# Patient Record
Sex: MALE | Race: WHITE | Employment: OTHER | URBAN - METROPOLITAN AREA
[De-identification: names, ages, dates, MRNs, and addresses within clinical notes are randomized per-mention and may not be internally consistent; named-entity substitution may affect disease eponyms.]

---

## 2017-01-13 DIAGNOSIS — E78.5 HYPERLIPIDEMIA LDL GOAL <100: ICD-10-CM

## 2017-01-13 DIAGNOSIS — E03.9 HYPOTHYROIDISM, UNSPECIFIED TYPE: ICD-10-CM

## 2017-01-13 DIAGNOSIS — Z11.59 NEED FOR HEPATITIS C SCREENING TEST: ICD-10-CM

## 2017-01-13 LAB
CHOLEST SERPL-MCNC: 227 MG/DL
HCV AB SERPL QL IA: NORMAL
HDLC SERPL-MCNC: 75 MG/DL
LDLC SERPL CALC-MCNC: 130 MG/DL
NONHDLC SERPL-MCNC: 152 MG/DL
TRIGL SERPL-MCNC: 110 MG/DL
TSH SERPL DL<=0.005 MIU/L-ACNC: 3.2 MU/L (ref 0.4–4)

## 2017-01-13 PROCEDURE — 36415 COLL VENOUS BLD VENIPUNCTURE: CPT | Performed by: INTERNAL MEDICINE

## 2017-01-13 PROCEDURE — 84443 ASSAY THYROID STIM HORMONE: CPT | Performed by: INTERNAL MEDICINE

## 2017-01-13 PROCEDURE — 80061 LIPID PANEL: CPT | Performed by: INTERNAL MEDICINE

## 2017-01-13 PROCEDURE — 86803 HEPATITIS C AB TEST: CPT | Performed by: INTERNAL MEDICINE

## 2017-01-20 ENCOUNTER — OFFICE VISIT (OUTPATIENT)
Dept: INTERNAL MEDICINE | Facility: CLINIC | Age: 67
End: 2017-01-20
Payer: COMMERCIAL

## 2017-01-20 VITALS
TEMPERATURE: 97.9 F | BODY MASS INDEX: 25.33 KG/M2 | DIASTOLIC BLOOD PRESSURE: 74 MMHG | WEIGHT: 187 LBS | HEART RATE: 75 BPM | HEIGHT: 72 IN | SYSTOLIC BLOOD PRESSURE: 138 MMHG | OXYGEN SATURATION: 96 %

## 2017-01-20 DIAGNOSIS — E03.9 HYPOTHYROIDISM, UNSPECIFIED TYPE: ICD-10-CM

## 2017-01-20 DIAGNOSIS — E78.5 HYPERLIPIDEMIA LDL GOAL <100: ICD-10-CM

## 2017-01-20 DIAGNOSIS — N18.30 CKD (CHRONIC KIDNEY DISEASE) STAGE 3, GFR 30-59 ML/MIN (H): ICD-10-CM

## 2017-01-20 DIAGNOSIS — I10 ESSENTIAL HYPERTENSION: ICD-10-CM

## 2017-01-20 PROCEDURE — 99214 OFFICE O/P EST MOD 30 MIN: CPT | Performed by: INTERNAL MEDICINE

## 2017-01-20 RX ORDER — PRAVASTATIN SODIUM 40 MG
40 TABLET ORAL DAILY
Qty: 90 TABLET | Refills: 3 | Status: SHIPPED | OUTPATIENT
Start: 2017-01-20 | End: 2017-10-13 | Stop reason: ALTCHOICE

## 2017-01-20 RX ORDER — LOSARTAN POTASSIUM 100 MG/1
100 TABLET ORAL DAILY
Qty: 90 TABLET | Refills: 3 | Status: SHIPPED | OUTPATIENT
Start: 2017-01-20 | End: 2018-03-09

## 2017-01-20 RX ORDER — LEVOTHYROXINE SODIUM 100 UG/1
100 TABLET ORAL DAILY
Qty: 90 TABLET | Refills: 3 | Status: SHIPPED | OUTPATIENT
Start: 2017-01-20 | End: 2018-04-07

## 2017-01-20 RX ORDER — HYDROCHLOROTHIAZIDE 25 MG/1
25 TABLET ORAL DAILY
Qty: 90 TABLET | Refills: 3 | Status: SHIPPED | OUTPATIENT
Start: 2017-01-20 | End: 2018-04-07

## 2017-01-20 NOTE — NURSING NOTE
Chief Complaint   Patient presents with     Results       Initial /82 mmHg  Pulse 75  Temp(Src) 97.9  F (36.6  C) (Oral)  Ht 6' (1.829 m)  Wt 187 lb (84.823 kg)  BMI 25.36 kg/m2  SpO2 96% Estimated body mass index is 25.36 kg/(m^2) as calculated from the following:    Height as of this encounter: 6' (1.829 m).    Weight as of this encounter: 187 lb (84.823 kg).  BP completed using cuff size: regular

## 2017-01-20 NOTE — PROGRESS NOTES
SUBJECTIVE:                                                    Agapito Fairbanks is a 66 year old male who presents to clinic today for the following health issues:      Lab results  Pt's past medical history, family history, habits, medications and allergies were reviewed with the patient today.  See snap shot for  HCM status. Most recent lab results reviewed with pt. Problem list and histories reviewed & adjusted, as indicated.  Additional history as below:    Denies chest pain, shortness of breath, abdominal pain, headache, vision changes or side effects with medications.  Patient ran out of losartan four days ago. Blood pressure elevated today off of the medication. Energy good. Did not ever start Pravastatin as previously discussed    GLC      106   12/2/2016  A1C      5.7   6/15/2015  CHOL      227   1/13/2017  LDL      130   1/13/2017  HDL       75   1/13/2017  TRIG      110   1/13/2017  CR     1.30   12/2/2016  ALT       29   12/2/2016  AST       15   12/2/2016  MICROL        5   11/24/2015  TSH     3.20   1/13/2017       No identified additional risks  [place] 10-year CHD Risk Score: 12% (13 Total Points)   Values used to calculate score:     Age: 66 years -- Points: 11     Total Cholesterol: 227 mg/dL -- Points: 1     HDL Cholesterol: 75 mg/dL -- Points: -1     Systolic BP (treated): 138 mmHg -- Points: 2     The patient is not a smoker. -- Points: 0     The patient has not been diagnosed with diabetes. -- Points: 0     The patient does not have a family history of CHD. -- Points:0     Additional ROS:   Constitutional, HEENT, Cardiovascular, Pulmonary, GI and , Neuro, MSK and Psych review of systems/symptoms are otherwise negative or unchanged from previous, except as noted above.      OBJECTIVE:  /82 mmHg  Pulse 75  Temp(Src) 97.9  F (36.6  C) (Oral)  Ht 6' (1.829 m)  Wt 187 lb (84.823 kg)  BMI 25.36 kg/m2  SpO2 96%   Estimated body mass index is 25.36 kg/(m^2) as calculated from the  following:    Height as of this encounter: 6' (1.829 m).    Weight as of this encounter: 187 lb (84.823 kg).  Eye: PERRL, EOMI  HENT: ear canals and TM's normal and nose and mouth without ulcers or lesions   Neck: no adenopathy. Thyroid normal to palpation. No bruits  Pulm: Lungs clear to auscultation   CV: Regular rates and rhythm  GI: Soft, nontender, Normal active bowel sounds, No hepatosplenomegaly or masses palpable  Ext: Peripheral pulses intact. No edema.  Neuro: Normal strength and tone, sensory exam grossly normal    Assessment/Plan:   1. Hyperlipidemia LDL goal <100   start statin therapy with CAD risk elevation. See plan discussion below  - pravastatin (PRAVACHOL) 40 MG tablet; Take 1 tablet (40 mg) by mouth daily  Dispense: 90 tablet; Refill: 3  - Lipid panel reflex to direct LDL; Future  - Hepatic panel; Future  - CK total; Future    2. Essential hypertension   blood pressure elevated today but off medication currently. Restart Losartan. Future BP recheck as below  - losartan (COZAAR) 100 MG tablet; Take 1 tablet (100 mg) by mouth daily  Dispense: 90 tablet; Refill: 3  - hydrochlorothiazide (HYDRODIURIL) 25 MG tablet; Take 1 tablet (25 mg) by mouth daily  Dispense: 90 tablet; Refill: 3  - Basic metabolic panel; Future    3. Hypothyroidism, unspecified type   Stable. Continue current medication  - levothyroxine (SYNTHROID/LEVOTHROID) 100 MCG tablet; Take 1 tablet (100 mcg) by mouth daily  Dispense: 90 tablet; Refill: 3    4. CKD (chronic kidney disease) stage 3, GFR 30-59 ml/min   stable. Future labs ordered  - Basic metabolic panel; Future      Plan discussion:  Pravastatin 40mg tab, 1 tab daily in PM  Restart Losartan for blood pressure control.  Continue other medications  Fasting labs in 4-6 weeks for cholesterol, liver. Call earlier  If side effects with med  Nonfasting kidney lab mid July. See me a few days later to review  results and blood pressure control back on Losartan       Los Love,  MD  Internal Medicine Department  [facility]

## 2017-01-20 NOTE — MR AVS SNAPSHOT
After Visit Summary   1/20/2017    Agapito Fairbanks    MRN: [id number]           Patient Information     Date Of Birth          1950        Visit Information        Provider Department      1/20/2017 10:30 AM Los Love MD [facility]        Today's Diagnoses     Advanced directives, counseling/discussion    -  1     Hyperlipidemia LDL goal <100         Essential hypertension         Hypothyroidism, unspecified type         CKD (chronic kidney disease) stage 3, GFR 30-59 ml/min           Care Instructions    Pravastatin 40mg tab, 1 tab daily in PM  Fasting labs in 4-6 weeks for cholesterol, liver. Call earlier  If side effects with med  Nonfasting kidney lab mid July. See me a few days later to review  Results and blood pressure              Follow-ups after your visit        Future tests that were ordered for you today     Open Future Orders        Priority Expected Expires Ordered    Lipid panel reflex to direct LDL Routine 2/19/2017 1/20/2018 1/20/2017    Hepatic panel Routine 2/19/2017 1/20/2018 1/20/2017    CK total Routine 2/19/2017 1/20/2018 1/20/2017    Basic metabolic panel Routine 7/19/2017 11/16/2017 1/20/2017            Who to contact     If you have questions or need follow up information about today's clinic visit or your schedule please contact [facility] directly at [phone].  Normal or non-critical lab and imaging results will be communicated to you by MyChart, letter or phone within 4 business days after the clinic has received the results. If you do not hear from us within 7 days, please contact the clinic through MyChart or phone. If you have a critical or abnormal lab result, we will notify you by phone as soon as possible.  Submit refill requests through [employer] or call your pharmacy and they will forward the refill request to us. Please allow 3 business days for your refill to be completed.          Additional  "Information About Your Visit        MyChart Information     esolidar lets you send messages to your doctor, view your test results, renew your prescriptions, schedule appointments and more. To sign up, go to [email or web address] . Click on \"Log in\" on the left side of the screen, which will take you to the Welcome page. Then click on \"Sign up Now\" on the right side of the page.     You will be asked to enter the access code listed below, as well as some personal information. Please follow the directions to create your username and password.     Your access code is: [email or web address]  Expires: 2017 11:00 AM     Your access code will  in 90 days. If you need help or a new code, please call your [facility] clinic or [phone].        Care EveryWhere ID     This is your Care EveryWhere ID. This could be used by other organizations to access your [facility] medical records  [id number]        Your Vitals Were     Pulse Temperature Height BMI (Body Mass Index) Pulse Oximetry       75 97.9  F (36.6  C) (Oral) 6' (1.829 m) 25.36 kg/m2 96%        Blood Pressure from Last 3 Encounters:   17 138/74   16 142/84   16 126/82    Weight from Last 3 Encounters:   17 187 lb (84.823 kg)   16 187 lb (84.823 kg)   16 186 lb 4.8 oz (84.505 kg)                 Where to get your medicines      These medications were sent to [facility] [address] [place] [address] - [address] [facility]  [address] [facility] [address]     Phone:  [phone]    - hydrochlorothiazide 25 MG tablet  - levothyroxine 100 MCG tablet  - losartan 100 MG tablet  - pravastatin 40 MG tablet       Primary Care Provider Office Phone # Fax #    Los Love [phone]       [facility] [address]  [facility] [address]        Thank you!     Thank you for choosing [facility]  for your care. Our goal is always to provide you with excellent care. Hearing " back from our patients is one way we can continue to improve our services. Please take a few minutes to complete the written survey that you may receive in the mail after your visit with us. Thank you!             Your Updated Medication List - Protect others around you: Learn how to safely use, store and throw away your medicines at www.disposemymeds.org.          This list is accurate as of: 1/20/17 11:20 AM.  Always use your most recent med list.                   Brand Name Dispense Instructions for use    hydrochlorothiazide 25 MG tablet    HYDRODIURIL    90 tablet    Take 1 tablet (25 mg) by mouth daily       levothyroxine 100 MCG tablet    SYNTHROID/LEVOTHROID    90 tablet    Take 1 tablet (100 mcg) by mouth daily       losartan 100 MG tablet    COZAAR    90 tablet    Take 1 tablet (100 mg) by mouth daily       MULTI VITAMIN MENS PO      1 TABLET DAILY       pravastatin 40 MG tablet    PRAVACHOL    90 tablet    Take 1 tablet (40 mg) by mouth daily       triamcinolone 0.1 % cream    KENALOG    80 g    Apply sparingly to affected area three times daily as needed

## 2017-04-28 DIAGNOSIS — L30.9 DERMATITIS: ICD-10-CM

## 2017-04-28 RX ORDER — TRIAMCINOLONE ACETONIDE 1 MG/G
CREAM TOPICAL
Qty: 80 G | Refills: 3 | Status: SHIPPED | OUTPATIENT
Start: 2017-04-28 | End: 2017-12-11

## 2017-05-08 ENCOUNTER — OFFICE VISIT (OUTPATIENT)
Dept: URGENT CARE | Facility: URGENT CARE | Age: 67
End: 2017-05-08
Payer: COMMERCIAL

## 2017-05-08 VITALS
WEIGHT: 185.8 LBS | OXYGEN SATURATION: 96 % | DIASTOLIC BLOOD PRESSURE: 90 MMHG | SYSTOLIC BLOOD PRESSURE: 140 MMHG | TEMPERATURE: 98.1 F | BODY MASS INDEX: 25.2 KG/M2 | HEART RATE: 87 BPM

## 2017-05-08 DIAGNOSIS — L30.9 DERMATITIS: ICD-10-CM

## 2017-05-08 DIAGNOSIS — R42 LIGHTHEADEDNESS: Primary | ICD-10-CM

## 2017-05-08 DIAGNOSIS — N18.30 CKD (CHRONIC KIDNEY DISEASE) STAGE 3, GFR 30-59 ML/MIN (H): ICD-10-CM

## 2017-05-08 DIAGNOSIS — E87.1 HYPONATREMIA: ICD-10-CM

## 2017-05-08 DIAGNOSIS — R42 DIZZINESS: ICD-10-CM

## 2017-05-08 DIAGNOSIS — L29.9 ITCHING: ICD-10-CM

## 2017-05-08 LAB
ALBUMIN SERPL-MCNC: 4 G/DL (ref 3.4–5)
ALP SERPL-CCNC: 78 U/L (ref 40–150)
ALT SERPL W P-5'-P-CCNC: 26 U/L (ref 0–70)
ANION GAP SERPL CALCULATED.3IONS-SCNC: 8 MMOL/L (ref 3–14)
AST SERPL W P-5'-P-CCNC: 17 U/L (ref 0–45)
BASOPHILS # BLD AUTO: 0 10E9/L (ref 0–0.2)
BASOPHILS NFR BLD AUTO: 0.4 %
BILIRUB SERPL-MCNC: 1.2 MG/DL (ref 0.2–1.3)
BUN SERPL-MCNC: 16 MG/DL (ref 7–30)
CALCIUM SERPL-MCNC: 8.9 MG/DL (ref 8.5–10.1)
CHLORIDE SERPL-SCNC: 93 MMOL/L (ref 94–109)
CO2 SERPL-SCNC: 29 MMOL/L (ref 20–32)
CREAT SERPL-MCNC: 1.31 MG/DL (ref 0.66–1.25)
DIFFERENTIAL METHOD BLD: NORMAL
EOSINOPHIL # BLD AUTO: 0.1 10E9/L (ref 0–0.7)
EOSINOPHIL NFR BLD AUTO: 1.6 %
ERYTHROCYTE [DISTWIDTH] IN BLOOD BY AUTOMATED COUNT: 12.9 % (ref 10–15)
GFR SERPL CREATININE-BSD FRML MDRD: 54 ML/MIN/1.7M2
GLUCOSE SERPL-MCNC: 104 MG/DL (ref 70–99)
HCT VFR BLD AUTO: 41.5 % (ref 40–53)
HGB BLD-MCNC: 14.2 G/DL (ref 13.3–17.7)
LYMPHOCYTES # BLD AUTO: 1.2 10E9/L (ref 0.8–5.3)
LYMPHOCYTES NFR BLD AUTO: 17.8 %
MCH RBC QN AUTO: 32.2 PG (ref 26.5–33)
MCHC RBC AUTO-ENTMCNC: 34.2 G/DL (ref 31.5–36.5)
MCV RBC AUTO: 94 FL (ref 78–100)
MONOCYTES # BLD AUTO: 0.7 10E9/L (ref 0–1.3)
MONOCYTES NFR BLD AUTO: 10.5 %
NEUTROPHILS # BLD AUTO: 4.9 10E9/L (ref 1.6–8.3)
NEUTROPHILS NFR BLD AUTO: 69.7 %
PLATELET # BLD AUTO: 263 10E9/L (ref 150–450)
POTASSIUM SERPL-SCNC: 3.7 MMOL/L (ref 3.4–5.3)
PROT SERPL-MCNC: 7.5 G/DL (ref 6.8–8.8)
RBC # BLD AUTO: 4.41 10E12/L (ref 4.4–5.9)
SODIUM SERPL-SCNC: 130 MMOL/L (ref 133–144)
WBC # BLD AUTO: 7 10E9/L (ref 4–11)

## 2017-05-08 PROCEDURE — 85025 COMPLETE CBC W/AUTO DIFF WBC: CPT | Performed by: PHYSICIAN ASSISTANT

## 2017-05-08 PROCEDURE — 99214 OFFICE O/P EST MOD 30 MIN: CPT | Performed by: PHYSICIAN ASSISTANT

## 2017-05-08 PROCEDURE — 80053 COMPREHEN METABOLIC PANEL: CPT | Performed by: PHYSICIAN ASSISTANT

## 2017-05-08 PROCEDURE — 36415 COLL VENOUS BLD VENIPUNCTURE: CPT | Performed by: PHYSICIAN ASSISTANT

## 2017-05-08 RX ORDER — TRIAMCINOLONE ACETONIDE 1 MG/G
CREAM TOPICAL
Qty: 30 G | Refills: 0
Start: 2017-05-08 | End: 2017-07-14

## 2017-05-08 NOTE — PATIENT INSTRUCTIONS
Discharge Instructions for Hyponatremia  You were diagnosed with hyponatremia, which means your blood level of sodium (salt) is too low. Salt is needed for the body and brain to work. Very low blood levels of sodium can be fatal. Symptoms can include headache, confusion, fatigue, muscle cramps, hallucinations, seizures, and coma. You have been treated to raise your blood levels of sodium. The following instructions will help you care for yourself at home as you have been instructed.  Home care    Limit your intake of fluids. Drink only the amounts directed by your healthcare provider.    Ask your healthcare provider what you should use to replace fluids if you are throwing up.    Keep all follow-up appointments. Your provider needs to watch your condition closely.  To help prevent hyponatremia:    Take all medicines exactly as directed. Certain medicines can lower blood sodium levels.    If you have done something that makes you sweat a lot, drink fluids that contain salt and other electrolytes.     Tell all healthcare providers what medicines you take. Mention all prescription and over-the-counter drugs and herbs.    Have your sodium levels checked often. This is vital if you take a diuretic (medicine that helps your body get rid of water).  Follow-up  Schedule a follow-up visit as directed.  When to call your healthcare provider  Call your provider right away if you have any of the following:    Severe tiredness    Fainting    Dizziness    Loss of appetite    Nausea or vomiting    Confusion or forgetfullness    Muscle spasms, cramping, or twitching    Seizures    Gait disturbances     5677-1869 [employer]. [address] [place] [address], [place] [address]. All rights reserved. This information is not intended as a substitute for professional medical care. Always follow your healthcare professional's instructions.        Hyponatremia  Hyponatremia means low sodium levels in the blood. This condition most  often occurs after prolonged vomiting or diarrhea, which causes your body to lose too much water and sodium. It can also result from drinking excess amounts of water or the use of diuretics (water pills).  Mild hyponatremia causes no symptoms. It is only discovered with a blood test. As sodium levels in the blood decreases, symptoms begin to appear. This includes weakness, confusion, muscle cramping and seizures.  Home care    Reduce your daily water intake until the problem is corrected.    If you have been taking diuretics, you may be asked to stop taking them for a short time.    If you are having symptoms of weakness or confusion, do not drive or operate dangerous machinery until symptoms resolve.  Follow-up care  Follow up with your healthcare provider for a repeat blood test within the next week or as advised by our staff.  When to seek medical advice  Call your healthcare provider if any of the following occur:    Increasing weakness    Dizziness    Irregular heartbeat, extra beats or very fast heart rate    Increasing confusion    Fainting or loss of consciousness    2447-9170 [employer]. [address] [place] [address], [place] [address]. All rights reserved. This information is not intended as a substitute for professional medical care. Always follow your healthcare professional's instructions.

## 2017-05-08 NOTE — NURSING NOTE
Chief Complaint   Patient presents with     Derm Problem     Pt c/o itching on his lower back and bottom.     Urgent Care     Pt reports feeling lightheaded intermittently X 1 week. Pt requesting blood work done.     Abrasion     Pt was accidently slashed with a weed chris on both legs on 5/6/17.       Initial /90  Pulse 87  Temp 98.1  F (36.7  C)  Wt 185 lb 12.8 oz (84.3 kg)  SpO2 96%  BMI 25.2 kg/m2 Estimated body mass index is 25.2 kg/(m^2) as calculated from the following:    Height as of 1/20/17: 6' (1.829 m).    Weight as of this encounter: 185 lb 12.8 oz (84.3 kg).  Medication Reconciliation: complete

## 2017-05-08 NOTE — MR AVS SNAPSHOT
After Visit Summary   5/8/2017    Agapito Fairbanks    MRN: [id number]           Patient Information     Date Of Birth          1950        Visit Information        Provider Department      5/8/2017 3:05 PM Kendrick Rojas PA-C [facility]        Today's Diagnoses     Lightheadedness    -  1    Itching        Dizziness        Dermatitis        Hyponatremia        CKD (chronic kidney disease) stage 3, GFR 30-59 ml/min          Care Instructions      Discharge Instructions for Hyponatremia  You were diagnosed with hyponatremia, which means your blood level of sodium (salt) is too low. Salt is needed for the body and brain to work. Very low blood levels of sodium can be fatal. Symptoms can include headache, confusion, fatigue, muscle cramps, hallucinations, seizures, and coma. You have been treated to raise your blood levels of sodium. The following instructions will help you care for yourself at home as you have been instructed.  Home care    Limit your intake of fluids. Drink only the amounts directed by your healthcare provider.    Ask your healthcare provider what you should use to replace fluids if you are throwing up.    Keep all follow-up appointments. Your provider needs to watch your condition closely.  To help prevent hyponatremia:    Take all medicines exactly as directed. Certain medicines can lower blood sodium levels.    If you have done something that makes you sweat a lot, drink fluids that contain salt and other electrolytes.     Tell all healthcare providers what medicines you take. Mention all prescription and over-the-counter drugs and herbs.    Have your sodium levels checked often. This is vital if you take a diuretic (medicine that helps your body get rid of water).  Follow-up  Schedule a follow-up visit as directed.  When to call your healthcare provider  Call your provider right away if you have any of the following:    Severe  tiredness    Fainting    Dizziness    Loss of appetite    Nausea or vomiting    Confusion or forgetfullness    Muscle spasms, cramping, or twitching    Seizures    Gait disturbances     7491-3199 [employer]. [address] [place] [address]. All rights reserved. This information is not intended as a substitute for professional medical care. Always follow your healthcare professional's instructions.        Hyponatremia  Hyponatremia means low sodium levels in the blood. This condition most often occurs after prolonged vomiting or diarrhea, which causes your body to lose too much water and sodium. It can also result from drinking excess amounts of water or the use of diuretics (water pills).  Mild hyponatremia causes no symptoms. It is only discovered with a blood test. As sodium levels in the blood decreases, symptoms begin to appear. This includes weakness, confusion, muscle cramping and seizures.  Home care    Reduce your daily water intake until the problem is corrected.    If you have been taking diuretics, you may be asked to stop taking them for a short time.    If you are having symptoms of weakness or confusion, do not drive or operate dangerous machinery until symptoms resolve.  Follow-up care  Follow up with your healthcare provider for a repeat blood test within the next week or as advised by our staff.  When to seek medical advice  Call your healthcare provider if any of the following occur:    Increasing weakness    Dizziness    Irregular heartbeat, extra beats or very fast heart rate    Increasing confusion    Fainting or loss of consciousness    4843-2423 [employer]. [address] [place] [address]. All rights reserved. This information is not intended as a substitute for professional medical care. Always follow your healthcare professional's instructions.              Follow-ups after your visit        Who to contact     If you have questions or need  "follow up information about today's clinic visit or your schedule please contact [facility] directly at [phone].  Normal or non-critical lab and imaging results will be communicated to you by MyChart, letter or phone within 4 business days after the clinic has received the results. If you do not hear from us within 7 days, please contact the clinic through [employer] or phone. If you have a critical or abnormal lab result, we will notify you by phone as soon as possible.  Submit refill requests through [employer] or call your pharmacy and they will forward the refill request to us. Please allow 3 business days for your refill to be completed.          Additional Information About Your Visit        [employer] Information     [employer]ove lets you send messages to your doctor, view your test results, renew your prescriptions, schedule appointments and more. To sign up, go to [email or web address] . Click on \"Log in\" on the left side of the screen, which will take you to the Welcome page. Then click on \"Sign up Now\" on the right side of the page.     You will be asked to enter the access code listed below, as well as some personal information. Please follow the directions to create your username and password.     Your access code is: [email or web address]  Expires: 2017  4:16 PM     Your access code will  in 90 days. If you need help or a new code, please call your [place] clinic or [phone].        Care EveryWhere ID     This is your Care EveryWhere ID. This could be used by other organizations to access your [place] medical records  [id number]        Your Vitals Were     Pulse Temperature Pulse Oximetry BMI (Body Mass Index)          87 98.1  F (36.7  C) 96% 25.2 kg/m2         Blood Pressure from Last 3 Encounters:   17 140/90   17 138/74   16 142/84    Weight from Last 3 Encounters:   17 185 lb 12.8 oz (84.3 kg)   17 187 lb (84.8 kg)   16 187 lb " (84.8 kg)              We Performed the Following     CBC with platelets differential     Comprehensive metabolic panel          Today's Medication Changes          These changes are accurate as of: 5/8/17  4:16 PM.  If you have any questions, ask your nurse or doctor.               These medicines have changed or have updated prescriptions.        Dose/Directions    pravastatin 40 MG tablet   Commonly known as:  PRAVACHOL   This may have changed:    - when to take this  - reasons to take this   Used for:  Hyperlipidemia LDL goal <100        Dose:  40 mg   Take 1 tablet (40 mg) by mouth daily   Quantity:  90 tablet   Refills:  3       * triamcinolone 0.1 % cream   Commonly known as:  KENALOG   This may have changed:  Another medication with the same name was added. Make sure you understand how and when to take each.   Used for:  Dermatitis   Changed by:  Los Love MD        APPLY SPARINGLY TO AFFECTED AREA THREE TIMES DAILY AS NEEDED   Quantity:  80 g   Refills:  3       * triamcinolone 0.1 % cream   Commonly known as:  KENALOG   This may have changed:  You were already taking a medication with the same name, and this prescription was added. Make sure you understand how and when to take each.   Used for:  Dermatitis   Changed by:  Kendrick Rojas PA-C        Apply sparingly to affected area three times daily for 14 days.   Quantity:  30 g   Refills:  0       * Notice:  This list has 2 medication(s) that are the same as other medications prescribed for you. Read the directions carefully, and ask your doctor or other care provider to review them with you.         Where to get your medicines      Some of these will need a paper prescription and others can be bought over the counter.  Ask your nurse if you have questions.     You don't need a prescription for these medications     triamcinolone 0.1 % cream                Primary Care Provider Office Phone # Fax #    Los Love [phone]        [facility] [address] [facility] [address]        Thank you!     Thank you for choosing [facility]  for your care. Our goal is always to provide you with excellent care. Hearing back from our patients is one way we can continue to improve our services. Please take a few minutes to complete the written survey that you may receive in the mail after your visit with us. Thank you!             Your Updated Medication List - Protect others around you: Learn how to safely use, store and throw away your medicines at www.disposemymeds.org.          This list is accurate as of: 5/8/17  4:16 PM.  Always use your most recent med list.                   Brand Name Dispense Instructions for use    hydrochlorothiazide 25 MG tablet    HYDRODIURIL    90 tablet    Take 1 tablet (25 mg) by mouth daily       levothyroxine 100 MCG tablet    SYNTHROID/LEVOTHROID    90 tablet    Take 1 tablet (100 mcg) by mouth daily       losartan 100 MG tablet    COZAAR    90 tablet    Take 1 tablet (100 mg) by mouth daily       MULTI VITAMIN MENS PO      1 TABLET DAILY       pravastatin 40 MG tablet    PRAVACHOL    90 tablet    Take 1 tablet (40 mg) by mouth daily       * triamcinolone 0.1 % cream    KENALOG    80 g    APPLY SPARINGLY TO AFFECTED AREA THREE TIMES DAILY AS NEEDED       * triamcinolone 0.1 % cream    KENALOG    30 g    Apply sparingly to affected area three times daily for 14 days.       * Notice:  This list has 2 medication(s) that are the same as other medications prescribed for you. Read the directions carefully, and ask your doctor or other care provider to review them with you.

## 2017-05-08 NOTE — PROGRESS NOTES
SUBJECTIVE:   Agapito Fairbanks is a 67 year old male presenting with a chief complaint of lightheadedness and itching of skin.  Onset of symptoms was 1 day(s) ago.  Course of illness is same.    Severity moderate  Current and Associated symptoms: lightheaded  Treatment measures tried include none.  Predisposing factors include hx of Anemia.    Past Medical History:   Diagnosis Date     Acute gastritis with hemorrhage 11/02     Basal cell carcinoma, leg      left pretibial area     CKD (chronic kidney disease) stage 3, GFR 30-59 ml/min     creat baseline approx 1.4     Hearing loss      Helicobacter pylori (H. 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     Mild major depression (H)      Other and unspecified malignant neoplasm of skin of other and unspecified parts of face      Basal Cell CA nasal area 4/04, chest 3/05, right chest 10/09     Other testicular hypofunction      Squamous cell carcinoma (H)  Jan 2012     RLE s/p excision     Tobacco use disorder     quit 1998     Unspecified cataract     left     Unspecified essential hypertension      Unspecified retinal detachment     Bilateral        Allergies   Allergen Reactions     Simvastatin      myalgias     Lisinopril Rash     rash         Social History   Substance Use Topics     Smoking status: Former Smoker     Packs/day: 1.50     Years: 30.00     Quit date: 1/1/1998     Smokeless tobacco: Never Used     Alcohol use No       ROS:  CONSTITUTIONAL:NEGATIVE for fever, chills, change in weight  INTEGUMENTARY/SKIN: POSITIVE for itchy rash on arms bilaterally  ENT/MOUTH: NEGATIVE for ear, mouth and throat problems  RESP:NEGATIVE for significant cough or SOB  CV: NEGATIVE for chest pain, palpitations or  peripheral edema  GI: NEGATIVE for nausea, abdominal pain, heartburn, or change in bowel habits  MUSCULOSKELETAL: NEGATIVE for significant arthralgias or myalgia  NEURO: Positive for lightheadedness    OBJECTIVE  :/90  Pulse 87  Temp 98.1  F (36.7  C)  Wt 185 lb 12.8 oz (84.3 kg)  SpO2 96%  BMI 25.2 kg/m2  GENERAL APPEARANCE: healthy, alert and no distress  EYES: EOMI,  PERRL, conjunctiva clear  HENT: ear canals and TM's normal.  Nose and mouth without ulcers, erythema or lesions  NECK: supple, nontender, no lymphadenopathy  RESP: lungs clear to auscultation - no rales, rhonchi or wheezes  CV: regular rates and rhythm, normal S1 S2, no murmur noted  NEURO: Normal strength and tone, sensory exam grossly normal,  normal speech and mentation  SKIN: Positive for dermatitis of skin, both arms    Results for orders placed or performed in visit on 05/08/17   CBC with platelets differential   Result Value Ref Range    WBC 7.0 4.0 - 11.0 10e9/L    RBC Count 4.41 4.4 - 5.9 10e12/L    Hemoglobin 14.2 13.3 - 17.7 g/dL    Hematocrit 41.5 40.0 - 53.0 %    MCV 94 78 - 100 fl    MCH 32.2 26.5 - 33.0 pg    MCHC 34.2 31.5 - 36.5 g/dL    RDW 12.9 10.0 - 15.0 %    Platelet Count 263 150 - 450 10e9/L    Diff Method Automated Method     % Neutrophils 69.7 %    % Lymphocytes 17.8 %    % Monocytes 10.5 %    % Eosinophils 1.6 %    % Basophils 0.4 %    Absolute Neutrophil 4.9 1.6 - 8.3 10e9/L    Absolute Lymphocytes 1.2 0.8 - 5.3 10e9/L    Absolute Monocytes 0.7 0.0 - 1.3 10e9/L    Absolute Eosinophils 0.1 0.0 - 0.7 10e9/L    Absolute Basophils 0.0 0.0 - 0.2 10e9/L   Comprehensive metabolic panel   Result Value Ref Range    Sodium 130 (L) 133 - 144 mmol/L    Potassium 3.7 3.4 - 5.3 mmol/L    Chloride 93 (L) 94 - 109 mmol/L    Carbon Dioxide 29 20 - 32 mmol/L    Anion Gap 8 3 - 14 mmol/L    Glucose 104 (H) 70 - 99 mg/dL    Urea Nitrogen 16 7 - 30 mg/dL    Creatinine 1.31 (H) 0.66 - 1.25 mg/dL    GFR Estimate 54 (L) >60 mL/min/1.7m2     GFR Estimate If Black 66 >60 mL/min/1.7m2    Calcium 8.9 8.5 - 10.1 mg/dL    Bilirubin Total 1.2 0.2 - 1.3 mg/dL    Albumin 4.0 3.4 - 5.0 g/dL    Protein Total 7.5 6.8 - 8.8 g/dL    Alkaline Phosphatase 78 40 - 150 U/L    ALT 26 0 - 70 U/L    AST 17 0 - 45 U/L       ASSESSMENT/PLAN:      ICD-10-CM    1. Lightheadedness R42 CBC with platelets differential     Comprehensive metabolic panel   2. Itching L29.9    3. Dizziness R42    4. Dermatitis L30.9 triamcinolone (KENALOG) 0.1 % cream   5. Hyponatremia E87.1    6. CKD (chronic kidney disease) stage 3, GFR 30-59 ml/min N18.3        Hyponatremia info given to patient  Schedule follow up appt with PCP in 1 week, sooner if symptoms worsen

## 2017-05-16 ENCOUNTER — OFFICE VISIT (OUTPATIENT)
Dept: INTERNAL MEDICINE | Facility: CLINIC | Age: 67
End: 2017-05-16
Payer: COMMERCIAL

## 2017-05-16 VITALS
TEMPERATURE: 97.5 F | HEIGHT: 72 IN | SYSTOLIC BLOOD PRESSURE: 130 MMHG | DIASTOLIC BLOOD PRESSURE: 84 MMHG | WEIGHT: 186.3 LBS | OXYGEN SATURATION: 98 % | HEART RATE: 75 BPM | BODY MASS INDEX: 25.23 KG/M2

## 2017-05-16 DIAGNOSIS — R42 DIZZINESS: ICD-10-CM

## 2017-05-16 DIAGNOSIS — E87.1 HYPONATREMIA: Primary | ICD-10-CM

## 2017-05-16 DIAGNOSIS — I10 ESSENTIAL HYPERTENSION: ICD-10-CM

## 2017-05-16 LAB
ANION GAP SERPL CALCULATED.3IONS-SCNC: 8 MMOL/L (ref 3–14)
BUN SERPL-MCNC: 15 MG/DL (ref 7–30)
CALCIUM SERPL-MCNC: 8.9 MG/DL (ref 8.5–10.1)
CHLORIDE SERPL-SCNC: 95 MMOL/L (ref 94–109)
CO2 SERPL-SCNC: 30 MMOL/L (ref 20–32)
CREAT SERPL-MCNC: 1.34 MG/DL (ref 0.66–1.25)
GFR SERPL CREATININE-BSD FRML MDRD: 53 ML/MIN/1.7M2
GLUCOSE SERPL-MCNC: 111 MG/DL (ref 70–99)
POTASSIUM SERPL-SCNC: 3.9 MMOL/L (ref 3.4–5.3)
SODIUM SERPL-SCNC: 133 MMOL/L (ref 133–144)

## 2017-05-16 PROCEDURE — 36415 COLL VENOUS BLD VENIPUNCTURE: CPT | Performed by: INTERNAL MEDICINE

## 2017-05-16 PROCEDURE — 99213 OFFICE O/P EST LOW 20 MIN: CPT | Performed by: INTERNAL MEDICINE

## 2017-05-16 PROCEDURE — 80048 BASIC METABOLIC PNL TOTAL CA: CPT | Performed by: INTERNAL MEDICINE

## 2017-05-16 NOTE — MR AVS SNAPSHOT
"              After Visit Summary   2017    Agapito Fairbanks    MRN: [id number]           Patient Information     Date Of Birth          1950        Visit Information        Provider Department      2017 10:40 AM Aureliano Canela MD [facility]        Today's Diagnoses     Hyponatremia    -  1    Essential hypertension        Dizziness           Follow-ups after your visit        Who to contact     If you have questions or need follow up information about today's clinic visit or your schedule please contact [facility] directly at [phone].  Normal or non-critical lab and imaging results will be communicated to you by Afraxish[employer], letter or phone within 4 business days after the clinic has received the results. If you do not hear from us within 7 days, please contact the clinic through Afraxishart or phone. If you have a critical or abnormal lab result, we will notify you by phone as soon as possible.  Submit refill requests through [employer] or call your pharmacy and they will forward the refill request to us. Please allow 3 business days for your refill to be completed.          Additional Information About Your Visit        MyChart Information     [employer] lets you send messages to your doctor, view your test results, renew your prescriptions, schedule appointments and more. To sign up, go to [email or web address] . Click on \"Log in\" on the left side of the screen, which will take you to the Welcome page. Then click on \"Sign up Now\" on the right side of the page.     You will be asked to enter the access code listed below, as well as some personal information. Please follow the directions to create your username and password.     Your access code is: [email or web address]  Expires: 2017  4:16 PM     Your access code will  in 90 days. If you need help or a new code, please call your [facility] or [phone].        Care EveryWhere ID     This is " your Care EveryWhere ID. This could be used by other organizations to access your [facility] medical records  [id number]        Your Vitals Were     Pulse Temperature Height Pulse Oximetry BMI (Body Mass Index)       75 97.5  F (36.4  C) (Oral) 6' (1.829 m) 98% 25.27 kg/m2        Blood Pressure from Last 3 Encounters:   05/16/17 130/84   05/08/17 140/90   01/20/17 138/74    Weight from Last 3 Encounters:   05/16/17 186 lb 4.8 oz (84.5 kg)   05/08/17 185 lb 12.8 oz (84.3 kg)   01/20/17 187 lb (84.8 kg)              We Performed the Following     Basic metabolic panel          Today's Medication Changes          These changes are accurate as of: 5/16/17 11:19 AM.  If you have any questions, ask your nurse or doctor.               These medicines have changed or have updated prescriptions.        Dose/Directions    pravastatin 40 MG tablet   Commonly known as:  PRAVACHOL   This may have changed:    - when to take this  - reasons to take this   Used for:  Hyperlipidemia LDL goal <100        Dose:  40 mg   Take 1 tablet (40 mg) by mouth daily   Quantity:  90 tablet   Refills:  3                Primary Care Provider Office Phone # Fax #    Los Love [phone]       [facility] [address] [facility] [address]        Thank you!     Thank you for choosing [facility]  for your care. Our goal is always to provide you with excellent care. Hearing back from our patients is one way we can continue to improve our services. Please take a few minutes to complete the written survey that you may receive in the mail after your visit with us. Thank you!             Your Updated Medication List - Protect others around you: Learn how to safely use, store and throw away your medicines at www.disposemymeds.org.          This list is accurate as of: 5/16/17 11:19 AM.  Always use your most recent med list.                   Brand Name Dispense Instructions for use     hydrochlorothiazide 25 MG tablet    HYDRODIURIL    90 tablet    Take 1 tablet (25 mg) by mouth daily       levothyroxine 100 MCG tablet    SYNTHROID/LEVOTHROID    90 tablet    Take 1 tablet (100 mcg) by mouth daily       losartan 100 MG tablet    COZAAR    90 tablet    Take 1 tablet (100 mg) by mouth daily       MULTI VITAMIN MENS PO      1 TABLET DAILY       pravastatin 40 MG tablet    PRAVACHOL    90 tablet    Take 1 tablet (40 mg) by mouth daily       * triamcinolone 0.1 % cream    KENALOG    80 g    APPLY SPARINGLY TO AFFECTED AREA THREE TIMES DAILY AS NEEDED       * triamcinolone 0.1 % cream    KENALOG    30 g    Apply sparingly to affected area three times daily for 14 days.       * Notice:  This list has 2 medication(s) that are the same as other medications prescribed for you. Read the directions carefully, and ask your doctor or other care provider to review them with you.

## 2017-05-16 NOTE — NURSING NOTE
Chief Complaint   Patient presents with     Hospital F/U       Initial /88  Pulse 75  Temp 97.5  F (36.4  C) (Oral)  Ht 6' (1.829 m)  Wt 186 lb 4.8 oz (84.5 kg)  SpO2 98%  BMI 25.27 kg/m2 Estimated body mass index is 25.27 kg/(m^2) as calculated from the following:    Height as of this encounter: 6' (1.829 m).    Weight as of this encounter: 186 lb 4.8 oz (84.5 kg).  Medication Reconciliation: complete

## 2017-05-16 NOTE — LETTER
[facility]  [address]  [address]  [phone]        Agapito Fairbanks  [address] [facility] [id number]      5/17/2017        Dear Mr. Villaloni ARECHIGA Fairbanks:    I am writing to inform you of the results of the laboratory tests you had done recently.    The results of your recent labs are as noted.   Electrolytes: NORMAL (Sodium level is back to normal)      Thank you for allowing me to participate in your care. If you have any further questions or problems, please contact me via our nurse line at [phone].    Sincerely,          Aureliano Canela MD  Department of Internal Medicine  [facility]

## 2017-05-16 NOTE — PROGRESS NOTES
"  SUBJECTIVE:                                                    Agapito Fairbanks is a 67 year old male who presents to clinic today for the following health issues:      ED/UC Followup:    Facility:  [facility]  Date of visit: 5-8-17  Reason for visit: Lightheadedness, dizzy  Current Status: stable     Pt states he feels pretty much \"fine\" today. Came back in because they told him to and due to the low Na.  Hasn't changed anything since his visit but notes no further feeling of dizziness.    Problem list and histories reviewed & adjusted, as indicated.  Additional history: as documented    Labs reviewed in EPIC    Reviewed and updated as needed this visit by clinical staff  Allergies       Reviewed and updated as needed this visit by Provider       ROS:  Constitutional, HEENT, cardiovascular, pulmonary, gi and gu systems are negative, except as otherwise noted.    OBJECTIVE:                                                    /84  Pulse 75  Temp 97.5  F (36.4  C) (Oral)  Ht 6' (1.829 m)  Wt 186 lb 4.8 oz (84.5 kg)  SpO2 98%  BMI 25.27 kg/m2  Body mass index is 25.27 kg/(m^2).  GENERAL APPEARANCE: alert and no distress  HENT: nose and mouth without ulcers or lesions  NECK: no adenopathy, no asymmetry, masses, or scars and thyroid normal to palpation  RESP: lungs clear to auscultation - no rales, rhonchi or wheezes  CV: regular rates and rhythm, normal S1 S2, no S3 or S4 and no murmur, click or rub    Diagnostic test results:  Results for orders placed or performed in visit on 05/16/17 (from the past 24 hour(s))   Basic metabolic panel   Result Value Ref Range    Sodium 133 133 - 144 mmol/L    Potassium 3.9 3.4 - 5.3 mmol/L    Chloride 95 94 - 109 mmol/L    Carbon Dioxide 30 20 - 32 mmol/L    Anion Gap 8 3 - 14 mmol/L    Glucose 111 (H) 70 - 99 mg/dL    Urea Nitrogen 15 7 - 30 mg/dL    Creatinine 1.34 (H) 0.66 - 1.25 mg/dL    GFR Estimate 53 (L) >60 mL/min/1.7m2    GFR Estimate If Black 64 >60 " mL/min/1.7m2    Calcium 8.9 8.5 - 10.1 mg/dL        ASSESSMENT/PLAN:                                                    1. Hyponatremia  Resolved- continue meds  - Basic metabolic panel    2. Essential hypertension  Borderline- on recheck here it did go down some into the lower 130s    3. Dizziness  resolved      Follow up with Provider - w/PCP prn or prev scheduled     Aureliano Canela MD  [facility]

## 2017-07-11 DIAGNOSIS — E78.5 HYPERLIPIDEMIA LDL GOAL <100: ICD-10-CM

## 2017-07-11 DIAGNOSIS — N18.30 CKD (CHRONIC KIDNEY DISEASE) STAGE 3, GFR 30-59 ML/MIN (H): ICD-10-CM

## 2017-07-11 DIAGNOSIS — I10 ESSENTIAL HYPERTENSION: ICD-10-CM

## 2017-07-11 LAB
ALBUMIN SERPL-MCNC: 4.1 G/DL (ref 3.4–5)
ALP SERPL-CCNC: 69 U/L (ref 40–150)
ALT SERPL W P-5'-P-CCNC: 27 U/L (ref 0–70)
ANION GAP SERPL CALCULATED.3IONS-SCNC: 8 MMOL/L (ref 3–14)
AST SERPL W P-5'-P-CCNC: 18 U/L (ref 0–45)
BILIRUB DIRECT SERPL-MCNC: 0.2 MG/DL (ref 0–0.2)
BILIRUB SERPL-MCNC: 1.3 MG/DL (ref 0.2–1.3)
BUN SERPL-MCNC: 17 MG/DL (ref 7–30)
CALCIUM SERPL-MCNC: 8.9 MG/DL (ref 8.5–10.1)
CHLORIDE SERPL-SCNC: 94 MMOL/L (ref 94–109)
CHOLEST SERPL-MCNC: 217 MG/DL
CK SERPL-CCNC: 231 U/L (ref 30–300)
CO2 SERPL-SCNC: 31 MMOL/L (ref 20–32)
CREAT SERPL-MCNC: 1.39 MG/DL (ref 0.66–1.25)
GFR SERPL CREATININE-BSD FRML MDRD: 51 ML/MIN/1.7M2
GLUCOSE SERPL-MCNC: 110 MG/DL (ref 70–99)
HDLC SERPL-MCNC: 64 MG/DL
LDLC SERPL CALC-MCNC: 120 MG/DL
NONHDLC SERPL-MCNC: 153 MG/DL
POTASSIUM SERPL-SCNC: 3.6 MMOL/L (ref 3.4–5.3)
PROT SERPL-MCNC: 7.2 G/DL (ref 6.8–8.8)
SODIUM SERPL-SCNC: 133 MMOL/L (ref 133–144)
TRIGL SERPL-MCNC: 163 MG/DL

## 2017-07-11 PROCEDURE — 36415 COLL VENOUS BLD VENIPUNCTURE: CPT | Performed by: INTERNAL MEDICINE

## 2017-07-11 PROCEDURE — 82550 ASSAY OF CK (CPK): CPT | Performed by: INTERNAL MEDICINE

## 2017-07-11 PROCEDURE — 80076 HEPATIC FUNCTION PANEL: CPT | Performed by: INTERNAL MEDICINE

## 2017-07-11 PROCEDURE — 80048 BASIC METABOLIC PNL TOTAL CA: CPT | Performed by: INTERNAL MEDICINE

## 2017-07-11 PROCEDURE — 80061 LIPID PANEL: CPT | Performed by: INTERNAL MEDICINE

## 2017-07-14 ENCOUNTER — OFFICE VISIT (OUTPATIENT)
Dept: INTERNAL MEDICINE | Facility: CLINIC | Age: 67
End: 2017-07-14
Payer: COMMERCIAL

## 2017-07-14 VITALS
BODY MASS INDEX: 24.95 KG/M2 | HEART RATE: 69 BPM | TEMPERATURE: 97.6 F | SYSTOLIC BLOOD PRESSURE: 124 MMHG | WEIGHT: 184 LBS | OXYGEN SATURATION: 98 % | DIASTOLIC BLOOD PRESSURE: 76 MMHG

## 2017-07-14 DIAGNOSIS — N18.30 CKD (CHRONIC KIDNEY DISEASE) STAGE 3, GFR 30-59 ML/MIN (H): ICD-10-CM

## 2017-07-14 DIAGNOSIS — E78.5 HYPERLIPIDEMIA LDL GOAL <100: Primary | ICD-10-CM

## 2017-07-14 DIAGNOSIS — E03.9 HYPOTHYROIDISM, UNSPECIFIED TYPE: ICD-10-CM

## 2017-07-14 PROCEDURE — 99213 OFFICE O/P EST LOW 20 MIN: CPT | Performed by: INTERNAL MEDICINE

## 2017-07-14 NOTE — NURSING NOTE
Chief Complaint   Patient presents with     Results       Initial /82  Pulse 69  Temp 97.6  F (36.4  C) (Oral)  Wt 184 lb (83.5 kg)  SpO2 98%  BMI 24.95 kg/m2 Estimated body mass index is 24.95 kg/(m^2) as calculated from the following:    Height as of 5/16/17: 6' (1.829 m).    Weight as of this encounter: 184 lb (83.5 kg).  Medication Reconciliation: complete

## 2017-07-14 NOTE — PROGRESS NOTES
SUBJECTIVE:                                                    Agapito Fairbanks is a 67 year old male who presents to clinic today for the following health issues:      Follow up on Labs reaults    Amount of exercise or physical activity: 4-5 days/week for an average of 30-45 minutes    Problems taking medications regularly: No    Medication side effects: none    Diet: regular (no restrictions)    Pt's past medical history, family history, habits, medications and allergies were reviewed with the patient today.  See snap shot for  HCM status. Most recent lab results reviewed with pt. Problem list and histories reviewed & adjusted, as indicated.  Additional history as below:    Denies chest pain, shortness of breath, abdominal pain, headache, vision changes or side effects with medications.  Exercising  3x/week with walking dog and some weights. Has been off of Pravastatin recently.  Not sure why stopped taking it. Doesn't remember having side effects with med. Mood good. PHQ=0     Additional ROS:   Constitutional, HEENT, Cardiovascular, Pulmonary, GI and , Neuro, MSK and Psych review of systems/symptoms are otherwise negative or unchanged from previous, except as noted above.      OBJECTIVE:  /76  Pulse 69  Temp 97.6  F (36.4  C) (Oral)  Wt 184 lb (83.5 kg)  SpO2 98%  BMI 24.95 kg/m2   Estimated body mass index is 24.95 kg/(m^2) as calculated from the following:    Height as of 5/16/17: 6' (1.829 m).    Weight as of this encounter: 184 lb (83.5 kg).  Eye: PERRL, EOMI  HENT: ear canals and TM's normal and nose and mouth without ulcers or lesions   Neck: no adenopathy. Thyroid normal to palpation. No bruits  Pulm: Lungs clear to auscultation   CV: Regular rates and rhythm  GI: Soft, nontender, Normal active bowel sounds, No hepatosplenomegaly or masses palpable  Ext: Peripheral pulses intact. No edema.  Neuro: Normal strength and tone, sensory exam grossly normal  Nodes: normal ant/post cervical,  supraclavicular, and axillary nodes    Assessment/Plan: (See plan discussion below for further details)  1. Hyperlipidemia LDL goal <100  Restart Pravastatin. Repeat fasting lab 1 month  - Lipid panel reflex to direct LDL; Future    2. CKD (chronic kidney disease) stage 3, GFR 30-59 ml/min  Stable. Continue medications. Future labs 6 months except urine protein lab 1 month. Discussed importance of statin with CKD being heart disease risk equivalent  - Albumin Random Urine Quantitative; Future  - Vitamin D Deficiency; Future  - Hemoglobin; Future  - Basic metabolic panel; Future    3. Hypothyroidism, unspecified type  Stable. Continue medications. Future labs 6 months  - TSH with free T4 reflex; Future         Los Love MD  Internal Medicine Department  [facility]

## 2017-07-14 NOTE — MR AVS SNAPSHOT
After Visit Summary   7/14/2017    Agapito Fairbanks    MRN: [id number]           Patient Information     Date Of Birth          1950        Visit Information        Provider Department      7/14/2017 9:30 AM Los Love MD [facility]        Today's Diagnoses     Hyperlipidemia LDL goal <100    -  1    CKD (chronic kidney disease) stage 3, GFR 30-59 ml/min        Hypothyroidism, unspecified type           Follow-ups after your visit        Future tests that were ordered for you today     Open Future Orders        Priority Expected Expires Ordered    Albumin Random Urine Quantitative Routine 8/14/2017 7/14/2018 7/14/2017    Vitamin D Deficiency Routine 1/10/2018 5/10/2018 7/14/2017    Hemoglobin Routine 1/10/2018 5/10/2018 7/14/2017    Basic metabolic panel Routine 1/10/2018 5/10/2018 7/14/2017    TSH with free T4 reflex Routine 1/10/2018 5/10/2018 7/14/2017    Lipid panel reflex to direct LDL Routine 8/13/2017 7/14/2018 7/14/2017            Who to contact     If you have questions or need follow up information about today's clinic visit or your schedule please contact [facility] directly at [phone].  Normal or non-critical lab and imaging results will be communicated to you by [employer], letter or phone within 4 business days after the clinic has received the results. If you do not hear from us within 7 days, please contact the clinic through [employer] or phone. If you have a critical or abnormal lab result, we will notify you by phone as soon as possible.  Submit refill requests through [employer] or call your pharmacy and they will forward the refill request to us. Please allow 3 business days for your refill to be completed.          Additional Information About Your Visit        [employer] Information     [employer] lets you send messages to your doctor, view your test results, renew your prescriptions, schedule appointments and more. To sign up, go  "to [email or web address] . Click on \"Log in\" on the left side of the screen, which will take you to the Welcome page. Then click on \"Sign up Now\" on the right side of the page.     You will be asked to enter the access code listed below, as well as some personal information. Please follow the directions to create your username and password.     Your access code is: [email or web address]  Expires: 2017  4:16 PM     Your access code will  in 90 days. If you need help or a new code, please call your [place] clinic or [phone].        Care EveryWhere ID     This is your Care EveryWhere ID. This could be used by other organizations to access your [place] medical records  [id number]        Your Vitals Were     Pulse Temperature Pulse Oximetry BMI (Body Mass Index)          69 97.6  F (36.4  C) (Oral) 98% 24.95 kg/m2         Blood Pressure from Last 3 Encounters:   17 124/76   17 130/84   17 140/90    Weight from Last 3 Encounters:   17 184 lb (83.5 kg)   17 186 lb 4.8 oz (84.5 kg)   17 185 lb 12.8 oz (84.3 kg)                 Today's Medication Changes          These changes are accurate as of: 17 10:03 AM.  If you have any questions, ask your nurse or doctor.               These medicines have changed or have updated prescriptions.        Dose/Directions    pravastatin 40 MG tablet   Commonly known as:  PRAVACHOL   This may have changed:    - when to take this  - reasons to take this   Used for:  Hyperlipidemia LDL goal <100        Dose:  40 mg   Take 1 tablet (40 mg) by mouth daily   Quantity:  90 tablet   Refills:  3                Primary Care Provider Office Phone # Fax #    Los Love [phone]       [facility] [address] [facility] [address]        Equal Access to Services     JES DANGELO AH: ariella Ku, mann soto la'aan ah. So [facility] " [phone].    ATENCIÓN: Si ashish diego, tiene a agrawal disposición servicios gratuitos de asistencia lingüística. Louis rodrigues [phone].    We comply with applicable federal civil rights laws and [place] laws. We do not discriminate on the basis of race, color, national origin, age, disability sex, sexual orientation or gender identity.            Thank you!     Thank you for choosing [facility]  for your care. Our goal is always to provide you with excellent care. Hearing back from our patients is one way we can continue to improve our services. Please take a few minutes to complete the written survey that you may receive in the mail after your visit with us. Thank you!             Your Updated Medication List - Protect others around you: Learn how to safely use, store and throw away your medicines at www.disposemymeds.org.          This list is accurate as of: 7/14/17 10:03 AM.  Always use your most recent med list.                   Brand Name Dispense Instructions for use Diagnosis    hydrochlorothiazide 25 MG tablet    HYDRODIURIL    90 tablet    Take 1 tablet (25 mg) by mouth daily    Essential hypertension       levothyroxine 100 MCG tablet    SYNTHROID/LEVOTHROID    90 tablet    Take 1 tablet (100 mcg) by mouth daily    Hypothyroidism, unspecified type       losartan 100 MG tablet    COZAAR    90 tablet    Take 1 tablet (100 mg) by mouth daily    Essential hypertension       MULTI VITAMIN MENS PO      1 TABLET DAILY        pravastatin 40 MG tablet    PRAVACHOL    90 tablet    Take 1 tablet (40 mg) by mouth daily    Hyperlipidemia LDL goal <100       triamcinolone 0.1 % cream    KENALOG    80 g    APPLY SPARINGLY TO AFFECTED AREA THREE TIMES DAILY AS NEEDED    Dermatitis

## 2017-07-15 ASSESSMENT — PATIENT HEALTH QUESTIONNAIRE - PHQ9: SUM OF ALL RESPONSES TO PHQ QUESTIONS 1-9: 0

## 2017-08-22 DIAGNOSIS — N18.30 CKD (CHRONIC KIDNEY DISEASE) STAGE 3, GFR 30-59 ML/MIN (H): ICD-10-CM

## 2017-08-22 DIAGNOSIS — E78.5 HYPERLIPIDEMIA LDL GOAL <100: ICD-10-CM

## 2017-08-22 LAB
CHOLEST SERPL-MCNC: 211 MG/DL
CREAT UR-MCNC: 106 MG/DL
HDLC SERPL-MCNC: 73 MG/DL
LDLC SERPL CALC-MCNC: 113 MG/DL
MICROALBUMIN UR-MCNC: 6 MG/L
MICROALBUMIN/CREAT UR: 5.2 MG/G CR (ref 0–17)
NONHDLC SERPL-MCNC: 138 MG/DL
TRIGL SERPL-MCNC: 125 MG/DL

## 2017-08-22 PROCEDURE — 82043 UR ALBUMIN QUANTITATIVE: CPT | Performed by: INTERNAL MEDICINE

## 2017-08-22 PROCEDURE — 36415 COLL VENOUS BLD VENIPUNCTURE: CPT | Performed by: INTERNAL MEDICINE

## 2017-08-22 PROCEDURE — 80061 LIPID PANEL: CPT | Performed by: INTERNAL MEDICINE

## 2017-08-25 ENCOUNTER — OFFICE VISIT (OUTPATIENT)
Dept: INTERNAL MEDICINE | Facility: CLINIC | Age: 67
End: 2017-08-25
Payer: COMMERCIAL

## 2017-08-25 VITALS
OXYGEN SATURATION: 97 % | BODY MASS INDEX: 25.09 KG/M2 | DIASTOLIC BLOOD PRESSURE: 78 MMHG | SYSTOLIC BLOOD PRESSURE: 122 MMHG | WEIGHT: 185 LBS | TEMPERATURE: 98.7 F | HEART RATE: 73 BPM

## 2017-08-25 DIAGNOSIS — E78.5 HYPERLIPIDEMIA LDL GOAL <100: Primary | ICD-10-CM

## 2017-08-25 DIAGNOSIS — R41.3 MEMORY LOSS: ICD-10-CM

## 2017-08-25 PROCEDURE — 99214 OFFICE O/P EST MOD 30 MIN: CPT | Performed by: INTERNAL MEDICINE

## 2017-08-25 ASSESSMENT — PATIENT HEALTH QUESTIONNAIRE - PHQ9
SUM OF ALL RESPONSES TO PHQ QUESTIONS 1-9: 3
SUM OF ALL RESPONSES TO PHQ QUESTIONS 1-9: 3
10. IF YOU CHECKED OFF ANY PROBLEMS, HOW DIFFICULT HAVE THESE PROBLEMS MADE IT FOR YOU TO DO YOUR WORK, TAKE CARE OF THINGS AT HOME, OR GET ALONG WITH OTHER PEOPLE: NOT DIFFICULT AT ALL

## 2017-08-25 NOTE — PROGRESS NOTES
SUBJECTIVE:   Agapito Fairbanks is a 67 year old male who presents to clinic today for the following health issues:      Discuss lab results    Problems taking medications regularly: No    Medication side effects: none    Diet: lower cholesterol      Pt's past medical history, family history, habits, medications and allergies were reviewed with the patient today.  See snap shot for  HCM status. Most recent lab results reviewed with pt. Problem list and histories reviewed & adjusted, as indicated.  Additional history as below:    Was instructed to start taking Pravastatin daily but still has not been taking very often. Takes other meds in Am and generally forgets to take PM meds so lipids unchanged basically now compared to 1 month ago and still elevated with hx CKD. Denies chest pain, shortness of breath, abdominal pain, headache, vision changes or side effects with medications.   Notices occ forgetting small things like taking PM med. No issues with driving, finances, etc. Rare tingle/soreness in feet. Wears boat shoes often with poor arch support. Had better shoes yesterday and no sx      Additional ROS:   Constitutional, HEENT, Cardiovascular, Pulmonary, GI and , Neuro, MSK and Psych review of systems/symptoms are otherwise negative or unchanged from previous, except as noted above.      OBJECTIVE:  /78  Pulse 73  Temp 98.7  F (37.1  C) (Oral)  Wt 185 lb (83.9 kg)  SpO2 97%  BMI 25.09 kg/m2   Estimated body mass index is 25.09 kg/(m^2) as calculated from the following:    Height as of 5/16/17: 6' (1.829 m).    Weight as of this encounter: 185 lb (83.9 kg).  Eye: PERRL, EOMI  HENT: ear canals and TM's normal and nose and mouth without ulcers or lesions   Neck: no adenopathy. Thyroid normal to palpation. No bruits  Pulm: Lungs clear to auscultation   CV: Regular rates and rhythm  GI: Soft, nontender, Normal active bowel sounds, No hepatosplenomegaly or masses palpable  Ext: Peripheral pulses intact.  No edema. Higher arches of feet bilaterally  Neuro: Normal strength and tone, sensory exam grossly normal. 6CIT 10/28    Assessment/Plan: (See plan discussion below for further details)  1. Hyperlipidemia LDL goal <100  See plan below. Lipids elevated currently with poor med compliance  - Lipid panel reflex to direct LDL; Future    2. Memory loss  6CIT done (see nursing note) with elevated score but inconsistent since pt scoring 2/3 recall when asked by me to remember 3 objects but scored 0/4 when remembering address part of the 6 CIT. WIll have B12 in 1 month and then see pt back in clinic to repeat 6CIT and if still abnormal, will then get MRI brain and consider medication initiation of Aricept  - Vitamin B12; Future    Plan discussion:  Take Pravastatin  with morning meds to improve compliance  Fasting labs in 1 month for cholesterol and B12    If cholesterol labs still elevated with consistent use Pravastatin, will change to Atorvastatin  Flu shot in the Fall  Good arch support with shoes  Baseline memory test (done) and repeat 1 month     >25 minutes was spent with the patient today with more than 50% of the appointment providing counseling/education re: statin benefits in setting of CKD and memory loss causes/treatment options  and coordination of care      Los Love MD    Internal Medicine Department  [facility]

## 2017-08-25 NOTE — MR AVS SNAPSHOT
"              After Visit Summary   8/25/2017    Agapito Fairbanks    MRN: [id number]           Patient Information     Date Of Birth          1950        Visit Information        Provider Department      8/25/2017 8:00 AM Los Love MD [facility]        Care Instructions    Take Pravastatin  with morning meds to improve compliance  Fasting labs in 1 month for cholesterol and B12  If cholesterol labs still elevated with consistent use Pravastatin, will change to Atorvastatin  Flu shot in the Fall  See me in 6 months, earlier as needed  Good arch support with shoes  Baseline memory test and repeat 6 months          Follow-ups after your visit        Who to contact     If you have questions or need follow up information about today's clinic visit or your schedule please contact [facility] directly at [phone].  Normal or non-critical lab and imaging results will be communicated to you by MyChart, letter or phone within 4 business days after the clinic has received the results. If you do not hear from us within 7 days, please contact the clinic through SpotOnhart or phone. If you have a critical or abnormal lab result, we will notify you by phone as soon as possible.  Submit refill requests through [email or web address] or call your pharmacy and they will forward the refill request to us. Please allow 3 business days for your refill to be completed.          Additional Information About Your Visit        MyChart Information     [email or web address] lets you send messages to your doctor, view your test results, renew your prescriptions, schedule appointments and more. To sign up, go to [email or web address] . Click on \"Log in\" on the left side of the screen, which will take you to the Welcome page. Then click on \"Sign up Now\" on the right side of the page.     You will be asked to enter the access code listed below, as well as some personal information. Please follow the directions to " create your username and password.     Your access code is: [email or web address]  Expires: 2017  8:39 AM     Your access code will  in 90 days. If you need help or a new code, please call your [place] clinic or [phone].        Care EveryWhere ID     This is your Care EveryWhere ID. This could be used by other organizations to access your [place] medical records  [id number]        Your Vitals Were     Pulse Temperature Pulse Oximetry BMI (Body Mass Index)          73 98.7  F (37.1  C) (Oral) 97% 25.09 kg/m2         Blood Pressure from Last 3 Encounters:   17 122/78   17 124/76   17 130/84    Weight from Last 3 Encounters:   17 185 lb (83.9 kg)   17 184 lb (83.5 kg)   17 186 lb 4.8 oz (84.5 kg)              Today, you had the following     No orders found for display         Today's Medication Changes          These changes are accurate as of: 17  8:39 AM.  If you have any questions, ask your nurse or doctor.               These medicines have changed or have updated prescriptions.        Dose/Directions    pravastatin 40 MG tablet   Commonly known as:  PRAVACHOL   This may have changed:    - when to take this  - reasons to take this   Used for:  Hyperlipidemia LDL goal <100        Dose:  40 mg   Take 1 tablet (40 mg) by mouth daily   Quantity:  90 tablet   Refills:  3                Primary Care Provider Office Phone # Fax #    Los Love [phone]       [address] [place] [address]        Equal Access to Services     [facility] Services[facility] AH: Hadleonora espinosa Sokhushi, waaxda luqadaha, qaybta kaalmamann vaughn. So [facility] [phone].    ATENCIÓN: Si habla español, tiene a agrawal disposición servicios gratuitos de asistencia lingüística. Llame al [phone].    We comply with applicable federal civil rights laws and [place] laws. We do not discriminate on the basis of race, color, national origin, age,  disability sex, sexual orientation or gender identity.            Thank you!     Thank you for choosing [facility]  for your care. Our goal is always to provide you with excellent care. Hearing back from our patients is one way we can continue to improve our services. Please take a few minutes to complete the written survey that you may receive in the mail after your visit with us. Thank you!             Your Updated Medication List - Protect others around you: Learn how to safely use, store and throw away your medicines at www.disposemymeds.org.          This list is accurate as of: 8/25/17  8:39 AM.  Always use your most recent med list.                   Brand Name Dispense Instructions for use Diagnosis    hydrochlorothiazide 25 MG tablet    HYDRODIURIL    90 tablet    Take 1 tablet (25 mg) by mouth daily    Essential hypertension       levothyroxine 100 MCG tablet    SYNTHROID/LEVOTHROID    90 tablet    Take 1 tablet (100 mcg) by mouth daily    Hypothyroidism, unspecified type       losartan 100 MG tablet    COZAAR    90 tablet    Take 1 tablet (100 mg) by mouth daily    Essential hypertension       MULTI VITAMIN MENS PO      1 TABLET DAILY        pravastatin 40 MG tablet    PRAVACHOL    90 tablet    Take 1 tablet (40 mg) by mouth daily    Hyperlipidemia LDL goal <100       triamcinolone 0.1 % cream    KENALOG    80 g    APPLY SPARINGLY TO AFFECTED AREA THREE TIMES DAILY AS NEEDED    Dermatitis

## 2017-08-25 NOTE — NURSING NOTE
Chief Complaint   Patient presents with     Results       Initial /78  Pulse 73  Temp 98.7  F (37.1  C) (Oral)  Wt 185 lb (83.9 kg)  SpO2 97%  BMI 25.09 kg/m2 Estimated body mass index is 25.09 kg/(m^2) as calculated from the following:    Height as of 5/16/17: 6' (1.829 m).    Weight as of this encounter: 185 lb (83.9 kg).  Medication Reconciliation: complete       Six Item Cognitive Impairment Test   (6CIT):      What year is it?                               Correct - 0 points    What month is it?                               Correct - 0 points      Give the patient an address to remember with five components:   [place] ( first and last name - 2 components)   [address]  (number and name of street - 2 components)   [place] ( city - 1 component)      About what time is it (within the hour)? Correct - 0 points    Count backwards from 20 to 1:   Correct - 0 points    Say the months of the year in reverse: Correct - 0 points    Repeat the address phrase:   All wrong - 10 points    Total 6CIT Score:      10/28    Interpretation: The 6CIT uses an inverse score and questions are weighted to produce a total out of 28. Scores of 0-7 are considered normal and 8 or more significant.    Advantages The test has high sensitivity without compromising specificity even in mild dementia. It is easy to translate linguistically and culturally.  Disadvantages The main disadvantage is in the scoring and weighting of the test, which is initially confusing, however computer models have simplified this greatly.    Probability Statistics: At the 7/8 cut off: Overall figures sensitivity 90% specificity 100%, in mild dementia sensitivity = 78% , specificity = 100%    Copyright 2000 [facility], [facility], [place]. Courtesy of Dr. Tesfaye Goetz

## 2017-08-25 NOTE — PATIENT INSTRUCTIONS
Take Pravastatin  with morning meds to improve compliance  Fasting labs in 1 month for cholesterol and B12    If cholesterol labs still elevated with consistent use Pravastatin, will change to Atorvastatin  Flu shot in the Fall  Good arch support with shoes  Baseline memory test (done) and repeat 1 month

## 2017-08-26 ASSESSMENT — PATIENT HEALTH QUESTIONNAIRE - PHQ9: SUM OF ALL RESPONSES TO PHQ QUESTIONS 1-9: 3

## 2017-10-02 DIAGNOSIS — R41.3 MEMORY LOSS: ICD-10-CM

## 2017-10-02 DIAGNOSIS — E78.5 HYPERLIPIDEMIA LDL GOAL <100: ICD-10-CM

## 2017-10-02 LAB
CHOLEST SERPL-MCNC: 220 MG/DL
HDLC SERPL-MCNC: 74 MG/DL
LDLC SERPL CALC-MCNC: 131 MG/DL
NONHDLC SERPL-MCNC: 146 MG/DL
TRIGL SERPL-MCNC: 74 MG/DL
VIT B12 SERPL-MCNC: 504 PG/ML (ref 193–986)

## 2017-10-02 PROCEDURE — 82607 VITAMIN B-12: CPT | Performed by: INTERNAL MEDICINE

## 2017-10-02 PROCEDURE — 80061 LIPID PANEL: CPT | Performed by: INTERNAL MEDICINE

## 2017-10-02 PROCEDURE — 36415 COLL VENOUS BLD VENIPUNCTURE: CPT | Performed by: INTERNAL MEDICINE

## 2017-10-04 ENCOUNTER — TELEPHONE (OUTPATIENT)
Dept: INTERNAL MEDICINE | Facility: CLINIC | Age: 67
End: 2017-10-04

## 2017-10-04 DIAGNOSIS — E78.5 HYPERLIPIDEMIA LDL GOAL <100: Primary | ICD-10-CM

## 2017-10-04 NOTE — TELEPHONE ENCOUNTER
Reason for Call:  Request for results:    Name of test or procedure: Lab Test(s)    Date of test of procedure: 10.02.17    Location of the test or procedure: [facility] to leave the result message on voice mail or with a family member? email - [email or web address]    Phone number Patient can be reached at:  email - [email or web address]    Additional comments: the patient would like to know his lab results asap, please email to him at above email address.  If not acceptable, call him.    Call taken on 10/4/2017 at 11:31 AM by Edith Flynn

## 2017-10-06 NOTE — TELEPHONE ENCOUNTER
Pt was seen last in August. At that trime, had nt been taking pravastatin very often. Was instructed to be compliant and take Pravastatin every day   And repeat fasting labs 1 monrth later but now lipids are still elevated and even slightly worse so would appear that pt still not taking pravastatin every day (unless has poor respone to med but would expect some drop in cholesterol if taking daily no mater what compared to when had sporadic use - that is why  Need to know if has been compliant. Check with pt and then route  Info back to me. If compliant, will change to different statin therapy. B12 level normal

## 2017-10-10 NOTE — TELEPHONE ENCOUNTER
Spoke with patient and he does not think he has been taking it everyday due to it is an evening pill and he forgets.    Should he try taking it again daily and recheck labs in one month?  Please advise.

## 2017-10-13 RX ORDER — ROSUVASTATIN CALCIUM 10 MG/1
10 TABLET, COATED ORAL DAILY
Qty: 30 TABLET | Refills: 11 | Status: SHIPPED | OUTPATIENT
Start: 2017-10-13 | End: 2017-11-29

## 2017-10-13 NOTE — TELEPHONE ENCOUNTER
Have already tried to have pt better better with remembering to take Pravastatin every night with last clinic appt and that plan did not work. Therefore need to use a stronger statin that pt can take in the AM to help with compliance.  Pt to stop Pravastatin  Pt to start Rosuvastatin (generic Crestor) 10mg tab, 1 tab daily in AM. Rx faxed to [facility] pharmacy  Pt to have repeat fasting labs as ordered in 1 month

## 2017-11-29 DIAGNOSIS — E78.5 HYPERLIPIDEMIA LDL GOAL <100: ICD-10-CM

## 2017-11-29 LAB
ALBUMIN SERPL-MCNC: 4 G/DL (ref 3.4–5)
ALP SERPL-CCNC: 75 U/L (ref 40–150)
ALT SERPL W P-5'-P-CCNC: 31 U/L (ref 0–70)
AST SERPL W P-5'-P-CCNC: 22 U/L (ref 0–45)
BILIRUB DIRECT SERPL-MCNC: 0.2 MG/DL (ref 0–0.2)
BILIRUB SERPL-MCNC: 0.8 MG/DL (ref 0.2–1.3)
CHOLEST SERPL-MCNC: 162 MG/DL
CK SERPL-CCNC: 175 U/L (ref 30–300)
HDLC SERPL-MCNC: 79 MG/DL
LDLC SERPL CALC-MCNC: 63 MG/DL
NONHDLC SERPL-MCNC: 83 MG/DL
PROT SERPL-MCNC: 7.6 G/DL (ref 6.8–8.8)
TRIGL SERPL-MCNC: 102 MG/DL

## 2017-11-29 PROCEDURE — 80061 LIPID PANEL: CPT | Performed by: INTERNAL MEDICINE

## 2017-11-29 PROCEDURE — 82550 ASSAY OF CK (CPK): CPT | Performed by: INTERNAL MEDICINE

## 2017-11-29 PROCEDURE — 80076 HEPATIC FUNCTION PANEL: CPT | Performed by: INTERNAL MEDICINE

## 2017-11-29 PROCEDURE — 36415 COLL VENOUS BLD VENIPUNCTURE: CPT | Performed by: INTERNAL MEDICINE

## 2017-11-29 RX ORDER — ROSUVASTATIN CALCIUM 10 MG/1
10 TABLET, COATED ORAL DAILY
Qty: 90 TABLET | Refills: 3 | Status: SHIPPED | OUTPATIENT
Start: 2017-11-29 | End: 2018-04-23

## 2017-11-29 NOTE — LETTER
"[facility]  [address] [address]  [phone]      11/29/2017       Agapito Fairbanks  [address]  [facility] [address]        Dear Agapito,  Here are your most recent lab results. Unless commented on below, mild variation of results  outside the normal range are not clinically signicant.    Resulted Orders   Lipid panel reflex to direct LDL   Result Value Ref Range    Cholesterol 162 <200 mg/dL    Triglycerides 102 <150 mg/dL      Comment:      Fasting specimen    HDL Cholesterol 79 >39 mg/dL    LDL Cholesterol Calculated 63 <100 mg/dL      Comment:      Desirable:       <100 mg/dl    Non HDL Cholesterol 83 <130 mg/dL   Hepatic panel   Result Value Ref Range    Bilirubin Direct 0.2 0.0 - 0.2 mg/dL    Bilirubin Total 0.8 0.2 - 1.3 mg/dL    Albumin 4.0 3.4 - 5.0 g/dL    Protein Total 7.6 6.8 - 8.8 g/dL    Alkaline Phosphatase 75 40 - 150 U/L    ALT 31 0 - 70 U/L    AST 22 0 - 45 U/L   CK total   Result Value Ref Range    CK Total 175 30 - 300 U/L       Total Cholesterol, HDL (good) Cholesterol, LDL (bad) Cholesterol, Non-HDL (bad) cholesterol, Triglycerides, CK (muscle enzyme) and Liver lab results were normal.  Continue current medication.  Please contact your pharmacy if you need further refills of your medication. I have authorized a 90 day supply of Rosuvastatin the next time you fill the prescription.  Call our appointment desk at [phone] to schedule a \"lab only\" to have your Comprehensive Metabolic Panel and Lipid profile (Cholesterol Panel) checked fasting in 1 year.  Please also remember to have labs done non-fasting  in mid January 2018 to follow-up on your thyroid function, etc as previously ordered.    If you have further questions/concerns regarding the results, I would ask that you bring them to your next follow-up appointment with me and I would be happy to review them with you further.      Sincerely,      Los Love, " MD  Internal Medicine

## 2017-12-08 ENCOUNTER — TELEPHONE (OUTPATIENT)
Dept: INTERNAL MEDICINE | Facility: CLINIC | Age: 67
End: 2017-12-08

## 2017-12-08 DIAGNOSIS — R41.3 MEMORY LOSS: Primary | ICD-10-CM

## 2017-12-08 NOTE — TELEPHONE ENCOUNTER
Reason for call:  Other   Patient called regarding (reason for call): patients wife would like a referral for neurology  Additional comments: Patients wife would like to a referral to neurology. He cannot follow directions, and is having more memory issues. He is forgetting to take his medication. Whatever is going on has increased drastically in the last 2 months.       Phone number to reach patient:  Home number on file [phone] (home)    Best Time:  anytime    Can we leave a detailed message on this number?  YES

## 2017-12-11 NOTE — TELEPHONE ENCOUNTER
Patient last seen in August.  At that time, was to follow-up with me a month later for repeat memory testing with plan to have an MRI of the brain if not improved.  Per note below, patient's memory issues seem to be persisting.  With cancer history, important to have an MRI of the brain to rule out any evidence of spread from previous cancers or other reversible cause of memory loss.   Pt to have an MRI brain at [facility]. They will call pt to schedule appt  Pt to either f/u with me in clinic fore repeat  Memory testing and review of MRI results or See Dr Streeter  ([facility]) at the [facility] [phone]. Will have to call for an appointment    Inform pt and his wife

## 2017-12-11 NOTE — TELEPHONE ENCOUNTER
"Pt's wife is calling requesting a referral for pt to see a Neurologist, regarding memory issues, he can't sort things out, pt is a very smart person and now \"can't change a light bulb.\" changes started around Aug/Sept. And he had a baseline memory test at appt on 8/25/17.  Informed wife that pt is due for f/u appt with Dr. Love.  Please advise.  "

## 2017-12-14 ENCOUNTER — HOSPITAL ENCOUNTER (OUTPATIENT)
Dept: MRI IMAGING | Facility: CLINIC | Age: 67
Discharge: HOME | End: 2017-12-14
Attending: INTERNAL MEDICINE | Admitting: INTERNAL MEDICINE
Payer: MEDICARE

## 2017-12-14 DIAGNOSIS — R41.3 MEMORY LOSS: ICD-10-CM

## 2017-12-14 PROCEDURE — 70551 MRI BRAIN STEM W/O DYE: CPT

## 2017-12-21 ENCOUNTER — OFFICE VISIT (OUTPATIENT)
Dept: INTERNAL MEDICINE | Facility: CLINIC | Age: 67
End: 2017-12-21
Payer: COMMERCIAL

## 2017-12-21 VITALS
HEART RATE: 82 BPM | WEIGHT: 182 LBS | DIASTOLIC BLOOD PRESSURE: 78 MMHG | SYSTOLIC BLOOD PRESSURE: 120 MMHG | OXYGEN SATURATION: 98 % | BODY MASS INDEX: 24.68 KG/M2 | TEMPERATURE: 98.6 F

## 2017-12-21 DIAGNOSIS — E78.5 HYPERLIPIDEMIA LDL GOAL <100: ICD-10-CM

## 2017-12-21 DIAGNOSIS — R41.3 MEMORY LOSS: ICD-10-CM

## 2017-12-21 DIAGNOSIS — Z23 NEED FOR PROPHYLACTIC VACCINATION AND INOCULATION AGAINST INFLUENZA: Primary | ICD-10-CM

## 2017-12-21 PROCEDURE — 90662 IIV NO PRSV INCREASED AG IM: CPT | Performed by: INTERNAL MEDICINE

## 2017-12-21 PROCEDURE — 99214 OFFICE O/P EST MOD 30 MIN: CPT | Mod: 25 | Performed by: INTERNAL MEDICINE

## 2017-12-21 PROCEDURE — G0008 ADMIN INFLUENZA VIRUS VAC: HCPCS | Performed by: INTERNAL MEDICINE

## 2017-12-21 NOTE — MR AVS SNAPSHOT
"              After Visit Summary   2017    Agapito Fairbanks    MRN: [id number]           Patient Information     Date Of Birth          1950        Visit Information        Provider Department      2017 3:00 PM Los Love MD [facility]        Today's Diagnoses     Need for prophylactic vaccination and inoculation against influenza    -  1    Memory loss        Hyperlipidemia LDL goal <100           Follow-ups after your visit        Who to contact     If you have questions or need follow up information about today's clinic visit or your schedule please contact [facility] directly at [phone].  Normal or non-critical lab and imaging results will be communicated to you by INgrooveshart, letter or phone within 4 business days after the clinic has received the results. If you do not hear from us within 7 days, please contact the clinic through INgrooveshart or phone. If you have a critical or abnormal lab result, we will notify you by phone as soon as possible.  Submit refill requests through Applix or call your pharmacy and they will forward the refill request to us. Please allow 3 business days for your refill to be completed.          Additional Information About Your Visit        MyChart Information     Applix lets you send messages to your doctor, view your test results, renew your prescriptions, schedule appointments and more. To sign up, go to [email or web address] . Click on \"Log in\" on the left side of the screen, which will take you to the Welcome page. Then click on \"Sign up Now\" on the right side of the page.     You will be asked to enter the access code listed below, as well as some personal information. Please follow the directions to create your username and password.     Your access code is: [email or web address]  Expires: 3/21/2018  5:39 PM     Your access code will  in 90 days. If you need help or a new code, please call your [place] " clinic or [phone].        Care EveryWhere ID     This is your Care EveryWhere ID. This could be used by other organizations to access your [place] medical records  [id number]        Your Vitals Were     Pulse Temperature Pulse Oximetry BMI (Body Mass Index)          82 98.6  F (37  C) (Oral) 98% 24.68 kg/m2         Blood Pressure from Last 3 Encounters:   12/21/17 120/78   08/25/17 122/78   07/14/17 124/76    Weight from Last 3 Encounters:   12/21/17 182 lb (82.6 kg)   08/25/17 185 lb (83.9 kg)   07/14/17 184 lb (83.5 kg)              We Performed the Following     ADMIN INFLUENZA (For MEDICARE Patients ONLY) []     FLU VACCINE, INCREASED ANTIGEN, PRESV FREE, AGE 65+ [11643]     Vaccine Administration, Initial [63842]        Primary Care Provider Office Phone # Fax #    Los Love [phone]       [address] [place] [address]        Equal Access to Services     : Hadii aad ku hadasho Soomaali, waaxda luqadaha, qaybta kaalmada adeegyada, waxay shelia haydwayne liu . So [facility] [phone].    ATENCIÓN: Si habla español, tiene a agrawal disposición servicios gratuitos de asistencia lingüística. Llame al [phone].    We comply with applicable federal civil rights laws and [place] laws. We do not discriminate on the basis of race, color, national origin, age, disability, sex, sexual orientation, or gender identity.            Thank you!     Thank you for choosing [facility]  for your care. Our goal is always to provide you with excellent care. Hearing back from our patients is one way we can continue to improve our services. Please take a few minutes to complete the written survey that you may receive in the mail after your visit with us. Thank you!             Your Updated Medication List - Protect others around you: Learn how to safely use, store and throw away your medicines at www.disposemymeds.org.          This list is  accurate as of: 12/21/17  5:39 PM.  Always use your most recent med list.                   Brand Name Dispense Instructions for use Diagnosis    hydrochlorothiazide 25 MG tablet    HYDRODIURIL    90 tablet    Take 1 tablet (25 mg) by mouth daily    Essential hypertension       levothyroxine 100 MCG tablet    SYNTHROID/LEVOTHROID    90 tablet    Take 1 tablet (100 mcg) by mouth daily    Hypothyroidism, unspecified type       losartan 100 MG tablet    COZAAR    90 tablet    Take 1 tablet (100 mg) by mouth daily    Essential hypertension       MULTI VITAMIN MENS PO      1 TABLET DAILY        rosuvastatin 10 MG tablet    CRESTOR    90 tablet    Take 1 tablet (10 mg) by mouth daily    Hyperlipidemia LDL goal <100

## 2017-12-21 NOTE — NURSING NOTE
Chief Complaint   Patient presents with     Results     Follow up MRI     Memory Testing       Initial /78  Pulse 82  Temp 98.6  F (37  C) (Oral)  Wt 182 lb (82.6 kg)  SpO2 98%  BMI 24.68 kg/m2 Estimated body mass index is 24.68 kg/(m^2) as calculated from the following:    Height as of 5/16/17: 6' (1.829 m).    Weight as of this encounter: 182 lb (82.6 kg).  Medication Reconciliation: complete

## 2017-12-21 NOTE — PROGRESS NOTES
SUBJECTIVE:   Agapito Fairbanks is a 67 year old male who presents to clinic today for the following health issues:    Chief Complaint   Patient presents with     Results     Follow up MRI     Memory Testing     Six Item Cognitive Impairment Test   (6CIT):      What year is it?                               Correct - 0 points    What month is it?                               Correct - 0 points      Give the patient an address to remember with five components:   Joshua King ( first and last name - 2 components)   [address]  (number and name of street - 2 components)   [place] ( city - 1 component)      About what time is it (within the hour)? Correct - 0 points    Count backwards from 20 to 1:   Correct - 0 points    Say the months of the year in reverse: Correct - 0 points    Repeat the address phrase:   1 error - 2 points    Total 6CIT Score:      02/28    Interpretation: The 6CIT uses an inverse score and questions are weighted to produce a total out of 28. Scores of 0-7 are considered normal and 8 or more significant.    Advantages The test has high sensitivity without compromising specificity even in mild dementia. It is easy to translate linguistically and culturally.  Disadvantages The main disadvantage is in the scoring and weighting of the test, which is initially confusing, however computer models have simplified this greatly.    Probability Statistics: At the 7/8 cut off: Overall figures sensitivity 90% specificity 100%, in mild dementia sensitivity = 78% , specificity = 100%    Copyright 2000 [facility], [facility], . Courtesy of Dr. Tesfaye Goetz    Pt's past medical history, family history, habits, medications and allergies were reviewed with the patient today.  See snap shot for  HCM status. Most recent lab results reviewed with pt. Problem list and histories reviewed & adjusted, as indicated.  Additional history as below:       Component      Latest Ref Rng & Units  1/13/2017 5/8/2017 10/2/2017   WBC      4.0 - 11.0 10e9/L  7.0    RBC Count      4.4 - 5.9 10e12/L  4.41    Hemoglobin      13.3 - 17.7 g/dL  14.2    Hematocrit      40.0 - 53.0 %  41.5    MCV      78 - 100 fl  94    MCH      26.5 - 33.0 pg  32.2    MCHC      31.5 - 36.5 g/dL  34.2    RDW      10.0 - 15.0 %  12.9    Platelet Count      150 - 450 10e9/L  263    Diff Method        Automated Method    % Neutrophils      %  69.7    % Lymphocytes      %  17.8    % Monocytes      %  10.5    % Eosinophils      %  1.6    % Basophils      %  0.4    Absolute Neutrophil      1.6 - 8.3 10e9/L  4.9    Absolute Lymphocytes      0.8 - 5.3 10e9/L  1.2    Absolute Monocytes      0.0 - 1.3 10e9/L  0.7    Absolute Eosinophils      0.0 - 0.7 10e9/L  0.1    Absolute Basophils      0.0 - 0.2 10e9/L  0.0    Sodium      133 - 144 mmol/L  130 (L)    Potassium      3.4 - 5.3 mmol/L  3.7    Chloride      94 - 109 mmol/L  93 (L)    Carbon Dioxide      20 - 32 mmol/L  29    Anion Gap      3 - 14 mmol/L  8    Glucose      70 - 99 mg/dL  104 (H)    Urea Nitrogen      7 - 30 mg/dL  16    Creatinine      0.66 - 1.25 mg/dL  1.31 (H)    GFR Estimate      >60 mL/min/1.7m2  54 (L)    GFR Estimate If Black      >60 mL/min/1.7m2  66    Calcium      8.5 - 10.1 mg/dL  8.9    Bilirubin Total      0.2 - 1.3 mg/dL  1.2    Albumin      3.4 - 5.0 g/dL  4.0    Protein Total      6.8 - 8.8 g/dL  7.5    Alkaline Phosphatase      40 - 150 U/L  78    ALT      0 - 70 U/L  26    AST      0 - 45 U/L  17    Bilirubin Direct      0.0 - 0.2 mg/dL      Cholesterol      <200 mg/dL   220 (H)   Triglycerides      <150 mg/dL   74   HDL Cholesterol      >39 mg/dL   74   LDL Cholesterol Calculated      <100 mg/dL   131 (H)   Non HDL Cholesterol      <130 mg/dL   146 (H)   TSH      0.40 - 4.00 mU/L 3.20     Vitamin B12      193 - 986 pg/mL   504   CK Total      30 - 300 U/L        Component      Latest Ref Rng & Units 11/29/2017   WBC      4.0 - 11.0 10e9/L    RBC Count      4.4  - 5.9 10e12/L    Hemoglobin      13.3 - 17.7 g/dL    Hematocrit      40.0 - 53.0 %    MCV      78 - 100 fl    MCH      26.5 - 33.0 pg    MCHC      31.5 - 36.5 g/dL    RDW      10.0 - 15.0 %    Platelet Count      150 - 450 10e9/L    Diff Method          % Neutrophils      %    % Lymphocytes      %    % Monocytes      %    % Eosinophils      %    % Basophils      %    Absolute Neutrophil      1.6 - 8.3 10e9/L    Absolute Lymphocytes      0.8 - 5.3 10e9/L    Absolute Monocytes      0.0 - 1.3 10e9/L    Absolute Eosinophils      0.0 - 0.7 10e9/L    Absolute Basophils      0.0 - 0.2 10e9/L    Sodium      133 - 144 mmol/L    Potassium      3.4 - 5.3 mmol/L    Chloride      94 - 109 mmol/L    Carbon Dioxide      20 - 32 mmol/L    Anion Gap      3 - 14 mmol/L    Glucose      70 - 99 mg/dL    Urea Nitrogen      7 - 30 mg/dL    Creatinine      0.66 - 1.25 mg/dL    GFR Estimate      >60 mL/min/1.7m2    GFR Estimate If Black      >60 mL/min/1.7m2    Calcium      8.5 - 10.1 mg/dL    Bilirubin Total      0.2 - 1.3 mg/dL 0.8   Albumin      3.4 - 5.0 g/dL 4.0   Protein Total      6.8 - 8.8 g/dL 7.6   Alkaline Phosphatase      40 - 150 U/L 75   ALT      0 - 70 U/L 31   AST      0 - 45 U/L 22   Bilirubin Direct      0.0 - 0.2 mg/dL 0.2   Cholesterol      <200 mg/dL 162   Triglycerides      <150 mg/dL 102   HDL Cholesterol      >39 mg/dL 79   LDL Cholesterol Calculated      <100 mg/dL 63   Non HDL Cholesterol      <130 mg/dL 83   TSH      0.40 - 4.00 mU/L    Vitamin B12      193 - 986 pg/mL    CK Total      30 - 300 U/L 175       PHQ-9 ([employer]) 8/25/2017   1.  Little interest or pleasure in doing things Several days   2.  Feeling down, depressed, or hopeless Not at all   3.  Trouble falling or staying asleep, or sleeping too much Not at all   4.  Feeling tired or having little energy Several days   5.  Poor appetite or overeating Several days   6.  Feeling bad about yourself Not at all   7.  Trouble concentrating Not at all   8.   "Moving slowly or restless Not at all   9.  Suicidal or self-harm thoughts Not at all   PHQ-9 via [facility] TOTAL SCORE-----> 3 (Minimal depression)   Difficulty at work, home, or with people Not difficult at all         MR BRAIN WITHOUT CONTRAST December 14, 2017 5:42 PM     HISTORY: Memory loss.     COMPARISON: None.     TECHNIQUE: Routine noncontrast MR brain.     FINDINGS: Diffusion scan shows no evidence for recent infarct. There  are a few tiny foci of T2 hyperintensity in the periventricular region  of each cerebral hemisphere consistent with minimal small vessel  ischemic change. There is only minimal atrophy for age. No  intracranial hemorrhage or mass. There is a cyst or polyp in the floor  of the left maxillary antrum.       IMPRESSION: Minimal atrophy and chronic white matter disease for age.     CHRISTI KWONG MD          Patient taking rosuvastatin now.  Excellent response for lipids with medication.  Denies side effects with the medicine. Denies chest pain, shortness of breath, abdominal pain, headache, vision changes or side effects with medications.  Exercising a few times a week.  Patient here with his wife today.  Patient states that he occasionally misplaces things or will forget occasional instructions.  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  Patient drinking 2 glasses of scotch or beer per day at night.  Wife states that his sleep is \"quiet\".  She has not seen him snoring or having obvious apnea episodes.  She describes the patient is having some good days and some \"crazy\" days.  On those \"crazy\" days, patient will have more difficulty following directions.  The is occasionally misplacing or losing things with a previous history of being very detailed regarding things like that.  Patient denies getting lost with driving.  Denies headache issues.  PHQ score 4 months ago was 3 (see above).  Patient denies depression symptoms today.  States he is \"middle-of-the-road\".  " Not specifically happy that specifically sad.  Reviewed recent MRI of the brain with patient and his wife today.  Denies any sinus symptoms related to the cyst versus polyp in the left maxillary sinus patient's wife states that she had spoken to  A friend who is a neurologist in [place] who was able to then get a referral for the patient to see Dr. Roque Paige at the [facility].  The appointment is scheduled for April but possibly being moved up into January according to patient's wife    Additional ROS:   Constitutional, HEENT, Cardiovascular, Pulmonary, GI and , Neuro, MSK and Psych review of systems/symptoms are otherwise negative or unchanged from previous, except as noted above.      OBJECTIVE:  /78  Pulse 82  Temp 98.6  F (37  C) (Oral)  Wt 182 lb (82.6 kg)  SpO2 98%  BMI 24.68 kg/m2   Estimated body mass index is 24.68 kg/(m^2) as calculated from the following:    Height as of 5/16/17: 6' (1.829 m).    Weight as of this encounter: 182 lb (82.6 kg).  Eye: PERRL, EOMI  HENT: ear canals and TM's normal and nose and mouth without ulcers or lesions.  Baseline minimal voice hoarseness  Neck: no adenopathy. Thyroid normal to palpation. No bruits  Pulm: Lungs clear to auscultation   CV: Regular rates and rhythm  GI: Soft, nontender, Normal active bowel sounds, No hepatosplenomegaly or masses palpable  Ext: Peripheral pulses intact. No edema.  Neuro: Normal strength and tone, sensory exam grossly normal. Normal coordination. 6CIT = 2/28 today. Was 10/28 four months ago  Gen:  Slight guarding affect.  Normal dress and current thought content    Assessment/Plan: (See plan discussion below for further details)  1. Memory loss  No reversible cause seen on MRI or labs.  With patient's use of daily alcohol, when or how much effect this is happening either on brain function itself or potentially affecting sleep quality and creating some possible central apnea as wife was not observed  obvious obstructive apnea episodes.  Alcohol use may be also indicative of a psychological component despite above PHQ scores that could also affect memory issues.  Wife commented that the patient personality is a little different now than previous and not as joking as he used to be in the past encourage patient to discontinue all alcohol use for the next 1 month.  And then see neurology in January if able to have the consult moved up or otherwise follow-up with me.  Possible benefit of neuropsych testing but will defer to neurology.  If no change off of alcohol, wife does not see any apnea episodes occurring when she observes patient's sleep pattern as asked to do by me, will consider initiation of Aricept trial    2. Hyperlipidemia LDL goal <100  Lipids now at goal.  Continue with simvastatin    3. Need for prophylactic vaccination and inoculation against influenza  - FLU VACCINE, INCREASED ANTIGEN, PRESV FREE, AGE 65+ [93488]  - Vaccine Administration, Initial [42195]  - ADMIN INFLUENZA (For MEDICARE Patients ONLY) []    >25 minutes was spent with the patient today with more than 50% of the appointment providing counseling/education re: memory loss  and coordination of care         Los Love MD  Internal Medicine Department  [facility]              Injectable Influenza Immunization Documentation    1.  Is the person to be vaccinated sick today?   No    2. Does the person to be vaccinated have an allergy to a component   of the vaccine?   No  Egg Allergy Algorithm Link    3. Has the person to be vaccinated ever had a serious reaction   to influenza vaccine in the past?   No    4. Has the person to be vaccinated ever had Guillain-Barré syndrome?   No    Form completed by Patsy Winslow [facility]

## 2018-01-03 ENCOUNTER — TELEPHONE (OUTPATIENT)
Dept: INTERNAL MEDICINE | Facility: CLINIC | Age: 68
End: 2018-01-03

## 2018-01-03 NOTE — TELEPHONE ENCOUNTER
Reason for Call:  Other Pt request  Detailed comments: Dr Love saw the pt on 12/21/17.  At that time, Dr Love told the pt that he would send MRI results and other records to Dr Valentin Paige at [facility].  The records haven't been received.  Please contact pt when the records have been sent.    Phone Number Patient can be reached at: Home number on file [phone] (home)    Best Time: anytime    Can we leave a detailed message on this number? YES    Call taken on 1/3/2018 at 4:01 PM by HOLDEN ANTONIO

## 2018-01-24 DIAGNOSIS — E03.9 HYPOTHYROIDISM, UNSPECIFIED TYPE: ICD-10-CM

## 2018-01-24 DIAGNOSIS — N18.30 CKD (CHRONIC KIDNEY DISEASE) STAGE 3, GFR 30-59 ML/MIN (H): ICD-10-CM

## 2018-01-24 LAB
ANION GAP SERPL CALCULATED.3IONS-SCNC: 7 MMOL/L (ref 3–14)
BUN SERPL-MCNC: 12 MG/DL (ref 7–30)
CALCIUM SERPL-MCNC: 8.5 MG/DL (ref 8.5–10.1)
CHLORIDE SERPL-SCNC: 99 MMOL/L (ref 94–109)
CO2 SERPL-SCNC: 29 MMOL/L (ref 20–32)
CREAT SERPL-MCNC: 1.34 MG/DL (ref 0.66–1.25)
DEPRECATED CALCIDIOL+CALCIFEROL SERPL-MC: 38 UG/L (ref 20–75)
GFR SERPL CREATININE-BSD FRML MDRD: 53 ML/MIN/1.7M2
GLUCOSE SERPL-MCNC: 94 MG/DL (ref 70–99)
HGB BLD-MCNC: 14 G/DL (ref 13.3–17.7)
POTASSIUM SERPL-SCNC: 3.5 MMOL/L (ref 3.4–5.3)
SODIUM SERPL-SCNC: 135 MMOL/L (ref 133–144)
T4 FREE SERPL-MCNC: 0.94 NG/DL (ref 0.76–1.46)
TSH SERPL DL<=0.005 MIU/L-ACNC: 4.24 MU/L (ref 0.4–4)

## 2018-01-24 PROCEDURE — 85018 HEMOGLOBIN: CPT | Performed by: INTERNAL MEDICINE

## 2018-01-24 PROCEDURE — 80048 BASIC METABOLIC PNL TOTAL CA: CPT | Performed by: INTERNAL MEDICINE

## 2018-01-24 PROCEDURE — 84439 ASSAY OF FREE THYROXINE: CPT | Performed by: INTERNAL MEDICINE

## 2018-01-24 PROCEDURE — 84443 ASSAY THYROID STIM HORMONE: CPT | Performed by: INTERNAL MEDICINE

## 2018-01-24 PROCEDURE — 36415 COLL VENOUS BLD VENIPUNCTURE: CPT | Performed by: INTERNAL MEDICINE

## 2018-01-24 PROCEDURE — 82306 VITAMIN D 25 HYDROXY: CPT | Performed by: INTERNAL MEDICINE

## 2018-02-05 ENCOUNTER — TRANSFERRED RECORDS (OUTPATIENT)
Dept: HEALTH INFORMATION MANAGEMENT | Facility: CLINIC | Age: 68
End: 2018-02-05

## 2018-03-09 DIAGNOSIS — I10 ESSENTIAL HYPERTENSION: ICD-10-CM

## 2018-03-09 NOTE — TELEPHONE ENCOUNTER
"Requested Prescriptions   Pending Prescriptions Disp Refills     losartan (COZAAR) 100 MG tablet [Pharmacy Med Name: LOSARTAN 100MG   TAB]  Last Written Prescription Date:  1/20/2017  Last Fill Quantity: 90,  # refills: 3   Last office visit: 12/21/2017 with prescribing provider:  12/21/2017   Future Office Visit:     90 tablet 3     Sig: TAKE ONE TABLET BY MOUTH ONCE DAILY    Angiotensin-II Receptors Failed    3/9/2018  9:20 AM       Failed - Normal serum creatinine on file in past 12 months    Recent Labs   Lab Test  01/24/18   1052   CR  1.34*            Passed - Blood pressure under 140/90 in past 12 months    BP Readings from Last 3 Encounters:   12/21/17 120/78   08/25/17 122/78   07/14/17 124/76                Passed - Recent (12 mo) or future (30 days) visit within the authorizing provider's specialty    Patient had office visit in the last year or has a visit in the next 30 days with authorizing provider.  See \"Patient Info\" tab in inbasket, or \"Choose Columns\" in Meds & Orders section of the refill encounter.            Passed - Patient is age 18 or older       Passed - Normal serum potassium on file in past 12 months    Recent Labs   Lab Test  01/24/18   1052   POTASSIUM  3.5                      "

## 2018-03-12 RX ORDER — LOSARTAN POTASSIUM 100 MG/1
TABLET ORAL
Qty: 90 TABLET | Refills: 2 | Status: SHIPPED | OUTPATIENT
Start: 2018-03-12 | End: 2018-04-23

## 2018-04-07 DIAGNOSIS — E03.9 HYPOTHYROIDISM, UNSPECIFIED TYPE: ICD-10-CM

## 2018-04-07 DIAGNOSIS — E78.5 HYPERLIPIDEMIA LDL GOAL <100: ICD-10-CM

## 2018-04-07 DIAGNOSIS — I10 ESSENTIAL HYPERTENSION: ICD-10-CM

## 2018-04-07 NOTE — TELEPHONE ENCOUNTER
"  Requested Prescriptions   Pending Prescriptions Disp Refills     hydrochlorothiazide (HYDRODIURIL) 25 MG tablet [Pharmacy Med Name: HYDROCHLOROT 25MG   TAB]  Last Written Prescription Date:  1/20/17  Last Fill Quantity: 90 tablet,  # refills: 3   Last Office Visit with [facility] or [facility] prescribing provider:  12/21/17   Future Office Visit:      90 tablet 3     Sig: TAKE ONE TABLET BY MOUTH ONCE DAILY    Diuretics (Including Combos) Protocol Failed    4/7/2018  1:37 PM       Failed - Normal serum creatinine on file in past 12 months    Recent Labs   Lab Test  01/24/18   1052   CR  1.34*           Passed - Blood pressure under 140/90 in past 12 months    BP Readings from Last 3 Encounters:   12/21/17 120/78   08/25/17 122/78   07/14/17 124/76          Passed - Recent (12 mo) or future (30 days) visit within the authorizing provider's specialty    Patient had office visit in the last 12 months or has a visit in the next 30 days with authorizing provider or within the authorizing provider's specialty.  See \"Patient Info\" tab in inbasket, or \"Choose Columns\" in Meds & Orders section of the refill encounter.           Passed - Patient is age 18 or older       Passed - Normal serum potassium on file in past 12 months    Recent Labs   Lab Test  01/24/18   1052   POTASSIUM  3.5           Passed - Normal serum sodium on file in past 12 months    Recent Labs   Lab Test  01/24/18   1052   NA  135              levothyroxine (SYNTHROID/LEVOTHROID) 100 MCG tablet [Pharmacy Med Name: LEVOTHYROXIN 100MCG TAB]  Last Written Prescription Date:  1/20/17  Last Fill Quantity: 90 tablet,  # refills: 3   Last Office Visit with [facility] or [facility] prescribing provider:  12/21/17   Future Office Visit:      90 tablet 3     Sig: TAKE ONE TABLET BY MOUTH ONCE DAILY    Thyroid Protocol Failed    4/7/2018  1:37 PM       Failed - Normal TSH on file in past 12 months    Recent Labs   Lab Test  01/24/18   1052   TSH  4.24*           Passed - " "Patient is 12 years or older       Passed - Recent (12 mo) or future (30 days) visit within the authorizing provider's specialty    Patient had office visit in the last 12 months or has a visit in the next 30 days with authorizing provider or within the authorizing provider's specialty.  See \"Patient Info\" tab in inbasket, or \"Choose Columns\" in Meds & Orders section of the refill encounter.            pravastatin (PRAVACHOL) 40 MG tablet [Pharmacy Med Name: PRAVASTATIN 40MG    TAB]  Last Written Prescription Date:  1/20/17  Last Fill Quantity: 90 tablet,  # refills: 3   Last Office Visit with [facility] or [facility] prescribing provider:  12/21/17   Future Office Visit:      90 tablet 3     Sig: TAKE ONE TABLET BY MOUTH ONCE DAILY    Statins Protocol Passed    4/7/2018  1:37 PM       Passed - LDL on file in past 12 months    Recent Labs   Lab Test  11/29/17   0943   LDL  63          Passed - No abnormal creatine kinase in past 12 months    Recent Labs   Lab Test  11/29/17   0943   CKT  175           Passed - Recent (12 mo) or future (30 days) visit within the authorizing provider's specialty    Patient had office visit in the last 12 months or has a visit in the next 30 days with authorizing provider or within the authorizing provider's specialty.  See \"Patient Info\" tab in inbasket, or \"Choose Columns\" in Meds & Orders section of the refill encounter.           Passed - Patient is age 18 or older          "

## 2018-04-09 NOTE — TELEPHONE ENCOUNTER
Routing refill request to provider for review/approval because:  Labs out of range:  TSH and Cr

## 2018-04-10 RX ORDER — PRAVASTATIN SODIUM 40 MG
TABLET ORAL
Qty: 30 TABLET | Refills: 0 | Status: SHIPPED | OUTPATIENT
Start: 2018-04-10 | End: 2018-04-11 | Stop reason: ALTCHOICE

## 2018-04-10 RX ORDER — LEVOTHYROXINE SODIUM 100 UG/1
TABLET ORAL
Qty: 30 TABLET | Refills: 0 | Status: SHIPPED | OUTPATIENT
Start: 2018-04-10 | End: 2018-04-23 | Stop reason: DRUGHIGH

## 2018-04-10 RX ORDER — HYDROCHLOROTHIAZIDE 25 MG/1
TABLET ORAL
Qty: 30 TABLET | Refills: 0 | Status: SHIPPED | OUTPATIENT
Start: 2018-04-10 | End: 2018-04-23

## 2018-04-11 ENCOUNTER — TELEPHONE (OUTPATIENT)
Dept: INTERNAL MEDICINE | Facility: CLINIC | Age: 68
End: 2018-04-11

## 2018-04-11 DIAGNOSIS — N18.30 CKD (CHRONIC KIDNEY DISEASE) STAGE 3, GFR 30-59 ML/MIN (H): Primary | ICD-10-CM

## 2018-04-11 DIAGNOSIS — E78.5 HYPERLIPIDEMIA LDL GOAL <100: ICD-10-CM

## 2018-04-11 DIAGNOSIS — E03.9 HYPOTHYROIDISM, UNSPECIFIED TYPE: ICD-10-CM

## 2018-04-11 DIAGNOSIS — Z12.5 SCREENING FOR PROSTATE CANCER: ICD-10-CM

## 2018-04-11 NOTE — TELEPHONE ENCOUNTER
Reason for call:  Other   Patient called regarding (reason for call): patient wants lab orders before annual appt on 4/23/2018 so he can have lab results when seen  Additional comments:     Phone number to reach patient:  Cell number on file:    Telephone Information:   Mobile [phone]       Best Time:  Anytime     Can we leave a detailed message on this number?  YES

## 2018-04-12 NOTE — TELEPHONE ENCOUNTER
Labs ordered. Pt to have done fasting.  Nursing had entered 3 meds   4/7/18 that pt was requesting to have refilled and MD refilled the 3 meds for 30 days until seen in clinic. However, review of chart shoes that one of the requested and refilled meds was Pravastatin that had been discontinued in October when pt changed instead to  Rosuvastatin (generic Crestor) which pt should be taking instead of Pravastatin and should still have medication from previous refills. If pt has not picked up the med refills from a few days ago, cancel the refill request for Pravstatin from the pharmacy. If pt has already picked up the Pravastatin med, pt NOT to take it and pt to continue with Rosuvastatin 10mg daily as previously prescribed. Pt to schedule future lab appt in the next week or so as requested and will then review reuslts when seen back in clinic as scheduled

## 2018-04-17 DIAGNOSIS — N18.30 CKD (CHRONIC KIDNEY DISEASE) STAGE 3, GFR 30-59 ML/MIN (H): ICD-10-CM

## 2018-04-17 DIAGNOSIS — E03.9 HYPOTHYROIDISM, UNSPECIFIED TYPE: ICD-10-CM

## 2018-04-17 DIAGNOSIS — E78.5 HYPERLIPIDEMIA LDL GOAL <100: ICD-10-CM

## 2018-04-17 DIAGNOSIS — Z12.5 SCREENING FOR PROSTATE CANCER: ICD-10-CM

## 2018-04-17 LAB — PSA SERPL-ACNC: 4.59 UG/L (ref 0–4)

## 2018-04-17 PROCEDURE — G0103 PSA SCREENING: HCPCS | Performed by: INTERNAL MEDICINE

## 2018-04-17 PROCEDURE — 84443 ASSAY THYROID STIM HORMONE: CPT | Performed by: INTERNAL MEDICINE

## 2018-04-17 PROCEDURE — 80053 COMPREHEN METABOLIC PANEL: CPT | Performed by: INTERNAL MEDICINE

## 2018-04-17 PROCEDURE — 36415 COLL VENOUS BLD VENIPUNCTURE: CPT | Performed by: INTERNAL MEDICINE

## 2018-04-17 PROCEDURE — 84439 ASSAY OF FREE THYROXINE: CPT | Performed by: INTERNAL MEDICINE

## 2018-04-17 PROCEDURE — 80061 LIPID PANEL: CPT | Performed by: INTERNAL MEDICINE

## 2018-04-18 LAB
ALBUMIN SERPL-MCNC: 4.1 G/DL (ref 3.4–5)
ALP SERPL-CCNC: 67 U/L (ref 40–150)
ALT SERPL W P-5'-P-CCNC: 23 U/L (ref 0–70)
ANION GAP SERPL CALCULATED.3IONS-SCNC: 9 MMOL/L (ref 3–14)
AST SERPL W P-5'-P-CCNC: 17 U/L (ref 0–45)
BILIRUB SERPL-MCNC: 1 MG/DL (ref 0.2–1.3)
BUN SERPL-MCNC: 13 MG/DL (ref 7–30)
CALCIUM SERPL-MCNC: 9 MG/DL (ref 8.5–10.1)
CHLORIDE SERPL-SCNC: 97 MMOL/L (ref 94–109)
CHOLEST SERPL-MCNC: 212 MG/DL
CO2 SERPL-SCNC: 29 MMOL/L (ref 20–32)
CREAT SERPL-MCNC: 1.3 MG/DL (ref 0.66–1.25)
GFR SERPL CREATININE-BSD FRML MDRD: 55 ML/MIN/1.7M2
GLUCOSE SERPL-MCNC: 91 MG/DL (ref 70–99)
HDLC SERPL-MCNC: 74 MG/DL
LDLC SERPL CALC-MCNC: 126 MG/DL
NONHDLC SERPL-MCNC: 138 MG/DL
POTASSIUM SERPL-SCNC: 4.1 MMOL/L (ref 3.4–5.3)
PROT SERPL-MCNC: 7.4 G/DL (ref 6.8–8.8)
SODIUM SERPL-SCNC: 135 MMOL/L (ref 133–144)
T4 FREE SERPL-MCNC: 0.87 NG/DL (ref 0.76–1.46)
TRIGL SERPL-MCNC: 61 MG/DL
TSH SERPL DL<=0.005 MIU/L-ACNC: 5.48 MU/L (ref 0.4–4)

## 2018-04-23 ENCOUNTER — OFFICE VISIT (OUTPATIENT)
Dept: INTERNAL MEDICINE | Facility: CLINIC | Age: 68
End: 2018-04-23
Payer: COMMERCIAL

## 2018-04-23 VITALS
SYSTOLIC BLOOD PRESSURE: 124 MMHG | DIASTOLIC BLOOD PRESSURE: 80 MMHG | WEIGHT: 188 LBS | HEIGHT: 72 IN | TEMPERATURE: 98.4 F | RESPIRATION RATE: 16 BRPM | HEART RATE: 76 BPM | BODY MASS INDEX: 25.47 KG/M2

## 2018-04-23 DIAGNOSIS — E78.5 HYPERLIPIDEMIA LDL GOAL <100: ICD-10-CM

## 2018-04-23 DIAGNOSIS — Z00.00 MEDICARE ANNUAL WELLNESS VISIT, INITIAL: Primary | ICD-10-CM

## 2018-04-23 DIAGNOSIS — R97.20 ELEVATED PROSTATE SPECIFIC ANTIGEN (PSA): ICD-10-CM

## 2018-04-23 DIAGNOSIS — I10 ESSENTIAL HYPERTENSION: ICD-10-CM

## 2018-04-23 DIAGNOSIS — N18.30 CKD (CHRONIC KIDNEY DISEASE) STAGE 3, GFR 30-59 ML/MIN (H): ICD-10-CM

## 2018-04-23 DIAGNOSIS — E03.9 HYPOTHYROIDISM, UNSPECIFIED TYPE: ICD-10-CM

## 2018-04-23 PROCEDURE — 99214 OFFICE O/P EST MOD 30 MIN: CPT | Mod: 25 | Performed by: INTERNAL MEDICINE

## 2018-04-23 PROCEDURE — G0438 PPPS, INITIAL VISIT: HCPCS | Performed by: INTERNAL MEDICINE

## 2018-04-23 RX ORDER — HYDROCHLOROTHIAZIDE 25 MG/1
25 TABLET ORAL DAILY
Qty: 90 TABLET | Refills: 3 | Status: SHIPPED | OUTPATIENT
Start: 2018-04-23 | End: 2019-01-15 | Stop reason: DRUGHIGH

## 2018-04-23 RX ORDER — ROSUVASTATIN CALCIUM 10 MG/1
10 TABLET, COATED ORAL DAILY
Qty: 90 TABLET | Refills: 3 | Status: SHIPPED | OUTPATIENT
Start: 2018-04-23 | End: 2019-01-16

## 2018-04-23 RX ORDER — LEVOTHYROXINE SODIUM 100 UG/1
100 TABLET ORAL DAILY
Qty: 30 TABLET | Refills: 0 | Status: CANCELLED | OUTPATIENT
Start: 2018-04-23

## 2018-04-23 RX ORDER — LEVOTHYROXINE SODIUM 112 UG/1
112 TABLET ORAL DAILY
Qty: 90 TABLET | Refills: 3 | Status: SHIPPED | OUTPATIENT
Start: 2018-04-23 | End: 2019-01-16

## 2018-04-23 RX ORDER — CIPROFLOXACIN 250 MG/1
250 TABLET, FILM COATED ORAL 2 TIMES DAILY
Qty: 28 TABLET | Refills: 0 | Status: SHIPPED | OUTPATIENT
Start: 2018-04-23 | End: 2018-05-09

## 2018-04-23 RX ORDER — LOSARTAN POTASSIUM 100 MG/1
100 TABLET ORAL DAILY
Qty: 90 TABLET | Refills: 3 | Status: SHIPPED | OUTPATIENT
Start: 2018-04-23 | End: 2019-01-16

## 2018-04-23 ASSESSMENT — PAIN SCALES - GENERAL: PAINLEVEL: NO PAIN (0)

## 2018-04-23 ASSESSMENT — ACTIVITIES OF DAILY LIVING (ADL)
I_NEED_ASSISTANCE_FOR_THE_FOLLOWING_DAILY_ACTIVITIES:: NO ASSISTANCE IS NEEDED
CURRENT_FUNCTION: NO ASSISTANCE NEEDED

## 2018-04-23 NOTE — MR AVS SNAPSHOT
After Visit Summary   4/23/2018    Agapito Fairbanks    MRN: [id number]           Patient Information     Date Of Birth          1950        Visit Information        Provider Department      4/23/2018 3:00 PM Los Love MD [facility]        Today's Diagnoses     Elevated prostate specific antigen (PSA)    -  1    Essential hypertension        Hypothyroidism, unspecified type        Hyperlipidemia LDL goal <100          Care Instructions    Cipro 250mg,  1 tab twice a day for 14 days for elevated PSA  Stop Levothyroxine 100mcg tab  Start Levothyroxine 112mcg tab, 1 tab daily in AM for thyroid  Continue other medications   Review med list with current medication bottles at home to be sure what you are taking is on the list (besides the thyroid med change)  Fasting labs in early June for cholesterol and thyroid and PSA  If PSA remains elevated, will refer on to Urology for further assessment  Check with insurance re: coverage of Shingrx vaccine for shingles prevention and whether they want it given in clinic or pharmacy  Neuropsych testing with [facility] re: memory          Follow-ups after your visit        Who to contact     If you have questions or need follow up information about today's clinic visit or your schedule please contact [facility] directly at [phone].  Normal or non-critical lab and imaging results will be communicated to you by MyChart, letter or phone within 4 business days after the clinic has received the results. If you do not hear from us within 7 days, please contact the clinic through MyChart or phone. If you have a critical or abnormal lab result, we will notify you by phone as soon as possible.  Submit refill requests through [employer] or call your pharmacy and they will forward the refill request to us. Please allow 3 business days for your refill to be completed.          Additional Information About Your  "Visit        LiquidText Information     LiquidText lets you send messages to your doctor, view your test results, renew your prescriptions, schedule appointments and more. To sign up, go to [email or web address] . Click on \"Log in\" on the left side of the screen, which will take you to the Welcome page. Then click on \"Sign up Now\" on the right side of the page.     You will be asked to enter the access code listed below, as well as some personal information. Please follow the directions to create your username and password.     Your access code is: [email or web address]  Expires: 2018  3:48 PM     Your access code will  in 90 days. If you need help or a new code, please call your [facility] clinic or [phone].        Care EveryWhere ID     This is your Care EveryWhere ID. This could be used by other organizations to access your [facility] medical records  [id number]        Your Vitals Were     Pulse Temperature Respirations Height BMI (Body Mass Index)       76 98.4  F (36.9  C) (Oral) 16 6' (1.829 m) 25.5 kg/m2        Blood Pressure from Last 3 Encounters:   18 124/80   17 120/78   17 122/78    Weight from Last 3 Encounters:   18 188 lb (85.3 kg)   17 182 lb (82.6 kg)   17 185 lb (83.9 kg)              Today, you had the following     No orders found for display         Today's Medication Changes          These changes are accurate as of 18  3:48 PM.  If you have any questions, ask your nurse or doctor.               Start taking these medicines.        Dose/Directions    ciprofloxacin 250 MG tablet   Commonly known as:  CIPRO   Used for:  Elevated prostate specific antigen (PSA)   Started by:  Los Love MD        Dose:  250 mg   Take 1 tablet (250 mg) by mouth 2 times daily for 14 days   Quantity:  28 tablet   Refills:  0         These medicines have changed or have updated prescriptions.        Dose/Directions    hydrochlorothiazide 25 MG tablet   Commonly known as:  " HYDRODIURIL   This may have changed:  See the new instructions.   Used for:  Essential hypertension   Changed by:  Los Love MD        Dose:  25 mg   Take 1 tablet (25 mg) by mouth daily   Quantity:  90 tablet   Refills:  3       levothyroxine 112 MCG tablet   Commonly known as:  SYNTHROID/LEVOTHROID   This may have changed:  See the new instructions.   Used for:  Hypothyroidism, unspecified type   Changed by:  Los Love MD        Dose:  112 mcg   Take 1 tablet (112 mcg) by mouth daily   Quantity:  90 tablet   Refills:  3       losartan 100 MG tablet   Commonly known as:  COZAAR   This may have changed:  See the new instructions.   Used for:  Essential hypertension   Changed by:  Los Love MD        Dose:  100 mg   Take 1 tablet (100 mg) by mouth daily   Quantity:  90 tablet   Refills:  3            Where to get your medicines      These medications were sent to [facility] [address] [place] [address]     Phone:  [phone]     ciprofloxacin 250 MG tablet    hydrochlorothiazide 25 MG tablet    levothyroxine 112 MCG tablet    losartan 100 MG tablet    rosuvastatin 10 MG tablet                Primary Care Provider Office Phone # Fax #    Los Love [phone]       [address] [facility] [address]        Equal Access to Services     JES DANGELO AH: Hadii fabrizio rodriguez hadasho Soomaali, waaxda luqadaha, qaybta kaalmada adeegyada, waxay brendain haydwayne doshi. So [facility] [phone].    ATENCIÓN: Si habla español, tiene a agrawal disposición servicios gratuitos de asistencia lingüística. Llame al [phone].    We comply with applicable federal civil rights laws and [place] laws. We do not discriminate on the basis of race, color, national origin, age, disability, sex, sexual orientation, or gender identity.            Thank you!     Thank you for choosing [facility]  for your care.  Our goal is always to provide you with excellent care. Hearing back from our patients is one way we can continue to improve our services. Please take a few minutes to complete the written survey that you may receive in the mail after your visit with us. Thank you!             Your Updated Medication List - Protect others around you: Learn how to safely use, store and throw away your medicines at www.disposemymeds.org.          This list is accurate as of 4/23/18  3:48 PM.  Always use your most recent med list.                   Brand Name Dispense Instructions for use Diagnosis    ciprofloxacin 250 MG tablet    CIPRO    28 tablet    Take 1 tablet (250 mg) by mouth 2 times daily for 14 days    Elevated prostate specific antigen (PSA)       hydrochlorothiazide 25 MG tablet    HYDRODIURIL    90 tablet    Take 1 tablet (25 mg) by mouth daily    Essential hypertension       levothyroxine 112 MCG tablet    SYNTHROID/LEVOTHROID    90 tablet    Take 1 tablet (112 mcg) by mouth daily    Hypothyroidism, unspecified type       losartan 100 MG tablet    COZAAR    90 tablet    Take 1 tablet (100 mg) by mouth daily    Essential hypertension       MULTI VITAMIN MENS PO      1 TABLET DAILY        rosuvastatin 10 MG tablet    CRESTOR    90 tablet    Take 1 tablet (10 mg) by mouth daily    Hyperlipidemia LDL goal <100

## 2018-04-23 NOTE — PROGRESS NOTES
SUBJECTIVE:   Agapito Fairbanks is a 68 year old male who presents for Preventive Visit.      Are you in the first 12 months of your Medicare Part B coverage?  No    Healthy Habits:  Answers for HPI/ROS submitted by the patient on 4/23/2018   Annual Exam:  Getting at least 3 servings of Calcium per day:: Yes  Bi-annual eye exam:: Yes  Dental care twice a year:: Yes  Sleep apnea or symptoms of sleep apnea:: None  Diet:: Regular (no restrictions)  Taking medications regularly:: Yes  Medication side effects:: Lightheadedness  Additional concerns today:: No  Activities of Daily Living: no assistance needed  Home safety: throw rugs in the hallway, lack of grab bars in the bathroom  Hearing Impairment:: difficult to understand a speaker at a public meeting or Mosque service  PHQ-2 Score: 0        Ability to successfully perform activities of daily living: Yes, no assistance needed    Home safety:  none identified     Hearing impairment: No    Fall risk:  Fallen 2 or more times in the past year?: No  Any fall with injury in the past year?: No        COGNITIVE SCREEN  1) Repeat 3 items (Banana, Sunrise, Chair)    2) Clock draw: NORMAL  3) 3 item recall: Recalls 2 objects   Results: NORMAL clock, 1-2 items recalled: COGNITIVE IMPAIRMENT LESS LIKELY    Mini-CogTM Copyright S Sudhakar. Licensed by the author for use in [facility]; reprinted with permission ([email or web address]@.[facility]). All rights reserved.        Lab Results   Component Value Date    GLC 91 04/17/2018     Lab Results   Component Value Date    A1C 5.7 06/15/2015     Lab Results   Component Value Date    CHOL 212 04/17/2018     Lab Results   Component Value Date     04/17/2018     Lab Results   Component Value Date    HDL 74 04/17/2018     Lab Results   Component Value Date    TRIG 61 04/17/2018     Lab Results   Component Value Date    CR 1.30 04/17/2018     Lab Results   Component Value Date    ALT 23 04/17/2018     Lab Results   Component Value  Date    AST 17 04/17/2018     Lab Results   Component Value Date    MICROL 6 08/22/2017     Lab Results   Component Value Date    TSH 5.48 04/17/2018             Reviewed and updated as needed this visit by clinical staff         Reviewed and updated as needed this visit by Provider        Social History   Substance Use Topics     Smoking status: Former Smoker     Packs/day: 1.50     Years: 30.00     Quit date: 1/1/1998     Smokeless tobacco: Never Used     Alcohol use No       If you drink alcohol do you typically have >3 drinks per day or >7 drinks per week? No                        Today's PHQ-2 Score:   PHQ-2 ( 1999 [employer]) 8/25/2017 7/14/2017   Q1: Little interest or pleasure in doing things 0 0   Q2: Feeling down, depressed or hopeless 0 0   PHQ-2 Score 0 0       Do you feel safe in your environment - Yes    Do you have a Health Care Directive?: Yes: Patient states has Advance Directive and will bring in a copy to clinic.    Current providers sharing in care for this patient include:   Patient Care Team:  Los Love MD as PCP - General    The following health maintenance items are reviewed in Epic and correct as of today:  Health Maintenance   Topic Date Due     AORTIC ANEURYSM SCREENING (SYSTEM ASSIGNED)  03/31/2013     DEPRESSION ACTION PLAN Q1 YR  01/28/2015     ADVANCE DIRECTIVE PLANNING Q5 YRS  05/13/2016     PHQ-9 Q6 MONTHS  02/25/2018     FALL RISK ASSESSMENT  07/14/2018     MICROALBUMIN Q1 YEAR  08/22/2018     HEMOGLOBIN Q1 YR  01/24/2019     COLONOSCOPY Q5 YR  02/19/2019     BMP Q1 YR  04/17/2019     LIPID MONITORING Q1 YEAR  04/17/2019     TETANUS IMMUNIZATION (SYSTEM ASSIGNED)  12/06/2026     PNEUMOCOCCAL  Completed     INFLUENZA VACCINE  Completed     HEPATITIS C SCREENING  Completed     Labs reviewed in EPIC    Component      Latest Ref Rng & Units 11/21/2011 1/24/2014 7/11/2017 10/2/2017   PSA      0 - 4 ug/L 2.77 3.61       Component      Latest Ref Rng & Units 11/29/2017 1/24/2018 4/17/2018    PSA      0 - 4 ug/L   4.59 (H)       ROS:  CONSTITUTIONAL: NEGATIVE for fever, chills. Weight up 6 pounds in 4 mos  INTEGUMENTARY/SKIN: NEGATIVE for worrisome rashes, moles or lesions.   EYES: NEGATIVE for vision changes or irritation. Has glasses.   Eye exam in the past 1 year  ENT/MOUTH: NEGATIVE for ear, mouth and throat problems  RESP: NEGATIVE for significant cough or SOB  CV: NEGATIVE for chest pain, palpitations or peripheral edema  GI: NEGATIVE for nausea, abdominal pain, heartburn. Mild constipation  : NEGATIVE for frequency, dysuria, or hematuria. Hx CKD. Recent elevated PSA  MUSCULOSKELETAL: NEGATIVE for significant arthralgias or myalgia  NEURO: NEGATIVE for weakness, dizziness or paresthesias. Pt feels memory stable with mld loss.  6 CIT = 2/28 in December. Saw Neuro in February and will be having neuropsych testing  ENDOCRINE:  POSITIVE for  Low thyroid and  Hyperlipidemia. Lipids off despite same med (Crestor) showing normal lipids result last Fall and thyroid function too low now. Pt state compliant with meds  HEME: NEGATIVE for bleeding problems  PSYCHIATRIC:  POSITIVE for occ grumpiness. PHQ=0.     OBJECTIVE:   /80  Pulse 76  Temp 98.4  F (36.9  C) (Oral)  Resp 16  Ht 6' (1.829 m)  Wt 188 lb (85.3 kg)  BMI 25.5 kg/m2 Estimated body mass index is 25.5 kg/(m^2) as calculated from the following:    Height as of this encounter: 6' (1.829 m).    Weight as of this encounter: 188 lb (85.3 kg).  EXAM:   General appearance - healthy, alert, no distress  Skin - No rashes or lesions. Tattoo left UE  Head - normocephalic, atraumatic  Eyes - KAITLIN, EOMI, fundi exam with nondilated pupils negative.  Ears - External ears normal. Canals clear. TM's normal.  Nose/Sinuses - Nares normal. Septum midline. Mucosa normal. No drainage or sinus tenderness.  Oropharynx - No erythema, no adenopathy, no exudates.  Neck - Supple without adenopathy or thyromegaly. No bruits.  Lungs - Clear to auscultation  without wheezes/rhonchi.  Heart - Regular rate and rhythm without murmurs, clicks, or gallops.  Nodes - No supraclavicular, axillary, or inguinal adenopathy palpable.  Abdomen - Abdomen soft, non-tender. BS normal. No masses or hepatosplenomegaly palpable. No bruits.  Extremities -No cyanosis, clubbing or edema.    Musculoskeletal - Spine ROM normal. Muscular strength intact.   Peripheral pulses - radial=4/4, femoral=4/4, posterior tibial=4/4, dorsalis pedis=4/4,  Neuro - Gait normal. Reflexes normal and symmetric. Sensation grossly WNL.  Repeat memory testing deferred as patient has upcoming neuropsych testing with neurology  Genital - Normal-appearing male external genitalia. No scrotal masses or inguinal hernia palpable.   Rectal - Guaic negative stool. Normal tone. Prostate 2 plus in size to palpation.Mildly tender to palpation.  No rectal masses or prostate nodularity palpable      ASSESSMENT / PLAN:   1. Medicare annual wellness visit, initial   See plan discussion below re: [place]    2. Essential hypertension  Controlled.  Continue current medication  - hydrochlorothiazide (HYDRODIURIL) 25 MG tablet; Take 1 tablet (25 mg) by mouth daily  Dispense: 90 tablet; Refill: 3  - losartan (COZAAR) 100 MG tablet; Take 1 tablet (100 mg) by mouth daily  Dispense: 90 tablet; Refill: 3    3. Hypothyroidism, unspecified type  Uncontrolled.   Patient states he has been compliant with medication.  We will therefore increase levothyroxine to 112 mcg daily.  Repeat TSH 6 weeks  - levothyroxine (SYNTHROID/LEVOTHROID) 112 MCG tablet; Take 1 tablet (112 mcg) by mouth daily  Dispense: 90 tablet; Refill: 3  - OFFICE/OUTPT VISIT,EST,LEVL III  - TSH with free T4 reflex; Future    4. Hyperlipidemia LDL goal <100  Lipids elevated similar to when patient was not taking medications though patient adamant that he has been taking the rosuvastatin.  Numbers have been normal in the past 1 month after starting that medication and patient's  diet has not changed according to his report.  See plan below regarding confirming patient taking medication and encouraged him to take it in the morning to ensure additional compliance.  Repeat fasting lab 6 weeks  - rosuvastatin (CRESTOR) 10 MG tablet; Take 1 tablet (10 mg) by mouth daily  Dispense: 90 tablet; Refill: 3  - OFFICE/OUTPT VISIT,EST,LEVL III  - Lipid panel reflex to direct LDL Fasting; Future    5. Elevated prostate specific antigen (PSA)  Prostate minimally tender to palpation.  Large in size.  Will treat with Cipro for possible prostatitis caused the PSA elevation and recheck PSA with a six-week lab.  If not improving, will refer on to urology for possible MRI versus other  - ciprofloxacin (CIPRO) 250 MG tablet; Take 1 tablet (250 mg) by mouth 2 times daily for 14 days  Dispense: 28 tablet; Refill: 0  - OFFICE/OUTPT VISIT,EST,LEVL III  - PSA, total and free; Future    6. CKD (chronic kidney disease) stage 3, GFR 30-59 ml/min  Overall stable. Repeat labs 6 mos  - Basic metabolic panel; Future  - Albumin Random Urine Quantitative with Creat Ratio; Future  - Hemoglobin; Future  - Phosphorus; Future      End of Life Planning:  Patient currently has an advanced directive: Yes.  Practitioner is supportive of decision.    COUNSELING:  Reviewed preventive health counseling, as reflected in patient instructions        Estimated body mass index is 24.68 kg/(m^2) as calculated from the following:    Height as of 5/16/17: 6' (1.829 m).    Weight as of 12/21/17: 182 lb (82.6 kg).       reports that he quit smoking about 20 years ago. He has a 45.00 pack-year smoking history. He has never used smokeless tobacco.      Appropriate preventive services were discussed with this patient, including applicable screening as appropriate for cardiovascular disease, diabetes, osteopenia/osteoporosis, and glaucoma.  As appropriate for age/gender, discussed screening for colorectal cancer, prostate cancer, breast cancer, and  cervical cancer. Checklist reviewing preventive services available has been given to the patient.    Reviewed patients plan of care and provided an AVS. The Basic Care Plan (routine screening as documented in Health Maintenance) for Agapito meets the Care Plan requirement. This Care Plan has been established and reviewed with the Patient.    Counseling Resources:  ATP IV Guidelines  Pooled Cohorts Equation Calculator  Breast Cancer Risk Calculator  FRAX Risk Assessment  ICSI Preventive Guidelines  Dietary Guidelines for Americans, 2010  [employer] MyPlate  ASA Prophylaxis  Lung CA Screening      PLAN:  Cipro 250mg,  1 tab twice a day for 14 days for elevated PSA  Stop Levothyroxine 100mcg tab  Start Levothyroxine 112mcg tab, 1 tab daily in AM for thyroid  Continue other medications   Review med list with current medication bottles at home to be sure what you are taking is on the list (besides the thyroid med change)  Fasting labs in early June for cholesterol and thyroid and PSA  If PSA remains elevated, will refer on to Urology for further assessment  Check with insurance re: coverage of Shingrx vaccine for shingles prevention and whether they want it given in clinic or pharmacy  Neuropsych testing with [facility] re: memory    Pt was informed regarding extra E&M billing for medical issues not related to Medicare Wellness visit    Los Love MD  [facility]

## 2018-04-23 NOTE — PATIENT INSTRUCTIONS
Cipro 250mg,  1 tab twice a day for 14 days for elevated PSA  Stop Levothyroxine 100mcg tab  Start Levothyroxine 112mcg tab, 1 tab daily in AM for thyroid  Continue other medications   Review med list with current medication bottles at home to be sure what you are taking is on the list (besides the thyroid med change)  Fasting labs in early June for cholesterol and thyroid and PSA  If PSA remains elevated, will refer on to Urology for further assessment  Check with insurance re: coverage of Shingrx vaccine for shingles prevention and whether they want it given in clinic or pharmacy  Neuropsych testing with [facility] re: memory

## 2018-04-24 ASSESSMENT — PATIENT HEALTH QUESTIONNAIRE - PHQ9: SUM OF ALL RESPONSES TO PHQ QUESTIONS 1-9: 0

## 2018-05-04 DIAGNOSIS — L30.9 DERMATITIS: ICD-10-CM

## 2018-05-06 NOTE — TELEPHONE ENCOUNTER
"Requested Prescriptions   Pending Prescriptions Disp Refills     triamcinolone (KENALOG) 0.1 % cream [Pharmacy Med Name: TRIAMCINOLON 0.1%   CRE]  DISCONTINUED 7/14/17.  Last Written Prescription Date:  5/8/17  Last Fill Quantity: 30 G,  # refills: 0   Last office visit: 4/23/2018 with prescribing provider:  ANDREW   Future Office Visit:      3     Sig: APPLY  CREAM EXTERNALLY TO AFFECTED AREA THREE TIMES DAILY AS NEEDED SPARINGLY    Topical Steroid Protocol Passed    5/4/2018 11:39 AM       Passed - Patient is age 6 or older       Passed - Authorizing prescriber's most recent note related to this medication read.       Passed - High potency steroid not ordered       Passed - Recent (12 mo) or future (30 days) visit within the authorizing provider's specialty    Patient had office visit in the last 12 months or has a visit in the next 30 days with authorizing provider or within the authorizing provider's specialty.  See \"Patient Info\" tab in inbasket, or \"Choose Columns\" in Meds & Orders section of the refill encounter.              "

## 2018-05-09 RX ORDER — TRIAMCINOLONE ACETONIDE 1 MG/G
CREAM TOPICAL
Qty: 80 G | Refills: 0 | Status: SHIPPED | OUTPATIENT
Start: 2018-05-09 | End: 2019-01-16

## 2018-06-05 ENCOUNTER — OFFICE VISIT (OUTPATIENT)
Dept: URGENT CARE | Facility: URGENT CARE | Age: 68
End: 2018-06-05
Payer: COMMERCIAL

## 2018-06-05 VITALS
BODY MASS INDEX: 24.28 KG/M2 | RESPIRATION RATE: 16 BRPM | SYSTOLIC BLOOD PRESSURE: 130 MMHG | OXYGEN SATURATION: 99 % | WEIGHT: 179 LBS | HEART RATE: 70 BPM | DIASTOLIC BLOOD PRESSURE: 110 MMHG

## 2018-06-05 DIAGNOSIS — R42 LIGHTHEADEDNESS: ICD-10-CM

## 2018-06-05 DIAGNOSIS — R41.89 COGNITIVE IMPAIRMENT: ICD-10-CM

## 2018-06-05 DIAGNOSIS — R41.3 MEMORY LOSS: Primary | ICD-10-CM

## 2018-06-05 LAB
ALBUMIN SERPL-MCNC: 4.4 G/DL (ref 3.4–5)
ALBUMIN UR-MCNC: NEGATIVE MG/DL
ALP SERPL-CCNC: 79 U/L (ref 40–150)
ALT SERPL W P-5'-P-CCNC: 33 U/L (ref 0–70)
ANION GAP SERPL CALCULATED.3IONS-SCNC: 5 MMOL/L (ref 3–14)
APPEARANCE UR: CLEAR
AST SERPL W P-5'-P-CCNC: 27 U/L (ref 0–45)
BASOPHILS # BLD AUTO: 0 10E9/L (ref 0–0.2)
BASOPHILS NFR BLD AUTO: 0.3 %
BILIRUB SERPL-MCNC: 1.5 MG/DL (ref 0.2–1.3)
BILIRUB UR QL STRIP: NEGATIVE
BUN SERPL-MCNC: 10 MG/DL (ref 7–30)
CALCIUM SERPL-MCNC: 9.4 MG/DL (ref 8.5–10.1)
CHLORIDE SERPL-SCNC: 100 MMOL/L (ref 94–109)
CO2 SERPL-SCNC: 27 MMOL/L (ref 20–32)
COLOR UR AUTO: YELLOW
CREAT SERPL-MCNC: 1.28 MG/DL (ref 0.66–1.25)
DIFFERENTIAL METHOD BLD: NORMAL
EOSINOPHIL # BLD AUTO: 0.2 10E9/L (ref 0–0.7)
EOSINOPHIL NFR BLD AUTO: 1.9 %
ERYTHROCYTE [DISTWIDTH] IN BLOOD BY AUTOMATED COUNT: 13.3 % (ref 10–15)
GFR SERPL CREATININE-BSD FRML MDRD: 56 ML/MIN/1.7M2
GLUCOSE SERPL-MCNC: 110 MG/DL (ref 70–99)
GLUCOSE UR STRIP-MCNC: NEGATIVE MG/DL
HCT VFR BLD AUTO: 45.3 % (ref 40–53)
HGB BLD-MCNC: 15.2 G/DL (ref 13.3–17.7)
HGB UR QL STRIP: NEGATIVE
KETONES UR STRIP-MCNC: NEGATIVE MG/DL
LEUKOCYTE ESTERASE UR QL STRIP: NEGATIVE
LYMPHOCYTES # BLD AUTO: 1.1 10E9/L (ref 0.8–5.3)
LYMPHOCYTES NFR BLD AUTO: 13.6 %
MCH RBC QN AUTO: 32 PG (ref 26.5–33)
MCHC RBC AUTO-ENTMCNC: 33.6 G/DL (ref 31.5–36.5)
MCV RBC AUTO: 95 FL (ref 78–100)
MONOCYTES # BLD AUTO: 0.5 10E9/L (ref 0–1.3)
MONOCYTES NFR BLD AUTO: 6.8 %
NEUTROPHILS # BLD AUTO: 6 10E9/L (ref 1.6–8.3)
NEUTROPHILS NFR BLD AUTO: 77.4 %
NITRATE UR QL: NEGATIVE
PH UR STRIP: 6.5 PH (ref 5–7)
PLATELET # BLD AUTO: 253 10E9/L (ref 150–450)
POTASSIUM SERPL-SCNC: 4.9 MMOL/L (ref 3.4–5.3)
PROT SERPL-MCNC: 8.1 G/DL (ref 6.8–8.8)
RBC # BLD AUTO: 4.75 10E12/L (ref 4.4–5.9)
RBC #/AREA URNS AUTO: NORMAL /HPF
SODIUM SERPL-SCNC: 132 MMOL/L (ref 133–144)
SOURCE: NORMAL
SP GR UR STRIP: 1.01 (ref 1–1.03)
TSH SERPL DL<=0.005 MIU/L-ACNC: 2.1 MU/L (ref 0.4–4)
UROBILINOGEN UR STRIP-ACNC: 0.2 EU/DL (ref 0.2–1)
WBC # BLD AUTO: 7.8 10E9/L (ref 4–11)
WBC #/AREA URNS AUTO: NORMAL /HPF

## 2018-06-05 PROCEDURE — 81001 URINALYSIS AUTO W/SCOPE: CPT | Performed by: PHYSICIAN ASSISTANT

## 2018-06-05 PROCEDURE — 99214 OFFICE O/P EST MOD 30 MIN: CPT | Performed by: PHYSICIAN ASSISTANT

## 2018-06-05 PROCEDURE — 85025 COMPLETE CBC W/AUTO DIFF WBC: CPT | Performed by: PHYSICIAN ASSISTANT

## 2018-06-05 PROCEDURE — 36415 COLL VENOUS BLD VENIPUNCTURE: CPT | Performed by: PHYSICIAN ASSISTANT

## 2018-06-05 PROCEDURE — 80053 COMPREHEN METABOLIC PANEL: CPT | Performed by: PHYSICIAN ASSISTANT

## 2018-06-05 PROCEDURE — 84443 ASSAY THYROID STIM HORMONE: CPT | Performed by: PHYSICIAN ASSISTANT

## 2018-06-05 NOTE — MR AVS SNAPSHOT
"              After Visit Summary   2018    Agapito Fairbanks    MRN: [id number]           Patient Information     Date Of Birth          1950        Visit Information        Provider Department      2018 12:10 PM Kendrick Rojas PA-C [facility]        Today's Diagnoses     Memory loss    -  1    Cognitive impairment        Lightheadedness           Follow-ups after your visit        Who to contact     If you have questions or need follow up information about today's clinic visit or your schedule please contact [facility] directly at [phone].  Normal or non-critical lab and imaging results will be communicated to you by MyChart, letter or phone within 4 business days after the clinic has received the results. If you do not hear from us within 7 days, please contact the clinic through Miraculinshart or phone. If you have a critical or abnormal lab result, we will notify you by phone as soon as possible.  Submit refill requests through [employer] or call your pharmacy and they will forward the refill request to us. Please allow 3 business days for your refill to be completed.          Additional Information About Your Visit        MyChart Information     [employer] lets you send messages to your doctor, view your test results, renew your prescriptions, schedule appointments and more. To sign up, go to [email or web address] . Click on \"Log in\" on the left side of the screen, which will take you to the Welcome page. Then click on \"Sign up Now\" on the right side of the page.     You will be asked to enter the access code listed below, as well as some personal information. Please follow the directions to create your username and password.     Your access code is: [email or web address]  Expires: 2018  3:48 PM     Your access code will  in 90 days. If you need help or a new code, please call your [place] clinic or [phone].        Care EveryWhere ID  "    This is your Care EveryWhere ID. This could be used by other organizations to access your [place] medical records  [id number]        Your Vitals Were     Pulse Respirations Pulse Oximetry BMI (Body Mass Index)          70 16 99% 24.28 kg/m2         Blood Pressure from Last 3 Encounters:   06/05/18 (!) 130/110   04/23/18 124/80   12/21/17 120/78    Weight from Last 3 Encounters:   06/05/18 179 lb (81.2 kg)   04/23/18 188 lb (85.3 kg)   12/21/17 182 lb (82.6 kg)              We Performed the Following     CBC with platelets differential     Comprehensive metabolic panel     TSH with free T4 reflex     UA with Microscopic reflex to Culture        Primary Care Provider Office Phone # Fax #    [place] [facility] [phone]       [address] [facility] [address]        Equal Access to Services     JES DANGELO : Hadii fabrizio rodriguez hadasho Soomaali, waaxda luqadaha, qaybta kaalmada adeegyada, mann bass haydwayne liu . So [facility] [phone].    ATENCIÓN: Si habla español, tiene a agrawal disposición servicios gratuitos de asistencia lingüística. Llame al [phone].    We comply with applicable federal civil rights laws and [place] laws. We do not discriminate on the basis of race, color, national origin, age, disability, sex, sexual orientation, or gender identity.            Thank you!     Thank you for choosing [place] [facility]  for your care. Our goal is always to provide you with excellent care. Hearing back from our patients is one way we can continue to improve our services. Please take a few minutes to complete the written survey that you may receive in the mail after your visit with us. Thank you!             Your Updated Medication List - Protect others around you: Learn how to safely use, store and throw away your medicines at www.disposemymeds.org.          This list is accurate as of 6/5/18  3:16 PM.  Always use your most recent med list.                    Brand Name Dispense Instructions for use Diagnosis    hydrochlorothiazide 25 MG tablet    HYDRODIURIL    90 tablet    Take 1 tablet (25 mg) by mouth daily    Essential hypertension       levothyroxine 112 MCG tablet    SYNTHROID/LEVOTHROID    90 tablet    Take 1 tablet (112 mcg) by mouth daily    Hypothyroidism, unspecified type       losartan 100 MG tablet    COZAAR    90 tablet    Take 1 tablet (100 mg) by mouth daily    Essential hypertension       MULTI VITAMIN MENS PO      1 TABLET DAILY        rosuvastatin 10 MG tablet    CRESTOR    90 tablet    Take 1 tablet (10 mg) by mouth daily    Hyperlipidemia LDL goal <100       triamcinolone 0.1 % cream    KENALOG    80 g    APPLY  CREAM EXTERNALLY TO AFFECTED AREA THREE TIMES DAILY AS NEEDED SPARINGLY    Dermatitis

## 2018-06-05 NOTE — PROGRESS NOTES
SUBJECTIVE:   Agapito Fairbanks is a 68 year old male presenting with a chief complaint of having memory loss and impairment that seems to have worsened.  Onset of symptoms was 6 month(s) ago.  Course of illness is waxing and waning, but overall worsening.    Severity mild to moderate  Current and Associated symptoms: feeling forgetful at times, intermittent dizzines  Treatment measures tried include none.  Predisposing factors include hx alcohol consumption and CKD.    Past Medical History:   Diagnosis Date     Acute gastritis with hemorrhage 11/02     Basal cell carcinoma, leg      left pretibial area     CKD (chronic kidney disease) stage 3, GFR 30-59 ml/min     creat baseline approx 1.4     Hearing loss      Helicobacter pylori (H. 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     Mild major depression (H)      Other and unspecified malignant neoplasm of skin of other and unspecified parts of face      Basal Cell CA nasal area 4/04, chest 3/05, right chest 10/09     Other testicular hypofunction      Squamous cell carcinoma  Jan 2012     RLE s/p excision     Tobacco use disorder     quit 1998     Unspecified cataract     left     Unspecified essential hypertension      Unspecified retinal detachment     Bilateral        Allergies   Allergen Reactions     Simvastatin      myalgias     Lisinopril Rash     rash         Social History   Substance Use Topics     Smoking status: Former Smoker     Packs/day: 1.50     Years: 30.00     Quit date: 1/1/1998     Smokeless tobacco: Never Used     Alcohol use No       ROS:  CONSTITUTIONAL:NEGATIVE for fever, chills, change in weight  INTEGUMENTARY/SKIN: NEGATIVE for worrisome rashes, moles or lesions  EYES: NEGATIVE for vision  changes or irritation  ENT/MOUTH: NEGATIVE for ear, mouth and throat problems  RESP:NEGATIVE for significant cough or SOB  CV: NEGATIVE for chest pain, palpitations or peripheral edema  GI: NEGATIVE for nausea, abdominal pain, heartburn, or change in bowel habits  MUSCULOSKELETAL: NEGATIVE for significant arthralgias or myalgia  NEURO: POSITIVE for intermittent dizziness    OBJECTIVE  :BP (!) 130/110  Pulse 70  Resp 16  Wt 179 lb (81.2 kg)  SpO2 99%  BMI 24.28 kg/m2  GENERAL APPEARANCE: healthy, alert and no distress  EYES: EOMI,  PERRL, conjunctiva clear  HENT: ear canals and TM's normal.  Nose and mouth without ulcers, erythema or lesions  NECK: supple, nontender, no lymphadenopathy  RESP: lungs clear to auscultation - no rales, rhonchi or wheezes  CV: regular rates and rhythm, normal S1 S2, no murmur noted  ABDOMEN:  soft, nontender, no HSM or masses and bowel sounds normal  NEURO: Normal strength and tone, sensory exam grossly normal,  normal speech and mentation  SKIN: no suspicious lesions or rashes    Results for orders placed or performed in visit on 06/05/18   UA with Microscopic reflex to Culture   Result Value Ref Range    Color Urine Yellow     Appearance Urine Clear     Glucose Urine Negative NEG^Negative mg/dL    Bilirubin Urine Negative NEG^Negative    Ketones Urine Negative NEG^Negative mg/dL    Specific Gravity Urine 1.015 1.003 - 1.035    pH Urine 6.5 5.0 - 7.0 pH    Protein Albumin Urine Negative NEG^Negative mg/dL    Urobilinogen Urine 0.2 0.2 - 1.0 EU/dL    Nitrite Urine Negative NEG^Negative    Blood Urine Negative NEG^Negative    Leukocyte Esterase Urine Negative NEG^Negative    Source Midstream Urine     WBC Urine 0 - 5 OTO5^0 - 5 /HPF    RBC Urine O - 2 OTO2^O - 2 /HPF   CBC with platelets differential   Result Value Ref Range    WBC 7.8 4.0 - 11.0 10e9/L    RBC Count 4.75 4.4 - 5.9 10e12/L    Hemoglobin 15.2 13.3 - 17.7 g/dL    Hematocrit 45.3 40.0 - 53.0 %    MCV 95 78 - 100 fl    MCH  32.0 26.5 - 33.0 pg    MCHC 33.6 31.5 - 36.5 g/dL    RDW 13.3 10.0 - 15.0 %    Platelet Count 253 150 - 450 10e9/L    Diff Method Automated Method     % Neutrophils 77.4 %    % Lymphocytes 13.6 %    % Monocytes 6.8 %    % Eosinophils 1.9 %    % Basophils 0.3 %    Absolute Neutrophil 6.0 1.6 - 8.3 10e9/L    Absolute Lymphocytes 1.1 0.8 - 5.3 10e9/L    Absolute Monocytes 0.5 0.0 - 1.3 10e9/L    Absolute Eosinophils 0.2 0.0 - 0.7 10e9/L    Absolute Basophils 0.0 0.0 - 0.2 10e9/L   Comprehensive metabolic panel   Result Value Ref Range    Sodium 132 (L) 133 - 144 mmol/L    Potassium 4.9 3.4 - 5.3 mmol/L    Chloride 100 94 - 109 mmol/L    Carbon Dioxide 27 20 - 32 mmol/L    Anion Gap 5 3 - 14 mmol/L    Glucose 110 (H) 70 - 99 mg/dL    Urea Nitrogen 10 7 - 30 mg/dL    Creatinine 1.28 (H) 0.66 - 1.25 mg/dL    GFR Estimate 56 (L) >60 mL/min/1.7m2    GFR Estimate If Black 67 >60 mL/min/1.7m2    Calcium 9.4 8.5 - 10.1 mg/dL    Bilirubin Total 1.5 (H) 0.2 - 1.3 mg/dL    Albumin 4.4 3.4 - 5.0 g/dL    Protein Total 8.1 6.8 - 8.8 g/dL    Alkaline Phosphatase 79 40 - 150 U/L    ALT 33 0 - 70 U/L    AST 27 0 - 45 U/L   TSH with free T4 reflex   Result Value Ref Range    TSH 2.10 0.40 - 4.00 mU/L       ASSESSMENT/PLAN:      ICD-10-CM    1. Memory loss R41.3 UA with Microscopic reflex to Culture     CBC with platelets differential     Comprehensive metabolic panel     TSH with free T4 reflex   2. Cognitive impairment R41.89 UA with Microscopic reflex to Culture     CBC with platelets differential     Comprehensive metabolic panel     TSH with free T4 reflex   3. Lightheadedness R42      Orders Placed This Encounter     UA with Microscopic reflex to Culture     CBC with platelets differential     Comprehensive metabolic panel     TSH with free T4 reflex       Discussed with Dr. Veum.  At this time will have patient follow up with Neurology next week  If symptoms worsen acutely or weakness, numbness, headaches, confusion occur then go  to the ED  See orders in Epic

## 2018-09-21 ENCOUNTER — OFFICE VISIT (OUTPATIENT)
Dept: URGENT CARE | Facility: URGENT CARE | Age: 68
End: 2018-09-21
Payer: COMMERCIAL

## 2018-09-21 VITALS
DIASTOLIC BLOOD PRESSURE: 82 MMHG | RESPIRATION RATE: 18 BRPM | SYSTOLIC BLOOD PRESSURE: 118 MMHG | OXYGEN SATURATION: 98 % | TEMPERATURE: 98.2 F | HEART RATE: 62 BPM

## 2018-09-21 DIAGNOSIS — H93.8X1 PLUGGED FEELING IN EAR, RIGHT: ICD-10-CM

## 2018-09-21 DIAGNOSIS — R42 DIZZINESS: Primary | ICD-10-CM

## 2018-09-21 DIAGNOSIS — H69.91 DYSFUNCTION OF RIGHT EUSTACHIAN TUBE: ICD-10-CM

## 2018-09-21 PROCEDURE — 99213 OFFICE O/P EST LOW 20 MIN: CPT | Performed by: FAMILY MEDICINE

## 2018-09-21 NOTE — PROGRESS NOTES
Chief Complaint   Patient presents with     Urgent Care     Pt c/o stiff neck and off balance     SUBJECTIVE:   Agapito Fairbanks is a 68 year old male presenting with a chief complaint of plugged ears , post nasal drip , coughing , dizziness .  Onset of symptoms was 2 day(s) ago.  Course of illness is waxing and waning.    Severity mild  Current and Associated symptoms: post nasal drip   Treatment measures tried include None tried.  Predisposing factors include None.    Past Medical History:   Diagnosis Date     Acute gastritis with hemorrhage 11/02     Basal cell carcinoma, leg      left pretibial area     CKD (chronic kidney disease) stage 3, GFR 30-59 ml/min     creat baseline approx 1.4     Hearing loss      Helicobacter pylori (H. 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     Mild major depression (H)      Other and unspecified malignant neoplasm of skin of other and unspecified parts of face      Basal Cell CA nasal area 4/04, chest 3/05, right chest 10/09     Other testicular hypofunction      Squamous cell carcinoma  Jan 2012     RLE s/p excision     Tobacco use disorder     quit 1998     Unspecified cataract     left     Unspecified essential hypertension      Unspecified retinal detachment     Bilateral     Current Outpatient Prescriptions   Medication Sig Dispense Refill     levothyroxine (SYNTHROID/LEVOTHROID) 112 MCG tablet Take 1 tablet (112 mcg) by mouth daily 90 tablet 3     losartan (COZAAR) 100 MG tablet Take 1 tablet (100 mg) by mouth daily 90 tablet 3     MULTI VITAMIN MENS OR 1 TABLET DAILY       rosuvastatin (CRESTOR) 10 MG tablet Take 1 tablet (10 mg) by mouth daily 90 tablet 3     triamcinolone (KENALOG) 0.1 % cream APPLY  CREAM EXTERNALLY  TO AFFECTED AREA THREE TIMES DAILY AS NEEDED SPARINGLY 80 g 0     hydrochlorothiazide (HYDRODIURIL) 25 MG tablet Take 1 tablet (25 mg) by mouth daily (Patient not taking: Reported on 9/21/2018) 90 tablet 3     Social History   Substance Use Topics     Smoking status: Former Smoker     Packs/day: 1.50     Years: 30.00     Quit date: 1/1/1998     Smokeless tobacco: Never Used     Alcohol use No       ROS:  10 point ROS of systems including Constitutional, Eyes, Respiratory, Cardiovascular, Gastroenterology, Genitourinary, Integumentary, Muscularskeletal, Psychiatric were all negative except for pertinent positives noted in my HPI           OBJECTIVE:  /82  Pulse 62  Temp 98.2  F (36.8  C) (Oral)  Resp 18  SpO2 98%  GENERAL APPEARANCE: healthy, alert and no distress  EYES: EOMI,  PERRL, conjunctiva clear  HENT: ear canals and TM's normal left rt TM congested .  Nose and mouth without ulcers, erythema or lesions  NECK: supple, nontender, no lymphadenopathy  RESP: lungs clear to auscultation - no rales, rhonchi or wheezes  CV: regular rates and rhythm, normal S1 S2, no murmur noted  ABDOMEN:  soft, nontender, no HSM or masses and bowel sounds normal, finger nose test intact , Rombergs negative   NEURO: Normal strength and tone, sensory exam grossly normal,  normal speech and mentation  SKIN: no suspicious lesions or rashes  PSYCH: mentation appears normal    ASSESSMENT:  Agapito was seen today for urgent care.    Diagnoses and all orders for this visit:    Dizziness    Plugged feeling in ear, right    Dysfunction of right eustachian tube          PLAN:  Tylenol, OTC decongestant/antihistamine, Vaporizer   Consider dramamine for dizziness   Follow up if  symptoms fail to improve or worsens   Pt understood and agreed with plan     Deb Krishna MD     See orders in Epic

## 2018-09-21 NOTE — MR AVS SNAPSHOT
"              After Visit Summary   2018    Agapito Fairbanks    MRN: [id number]           Patient Information     Date Of Birth          1950        Visit Information        Provider Department      2018 11:30 AM Deb Krishna MD [facility]        Today's Diagnoses     Dizziness    -  1    Plugged feeling in ear, right        Dysfunction of right eustachian tube           Follow-ups after your visit        Who to contact     If you have questions or need follow up information about today's clinic visit or your schedule please contact [facility] directly at [phone].  Normal or non-critical lab and imaging results will be communicated to you by Paydianthart, letter or phone within 4 business days after the clinic has received the results. If you do not hear from us within 7 days, please contact the clinic through Paydianthart or phone. If you have a critical or abnormal lab result, we will notify you by phone as soon as possible.  Submit refill requests through [employer] or call your pharmacy and they will forward the refill request to us. Please allow 3 business days for your refill to be completed.          Additional Information About Your Visit        MyChart Information     [employer]u lets you send messages to your doctor, view your test results, renew your prescriptions, schedule appointments and more. To sign up, go to [email or web address] . Click on \"Log in\" on the left side of the screen, which will take you to the Welcome page. Then click on \"Sign up Now\" on the right side of the page.     You will be asked to enter the access code listed below, as well as some personal information. Please follow the directions to create your username and password.     Your access code is: [email or web address]  Expires: 2018  2:36 PM     Your access code will  in 90 days. If you need help or a new code, please call your [place] clinic or " [phone].        Care EveryWhere ID     This is your Care EveryWhere ID. This could be used by other organizations to access your [place] medical records  [id number]        Your Vitals Were     Pulse Temperature Respirations Pulse Oximetry          62 98.2  F (36.8  C) (Oral) 18 98%         Blood Pressure from Last 3 Encounters:   09/21/18 118/82   06/05/18 (!) 130/110   04/23/18 124/80    Weight from Last 3 Encounters:   06/05/18 179 lb (81.2 kg)   04/23/18 188 lb (85.3 kg)   12/21/17 182 lb (82.6 kg)              Today, you had the following     No orders found for display       Primary Care Provider Office Phone # Fax #    [place] [facility] [phone]       [address] [facility] [address]        Equal Access to Services     [facility]: Hadii aad ku hadasho Soomaali, waaxda luqadaha, qaybta kaalmada adeegyada, waxay brendain hayaan toya liu . So [facility] [phone].    ATENCIÓN: Si habla español, tiene a agrawal disposición servicios gratuitos de asistencia lingüística. Llame al [phone].    We comply with applicable federal civil rights laws and [place] laws. We do not discriminate on the basis of race, color, national origin, age, disability, sex, sexual orientation, or gender identity.            Thank you!     Thank you for choosing [place] [facility]  for your care. Our goal is always to provide you with excellent care. Hearing back from our patients is one way we can continue to improve our services. Please take a few minutes to complete the written survey that you may receive in the mail after your visit with us. Thank you!             Your Updated Medication List - Protect others around you: Learn how to safely use, store and throw away your medicines at www.disposemymeds.org.          This list is accurate as of 9/21/18  2:36 PM.  Always use your most recent med list.                   Brand Name Dispense Instructions for use Diagnosis     hydrochlorothiazide 25 MG tablet    HYDRODIURIL    90 tablet    Take 1 tablet (25 mg) by mouth daily    Essential hypertension       levothyroxine 112 MCG tablet    SYNTHROID/LEVOTHROID    90 tablet    Take 1 tablet (112 mcg) by mouth daily    Hypothyroidism, unspecified type       losartan 100 MG tablet    COZAAR    90 tablet    Take 1 tablet (100 mg) by mouth daily    Essential hypertension       MULTI VITAMIN MENS PO      1 TABLET DAILY        rosuvastatin 10 MG tablet    CRESTOR    90 tablet    Take 1 tablet (10 mg) by mouth daily    Hyperlipidemia LDL goal <100       triamcinolone 0.1 % cream    KENALOG    80 g    APPLY  CREAM EXTERNALLY TO AFFECTED AREA THREE TIMES DAILY AS NEEDED SPARINGLY    Dermatitis

## 2018-11-28 ENCOUNTER — OFFICE VISIT (OUTPATIENT)
Dept: INTERNAL MEDICINE | Facility: CLINIC | Age: 68
End: 2018-11-28
Payer: COMMERCIAL

## 2018-11-28 VITALS
OXYGEN SATURATION: 94 % | WEIGHT: 185.4 LBS | DIASTOLIC BLOOD PRESSURE: 80 MMHG | BODY MASS INDEX: 25.14 KG/M2 | SYSTOLIC BLOOD PRESSURE: 124 MMHG | HEART RATE: 83 BPM | TEMPERATURE: 99.1 F

## 2018-11-28 DIAGNOSIS — J06.9 UPPER RESPIRATORY TRACT INFECTION, UNSPECIFIED TYPE: Primary | ICD-10-CM

## 2018-11-28 PROCEDURE — 99213 OFFICE O/P EST LOW 20 MIN: CPT | Performed by: INTERNAL MEDICINE

## 2018-11-28 RX ORDER — AZITHROMYCIN 250 MG/1
TABLET, FILM COATED ORAL
Qty: 6 TABLET | Refills: 0 | Status: SHIPPED | OUTPATIENT
Start: 2018-11-28 | End: 2019-01-15

## 2018-11-28 NOTE — PROGRESS NOTES
SUBJECTIVE:                                                      HPI: Agapito Fairbanks is a pleasant 68 year old male who presents with a productive cough and sinus congestion:    - ongoing for ~3 days    - no sinus pressure or pain  - no runny nose or postnasal drip  - no sore throat  - no fevers or chills  - no shortness of breath or wheezing   - no sick contacts or recent travel    Has not tried any OTC medications for symptoms.    The medication, allergy, and problem lists have been reviewed and updated as appropriate.       OBJECTIVE:                                                      /80  Pulse 83  Temp 99.1  F (37.3  C) (Oral)  Wt 185 lb 6.4 oz (84.1 kg)  SpO2 94%  BMI 25.14 kg/m2  Constitutional: well-appearing  Head, Ears, Nose, and Throat: normocephalic, atraumatic; normal external auditory canal and pinna; tympanic membranes visualized and normal; nasal septum straight; nasal mucosa pink; no oropharyngeal lesions or ulcers; dentition normal; no tonsillar exudates  Neck: supple, symmetric, no thyromegaly or lymphadenopathy  Respiratory: normal respiratory effort; clear to auscultation bilaterally - no rhonchi, crackles, or wheezes       ASSESSMENT/PLAN:                                                      (J06.9) Upper respiratory tract infection, unspecified type  (primary encounter diagnosis)  Comment: likely viral URI.  Plan:    - supportive care measures recommended:    - rest and stay well-hydrated    - guaifenesin-dextromethorphan as needed for cough.    - Sudafed as needed for sinus congestion.   - if symptoms significantly worsen or do not improve over the next 1.5-2 weeks...    - patient may fill Rx for azithromycin (provided today) or call MD.    The instructions on the AVS were discussed and explained to the patient. Patient expressed understanding of instructions.    (Chart documentation was completed, in part, with Nursenav voice-recognition software. Even though reviewed, some  grammatical, spelling, and word errors may remain.)    Lizet Ferrer MD   [address] [address]  T: [phone], F: [phone]

## 2018-11-28 NOTE — MR AVS SNAPSHOT
"              After Visit Summary   11/28/2018    Agapito Fairbanks    MRN: [id number]           Patient Information     Date Of Birth          1950        Visit Information        Provider Department      11/28/2018 10:30 AM Lizet Ferrer MD [facility]        Care Instructions    Suspect viral URI.    Guaifenesin with dextromethorphan as needed for cough (Robitussin DM, Mucinex DM, Delsym DM).    Sudafed as needed for sinus congestion or runny nose.    Neti pot once or twice daily if possible.     ---    I don't think you need a chest xray or antibiotic right now, BUT if symptoms significantly worsen or do not improve after 1.5-2 weeks of symptoms, you should fill the antibiotic and/or contact me or Dr. Love.           Follow-ups after your visit        Who to contact     If you have questions or need follow up information about today's clinic visit or your schedule please contact [facility] directly at [phone].  Normal or non-critical lab and imaging results will be communicated to you by Infiniuhart, letter or phone within 4 business days after the clinic has received the results. If you do not hear from us within 7 days, please contact the clinic through Infiniuhart or phone. If you have a critical or abnormal lab result, we will notify you by phone as soon as possible.  Submit refill requests through Ecoark or call your pharmacy and they will forward the refill request to us. Please allow 3 business days for your refill to be completed.          Additional Information About Your Visit        Ecoark Information     Ecoark lets you send messages to your doctor, view your test results, renew your prescriptions, schedule appointments and more. To sign up, go to [email or web address] . Click on \"Log in\" on the left side of the screen, which will take you to the Welcome page. Then click on \"Sign up Now\" on the right side of the page.     You will be asked " to enter the access code listed below, as well as some personal information. Please follow the directions to create your username and password.     Your access code is: [email or web address]  Expires: 2018  1:36 PM     Your access code will  in 90 days. If you need help or a new code, please call your [place] clinic or [phone].        Care EveryWhere ID     This is your Care EveryWhere ID. This could be used by other organizations to access your [place] medical records  [id number]        Your Vitals Were     Pulse Temperature Pulse Oximetry BMI (Body Mass Index)          83 99.1  F (37.3  C) (Oral) 94% 25.14 kg/m2         Blood Pressure from Last 3 Encounters:   18 124/80   18 118/82   18 (!) 130/110    Weight from Last 3 Encounters:   18 185 lb 6.4 oz (84.1 kg)   18 179 lb (81.2 kg)   18 188 lb (85.3 kg)              Today, you had the following     No orders found for display       Primary Care Provider Office Phone # Fax #    Los Love [phone]       [address] [facility] [address]        Equal Access to Services     JES DANGELO AH: Hadii fabrizio ku hadasho Soomaali, waaxda luqadaha, qaybta kaalmada adeegyada, waxay idiin hayaldon toya liu . So [facility] [phone].    ATENCIÓN: Si habla español, tiene a agrawal disposición servicios gratuitos de asistencia lingüística. Llame al [phone].    We comply with applicable federal civil rights laws and [place] laws. We do not discriminate on the basis of race, color, national origin, age, disability, sex, sexual orientation, or gender identity.            Thank you!     Thank you for choosing [facility]  for your care. Our goal is always to provide you with excellent care. Hearing back from our patients is one way we can continue to improve our services. Please take a few minutes to complete the written survey that you may receive in the mail after your visit with us.  Thank you!             Your Updated Medication List - Protect others around you: Learn how to safely use, store and throw away your medicines at www.disposemymeds.org.          This list is accurate as of 11/28/18 10:41 AM.  Always use your most recent med list.                   Brand Name Dispense Instructions for use Diagnosis    hydrochlorothiazide 25 MG tablet    HYDRODIURIL    90 tablet    Take 1 tablet (25 mg) by mouth daily    Essential hypertension       levothyroxine 112 MCG tablet    SYNTHROID/LEVOTHROID    90 tablet    Take 1 tablet (112 mcg) by mouth daily    Hypothyroidism, unspecified type       losartan 100 MG tablet    COZAAR    90 tablet    Take 1 tablet (100 mg) by mouth daily    Essential hypertension       MULTI VITAMIN MENS PO      1 TABLET DAILY        rosuvastatin 10 MG tablet    CRESTOR    90 tablet    Take 1 tablet (10 mg) by mouth daily    Hyperlipidemia LDL goal <100       triamcinolone 0.1 % external cream    KENALOG    80 g    APPLY  CREAM EXTERNALLY TO AFFECTED AREA THREE TIMES DAILY AS NEEDED SPARINGLY    Dermatitis

## 2018-12-10 ENCOUNTER — OFFICE VISIT (OUTPATIENT)
Dept: URGENT CARE | Facility: URGENT CARE | Age: 68
End: 2018-12-10
Payer: COMMERCIAL

## 2018-12-10 VITALS
HEART RATE: 68 BPM | BODY MASS INDEX: 25.1 KG/M2 | OXYGEN SATURATION: 100 % | SYSTOLIC BLOOD PRESSURE: 128 MMHG | TEMPERATURE: 97.9 F | DIASTOLIC BLOOD PRESSURE: 82 MMHG | WEIGHT: 185.1 LBS

## 2018-12-10 DIAGNOSIS — R42 LIGHTHEADEDNESS: Primary | ICD-10-CM

## 2018-12-10 DIAGNOSIS — N18.30 CKD (CHRONIC KIDNEY DISEASE) STAGE 3, GFR 30-59 ML/MIN (H): ICD-10-CM

## 2018-12-10 DIAGNOSIS — Z85.118 HISTORY OF LUNG CANCER: ICD-10-CM

## 2018-12-10 DIAGNOSIS — E03.9 HYPOTHYROIDISM, UNSPECIFIED TYPE: ICD-10-CM

## 2018-12-10 PROCEDURE — 99214 OFFICE O/P EST MOD 30 MIN: CPT | Performed by: FAMILY MEDICINE

## 2018-12-18 NOTE — PROGRESS NOTES
SUBJECTIVE: Agapito Fairbanks is a 68 year old male presenting with a chief complaint of dizziness.  Onset of symptoms was day(s) ago.  Course of illness is same.    Severity moderate  Current and Associated symptoms: none  Treatment measures tried include None tried.  Predisposing factors include see problem list.    Past Medical History:   Diagnosis Date     Acute gastritis with hemorrhage      Basal cell carcinoma, leg      left pretibial area     CKD (chronic kidney disease) stage 3, GFR 30-59 ml/min (H)     creat baseline approx 1.4     Hearing loss      Helicobacter pylori (H. pylori)      Humerus fracture     left      Hyperlipidemia LDL goal <100 10/31/2010     Hypothyroidism      Impotence of organic origin      Laryngeal carcinoma (H)     Squamous cell carcinoma right vocal cord     Lung cancer (H)     1cm spiculated SKYLA Adenosquamous cell carcinoma     Mild major depression (H)      Other and unspecified malignant neoplasm of skin of other and unspecified parts of face      Basal Cell CA nasal area , chest 3/05, right chest 10/09     Other testicular hypofunction      Squamous cell carcinoma  2012     RLE s/p excision     Tobacco use disorder     quit      Unspecified cataract     left     Unspecified essential hypertension      Unspecified retinal detachment     Bilateral     Allergies   Allergen Reactions     Simvastatin      myalgias     Lisinopril Rash     rash     Social History     Tobacco Use     Smoking status: Former Smoker     Packs/day: 1.50     Years: 30.00     Pack years: 45.00     Last attempt to quit: 1998     Years since quittin.9     Smokeless tobacco: Never Used   Substance Use Topics     Alcohol use: No       ROS:  SKIN: no rash  GI: no vomiting    OBJECTIVE:  /82   Pulse 68   Temp 97.9  F (36.6  C) (Oral)   Wt 84 kg (185 lb 1.6 oz)   SpO2 100%   BMI 25.10 kg/m  GENERAL APPEARANCE: healthy, alert and no distress  EYES: EOMI,  PERRL,  conjunctiva clear  HENT: ear canals and TM's normal.  Nose and mouth without ulcers, erythema or lesions  NECK: supple, nontender, no lymphadenopathy  RESP: lungs clear to auscultation - no rales, rhonchi or wheezes  CV: regular rates and rhythm, normal S1 S2, no murmur noted  ABDOMEN:  soft, nontender, no HSM or masses and bowel sounds normal  NEURO: Normal strength and tone, sensory exam grossly normal,  normal speech and mentation  SKIN: no suspicious lesions or rashes      ICD-10-CM    1. Lightheadedness R42 CANCELED: Hemoglobin     CANCELED: Troponin I     CANCELED: Basic metabolic panel     CANCELED: TSH with free T4 reflex     CANCELED: EKG 12-lead complete w/read - Clinics     CANCELED: Hemoglobin     CANCELED: Troponin I     CANCELED: Basic metabolic panel  (Ca, Cl, CO2, Creat, Gluc, K, Na, BUN)     CANCELED: TSH with free T4 reflex   2. History of lung cancer Z85.118 CANCELED: Hemoglobin     CANCELED: Troponin I     CANCELED: Basic metabolic panel     CANCELED: TSH with free T4 reflex     CANCELED: EKG 12-lead complete w/read - Clinics     CANCELED: Hemoglobin     CANCELED: Troponin I     CANCELED: Basic metabolic panel  (Ca, Cl, CO2, Creat, Gluc, K, Na, BUN)     CANCELED: TSH with free T4 reflex   3. CKD (chronic kidney disease) stage 3, GFR 30-59 ml/min (H) N18.3 CANCELED: Hemoglobin     CANCELED: Troponin I     CANCELED: Basic metabolic panel     CANCELED: TSH with free T4 reflex     CANCELED: EKG 12-lead complete w/read - Clinics     CANCELED: Phosphorus     CANCELED: Phosphorus     CANCELED: Hemoglobin     CANCELED: Troponin I     CANCELED: Basic metabolic panel  (Ca, Cl, CO2, Creat, Gluc, K, Na, BUN)     CANCELED: TSH with free T4 reflex   4. Hypothyroidism, unspecified type E03.9 CANCELED: Hemoglobin     CANCELED: Troponin I     CANCELED: Basic metabolic panel     CANCELED: TSH with free T4 reflex     CANCELED: EKG 12-lead complete w/read - Clinics     CANCELED: Hemoglobin     CANCELED: Troponin I      CANCELED: Basic metabolic panel  (Ca, Cl, CO2, Creat, Gluc, K, Na, BUN)     CANCELED: TSH with free T4 reflex     Pt left without awaiting testing

## 2019-01-03 ENCOUNTER — TELEPHONE (OUTPATIENT)
Dept: INTERNAL MEDICINE | Facility: CLINIC | Age: 69
End: 2019-01-03

## 2019-01-03 DIAGNOSIS — R41.3 MEMORY LOSS: Primary | ICD-10-CM

## 2019-01-03 NOTE — TELEPHONE ENCOUNTER
Patients wife Millicent is calling to request a referral for Valentin to the [facility]. Valentin was diagnosed last year at [facility] with Mild Cognitive Impairment and they are seeking a second opinion and possible transfer of care.

## 2019-01-04 NOTE — TELEPHONE ENCOUNTER
Signed CTC in Media section from 2014 does not list anyone besides pt for communication. Please call pt to first see if OK to be sharing medical info/scheduling/etc with pt's wife and document that in demographics area in comments section. Also then confirm that pt wishing to have referral to other neurologist. Once have this info, then route back to me to address further and sign referral order

## 2019-01-04 NOTE — TELEPHONE ENCOUNTER
I talked with Roque and he gave his verbal permission to talk to his wife Millicent about scheduling, medical information and this referral request. Patient is aware and does want the referral to [facility].

## 2019-01-07 ENCOUNTER — TELEPHONE (OUTPATIENT)
Dept: NEUROLOGY | Facility: CLINIC | Age: 69
End: 2019-01-07

## 2019-01-07 NOTE — TELEPHONE ENCOUNTER
[facility] Call Center    Phone Message    May a detailed message be left on voicemail: yes    Reason for Call: Other: Pt's wife scheduled an appointment for him with Dr. Chavira on 01/23 for a second opinion for memory loss and mild cognitive impairment, and just wanted to pass along some information to Dr. Chavira prior to the visit.     The pt was originally seen at [facility] with a neurologist, psychologist, and occupational therapist. They had done some scans and testing, but ultimately told him to just wait a year and come back for more testing. The pt and his wife were displeased with this, and they are looking to take a more proactive approach to the pt's care. She wanted to express that they are open to participating in any studies or clinical trials that might be available, and really just want to take an active role in trying to improve the pt's health.    Action Taken: Message routed to:  [facility] Neurology

## 2019-01-08 NOTE — TELEPHONE ENCOUNTER
[facility] Call Center    Phone Message    May a detailed message be left on voicemail: yes    Reason for Call: Other: Pt's wife returning call from Cori, please give pt's wife a call back tomorrow anytime after 1pm.     Action Taken: Message routed to:  [facility] Neurology

## 2019-01-09 ENCOUNTER — CARE COORDINATION (OUTPATIENT)
Dept: NEUROLOGY | Facility: CLINIC | Age: 69
End: 2019-01-09

## 2019-01-09 NOTE — TELEPHONE ENCOUNTER
Pt wife states appt already made.    Also pt wife states that pt is not doing good. Wife is going to come to [facility] next week with pt - wife would like scan redone if possible. Wife states she is bringing list of things going on with pt. FYI

## 2019-01-09 NOTE — TELEPHONE ENCOUNTER
[facility] Call Center    Phone Message    May a detailed message be left on voicemail: yes    Reason for Call: Other: Crispin called in for Haritha, she said she was returning a call. Please give her a call back.     Action Taken: Message routed to:  [facility] Neurology

## 2019-01-09 NOTE — PROGRESS NOTES
Pt's wife called to relay some information to Dr. Chavira. Pt is seeing him for a second opinion as they aren't happy with his previous neurologist. They would like to take a proactive approach to his care and are requesting information on clinical trials.

## 2019-01-09 NOTE — TELEPHONE ENCOUNTER
Referral to neurology ordered.  Patient/wife  to call [facility]: Neurology Clinic - [place] [phone] to schedule an appointment

## 2019-01-10 DIAGNOSIS — E03.9 HYPOTHYROIDISM, UNSPECIFIED TYPE: ICD-10-CM

## 2019-01-10 DIAGNOSIS — E78.5 HYPERLIPIDEMIA LDL GOAL <100: ICD-10-CM

## 2019-01-10 DIAGNOSIS — R97.20 ELEVATED PROSTATE SPECIFIC ANTIGEN (PSA): ICD-10-CM

## 2019-01-10 DIAGNOSIS — N18.30 CKD (CHRONIC KIDNEY DISEASE) STAGE 3, GFR 30-59 ML/MIN (H): ICD-10-CM

## 2019-01-10 LAB
ANION GAP SERPL CALCULATED.3IONS-SCNC: 3 MMOL/L (ref 3–14)
BUN SERPL-MCNC: 16 MG/DL (ref 7–30)
CALCIUM SERPL-MCNC: 9.5 MG/DL (ref 8.5–10.1)
CHLORIDE SERPL-SCNC: 101 MMOL/L (ref 94–109)
CHOLEST SERPL-MCNC: 180 MG/DL
CO2 SERPL-SCNC: 31 MMOL/L (ref 20–32)
CREAT SERPL-MCNC: 1.4 MG/DL (ref 0.66–1.25)
CREAT UR-MCNC: 75 MG/DL
GFR SERPL CREATININE-BSD FRML MDRD: 50 ML/MIN/{1.73_M2}
GLUCOSE SERPL-MCNC: 104 MG/DL (ref 70–99)
HDLC SERPL-MCNC: 71 MG/DL
HGB BLD-MCNC: 14.6 G/DL (ref 13.3–17.7)
LDLC SERPL CALC-MCNC: 94 MG/DL
MICROALBUMIN UR-MCNC: <5 MG/L
MICROALBUMIN/CREAT UR: NORMAL MG/G CR (ref 0–17)
NONHDLC SERPL-MCNC: 109 MG/DL
POTASSIUM SERPL-SCNC: 4.1 MMOL/L (ref 3.4–5.3)
SODIUM SERPL-SCNC: 135 MMOL/L (ref 133–144)
TRIGL SERPL-MCNC: 77 MG/DL
TSH SERPL DL<=0.005 MIU/L-ACNC: 1.52 MU/L (ref 0.4–4)

## 2019-01-10 PROCEDURE — 82043 UR ALBUMIN QUANTITATIVE: CPT | Performed by: INTERNAL MEDICINE

## 2019-01-10 PROCEDURE — 84443 ASSAY THYROID STIM HORMONE: CPT | Performed by: INTERNAL MEDICINE

## 2019-01-10 PROCEDURE — 80061 LIPID PANEL: CPT | Performed by: INTERNAL MEDICINE

## 2019-01-10 PROCEDURE — 99000 SPECIMEN HANDLING OFFICE-LAB: CPT | Performed by: INTERNAL MEDICINE

## 2019-01-10 PROCEDURE — 80048 BASIC METABOLIC PNL TOTAL CA: CPT | Performed by: INTERNAL MEDICINE

## 2019-01-10 PROCEDURE — 85018 HEMOGLOBIN: CPT | Performed by: INTERNAL MEDICINE

## 2019-01-10 PROCEDURE — 36415 COLL VENOUS BLD VENIPUNCTURE: CPT | Performed by: INTERNAL MEDICINE

## 2019-01-10 PROCEDURE — 84154 ASSAY OF PSA FREE: CPT | Mod: 90 | Performed by: INTERNAL MEDICINE

## 2019-01-10 PROCEDURE — 84153 ASSAY OF PSA TOTAL: CPT | Mod: 90 | Performed by: INTERNAL MEDICINE

## 2019-01-11 LAB
PSA FREE MFR SERPL: 34 %
PSA FREE SERPL-MCNC: 1.5 NG/ML
PSA SERPL-MCNC: 4.4 NG/ML (ref 0–4)

## 2019-01-15 ENCOUNTER — OFFICE VISIT (OUTPATIENT)
Dept: INTERNAL MEDICINE | Facility: CLINIC | Age: 69
End: 2019-01-15
Payer: MEDICARE

## 2019-01-15 VITALS
HEIGHT: 72 IN | WEIGHT: 186 LBS | BODY MASS INDEX: 25.19 KG/M2 | DIASTOLIC BLOOD PRESSURE: 70 MMHG | HEART RATE: 76 BPM | OXYGEN SATURATION: 96 % | RESPIRATION RATE: 16 BRPM | TEMPERATURE: 98.1 F | SYSTOLIC BLOOD PRESSURE: 118 MMHG

## 2019-01-15 DIAGNOSIS — R09.81 NASAL CONGESTION: ICD-10-CM

## 2019-01-15 DIAGNOSIS — E78.5 HYPERLIPIDEMIA LDL GOAL <100: ICD-10-CM

## 2019-01-15 DIAGNOSIS — R41.89 COGNITIVE IMPAIRMENT: ICD-10-CM

## 2019-01-15 DIAGNOSIS — I10 ESSENTIAL HYPERTENSION: ICD-10-CM

## 2019-01-15 DIAGNOSIS — N18.30 CKD (CHRONIC KIDNEY DISEASE) STAGE 3, GFR 30-59 ML/MIN (H): Primary | ICD-10-CM

## 2019-01-15 PROCEDURE — 99214 OFFICE O/P EST MOD 30 MIN: CPT | Performed by: INTERNAL MEDICINE

## 2019-01-15 RX ORDER — HYDROCHLOROTHIAZIDE 12.5 MG/1
12.5 TABLET ORAL DAILY
Qty: 90 TABLET | Refills: 3 | Status: SHIPPED | OUTPATIENT
Start: 2019-01-15 | End: 2020-01-01

## 2019-01-15 ASSESSMENT — MIFFLIN-ST. JEOR: SCORE: 1641.69

## 2019-01-15 NOTE — PATIENT INSTRUCTIONS
Reduce Hydrochlorothiazide to 12.5mg tab, 1 tab daily tro see if lower dose lessens  lightheadedness  Continue exercise  Nasacort 1-2 spray each nostril daily at bedtime for nasal congestion  See neurology next week as scheduled   Repeat nonfasting labs in 6 months

## 2019-01-15 NOTE — PROGRESS NOTES
SUBJECTIVE:   Agapito Fairbanks is a 68 year old male who presents to clinic today for the following health issues:    Chief Complaint   Patient presents with     Sinus Problem     Patient was seen in Urgent Care on December 10, 2018 for Sinus Infection. Patient states the symptoms are reoccurring     Memory Loss     Pt's past medical history, family history, habits, medications and allergies were reviewed with the patient today.  See snap shot for  HCM status. Most recent lab results reviewed with pt. Problem list and histories reviewed & adjusted, as indicated.  Additional history as below:    Has intermittent lightheadedness.  No vertigo.  Occurs generally with position changes.  Denies chest pain, shortness of breath, vision changes, headaches.  Denies numbness in his extremities.  History of mild cognitive impairment disease.  Patient previously followed by many up with clinic of neurology.  He is accompanied by his wife today and they state that the patient has undergone some previous neuropsych testing but those results are not available to review. Last 6CIT score  in chart from Dec 2017 was 2 of 28.  Neurology notes from February 2018 reviewed.  Plan at that time was for patient to follow-up in 1 year but wife and patient state that they wish to be more proactive and participate in clinical trials or other treatments besides current prescribed donepezil through neurology.  They have therefore scheduled an appointment with neurology from the [facility] and patient has an appointment scheduled for next week   Notes some intermittent nasal congestion and occasional clear rhinorrhea.  No ear pain, sore throat, sinus nasal discoloration, fever, chill, frequent cough.  No current headache  Rare use of alcohol now.  Patient exercising 3-4 times a week with walking the dog and some resistance training    Additional ROS:   Constitutional, HEENT, Cardiovascular, Pulmonary, GI and , Neuro, MSK and Psych  review of systems/symptoms are otherwise negative or unchanged from previous, except as noted above.      OBJECTIVE:  /70   Pulse 76   Temp 98.1  F (36.7  C) (Oral)   Resp 16   Ht 1.829 m (6')   Wt 84.4 kg (186 lb)   SpO2 96%   BMI 25.23 kg/m     Estimated body mass index is 25.23 kg/m  as calculated from the following:    Height as of this encounter: 1.829 m (6').    Weight as of this encounter: 84.4 kg (186 lb).     HENT: ear canals and TM's normal and nose and mouth without ulcers or lesions nasal mucosa mildly edematous with some clear nasal discharge.  Sinuses nontender to palpation.  Chronic mild voice hoarseness from previous vocal cord cancer  Neck: no adenopathy. Thyroid normal to palpation. No bruits  Pulm: Lungs clear to auscultation   CV: Regular rates and rhythm  GI: Soft, nontender, Normal active bowel sounds   Ext: Peripheral pulses intact. No edema.  Neuro: Normal strength and gross LTS.  Answering general questions appropriately.  Formal memory testing not repeated today since patient will be seeing neurology in 1 week and will have extensive testing done at that time    Assessment/Plan: (See plan discussion below for further details)  1. Essential hypertension  No obvious orthostatic change today with some maneuver testing.   however, symptoms consistent with probable mild orthostasis.  No palpitations  Per pt when patient has lightheadedness.  Will try reducing hydrochlorothiazide dosage to see if symptoms improved.  Patient will maintain good fluid intake  - hydrochlorothiazide (HYDRODIURIL) 12.5 MG tablet; Take 1 tablet (12.5 mg) by mouth daily  Dispense: 90 tablet; Refill: 3    2. Nasal congestion  Patient will get over-the-counter Nasacort and use daily at bedtime to see if helps    3. Cognitive impairment  On donepezil through [place] [facility].  Will await second opinion evaluation by [facility] next week    4. CKD (chronic kidney disease) stage 3, GFR 30-59  ml/min (H)  Stable.  Repeat lab in 6 months  - Basic metabolic panel; Future    Plan discussion:   Reduce Hydrochlorothiazide to 12.5mg tab, 1 tab daily tro see if lower dose lessens  lightheadedness  Continue exercise  Nasacort 1-2 spray each nostril daily at bedtime for nasal congestion  See neurology next week as scheduled   Repeat nonfasting labs in 6 months         Los Love MD  Internal Medicine Department  [facility]

## 2019-01-16 DIAGNOSIS — E03.9 HYPOTHYROIDISM, UNSPECIFIED TYPE: ICD-10-CM

## 2019-01-16 DIAGNOSIS — L30.9 DERMATITIS: ICD-10-CM

## 2019-01-16 DIAGNOSIS — I10 ESSENTIAL HYPERTENSION: ICD-10-CM

## 2019-01-16 DIAGNOSIS — E78.5 HYPERLIPIDEMIA LDL GOAL <100: ICD-10-CM

## 2019-01-16 RX ORDER — LEVOTHYROXINE SODIUM 112 UG/1
112 TABLET ORAL DAILY
Qty: 90 TABLET | Refills: 3 | Status: SHIPPED | OUTPATIENT
Start: 2019-01-16 | End: 2020-01-01 | Stop reason: DRUGHIGH

## 2019-01-16 RX ORDER — ROSUVASTATIN CALCIUM 10 MG/1
10 TABLET, COATED ORAL DAILY
Qty: 90 TABLET | Refills: 3 | Status: SHIPPED | OUTPATIENT
Start: 2019-01-16 | End: 2020-01-01

## 2019-01-16 RX ORDER — TRIAMCINOLONE ACETONIDE 1 MG/G
CREAM TOPICAL
Qty: 80 G | Refills: 1 | Status: SHIPPED | OUTPATIENT
Start: 2019-01-16 | End: 2020-01-01

## 2019-01-16 RX ORDER — LOSARTAN POTASSIUM 100 MG/1
100 TABLET ORAL DAILY
Qty: 90 TABLET | Refills: 3 | Status: SHIPPED | OUTPATIENT
Start: 2019-01-16 | End: 2020-01-01

## 2019-01-16 NOTE — TELEPHONE ENCOUNTER
"Requested Prescriptions   Pending Prescriptions Disp Refills     rosuvastatin (CRESTOR) 10 MG tablet 90 tablet 3     Sig: Take 1 tablet (10 mg) by mouth daily    Statins Protocol Passed - 1/16/2019 10:51 AM       Passed - LDL on file in past 12 months    Recent Labs   Lab Test 01/10/19  1036   LDL 94            Passed - No abnormal creatine kinase in past 12 months    Recent Labs   Lab Test 11/29/17  0943                  Passed - Recent (12 mo) or future (30 days) visit within the authorizing provider's specialty    Patient had office visit in the last 12 months or has a visit in the next 30 days with authorizing provider or within the authorizing provider's specialty.  See \"Patient Info\" tab in inbasket, or \"Choose Columns\" in Meds & Orders section of the refill encounter.             Passed - Medication is active on med list       Passed - Patient is age 18 or older     Last Written Prescription Date:  04/23/2018  Last Fill Quantity: 90,  # refills: 3   Last office visit: No previous visit found with prescribing provider:  Dr Love   Future Office Visit:           triamcinolone (KENALOG) 0.1 % external cream 80 g 0     Sig: APPLY  CREAM EXTERNALLY TO AFFECTED AREA THREE TIMES DAILY AS NEEDED SPARINGLY    Topical Steroids and Nonsteroidals Protocol Passed - 1/16/2019 10:51 AM       Passed - Patient is age 6 or older       Passed - Authorizing prescriber's most recent note related to this medication read.    If refill request is for ophthalmic use, please forward request to provider for approval.         Passed - High potency steroid not ordered       Passed - Recent (12 mo) or future (30 days) visit within the authorizing provider's specialty    Patient had office visit in the last 12 months or has a visit in the next 30 days with authorizing provider or within the authorizing provider's specialty.  See \"Patient Info\" tab in inbasket, or \"Choose Columns\" in Meds & Orders section of the refill encounter.  " "           Passed - Medication is active on med list     Last Written Prescription Date:  05/09/2018  Last Fill Quantity: 80,  # refills: 0   Last office visit: No previous visit found with prescribing provider:  Dr Love   Future Office Visit:             losartan (COZAAR) 100 MG tablet 90 tablet 3     Sig: Take 1 tablet (100 mg) by mouth daily    Angiotensin-II Receptors Failed - 1/16/2019 10:51 AM       Failed - Normal serum creatinine on file in past 12 months    Recent Labs   Lab Test 01/10/19  1036  08/04/11  0837   CR 1.40*   < >  --    CREAT  --   --  1.5*    < > = values in this interval not displayed.            Passed - Blood pressure under 140/90 in past 12 months    BP Readings from Last 3 Encounters:   01/15/19 118/70   12/10/18 128/82   11/28/18 124/80                Passed - Recent (12 mo) or future (30 days) visit within the authorizing provider's specialty    Patient had office visit in the last 12 months or has a visit in the next 30 days with authorizing provider or within the authorizing provider's specialty.  See \"Patient Info\" tab in inbasket, or \"Choose Columns\" in Meds & Orders section of the refill encounter.             Passed - Medication is active on med list       Passed - Patient is age 18 or older       Passed - Normal serum potassium on file in past 12 months    Recent Labs   Lab Test 01/10/19  1036   POTASSIUM 4.1                 Last Written Prescription Date:  04/23/2018  Last Fill Quantity: 90,  # refills: 3   Last office visit: No previous visit found with prescribing provider:  Dr Love   Future Office Visit:             levothyroxine (SYNTHROID/LEVOTHROID) 112 MCG tablet 90 tablet 3     Sig: Take 1 tablet (112 mcg) by mouth daily    Thyroid Protocol Passed - 1/16/2019 10:51 AM       Passed - Patient is 12 years or older       Passed - Recent (12 mo) or future (30 days) visit within the authorizing provider's specialty    Patient had office visit in the last 12 months or has a " "visit in the next 30 days with authorizing provider or within the authorizing provider's specialty.  See \"Patient Info\" tab in inbasket, or \"Choose Columns\" in Meds & Orders section of the refill encounter.             Passed - Medication is active on med list       Passed - Normal TSH on file in past 12 months    Recent Labs   Lab Test 01/10/19  1036   TSH 1.52              Last Written Prescription Date:  04/23/2018  Last Fill Quantity: 90,  # refills: 3   Last office visit: No previous visit found with prescribing provider:  Dr Love   Future Office Visit:      "

## 2019-01-16 NOTE — TELEPHONE ENCOUNTER
Levothyroxine, Rosuvastatin, and Kenalog cream    Prescription approved per [facility] Refill Protocol.    Janet BAKER RN, BSN, PHN

## 2019-01-16 NOTE — TELEPHONE ENCOUNTER
Losartan    Last Written Prescription Date:  4/23/2018  Last Fill Quantity: 90,  # refills: 3   Last office visit: 1/15/2019 with prescribing provider:  Los Love     Future Office Visit:      Routing refill request to provider for review/approval because:  Labs out of range:  Cr elevated    Janet BAKER RN, BSN, PHN

## 2019-01-21 ENCOUNTER — PATIENT OUTREACH (OUTPATIENT)
Dept: CARE COORDINATION | Facility: CLINIC | Age: 69
End: 2019-01-21

## 2019-01-23 ENCOUNTER — OFFICE VISIT (OUTPATIENT)
Dept: NEUROLOGY | Facility: CLINIC | Age: 69
End: 2019-01-23
Payer: MEDICARE

## 2019-01-23 VITALS
SYSTOLIC BLOOD PRESSURE: 138 MMHG | HEART RATE: 65 BPM | HEIGHT: 72 IN | BODY MASS INDEX: 25.19 KG/M2 | DIASTOLIC BLOOD PRESSURE: 88 MMHG | WEIGHT: 186 LBS | OXYGEN SATURATION: 96 %

## 2019-01-23 DIAGNOSIS — R41.3 MEMORY LOSS: Primary | ICD-10-CM

## 2019-01-23 DIAGNOSIS — R41.3 MEMORY LOSS: ICD-10-CM

## 2019-01-23 LAB
HIV 1+2 AB+HIV1 P24 AG SERPL QL IA: NONREACTIVE
VIT B12 SERPL-MCNC: 737 PG/ML (ref 193–986)

## 2019-01-23 ASSESSMENT — ENCOUNTER SYMPTOMS
DISTURBANCES IN COORDINATION: 0
PARALYSIS: 0
MYALGIAS: 0
NECK MASS: 0
STIFFNESS: 0
NECK PAIN: 1
JOINT SWELLING: 0
DIZZINESS: 1
WEAKNESS: 0
TASTE DISTURBANCE: 0
TREMORS: 0
SINUS CONGESTION: 0
SINUS PAIN: 0
ARTHRALGIAS: 0
NUMBNESS: 0
BACK PAIN: 0
HOARSE VOICE: 0
SPEECH CHANGE: 0
MEMORY LOSS: 1
TROUBLE SWALLOWING: 0
LOSS OF CONSCIOUSNESS: 0
SMELL DISTURBANCE: 0
SORE THROAT: 0
SEIZURES: 0
TINGLING: 0
HEADACHES: 0
MUSCLE CRAMPS: 0
MUSCLE WEAKNESS: 0

## 2019-01-23 ASSESSMENT — PAIN SCALES - GENERAL: PAINLEVEL: NO PAIN (0)

## 2019-01-23 ASSESSMENT — MIFFLIN-ST. JEOR: SCORE: 1641.69

## 2019-01-23 NOTE — NURSING NOTE
Chief Complaint   Patient presents with     New Patient     [facility] LOSS        Sonia Gallardo, EMT

## 2019-01-23 NOTE — LETTER
2019       RE: Agapito Fairbanks  [address]  [facility] [address]     Dear Colleague,    Thank you for referring your patient, Agapito Fairbanks, to the [facility] NEUROLOGY at [facility]. Please see a copy of my visit note below.    Service Date: 2019      RE: Agapito Fairbanks   MRN: [id number]   : 1950      Dear Dr. Love:      Thank you for referring Agapito Fairbanks for neurologic consultation on 2019.  The patient is a 68-year-old man who comes with a chief complaint of memory loss.      The patient was accompanied by his wife, Millicent.  The patient did start to have trouble with memory the last few years, but it became evident last fall.  She noted he could not wire a light switch.  He also had trouble installing a .  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  He was an excellent .  The patient also has asked questions about what he had done recently which he should have known.  In addition, she has noted more irritability.  The patient in the past would not have had any trouble with remodeling and has demonstrated that now.  He said he possibly has had some mild depression, but he is not suicidal.  He has not had any symptoms to suggest acute issues including stroke.  He denied visual disturbance, including loss of vision or diplopia, weakness, paresthesias, imbalance, nor has he had headache.  He has had no trouble controlling his urine and stool.  He has not had any REM type of sleep disturbance.  He has been in good health.  He eats fruits, vegetables and meat.  He is taking a routine multivitamin from [employer].  The patient did have a transfusion in .  He evidently had a bleeding ulcer.  He can recall he probably had 3-4 units of blood given.  The patient also had a left lobectomy in .  He also had treated squamous cell cancer of the throat.  There has been no recurrence.  He  quit smoking 15-16 years ago.  He smoked for about 25-30 years.  He has not chewed tobacco.  He possibly would drink up to a pint of scotch or vodka a night, but this would be in 2-3 drinks.  It was hard to estimate the exact amount of alcohol he had used in the past.  In June he stopped.  The patient did have records that I was able to review through your office with them.  He did see a prior neurologist, Dr. Valentin Paige.  He had undergone previous neuropsychiatric testing, but it has not been available to us yet.  He was told to come back, but he and his wife would like him to possibly participate in any clinical trials or treatments besides prescribed donepezil.  He has suffered from essential hypertension.  He has continued hydrochlorothiazide 12.5 mg daily.  He does suffer also from nasal congestion and you recommended Nasacort.  He does have chronic kidney disease, stage III, and he is to have followup metabolic profile.  The patient was told to continue to exercise.      The patient did have other records available to me.  He was seen in the Nephrology Clinic on 11/13/2009.  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  He had a PET CT scan that showed a left lung apical mass that would be biopsied in the near future.  He also had Mohs surgery to treat the skin cancer on his torso.  The patient recently had hydrochlorothiazide that was stopped.  This was not controlling blood pressure and was started on amlodipine.        The patient did have other records that I reviewed with him.  He had on 12/14/2017 an MRI scan of the brain that showed minimal atrophy and chronic white matter disease for his age.  I agreed with that assessment.  There were a few tiny foci of T2 hyperintensity in the periventricular region and there was minimal atrophy for age.      I was able to review his laboratory tests with him.  On 01/10/2019, he had a normal metabolic profile   except for his creatinine of 1.4.  On 2018, it was 1.28.  On 2018, he had normal liver function tests.  The patient had on 01/10/2019 total cholesterol of 180 and his HDL cholesterol was 71 and his LDL cholesterol was 94.  His PSA free was 1.5.  The patient had a TSH of 1.52 and his glucose was 104.  He hemoglobin was 14.6.  On 2018, his hemoglobin was 15.2.  His MCV then was 95 and his white count 7800.      I was able to review with the patient other records now.  He was seen by Dr. Valentin Paige on 2018.  At that time, his wife did indicate he had memory problems since last summer.  She gave an example when he had purchased a patio umbrella but it was the wrong one and he did not recall them going to get the correct one.  She also noted he had become a bit depressed over the last year.  The patient did have a history according to his wife to suggest ADD as several family members had it.  He also had vocal cord cancer treated in .  He has had no brain irradiation.  He was treated with cisplatin.  The patient did tell during the review of these records that he has walked his dog daily.  With group home out of boredom he has gone to the [employer].  He has had no change though in his behavior according to his wife.  He also had a previous history of bilateral detached retinas but had good results from surgery and also had cataracts extracted.  He currently has been taking beside hydrochlorothiazide, levothyroxine, losartan and rosuvastatin.  He has no listed drug allergies.  He and his wife reviewed his family history.  His father suffered from memory loss in his 80s and  at 88.  It was more apparent the last 2 years of his life.  His brother who is 67 seems to be slower to speak and probably has some issues with memory.  The patient has been on Aricept 5 mg since he saw Dr. Paige.  He is not certain how beneficial it has been and his wife is not certain he has been taking it always.   He has not had any complications from it, including dyspepsia or diarrhea.  He has not had any symptoms to suggest bradycardia or orthostasis.  Dr. Paige noted that his MRI scan of the brain was unrevealing and a B12 level and TSH were normal.  He was sent for neuropsychometric testing, but I do not have those records available to me.      Neurologic examination revealed a pleasant man.  He was alert and oriented to time, place and person.  He could recall 1/3 objects after 5 minutes and with prompting 3/3 objects.  He was able to tell me about the [facility] presidency.  He could tell me the states that touch [place], and he was able to draw a clock correctly.  Otherwise, he does have some mild decrease in hearing and he has hearing aids but according to his wife does not wear them.  Gait, station, cerebellar testing, muscle stretch reflexes which were hypoactive in the arms, trace at the knees and absent at the ankles, plantar stimulation, strength, careful cranial nerve examination except for aphakia with no frontal lobe release signs and good extraocular movements and full visual fields, superficial and cortical sensory testing are unremarkable.  He had no trouble drawing a clock correctly to indicate 4:30.  I could not auscultate cervical bruits.  He had a regular cardiac rhythm without gallops or murmurs.  His blood pressure is 138/80 with pulse 65.      IMPRESSION:  Mild cognitive impairment.      The patient probably by history does have mild cognitive impairment.  I recommended he have repeat psychometrics to be compared to the previous ones done through the [facility].  I also asked that he have further tests now including serology and followup B12 level.  I did go over day-to-day activities that could be helpful in memory.  I went over possible studies that could be done at [facility] with Dr. Still or ones that are possibly being still done through [facility].  He and his wife did have  specific questions about new treatment that has been on the Internet.  I did explain to them that it is hard to know what to make of some of the new reports and I told them I will be going to a review of neurologic discoveries at [facility] in the next few weeks.  They have an excellent department regarding degenerative brain disease, and I am hoping I will find out if there are any new trials.  I am not aware of any that are being done currently at [place] other than epidemiologic ones.  I did suggest he continue donepezil.        I spent 1 hour with him.  Over 50% of the time involved counseling and coordination of care.  A complete review of medical systems was done and a positive review is listed in the report above.        D: 2019   T: 2019   MT: eugene      Name:     CHRISTI MANN   MRN:      [id number]        Account:      [id number]   :      1950           Service Date: 2019      Document: [id number]       Again, thank you for allowing me to participate in the care of your patient.      Sincerely,    Sebastien Chavira MD    CC:  Los Love MD   [facility]   [address] [place] [address]  [phone]

## 2019-01-24 LAB — T PALLIDUM AB SER QL: NONREACTIVE

## 2019-01-26 LAB — METHYLMALONATE SERPL-SCNC: 0.13 UMOL/L (ref 0–0.4)

## 2019-01-30 ENCOUNTER — TELEPHONE (OUTPATIENT)
Dept: NEUROLOGY | Facility: CLINIC | Age: 69
End: 2019-01-30

## 2019-01-30 NOTE — TELEPHONE ENCOUNTER
----- Message from Sebastien Chavira MD sent at 1/30/2019 10:46 AM CST -----  Tell patient and wife all labs normal or negative[good news]

## 2019-02-08 NOTE — PROGRESS NOTES
Service Date: 2019      Los Love MD   [facility]   [address]  [address]      RE: Agapito Fairbanks   MRN: [id number]   : 1950      Dear Dr. Love:      Thank you for referring Agapito Fairbanks for neurologic consultation on 2019.  The patient is a 68-year-old man who comes with a chief complaint of memory loss.      The patient was accompanied by his wife, Millicent.  The patient did start to have trouble with memory the last few years, but it became evident last fall.  She noted he could not wire a light switch.  He also had trouble installing a .  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  He was an excellent .  The patient also has asked questions about what he had done recently which he should have known.  In addition, she has noted more irritability.  The patient in the past would not have had any trouble with remodeling and has demonstrated that now.  He said he possibly has had some mild depression, but he is not suicidal.  He has not had any symptoms to suggest acute issues including stroke.  He denied visual disturbance, including loss of vision or diplopia, weakness, paresthesias, imbalance, nor has he had headache.  He has had no trouble controlling his urine and stool.  He has not had any REM type of sleep disturbance.  He has been in good health.  He eats fruits, vegetables and meat.  He is taking a routine multivitamin from [employer].  The patient did have a transfusion in .  He evidently had a bleeding ulcer.  He can recall he probably had 3-4 units of blood given.  The patient also had a left lobectomy in .  He also had treated squamous cell cancer of the throat.  There has been no recurrence.  He quit smoking 15-16 years ago.  He smoked for about 25-30 years.  He has not chewed tobacco.  He possibly would drink up to a pint of scotch or vodka a night, but this would be in 2-3 drinks.  It was  hard to estimate the exact amount of alcohol he had used in the past.  In June he stopped.  The patient did have records that I was able to review through your office with them.  He did see a prior neurologist, Dr. Valentin Paige.  He had undergone previous neuropsychiatric testing, but it has not been available to us yet.  He was told to come back, but he and his wife would like him to possibly participate in any clinical trials or treatments besides prescribed donepezil.  He has suffered from essential hypertension.  He has continued hydrochlorothiazide 12.5 mg daily.  He does suffer also from nasal congestion and you recommended Nasacort.  He does have chronic kidney disease, stage III, and he is to have followup metabolic profile.  The patient was told to continue to exercise.      The patient did have other records available to me.  He was seen in the Nephrology Clinic on 11/13/2009.  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  He had a PET CT scan that showed a left lung apical mass that would be biopsied in the near future.  He also had Mohs surgery to treat the skin cancer on his torso.  The patient recently had hydrochlorothiazide that was stopped.  This was not controlling blood pressure and was started on amlodipine.        The patient did have other records that I reviewed with him.  He had on 12/14/2017 an MRI scan of the brain that showed minimal atrophy and chronic white matter disease for his age.  I agreed with that assessment.  There were a few tiny foci of T2 hyperintensity in the periventricular region and there was minimal atrophy for age.      I was able to review his laboratory tests with him.  On 01/10/2019, he had a normal metabolic profile  except for his creatinine of 1.4.  On 06/05/2018, it was 1.28.  On 06/05/2018, he had normal liver function tests.  The patient had on 01/10/2019 total cholesterol of 180 and his HDL cholesterol was 71  and his LDL cholesterol was 94.  His PSA free was 1.5.  The patient had a TSH of 1.52 and his glucose was 104.  He hemoglobin was 14.6.  On 2018, his hemoglobin was 15.2.  His MCV then was 95 and his white count 7800.      I was able to review with the patient other records now.  He was seen by Dr. Valentin Paige on 2018.  At that time, his wife did indicate he had memory problems since last summer.  She gave an example when he had purchased a patio umbrella but it was the wrong one and he did not recall them going to get the correct one.  She also noted he had become a bit depressed over the last year.  The patient did have a history according to his wife to suggest ADD as several family members had it.  He also had vocal cord cancer treated in .  He has had no brain irradiation.  He was treated with cisplatin.  The patient did tell during the review of these records that he has walked his dog daily.  With snf out of boredom he has gone to the [employer].  He has had no change though in his behavior according to his wife.  He also had a previous history of bilateral detached retinas but had good results from surgery and also had cataracts extracted.  He currently has been taking beside hydrochlorothiazide, levothyroxine, losartan and rosuvastatin.  He has no listed drug allergies.  He and his wife reviewed his family history.  His father suffered from memory loss in his 80s and  at 88.  It was more apparent the last 2 years of his life.  His brother who is 67 seems to be slower to speak and probably has some issues with memory.  The patient has been on Aricept 5 mg since he saw Dr. Paige.  He is not certain how beneficial it has been and his wife is not certain he has been taking it always.  He has not had any complications from it, including dyspepsia or diarrhea.  He has not had any symptoms to suggest bradycardia or orthostasis.  Dr. Paige noted that his MRI scan of the brain was  unrevealing and a B12 level and TSH were normal.  He was sent for neuropsychometric testing, but I do not have those records available to me.      Neurologic examination revealed a pleasant man.  He was alert and oriented to time, place and person.  He could recall 1/3 objects after 5 minutes and with prompting 3/3 objects.  He was able to tell me about the [employer] presidency.  He could tell me the states that touch [place], and he was able to draw a clock correctly.  Otherwise, he does have some mild decrease in hearing and he has hearing aids but according to his wife does not wear them.  Gait, station, cerebellar testing, muscle stretch reflexes which were hypoactive in the arms, trace at the knees and absent at the ankles, plantar stimulation, strength, careful cranial nerve examination except for aphakia with no frontal lobe release signs and good extraocular movements and full visual fields, superficial and cortical sensory testing are unremarkable.  He had no trouble drawing a clock correctly to indicate 4:30.  I could not auscultate cervical bruits.  He had a regular cardiac rhythm without gallops or murmurs.  His blood pressure is 138/80 with pulse 65.      IMPRESSION:  Mild cognitive impairment.      The patient probably by history does have mild cognitive impairment.  I recommended he have repeat psychometrics to be compared to the previous ones done through the [facility].  I also asked that he have further tests now including serology and followup B12 level.  I did go over day-to-day activities that could be helpful in memory.  I went over possible studies that could be done at [facility] with Dr. Still or ones that are possibly being still done through [facility].  He and his wife did have specific questions about new treatment that has been on the Internet.  I did explain to them that it is hard to know what to make of some of the new reports and I told them I will be going to a  review of neurologic discoveries at [facility] in the next few weeks.  They have an excellent department regarding degenerative brain disease, and I am hoping I will find out if there are any new trials.  I am not aware of any that are being done currently at [place] other than epidemiologic ones.  I did suggest he continue donepezil.        I spent 1 hour with him.  Over 50% of the time involved counseling and coordination of care.  A complete review of medical systems was done and a positive review is listed in the report above.      Sincerely,         AZUL FLORES MD             D: 2019   T: 2019   MT: eugene      Name:     CHRISTI MNAN   MRN:      [id number]        Account:      [id number]   :      1950           Service Date: 2019      Document: [id number]

## 2019-02-18 ENCOUNTER — OFFICE VISIT (OUTPATIENT)
Dept: NEUROPSYCHOLOGY | Facility: CLINIC | Age: 69
End: 2019-02-18
Payer: MEDICARE

## 2019-02-18 DIAGNOSIS — G31.84 MCI (MILD COGNITIVE IMPAIRMENT) WITH MEMORY LOSS: Primary | ICD-10-CM

## 2019-02-18 NOTE — NURSING NOTE
The patient was seen for neuropsychological evaluation at the request of Dr. Sebastien Chavira, for the purposes of diagnostic clarification and treatment planning. 140 minutes of test administration and scoring were provided by this writer, Jamie Tello. Please see Dr. Yakov Larios's report for a full interpretation of the findings.

## 2019-02-26 NOTE — PROGRESS NOTES
NAME: Agapito Fairbanks  MRN: [id number]  : 1950  CAPUTO: 2019    Neuropsychology Laboratory  [facility]  [address] [facility] [address]  [place]  [address] [phone]    NEUROPSYCHOLOGICAL EVALUATION    RELEVANT HISTORY AND REASON FOR REFERRAL    This is a report of neuropsychological consultation regarding Agapito Fairbanks, a 68-year-old, right-handed man with 12 years of formal education. There have been concerns about cognitive problems that seemed to be growing over the last year and a half. He saw providers at [facility] and was diagnosed with mild cognitive impairment (MCI) on the basis of a 2018 neuropsychological evaluation with Dr. Rosita Castano. Dr. Castano s test data primarily suggested amnestic disturbance. Aside from possible degenerative syndromes, concerns were raised about his history of heavy alcohol consumption and he was encouraged to quit or cut back drastically. He was prescribed donepezil by Dr. Valentin Paige. He recently saw Dr. Sebastien Chavira for additional neurologic evaluation. Dr. Chavira ordered this reevaluation.    Mr. Fairbanks tells me he has a sense of needing to think about things more than usual, but he does not see himself as having any debilitating cognitive problems. His wife, Millicent, accompanies him to the evaluation. She describes more problems than he does. He is less prone to confusion now, because he did end up cutting back his alcohol intake last fall. He had a habit of at least 2-3 drinks every day for his adult life. He has now cut back to about 2-3 drinks each week, per his estimate. He denies any use of tobacco or illicit drugs. His wife describes continued cognitive abnormalities. He asks atypical questions out of the blue about things that he should know, such as whether she wears contact lenses or where she went to high school. The early signs of cognitive problems were difficulties completing home  repairs, needing to bring in outside assistance for projects that would have previously been easy for him.    There is no history of stroke, seizure, TBI, migraine, motoric dyscontrol/parkinsonism, or episodes of delirium. Brain MRI without contrast from December 2017 showed only minimal parenchymal atrophy and signs of chronic white matter changes. His wife tells me he fell off of a ladder several months ago and has had complaints of a stiff neck since then.    Medical history is notable for prior cancer treatments. Vocal cord cancer was treated with chemotherapy and radiation in 2006. He also had a lung lobectomy for carcinoma. Records from pathology examinations suggested that the lung carcinoma was not a metastatic process from the vocal cord cancer, but that they were probably two separate entities. He also had skin cancer treated with Mohs surgery. His sense of taste has been chronically affected since his cancer treatments. He has needed to use hearing aids ever since treatment with cisplatin. There have been no visual abnormalities, and he denies any changes to his sense of smell.    He reports that his mood is always lower in the samuels. He is unsure of the time frame, but he says at some point in his life he was given an antidepressant but then his doctor later declined to continue the prescription because of his alcohol habits. The medical records indicate he was treated with citalopram in 2011 in 2012. He has never had engagement with psychotherapy. Today, his wife tells me she wants him to receive greater clinical attention to his mental health.    He exhibits a shorter temper, and he seems to have lost much of his sense of humor. He does not demonstrate inappropriate interpersonal behaviors or coarsening of social interactions.    He denies any hallucinatory experiences. There are no REM sleep behaviors.    He feels that his sleep is okay.    Mr. Fairbanks denies any changes in his ability to manage  instrumental ADLs. He denies any concerns about driving safety.    Psychosocial history includes one prior marriage, with a daughter from that marriage. He and Maira have been  since . She brought two children to the marriage.    Medical records indicate some concerns about lifelong ADHD-like behavioral traits, and an extended family history of ADHD. Mr. Fairbanks tells me that he was always a smart kid but never applied himself in school. He previously told Dr. Castano that testing in his teens indicated an IQ of 127, yet he mostly earned C grades. He characterizes himself as spending more time in the hallway than in the classroom. He liked science and history classes and was less fond of language arts classes. He graduated from high school on time. 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 He thinks he retired sometime between  at . He had been working as a part-time  for residents of a [employer] home. He tells me that he stopped about two months ago, but he cannot state why. When he saw Dr. Castano, he also indicated having reduced his hours but could not clearly describe why.     Aside from some members of extended family receiving diagnoses of ADHD, reported family history includes about four or five years of cognitive decline for his father, prior to his death at age 87 or 88. His mother  from colon cancer in her late 50s or early 60s.    Additional medical concerns include hypothyroidism, hypertension, hyperlipemia, stage-3 chronic kidney disease, elevated PSA, and impotence of organic origin. Current medications include donepezil, levothyroxine, losartan, hydrochlorothiazide, and rosuvastatin.     BEHAVIORAL OBSERVATIONS    Mr. Fairbanks was polite and cooperative with the evaluation. He was alert and not somnolent. Mood was neutral, with mood-congruent affective  display. He had demeanor that suggested limited concern about his recent history, and my sense in the interview was that this was an expression of anosognosia/indifference rather than denial/minimization. Speech production was generally normal. Content of speech was linear and goal-directed, but tended to be vague and imprecise. The vagueness and imprecision seemed to reflect reduced fund of recent information. He demonstrated rapid, impulsive, and unplanned approaches to most tests, ending to give answers while instructions or other materials were still being delivered to him. Despite inattention and impulsivity, he appeared to put forth appropriate effort, and he persisted well with all requested procedures. The test results are seen as valid estimations of his cognitive status.    MEASURES ADMINISTERED    In addition to conducting a clinical interview, I directly administered the following measures:     [facility]    The following measures were administered by a trained psychometrist, under my direct supervision:    Orientation Questionnaire: Time, Place, Basic Personal Information, Recent [place] Presidents; Wide Range Achievement Test 4: Word Reading; Wechsler Adult Intelligence Scale-IV: Similarities, Information, Block Design, Matrix Reasoning, Digit Span, Coding; Controlled Oral Word Association Test; Animal Naming Test; Gerlaw Naming Test; Clemente Visual Acuity Screen; Grooved Pegboard; Trail Making Test; Porteus Maze Test; Letter Cancellation Test; Jero-Osterrieth Complex Figure Test; Jero Auditory Verbal Learning Test; Wechsler Memory Scale-IV: Logical Memory; Cardoso Depression Inventory-II; Cardoso Anxiety Inventory.    RESULTS AND INTERPRETATION    Orientation: Orientation was normal for time, place, basic personal information, and basic cultural information.     Intellectual Abilities: Performance on a reading and pronunciation test that is validated for estimating premorbid intellect  was in the high average range. On measures of current intellectual functioning, verbal skills were commensurately high average, while nonverbal/spatial skills were middle average. High average verbal abilities included demonstrating general fund of information and abstract analogical reasoning. Middle average nonverbal skills included problem-solving through hands-on object assembly and through visual pattern identification.     Language & Related Skills: Aphasia screening was entirely negative for the presence of a central language disorder, with no indications of letter/number agnosia, anomia, alexia, agraphia, spelling apraxia, acalculia, repetition defects, comprehension defects, ideomotor apraxia, or right-left disorientation/body agnosia. As noted above, basic reading and pronunciation skills were high average. Confrontation naming was high average to superior. Associative verbal fluency performances were middle average.     Visual Perceptual & Constructional Skills: Practical near-point visual acuity (i.e., both eyes together and wearing glasses) was 20/20 on Clemente screening. Copies of simple shapes and figures were rushed and loosely organized but readily recognizable as the target images. Likewise, his copy of a complex geometric figure was recognizable but below normal expectations for accuracy and precision in details and relative proportions. Later recall trials showed improved accuracy in some of the more distorted portions of his copy, indicating that his copy errors were due to poor planing and lack of self-correction attempts rather than fundamental perceptual or constructional defects.     Motor Skills: Speeded fine-motor dexterity for placing shaped pegs into holes was average and bilaterally equivalent.     Mental Speed & Executive Functioning: As noted above, speeded verbal fluency performances were average. Graphomotor clerical speed was average. Visual scanning and sequencing (trail making)  under simple conditions was borderline for speed but had no errors. Trail making under greater executive demands for controlling divided attention was low average for speed but had 3 errors. Sustained, vigilant control over divided attention was below normal expectations on a dual-target detection task. Planing, foresight, and using immediate feedback to overcome errors were normal on an unspeeded maze-solving task. General inattentive impulsivity in his test approaches indicated executive weaknesses.     Attention & Working Memory: Immediate auditory attention and working memory were middle average for repeating and rearranging digit strings.     Learning & Anterograde Memory: A few minutes after the initial copy, free recall of the complex figure was impaired (very few details but as noted above, there were certain improvements in the basic layout of his recall drawing compared to his copy drawing). After 30 minutes, free recall of the figure was borderline impaired, and recognition of figure elements was impaired. Immediate verbal memory for short stories was on the lower side of average. Delayed story recall was borderline impaired. Delayed recognition of story details was borderline impaired. Learning a word list over repeated readings was impaired. Free recall of the list was borderline after a brief delay with active interference. After 30 minutes, list recall was very impaired, while list recognition was borderline impaired (normal hit rate but excessive false-positive errors).     Emotional Functioning: Mr. Fairbanks did not endorse clinically significant levels of depression (BDI-II = 4), and he did not endorse any anxiety-related symptoms (VALERIE = 0).     IMPRESSIONS    The neuropsychological results are abnormal. Mr. Fairbanks demonstrates substantial dysfunction in learning and memory, ranging from borderline to very impaired. There are also significant but somewhat more variable weaknesses in executive  functioning. Visuoconstructional abilities are below expectations and may partly reflect dysexecutive behavioral interference. Basic visual perceptual functions appear to be intact. Basic language functions are intact. Verbal intellectual abilities are above average, and nonverbal abilities are middle average. Basic mental and psychomotor speed are average. He denies the presence of any clinically meaningful mood or anxiety symptoms, while his wife has concerns about his emotional functioning. He seems to have reduced insight into recent functioning, with a bit of anosognosia and indifference that could reflect changes in brain health.     The results are similar to those obtained by Dr. Castano in June 2018, but with a bit of decline in some aspects of executive and spatial abilities. The overall picture is one of relative stability and no signs of precipitous progression. Mild cognitive impairment (MCI) is still an appropriate diagnostic label.    The data indicate dysfunction among medial temporal and/or basal forebrain memory systems bilaterally, along with weaknesses in frontal-subcortical circuits related to executive control. His history suggests above-average cognitive capacity at baseline, with lifelong ADHD-like behaviors contributing to relative underachievement. Exacerbation of such neurodevelopmental factors is likely from decades of heavier-than-average alcohol consumption. It is also possible that we are seeing the early stages of an intrinsic neurodegenerative syndrome, such as Alzheimer s disease. Medical records indicate that cancer was treated with radiation targeted to the neck and vocal cords, not the brain, so downstream effects of radiation therapy are unlikely. He has had three separate cancers in his lifetime, and heavier alcohol consumption is a risk for cancer. PSA is elevated. His brain MRI in 2017 did not utilize contrast agent. Overall, the etiology for his MCI is not clear, but impacts  from early neurodegenerative processes and alcohol would be at the top of my differential.      RECOMMENDATIONS    Continued neurologic care and monitoring are needed. I defer to Dr. Chavira regarding any additional workups or changes to the treatment plan.     I am glad that he is not experiencing side effects from donepezil. It is possible that relative stability in his psychometric profile is due to the medication.     It is also possible that relative stability is related to his healthier levels of alcohol intake. Given clear evidence that cognitive functions and brain health are reduced from premorbid levels, it stands to reason that all controllable risk factors for further decline should be assiduously managed. I recommend that his alcohol intake be brought to minimum possible levels.     He continues to possess many significant cognitive strengths. Executive weaknesses would be expected to reduce his ability to fully apply his strengths. Some extra checking and monitoring for safety (e.g., driving, tool use) and for medication adherence are reasonable. Further evaluation with Occupational Therapy might be considered, if his wife notices consequential errors or growing risk factors.     If this is Alzheimer s disease or other degenerative condition, a slower rate of progression might be expected for an individual with a high baseline and significant cognitive reserve. An up-to-date neuropsychological baseline has been obtained. Longitudinal monitoring is advised. A return for reevaluation in 12-18 months would be reasonable, but I would be happy to see him whenever clinically necessary.     Yakov Larios, PhD,   Clinical Neuropsychologist      Time spent: One hour neurobehavioral status exam including interview, clinical assessment by licensed and board-eligible neuropsychologist (CPT 18047). One hour neuropsychological testing evaluation by licensed and board-eligible neuropsychologist, including  integration of patient data, interpretation of standardized test results and clinical data, clinical decision-making, treatment planning, report, and interactive feedback to the patient, first hour (CPT 57667). Two hours of neuropsychological testing evaluation by licensed and board-eligible neuropsychologist, including integration of patient data, interpretation of standardized test results and clinical data, clinical decision-making, treatment planning, report, and interactive feedback to the patient, subsequent hours (CPT 68498). Thirty minutes of psychological and neuropsychological test administration and scoring by technician, first 30 minutes (CPT 31506). One hour and 50 minutes psychological or neuropsychological test administration and scoring by technician, subsequent 30-minute intervals (CPT 66272). Diagnoses: G31.84

## 2019-02-26 NOTE — PROGRESS NOTES
Name: Agapito Fairbanks MRN: [id number]  : 1950 CAPUTO: 2019  Staff: SALENA Tech: NEO Age: 68  Sex: Male Hand: Right Educ: 12  Vision: 20/20 ?with correction / ?without correction    ORIENTATION     Time  -1     Place       Personal info          Presidents     WRAT4   SS %ile Grade Equiv.     Word Reading  117 87 >12.9    WAIS-IV    VCI: (120) YENNIFER: (98)     Raw SSa     Similarities  29 13     Information  22 14     Block Design  24 8     Matrix Reasoning 13 11     Digit Span  26 11     Coding  57 11    [facility] AST   Err: None   Figures: fast, loosely organized    COWAT (CFL)   Raw: 34   SS: 10 %ile: 41-59    ANIMAL NAMING TEST   Raw: 16   SS: 8 T: 45    BOSTON NAMING TEST   Raw: 60   SS: 17 %ile: 99     GROOVED PEGBOARD    Raw  Drops SS T   RH  86  0 6 49   LH 85  0 7 52    TRAILS  Raw  Err SS %ile   A 60  0 6 6-10   B 121  3 8 19-28    PORTEUS MAZE TEST   Test Age: 14    LETTER CANCELLATION   Time: 182  Err: 2    RADHA-O    Raw    T %ile     Time to Copy  181      >16     Copy    24     11-18     Short Delay Recall 3.5 27 1     Long Delay Recall 7 34 5     Recognition Total 15 <20 <1    AVLT (>55, MOANS norms)     Trial 1 2 3 4 5 B 6 30              7 7 8 8 9 4 4 1      Raw SS %ile     Trial 1    7 14 76-86     Learning Over Trials  4 5 <2     Short Delay Recall  4 6 2-5     30  Recall   1 5 <2     30  Recognition Hits/FPs  12/7 5 3-5    WMS-IV  Raw SS / %ile     LM I  25 8     LM II  4 4     LM Recog. 14 3rd-9th    BDI-II  Raw:  4  Interpretation: Minimal    VALERIE  Raw:  0  Interpretation: Minimal

## 2019-02-28 ENCOUNTER — OFFICE VISIT (OUTPATIENT)
Dept: NEUROLOGY | Facility: CLINIC | Age: 69
End: 2019-02-28
Payer: MEDICARE

## 2019-02-28 VITALS — SYSTOLIC BLOOD PRESSURE: 134 MMHG | HEART RATE: 82 BPM | DIASTOLIC BLOOD PRESSURE: 76 MMHG | OXYGEN SATURATION: 98 %

## 2019-02-28 DIAGNOSIS — R41.3 MEMORY LOSS: Primary | ICD-10-CM

## 2019-02-28 DIAGNOSIS — F41.9 ANXIETY: ICD-10-CM

## 2019-02-28 RX ORDER — SERTRALINE HYDROCHLORIDE 25 MG/1
TABLET, FILM COATED ORAL
Qty: 90 TABLET | Refills: 3 | Status: SHIPPED | OUTPATIENT
Start: 2019-02-28 | End: 2019-10-23

## 2019-02-28 ASSESSMENT — PAIN SCALES - GENERAL: PAINLEVEL: NO PAIN (0)

## 2019-02-28 NOTE — NURSING NOTE
Chief Complaint   Patient presents with     RECHECK     [facility] RETURN - 5 WEEK FOLLOW UP POST LAB       Farhat Mallory, EMT

## 2019-02-28 NOTE — LETTER
2019       RE: Agapito Fairbanks  [address] [facility] [address]     Dear Colleague,    Thank you for referring your patient, Agapito Fairbanks, to the [facility] NEUROLOGY at [facility]. Please see a copy of my visit note below.    Service Date: 2019      Los Love MD   [facility]    [address] [place] [address]      RE: Agapito Fairbanks   MRN: [id number]   : 1950      Dear Dr. Love:      This is in regard to follow up on Agapito Fairbanks.  The patient returned today with a chief complaint of memory loss.      The patient underwent further testing at my suggestion.  He had negative serology.  His B12 level was well within normal limits and he had a normal MMA.  I went over all of his most recent labs with the patient and his wife.  I also reviewed with them at length his neuropsychometrics.  These were done by Dr. Larios on 2019.  I made sure the patient and his wife had copies of these records.  I did ask that they share them with you.  Dr. Larios went on to state the patient demonstrated substantial dysfunction in learning and memory, ranging from borderline to very impaired.  He had more variable weaknesses in executive functioning.  He went on to state visual constructional abilities are below expectations and may partly reflect dysexecutive behavioral interference.  The patient did have verbal intellectual abilities that were above average and nonverbal abilities are middle average.  He felt that he seemed to have reduced insight in the recent functioning, with a bit of anosognosia and indifference that could reflect changes in brain health.  These results were compared to the previous study by Dr. Castano Ph. D. in 2018 and there was a bit of decline in some aspects of executive and spatial abilities.  The overall picture is one of relative stability and there were no signs of precipitous progression.   Dr. Larios felt mild cognitive impairment was still an appropriate diagnostic label.  He went on to state that he had dysfunction among medial temporal and/or basal forebrain membrane systems bilaterally, along with weaknesses in frontal-subcortical circuits related to executive control.  His history suggests above average cognitive capacity at baseline, with lifelong ADHD-like behaviors contributing to relative underachievement.  Exacerbation of such neurodevelopment factors is likely from decades of heavier than average alcohol consumption.  The patient and his wife did go on to tell me that he basically is not using alcohol now.  Dr. Larios did go on to state it is also possible that were seen the early stages of an intrinsic neurodegenerative syndrome, such as Alzheimer disease.  The patient and his wife did review with me his lifestyle issues.  Dr. Larios did suggest that he may benefit from occupational therapy assessment for safety.  He is willing to go ahead with this.  The patient is going to continue donepezil.  He has had no trouble taking the medication and it is possible it has been helpful to him.  I did go over briefly the use of rivastigmine, but at this point will continue on present medication as his condition does seem to be somewhat stable.      The patient and his wife again had multiple questions about possible recent advances in treating memory loss.  I went over a recent conference I went to the [employer] and lectures there as well as a personal conversation with Dr. Heredia,  one of the co-directors of the Aging and Memory clinic.  It does appear that there is now good evidence that exercise may stabilize memory loss and possibly improve it.  I did tell them about Dr. Heredia's assessment of intranasal insulin and also her limited review of cerebral ultrasound treatments.  The patient and his wife definitely would like him involved in some type of study and I suggested he  either try to get into the [facility] study, although his previous alcohol use may be problematic.  I also suggested again that he may want to see Dr. Still, our neurodegenerative expert here.  The patient has not had an MRI scan for a while as Dr. Larios pointed out.  It was not done with contrast.  He is going to go ahead now and have MRI scan with and without contrast done of the brain.  I have suggested he have neurologic follow up then in the next few weeks with me.      Thank you for allowing me to see this patient.       Sincerely yours,       Sebastien Chavira MD      I spent 35 minutes with the patient and his wife.  Over 50% of the time this involved counseling and coordination of care.     D: 2019   T: 2019   MT: LEIGHTON      Name:     CHRISTI MANN   MRN:      [id number]        Account:      [id number]   :      1950           Service Date: 2019      Document: [id number]

## 2019-03-01 NOTE — PROGRESS NOTES
Service Date: 2019      Los Love MD   [facility]    [address] [place] [address]      RE: Agapito Fairbanks   MRN: [id number]   : 1950      Dear Dr. Love:      This is in regard to follow up on Agapito Fairbanks.  The patient returned today with a chief complaint of memory loss.      The patient underwent further testing at my suggestion.  He had negative serology.  His B12 level was well within normal limits and he had a normal MMA.  I went over all of his most recent labs with the patient and his wife.  I also reviewed with them at length his neuropsychometrics.  These were done by Dr. Larios on 2019.  I made sure the patient and his wife had copies of these records.  I did ask that they share them with you.  Dr. Larios went on to state the patient demonstrated substantial dysfunction in learning and memory, ranging from borderline to very impaired.  He had more variable weaknesses in executive functioning.  He went on to state visual constructional abilities are below expectations and may partly reflect dysexecutive behavioral interference.  The patient did have verbal intellectual abilities that were above average and nonverbal abilities are middle average.  He felt that he seemed to have reduced insight in the recent functioning, with a bit of anosognosia and indifference that could reflect changes in brain health.  These results were compared to the previous study by Dr. Castano Ph. D. in 2018 and there was a bit of decline in some aspects of executive and spatial abilities.  The overall picture is one of relative stability and there were no signs of precipitous progression.  Dr. Larios felt mild cognitive impairment was still an appropriate diagnostic label.  He went on to state that he had dysfunction among medial temporal and/or basal forebrain membrane systems bilaterally, along with weaknesses in frontal-subcortical circuits related to executive control.   His history suggests above average cognitive capacity at baseline, with lifelong ADHD-like behaviors contributing to relative underachievement.  Exacerbation of such neurodevelopment factors is likely from decades of heavier than average alcohol consumption.  The patient and his wife did go on to tell me that he basically is not using alcohol now.  Dr. Larios did go on to state it is also possible that were seen the early stages of an intrinsic neurodegenerative syndrome, such as Alzheimer disease.  The patient and his wife did review with me his lifestyle issues.  Dr. Larios did suggest that he may benefit from occupational therapy assessment for safety.  He is willing to go ahead with this.  The patient is going to continue donepezil.  He has had no trouble taking the medication and it is possible it has been helpful to him.  I did go over briefly the use of rivastigmine, but at this point will continue on present medication as his condition does seem to be somewhat stable.      The patient and his wife again had multiple questions about possible recent advances in treating memory loss.  I went over a recent conference I went to the [employer] and lectures there as well as a personal conversation with Dr. Heredia,  one of the co-directors of the [facility] clinic.  It does appear that there is now good evidence that exercise may stabilize memory loss and possibly improve it.  I did tell them about Dr. Heredia's assessment of intranasal insulin and also her limited review of cerebral ultrasound treatments.  The patient and his wife definitely would like him involved in some type of study and I suggested he either try to get into the [facility] study, although his previous alcohol use may be problematic.  I also suggested again that he may want to see Dr. Still, our neurodegenerative expert here.  The patient has not had an MRI scan for a while as Dr. Larios pointed out.  It was not done with  contrast.  He is going to go ahead now and have MRI scan with and without contrast done of the brain.  I have suggested he have neurologic follow up then in the next few weeks with me.      Thank you for allowing me to see this patient.       Sincerely yours,       Azul Chavira MD      I spent 35 minutes with the patient and his wife.  Over 50% of the time this involved counseling and coordination of care.         AZUL CHAVIRA MD             D: 2019   T: 2019   MT: LEIGHTON      Name:     CHRISTI MANN   MRN:      [id number]        Account:      [id number]   :      1950           Service Date: 2019      Document: [id number]

## 2019-03-08 ENCOUNTER — HOSPITAL ENCOUNTER (OUTPATIENT)
Dept: OCCUPATIONAL THERAPY | Facility: CLINIC | Age: 69
Setting detail: THERAPIES SERIES
End: 2019-03-08
Attending: PSYCHIATRY & NEUROLOGY
Payer: MEDICARE

## 2019-03-08 PROCEDURE — 97165 OT EVAL LOW COMPLEX 30 MIN: CPT | Mod: GO | Performed by: OCCUPATIONAL THERAPIST

## 2019-03-08 PROCEDURE — 96125 COGNITIVE TEST BY HC PRO: CPT | Mod: GO | Performed by: OCCUPATIONAL THERAPIST

## 2019-03-08 PROCEDURE — 97535 SELF CARE MNGMENT TRAINING: CPT | Mod: GO | Performed by: OCCUPATIONAL THERAPIST

## 2019-03-08 NOTE — PROGRESS NOTES
"Cognitive Performance Test    SUMMARY OF TEST:    The Cognitive Performance Test (CPT) is a standardized performance-based assessment to measure working memory/executive function processing capacities that underlie functional performance. Subtasks include common basic and instrumental activities of daily living (ADL/IADL) which are rated based on the manner in which patients respond to task demands of varying complexity. The total CPT score describes a level of functioning that indicates how information is processed, implications for functional activities, potential safety risks and a recommended level of supervision or assist based on cognitive function. The highest total score on this test is in the range of 5.6 to 5.8.    DATE OF TESTING: 3/8/2019    RESULTS OF TESTING:                                                                                           CPT Subtest Results    MEDBOX: 6/6 SHOP/GLOVES: 5/6 PHONE: 6/6   WASH:  5/5 TRAVEL: 6/6 TOAST: 5/5   DRESS: NT/5   TOTAL CPT SCORE:  33/34     Average CPT Score  5.5/5.6    INTERPRETATION OF TEST RESULTS:    Based on the Cognitive Performance Test, this patient scored at CPT Level 5.5.  See CPT Levels reference below.    Summary of functional cognitive status:   Patient did very well on all the tasks today stating \"aren't we are kind of wasting our time here.\"  He was able to set up all medications correctly, demonstrated good attention to details, listening and problem solving skills.     Factors affecting performance:  No additional problems noted    Recommendations:  Supervision in living setting:  Alone/independent in managing personal affairs.    Patient will benefit from further education on factors that affect memory, ways to stay cognitively fit and strategies to improve memory.         TIME ADMINISTERING TEST: 30    TIME FOR INTERPRETATION AND PREPARATION OF REPORT: 8    TOTAL TIME: 38      CPT Levels Reference:    Patient's Average CPT Score:  5.5   " "                                                                                                                                               Individual scores range along a continuum as outlined below.  In addition to cognitive status, other factors may affect safety in a home environment.  Please refer to specific recommendations for this patient.    _X__5.6-5.8  Normal functioning (absence of cognitive-functional disability).  Independent in managing personal affairs, monitors and directs own behavior.  Uses complex information to carry out daily activities with safety and accuracy.    Proficient with instrumental activities of daily living (IADL) and learning new activity.  Problems are anticipated, errors are avoided, and consequences of actions are considered.      ___5.0   Mild cognitive-functional disability; deficits in working memory and executive thought processes. Difficulty using complex information. Problems may be observed with recent memory, judgment, reasoning and planning ahead. May be impulsive or have difficulty anticipating consequences.  Safety:  May require assistance to plan ahead; or to manage complex medication schedules, appointments or finances.  Hazardous activities may need to be monitored or limited.  ADL:  Mild functional decline.  Able to complete basic self-care and routine household tasks.  May have difficulty with complex daily tasks such as reading, writing, meal preparation, shopping or driving.   Learns through hands on teaching. Self-centered behavior or difficulty considering the needs of others may be seen related to trouble seeing the  whole picture\". Can appear disorganized or uninhibited.    ___4.5  Mild to moderate cognitive-functional disability. Significant deficits in working memory and executive thought processes. Judgment, reasoning and planning show obvious impairment.  Distractible with inability to shift attention/actions given competing stimuli.  Difficulty " with problem solving and managing details. Complex daily tasks performed with inconsistency, difficulty, or error.     Safety:  Medications should be monitored, stove use may require supervision, and driving ability may be affected.  Impaired safety awareness with inability to anticipate potential problems.  May not recognize or respond to emergent situations. Requires frequent check-in support.   ADL:  Mild difficulty with simple everyday self-care tasks. Benefits from structured, routine activity.  Will likely need reminders to complete tasks outside of the routine. Requires assistance with planning and IADL tasks like shopping and finances. Learns concrete tasks through repetition, but performance may not generalize. Tends to be impulsive with poor insight. Self centered behavior or inability to consider the needs of others is common.    ___4.0  Moderate cognitive-functional disability; abstract to concrete thought processes. Working memory and executive function impairments are obvious. Difficulty with planning and problem solving.  Behavior is goal-directed, but unable to follow multi-step directions, is easily distracted, and may not recognize mistakes.  Inability to anticipate hazards or understand precautions.  Safety:  Recommend 24-hour supervision for safety. Supervision needed for medication management and for hazardous activities. May not be able to follow a restricted diet. Can get lost in unfamiliar surroundings. Generally, persons functioning at level 4 should not be driving.   ADL:  Some decline in quality or frequency of ADL.  Bourbon enhanced by use of a routine, simple concrete directions, and caregiver set-up of needed items. Complex tasks such as money or home management typically requires assistance.  Relies heavily on vision to guide behavior; will ignore objects/hazards not in plain sight and can be distracted by irrelevant objects. Often has poor insight.  Able to carry out social  conversation and may verbally  cover  for deficits leading caregivers to believe they are capable of functioning independently.       ___3.5  Moderate cognitive-functional disability; increased cues needed for task completion. Aware of concrete task steps but needs prompting or cues to initiate and complete simple tasks. Attention span is limited, simple directions may need to be repeated, and re-focus to a topic or task may be required.  Safety:  24-hour supervision required for safety and for assistance with daily tasks. Assistance required with medications, and access to medication should be limited. Meals, nutrition and dietary restrictions need to be monitored.  All hazardous activities should be restricted or supervised. Should not drive. Prone to wandering and can become lost.  ADL:  Moderate functional decline. Familiar tasks usually requires set-up of supplies and directions to complete steps. May need objects handed to them for task initiation. Function best with a set schedule in familiar surroundings with familiar people. All complex tasks must be done by others. Vocabulary is diminished and speech often unfocused.

## 2019-03-08 NOTE — PROGRESS NOTES
[facility]          OUTPATIENT OCCUPATIONAL THERAPY  EVALUATION  PLAN OF TREATMENT FOR OUTPATIENT REHABILITATION  (COMPLETE FOR INITIAL CLAIMS ONLY)  Patient's Last Name, First Name, M.I.  YOB: 1950  Agapito Fairbanks                        Provider's Name  [facility] Medical Record No.  [id number]                               Onset Date:     02/28/19   Start of Care Date:     03/05/19   Type:     ___PT   _X_OT   ___SLP Medical Diagnosis:     Memory Loss                          OT Diagnosis:     decreased cognition for ADL/IADLs Visits from SOC:  1   _________________________________________________________________________________  Plan of Treatment/Functional Goals:             Goals  Goal Identifier: 1-CPT  Goal Description: Patient to verbalize understanding of Cognitive Performance Test results and identify 3 strategies to increase patient's safety and independence in the home setting.  Target Date: 03/08/19  Date Met: 03/08/19    Goal Identifier: 2-Memory Compensation  Goal Description: Patient will verbalize understanding of memory compensatory strategies and ways to stay cognitively fit to improve memory and independence for remembering appointments, conversations, etc.   Target Date: 03/08/19  Date Met: 03/08/19        Therapy Frequency: Eval and one treatment        Melodie Spencer OT          I CERTIFY THE NEED FOR THESE SERVICES FURNISHED UNDER        THIS PLAN OF TREATMENT AND WHILE UNDER MY CARE     (Physician co-signature of this document indicates review and certification of the therapy plan).                   Certification date from: 03/08/19, Certification date to: 03/08/19               Referring Physician: Dr. Sebastien Chavira     Initial Assessment        See Epic Evaluation      Start Of Care Date: 03/05/19

## 2019-03-08 NOTE — PROGRESS NOTES
03/08/19 1400   Quick Adds   Quick Adds Certification   Type of Visit Initial Outpatient Occupational Therapy Evaluation   General Information   Start Of Care Date 03/05/19   Referring Physician Dr. Sebastien Chavira   Orders Evaluate and treat as indicated   Orders Date 02/28/19   Medical Diagnosis Memory Loss   Onset of Illness/Injury or Date of Surgery 02/28/19   Precautions/Limitations (has hearing aids (wears occasinally))   Special Instructions Cognitive Performance Test   Surgical/Medical History Reviewed Yes   Additional Occupational Profile Info/Pertinent History of Current Problem Patient referred to outpatient OT for Cognitive Performance testing.  Please see Dr. Colón note for details.  Patient had neuropsych testing completed.    Role/Living Environment   Current Community Support Family/friend caregiver;Housekeeping service   Patient role/Employment history Retired  (real estate sales)   Community/Avocational Activities Drives for [employer] 1-2 days a week, exercise 3 days a week, enjoys golfing/traveling, reads the newspaper everyday   Current Living Environment House   Prior Level - Transfers Independent   Prior Level - Ambulation Independent   Prior Level - ADLS Independent   Prior Responsibilities - IADL Meal Preparation;Housekeeping;Laundry;Shopping;Yardwork;Medication management;Finances;Driving;Work   Patient/family Goals Statement To improve memory   Pain   Patient currently in pain No   Fall Risk Screen   Have you fallen 2 or more times in the past year? No   Have you fallen and had an injury in the past year? No   Cognitive Status Examination   Orientation Orientation to person, place and time   Level of Consciousness Alert   Follows Commands and Answers Questions 100% of the time;Able to follow multistep instructions   Personal Safety and Judgment Intact   Memory Comments Declined MOCA screen-felt he has done in the past.  Reports that he is sometimes forgetful but reports no  concerns with his memory.   Cognitive Comment See Cognitive Performance Testing. Patient will benefit from further education on memory compensation strategies.    Visual Perception   Visual Perception Wears glasses   Visual Perception Comments No new changes.    Adult OT Eval Goals   OT Eval Goals (Adult) 2   OT Goal 1   Goal Identifier 1-CPT   Goal Description Patient to verbalize understanding of Cognitive Performance Test results and identify 3 strategies to increase patient's safety and independence in the home setting.   Target Date 03/08/19   Date Met 03/08/19   OT Goal 2   Goal Identifier 2-Memory Compensation   Goal Description Patient will verbalize understanding of memory compensatory strategies and ways to stay cognitively fit to improve memory and independence for remembering appointments, conversations, etc.    Target Date 03/08/19   Date Met 03/08/19   Clinical Impression   Criteria for Skilled Therapeutic Interventions Met Yes, treatment indicated   OT Diagnosis decreased cognition for ADL/IADLs   Influenced by the following impairments memory changes   Assessment of Occupational Performance 1-3 Performance Deficits   Identified Performance Deficits affects ADL/IADL, household/community mobility, work, driving, recreational activities   Clinical Decision Making (Complexity) Low complexity   Therapy Frequency Eval and one treatment   Risks and Benefits of Treatment have been explained. Yes   Patient, Family & other staff in agreement with plan of care Yes   Clinical Impression Comments Patient was seen for OT eval, completion of the cognitive performance test, and 1 treatment session for education on results and factors that affect memory/strategies to improve memory.  No further skilled occupational therapy needed at this time.  Patient discharged from OT.   Education Assessment   Barriers To Learning No Barriers   Preferred Learning Style Listening;Reading;Demonstration;Pictures/video   Therapy  Certification   Certification date from 03/08/19   Certification date to 03/08/19   Total Evaluation Time   OT Eval, Low Complexity Minutes (19689) 10

## 2019-03-14 ENCOUNTER — ANCILLARY PROCEDURE (OUTPATIENT)
Dept: MRI IMAGING | Facility: CLINIC | Age: 69
End: 2019-03-14
Attending: PSYCHIATRY & NEUROLOGY
Payer: MEDICARE

## 2019-03-14 DIAGNOSIS — R41.3 MEMORY LOSS: ICD-10-CM

## 2019-04-04 ENCOUNTER — OFFICE VISIT (OUTPATIENT)
Dept: NEUROLOGY | Facility: CLINIC | Age: 69
End: 2019-04-04
Payer: MEDICARE

## 2019-04-04 VITALS
RESPIRATION RATE: 16 BRPM | OXYGEN SATURATION: 96 % | DIASTOLIC BLOOD PRESSURE: 73 MMHG | SYSTOLIC BLOOD PRESSURE: 109 MMHG | BODY MASS INDEX: 25.06 KG/M2 | HEART RATE: 65 BPM | HEIGHT: 72 IN | TEMPERATURE: 98.1 F | WEIGHT: 185 LBS

## 2019-04-04 DIAGNOSIS — R41.3 MEMORY LOSS: ICD-10-CM

## 2019-04-04 ASSESSMENT — MIFFLIN-ST. JEOR: SCORE: 1632.15

## 2019-04-04 ASSESSMENT — PAIN SCALES - GENERAL: PAINLEVEL: NO PAIN (0)

## 2019-04-04 NOTE — LETTER
2019       RE: Agapito Fairbanks  [address]  [facility] [address]     Dear Colleague,    Thank you for referring your patient, Agapito Fairbanks, to the [facility] NEUROLOGY at [facility]. Please see a copy of my visit note below.    Service Date: 2019      RE: Agapito Fairbanks   MRN: [id number]   : 1950      Dear Dr. Love:      This is in regard to followup on Agapito Fairbanks.  The patient returned today with chief complaint of memory loss.      The patient has markedly improved according to his wife, Millicent, since he was started on sertraline.  He possibly had one night that he had vivid dreams but otherwise has not had significant dreams.  He transitioned himself to a 50 mg dose.  She has noted that he has been much more focused and overall much better than he has been for a number of years.  He and his wife are looking forward to starting an exercise program.  They are aware that aerobic exercise is the one activity that may help degenerative brain disease.  He does snore at times, but he does feel refreshed.  He has never fallen asleep in a chair in the afternoon nor at a stop sign.  He has stayed away from alcohol.  She did note that he snored heavily when he was drinking.  The patient denied any side effects from the sertraline including tremor, nausea, headache or impotence.      I reviewed with them his most recent MRI.  This was compared to the previous MRI from 2 years ago.  This was done without contrast.  I went over the reasons for this with them.  It did not appear there was any significant change.  I reassured him about this.  He was felt to have mild atrophy on both scans.      The patient did undergo occupational therapy assessment for safety.  He scored 5.5 and this was registered as normal functioning.  He would then be independent in all ADLs.  The patient and his wife were quite pleased with this.      The patient still is  going to be seen in followup now by Dr. Still.  His wife did have some questions about intranasal insulin therapy.  She also did contact the [facility] concerning ultrasound treatment for memory loss.  I told her again I did not have any experience in this and had reviewed it earlier with Dr. Heredia who is a member of the [facility] at the [facility] in [place].  I had nothing further to add about it and did suggest they review that with Dr. Still.      The patient's blood pressure today was 109/73 and his pulse was 65.  He had a regular cardiac rhythm without gallops or murmurs.        The patient seems to be doing quite well.  I said that his response to sertraline would be in keeping with a diagnosis of possible ADHD.  The patient probably did have issues related to alcohol use.  I have suggested he have neurologic followup in the [facility] now on a p.r.n. basis.  He is going to follow up with Dr. Still.      I spent 20 minutes with the patient today.  Over 50% of the time involved counseling and coordination of care.      Thank you for allowing me to see this patient.         D: 2019   T: 2019   MT: eugene      Name:     CHRISTI MANN   MRN:      [id number]        Account:      [id number]   :      1950           Service Date: 2019      Document: [id number]       Again, thank you for allowing me to participate in the care of your patient.      Sincerely,    Sebastien Chavira MD    CC:  Los Love MD   [address]  [address]

## 2019-04-04 NOTE — NURSING NOTE
Chief Complaint   Patient presents with     Memory Loss     [facility] RETURN 6 WK F/U MEMORY LOSS, MILD COGNITIVE IMPAIRMENT       Los Adame, EMT

## 2019-04-05 NOTE — PROGRESS NOTES
Service Date: 2019      Los Love MD   [facility]   [address] [place] [address]  [id number]      RE: Agapito Fairbanks   MRN: [id number]   : 1950      Dear Dr. Love:      This is in regard to followup on Agapito Fairbanks.  The patient returned today with chief complaint of memory loss.      The patient has markedly improved according to his wife, Millicent, since he was started on sertraline.  He possibly had one night that he had vivid dreams but otherwise has not had significant dreams.  He transitioned himself to a 50 mg dose.  She has noted that he has been much more focused and overall much better than he has been for a number of years.  He and his wife are looking forward to starting an exercise program.  They are aware that aerobic exercise is the one activity that may help degenerative brain disease.  He does snore at times, but he does feel refreshed.  He has never fallen asleep in a chair in the afternoon nor at a stop sign.  He has stayed away from alcohol.  She did note that he snored heavily when he was drinking.  The patient denied any side effects from the sertraline including tremor, nausea, headache or impotence.      I reviewed with them his most recent MRI.  This was compared to the previous MRI from 2 years ago.  This was done without contrast.  I went over the reasons for this with them.  It did not appear there was any significant change.  I reassured him about this.  He was felt to have mild atrophy on both scans.      The patient did undergo occupational therapy assessment for safety.  He scored 5.5 and this was registered as normal functioning.  He would then be independent in all ADLs.  The patient and his wife were quite pleased with this.      The patient still is going to be seen in followup now by Dr. Still.  His wife did have some questions about intranasal insulin therapy.  She also did contact the [facility] concerning ultrasound  treatment for memory loss.  I told her again I did not have any experience in this and had reviewed it earlier with Dr. Heredia who is a member of the [facility] at the [facility] in [place].  I had nothing further to add about it and did suggest they review that with Dr. Still.      The patient's blood pressure today was 109/73 and his pulse was 65.  He had a regular cardiac rhythm without gallops or murmurs.        The patient seems to be doing quite well.  I said that his response to sertraline would be in keeping with a diagnosis of possible ADHD.  The patient probably did have issues related to alcohol use.  I have suggested he have neurologic followup in the [facility] now on a p.r.n. basis.  He is going to follow up with Dr. Still.      I spent 20 minutes with the patient today.  Over 50% of the time involved counseling and coordination of care.      Thank you for allowing me to see this patient.      Sincerely,      MD AZUL Ko MD             D: 2019   T: 2019   MT: [email or web address]      Name:     CHRISTI MANN   MRN:      [id number]        Account:      [id number]   :      1950           Service Date: 2019      Document: [id number]

## 2019-04-10 PROBLEM — R41.3 MEMORY LOSS: Status: ACTIVE | Noted: 2019-04-10

## 2019-04-26 ENCOUNTER — OFFICE VISIT (OUTPATIENT)
Dept: NEUROLOGY | Facility: CLINIC | Age: 69
End: 2019-04-26
Attending: PSYCHIATRY & NEUROLOGY
Payer: MEDICARE

## 2019-04-26 VITALS
WEIGHT: 183.6 LBS | SYSTOLIC BLOOD PRESSURE: 149 MMHG | HEART RATE: 84 BPM | DIASTOLIC BLOOD PRESSURE: 95 MMHG | BODY MASS INDEX: 24.9 KG/M2

## 2019-04-26 DIAGNOSIS — G31.84 MILD COGNITIVE IMPAIRMENT: Primary | ICD-10-CM

## 2019-04-26 ASSESSMENT — PAIN SCALES - GENERAL: PAINLEVEL: NO PAIN (0)

## 2019-04-26 NOTE — LETTER
4/26/2019     RE: Agapito Fairbanks  [address]  [facility] [address]     Dear Colleague,    Thank you for referring your patient, Agapito Fairbanks, to the [facility] at [facility]. Please see a copy of my visit note below.    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 He is accompanied to today's visit by his wife Millicent, whom he has been with for 28 years, who assists in providing the history.      At baseline, Mr. Fairbanks is described as having an excellent memory for his entire life. He has a high IQ but had difficulty in school because of disruptive behavior which he attributes to ADHD. 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 Currently he works around 10-25 hours a week in this capacity.     Millicent reports they first began noticing a decline in memory approximately 2 years ago when he disassembled a three-way light switch and could not figure out how to put it back together. This was highly irregular for Mr. Fairbanks who had always been very good at these kinds of tasks. Also, during this time, Mr. Fairbanks started having difficulty remembering upcoming social commitments. For example, Millicent would tell him that they were going to have dinner with friends the next night, and within 30 minutes, he would have no recollection of that plan. Millicent also reports an episode where Mr. Fairbanks returned from lunch and could not remember the name of restaurant that he just ate at. Mr. Fairbanks has problems following and keeping track of conversations. He  misplaces his clothes, which he never had a problem with before. Recently, he attempted to cook corned beef and cabbage but forgot to include cabbage. Ms. Fairbanks thinks his memory has always been this way and when asked about the problems that his wife pointed out, he says that she is just noticing it now. Millicent reports that the only personality change she sees is that Mr. Fairbanks is less humorous than before.     Mr. Fairbanks s cognitive issues have remained stable at this level of dysfunction over these past two years. Millicent confirms this. Things have not been getting worse. Actually, Sandra has seen some improvements in memory which could have to do with Mr. Fairbanks cutting back on alcohol consumption over the past 6-8 months going from 2-3 drinks a day to 1-2 drinks a week. He was started on Donepezil about a year ago by his PCP Dr. Paige. He takes it at night and has been tolerating the lowest dose well but does report some vivid dreams.      Review of outside records:  I personally reviewed outside records which in summary reveal:   -8/2017 Scored 10/28 on 6CIT test during visit with PCP [place] [facility] who noted inconsistent results   6CIT done (see nursing note) with elevated score but inconsistent since pt scoring 2/3 recall when asked by me to remember 3 objects but scored 0/4 when remembering address part of the 6 CIT   -12/2017 Score 2/28 on 6CIT during visit with PCP [facility]. Instructed patient to decrease alcohol consumption, if does not see improvement in memory, considering initiation of Aricept trial  12/2017 MRI w/o contrast reveals minimal atrophy due to age, minimal small vessel ischemic change in periventricular region  -2/2018 Neurology exam revealed MMSE score of 25/30; ordered neuropsych eval  -4/2018 Minicog performed by PCP [facility] Love was normal   -2/2019 Neuropsych evaluation with Dr. Larios reveals substantial dysfunction in learning and memory, and milder dysfunction in other cognitive  domains (see below for details)  - 2/2019 Neurology testing for causes of memory loss reveal negative serology, B12 level was well within normal limits and normal MMA.  - 3/2019 OT evaluation - Mr. Fairbanks scored average of 5.5 out of 5.6. Did very well in all tests. Recommendations were: Supervision in living setting:  Alone/independent in managing personal affairs.  Patient will benefit from further education on factors that affect memory, ways to stay cognitively fit and strategies to improve memory.  - 3/2019 MRI w/o contrast shows no significant changes when compared to MRI performed 2 years prior. Mild atrophy on both scans.  - 4/2019 Neurology evaluation reveals Mr. Fairbanks has responded positively to sertraline, becoming more focused and overall much better. This was consistent with a possible diagnosis of ADHD.    Review of cognitive, visuospatial, motor, sensory and behavioral systems:      Memory: Mr. Fairbanks has no problem with remembering remote events. He does have problems with recent memory. He required his wife to remind him that her birthday was this month before he correctly said that the month is April.     Executive functions: Mr. Fairbanks has does not have difficulty performing multi-step tasks.      Language: Mr. Fairbanks does not have word-finding difficulties. No difficulty which speech or with reading. He is communicating well.     Visuospatial skills: No issues with recognizing people's faces. No problems with driving. He has no trouble seeing things properly or difficulty recognizing objects. No problem with getting lost. No problem reaching for things, not bumping into walls. Seeing things properly.     Motor: Mr. Fairbanks has no difficulty with walking. No balance issues. He stays active by doing yardwork and going to the gym which he goes to 2-3 times a week and lifts weights. He has no tremor, involuntary movements, or muscle twitching. He has no difficulty swallowing. No reported  falls.     Sensory: Mr. Fairbanks has no sensory problems such as numbness, tingling or paresthesias and pain. He denies loss of vision, blurry vision or double vision. He does have absent patellar reflex on the right side.     Behavior: Mr. Fairbanks is less humorous than before. There are no symptoms of agitation, aggression, or violent outbursts. There are no delusions, hallucinations. No gazing spells.     Sleep: Mr. Fairbanks sleeps well at night getting about 9 hours of good quality sleep a night. He sleeps lightly but there is no witnessed apnea. He does not take naps or feel sleepy during the day time. He has no abnormal or violent behavior during sleep and does not act out his dreams.      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 There are no concerns with his driving. He does his finances, pays his own bills, and taxes without problem. He enjoys cooking and eats balanced meals with no dietary restrictions. He works out 2-3 times a week using weight machines.    Medical review of systems: The comprehensive review of systems is otherwise reviewed and negative.     Patient Active Problem List   Diagnosis     Testicular hypofunction     Impotence of organic origin     Essential hypertension     ESOPHAGEAL REFLUX     CKD (chronic kidney disease) stage 3, GFR 30-59 ml/min (H)     HYPERLIPIDEMIA LDL GOAL <100     Hypothyroidism     Advanced directives, counseling/discussion     History of lung cancer     History of laryngeal cancer     Talipes cavus     Memory loss         Past Medical History:   Diagnosis Date     Acute gastritis with hemorrhage 11/02     Basal cell carcinoma, leg      left pretibial area     CKD (chronic kidney disease) stage 3, GFR 30-59 ml/min (H)     creat baseline approx 1.4     Hearing loss      Helicobacter pylori (H. 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    Squamous cell carcinoma right vocal cord     Lung cancer (H) 0/09    1cm spiculated SKYLA Adenosquamous cell carcinoma     Mild major depression (H)      Other and unspecified malignant neoplasm of skin of other and unspecified parts of face      Basal Cell CA nasal area 4/04, chest 3/05, right chest 10/09     Other testicular hypofunction      Squamous cell carcinoma  Jan 2012     RLE s/p excision     Tobacco use disorder     quit 1998     Unspecified cataract     left     Unspecified essential hypertension      Unspecified retinal detachment     Bilateral   Cisplatin induced kidney disease   Evaluated yearly with endoscope for vocal cord cancer in remission  He had normal childhood development and reports no major head injuries.     Past Surgical History:   Procedure Laterality Date     C NONSPECIFIC PROCEDURE  5/01, 9/01    B retinal surg.     C NONSPECIFIC PROCEDURE  12/01    L cataract     C NONSPECIFIC PROCEDURE  1983    right ankle fx repair     C NONSPECIFIC PROCEDURE  3/05    Right hemilaryngectomy (laser) for Squamous Cell CA T1N0     C NONSPECIFIC PROCEDURE  3/05, 10/09    Moh's procedure for Basal Cell CA chest     C NONSPECIFIC PROCEDURE  4/04    Moh's procedure for Basal Cell CA ear lobe     C NONSPECIFIC PROCEDURE  3/05, 12/05    right vocal cord squamous cell CA excision     C NONSPECIFIC PROCEDURE  12/07    Phacoemulsification of the lens with posterior chamber lens implant, right eye.      C NONSPECIFIC PROCEDURE  10/09     Moh's procedufe for BCC left pretibial area     C NONSPECIFIC PROCEDURE  12/09    Left thoracoscopic wedge resection, Open completion left upper lobe segmentectomy        Current Outpatient Medications   Medication Sig Dispense Refill     Donepezil HCl (ARICEPT PO)        hydrochlorothiazide (HYDRODIURIL) 12.5 MG tablet Take 1 tablet (12.5 mg) by mouth daily 90 tablet 3     levothyroxine  (SYNTHROID/LEVOTHROID) 112 MCG tablet Take 1 tablet (112 mcg) by mouth daily 90 tablet 3     losartan (COZAAR) 100 MG tablet Take 1 tablet (100 mg) by mouth daily 90 tablet 3     MULTI VITAMIN MENS OR 1 TABLET DAILY       rosuvastatin (CRESTOR) 10 MG tablet Take 1 tablet (10 mg) by mouth daily 90 tablet 3     sertraline (ZOLOFT) 25 MG tablet Take 25 mg daily for 2 weeks, then 50mg 90 tablet 3     triamcinolone (KENALOG) 0.1 % external cream APPLY  CREAM EXTERNALLY TO AFFECTED AREA THREE TIMES DAILY AS NEEDED SPARINGLY 80 g 1      Donepezil dosage is around 5 mg, started about 1 year ago  Sertraline started 6-8 weeks ago, mood elevated    Allergies   Allergen Reactions     Simvastatin      myalgias     Lisinopril Rash     rash       Family History   Problem Relation Age of Onset     Colon Cancer Mother          age 65, in      Family History Negative Father         mild memory problems,  age 87, in      Family History Negative Brother      Family History Negative Brother      Cancer Brother         hx prostate ca   3 brothers - older is 72. Younger brothers are 65, 68. No memory problems that they are aware of, but they are not in contact often  One daughter, in good health.    Social History     Socioeconomic History     Marital status:      Spouse name: Not on file     Number of children: Not on file     Years of education: Not on file     Highest education level: Not on file   Occupational History     Not on file   Social Needs     Financial resource strain: Not on file     Food insecurity:     Worry: Not on file     Inability: Not on file     Transportation needs:     Medical: Not on file     Non-medical: Not on file   Tobacco Use     Smoking status: Former Smoker     Packs/day: 1.50     Years: 30.00     Pack years: 45.00     Last attempt to quit: 1998     Years since quittin.3     Smokeless tobacco: Never Used   Substance and Sexual Activity     Alcohol use: No     Drug use: No      Sexual activity: Yes     Partners: Female     Comment: vidya with TL   Lifestyle     Physical activity:     Days per week: Not on file     Minutes per session: Not on file     Stress: Not on file   Relationships     Social connections:     Talks on phone: Not on file     Gets together: Not on file     Attends Alevism service: Not on file     Active member of club or organization: Not on file     Attends meetings of clubs or organizations: Not on file     Relationship status: Not on file     Intimate partner violence:     Fear of current or ex partner: Not on file     Emotionally abused: Not on file     Physically abused: Not on file     Forced sexual activity: Not on file   Other Topics Concern     Parent/sibling w/ CABG, MI or angioplasty before 65F 55M? Not Asked   Social History Narrative    High school education.    Worked as  and worked way up to manager    Transitioned to career in real estate x 40 years    [id number] became a transporter for [employer] services such as [employer]    Working part-time as of 2019    Met wife in 1992    Seldom drinks more than 1 drink every now and then    1 daughter in [facility]       General Physical examination:  BP (!) 149/95   Pulse 84   Wt 183 lb 9.6 oz (83.3 kg)   BMI 24.90 kg/m     BP checks at home 120s-130s/70s    General: Mr. Fairbanks is well appearing and is appropriately groomed and dressed. His voice is slightly hoarse s/p vocal cord tumor resection  Chest: Clear to auscultation bilaterally.  Heart: Regular rhythm with no murmurs, rubs, or gallops.  Ext: warm, no edema  Skin: clean, dry, intact    Neurological examination:  Mental status: Mr. Fairbanks  is awake, alert, and appropriate, with fluent speech, no word finding difficulties and no difficulty in answering questions. He has mildly diminished insight into his memory loss. His memory for remote events is intact. His memory for recent events is generally intact, alhtough he  relies on his wife to fill in some details. Attention and concentration are intact. His fund of knowledge is fair. No apraxia is noted.  He initially does not name the month or year, then corrects himself  He is oriented to the 26th, Friday, and [place]  He is unable to say which floor we are on.  Fundoscopic examination: Fundus is flat with sharp disc margins on the left, not examined on the right.    Cranial nerves: Pupils are equal, round and reactive to light bilaterally. Visual fields are full to confrontation with no visual extinction. Extraocular movements are intact. Smooth pursuit is intact. Saccades are normal in initiation, velocity and amplitude both vertically and horizontally. Facial sensation is intact to light touch. Facial strength is full bilaterally. Hearing is intact to finger rub bilaterally. The tongue protrudes in the midline with intact strength. There are no tongue fasciculations noted. The soft palate elevates symmetrically. Sternocledomastoid and trapezius muscle strength is full bilaterally.  Motor examination: Bulk is normal throughout. Axial and upper and lower extremity tone is normal. No pronator drift is noted. Strength is 5/5 and symmetric throughout. No fasciculations are noted. There is no tremor or other involuntary movement.  Sensory examination: Sensation is intact to vibratory sense and proprioception. There is no cortical sensory loss by graphesthesia, or neglect.  Reflexes: Reflexes are symmetric and 2+ throughout. Plantar response is flexor bilaterally.  Coordination: Finger-nose-finger and heel-knee-shin testing is normal with no dysmetria bilaterally.  Rapid finger tapping, opening and closing of fist and pronation-supination are not slowed. Foot tapping is not slowed.  Gait: He has an upright and steady stance with normal stride length and arm swing. Toe, heel, and tandem walking is intact. No Romberg's sign is present. There is no  "retropulsion.    Neuropsychological evaluation in Feb 2019 by Dr. Yakov Larios:  \"Substantial dysfunction in learning and memory, ranging from borderline to very impaired. There are also significant but somewhat more variable weaknesses in executive functioning. Visuoconstructional abilities are below expectations and may partly reflect dysexecutive behavioral interference. Reduced insight into recent functioning.\"    Labs:  Orders Only on 01/23/2019   Component Date Value Ref Range Status     Treponema Antibodies 01/23/2019 Nonreactive  NR^Nonreactive Final     HIV Antigen Antibody Combo 01/23/2019 Nonreactive  NR^Nonreactive     Final     Methylmalonic Acid 01/23/2019 0.13  0.00 - 0.40 umol/L Final     Vitamin B12 01/23/2019 737  193 - 986 pg/mL Final   Orders Only on 01/10/2019   Component Date Value Ref Range Status     PSA Free 01/10/2019 1.5  ng/mL Final     Prostate Specific Antigen 01/10/2019 4.4* 0.0 - 4.0 ng/mL Final     PSAPCT 01/10/2019 34  % Final     Cholesterol 01/10/2019 180  <200 mg/dL Final     Triglycerides 01/10/2019 77  <150 mg/dL Final     HDL Cholesterol 01/10/2019 71  >39 mg/dL Final     LDL Cholesterol Calculated 01/10/2019 94  <100 mg/dL Final     Non HDL Cholesterol 01/10/2019 109  <130 mg/dL Final     TSH 01/10/2019 1.52  0.40 - 4.00 mU/L Final     Sodium 01/10/2019 135  133 - 144 mmol/L Final     Potassium 01/10/2019 4.1  3.4 - 5.3 mmol/L Final     Chloride 01/10/2019 101  94 - 109 mmol/L Final     Carbon Dioxide 01/10/2019 31  20 - 32 mmol/L Final     Anion Gap 01/10/2019 3  3 - 14 mmol/L Final     Glucose 01/10/2019 104* 70 - 99 mg/dL Final     Urea Nitrogen 01/10/2019 16  7 - 30 mg/dL Final     Creatinine 01/10/2019 1.40* 0.66 - 1.25 mg/dL Final     GFR Estimate 01/10/2019 50* >60 mL/min/[1.73_m2] Final     GFR Estimate If Black 01/10/2019 58* >60 mL/min/[1.73_m2] Final     Calcium 01/10/2019 9.5  8.5 - 10.1 mg/dL Final     Hemoglobin 01/10/2019 14.6  13.3 - 17.7 g/dL Final     " Creatinine Urine 01/10/2019 75  mg/dL Final     Albumin Urine mg/L 01/10/2019 <5  mg/L Final     Albumin Urine mg/g Cr 01/10/2019 Unable to calculate due to low value  0 - 17 mg/g Cr Final   Office Visit on 06/05/2018   Component Date Value Ref Range Status     Color Urine 06/05/2018 Yellow   Final     Appearance Urine 06/05/2018 Clear   Final     Glucose Urine 06/05/2018 Negative  NEG^Negative mg/dL Final     Bilirubin Urine 06/05/2018 Negative  NEG^Negative Final     Ketones Urine 06/05/2018 Negative  NEG^Negative mg/dL Final     Specific Gravity Urine 06/05/2018 1.015  1.003 - 1.035 Final     pH Urine 06/05/2018 6.5  5.0 - 7.0 pH Final     Protein Albumin Urine 06/05/2018 Negative  NEG^Negative mg/dL Final     Urobilinogen Urine 06/05/2018 0.2  0.2 - 1.0 EU/dL Final     Nitrite Urine 06/05/2018 Negative  NEG^Negative Final     Blood Urine 06/05/2018 Negative  NEG^Negative Final     Leukocyte Esterase Urine 06/05/2018 Negative  NEG^Negative Final     Source 06/05/2018 Midstream Urine   Final     WBC Urine 06/05/2018 0 - 5  OTO5^0 - 5 /HPF Final     RBC Urine 06/05/2018 O - 2  OTO2^O - 2 /HPF Final     WBC 06/05/2018 7.8  4.0 - 11.0 10e9/L Final     RBC Count 06/05/2018 4.75  4.4 - 5.9 10e12/L Final     Hemoglobin 06/05/2018 15.2  13.3 - 17.7 g/dL Final     Hematocrit 06/05/2018 45.3  40.0 - 53.0 % Final     MCV 06/05/2018 95  78 - 100 fl Final     MCH 06/05/2018 32.0  26.5 - 33.0 pg Final     MCHC 06/05/2018 33.6  31.5 - 36.5 g/dL Final     RDW 06/05/2018 13.3  10.0 - 15.0 % Final     Platelet Count 06/05/2018 253  150 - 450 10e9/L Final     Diff Method 06/05/2018 Automated Method   Final     % Neutrophils 06/05/2018 77.4  % Final     % Lymphocytes 06/05/2018 13.6  % Final     % Monocytes 06/05/2018 6.8  % Final     % Eosinophils 06/05/2018 1.9  % Final     % Basophils 06/05/2018 0.3  % Final     Absolute Neutrophil 06/05/2018 6.0  1.6 - 8.3 10e9/L Final     Absolute Lymphocytes 06/05/2018 1.1  0.8 - 5.3 10e9/L  Final     Absolute Monocytes 06/05/2018 0.5  0.0 - 1.3 10e9/L Final     Absolute Eosinophils 06/05/2018 0.2  0.0 - 0.7 10e9/L Final     Absolute Basophils 06/05/2018 0.0  0.0 - 0.2 10e9/L Final     Sodium 06/05/2018 132* 133 - 144 mmol/L Final     Potassium 06/05/2018 4.9  3.4 - 5.3 mmol/L Final     Chloride 06/05/2018 100  94 - 109 mmol/L Final     Carbon Dioxide 06/05/2018 27  20 - 32 mmol/L Final     Anion Gap 06/05/2018 5  3 - 14 mmol/L Final     Glucose 06/05/2018 110* 70 - 99 mg/dL Final     Urea Nitrogen 06/05/2018 10  7 - 30 mg/dL Final     Creatinine 06/05/2018 1.28* 0.66 - 1.25 mg/dL Final     GFR Estimate 06/05/2018 56* >60 mL/min/1.7m2 Final     GFR Estimate If Black 06/05/2018 67  >60 mL/min/1.7m2 Final     Calcium 06/05/2018 9.4  8.5 - 10.1 mg/dL Final     Bilirubin Total 06/05/2018 1.5* 0.2 - 1.3 mg/dL Final     Albumin 06/05/2018 4.4  3.4 - 5.0 g/dL Final     Protein Total 06/05/2018 8.1  6.8 - 8.8 g/dL Final     Alkaline Phosphatase 06/05/2018 79  40 - 150 U/L Final     ALT 06/05/2018 33  0 - 70 U/L Final     AST 06/05/2018 27  0 - 45 U/L Final     TSH 06/05/2018 2.10  0.40 - 4.00 mU/L Final   Orders Only on 04/17/2018   Component Date Value Ref Range Status     Cholesterol 04/17/2018 212* <200 mg/dL Final     Triglycerides 04/17/2018 61  <150 mg/dL Final     HDL Cholesterol 04/17/2018 74  >39 mg/dL Final     LDL Cholesterol Calculated 04/17/2018 126* <100 mg/dL Final     Non HDL Cholesterol 04/17/2018 138* <130 mg/dL Final     Sodium 04/17/2018 135  133 - 144 mmol/L Final     Potassium 04/17/2018 4.1  3.4 - 5.3 mmol/L Final     Chloride 04/17/2018 97  94 - 109 mmol/L Final     Carbon Dioxide 04/17/2018 29  20 - 32 mmol/L Final     Anion Gap 04/17/2018 9  3 - 14 mmol/L Final     Glucose 04/17/2018 91  70 - 99 mg/dL Final     Urea Nitrogen 04/17/2018 13  7 - 30 mg/dL Final     Creatinine 04/17/2018 1.30* 0.66 - 1.25 mg/dL Final     GFR Estimate 04/17/2018 55* >60 mL/min/1.7m2 Final     GFR Estimate  If Black 04/17/2018 66  >60 mL/min/1.7m2 Final     Calcium 04/17/2018 9.0  8.5 - 10.1 mg/dL Final     Bilirubin Total 04/17/2018 1.0  0.2 - 1.3 mg/dL Final     Albumin 04/17/2018 4.1  3.4 - 5.0 g/dL Final     Protein Total 04/17/2018 7.4  6.8 - 8.8 g/dL Final     Alkaline Phosphatase 04/17/2018 67  40 - 150 U/L Final     ALT 04/17/2018 23  0 - 70 U/L Final     AST 04/17/2018 17  0 - 45 U/L Final     TSH 04/17/2018 5.48* 0.40 - 4.00 mU/L Final     PSA 04/17/2018 4.59* 0 - 4 ug/L Final     T4 Free 04/17/2018 0.87  0.76 - 1.46 ng/dL Final   Orders Only on 01/24/2018   Component Date Value Ref Range Status     TSH 01/24/2018 4.24* 0.40 - 4.00 mU/L Final     Vitamin D Deficiency screening 01/24/2018 38  20 - 75 ug/L Final     Hemoglobin 01/24/2018 14.0  13.3 - 17.7 g/dL Final     Sodium 01/24/2018 135  133 - 144 mmol/L Final     Potassium 01/24/2018 3.5  3.4 - 5.3 mmol/L Final     Chloride 01/24/2018 99  94 - 109 mmol/L Final     Carbon Dioxide 01/24/2018 29  20 - 32 mmol/L Final     Anion Gap 01/24/2018 7  3 - 14 mmol/L Final     Glucose 01/24/2018 94  70 - 99 mg/dL Final     Urea Nitrogen 01/24/2018 12  7 - 30 mg/dL Final     Creatinine 01/24/2018 1.34* 0.66 - 1.25 mg/dL Final     GFR Estimate 01/24/2018 53* >60 mL/min/1.7m2 Final     GFR Estimate If Black 01/24/2018 64  >60 mL/min/1.7m2 Final     Calcium 01/24/2018 8.5  8.5 - 10.1 mg/dL Final     T4 Free 01/24/2018 0.94  0.76 - 1.46 ng/dL Final   Orders Only on 11/29/2017   Component Date Value Ref Range Status     Cholesterol 11/29/2017 162  <200 mg/dL Final     Triglycerides 11/29/2017 102  <150 mg/dL Final     HDL Cholesterol 11/29/2017 79  >39 mg/dL Final     LDL Cholesterol Calculated 11/29/2017 63  <100 mg/dL Final     Non HDL Cholesterol 11/29/2017 83  <130 mg/dL Final     Bilirubin Direct 11/29/2017 0.2  0.0 - 0.2 mg/dL Final     Bilirubin Total 11/29/2017 0.8  0.2 - 1.3 mg/dL Final     Albumin 11/29/2017 4.0  3.4 - 5.0 g/dL Final     Protein Total 11/29/2017  7.6  6.8 - 8.8 g/dL Final     Alkaline Phosphatase 11/29/2017 75  40 - 150 U/L Final     ALT 11/29/2017 31  0 - 70 U/L Final     AST 11/29/2017 22  0 - 45 U/L Final     CK Total 11/29/2017 175  30 - 300 U/L Final   Orders Only on 10/02/2017   Component Date Value Ref Range Status     Cholesterol 10/02/2017 220* <200 mg/dL Final     Triglycerides 10/02/2017 74  <150 mg/dL Final     HDL Cholesterol 10/02/2017 74  >39 mg/dL Final     LDL Cholesterol Calculated 10/02/2017 131* <100 mg/dL Final     Non HDL Cholesterol 10/02/2017 146* <130 mg/dL Final     Vitamin B12 10/02/2017 504  193 - 986 pg/mL Final   Orders Only on 08/22/2017   Component Date Value Ref Range Status     Cholesterol 08/22/2017 211* <200 mg/dL Final     Triglycerides 08/22/2017 125  <150 mg/dL Final     HDL Cholesterol 08/22/2017 73  >39 mg/dL Final     LDL Cholesterol Calculated 08/22/2017 113* <100 mg/dL Final     Non HDL Cholesterol 08/22/2017 138* <130 mg/dL Final     Creatinine Urine 08/22/2017 106  mg/dL Final     Albumin Urine mg/L 08/22/2017 6  mg/L Final     Albumin Urine mg/g Cr 08/22/2017 5.20  0 - 17 mg/g Cr Final   Orders Only on 07/11/2017   Component Date Value Ref Range Status     Cholesterol 07/11/2017 217* <200 mg/dL Final     Triglycerides 07/11/2017 163* <150 mg/dL Final     HDL Cholesterol 07/11/2017 64  >39 mg/dL Final     LDL Cholesterol Calculated 07/11/2017 120* <100 mg/dL Final     Non HDL Cholesterol 07/11/2017 153* <130 mg/dL Final     Bilirubin Direct 07/11/2017 0.2  0.0 - 0.2 mg/dL Final     Bilirubin Total 07/11/2017 1.3  0.2 - 1.3 mg/dL Final     Albumin 07/11/2017 4.1  3.4 - 5.0 g/dL Final     Protein Total 07/11/2017 7.2  6.8 - 8.8 g/dL Final     Alkaline Phosphatase 07/11/2017 69  40 - 150 U/L Final     ALT 07/11/2017 27  0 - 70 U/L Final     AST 07/11/2017 18  0 - 45 U/L Final     CK Total 07/11/2017 231  30 - 300 U/L Final     Sodium 07/11/2017 133  133 - 144 mmol/L Final     Potassium 07/11/2017 3.6  3.4 - 5.3  mmol/L Final     Chloride 07/11/2017 94  94 - 109 mmol/L Final     Carbon Dioxide 07/11/2017 31  20 - 32 mmol/L Final     Anion Gap 07/11/2017 8  3 - 14 mmol/L Final     Glucose 07/11/2017 110* 70 - 99 mg/dL Final     Urea Nitrogen 07/11/2017 17  7 - 30 mg/dL Final     Creatinine 07/11/2017 1.39* 0.66 - 1.25 mg/dL Final     GFR Estimate 07/11/2017 51* >60 mL/min/1.7m2 Final     GFR Estimate If Black 07/11/2017 61  >60 mL/min/1.7m2 Final     Calcium 07/11/2017 8.9  8.5 - 10.1 mg/dL Final   Office Visit on 05/16/2017   Component Date Value Ref Range Status     Sodium 05/16/2017 133  133 - 144 mmol/L Final     Potassium 05/16/2017 3.9  3.4 - 5.3 mmol/L Final     Chloride 05/16/2017 95  94 - 109 mmol/L Final     Carbon Dioxide 05/16/2017 30  20 - 32 mmol/L Final     Anion Gap 05/16/2017 8  3 - 14 mmol/L Final     Glucose 05/16/2017 111* 70 - 99 mg/dL Final     Urea Nitrogen 05/16/2017 15  7 - 30 mg/dL Final     Creatinine 05/16/2017 1.34* 0.66 - 1.25 mg/dL Final     GFR Estimate 05/16/2017 53* >60 mL/min/1.7m2 Final     GFR Estimate If Black 05/16/2017 64  >60 mL/min/1.7m2 Final     Calcium 05/16/2017 8.9  8.5 - 10.1 mg/dL Final   There may be more visits with results that are not included.     Imaging:  Brain MRI 3/14/19 Thin rim of periventricular white matter disease, more so on the right, mild hippocampal atrophy, all findings similar to that of brain MRI 12/14/17    Impression:  Mr. Fairbanks is a 69 year old right-handed male who presents with two years of progressive memory loss. His exam is notable for mild short-term memory loss and is otherwise normal. Neuropsychological testing shows impairments in learning and memory and milder impairments in executive function and visuospatial abilities. Brain MRI shows mild periventricular white matter disease and hippocampal atrophy. He meets criteria for mild cognitive impairment with intermediate likelihood of Alzheimer's pathophysiology.    Recommendations:    Switch to  morning dosing of donepezil 5 mg, we may go up to 10 mg a day in the near future    Basic labs for coags and platelets before lumbar puncture    Lumbar puncture to be scheduled at [facility] to assess for biomarkers of Alzheimer's disease, and inflammatory and auto-immune (para-neoplastic or cancer related) conditions.     Mr. Fairbanks expressed interest in being contacted in the future about clinical studies for which he may be eligible     Limit alcohol intake to 1 drink or less a day, and not immediately before bedtime.     Providing consent form to be contacted by  the [employer]    Encourage cardiovascular exercise and Mediterranean diet.    Repeat neuropsychological testing as needed.     Follow-up: Return in about 4 months.    I spent a total of 60 minutes face-to-face with the patient, and over half of that time was spent counseling regarding the symptoms, diagnosis, medication risks, lifestyle modification, therapeutic options, and prognosis.    Again, thank you for allowing me to participate in the care of your patient.      Sincerely,    Josep Still MD

## 2019-04-26 NOTE — PROGRESS NOTES
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 He is accompanied to today's visit by his wife Millicent, whom he has been with for 28 years, who assists in providing the history.      At baseline, Mr. Fairbanks is described as having an excellent memory for his entire life. He has a high IQ but had difficulty in school because of disruptive behavior which he attributes to ADHD. 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 Currently he works around 10-25 hours a week in this capacity.     Millicent reports they first began noticing a decline in memory approximately 2 years ago when he disassembled a three-way light switch and could not figure out how to put it back together. This was highly irregular for Mr. Fairbanks who had always been very good at these kinds of tasks. Also, during this time, Mr. Fairbanks started having difficulty remembering upcoming social commitments. For example, Millicent would tell him that they were going to have dinner with friends the next night, and within 30 minutes, he would have no recollection of that plan. Millicent also reports an episode where Mr. Fairbanks returned from lunch and could not remember the name of restaurant that he just ate at. Mr. Fairbanks has problems following and keeping track of conversations. He misplaces his clothes, which he never had a problem with before. Recently, he attempted to cook corned beef and cabbage but forgot to include cabbage. Ms. Fairbanks thinks his memory has always been this way and when asked about the problems that his wife pointed out, he says that she is just  noticing it now. Millicent reports that the only personality change she sees is that Mr. Fairbanks is less humorous than before.     Mr. Fairbanks s cognitive issues have remained stable at this level of dysfunction over these past two years. Millicent confirms this. Things have not been getting worse. Actually, Sandra has seen some improvements in memory which could have to do with Mr. Fairbanks cutting back on alcohol consumption over the past 6-8 months going from 2-3 drinks a day to 1-2 drinks a week. He was started on Donepezil about a year ago by his PCP Dr. Paige. He takes it at night and has been tolerating the lowest dose well but does report some vivid dreams.      Review of outside records:  I personally reviewed outside records which in summary reveal:   -8/2017 Scored 10/28 on 6CIT test during visit with PCP [facility] who noted inconsistent results   6CIT done (see nursing note) with elevated score but inconsistent since pt scoring 2/3 recall when asked by me to remember 3 objects but scored 0/4 when remembering address part of the 6 CIT   -12/2017 Score 2/28 on 6CIT during visit with PCP [facility]. Instructed patient to decrease alcohol consumption, if does not see improvement in memory, considering initiation of Aricept trial  12/2017 MRI w/o contrast reveals minimal atrophy due to age, minimal small vessel ischemic change in periventricular region  -2/2018 Neurology exam revealed MMSE score of 25/30; ordered neuropsych eval  -4/2018 Minicog performed by PCP [facility] was normal   -2/2019 Neuropsych evaluation with Dr. Larios reveals substantial dysfunction in learning and memory, and milder dysfunction in other cognitive domains (see below for details)  - 2/2019 Neurology testing for causes of memory loss reveal negative serology, B12 level was well within normal limits and normal MMA.  - 3/2019 OT evaluation - Mr. Fairbanks scored average of 5.5 out of 5.6. Did very well in all tests. Recommendations were:  Supervision in living setting:  Alone/independent in managing personal affairs.  Patient will benefit from further education on factors that affect memory, ways to stay cognitively fit and strategies to improve memory.  - 3/2019 MRI w/o contrast shows no significant changes when compared to MRI performed 2 years prior. Mild atrophy on both scans.  - 4/2019 Neurology evaluation reveals Mr. Fairbanks has responded positively to sertraline, becoming more focused and overall much better. This was consistent with a possible diagnosis of ADHD.    Review of cognitive, visuospatial, motor, sensory and behavioral systems:      Memory: Mr. Fairbanks has no problem with remembering remote events. He does have problems with recent memory. He required his wife to remind him that her birthday was this month before he correctly said that the month is April.     Executive functions: Mr. Fairbanks has does not have difficulty performing multi-step tasks.      Language: Mr. Fairbanks does not have word-finding difficulties. No difficulty which speech or with reading. He is communicating well.     Visuospatial skills: No issues with recognizing people's faces. No problems with driving. He has no trouble seeing things properly or difficulty recognizing objects. No problem with getting lost. No problem reaching for things, not bumping into walls. Seeing things properly.     Motor: Mr. Fairbanks has no difficulty with walking. No balance issues. He stays active by doing yardwork and going to the gym which he goes to 2-3 times a week and lifts weights. He has no tremor, involuntary movements, or muscle twitching. He has no difficulty swallowing. No reported falls.     Sensory: Mr. Fairbanks has no sensory problems such as numbness, tingling or paresthesias and pain. He denies loss of vision, blurry vision or double vision. He does have absent patellar reflex on the right side.     Behavior: Mr. Fairbanks is less humorous than before. There are no  symptoms of agitation, aggression, or violent outbursts. There are no delusions, hallucinations. No gazing spells.     Sleep: Mr. Fairbanks sleeps well at night getting about 9 hours of good quality sleep a night. He sleeps lightly but there is no witnessed apnea. He does not take naps or feel sleepy during the day time. He has no abnormal or violent behavior during sleep and does not act out his dreams.      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 There are no concerns with his driving. He does his finances, pays his own bills, and taxes without problem. He enjoys cooking and eats balanced meals with no dietary restrictions. He works out 2-3 times a week using weight machines.    Medical review of systems: The comprehensive review of systems is otherwise reviewed and negative.     Patient Active Problem List   Diagnosis     Testicular hypofunction     Impotence of organic origin     Essential hypertension     ESOPHAGEAL REFLUX     CKD (chronic kidney disease) stage 3, GFR 30-59 ml/min (H)     HYPERLIPIDEMIA LDL GOAL <100     Hypothyroidism     Advanced directives, counseling/discussion     History of lung cancer     History of laryngeal cancer     Talipes cavus     Memory loss         Past Medical History:   Diagnosis Date     Acute gastritis with hemorrhage 11/02     Basal cell carcinoma, leg      left pretibial area     CKD (chronic kidney disease) stage 3, GFR 30-59 ml/min (H)     creat baseline approx 1.4     Hearing loss      Helicobacter pylori (H. 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     Mild major depression (H)      Other and unspecified malignant neoplasm of skin of other and  unspecified parts of face      Basal Cell CA nasal area 4/04, chest 3/05, right chest 10/09     Other testicular hypofunction      Squamous cell carcinoma  Jan 2012     RLE s/p excision     Tobacco use disorder     quit 1998     Unspecified cataract     left     Unspecified essential hypertension      Unspecified retinal detachment     Bilateral   Cisplatin induced kidney disease   Evaluated yearly with endoscope for vocal cord cancer in remission  He had normal childhood development and reports no major head injuries.     Past Surgical History:   Procedure Laterality Date     C NONSPECIFIC PROCEDURE  5/01, 9/01    B retinal surg.     C NONSPECIFIC PROCEDURE  12/01    L cataract     C NONSPECIFIC PROCEDURE  1983    right ankle fx repair     C NONSPECIFIC PROCEDURE  3/05    Right hemilaryngectomy (laser) for Squamous Cell CA T1N0     C NONSPECIFIC PROCEDURE  3/05, 10/09    Moh's procedure for Basal Cell CA chest     C NONSPECIFIC PROCEDURE  4/04    Moh's procedure for Basal Cell CA ear lobe     C NONSPECIFIC PROCEDURE  3/05, 12/05    right vocal cord squamous cell CA excision     C NONSPECIFIC PROCEDURE  12/07    Phacoemulsification of the lens with posterior chamber lens implant, right eye.      C NONSPECIFIC PROCEDURE  10/09     Moh's procedufe for BCC left pretibial area     C NONSPECIFIC PROCEDURE  12/09    Left thoracoscopic wedge resection, Open completion left upper lobe segmentectomy        Current Outpatient Medications   Medication Sig Dispense Refill     Donepezil HCl (ARICEPT PO)        hydrochlorothiazide (HYDRODIURIL) 12.5 MG tablet Take 1 tablet (12.5 mg) by mouth daily 90 tablet 3     levothyroxine (SYNTHROID/LEVOTHROID) 112 MCG tablet Take 1 tablet (112 mcg) by mouth daily 90 tablet 3     losartan (COZAAR) 100 MG tablet Take 1 tablet (100 mg) by mouth daily 90 tablet 3     MULTI VITAMIN MENS OR 1 TABLET DAILY       rosuvastatin (CRESTOR) 10 MG tablet Take 1 tablet (10 mg) by mouth daily 90 tablet 3      sertraline (ZOLOFT) 25 MG tablet Take 25 mg daily for 2 weeks, then 50mg 90 tablet 3     triamcinolone (KENALOG) 0.1 % external cream APPLY  CREAM EXTERNALLY TO AFFECTED AREA THREE TIMES DAILY AS NEEDED SPARINGLY 80 g 1      Donepezil dosage is around 5 mg, started about 1 year ago  Sertraline started 6-8 weeks ago, mood elevated    Allergies   Allergen Reactions     Simvastatin      myalgias     Lisinopril Rash     rash       Family History   Problem Relation Age of Onset     Colon Cancer Mother          age 65, in      Family History Negative Father         mild memory problems,  age 87, in      Family History Negative Brother      Family History Negative Brother      Cancer Brother         hx prostate ca   3 brothers - older is 72. Younger brothers are 65, 68. No memory problems that they are aware of, but they are not in contact often  One daughter, in good health.    Social History     Socioeconomic History     Marital status:      Spouse name: Not on file     Number of children: Not on file     Years of education: Not on file     Highest education level: Not on file   Occupational History     Not on file   Social Needs     Financial resource strain: Not on file     Food insecurity:     Worry: Not on file     Inability: Not on file     Transportation needs:     Medical: Not on file     Non-medical: Not on file   Tobacco Use     Smoking status: Former Smoker     Packs/day: 1.50     Years: 30.00     Pack years: 45.00     Last attempt to quit: 1998     Years since quittin.3     Smokeless tobacco: Never Used   Substance and Sexual Activity     Alcohol use: No     Drug use: No     Sexual activity: Yes     Partners: Female     Comment: vidya with TL   Lifestyle     Physical activity:     Days per week: Not on file     Minutes per session: Not on file     Stress: Not on file   Relationships     Social connections:     Talks on phone: Not on file     Gets together: Not on file      Attends [employer] service: Not on file     Active member of club or organization: Not on file     Attends meetings of clubs or organizations: Not on file     Relationship status: Not on file     Intimate partner violence:     Fear of current or ex partner: Not on file     Emotionally abused: Not on file     Physically abused: Not on file     Forced sexual activity: Not on file   Other Topics Concern     Parent/sibling w/ CABG, MI or angioplasty before 65F 55M? Not Asked   Social History Narrative    High school education.    Worked as  and worked way up to manager    Transitioned to career in real estate x 40 years    [id number] became a transporter for [employer] services such as [employer]    Working part-time as of 2019    Met wife in 1992    Seldom drinks more than 1 drink every now and then    1 daughter in [facility]       General Physical examination:  BP (!) 149/95   Pulse 84   Wt 183 lb 9.6 oz (83.3 kg)   BMI 24.90 kg/m     BP checks at home 120s-130s/70s    General: Mr. Fairbanks is well appearing and is appropriately groomed and dressed. His voice is slightly hoarse s/p vocal cord tumor resection  Chest: Clear to auscultation bilaterally.  Heart: Regular rhythm with no murmurs, rubs, or gallops.  Ext: warm, no edema  Skin: clean, dry, intact    Neurological examination:  Mental status: Mr. Fairbanks  is awake, alert, and appropriate, with fluent speech, no word finding difficulties and no difficulty in answering questions. He has mildly diminished insight into his memory loss. His memory for remote events is intact. His memory for recent events is generally intact, alhtough he relies on his wife to fill in some details. Attention and concentration are intact. His fund of knowledge is fair. No apraxia is noted.  He initially does not name the month or year, then corrects himself  He is oriented to the 26th, Friday, and [place]  He is unable to say which floor we are  "on.  Fundoscopic examination: Fundus is flat with sharp disc margins on the left, not examined on the right.    Cranial nerves: Pupils are equal, round and reactive to light bilaterally. Visual fields are full to confrontation with no visual extinction. Extraocular movements are intact. Smooth pursuit is intact. Saccades are normal in initiation, velocity and amplitude both vertically and horizontally. Facial sensation is intact to light touch. Facial strength is full bilaterally. Hearing is intact to finger rub bilaterally. The tongue protrudes in the midline with intact strength. There are no tongue fasciculations noted. The soft palate elevates symmetrically. Sternocledomastoid and trapezius muscle strength is full bilaterally.  Motor examination: Bulk is normal throughout. Axial and upper and lower extremity tone is normal. No pronator drift is noted. Strength is 5/5 and symmetric throughout. No fasciculations are noted. There is no tremor or other involuntary movement.  Sensory examination: Sensation is intact to vibratory sense and proprioception. There is no cortical sensory loss by graphesthesia, or neglect.  Reflexes: Reflexes are symmetric and 2+ throughout. Plantar response is flexor bilaterally.  Coordination: Finger-nose-finger and heel-knee-shin testing is normal with no dysmetria bilaterally.  Rapid finger tapping, opening and closing of fist and pronation-supination are not slowed. Foot tapping is not slowed.  Gait: He has an upright and steady stance with normal stride length and arm swing. Toe, heel, and tandem walking is intact. No Romberg's sign is present. There is no retropulsion.    Neuropsychological evaluation in Feb 2019 by Dr. Yakov Larios:  \"Substantial dysfunction in learning and memory, ranging from borderline to very impaired. There are also significant but somewhat more variable weaknesses in executive functioning. Visuoconstructional abilities are below expectations and may partly " "reflect dysexecutive behavioral interference. Reduced insight into recent functioning.\"    Labs:  Orders Only on 01/23/2019   Component Date Value Ref Range Status     Treponema Antibodies 01/23/2019 Nonreactive  NR^Nonreactive Final     HIV Antigen Antibody Combo 01/23/2019 Nonreactive  NR^Nonreactive     Final     Methylmalonic Acid 01/23/2019 0.13  0.00 - 0.40 umol/L Final     Vitamin B12 01/23/2019 737  193 - 986 pg/mL Final   Orders Only on 01/10/2019   Component Date Value Ref Range Status     PSA Free 01/10/2019 1.5  ng/mL Final     Prostate Specific Antigen 01/10/2019 4.4* 0.0 - 4.0 ng/mL Final     PSAPCT 01/10/2019 34  % Final     Cholesterol 01/10/2019 180  <200 mg/dL Final     Triglycerides 01/10/2019 77  <150 mg/dL Final     HDL Cholesterol 01/10/2019 71  >39 mg/dL Final     LDL Cholesterol Calculated 01/10/2019 94  <100 mg/dL Final     Non HDL Cholesterol 01/10/2019 109  <130 mg/dL Final     TSH 01/10/2019 1.52  0.40 - 4.00 mU/L Final     Sodium 01/10/2019 135  133 - 144 mmol/L Final     Potassium 01/10/2019 4.1  3.4 - 5.3 mmol/L Final     Chloride 01/10/2019 101  94 - 109 mmol/L Final     Carbon Dioxide 01/10/2019 31  20 - 32 mmol/L Final     Anion Gap 01/10/2019 3  3 - 14 mmol/L Final     Glucose 01/10/2019 104* 70 - 99 mg/dL Final     Urea Nitrogen 01/10/2019 16  7 - 30 mg/dL Final     Creatinine 01/10/2019 1.40* 0.66 - 1.25 mg/dL Final     GFR Estimate 01/10/2019 50* >60 mL/min/[1.73_m2] Final     GFR Estimate If Black 01/10/2019 58* >60 mL/min/[1.73_m2] Final     Calcium 01/10/2019 9.5  8.5 - 10.1 mg/dL Final     Hemoglobin 01/10/2019 14.6  13.3 - 17.7 g/dL Final     Creatinine Urine 01/10/2019 75  mg/dL Final     Albumin Urine mg/L 01/10/2019 <5  mg/L Final     Albumin Urine mg/g Cr 01/10/2019 Unable to calculate due to low value  0 - 17 mg/g Cr Final   Office Visit on 06/05/2018   Component Date Value Ref Range Status     Color Urine 06/05/2018 Yellow   Final     Appearance Urine 06/05/2018 Clear "   Final     Glucose Urine 06/05/2018 Negative  NEG^Negative mg/dL Final     Bilirubin Urine 06/05/2018 Negative  NEG^Negative Final     Ketones Urine 06/05/2018 Negative  NEG^Negative mg/dL Final     Specific Gravity Urine 06/05/2018 1.015  1.003 - 1.035 Final     pH Urine 06/05/2018 6.5  5.0 - 7.0 pH Final     Protein Albumin Urine 06/05/2018 Negative  NEG^Negative mg/dL Final     Urobilinogen Urine 06/05/2018 0.2  0.2 - 1.0 EU/dL Final     Nitrite Urine 06/05/2018 Negative  NEG^Negative Final     Blood Urine 06/05/2018 Negative  NEG^Negative Final     Leukocyte Esterase Urine 06/05/2018 Negative  NEG^Negative Final     Source 06/05/2018 Midstream Urine   Final     WBC Urine 06/05/2018 0 - 5  OTO5^0 - 5 /HPF Final     RBC Urine 06/05/2018 O - 2  OTO2^O - 2 /HPF Final     WBC 06/05/2018 7.8  4.0 - 11.0 10e9/L Final     RBC Count 06/05/2018 4.75  4.4 - 5.9 10e12/L Final     Hemoglobin 06/05/2018 15.2  13.3 - 17.7 g/dL Final     Hematocrit 06/05/2018 45.3  40.0 - 53.0 % Final     MCV 06/05/2018 95  78 - 100 fl Final     MCH 06/05/2018 32.0  26.5 - 33.0 pg Final     MCHC 06/05/2018 33.6  31.5 - 36.5 g/dL Final     RDW 06/05/2018 13.3  10.0 - 15.0 % Final     Platelet Count 06/05/2018 253  150 - 450 10e9/L Final     Diff Method 06/05/2018 Automated Method   Final     % Neutrophils 06/05/2018 77.4  % Final     % Lymphocytes 06/05/2018 13.6  % Final     % Monocytes 06/05/2018 6.8  % Final     % Eosinophils 06/05/2018 1.9  % Final     % Basophils 06/05/2018 0.3  % Final     Absolute Neutrophil 06/05/2018 6.0  1.6 - 8.3 10e9/L Final     Absolute Lymphocytes 06/05/2018 1.1  0.8 - 5.3 10e9/L Final     Absolute Monocytes 06/05/2018 0.5  0.0 - 1.3 10e9/L Final     Absolute Eosinophils 06/05/2018 0.2  0.0 - 0.7 10e9/L Final     Absolute Basophils 06/05/2018 0.0  0.0 - 0.2 10e9/L Final     Sodium 06/05/2018 132* 133 - 144 mmol/L Final     Potassium 06/05/2018 4.9  3.4 - 5.3 mmol/L Final     Chloride 06/05/2018 100  94 - 109  mmol/L Final     Carbon Dioxide 06/05/2018 27  20 - 32 mmol/L Final     Anion Gap 06/05/2018 5  3 - 14 mmol/L Final     Glucose 06/05/2018 110* 70 - 99 mg/dL Final     Urea Nitrogen 06/05/2018 10  7 - 30 mg/dL Final     Creatinine 06/05/2018 1.28* 0.66 - 1.25 mg/dL Final     GFR Estimate 06/05/2018 56* >60 mL/min/1.7m2 Final     GFR Estimate If Black 06/05/2018 67  >60 mL/min/1.7m2 Final     Calcium 06/05/2018 9.4  8.5 - 10.1 mg/dL Final     Bilirubin Total 06/05/2018 1.5* 0.2 - 1.3 mg/dL Final     Albumin 06/05/2018 4.4  3.4 - 5.0 g/dL Final     Protein Total 06/05/2018 8.1  6.8 - 8.8 g/dL Final     Alkaline Phosphatase 06/05/2018 79  40 - 150 U/L Final     ALT 06/05/2018 33  0 - 70 U/L Final     AST 06/05/2018 27  0 - 45 U/L Final     TSH 06/05/2018 2.10  0.40 - 4.00 mU/L Final   Orders Only on 04/17/2018   Component Date Value Ref Range Status     Cholesterol 04/17/2018 212* <200 mg/dL Final     Triglycerides 04/17/2018 61  <150 mg/dL Final     HDL Cholesterol 04/17/2018 74  >39 mg/dL Final     LDL Cholesterol Calculated 04/17/2018 126* <100 mg/dL Final     Non HDL Cholesterol 04/17/2018 138* <130 mg/dL Final     Sodium 04/17/2018 135  133 - 144 mmol/L Final     Potassium 04/17/2018 4.1  3.4 - 5.3 mmol/L Final     Chloride 04/17/2018 97  94 - 109 mmol/L Final     Carbon Dioxide 04/17/2018 29  20 - 32 mmol/L Final     Anion Gap 04/17/2018 9  3 - 14 mmol/L Final     Glucose 04/17/2018 91  70 - 99 mg/dL Final     Urea Nitrogen 04/17/2018 13  7 - 30 mg/dL Final     Creatinine 04/17/2018 1.30* 0.66 - 1.25 mg/dL Final     GFR Estimate 04/17/2018 55* >60 mL/min/1.7m2 Final     GFR Estimate If Black 04/17/2018 66  >60 mL/min/1.7m2 Final     Calcium 04/17/2018 9.0  8.5 - 10.1 mg/dL Final     Bilirubin Total 04/17/2018 1.0  0.2 - 1.3 mg/dL Final     Albumin 04/17/2018 4.1  3.4 - 5.0 g/dL Final     Protein Total 04/17/2018 7.4  6.8 - 8.8 g/dL Final     Alkaline Phosphatase 04/17/2018 67  40 - 150 U/L Final     ALT  04/17/2018 23  0 - 70 U/L Final     AST 04/17/2018 17  0 - 45 U/L Final     TSH 04/17/2018 5.48* 0.40 - 4.00 mU/L Final     PSA 04/17/2018 4.59* 0 - 4 ug/L Final     T4 Free 04/17/2018 0.87  0.76 - 1.46 ng/dL Final   Orders Only on 01/24/2018   Component Date Value Ref Range Status     TSH 01/24/2018 4.24* 0.40 - 4.00 mU/L Final     Vitamin D Deficiency screening 01/24/2018 38  20 - 75 ug/L Final     Hemoglobin 01/24/2018 14.0  13.3 - 17.7 g/dL Final     Sodium 01/24/2018 135  133 - 144 mmol/L Final     Potassium 01/24/2018 3.5  3.4 - 5.3 mmol/L Final     Chloride 01/24/2018 99  94 - 109 mmol/L Final     Carbon Dioxide 01/24/2018 29  20 - 32 mmol/L Final     Anion Gap 01/24/2018 7  3 - 14 mmol/L Final     Glucose 01/24/2018 94  70 - 99 mg/dL Final     Urea Nitrogen 01/24/2018 12  7 - 30 mg/dL Final     Creatinine 01/24/2018 1.34* 0.66 - 1.25 mg/dL Final     GFR Estimate 01/24/2018 53* >60 mL/min/1.7m2 Final     GFR Estimate If Black 01/24/2018 64  >60 mL/min/1.7m2 Final     Calcium 01/24/2018 8.5  8.5 - 10.1 mg/dL Final     T4 Free 01/24/2018 0.94  0.76 - 1.46 ng/dL Final   Orders Only on 11/29/2017   Component Date Value Ref Range Status     Cholesterol 11/29/2017 162  <200 mg/dL Final     Triglycerides 11/29/2017 102  <150 mg/dL Final     HDL Cholesterol 11/29/2017 79  >39 mg/dL Final     LDL Cholesterol Calculated 11/29/2017 63  <100 mg/dL Final     Non HDL Cholesterol 11/29/2017 83  <130 mg/dL Final     Bilirubin Direct 11/29/2017 0.2  0.0 - 0.2 mg/dL Final     Bilirubin Total 11/29/2017 0.8  0.2 - 1.3 mg/dL Final     Albumin 11/29/2017 4.0  3.4 - 5.0 g/dL Final     Protein Total 11/29/2017 7.6  6.8 - 8.8 g/dL Final     Alkaline Phosphatase 11/29/2017 75  40 - 150 U/L Final     ALT 11/29/2017 31  0 - 70 U/L Final     AST 11/29/2017 22  0 - 45 U/L Final     CK Total 11/29/2017 175  30 - 300 U/L Final   Orders Only on 10/02/2017   Component Date Value Ref Range Status     Cholesterol 10/02/2017 220* <200 mg/dL  Final     Triglycerides 10/02/2017 74  <150 mg/dL Final     HDL Cholesterol 10/02/2017 74  >39 mg/dL Final     LDL Cholesterol Calculated 10/02/2017 131* <100 mg/dL Final     Non HDL Cholesterol 10/02/2017 146* <130 mg/dL Final     Vitamin B12 10/02/2017 504  193 - 986 pg/mL Final   Orders Only on 08/22/2017   Component Date Value Ref Range Status     Cholesterol 08/22/2017 211* <200 mg/dL Final     Triglycerides 08/22/2017 125  <150 mg/dL Final     HDL Cholesterol 08/22/2017 73  >39 mg/dL Final     LDL Cholesterol Calculated 08/22/2017 113* <100 mg/dL Final     Non HDL Cholesterol 08/22/2017 138* <130 mg/dL Final     Creatinine Urine 08/22/2017 106  mg/dL Final     Albumin Urine mg/L 08/22/2017 6  mg/L Final     Albumin Urine mg/g Cr 08/22/2017 5.20  0 - 17 mg/g Cr Final   Orders Only on 07/11/2017   Component Date Value Ref Range Status     Cholesterol 07/11/2017 217* <200 mg/dL Final     Triglycerides 07/11/2017 163* <150 mg/dL Final     HDL Cholesterol 07/11/2017 64  >39 mg/dL Final     LDL Cholesterol Calculated 07/11/2017 120* <100 mg/dL Final     Non HDL Cholesterol 07/11/2017 153* <130 mg/dL Final     Bilirubin Direct 07/11/2017 0.2  0.0 - 0.2 mg/dL Final     Bilirubin Total 07/11/2017 1.3  0.2 - 1.3 mg/dL Final     Albumin 07/11/2017 4.1  3.4 - 5.0 g/dL Final     Protein Total 07/11/2017 7.2  6.8 - 8.8 g/dL Final     Alkaline Phosphatase 07/11/2017 69  40 - 150 U/L Final     ALT 07/11/2017 27  0 - 70 U/L Final     AST 07/11/2017 18  0 - 45 U/L Final     CK Total 07/11/2017 231  30 - 300 U/L Final     Sodium 07/11/2017 133  133 - 144 mmol/L Final     Potassium 07/11/2017 3.6  3.4 - 5.3 mmol/L Final     Chloride 07/11/2017 94  94 - 109 mmol/L Final     Carbon Dioxide 07/11/2017 31  20 - 32 mmol/L Final     Anion Gap 07/11/2017 8  3 - 14 mmol/L Final     Glucose 07/11/2017 110* 70 - 99 mg/dL Final     Urea Nitrogen 07/11/2017 17  7 - 30 mg/dL Final     Creatinine 07/11/2017 1.39* 0.66 - 1.25 mg/dL Final      GFR Estimate 07/11/2017 51* >60 mL/min/1.7m2 Final     GFR Estimate If Black 07/11/2017 61  >60 mL/min/1.7m2 Final     Calcium 07/11/2017 8.9  8.5 - 10.1 mg/dL Final   Office Visit on 05/16/2017   Component Date Value Ref Range Status     Sodium 05/16/2017 133  133 - 144 mmol/L Final     Potassium 05/16/2017 3.9  3.4 - 5.3 mmol/L Final     Chloride 05/16/2017 95  94 - 109 mmol/L Final     Carbon Dioxide 05/16/2017 30  20 - 32 mmol/L Final     Anion Gap 05/16/2017 8  3 - 14 mmol/L Final     Glucose 05/16/2017 111* 70 - 99 mg/dL Final     Urea Nitrogen 05/16/2017 15  7 - 30 mg/dL Final     Creatinine 05/16/2017 1.34* 0.66 - 1.25 mg/dL Final     GFR Estimate 05/16/2017 53* >60 mL/min/1.7m2 Final     GFR Estimate If Black 05/16/2017 64  >60 mL/min/1.7m2 Final     Calcium 05/16/2017 8.9  8.5 - 10.1 mg/dL Final   There may be more visits with results that are not included.     Imaging:  Brain MRI 3/14/19 Thin rim of periventricular white matter disease, more so on the right, mild hippocampal atrophy, all findings similar to that of brain MRI 12/14/17    Impression:  Mr. Fairbanks is a 69 year old right-handed male who presents with two years of progressive memory loss. His exam is notable for mild short-term memory loss and is otherwise normal. Neuropsychological testing shows impairments in learning and memory and milder impairments in executive function and visuospatial abilities. Brain MRI shows mild periventricular white matter disease and hippocampal atrophy. He meets criteria for mild cognitive impairment with intermediate likelihood of Alzheimer's pathophysiology.    Recommendations:    Switch to morning dosing of donepezil 5 mg, we may go up to 10 mg a day in the near future    Basic labs for coags and platelets before lumbar puncture    [facility] to assess for biomarkers of Alzheimer's disease, and inflammatory and auto-immune (para-neoplastic or cancer related)  conditions.     Mr. Fairbanks expressed interest in being contacted in the future about clinical studies for which he may be eligible     Limit alcohol intake to 1 drink or less a day, and not immediately before bedtime.     Providing consent form to be contacted by  the Alzheimer's Association    Encourage cardiovascular exercise and Mediterranean diet.    Repeat neuropsychological testing as needed.     Follow-up: Return in about 4 months.    I spent a total of 60 minutes face-to-face with the patient, and over half of that time was spent counseling regarding the symptoms, diagnosis, medication risks, lifestyle modification, therapeutic options, and prognosis.

## 2019-04-26 NOTE — PATIENT INSTRUCTIONS
"You saw saw Dr. Josep Still at the [facility] for an evaluation of memory loss. Further workup is needed:     Recommendations:    Switch to morning dosing of donepezil 5 mg, we may go up to 10 mg a day in the near future    Basic labs for clotting factors at any [place] lab, before lumbar puncture    Lumbar puncture to be scheduled at [facility] to assess for biomarkers of Alzheimer's disease, and inflammatory and auto-immune (para-neoplastic or cancer related) conditions.     In the future, if you qualify, wll have our coordinator, Rocio Tierney, contact you about a clinical trial at the [facility]. The clinical trial is titled \"Levetiracetam for Alzheimer's disease-associated Network Hyperexcitability\". More information is available on ClinicalTrials.gov. Type the identifier [id number] in the Other Terms tab.    Limit alcohol intake to 1 drink or less a day, and not before bed, as discussed.     Providing consent form to be contacted by  the Alzheimer's Association    Encourage cardiovascular exercise and Mediterranean diet.    Return to clinic in 4 months.     Research opportunities:  1. [facility] Caregiver Registry (forms available in our waiting room)  2. Alzheimer's Association TrialMatch:  http://www.alz.org/research/clinical_trials/find_clinical_trials_trialmatch.asp  3. ClinicalTrials.gov       "

## 2019-05-03 ENCOUNTER — HEALTH MAINTENANCE LETTER (OUTPATIENT)
Age: 69
End: 2019-05-03

## 2019-05-10 DIAGNOSIS — G31.84 MILD COGNITIVE IMPAIRMENT: ICD-10-CM

## 2019-05-10 LAB
APTT PPP: 38 SEC (ref 22–37)
BASOPHILS # BLD AUTO: 0 10E9/L (ref 0–0.2)
BASOPHILS NFR BLD AUTO: 0.6 %
DIFFERENTIAL METHOD BLD: NORMAL
EOSINOPHIL # BLD AUTO: 0.1 10E9/L (ref 0–0.7)
EOSINOPHIL NFR BLD AUTO: 2.1 %
ERYTHROCYTE [DISTWIDTH] IN BLOOD BY AUTOMATED COUNT: 13 % (ref 10–15)
HCT VFR BLD AUTO: 42.8 % (ref 40–53)
HGB BLD-MCNC: 14.5 G/DL (ref 13.3–17.7)
INR PPP: 0.98 (ref 0.86–1.14)
LYMPHOCYTES # BLD AUTO: 1 10E9/L (ref 0.8–5.3)
LYMPHOCYTES NFR BLD AUTO: 16.3 %
MCH RBC QN AUTO: 31.1 PG (ref 26.5–33)
MCHC RBC AUTO-ENTMCNC: 33.9 G/DL (ref 31.5–36.5)
MCV RBC AUTO: 92 FL (ref 78–100)
MONOCYTES # BLD AUTO: 0.5 10E9/L (ref 0–1.3)
MONOCYTES NFR BLD AUTO: 7.5 %
NEUTROPHILS # BLD AUTO: 4.6 10E9/L (ref 1.6–8.3)
NEUTROPHILS NFR BLD AUTO: 73.5 %
PLATELET # BLD AUTO: 251 10E9/L (ref 150–450)
RBC # BLD AUTO: 4.66 10E12/L (ref 4.4–5.9)
WBC # BLD AUTO: 6.2 10E9/L (ref 4–11)

## 2019-05-10 PROCEDURE — 85730 THROMBOPLASTIN TIME PARTIAL: CPT | Performed by: INTERNAL MEDICINE

## 2019-05-10 PROCEDURE — 85025 COMPLETE CBC W/AUTO DIFF WBC: CPT | Performed by: INTERNAL MEDICINE

## 2019-05-10 PROCEDURE — 85610 PROTHROMBIN TIME: CPT | Performed by: INTERNAL MEDICINE

## 2019-05-10 PROCEDURE — 36415 COLL VENOUS BLD VENIPUNCTURE: CPT | Performed by: INTERNAL MEDICINE

## 2019-05-31 ENCOUNTER — OFFICE VISIT (OUTPATIENT)
Dept: NEUROLOGY | Facility: CLINIC | Age: 69
End: 2019-05-31
Payer: MEDICARE

## 2019-05-31 VITALS
OXYGEN SATURATION: 95 % | WEIGHT: 182 LBS | SYSTOLIC BLOOD PRESSURE: 125 MMHG | DIASTOLIC BLOOD PRESSURE: 79 MMHG | HEIGHT: 72 IN | BODY MASS INDEX: 24.65 KG/M2 | HEART RATE: 80 BPM

## 2019-05-31 DIAGNOSIS — G31.84 MILD COGNITIVE IMPAIRMENT: ICD-10-CM

## 2019-05-31 DIAGNOSIS — R41.3 MEMORY LOSS: Primary | ICD-10-CM

## 2019-05-31 DIAGNOSIS — E78.5 HYPERLIPIDEMIA LDL GOAL <100: ICD-10-CM

## 2019-05-31 LAB
APPEARANCE CSF: CLEAR
COLOR CSF: COLORLESS
GLUCOSE CSF-MCNC: 63 MG/DL (ref 40–70)
PROT CSF-MCNC: 36 MG/DL (ref 15–60)
RBC # CSF MANUAL: 3 /UL (ref 0–2)
TUBE # CSF: 3 #
WBC # CSF MANUAL: 1 /UL (ref 0–5)

## 2019-05-31 ASSESSMENT — MIFFLIN-ST. JEOR: SCORE: 1618.55

## 2019-05-31 ASSESSMENT — PAIN SCALES - GENERAL: PAINLEVEL: NO PAIN (0)

## 2019-05-31 NOTE — LETTER
2019       RE: Agapito Fairbanks  [address]  [facility] [address]     Dear Colleague,    Thank you for referring your patient, Agapito Fairbanks, to the [facility] at [facility]. Please see a copy of my visit note below.    [facility]  [address] [address] [phone]  Dept: [phone]  ______________________________________________________________________________    Patient: Agapito Fairbanks   : 1950   MRN: [id number]   May 31, 2019    INVASIVE PROCEDURE SAFETY CHECKLIST    Date: 2019   Procedure:Botox injections  Patient Name: Agapito Fairbanks  MRN: [id number]  YOB: 1950    Action: Complete sections as appropriate. Any discrepancy results in a HARD COPY until resolved.     PRE PROCEDURE:  Patient ID verified with 2 identifiers (name and  or MRN): Yes  Procedure and site verified with patient/designee (when able): Yes  Accurate consent documentation in medical record: Yes  H&P (or appropriate assessment) documented in medical record: Yes  H&P must be up to 20 days prior to procedure and updates within 24 hours of procedure as applicable: Yes  Relevant diagnostic and radiology test results appropriately labeled and displayed as applicable: Yes  Procedure site(s) marked with provider initials: NA    TIMEOUT:  Time-Out performed immediately prior to starting procedure, including verbal and active participation of all team members addressing the following:Yes  * Correct patient identify  * Confirmed that the correct side and site are marked  * An accurate procedure consent form  * Agreement on the procedure to be done  * Correct patient position  * Relevant images and results are properly labeled and appropriately displayed  * The need to administer antibiotics or fluids for irrigation purposes during the procedure as applicable   * Safety precautions based on patient  history or medication use    DURING PROCEDURE: Verification of correct person, site, and procedures any time the responsibility for care of the patient is transferred to another member of the care team.       PURPOSE OF VISIT: Diagnostic lumbar puncture.   REQUESTING PHYSICIAN:Josep Robbins  INDICATION: Clinical evaluation of memory loss, cognitive changes  CONTRAINDICATIONS: This individual has no sensitivity to topical iodine or allergy to local anesthetic. She is not on any anticoagulation medications. She has no history of bleeding diathesis or coagulopathy.   Results for AGAPITO MANN (MRN [id number]) as of 5/31/2019 09:28   Ref. Range 5/10/2019 09:40   Platelet Count Latest Ref Range: 150 - 450 10e9/L 251     Results for AGAPITO MANN (MRN [id number]) as of 5/31/2019 09:28   Ref. Range 5/10/2019 09:40   INR Latest Ref Range: 0.86 - 1.14  0.98   PTT Latest Ref Range: 22 - 37 sec 38 (H)     Brain MRI on 3/14/2019 reviewed and no apparent contraindications.   CONSENT: This was obtained directly from the patient as documented on the [facility] Affirmation of Consent Surgery or Invasive Procedure.   PATIENT SAFETY: Invasive procedure safety check was successfully completed.   : Candace IBRAHIM CNP  STAFF IN THE ROOM: [place] EMT  DESCRIPTION: Agapito Mann was positioned in the left lateral decubitus. The lumbar skin was prepared with an iodine-containing solution at the mid lumbar level. Two cubic centimeters of 1% Lidocaine (lot [id number], EXP 01 aUG 2021, NDC [id number]) was infiltrated locally at the L4-L5 interspace level. The lumbar subarachnoid space was  entered on the first pass by utilizing a 3-1/2 inch, 22 gauge, pencil-point Charles needle. There was an initial show of clear CSF. The opening pressure was 13 cm CSF. The CSF meniscus exhibited good respiratory and cardiac dynamics.   Ten milliliters of spinal fluid was removed and sent to the lab for tests.    per Dr Still orders: protein, lyme IgG and IgM CSF, glucose, CSF Autoimmune Encephalopathy panel to [place], CSF ADmark Phos-Tau/Total-Tau/A Beta 42 Analysis to [place], cell count with diff,   COMPLICATIONS: None were identified.   HEALTH LITERACY: The concept and management of post lumbar puncture headache were discussed with the patient.  DISPOSITION: Study results will be conveyed to the patient by Dr. Still    This procedure was performed under a hospital privileging agreement with Dr. Kan, Neurologist     Candace IBRAHIM, [facility]urology Clinic

## 2019-05-31 NOTE — PROGRESS NOTES
[facility] NEUROLOG[facility]  [address] [place] [address]  [phone]  Dept: [phone]  ______________________________________________________________________________    Patient: Agapito Fairbanks   : 1950   MRN: [id number]   May 31, 2019    INVASIVE PROCEDURE SAFETY CHECKLIST    Date: 2019   Procedure:Botox injections  Patient Name: Agapito Fairbanks  MRN: [id number]  YOB: 1950    Action: Complete sections as appropriate. Any discrepancy results in a HARD COPY until resolved.     PRE PROCEDURE:  Patient ID verified with 2 identifiers (name and  or MRN): Yes  Procedure and site verified with patient/designee (when able): Yes  Accurate consent documentation in medical record: Yes  H&P (or appropriate assessment) documented in medical record: Yes  H&P must be up to 20 days prior to procedure and updates within 24 hours of procedure as applicable: Yes  Relevant diagnostic and radiology test results appropriately labeled and displayed as applicable: Yes  Procedure site(s) marked with provider initials: NA    TIMEOUT:  Time-Out performed immediately prior to starting procedure, including verbal and active participation of all team members addressing the following:Yes  * Correct patient identify  * Confirmed that the correct side and site are marked  * An accurate procedure consent form  * Agreement on the procedure to be done  * Correct patient position  * Relevant images and results are properly labeled and appropriately displayed  * The need to administer antibiotics or fluids for irrigation purposes during the procedure as applicable   * Safety precautions based on patient history or medication use    DURING PROCEDURE: Verification of correct person, site, and procedures any time the responsibility for care of the patient is transferred to another member of the care team.       PURPOSE OF VISIT: Diagnostic lumbar puncture.   REQUESTING PHYSICIAN:Dr Still,  Josep  INDICATION: Clinical evaluation of memory loss, cognitive changes  CONTRAINDICATIONS: This individual has no sensitivity to topical iodine or allergy to local anesthetic. She is not on any anticoagulation medications. She has no history of bleeding diathesis or coagulopathy.   Results for AGAPITO MANN (MRN [id number]) as of 5/31/2019 09:28   Ref. Range 5/10/2019 09:40   Platelet Count Latest Ref Range: 150 - 450 10e9/L 251     Results for AGAPITO MANN (MRN [id number]) as of 5/31/2019 09:28   Ref. Range 5/10/2019 09:40   INR Latest Ref Range: 0.86 - 1.14  0.98   PTT Latest Ref Range: 22 - 37 sec 38 (H)     Brain MRI on 3/14/2019 reviewed and no apparent contraindications.   CONSENT: This was obtained directly from the patient as documented on the [facility] Affirmation of Consent Surgery or Invasive Procedure.   PATIENT SAFETY: Invasive procedure safety check was successfully completed.   : Candace IBRAHIM CNP  STAFF IN THE ROOM: [place] EMT  DESCRIPTION: Agapito Mann was positioned in the left lateral decubitus. The lumbar skin was prepared with an iodine-containing solution at the mid lumbar level. Two cubic centimeters of 1% Lidocaine (lot [id number], EXP 01 aUG 2021, NDC [id number]) was infiltrated locally at the L4-L5 interspace level. The lumbar subarachnoid space was  entered on the first pass by utilizing a 3-1/2 inch, 22 gauge, pencil-point Charles needle. There was an initial show of clear CSF. The opening pressure was 13 cm CSF. The CSF meniscus exhibited good respiratory and cardiac dynamics.   Ten milliliters of spinal fluid was removed and sent to the lab for tests.   per Dr Still orders: protein, lyme IgG and IgM CSF, glucose, CSF Autoimmune Encephalopathy panel to [place], CSF ADmark Phos-Tau/Total-Tau/A Beta 42 Analysis to [place], cell count with diff,   COMPLICATIONS: None were identified.   HEALTH LITERACY: The concept and management of post  lumbar puncture headache were discussed with the patient.  DISPOSITION: Study results will be conveyed to the patient by Dr. Still    This procedure was performed under a hospital privileging agreement with Dr. Kan, Neurologist     Candace IBRAHIM, [facility]

## 2019-05-31 NOTE — NURSING NOTE
Chief Complaint   Patient presents with     Procedure     [facility] LUMBAR PUNCTURE       Sonia Gallardo, EMT

## 2019-06-02 LAB
B BURGDOR IGG CSF QL IB: NEGATIVE
B BURGDOR IGM CSF QL IB: NEGATIVE

## 2019-06-05 LAB
MISCELLANEOUS TEST: NORMAL
MISCELLANEOUS TEST: NORMAL

## 2019-06-12 LAB
LAB SCANNED RESULT: ABNORMAL
RESULT: NORMAL
SEND OUTS MISC TEST CODE: NORMAL
SEND OUTS MISC TEST SPECIMEN: NORMAL
TEST NAME: NORMAL

## 2019-06-13 LAB — LAB SCANNED RESULT: NORMAL

## 2019-08-04 ENCOUNTER — OFFICE VISIT (OUTPATIENT)
Dept: URGENT CARE | Facility: URGENT CARE | Age: 69
End: 2019-08-04
Payer: MEDICARE

## 2019-08-04 VITALS
SYSTOLIC BLOOD PRESSURE: 137 MMHG | TEMPERATURE: 97.2 F | BODY MASS INDEX: 25.23 KG/M2 | HEART RATE: 66 BPM | DIASTOLIC BLOOD PRESSURE: 90 MMHG | WEIGHT: 186 LBS

## 2019-08-04 DIAGNOSIS — H93.8X1 EAR PRESSURE, RIGHT: ICD-10-CM

## 2019-08-04 DIAGNOSIS — H69.91 DYSFUNCTION OF RIGHT EUSTACHIAN TUBE: Primary | ICD-10-CM

## 2019-08-04 PROCEDURE — 99214 OFFICE O/P EST MOD 30 MIN: CPT | Performed by: PHYSICIAN ASSISTANT

## 2019-08-04 RX ORDER — FLUTICASONE PROPIONATE 50 MCG
1-2 SPRAY, SUSPENSION (ML) NASAL DAILY
Qty: 16 G | Refills: 0 | Status: SHIPPED | OUTPATIENT
Start: 2019-08-04 | End: 2020-01-01

## 2019-08-06 NOTE — PROGRESS NOTES
SUBJECTIVE:   Agapito Fairbanks is a 69 year old male presenting with a chief complaint of right ear pressure and pain.  Onset of symptoms was 3 week(s) ago.  Course of illness is same.    Severity moderate  Current and Associated symptoms: right ear pressure and tenderness  Treatment measures tried include OTC medications.  Predisposing factors include recent illness, hx of hearing loss.    Past Medical History:   Diagnosis Date     Acute gastritis with hemorrhage      Basal cell carcinoma, leg      left pretibial area     CKD (chronic kidney disease) stage 3, GFR 30-59 ml/min (H)     creat baseline approx 1.4     Hearing loss      Helicobacter pylori (H. pylori)      Humerus fracture     left      Hyperlipidemia LDL goal <100 10/31/2010     Hypothyroidism      Impotence of organic origin      Laryngeal carcinoma (H)     Squamous cell carcinoma right vocal cord     Lung cancer (H)     1cm spiculated SKYLA Adenosquamous cell carcinoma     Mild major depression (H)      Other and unspecified malignant neoplasm of skin of other and unspecified parts of face      Basal Cell CA nasal area , chest 3/05, right chest 10/09     Other testicular hypofunction      Squamous cell carcinoma  2012     RLE s/p excision     Tobacco use disorder     quit      Unspecified cataract     left     Unspecified essential hypertension      Unspecified retinal detachment     Bilateral        Family History   Problem Relation Age of Onset     Colon Cancer Mother          age 65, in      Family History Negative Father         mild memory problems,  age 87, in      Family History Negative Brother      Family History Negative Brother      Cancer Brother         hx prostate ca       Allergies   Allergen Reactions     Simvastatin      myalgias     Lisinopril Rash     rash         Social History     Tobacco Use     Smoking status: Former Smoker     Packs/day: 1.50     Years: 30.00     Pack years:  45.00     Last attempt to quit: 1998     Years since quittin.6     Smokeless tobacco: Never Used   Substance Use Topics     Alcohol use: No       ROS:  CONSTITUTIONAL:NEGATIVE for fever, chills, change in weight  INTEGUMENTARY/SKIN: NEGATIVE for worrisome rashes, moles or lesions  EYES: NEGATIVE for vision changes or irritation  ENT/MOUTH: Positive for right ear pain, pressure  RESP:NEGATIVE for significant cough or SOB  CV: NEGATIVE for chest pain, palpitations or peripheral edema  GI: NEGATIVE for nausea, abdominal pain, heartburn, or change in bowel habits  MUSCULOSKELETAL: NEGATIVE for significant arthralgias or myalgia  NEURO: Positive for mild hearing issues of right ear    OBJECTIVE  :BP (!) 137/90   Pulse 66   Temp 97.2  F (36.2  C) (Oral)   Wt 84.4 kg (186 lb)   BMI 25.23 kg/m    GENERAL APPEARANCE: healthy, alert and no distress  EYES: EOMI,  PERRL, conjunctiva clear  HENT: ear canals and TM's normal.  Nose and mouth without ulcers, erythema or lesions  NECK: supple, nontender, no lymphadenopathy  RESP: lungs clear to auscultation - no rales, rhonchi or wheezes  CV: regular rates and rhythm, normal S1 S2, no murmur noted  ABDOMEN:  soft, nontender, no HSM or masses and bowel sounds normal  NEURO: Normal strength and tone, sensory exam grossly normal,  normal speech and mentation  SKIN: no suspicious lesions or rashes    ASSESSMENT/PLAN      ICD-10-CM    1. Dysfunction of right eustachian tube H69.81 fluticasone (FLONASE) 50 MCG/ACT nasal spray   2. Ear pressure, right H93.8X1 fluticasone (FLONASE) 50 MCG/ACT nasal spray       Orders Placed This Encounter     fluticasone (FLONASE) 50 MCG/ACT nasal spray       Follow up with ENT if symptoms seem to persist or worsen  ENT information given to patient  See orders in Epic

## 2019-08-10 ENCOUNTER — HOSPITAL ENCOUNTER (EMERGENCY)
Facility: CLINIC | Age: 69
Discharge: HOME | End: 2019-08-10
Attending: EMERGENCY MEDICINE | Admitting: EMERGENCY MEDICINE
Payer: MEDICARE

## 2019-08-10 VITALS
RESPIRATION RATE: 20 BRPM | TEMPERATURE: 99.2 F | BODY MASS INDEX: 24.52 KG/M2 | OXYGEN SATURATION: 98 % | WEIGHT: 181 LBS | DIASTOLIC BLOOD PRESSURE: 89 MMHG | HEIGHT: 72 IN | HEART RATE: 75 BPM | SYSTOLIC BLOOD PRESSURE: 150 MMHG

## 2019-08-10 DIAGNOSIS — T18.108A IMPACTED FOREIGN BODY IN ESOPHAGUS, INITIAL ENCOUNTER: ICD-10-CM

## 2019-08-10 LAB — INTERPRETATION ECG - MUSE: NORMAL

## 2019-08-10 PROCEDURE — 25500045 ZZH RX 255: Performed by: EMERGENCY MEDICINE

## 2019-08-10 PROCEDURE — 99283 EMERGENCY DEPT VISIT LOW MDM: CPT

## 2019-08-10 RX ADMIN — ANTACID/ANTIFLATULENT 4 G: 380; 550; 10; 10 GRANULE, EFFERVESCENT ORAL at 14:18

## 2019-08-10 ASSESSMENT — MIFFLIN-ST. JEOR: SCORE: 1614.01

## 2019-08-10 ASSESSMENT — ENCOUNTER SYMPTOMS
VOMITING: 1
FEVER: 0
DIARRHEA: 0

## 2019-08-10 NOTE — ED AVS SNAPSHOT
Emergency Department  [address] [facility] [address]  Phone:  [phone]  Fax:  [phone]                                    Agapito Fairbanks   MRN: [id number]    Department:   Emergency Department   Date of Visit:  8/10/2019           After Visit Summary Signature Page    I have received my discharge instructions, and my questions have been answered. I have discussed any challenges I see with this plan with the nurse or doctor.    ..........................................................................................................................................  Patient/Patient Representative Signature      ..........................................................................................................................................  Patient Representative Print Name and Relationship to Patient    ..................................................               ................................................  Date                                   Time    ..........................................................................................................................................  Reviewed by Signature/Title    ...................................................              ..............................................  Date                                               Time          22EPIC Rev 08/18

## 2019-08-10 NOTE — ED PROVIDER NOTES
"  History     Chief Complaint:  Gastroesophageal Reflux     HPI   Agapito Fairbanks is a 69 year old male with a history of lung cancer and laryngeal cancer s/p radiation who presents with gastroesophageal reflux. Patient reports that since last night, every time he eats or drinks, he will cough up \"foam.\" This began after eating steak. He doesn't feel like anything is stuck, but is gagging. He has a history of needing dilation. No fevers or diarrhea.     Allergies:  Simvastatin   Lisinopril      Medications:    Aricept   Hydrodiuril   Levothyroxine   Cozaar   Crestor   Zoloft     Past Medical History:    Acute gastritis with hemorrhage   Basal cell carcinoma, leg   CKD (chronic kidney disease) stage 3  Hearing loss   Helicobacter pylori (H. pylori)   Humerus fracture   Hyperlipidemia    Hypothyroidism   Laryngeal carcinoma    Lung cancer    Mild major depression    Other testicular hypofunction   Squamous cell carcinoma    Cataract   Hypertension   Retinal detachment     Past Surgical History:    Retinal surgery, bilateral  Cataract, left   Right ankle fx repair   Right hemilaryngectomy (laser)   Moh's procedure x3  Right vocal cord squamous cancer excision   Phacoemulsification of the lens with posterior chamber lens implant, right   Left thoracoscopic wedge resection, open completion left upper lobe segmentectomy     Family History:    Mother: colon cancer  Father: memory issues   Brother: prostate cancer     Social History:  The patient was accompanied to the ED by wife.  Smoking Status: Former 1.50 ppd smoker for 30 years, quit 21.6 years ago  Smokeless Tobacco: Never Used  Alcohol Use: Negative   Drug Use: Negative    Marital Status:      Review of Systems   Constitutional: Negative for fever.   Gastrointestinal: Positive for vomiting (gagging). Negative for diarrhea.   All other systems reviewed and are negative.        Physical Exam     Patient Vitals for the past 24 hrs:   BP Temp Temp src Pulse " Heart Rate Resp SpO2 Height Weight   08/10/19 1556 (!) 150/89 -- -- 75 75 20 98 % -- --   08/10/19 1334 (!) 161/92 99.2  F (37.3  C) Oral -- 77 20 95 % 1.829 m (6') 82.1 kg (181 lb)     Physical Exam  Vitals: reviewed by me  General: Pt seen on [facility], [facility], cooperative, and alert to conversation  Eyes: Tracking well, clear conjunctiva BL  ENT: MMM, midline trachea.   Lungs:   No tachypnea, no accessory muscle use. No respiratory distress.   CV: Rate as above, regular rhythm.    Abd: Soft, non tender, no guarding, no rebound. Non distended  MSK: no peripheral edema or joint effusion.  No evidence of trauma  Skin: No rash, normal turgor and temperature  Neuro: Clear speech and no facial droop.  Psych: Not RIS, no e/o AH/VH      Emergency Department Course     Interventions:  1418 EZ gas 4 g Oral     Emergency Department Course:    1353 Nursing notes and vitals reviewed.    1404 I performed an exam of the patient as documented above.     1546 Rechecked and updated.      1600 Findings and plan explained to the Patient. Patient discharged home with instructions regarding supportive care, medications, and reasons to return. The importance of close follow-up was reviewed.     Impression & Plan      Medical Decision Making:  Agapito Fairbanks is a very pleasant 69 year old male who presents to the emergency department with what appears to be an esophageal food impaction. He has a very clear history that would predispose him to this, and that he has had radiation around his esophagus with his throat cancer, and that he also had to have dilation at one point before. Here in the ER, he states he feels 100% better after taking in some EZ gas. He is then able to drink a full bottle of water and eat some crackers, and observe for an hour with no nausea or vomiting or other symptoms. He is asking to go home, I do think he is stable for outpatient GI evaluation for a possible stricture. Will discharge as above. Family  okay with plan.     Diagnosis:    ICD-10-CM    1. Impacted foreign body in esophagus, initial encounter T18.108A     resolved     Disposition:   The patient is discharged to home.    Scribe Disclosure:  I, Rfuina Eastman, am serving as a scribe at 2:03 PM on 8/10/2019 to document services personally performed by Sinan Hollingsworth MD based on my observations and the provider's statements to me.         Destin Hollingsworth MD  08/10/19 2046

## 2019-08-30 ENCOUNTER — OFFICE VISIT (OUTPATIENT)
Dept: NEUROLOGY | Facility: CLINIC | Age: 69
End: 2019-08-30
Payer: COMMERCIAL

## 2019-08-30 VITALS
BODY MASS INDEX: 25.25 KG/M2 | HEART RATE: 70 BPM | SYSTOLIC BLOOD PRESSURE: 156 MMHG | WEIGHT: 186.2 LBS | DIASTOLIC BLOOD PRESSURE: 94 MMHG

## 2019-08-30 DIAGNOSIS — G31.84 MILD COGNITIVE IMPAIRMENT: Primary | ICD-10-CM

## 2019-08-30 ASSESSMENT — PAIN SCALES - GENERAL: PAINLEVEL: NO PAIN (0)

## 2019-08-30 NOTE — PROGRESS NOTES
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 He is accompanied to today's visit by his wife Millicent, who assists in providing the history.      Mr. Fairbanks has a history of lung cancer and laryngeal cancer s/p radiation therapy. [facility] with two years of progressive memory loss. 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     Neither he nor Millicent have noticed any changes in his cognitive abilities. Overall, he is doing much better than he was two years ago, when he was drinking more alcohol including vodka daily. Jeremie does worry that he does not always follow conversations well.     Millicent is not sure if he is taking donepezil or Zoloft. She does not oversee his medications. He had a couple of bouts of diarrhea while driving and worries that this was a side effect of donepezil.     Millicent has noticed that he has a short temper and is more irritable, and does not smile. Because of this, she wonders if he is taking the Zoloft.     He has not developed any motor symptoms, but can be fidgety with his hands.    He is exercising frequently, including weights.     Medical review of systems: He was seen in the ED recently for difficulty swallowing, which self resolved, and is planning to have an endoscopy evaluation. The comprehensive review of systems is otherwise reviewed and negative.     Patient Active Problem List   Diagnosis     Testicular hypofunction     Impotence of organic origin     Essential hypertension     ESOPHAGEAL REFLUX     CKD (chronic kidney disease) stage 3, GFR 30-59 ml/min (H)     HYPERLIPIDEMIA LDL GOAL <100     Hypothyroidism     Advanced directives, counseling/discussion     History of lung cancer     History of laryngeal cancer     Talipes cavus     Memory loss          Past Medical History:   Diagnosis Date     Acute gastritis with hemorrhage 11/02     Basal cell carcinoma, leg      left pretibial area     CKD (chronic kidney disease) stage 3, GFR 30-59 ml/min (H)     creat baseline approx 1.4     Hearing loss      Helicobacter pylori (H. 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     Mild major depression (H)      Other and unspecified malignant neoplasm of skin of other and unspecified parts of face      Basal Cell CA nasal area 4/04, chest 3/05, right chest 10/09     Other testicular hypofunction      Squamous cell carcinoma  Jan 2012     RLE s/p excision     Tobacco use disorder     quit 1998     Unspecified cataract     left     Unspecified essential hypertension      Unspecified retinal detachment     Bilateral   Cisplatin induced kidney disease   Evaluated yearly with endoscope for vocal cord cancer in remission  He had normal childhood development and reports no major head injuries.     Past Surgical History:   Procedure Laterality Date     C NONSPECIFIC PROCEDURE  5/01, 9/01    B retinal surg.     C NONSPECIFIC PROCEDURE  12/01    L cataract     C NONSPECIFIC PROCEDURE  1983    right ankle fx repair     C NONSPECIFIC PROCEDURE  3/05    Right hemilaryngectomy (laser) for Squamous Cell CA T1N0     C NONSPECIFIC PROCEDURE  3/05, 10/09    Moh's procedure for Basal Cell CA chest     C NONSPECIFIC PROCEDURE  4/04    Moh's procedure for Basal Cell CA ear lobe     C NONSPECIFIC PROCEDURE  3/05, 12/05    right vocal cord squamous cell CA excision     C NONSPECIFIC PROCEDURE  12/07    Phacoemulsification of the lens with posterior chamber lens implant, right eye.      C NONSPECIFIC PROCEDURE  10/09     Moh's procedufe for  BCC left pretibial area     C NONSPECIFIC PROCEDURE      Left thoracoscopic wedge resection, Open completion left upper lobe segmentectomy      THORACIC SURGERY      lung,throat       Current Outpatient Medications   Medication Sig Dispense Refill     Donepezil HCl (ARICEPT PO) Patient take prn       fluticasone (FLONASE) 50 MCG/ACT nasal spray Spray 1-2 sprays into both nostrils daily (Patient taking differently: Spray 1-2 sprays into both nostrils daily prn) 16 g 0     hydrochlorothiazide (HYDRODIURIL) 12.5 MG tablet Take 1 tablet (12.5 mg) by mouth daily 90 tablet 3     levothyroxine (SYNTHROID/LEVOTHROID) 112 MCG tablet Take 1 tablet (112 mcg) by mouth daily 90 tablet 3     losartan (COZAAR) 100 MG tablet Take 1 tablet (100 mg) by mouth daily 90 tablet 3     MULTI VITAMIN MENS OR 1 TABLET DAILY       rosuvastatin (CRESTOR) 10 MG tablet Take 1 tablet (10 mg) by mouth daily 90 tablet 3     sertraline (ZOLOFT) 25 MG tablet Take 25 mg daily for 2 weeks, then 50mg 90 tablet 3     triamcinolone (KENALOG) 0.1 % external cream APPLY  CREAM EXTERNALLY TO AFFECTED AREA THREE TIMES DAILY AS NEEDED SPARINGLY 80 g 1      Donepezil dosage is around 5 mg, started about 1 year ago  Sertraline started 6-8 weeks ago, mood elevated    Allergies   Allergen Reactions     Simvastatin      myalgias     Lisinopril Rash     rash       Family History   Problem Relation Age of Onset     Colon Cancer Mother          age 65, in      Family History Negative Father         mild memory problems,  age 87, in      Family History Negative Brother      Family History Negative Brother      Cancer Brother         hx prostate ca   3 brothers - older is 72. Younger brothers are 65, 68. No memory problems that they are aware of, but they are not in contact often  One daughter, in good health.    Social History     Socioeconomic History     Marital status:      Spouse name: Not on file     Number of children: Not on file      Years of education: Not on file     Highest education level: Not on file   Occupational History     Not on file   Social Needs     Financial resource strain: Not on file     Food insecurity:     Worry: Not on file     Inability: Not on file     Transportation needs:     Medical: Not on file     Non-medical: Not on file   Tobacco Use     Smoking status: Former Smoker     Packs/day: 1.50     Years: 30.00     Pack years: 45.00     Last attempt to quit: 1998     Years since quittin.6     Smokeless tobacco: Never Used   Substance and Sexual Activity     Alcohol use: No     Drug use: No     Sexual activity: Yes     Partners: Female     Comment: vidya with TL   Lifestyle     Physical activity:     Days per week: Not on file     Minutes per session: Not on file     Stress: Not on file   Relationships     Social connections:     Talks on phone: Not on file     Gets together: Not on file     Attends Gnosticist service: Not on file     Active member of club or organization: Not on file     Attends meetings of clubs or organizations: Not on file     Relationship status: Not on file     Intimate partner violence:     Fear of current or ex partner: Not on file     Emotionally abused: Not on file     Physically abused: Not on file     Forced sexual activity: Not on file   Other Topics Concern     Parent/sibling w/ CABG, MI or angioplasty before 65F 55M? Not Asked   Social History Narrative    High school education.    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     General Physical examination:  BP (!) 156/94 (BP Location: Right arm, Patient Position: Sitting, Cuff Size: Adult Regular)   Pulse 70    Wt 186 lb 3.2 oz (84.5 kg)   BMI 25.25 kg/m     BP checks at home 120s-130s/70s    General: Mr. Fairbanks is well appearing and is appropriately groomed and dressed. His voice is slightly hoarse s/p vocal cord tumor resection  Chest: Clear to auscultation bilaterally.  Heart: Regular rhythm with no murmurs, rubs, or gallops.  Ext: warm, no edema  Skin: clean, dry, intact    Neurological examination:  Mental status: Mr. Fairbanks  is awake, alert, and appropriate, with fluent speech, no word finding difficulties and no difficulty in answering questions. He has mildly diminished insight into his memory loss. His memory for remote events is intact. His memory for recent events is generally intact, including a band they saw at the [facility] and his recent visit to the ED. Attention and concentration are intact. His fund of knowledge is fair. No apraxia is noted.  Cranial nerves: Pupils are equal, round and reactive to light bilaterally. Visual fields are full to confrontation with no visual extinction. Extraocular movements are intact. Smooth pursuit is intact. Saccades are normal in initiation, velocity and amplitude both vertically and horizontally. Facial sensation is intact to light touch. Facial strength is full bilaterally. Hearing is intact to finger rub bilaterally.   Motor examination: Bulk is normal throughout. Axial and upper and lower extremity tone is normal. No pronator drift is noted. Strength is 5/5 and symmetric throughout. No fasciculations are noted. There is no tremor or other involuntary movement.  Sensory examination: Sensation is intact to vibratory sense and proprioception. There is no cortical sensory loss by graphesthesia, or neglect.  Reflexes: Reflexes are symmetric and 1+ throughout. Plantar response is flexor bilaterally.  Coordination: Finger-nose-finger and heel-knee-shin testing is normal with no dysmetria bilaterally.  Rapid finger tapping, opening and closing of fist and  "pronation-supination are not slowed. Foot tapping is not slowed.  Gait: He has an upright and steady stance with normal stride length and arm swing.  There is no retropulsion.    Neuropsychological evaluation in Feb 2019 by Dr. Yakov Larios:  \"Substantial dysfunction in learning and memory, ranging from borderline to very impaired. There are also significant but somewhat more variable weaknesses in executive functioning. Visuoconstructional abilities are below expectations and may partly reflect dysexecutive behavioral interference. Reduced insight into recent functioning.\"    Labs:  Orders Only on 01/23/2019   Component Date Value Ref Range Status     Treponema Antibodies 01/23/2019 Nonreactive  NR^Nonreactive Final     HIV Antigen Antibody Combo 01/23/2019 Nonreactive  NR^Nonreactive     Final     Methylmalonic Acid 01/23/2019 0.13  0.00 - 0.40 umol/L Final     Vitamin B12 01/23/2019 737  193 - 986 pg/mL Final   Orders Only on 01/10/2019   Component Date Value Ref Range Status     PSA Free 01/10/2019 1.5  ng/mL Final     Prostate Specific Antigen 01/10/2019 4.4* 0.0 - 4.0 ng/mL Final     PSAPCT 01/10/2019 34  % Final     Cholesterol 01/10/2019 180  <200 mg/dL Final     Triglycerides 01/10/2019 77  <150 mg/dL Final     HDL Cholesterol 01/10/2019 71  >39 mg/dL Final     LDL Cholesterol Calculated 01/10/2019 94  <100 mg/dL Final     Non HDL Cholesterol 01/10/2019 109  <130 mg/dL Final     TSH 01/10/2019 1.52  0.40 - 4.00 mU/L Final     Sodium 01/10/2019 135  133 - 144 mmol/L Final     Potassium 01/10/2019 4.1  3.4 - 5.3 mmol/L Final     Chloride 01/10/2019 101  94 - 109 mmol/L Final     Carbon Dioxide 01/10/2019 31  20 - 32 mmol/L Final     Anion Gap 01/10/2019 3  3 - 14 mmol/L Final     Glucose 01/10/2019 104* 70 - 99 mg/dL Final     Urea Nitrogen 01/10/2019 16  7 - 30 mg/dL Final     Creatinine 01/10/2019 1.40* 0.66 - 1.25 mg/dL Final     GFR Estimate 01/10/2019 50* >60 mL/min/[1.73_m2] Final     GFR Estimate If " Black 01/10/2019 58* >60 mL/min/[1.73_m2] Final     Calcium 01/10/2019 9.5  8.5 - 10.1 mg/dL Final     Hemoglobin 01/10/2019 14.6  13.3 - 17.7 g/dL Final     Creatinine Urine 01/10/2019 75  mg/dL Final     Albumin Urine mg/L 01/10/2019 <5  mg/L Final     Albumin Urine mg/g Cr 01/10/2019 Unable to calculate due to low value  0 - 17 mg/g Cr Final   Office Visit on 06/05/2018   Component Date Value Ref Range Status     Color Urine 06/05/2018 Yellow   Final     Appearance Urine 06/05/2018 Clear   Final     Glucose Urine 06/05/2018 Negative  NEG^Negative mg/dL Final     Bilirubin Urine 06/05/2018 Negative  NEG^Negative Final     Ketones Urine 06/05/2018 Negative  NEG^Negative mg/dL Final     Specific Gravity Urine 06/05/2018 1.015  1.003 - 1.035 Final     pH Urine 06/05/2018 6.5  5.0 - 7.0 pH Final     Protein Albumin Urine 06/05/2018 Negative  NEG^Negative mg/dL Final     Urobilinogen Urine 06/05/2018 0.2  0.2 - 1.0 EU/dL Final     Nitrite Urine 06/05/2018 Negative  NEG^Negative Final     Blood Urine 06/05/2018 Negative  NEG^Negative Final     Leukocyte Esterase Urine 06/05/2018 Negative  NEG^Negative Final     Source 06/05/2018 Midstream Urine   Final     WBC Urine 06/05/2018 0 - 5  OTO5^0 - 5 /HPF Final     RBC Urine 06/05/2018 O - 2  OTO2^O - 2 /HPF Final     WBC 06/05/2018 7.8  4.0 - 11.0 10e9/L Final     RBC Count 06/05/2018 4.75  4.4 - 5.9 10e12/L Final     Hemoglobin 06/05/2018 15.2  13.3 - 17.7 g/dL Final     Hematocrit 06/05/2018 45.3  40.0 - 53.0 % Final     MCV 06/05/2018 95  78 - 100 fl Final     MCH 06/05/2018 32.0  26.5 - 33.0 pg Final     MCHC 06/05/2018 33.6  31.5 - 36.5 g/dL Final     RDW 06/05/2018 13.3  10.0 - 15.0 % Final     Platelet Count 06/05/2018 253  150 - 450 10e9/L Final     Diff Method 06/05/2018 Automated Method   Final     % Neutrophils 06/05/2018 77.4  % Final     % Lymphocytes 06/05/2018 13.6  % Final     % Monocytes 06/05/2018 6.8  % Final     % Eosinophils 06/05/2018 1.9  % Final     %  Basophils 06/05/2018 0.3  % Final     Absolute Neutrophil 06/05/2018 6.0  1.6 - 8.3 10e9/L Final     Absolute Lymphocytes 06/05/2018 1.1  0.8 - 5.3 10e9/L Final     Absolute Monocytes 06/05/2018 0.5  0.0 - 1.3 10e9/L Final     Absolute Eosinophils 06/05/2018 0.2  0.0 - 0.7 10e9/L Final     Absolute Basophils 06/05/2018 0.0  0.0 - 0.2 10e9/L Final     Sodium 06/05/2018 132* 133 - 144 mmol/L Final     Potassium 06/05/2018 4.9  3.4 - 5.3 mmol/L Final     Chloride 06/05/2018 100  94 - 109 mmol/L Final     Carbon Dioxide 06/05/2018 27  20 - 32 mmol/L Final     Anion Gap 06/05/2018 5  3 - 14 mmol/L Final     Glucose 06/05/2018 110* 70 - 99 mg/dL Final     Urea Nitrogen 06/05/2018 10  7 - 30 mg/dL Final     Creatinine 06/05/2018 1.28* 0.66 - 1.25 mg/dL Final     GFR Estimate 06/05/2018 56* >60 mL/min/1.7m2 Final     GFR Estimate If Black 06/05/2018 67  >60 mL/min/1.7m2 Final     Calcium 06/05/2018 9.4  8.5 - 10.1 mg/dL Final     Bilirubin Total 06/05/2018 1.5* 0.2 - 1.3 mg/dL Final     Albumin 06/05/2018 4.4  3.4 - 5.0 g/dL Final     Protein Total 06/05/2018 8.1  6.8 - 8.8 g/dL Final     Alkaline Phosphatase 06/05/2018 79  40 - 150 U/L Final     ALT 06/05/2018 33  0 - 70 U/L Final     AST 06/05/2018 27  0 - 45 U/L Final     TSH 06/05/2018 2.10  0.40 - 4.00 mU/L Final   Orders Only on 04/17/2018   Component Date Value Ref Range Status     Cholesterol 04/17/2018 212* <200 mg/dL Final     Triglycerides 04/17/2018 61  <150 mg/dL Final     HDL Cholesterol 04/17/2018 74  >39 mg/dL Final     LDL Cholesterol Calculated 04/17/2018 126* <100 mg/dL Final     Non HDL Cholesterol 04/17/2018 138* <130 mg/dL Final     Sodium 04/17/2018 135  133 - 144 mmol/L Final     Potassium 04/17/2018 4.1  3.4 - 5.3 mmol/L Final     Chloride 04/17/2018 97  94 - 109 mmol/L Final     Carbon Dioxide 04/17/2018 29  20 - 32 mmol/L Final     Anion Gap 04/17/2018 9  3 - 14 mmol/L Final     Glucose 04/17/2018 91  70 - 99 mg/dL Final     Urea Nitrogen  04/17/2018 13  7 - 30 mg/dL Final     Creatinine 04/17/2018 1.30* 0.66 - 1.25 mg/dL Final     GFR Estimate 04/17/2018 55* >60 mL/min/1.7m2 Final     GFR Estimate If Black 04/17/2018 66  >60 mL/min/1.7m2 Final     Calcium 04/17/2018 9.0  8.5 - 10.1 mg/dL Final     Bilirubin Total 04/17/2018 1.0  0.2 - 1.3 mg/dL Final     Albumin 04/17/2018 4.1  3.4 - 5.0 g/dL Final     Protein Total 04/17/2018 7.4  6.8 - 8.8 g/dL Final     Alkaline Phosphatase 04/17/2018 67  40 - 150 U/L Final     ALT 04/17/2018 23  0 - 70 U/L Final     AST 04/17/2018 17  0 - 45 U/L Final     TSH 04/17/2018 5.48* 0.40 - 4.00 mU/L Final     PSA 04/17/2018 4.59* 0 - 4 ug/L Final     T4 Free 04/17/2018 0.87  0.76 - 1.46 ng/dL Final   Orders Only on 01/24/2018   Component Date Value Ref Range Status     TSH 01/24/2018 4.24* 0.40 - 4.00 mU/L Final     Vitamin D Deficiency screening 01/24/2018 38  20 - 75 ug/L Final     Hemoglobin 01/24/2018 14.0  13.3 - 17.7 g/dL Final     Sodium 01/24/2018 135  133 - 144 mmol/L Final     Potassium 01/24/2018 3.5  3.4 - 5.3 mmol/L Final     Chloride 01/24/2018 99  94 - 109 mmol/L Final     Carbon Dioxide 01/24/2018 29  20 - 32 mmol/L Final     Anion Gap 01/24/2018 7  3 - 14 mmol/L Final     Glucose 01/24/2018 94  70 - 99 mg/dL Final     Urea Nitrogen 01/24/2018 12  7 - 30 mg/dL Final     Creatinine 01/24/2018 1.34* 0.66 - 1.25 mg/dL Final     GFR Estimate 01/24/2018 53* >60 mL/min/1.7m2 Final     GFR Estimate If Black 01/24/2018 64  >60 mL/min/1.7m2 Final     Calcium 01/24/2018 8.5  8.5 - 10.1 mg/dL Final     T4 Free 01/24/2018 0.94  0.76 - 1.46 ng/dL Final   Orders Only on 11/29/2017   Component Date Value Ref Range Status     Cholesterol 11/29/2017 162  <200 mg/dL Final     Triglycerides 11/29/2017 102  <150 mg/dL Final     HDL Cholesterol 11/29/2017 79  >39 mg/dL Final     LDL Cholesterol Calculated 11/29/2017 63  <100 mg/dL Final     Non HDL Cholesterol 11/29/2017 83  <130 mg/dL Final     Bilirubin Direct 11/29/2017  0.2  0.0 - 0.2 mg/dL Final     Bilirubin Total 11/29/2017 0.8  0.2 - 1.3 mg/dL Final     Albumin 11/29/2017 4.0  3.4 - 5.0 g/dL Final     Protein Total 11/29/2017 7.6  6.8 - 8.8 g/dL Final     Alkaline Phosphatase 11/29/2017 75  40 - 150 U/L Final     ALT 11/29/2017 31  0 - 70 U/L Final     AST 11/29/2017 22  0 - 45 U/L Final     CK Total 11/29/2017 175  30 - 300 U/L Final   Orders Only on 10/02/2017   Component Date Value Ref Range Status     Cholesterol 10/02/2017 220* <200 mg/dL Final     Triglycerides 10/02/2017 74  <150 mg/dL Final     HDL Cholesterol 10/02/2017 74  >39 mg/dL Final     LDL Cholesterol Calculated 10/02/2017 131* <100 mg/dL Final     Non HDL Cholesterol 10/02/2017 146* <130 mg/dL Final     Vitamin B12 10/02/2017 504  193 - 986 pg/mL Final   Orders Only on 08/22/2017   Component Date Value Ref Range Status     Cholesterol 08/22/2017 211* <200 mg/dL Final     Triglycerides 08/22/2017 125  <150 mg/dL Final     HDL Cholesterol 08/22/2017 73  >39 mg/dL Final     LDL Cholesterol Calculated 08/22/2017 113* <100 mg/dL Final     Non HDL Cholesterol 08/22/2017 138* <130 mg/dL Final     Creatinine Urine 08/22/2017 106  mg/dL Final     Albumin Urine mg/L 08/22/2017 6  mg/L Final     Albumin Urine mg/g Cr 08/22/2017 5.20  0 - 17 mg/g Cr Final   Orders Only on 07/11/2017   Component Date Value Ref Range Status     Cholesterol 07/11/2017 217* <200 mg/dL Final     Triglycerides 07/11/2017 163* <150 mg/dL Final     HDL Cholesterol 07/11/2017 64  >39 mg/dL Final     LDL Cholesterol Calculated 07/11/2017 120* <100 mg/dL Final     Non HDL Cholesterol 07/11/2017 153* <130 mg/dL Final     Bilirubin Direct 07/11/2017 0.2  0.0 - 0.2 mg/dL Final     Bilirubin Total 07/11/2017 1.3  0.2 - 1.3 mg/dL Final     Albumin 07/11/2017 4.1  3.4 - 5.0 g/dL Final     Protein Total 07/11/2017 7.2  6.8 - 8.8 g/dL Final     Alkaline Phosphatase 07/11/2017 69  40 - 150 U/L Final     ALT 07/11/2017 27  0 - 70 U/L Final     AST 07/11/2017  18  0 - 45 U/L Final     CK Total 07/11/2017 231  30 - 300 U/L Final     Sodium 07/11/2017 133  133 - 144 mmol/L Final     Potassium 07/11/2017 3.6  3.4 - 5.3 mmol/L Final     Chloride 07/11/2017 94  94 - 109 mmol/L Final     Carbon Dioxide 07/11/2017 31  20 - 32 mmol/L Final     Anion Gap 07/11/2017 8  3 - 14 mmol/L Final     Glucose 07/11/2017 110* 70 - 99 mg/dL Final     Urea Nitrogen 07/11/2017 17  7 - 30 mg/dL Final     Creatinine 07/11/2017 1.39* 0.66 - 1.25 mg/dL Final     GFR Estimate 07/11/2017 51* >60 mL/min/1.7m2 Final     GFR Estimate If Black 07/11/2017 61  >60 mL/min/1.7m2 Final     Calcium 07/11/2017 8.9  8.5 - 10.1 mg/dL Final   Office Visit on 05/16/2017   Component Date Value Ref Range Status     Sodium 05/16/2017 133  133 - 144 mmol/L Final     Potassium 05/16/2017 3.9  3.4 - 5.3 mmol/L Final     Chloride 05/16/2017 95  94 - 109 mmol/L Final     Carbon Dioxide 05/16/2017 30  20 - 32 mmol/L Final     Anion Gap 05/16/2017 8  3 - 14 mmol/L Final     Glucose 05/16/2017 111* 70 - 99 mg/dL Final     Urea Nitrogen 05/16/2017 15  7 - 30 mg/dL Final     Creatinine 05/16/2017 1.34* 0.66 - 1.25 mg/dL Final     GFR Estimate 05/16/2017 53* >60 mL/min/1.7m2 Final     GFR Estimate If Black 05/16/2017 64  >60 mL/min/1.7m2 Final     Calcium 05/16/2017 8.9  8.5 - 10.1 mg/dL Final   There may be more visits with results that are not included.     Imaging:  Brain MRI 3/14/19 Thin rim of periventricular white matter disease, more so on the right, mild hippocampal atrophy, all findings similar to that of brain MRI 12/14/17    CSF studies icluded normal protein, glucose, cell counts, negative Lyme, negative autoimmune panel.   The [facility] evaluation revealed low Abeta42 334.35 pg/ml, t-tau of 246.2 pg/ml and normal p-tau if 47.75 pg/ml. These results are borderline in regards to AD pathophysiology.     Impression:  Mr. Fairbanks is a 69 year old right-handed male with history of lung cancer and laryngeal cancer who  [facility] with two years of progressive memory loss. His exam is notable for mild short-term memory loss and is otherwise normal. Neuropsychological testing shows impairments in learning and memory and milder impairments in executive function and visuospatial abilities. Brain MRI shows mild periventricular white matter disease and hippocampal atrophy. After CSF evaluation, he continues to meets criteria for mild cognitive impairment with intermediate likelihood of Alzheimer's pathophysiology. Relative stability since reducing alcohol intake is a good sign; however, MRI findings and cognitive testing indicate risk for progression.     Recommendations:    Millicent should provide more medication oversight    Continue morning dosing of donepezil 5 mg. Take more fiber/benafiber in diet. If loose stools become a persistent problem, we can switch to rivastigmine patch.     Continue Zoloft    Limit alcohol intake to 1 drink or less a day, and not before bed.    Encourage cardiovascular exercise and Mediterranean diet.    Repeat neuropsychological testing in about 1 year    Mr. Fairbanks expressed interest in being contacted in the future about clinical studies for which he may be eligible     Follow-up: Return in about 8 months.    I spent a total of 40 minutes face-to-face with the patient, and over half of that time was spent counseling regarding the symptoms, diagnosis, medication risks, lifestyle modification, therapeutic options, and prognosis.

## 2019-08-30 NOTE — PATIENT INSTRUCTIONS
You saw saw Dr. Josep Still at the [facility] for an evaluation of memory loss. Spinal fluid results revealed borderline results for Alzheimer's disease. Remainder of results were normal (no evidence of Lyme disease or autoimmune/cancer related antibodies). The diagnosis is Mild Cognitive Impairment, which places you at risk for developing dementia. We need to follow you in clinic with repeat cognitive testing.     Recommendations:    [facility] should provide more medication oversight    Continue morning dosing of donepezil 5 mg. Take more fiber/benafiber in your diet. If loose stools become a persistent problem, we can switch to a patch form of a similar drug (rivastimgine).     Continue Zoloft    Limit alcohol intake to 1 drink or less a day, and not before bed, as discussed.     Encourage cardiovascular exercise and Mediterranean diet.    Repeat cognitive testing in about 1 year    Return to clinic in 8 months.     Research opportunities:  1. [facility] Caregiver Registry (forms available in our waiting room)  2. Alzheimer's Association TrialMatch:  http://www.alz.org/research/clinical_trials/find_clinical_trials_trialmatch.asp  3. ClinicalTrials.gov

## 2019-09-16 ENCOUNTER — OFFICE VISIT (OUTPATIENT)
Dept: INTERNAL MEDICINE | Facility: CLINIC | Age: 69
End: 2019-09-16
Payer: MEDICARE

## 2019-09-16 VITALS
RESPIRATION RATE: 16 BRPM | SYSTOLIC BLOOD PRESSURE: 120 MMHG | BODY MASS INDEX: 25.43 KG/M2 | DIASTOLIC BLOOD PRESSURE: 80 MMHG | HEART RATE: 84 BPM | WEIGHT: 187.5 LBS | OXYGEN SATURATION: 99 % | TEMPERATURE: 98 F

## 2019-09-16 DIAGNOSIS — R42 DIZZINESS: Primary | ICD-10-CM

## 2019-09-16 DIAGNOSIS — B02.9 HERPES ZOSTER WITHOUT COMPLICATION: ICD-10-CM

## 2019-09-16 LAB
ANION GAP SERPL CALCULATED.3IONS-SCNC: 4 MMOL/L (ref 3–14)
BUN SERPL-MCNC: 13 MG/DL (ref 7–30)
CALCIUM SERPL-MCNC: 9.3 MG/DL (ref 8.5–10.1)
CHLORIDE SERPL-SCNC: 96 MMOL/L (ref 94–109)
CO2 SERPL-SCNC: 30 MMOL/L (ref 20–32)
CREAT SERPL-MCNC: 1.33 MG/DL (ref 0.66–1.25)
ERYTHROCYTE [DISTWIDTH] IN BLOOD BY AUTOMATED COUNT: 12.8 % (ref 10–15)
GFR SERPL CREATININE-BSD FRML MDRD: 53 ML/MIN/{1.73_M2}
GLUCOSE SERPL-MCNC: 104 MG/DL (ref 70–99)
HCT VFR BLD AUTO: 43.5 % (ref 40–53)
HGB BLD-MCNC: 14.8 G/DL (ref 13.3–17.7)
MCH RBC QN AUTO: 30.9 PG (ref 26.5–33)
MCHC RBC AUTO-ENTMCNC: 34 G/DL (ref 31.5–36.5)
MCV RBC AUTO: 91 FL (ref 78–100)
PLATELET # BLD AUTO: 302 10E9/L (ref 150–450)
POTASSIUM SERPL-SCNC: 3.8 MMOL/L (ref 3.4–5.3)
RBC # BLD AUTO: 4.79 10E12/L (ref 4.4–5.9)
SODIUM SERPL-SCNC: 130 MMOL/L (ref 133–144)
WBC # BLD AUTO: 8 10E9/L (ref 4–11)

## 2019-09-16 PROCEDURE — 85027 COMPLETE CBC AUTOMATED: CPT | Performed by: INTERNAL MEDICINE

## 2019-09-16 PROCEDURE — 80048 BASIC METABOLIC PNL TOTAL CA: CPT | Performed by: INTERNAL MEDICINE

## 2019-09-16 PROCEDURE — 99214 OFFICE O/P EST MOD 30 MIN: CPT | Performed by: INTERNAL MEDICINE

## 2019-09-16 PROCEDURE — 36415 COLL VENOUS BLD VENIPUNCTURE: CPT | Performed by: INTERNAL MEDICINE

## 2019-09-16 RX ORDER — VALACYCLOVIR HYDROCHLORIDE 1 G/1
1000 TABLET, FILM COATED ORAL 3 TIMES DAILY
Qty: 21 TABLET | Refills: 0 | Status: SHIPPED | OUTPATIENT
Start: 2019-09-16 | End: 2020-01-01

## 2019-09-16 NOTE — PROGRESS NOTES
Subjective     Agapito Fairbanks is a 69 year old male who presents to clinic today for the following health issues:    HPI   Dizziness      Duration: x1 week    Description   Feeling faint:  YES-  Feeling like the surroundings are moving: no   Loss of consciousness or falls: no     Intensity:  No pain    Accompanying signs and symptoms: has a rash on right arm  Nausea/vomitting: no   Palpitations: no   Weakness in arms or legs: no   Vision or speech changes: no   Ringing in ears (Tinnitus): no   Hearing loss related to dizziness: no   Other (fevers/chills/sweating/dyspnea): no     History (similar episodes/head trauma/previous evaluation/recent bleeding): None    Precipitating or alleviating factors (new meds/chemicals): None  Worse with activity/head movement: no     Therapies tried and outcome: None        As above.  Has been aware of a vague sensation of lightheadedness for the past week.  Not severe or debilitating, not functionally limiting at all.  No recent changes in medications; he currently takes donepezil for cognitive impairment, possibly dementia, but appears to have been on this medicine for some time, according to documentation.    He mentions presence of a rash on his right arm, for the last 5 to 7 days.  It is not itchy, does not particularly hurt.      Reviewed and updated as needed this visit by Provider  Tobacco  Allergies  Meds  Problems  Med Hx  Surg Hx  Fam Hx         Review of Systems   ROS COMP: Constitutional, HEENT, cardiovascular, pulmonary, GI, , musculoskeletal, neuro, skin, endocrine and psych systems are negative, except as otherwise noted.      Objective    /80 (BP Location: Left arm, Patient Position: Chair, Cuff Size: Adult Regular)   Pulse 84   Temp 98  F (36.7  C) (Oral)   Resp 16   Wt 85 kg (187 lb 8 oz)   SpO2 99%   BMI 25.43 kg/m    Body mass index is 25.43 kg/m .  Physical Exam   GENERAL: healthy, alert and no distress  NECK: no adenopathy, no asymmetry,  masses, or scars and thyroid normal to palpation  RESP: lungs clear to auscultation - no rales, rhonchi or wheezes  CV: regular rate and rhythm, normal S1 S2, no S3 or S4, no murmur, click or rub, no peripheral edema and peripheral pulses strong  ABDOMEN: soft, nontender, no hepatosplenomegaly, no masses and bowel sounds normal  MS: no gross musculoskeletal defects noted, no edema  SKIN: On his right arm anteriorly, there is a non-confluent erythematous maculopapular rash, with some overlying scaling.  Appears to be in dermatomal distribution, with some extension onto his right upper chest:                    Assessment & Plan     1. Dizziness  Exam today completely unremarkable, other than skin rash.  Treat for shingles as below.  Check labs as ordered today.  - CBC with platelets  - Basic metabolic panel  (Ca, Cl, CO2, Creat, Gluc, K, Na, BUN)    2. Herpes zoster without complication  Valtrex as prescribed, though a little late in presentation.  - valACYclovir (VALTREX) 1000 mg tablet; Take 1 tablet (1,000 mg) by mouth 3 times daily for 7 days  Dispense: 21 tablet; Refill: 0       See Patient Instructions    Return in about 2 weeks (around 9/30/2019) for recheck with primary care provider if no better.    Sudhakar Grossman MD  [facility]

## 2019-09-16 NOTE — PATIENT INSTRUCTIONS
- I will contact you with lab results from today.    - Start taking Valtrex, 3 times daily for 7 days.  (Prescription has been sent to your pharmacy)

## 2019-10-01 ENCOUNTER — HEALTH MAINTENANCE LETTER (OUTPATIENT)
Age: 69
End: 2019-10-01

## 2019-10-16 ENCOUNTER — TELEPHONE (OUTPATIENT)
Dept: NEUROLOGY | Facility: CLINIC | Age: 69
End: 2019-10-16

## 2019-10-16 NOTE — TELEPHONE ENCOUNTER
Health Call Center    Phone Message    May a detailed message be left on voicemail: yes    Reason for Call: Other:  pt's wife requesting a referral from Dr. Chavira in order for pt to be seen by any /[facility] for polyp in sinus. Pt's snoring very problematic. Pt needs referral WITH DIAGNOSIS INFO WITHIN THE REFERRAL. Thank you.  Please call pt's wife at home ph# to let her know when the referral is ready. Thank you.    Action Taken: Message routed to:  [facility] ([facility]  Neurology

## 2019-10-17 ENCOUNTER — IMMUNIZATION (OUTPATIENT)
Dept: NURSING | Facility: CLINIC | Age: 69
End: 2019-10-17
Payer: MEDICARE

## 2019-10-17 DIAGNOSIS — R06.83 SNORING: ICD-10-CM

## 2019-10-17 DIAGNOSIS — J33.9 NASAL POLYP: Primary | ICD-10-CM

## 2019-10-17 PROCEDURE — 90662 IIV NO PRSV INCREASED AG IM: CPT

## 2019-10-17 PROCEDURE — G0008 ADMIN INFLUENZA VIRUS VAC: HCPCS

## 2019-10-21 DIAGNOSIS — F41.9 ANXIETY: ICD-10-CM

## 2019-10-21 NOTE — TELEPHONE ENCOUNTER
FUTURE VISIT INFORMATION      FUTURE VISIT INFORMATION:    Date: 10/25/19    Time: 9:30AM    Location: [facility]  REFERRAL INFORMATION:    Referring provider:  Sebastien Chavira MD    Referring providers clinic:  [facility]     Reason for visit/diagnosis Nasal polyp     RECORDS REQUESTED FROM:       Clinic name Comments Records Status Imaging Status   [facility]  10/17/19 referral EPIC    [facility] 3/14/19 MR Brain  [facility] PACS   [facility] 1/6/15 notes with Dr Grover EPIC

## 2019-10-21 NOTE — TELEPHONE ENCOUNTER
Health Call Center    Phone Message    May a detailed message be left on voicemail: yes    Reason for Call: Medication Refill Request    Has the patient contacted the pharmacy for the refill? Yes   Name of medication being requested: sertraline (ZOLOFT) 25 MG tablet  Provider who prescribed the medication: dr. mckeon  Pharmacy: [facility] #[id number] [address] [facility] [address]  Date medication is needed: asap (pt is out)      Action Taken: Message routed to:  [facility] ([facility] sam bal

## 2019-10-21 NOTE — TELEPHONE ENCOUNTER
Rx Authorization:    Requested Medication/ Dose sertraline (ZOLOFT) 25 MG tablet    Date last refill ordered: 02/28/19    Quantity ordered: 90    # refills: 3    Date of last clinic visit with ordering provider: 04/04/19    Date of next clinic visit with ordering provider: None Scheduled     All pertinent protocol data (lab date/result):     Include pertinent information from patients message:

## 2019-10-22 ENCOUNTER — DOCUMENTATION ONLY (OUTPATIENT)
Dept: CARE COORDINATION | Facility: CLINIC | Age: 69
End: 2019-10-22

## 2019-10-23 RX ORDER — SERTRALINE HYDROCHLORIDE 25 MG/1
TABLET, FILM COATED ORAL
Qty: 90 TABLET | Refills: 3 | Status: SHIPPED | OUTPATIENT
Start: 2019-10-23 | End: 2020-01-01 | Stop reason: ALTCHOICE

## 2019-10-25 ENCOUNTER — PRE VISIT (OUTPATIENT)
Dept: OTOLARYNGOLOGY | Facility: CLINIC | Age: 69
End: 2019-10-25

## 2019-10-25 ENCOUNTER — OFFICE VISIT (OUTPATIENT)
Dept: OTOLARYNGOLOGY | Facility: CLINIC | Age: 69
End: 2019-10-25
Attending: PSYCHIATRY & NEUROLOGY
Payer: COMMERCIAL

## 2019-10-25 VITALS
HEART RATE: 83 BPM | WEIGHT: 188 LBS | BODY MASS INDEX: 25.5 KG/M2 | DIASTOLIC BLOOD PRESSURE: 82 MMHG | SYSTOLIC BLOOD PRESSURE: 130 MMHG

## 2019-10-25 DIAGNOSIS — J32.0 CHRONIC MAXILLARY SINUSITIS: ICD-10-CM

## 2019-10-25 DIAGNOSIS — R09.81 NASAL CONGESTION: ICD-10-CM

## 2019-10-25 DIAGNOSIS — J33.9 NASAL POLYP: Primary | ICD-10-CM

## 2019-10-25 DIAGNOSIS — G47.30 SLEEP-DISORDERED BREATHING: ICD-10-CM

## 2019-10-25 DIAGNOSIS — Z92.3 HISTORY OF HEAD AND NECK RADIATION: ICD-10-CM

## 2019-10-25 ASSESSMENT — PAIN SCALES - GENERAL: PAINLEVEL: NO PAIN (0)

## 2019-10-25 NOTE — NURSING NOTE
Chief Complaint   Patient presents with     Consult     Nasal polyp     Blood pressure 130/82, pulse 83, weight 85.3 kg (188 lb).    Mariya Eli EMT

## 2019-10-25 NOTE — LETTER
10/25/2019       RE: Agapito Fairbanks  [address]  [place] [address]     Dear Colleague,    Thank you for referring your patient, Agapito Fairbanks, to the [facility] at [facility]. Please see a copy of my visit note below.                      [facility]                            New Patient Visit      Encounter date: October 25, 2019    Referring Provider:   Sebastien Chavira MD  [facility] NEUROLOGY  [address] [place] [id number]    Chief Complaint: nasal polyp    History of Present Illness: Agapito Fairbanks is a 69-year-old gentleman who presents to me as a referral from Dr. Chavira for evaluation and treatment of the left maxillary sinuses.  This was discovered on MRI which was obtained for memory issues.  The MRI was from March of this year and we have reviewed it.  Mr. Fairbanks has no associated symptoms including, facial pain or pressure, thick purulent discharge, or significant nasal obstruction.  He is not requiring frequent inbox or steroids for these associated symptoms.  His wife is suffered from sinusitis episodes and she is very clear that he has not been experiencing any symptoms like this.  There is only occasional mucus congestion and postnasal discharge. He has never had any sinonasal surgery.  He does have a history of larynx cancer treated with surgery and chemoradiation here approximately 12 years ago.  Wife also mentions today that Mr. Fairbanks has been demonstrating pretty significant snoring at night.  She denies any noticed apneic events.  The neurologist he was seeing I recommended a sleep study however this is not yet been scheduled.  Mr. Fairbanks and his wife deny any voice changes, dyspnea, dysphasia, ear pain or new lumps or bumps in the neck.      Review of systems: A 14-point review of systems has been conducted and was negative for any notable symptoms, except as dictated in the  history of present illness.     Past Medical History:   Diagnosis Date     Acute gastritis with hemorrhage 11/02     Basal cell carcinoma, leg      left pretibial area     CKD (chronic kidney disease) stage 3, GFR 30-59 ml/min (H)     creat baseline approx 1.4     Hearing loss      Helicobacter pylori (H. 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     Mild major depression (H)      Other and unspecified malignant neoplasm of skin of other and unspecified parts of face      Basal Cell CA nasal area 4/04, chest 3/05, right chest 10/09     Other testicular hypofunction      Squamous cell carcinoma  Jan 2012     RLE s/p excision     Tobacco use disorder     quit 1998     Unspecified cataract     left     Unspecified essential hypertension      Unspecified retinal detachment     Bilateral        Past Surgical History:   Procedure Laterality Date     C NONSPECIFIC PROCEDURE  5/01, 9/01    B retinal surg.     C NONSPECIFIC PROCEDURE  12/01    L cataract     C NONSPECIFIC PROCEDURE  1983    right ankle fx repair     C NONSPECIFIC PROCEDURE  3/05    Right hemilaryngectomy (laser) for Squamous Cell CA T1N0     C NONSPECIFIC PROCEDURE  3/05, 10/09    Moh's procedure for Basal Cell CA chest     C NONSPECIFIC PROCEDURE  4/04    Moh's procedure for Basal Cell CA ear lobe     C NONSPECIFIC PROCEDURE  3/05, 12/05    right vocal cord squamous cell CA excision     C NONSPECIFIC PROCEDURE  12/07    Phacoemulsification of the lens with posterior chamber lens implant, right eye.      C NONSPECIFIC PROCEDURE  10/09     Moh's procedufe for BCC left pretibial area     C NONSPECIFIC PROCEDURE  12/09    Left thoracoscopic wedge resection, Open completion left upper lobe segmentectomy       THORACIC SURGERY      lung,throat        Family History   Problem Relation Age of Onset     Colon Cancer Mother          age 65, in      Family History Negative Father         mild memory problems,  age 87, in      Family History Negative Brother      Family History Negative Brother      Cancer Brother         hx prostate ca        Social History     Socioeconomic History     Marital status:      Spouse name: Not on file     Number of children: Not on file     Years of education: Not on file     Highest education level: Not on file   Occupational History     Not on file   Social Needs     Financial resource strain: Not on file     Food insecurity:     Worry: Not on file     Inability: Not on file     Transportation needs:     Medical: Not on file     Non-medical: Not on file   Tobacco Use     Smoking status: Former Smoker     Packs/day: 1.50     Years: 30.00     Pack years: 45.00     Last attempt to quit: 1998     Years since quittin.8     Smokeless tobacco: Never Used   Substance and Sexual Activity     Alcohol use: No     Drug use: No     Sexual activity: Yes     Partners: Female     Comment: vidya with TL   Lifestyle     Physical activity:     Days per week: Not on file     Minutes per session: Not on file     Stress: Not on file   Relationships     Social connections:     Talks on phone: Not on file     Gets together: Not on file     Attends Anglican service: Not on file     Active member of club or organization: Not on file     Attends meetings of clubs or organizations: Not on file     Relationship status: Not on file     Intimate partner violence:     Fear of current or ex partner: Not on file     Emotionally abused: Not on file     Physically abused: Not on file     Forced sexual activity: Not on file   Other Topics Concern     Parent/sibling w/ CABG, MI or angioplasty before 65F 55M? Not Asked   Social History Narrative    High school education.    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 [employer]    Working part-time as of 2019    Met wife in 1992    Seldom drinks more than 1 drink every now and then    1 daughter in [facility]        Physical Exam:  Vital signs: Wt 85.3 kg (188 lb)   BMI 25.50 kg/m      General Appearance: No acute distress, appropriate demeanor, conversant  Eyes: moist conjunctivae; EOMI; pupils symmetric; visual acuity grossly intact; no proptosis  Head: normocephalic; overall symmetric appearance without deformity  Face: overall symmetric without deformity; HB I-VI  Ears: Normal appearance of external ear; external meatus normal in appearance; TMs intact without perforation bilaterally;   Nose: No external deformity; septum relatively midline; inferior turbinates  without significant hypertrophy  Oral Cavity/oropharynx: Normal appearance of mucosa; tongue midline; no mass or lesions; oropharynx without obvious mucosal abnormality  Neck: no palpable lymphadenopathy; post-radiation changes in then neck  Lungs: symmetric chest rise; no wheezing  CV: Good distal perfusion; normal hear rate  Extremities: No deformity  Neurologic Exam: Cranial nerves II-XII are grossly intact; no focal deficit      Procedure Note  Procedure performed: Rigid nasal endoscopy  Indication: To evaluate for sinonasal pathology not visualized on routine anterior rhinoscopy; left maxillary sinus cyst  Anesthesia: 4% topical lidocaine with 0.05% oxymetazoline  Description of procedure: A 30 degree, 3 mm rigid endoscope was inserted into bilateral nasal cavities and the nasal valves, nasal cavity, middle meatus, sphenoethmoid recess, and nasopharynx were thoroughly evaluated for evidence of obstruction, edema, purulence, polyps and/or mass/lesion.     Kerens-Bladimir Endoscopic Scoring System  Endoscopic observation Right Left   Polyps in middle meatus (0  = absent, 1 = restricted to middle meatus, 2 = Beyond middle meatus) 0 0   Discharge (0 = absent, 1 = thin and clear, 2 = thick, purulent) 0 0   Edema (0 = absent, 1 = mild-moderate, 2 = moderate-severe) 0 0   Crusting (0 = absent, 1 = mild-moderate, 2 = moderate-severe) 0 0   Scarring (0= absent, 1 = mild-moderate, 2 = moderate-severe) 0 0   Total 0 0     Findings  RT: MM clear; superior turbinate with mild edema; SER clear  LT: MM clear; SER clear    Nasopharynx clear    A flexible laryngoscopy is also performed:  Indication: Snoring occurring in the context of history of head and neck irradiation for the treatment of larynx cancer.  Anesthesia: 4% topical lidocaine with 0.05% oxymetazoline.  Description of procedure: A flexible distal chip laryngoscope is passed through the left nasal cavity the nasopharynx, oropharynx, hypopharynx and larynx are thoroughly examined both during phonation and during head and neck rotation.  Findings: The nasopharynx, oropharynx, hypopharynx, larynx all demonstrate postradiation changes.  There are no concerning lesions or masses.     The patient tolerated the procedure well without complication.     Laboratory Review:  n/a    Imaging Review:  I reviewed Mr. Fairbanks's MRI both from March of this year as well as December 2017.  Both demonstrate a fairly stable in size left maxillary sinus retention cyst.  It does not demonstrate concerning features.  He also has a mild amount of mucosal thickening of the paranasal sinuses visualized on T2 sequences.    Pathology Review:      Assessment/Medical Decision Making:  Mild chronic sinusitis based on imaging but he is fairly asymptomatic.  He has not required frequent antibiotics or steroids.  He also has pretty severe snoring according to his wife and there is a concern for sleep apnea.  I performed laryngoscopy today as well given his history of larynx cancer and recent changes in his sleeping behavior.  There is no concerning findings on  laryngoscopy.  For the sinusitis, since he is asymptomatic I am only recommending nasal saline irrigations and Flonase spray which he can use when his nasal congestion worsens.  For the snoring, I will place an order and referral for sleep study.  I will follow-up the results and make the appropriate referrals as needed.      Plan:  1. Flonase and saline irrigations as needed for symptomatic nasal congestion/drainage. No intervention needed for left maxillary sinus cyst.   2. Referral placed for sleep study  3. Return to clinic as needed    Aureliano Hobson MD    [facility]  Rhinology  Endoscopic Skull Base Surgery  [facility]  Department of Otolaryngology - Head & Neck Surgery

## 2019-10-25 NOTE — PROGRESS NOTES
[facility]                            New Patient Visit      Encounter date: October 25, 2019    Referring Provider:   Sebastien Chavira MD  [facility] NEUROLOGY  [address] [place] [address]    Chief Complaint: nasal polyp    History of Present Illness: Agapito Fairbanks is a 69-year-old gentleman who presents to me as a referral from Dr. Chavira for evaluation and treatment of the left maxillary sinuses.  This was discovered on MRI which was obtained for memory issues.  The MRI was from March of this year and we have reviewed it.  Mr. Fairbanks has no associated symptoms including, facial pain or pressure, thick purulent discharge, or significant nasal obstruction.  He is not requiring frequent inbox or steroids for these associated symptoms.  His wife is suffered from sinusitis episodes and she is very clear that he has not been experiencing any symptoms like this.  There is only occasional mucus congestion and postnasal discharge. He has never had any sinonasal surgery.  He does have a history of larynx cancer treated with surgery and chemoradiation here approximately 12 years ago.  Wife also mentions today that Mr. Fairbanks has been demonstrating pretty significant snoring at night.  She denies any noticed apneic events.  The neurologist he was seeing I recommended a sleep study however this is not yet been scheduled.  Mr. Fairbanks and his wife deny any voice changes, dyspnea, dysphasia, ear pain or new lumps or bumps in the neck.      Review of systems: A 14-point review of systems has been conducted and was negative for any notable symptoms, except as dictated in the history of present illness.     Past Medical History:   Diagnosis Date     Acute gastritis with hemorrhage 11/02     Basal cell carcinoma, leg      left pretibial area     CKD (chronic kidney disease) stage 3, GFR 30-59 ml/min (H)     creat baseline approx 1.4     Hearing loss      Helicobacter pylori  (H. pylori)      Humerus fracture     left      Hyperlipidemia LDL goal <100 10/31/2010     Hypothyroidism      Impotence of organic origin      Laryngeal carcinoma (H)     Squamous cell carcinoma right vocal cord     Lung cancer (H)     1cm spiculated SKYLA Adenosquamous cell carcinoma     Mild major depression (H)      Other and unspecified malignant neoplasm of skin of other and unspecified parts of face      Basal Cell CA nasal area , chest 3/05, right chest 10/09     Other testicular hypofunction      Squamous cell carcinoma  2012     RLE s/p excision     Tobacco use disorder     quit      Unspecified cataract     left     Unspecified essential hypertension      Unspecified retinal detachment     Bilateral        Past Surgical History:   Procedure Laterality Date     C NONSPECIFIC PROCEDURE  ,     B retinal surg.     C NONSPECIFIC PROCEDURE      L cataract     C NONSPECIFIC PROCEDURE      right ankle fx repair     C NONSPECIFIC PROCEDURE  3/05    Right hemilaryngectomy (laser) for Squamous Cell CA T1N0     C NONSPECIFIC PROCEDURE  3/05, 10/09    Moh's procedure for Basal Cell CA chest     C NONSPECIFIC PROCEDURE      Moh's procedure for Basal Cell CA ear lobe     C NONSPECIFIC PROCEDURE  3/05,     right vocal cord squamous cell CA excision     C NONSPECIFIC PROCEDURE      Phacoemulsification of the lens with posterior chamber lens implant, right eye.      C NONSPECIFIC PROCEDURE  10/09     Moh's procedufe for BCC left pretibial area     C NONSPECIFIC PROCEDURE      Left thoracoscopic wedge resection, Open completion left upper lobe segmentectomy      THORACIC SURGERY      lung,throat        Family History   Problem Relation Age of Onset     Colon Cancer Mother          age 65, in      Family History Negative Father         mild memory problems,  age 87, in      Family History Negative Brother      Family History Negative Brother       Cancer Brother         hx prostate ca        Social History     Socioeconomic History     Marital status:      Spouse name: Not on file     Number of children: Not on file     Years of education: Not on file     Highest education level: Not on file   Occupational History     Not on file   Social Needs     Financial resource strain: Not on file     Food insecurity:     Worry: Not on file     Inability: Not on file     Transportation needs:     Medical: Not on file     Non-medical: Not on file   Tobacco Use     Smoking status: Former Smoker     Packs/day: 1.50     Years: 30.00     Pack years: 45.00     Last attempt to quit: 1998     Years since quittin.8     Smokeless tobacco: Never Used   Substance and Sexual Activity     Alcohol use: No     Drug use: No     Sexual activity: Yes     Partners: Female     Comment: vidya with TL   Lifestyle     Physical activity:     Days per week: Not on file     Minutes per session: Not on file     Stress: Not on file   Relationships     Social connections:     Talks on phone: Not on file     Gets together: Not on file     Attends Jew service: Not on file     Active member of club or organization: Not on file     Attends meetings of clubs or organizations: Not on file     Relationship status: Not on file     Intimate partner violence:     Fear of current or ex partner: Not on file     Emotionally abused: Not on file     Physically abused: Not on file     Forced sexual activity: Not on file   Other Topics Concern     Parent/sibling w/ CABG, MI or angioplasty before 65F 55M? Not Asked   Social History Narrative    High school education.    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 [employer]    Working part-time as of 2019    Met wife in     Seldom drinks more than 1 drink every now and then    1 daughter in [facility]         Physical Exam:  Vital signs: Wt 85.3 kg (188 lb)   BMI 25.50 kg/m     General Appearance: No acute distress, appropriate demeanor, conversant  Eyes: moist conjunctivae; EOMI; pupils symmetric; visual acuity grossly intact; no proptosis  Head: normocephalic; overall symmetric appearance without deformity  Face: overall symmetric without deformity; HB I-VI  Ears: Normal appearance of external ear; external meatus normal in appearance; TMs intact without perforation bilaterally;   Nose: No external deformity; septum relatively midline; inferior turbinates  without significant hypertrophy  Oral Cavity/oropharynx: Normal appearance of mucosa; tongue midline; no mass or lesions; oropharynx without obvious mucosal abnormality  Neck: no palpable lymphadenopathy; post-radiation changes in then neck  Lungs: symmetric chest rise; no wheezing  CV: Good distal perfusion; normal hear rate  Extremities: No deformity  Neurologic Exam: Cranial nerves II-XII are grossly intact; no focal deficit      Procedure Note  Procedure performed: Rigid nasal endoscopy  Indication: To evaluate for sinonasal pathology not visualized on routine anterior rhinoscopy; left maxillary sinus cyst  Anesthesia: 4% topical lidocaine with 0.05% oxymetazoline  Description of procedure: A 30 degree, 3 mm rigid endoscope was inserted into bilateral nasal cavities and the nasal valves, nasal cavity, middle meatus, sphenoethmoid recess, and nasopharynx were thoroughly evaluated for evidence of obstruction, edema, purulence, polyps and/or mass/lesion.     Cyndy-Bladimir Endoscopic Scoring System  Endoscopic observation Right Left   Polyps in middle meatus (0 = absent, 1 = restricted to middle meatus, 2 = Beyond middle meatus) 0 0   Discharge (0 = absent, 1 = thin and clear, 2 = thick, purulent) 0 0   Edema (0 = absent, 1 = mild-moderate, 2 = moderate-severe) 0 0   Crusting (0 = absent, 1 = mild-moderate, 2 = moderate-severe) 0 0   Scarring (0= absent, 1 =  mild-moderate, 2 = moderate-severe) 0 0   Total 0 0     Findings  RT: MM clear; superior turbinate with mild edema; SER clear  LT: MM clear; SER clear    Nasopharynx clear    A flexible laryngoscopy is also performed:  Indication: Snoring occurring in the context of history of head and neck irradiation for the treatment of larynx cancer.  Anesthesia: 4% topical lidocaine with 0.05% oxymetazoline.  Description of procedure: A flexible distal chip laryngoscope is passed through the left nasal cavity the nasopharynx, oropharynx, hypopharynx and larynx are thoroughly examined both during phonation and during head and neck rotation.  Findings: The nasopharynx, oropharynx, hypopharynx, larynx all demonstrate postradiation changes.  There are no concerning lesions or masses.     The patient tolerated the procedure well without complication.     Laboratory Review:  n/a    Imaging Review:  I reviewed Mr. Fairbanks's MRI both from March of this year as well as December 2017.  Both demonstrate a fairly stable in size left maxillary sinus retention cyst.  It does not demonstrate concerning features.  He also has a mild amount of mucosal thickening of the paranasal sinuses visualized on T2 sequences.    Pathology Review:      Assessment/Medical Decision Making:  Mild chronic sinusitis based on imaging but he is fairly asymptomatic.  He has not required frequent antibiotics or steroids.  He also has pretty severe snoring according to his wife and there is a concern for sleep apnea.  I performed laryngoscopy today as well given his history of larynx cancer and recent changes in his sleeping behavior.  There is no concerning findings on laryngoscopy.  For the sinusitis, since he is asymptomatic I am only recommending nasal saline irrigations and Flonase spray which he can use when his nasal congestion worsens.  For the snoring, I will place an order and referral for sleep study.  I will follow-up the results and make the appropriate  referrals as needed.      Plan:  1. Flonase and saline irrigations as needed for symptomatic nasal congestion/drainage. No intervention needed for left maxillary sinus cyst.   2. Referral placed for sleep study  3. Return to clinic as needed    Aureliano Hobson MD    [facility]  Rhinology  Endoscopic Skull Base Surgery  [facility]  Department of Otolaryngology - Head & Neck Surgery

## 2019-10-25 NOTE — PATIENT INSTRUCTIONS
You were seen in the ENT clinic today with Dr. Hobson    Recommendations for you:    -Sleep Study      Please call our clinic for any questions, concerns, and/or worsening symptoms.      Clinic #[phone]       Option 1 for scheduling.    Thank you for allowing us to be apart of your care!    Nita STAPLES, RNCC    If you need to reach me my direct line is: [phone]     [facility]    Multiple locations   [facility]  First Floor, [address]  [address] [address]  [phone]  [place]  [address], [address]  [place] [address]  Appointments: [phone]  Sleep Center: [phone]  [employer]  [address] [place] [address]  Appointments: [phone]  Information: [phone]  Brandi  Clinic Address:  [facility], [address] [address]  Appointments: [phone]  [address] [place] [address]  Appointments: [phone]     Wernicke-Korsakoff syndrome (alcoholic)

## 2019-12-15 ENCOUNTER — HEALTH MAINTENANCE LETTER (OUTPATIENT)
Age: 69
End: 2019-12-15

## 2020-01-01 ENCOUNTER — TELEPHONE (OUTPATIENT)
Dept: ONCOLOGY | Facility: CLINIC | Age: 70
End: 2020-01-01

## 2020-01-01 ENCOUNTER — VIRTUAL VISIT (OUTPATIENT)
Dept: ONCOLOGY | Facility: CLINIC | Age: 70
End: 2020-01-01
Attending: STUDENT IN AN ORGANIZED HEALTH CARE EDUCATION/TRAINING PROGRAM
Payer: MEDICARE

## 2020-01-01 ENCOUNTER — HOSPITAL ENCOUNTER (OUTPATIENT)
Dept: PET IMAGING | Facility: CLINIC | Age: 70
Discharge: HOME | End: 2020-11-09
Attending: INTERNAL MEDICINE | Admitting: INTERNAL MEDICINE
Payer: MEDICARE

## 2020-01-01 ENCOUNTER — INFUSION THERAPY VISIT (OUTPATIENT)
Dept: INFUSION THERAPY | Facility: CLINIC | Age: 70
End: 2020-01-01
Attending: INTERNAL MEDICINE
Payer: MEDICARE

## 2020-01-01 ENCOUNTER — APPOINTMENT (OUTPATIENT)
Dept: CT IMAGING | Facility: CLINIC | Age: 70
DRG: 843 | End: 2020-01-01
Attending: EMERGENCY MEDICINE
Payer: MEDICARE

## 2020-01-01 ENCOUNTER — HOSPITAL ENCOUNTER (OUTPATIENT)
Facility: CLINIC | Age: 70
Setting detail: SPECIMEN
Discharge: HOME | End: 2020-11-05
Attending: INTERNAL MEDICINE | Admitting: INTERNAL MEDICINE
Payer: MEDICARE

## 2020-01-01 ENCOUNTER — TELEPHONE (OUTPATIENT)
Dept: INTERNAL MEDICINE | Facility: CLINIC | Age: 70
End: 2020-01-01

## 2020-01-01 ENCOUNTER — HOSPITAL ENCOUNTER (OUTPATIENT)
Facility: CLINIC | Age: 70
Setting detail: SPECIMEN
Discharge: HOME | End: 2020-05-19
Attending: INTERNAL MEDICINE | Admitting: INTERNAL MEDICINE
Payer: MEDICARE

## 2020-01-01 ENCOUNTER — HOSPITAL ENCOUNTER (OUTPATIENT)
Facility: CLINIC | Age: 70
End: 2020-01-01
Admitting: RADIOLOGY
Payer: MEDICARE

## 2020-01-01 ENCOUNTER — PRE VISIT (OUTPATIENT)
Dept: PULMONOLOGY | Facility: CLINIC | Age: 70
End: 2020-01-01

## 2020-01-01 ENCOUNTER — TRANSFERRED RECORDS (OUTPATIENT)
Dept: HEALTH INFORMATION MANAGEMENT | Facility: CLINIC | Age: 70
End: 2020-01-01

## 2020-01-01 ENCOUNTER — APPOINTMENT (OUTPATIENT)
Dept: SPEECH THERAPY | Facility: CLINIC | Age: 70
DRG: 643 | End: 2020-01-01
Attending: HOSPITALIST
Payer: MEDICARE

## 2020-01-01 ENCOUNTER — HOSPITAL ENCOUNTER (INPATIENT)
Facility: CLINIC | Age: 70
LOS: 4 days | Discharge: HOME | DRG: 393 | End: 2020-09-26
Attending: EMERGENCY MEDICINE | Admitting: INTERNAL MEDICINE
Payer: MEDICARE

## 2020-01-01 ENCOUNTER — PATIENT OUTREACH (OUTPATIENT)
Dept: ONCOLOGY | Facility: CLINIC | Age: 70
End: 2020-01-01

## 2020-01-01 ENCOUNTER — HOME INFUSION (PRE-WILLOW HOME INFUSION) (OUTPATIENT)
Dept: PHARMACY | Facility: CLINIC | Age: 70
End: 2020-01-01

## 2020-01-01 ENCOUNTER — APPOINTMENT (OUTPATIENT)
Dept: PHYSICAL THERAPY | Facility: CLINIC | Age: 70
DRG: 843 | End: 2020-01-01
Attending: INTERNAL MEDICINE
Payer: MEDICARE

## 2020-01-01 ENCOUNTER — HOSPITAL ENCOUNTER (OUTPATIENT)
Facility: CLINIC | Age: 70
Setting detail: SPECIMEN
Discharge: HOME | End: 2020-08-18
Attending: INTERNAL MEDICINE | Admitting: INTERNAL MEDICINE
Payer: MEDICARE

## 2020-01-01 ENCOUNTER — HOSPITAL ENCOUNTER (OUTPATIENT)
Dept: MRI IMAGING | Facility: CLINIC | Age: 70
End: 2020-04-06
Attending: RADIOLOGY
Payer: MEDICARE

## 2020-01-01 ENCOUNTER — APPOINTMENT (OUTPATIENT)
Dept: SPEECH THERAPY | Facility: CLINIC | Age: 70
DRG: 393 | End: 2020-01-01
Attending: INTERNAL MEDICINE
Payer: MEDICARE

## 2020-01-01 ENCOUNTER — MEDICAL CORRESPONDENCE (OUTPATIENT)
Dept: HEALTH INFORMATION MANAGEMENT | Facility: CLINIC | Age: 70
End: 2020-01-01

## 2020-01-01 ENCOUNTER — APPOINTMENT (OUTPATIENT)
Dept: SPEECH THERAPY | Facility: CLINIC | Age: 70
DRG: 193 | End: 2020-01-01
Attending: STUDENT IN AN ORGANIZED HEALTH CARE EDUCATION/TRAINING PROGRAM
Payer: MEDICARE

## 2020-01-01 ENCOUNTER — CARE COORDINATION (OUTPATIENT)
Dept: INTERNAL MEDICINE | Facility: CLINIC | Age: 70
End: 2020-01-01

## 2020-01-01 ENCOUNTER — TELEPHONE (OUTPATIENT)
Dept: PHARMACY | Facility: CLINIC | Age: 70
End: 2020-01-01

## 2020-01-01 ENCOUNTER — APPOINTMENT (OUTPATIENT)
Dept: PHYSICAL THERAPY | Facility: CLINIC | Age: 70
DRG: 193 | End: 2020-01-01
Payer: MEDICARE

## 2020-01-01 ENCOUNTER — APPOINTMENT (OUTPATIENT)
Dept: PHYSICAL THERAPY | Facility: CLINIC | Age: 70
DRG: 643 | End: 2020-01-01
Attending: HOSPITALIST
Payer: MEDICARE

## 2020-01-01 ENCOUNTER — ONCOLOGY VISIT (OUTPATIENT)
Dept: ONCOLOGY | Facility: CLINIC | Age: 70
End: 2020-01-01
Attending: INTERNAL MEDICINE
Payer: MEDICARE

## 2020-01-01 ENCOUNTER — DOCUMENTATION ONLY (OUTPATIENT)
Dept: ONCOLOGY | Facility: CLINIC | Age: 70
End: 2020-01-01

## 2020-01-01 ENCOUNTER — APPOINTMENT (OUTPATIENT)
Dept: PHYSICAL THERAPY | Facility: CLINIC | Age: 70
DRG: 193 | End: 2020-01-01
Attending: STUDENT IN AN ORGANIZED HEALTH CARE EDUCATION/TRAINING PROGRAM
Payer: MEDICARE

## 2020-01-01 ENCOUNTER — HOSPITAL ENCOUNTER (OUTPATIENT)
Dept: CT IMAGING | Facility: CLINIC | Age: 70
End: 2020-04-13
Attending: RADIOLOGY
Payer: MEDICARE

## 2020-01-01 ENCOUNTER — DOCUMENTATION ONLY (OUTPATIENT)
Dept: INTERNAL MEDICINE | Facility: CLINIC | Age: 70
End: 2020-01-01

## 2020-01-01 ENCOUNTER — TELEPHONE (OUTPATIENT)
Dept: PULMONOLOGY | Facility: CLINIC | Age: 70
End: 2020-01-01

## 2020-01-01 ENCOUNTER — OFFICE VISIT (OUTPATIENT)
Dept: INTERNAL MEDICINE | Facility: CLINIC | Age: 70
End: 2020-01-01
Payer: MEDICARE

## 2020-01-01 ENCOUNTER — APPOINTMENT (OUTPATIENT)
Dept: OCCUPATIONAL THERAPY | Facility: CLINIC | Age: 70
DRG: 643 | End: 2020-01-01
Attending: HOSPITALIST
Payer: MEDICARE

## 2020-01-01 ENCOUNTER — APPOINTMENT (OUTPATIENT)
Dept: GENERAL RADIOLOGY | Facility: CLINIC | Age: 70
DRG: 864 | End: 2020-01-01
Attending: EMERGENCY MEDICINE
Payer: MEDICARE

## 2020-01-01 ENCOUNTER — HOSPITAL ENCOUNTER (OUTPATIENT)
Facility: CLINIC | Age: 70
Setting detail: SPECIMEN
DRG: 643 | End: 2020-10-01
Attending: NURSE PRACTITIONER
Payer: MEDICARE

## 2020-01-01 ENCOUNTER — APPOINTMENT (OUTPATIENT)
Dept: GENERAL RADIOLOGY | Facility: CLINIC | Age: 70
DRG: 193 | End: 2020-01-01
Attending: STUDENT IN AN ORGANIZED HEALTH CARE EDUCATION/TRAINING PROGRAM
Payer: MEDICARE

## 2020-01-01 ENCOUNTER — ONCOLOGY VISIT (OUTPATIENT)
Dept: ONCOLOGY | Facility: CLINIC | Age: 70
End: 2020-01-01
Attending: NURSE PRACTITIONER
Payer: MEDICARE

## 2020-01-01 ENCOUNTER — HOSPITAL ENCOUNTER (OUTPATIENT)
Facility: CLINIC | Age: 70
Setting detail: SPECIMEN
Discharge: HOME | End: 2020-08-04
Attending: NURSE PRACTITIONER | Admitting: NURSE PRACTITIONER
Payer: MEDICARE

## 2020-01-01 ENCOUNTER — TELEPHONE (OUTPATIENT)
Dept: SURGERY | Facility: CLINIC | Age: 70
End: 2020-01-01

## 2020-01-01 ENCOUNTER — HOSPITAL ENCOUNTER (INPATIENT)
Facility: CLINIC | Age: 70
LOS: 4 days | Discharge: HOME | DRG: 193 | End: 2020-09-05
Attending: EMERGENCY MEDICINE | Admitting: INTERNAL MEDICINE
Payer: MEDICARE

## 2020-01-01 ENCOUNTER — PATIENT OUTREACH (OUTPATIENT)
Dept: NURSING | Facility: CLINIC | Age: 70
End: 2020-01-01
Payer: MEDICARE

## 2020-01-01 ENCOUNTER — TELEPHONE (OUTPATIENT)
Dept: CARE COORDINATION | Facility: CLINIC | Age: 70
End: 2020-01-01

## 2020-01-01 ENCOUNTER — HOSPITAL ENCOUNTER (OUTPATIENT)
Facility: CLINIC | Age: 70
Setting detail: SPECIMEN
Discharge: HOME | End: 2020-07-21
Attending: INTERNAL MEDICINE | Admitting: INTERNAL MEDICINE
Payer: MEDICARE

## 2020-01-01 ENCOUNTER — VIRTUAL VISIT (OUTPATIENT)
Dept: PULMONOLOGY | Facility: CLINIC | Age: 70
End: 2020-01-01
Attending: INTERNAL MEDICINE
Payer: MEDICARE

## 2020-01-01 ENCOUNTER — HOSPITAL ENCOUNTER (OUTPATIENT)
Facility: CLINIC | Age: 70
Setting detail: SPECIMEN
Discharge: HOME | End: 2020-04-13
Admitting: RADIOLOGY
Payer: MEDICARE

## 2020-01-01 ENCOUNTER — HOSPITAL ENCOUNTER (OUTPATIENT)
Facility: CLINIC | Age: 70
Discharge: HOME | DRG: 393 | End: 2020-09-21
Attending: RADIOLOGY | Admitting: RADIOLOGY
Payer: MEDICARE

## 2020-01-01 ENCOUNTER — HOSPITAL ENCOUNTER (INPATIENT)
Facility: CLINIC | Age: 70
LOS: 4 days | Discharge: HOME | DRG: 643 | End: 2020-10-05
Attending: EMERGENCY MEDICINE | Admitting: INTERNAL MEDICINE
Payer: MEDICARE

## 2020-01-01 ENCOUNTER — APPOINTMENT (OUTPATIENT)
Dept: MRI IMAGING | Facility: CLINIC | Age: 70
DRG: 193 | End: 2020-01-01
Attending: INTERNAL MEDICINE
Payer: MEDICARE

## 2020-01-01 ENCOUNTER — ONCOLOGY VISIT (OUTPATIENT)
Dept: ONCOLOGY | Facility: CLINIC | Age: 70
End: 2020-01-01
Attending: HOSPITALIST
Payer: MEDICARE

## 2020-01-01 ENCOUNTER — APPOINTMENT (OUTPATIENT)
Dept: INTERVENTIONAL RADIOLOGY/VASCULAR | Facility: CLINIC | Age: 70
DRG: 393 | End: 2020-01-01
Attending: EMERGENCY MEDICINE
Payer: MEDICARE

## 2020-01-01 ENCOUNTER — HOSPITAL ENCOUNTER (OUTPATIENT)
Dept: GENERAL RADIOLOGY | Facility: CLINIC | Age: 70
End: 2020-04-13
Attending: RADIOLOGY
Payer: MEDICARE

## 2020-01-01 ENCOUNTER — HOSPITAL ENCOUNTER (OUTPATIENT)
Facility: CLINIC | Age: 70
Setting detail: SPECIMEN
Discharge: HOME | End: 2020-09-08
Attending: INTERNAL MEDICINE | Admitting: NURSE PRACTITIONER
Payer: MEDICARE

## 2020-01-01 ENCOUNTER — APPOINTMENT (OUTPATIENT)
Dept: PHYSICAL THERAPY | Facility: CLINIC | Age: 70
DRG: 393 | End: 2020-01-01
Attending: INTERNAL MEDICINE
Payer: MEDICARE

## 2020-01-01 ENCOUNTER — OFFICE VISIT (OUTPATIENT)
Dept: UROLOGY | Facility: CLINIC | Age: 70
End: 2020-01-01
Attending: INTERNAL MEDICINE
Payer: MEDICARE

## 2020-01-01 ENCOUNTER — APPOINTMENT (OUTPATIENT)
Dept: CT IMAGING | Facility: CLINIC | Age: 70
DRG: 864 | End: 2020-01-01
Attending: EMERGENCY MEDICINE
Payer: MEDICARE

## 2020-01-01 ENCOUNTER — APPOINTMENT (OUTPATIENT)
Dept: OCCUPATIONAL THERAPY | Facility: CLINIC | Age: 70
DRG: 393 | End: 2020-01-01
Attending: INTERNAL MEDICINE
Payer: MEDICARE

## 2020-01-01 ENCOUNTER — APPOINTMENT (OUTPATIENT)
Dept: NURSING | Facility: CLINIC | Age: 70
End: 2020-01-01
Payer: MEDICARE

## 2020-01-01 ENCOUNTER — HOSPITAL ENCOUNTER (OUTPATIENT)
Dept: PET IMAGING | Facility: CLINIC | Age: 70
Discharge: HOME | End: 2020-11-02
Attending: INTERNAL MEDICINE | Admitting: INTERNAL MEDICINE
Payer: MEDICARE

## 2020-01-01 ENCOUNTER — HOSPITAL ENCOUNTER (OUTPATIENT)
Dept: PET IMAGING | Facility: CLINIC | Age: 70
Discharge: HOME | End: 2020-07-20
Attending: INTERNAL MEDICINE | Admitting: INTERNAL MEDICINE
Payer: MEDICARE

## 2020-01-01 ENCOUNTER — PRE VISIT (OUTPATIENT)
Dept: ONCOLOGY | Facility: CLINIC | Age: 70
End: 2020-01-01

## 2020-01-01 ENCOUNTER — HOSPITAL ENCOUNTER (OUTPATIENT)
Dept: MRI IMAGING | Facility: CLINIC | Age: 70
Discharge: HOME | End: 2020-04-21
Attending: INTERNAL MEDICINE | Admitting: INTERNAL MEDICINE
Payer: MEDICARE

## 2020-01-01 ENCOUNTER — APPOINTMENT (OUTPATIENT)
Dept: GENERAL RADIOLOGY | Facility: CLINIC | Age: 70
DRG: 393 | End: 2020-01-01
Attending: EMERGENCY MEDICINE
Payer: MEDICARE

## 2020-01-01 ENCOUNTER — HOSPITAL ENCOUNTER (OUTPATIENT)
Facility: CLINIC | Age: 70
Setting detail: SPECIMEN
Discharge: HOME | End: 2020-11-04
Attending: INTERNAL MEDICINE | Admitting: INTERNAL MEDICINE
Payer: MEDICARE

## 2020-01-01 ENCOUNTER — TELEPHONE (OUTPATIENT)
Dept: INTERVENTIONAL RADIOLOGY/VASCULAR | Facility: CLINIC | Age: 70
End: 2020-01-01

## 2020-01-01 ENCOUNTER — VIRTUAL VISIT (OUTPATIENT)
Dept: INTERNAL MEDICINE | Facility: CLINIC | Age: 70
End: 2020-01-01
Payer: MEDICARE

## 2020-01-01 ENCOUNTER — HOSPITAL ENCOUNTER (OUTPATIENT)
Facility: CLINIC | Age: 70
Setting detail: SPECIMEN
Discharge: HOME | DRG: 864 | End: 2020-04-28
Attending: INTERNAL MEDICINE | Admitting: INTERNAL MEDICINE
Payer: MEDICARE

## 2020-01-01 ENCOUNTER — DOCUMENTATION ONLY (OUTPATIENT)
Dept: PHARMACY | Facility: CLINIC | Age: 70
End: 2020-01-01

## 2020-01-01 ENCOUNTER — PATIENT OUTREACH (OUTPATIENT)
Dept: CARE COORDINATION | Facility: CLINIC | Age: 70
End: 2020-01-01

## 2020-01-01 ENCOUNTER — HOSPITAL ENCOUNTER (OUTPATIENT)
Facility: CLINIC | Age: 70
Setting detail: SPECIMEN
Discharge: HOME | End: 2020-08-11
Attending: INTERNAL MEDICINE | Admitting: NURSE PRACTITIONER
Payer: MEDICARE

## 2020-01-01 ENCOUNTER — OFFICE VISIT (OUTPATIENT)
Dept: SLEEP MEDICINE | Facility: CLINIC | Age: 70
End: 2020-01-01
Attending: INTERNAL MEDICINE
Payer: MEDICARE

## 2020-01-01 ENCOUNTER — APPOINTMENT (OUTPATIENT)
Dept: GENERAL RADIOLOGY | Facility: CLINIC | Age: 70
DRG: 193 | End: 2020-01-01
Attending: EMERGENCY MEDICINE
Payer: MEDICARE

## 2020-01-01 ENCOUNTER — APPOINTMENT (OUTPATIENT)
Dept: INTERVENTIONAL RADIOLOGY/VASCULAR | Facility: CLINIC | Age: 70
DRG: 393 | End: 2020-01-01
Attending: INTERNAL MEDICINE
Payer: MEDICARE

## 2020-01-01 ENCOUNTER — HOSPITAL ENCOUNTER (OUTPATIENT)
Facility: CLINIC | Age: 70
Setting detail: SPECIMEN
Discharge: HOME | End: 2020-09-15
Attending: INTERNAL MEDICINE | Admitting: INTERNAL MEDICINE
Payer: MEDICARE

## 2020-01-01 ENCOUNTER — APPOINTMENT (OUTPATIENT)
Dept: OCCUPATIONAL THERAPY | Facility: CLINIC | Age: 70
DRG: 393 | End: 2020-01-01
Payer: MEDICARE

## 2020-01-01 ENCOUNTER — HOSPITAL ENCOUNTER (OUTPATIENT)
Facility: CLINIC | Age: 70
Setting detail: SPECIMEN
Discharge: HOME | End: 2020-07-27
Attending: INTERNAL MEDICINE | Admitting: INTERNAL MEDICINE
Payer: MEDICARE

## 2020-01-01 ENCOUNTER — TELEPHONE (OUTPATIENT)
Dept: UROLOGY | Facility: CLINIC | Age: 70
End: 2020-01-01

## 2020-01-01 ENCOUNTER — HOSPITAL ENCOUNTER (INPATIENT)
Facility: CLINIC | Age: 70
LOS: 2 days | Discharge: HOME HEALTH | DRG: 843 | End: 2020-08-28
Attending: EMERGENCY MEDICINE | Admitting: INTERNAL MEDICINE
Payer: MEDICARE

## 2020-01-01 ENCOUNTER — HOSPITAL ENCOUNTER (OUTPATIENT)
Facility: CLINIC | Age: 70
Setting detail: SPECIMEN
Discharge: HOME | End: 2020-06-09
Attending: INTERNAL MEDICINE | Admitting: INTERNAL MEDICINE
Payer: MEDICARE

## 2020-01-01 ENCOUNTER — DOCUMENTATION ONLY (OUTPATIENT)
Dept: OTHER | Facility: CLINIC | Age: 70
End: 2020-01-01

## 2020-01-01 ENCOUNTER — APPOINTMENT (OUTPATIENT)
Dept: GENERAL RADIOLOGY | Facility: CLINIC | Age: 70
DRG: 843 | End: 2020-01-01
Attending: EMERGENCY MEDICINE
Payer: MEDICARE

## 2020-01-01 ENCOUNTER — HOSPITAL ENCOUNTER (INPATIENT)
Facility: CLINIC | Age: 70
LOS: 1 days | Discharge: HOME | DRG: 864 | End: 2020-05-02
Attending: EMERGENCY MEDICINE | Admitting: INTERNAL MEDICINE
Payer: MEDICARE

## 2020-01-01 ENCOUNTER — HOSPITAL ENCOUNTER (OUTPATIENT)
Dept: CT IMAGING | Facility: CLINIC | Age: 70
Discharge: HOME | End: 2020-06-10
Attending: INTERNAL MEDICINE | Admitting: INTERNAL MEDICINE
Payer: MEDICARE

## 2020-01-01 ENCOUNTER — HOSPITAL ENCOUNTER (OUTPATIENT)
Dept: PET IMAGING | Facility: CLINIC | Age: 70
Discharge: HOME | End: 2020-04-02
Attending: RADIOLOGY | Admitting: RADIOLOGY
Payer: MEDICARE

## 2020-01-01 ENCOUNTER — APPOINTMENT (OUTPATIENT)
Dept: PHYSICAL THERAPY | Facility: CLINIC | Age: 70
DRG: 393 | End: 2020-01-01
Payer: MEDICARE

## 2020-01-01 ENCOUNTER — HOSPITAL ENCOUNTER (OUTPATIENT)
Facility: CLINIC | Age: 70
Setting detail: SPECIMEN
Discharge: HOME | End: 2020-06-30
Attending: INTERNAL MEDICINE | Admitting: INTERNAL MEDICINE
Payer: MEDICARE

## 2020-01-01 ENCOUNTER — ANCILLARY PROCEDURE (OUTPATIENT)
Dept: MRI IMAGING | Facility: CLINIC | Age: 70
End: 2020-01-01
Attending: UROLOGY
Payer: COMMERCIAL

## 2020-01-01 VITALS
BODY MASS INDEX: 17.39 KG/M2 | WEIGHT: 128.2 LBS | HEART RATE: 94 BPM | RESPIRATION RATE: 22 BRPM | DIASTOLIC BLOOD PRESSURE: 62 MMHG | SYSTOLIC BLOOD PRESSURE: 90 MMHG | OXYGEN SATURATION: 100 % | TEMPERATURE: 95 F

## 2020-01-01 VITALS
DIASTOLIC BLOOD PRESSURE: 75 MMHG | HEIGHT: 72 IN | TEMPERATURE: 96.8 F | BODY MASS INDEX: 18.96 KG/M2 | SYSTOLIC BLOOD PRESSURE: 114 MMHG | HEART RATE: 97 BPM | WEIGHT: 140 LBS | OXYGEN SATURATION: 94 % | RESPIRATION RATE: 18 BRPM

## 2020-01-01 VITALS
TEMPERATURE: 97.7 F | WEIGHT: 134 LBS | RESPIRATION RATE: 16 BRPM | HEIGHT: 72 IN | DIASTOLIC BLOOD PRESSURE: 68 MMHG | HEART RATE: 77 BPM | OXYGEN SATURATION: 94 % | SYSTOLIC BLOOD PRESSURE: 101 MMHG | BODY MASS INDEX: 18.15 KG/M2

## 2020-01-01 VITALS
BODY MASS INDEX: 18.77 KG/M2 | RESPIRATION RATE: 18 BRPM | TEMPERATURE: 98.1 F | OXYGEN SATURATION: 83 % | WEIGHT: 138.6 LBS | DIASTOLIC BLOOD PRESSURE: 93 MMHG | HEART RATE: 99 BPM | SYSTOLIC BLOOD PRESSURE: 151 MMHG | HEIGHT: 72 IN

## 2020-01-01 VITALS
BODY MASS INDEX: 24.52 KG/M2 | WEIGHT: 181 LBS | OXYGEN SATURATION: 98 % | RESPIRATION RATE: 16 BRPM | SYSTOLIC BLOOD PRESSURE: 132 MMHG | HEIGHT: 72 IN | DIASTOLIC BLOOD PRESSURE: 80 MMHG | TEMPERATURE: 98.6 F | HEART RATE: 74 BPM

## 2020-01-01 VITALS
RESPIRATION RATE: 20 BRPM | SYSTOLIC BLOOD PRESSURE: 94 MMHG | HEART RATE: 90 BPM | DIASTOLIC BLOOD PRESSURE: 71 MMHG | OXYGEN SATURATION: 95 % | OXYGEN SATURATION: 100 % | RESPIRATION RATE: 20 BRPM | SYSTOLIC BLOOD PRESSURE: 103 MMHG | TEMPERATURE: 98.5 F | HEART RATE: 76 BPM | TEMPERATURE: 98.2 F | DIASTOLIC BLOOD PRESSURE: 55 MMHG

## 2020-01-01 VITALS
SYSTOLIC BLOOD PRESSURE: 109 MMHG | HEART RATE: 71 BPM | WEIGHT: 132 LBS | RESPIRATION RATE: 16 BRPM | DIASTOLIC BLOOD PRESSURE: 66 MMHG | TEMPERATURE: 97.3 F | BODY MASS INDEX: 17.88 KG/M2 | OXYGEN SATURATION: 99 % | HEIGHT: 72 IN

## 2020-01-01 VITALS
HEIGHT: 72 IN | HEART RATE: 68 BPM | SYSTOLIC BLOOD PRESSURE: 130 MMHG | OXYGEN SATURATION: 97 % | RESPIRATION RATE: 16 BRPM | BODY MASS INDEX: 24.79 KG/M2 | WEIGHT: 183 LBS | DIASTOLIC BLOOD PRESSURE: 80 MMHG

## 2020-01-01 VITALS
RESPIRATION RATE: 18 BRPM | OXYGEN SATURATION: 99 % | BODY MASS INDEX: 17.61 KG/M2 | HEART RATE: 80 BPM | SYSTOLIC BLOOD PRESSURE: 87 MMHG | DIASTOLIC BLOOD PRESSURE: 56 MMHG | HEIGHT: 72 IN | TEMPERATURE: 97.7 F | WEIGHT: 130 LBS

## 2020-01-01 VITALS
WEIGHT: 177.4 LBS | RESPIRATION RATE: 16 BRPM | HEART RATE: 79 BPM | DIASTOLIC BLOOD PRESSURE: 78 MMHG | TEMPERATURE: 95.8 F | HEIGHT: 72 IN | OXYGEN SATURATION: 97 % | SYSTOLIC BLOOD PRESSURE: 133 MMHG | BODY MASS INDEX: 24.03 KG/M2

## 2020-01-01 VITALS
WEIGHT: 175.6 LBS | BODY MASS INDEX: 23.78 KG/M2 | RESPIRATION RATE: 16 BRPM | HEART RATE: 89 BPM | HEIGHT: 72 IN | TEMPERATURE: 98.5 F | SYSTOLIC BLOOD PRESSURE: 116 MMHG | DIASTOLIC BLOOD PRESSURE: 77 MMHG | OXYGEN SATURATION: 97 %

## 2020-01-01 VITALS
TEMPERATURE: 98.9 F | WEIGHT: 161.8 LBS | HEART RATE: 95 BPM | RESPIRATION RATE: 18 BRPM | BODY MASS INDEX: 21.94 KG/M2 | OXYGEN SATURATION: 97 % | SYSTOLIC BLOOD PRESSURE: 109 MMHG | DIASTOLIC BLOOD PRESSURE: 74 MMHG

## 2020-01-01 VITALS
DIASTOLIC BLOOD PRESSURE: 48 MMHG | HEART RATE: 83 BPM | TEMPERATURE: 97.7 F | RESPIRATION RATE: 20 BRPM | SYSTOLIC BLOOD PRESSURE: 97 MMHG | OXYGEN SATURATION: 96 %

## 2020-01-01 VITALS
TEMPERATURE: 98.9 F | DIASTOLIC BLOOD PRESSURE: 57 MMHG | HEART RATE: 78 BPM | RESPIRATION RATE: 20 BRPM | SYSTOLIC BLOOD PRESSURE: 91 MMHG | OXYGEN SATURATION: 94 %

## 2020-01-01 VITALS
RESPIRATION RATE: 16 BRPM | SYSTOLIC BLOOD PRESSURE: 104 MMHG | OXYGEN SATURATION: 95 % | TEMPERATURE: 98.2 F | DIASTOLIC BLOOD PRESSURE: 77 MMHG

## 2020-01-01 VITALS
HEART RATE: 59 BPM | HEIGHT: 72 IN | SYSTOLIC BLOOD PRESSURE: 92 MMHG | RESPIRATION RATE: 15 BRPM | OXYGEN SATURATION: 97 % | WEIGHT: 132.6 LBS | TEMPERATURE: 98.1 F | BODY MASS INDEX: 17.96 KG/M2 | DIASTOLIC BLOOD PRESSURE: 67 MMHG

## 2020-01-01 VITALS
HEART RATE: 72 BPM | RESPIRATION RATE: 18 BRPM | SYSTOLIC BLOOD PRESSURE: 118 MMHG | OXYGEN SATURATION: 94 % | DIASTOLIC BLOOD PRESSURE: 74 MMHG | TEMPERATURE: 97.1 F

## 2020-01-01 VITALS
TEMPERATURE: 97.6 F | DIASTOLIC BLOOD PRESSURE: 69 MMHG | OXYGEN SATURATION: 99 % | HEART RATE: 84 BPM | SYSTOLIC BLOOD PRESSURE: 99 MMHG | RESPIRATION RATE: 20 BRPM

## 2020-01-01 VITALS
OXYGEN SATURATION: 98 % | DIASTOLIC BLOOD PRESSURE: 75 MMHG | HEART RATE: 88 BPM | TEMPERATURE: 97.8 F | RESPIRATION RATE: 20 BRPM | SYSTOLIC BLOOD PRESSURE: 103 MMHG

## 2020-01-01 VITALS
WEIGHT: 173.4 LBS | HEART RATE: 86 BPM | DIASTOLIC BLOOD PRESSURE: 82 MMHG | HEIGHT: 72 IN | RESPIRATION RATE: 16 BRPM | BODY MASS INDEX: 23.49 KG/M2 | SYSTOLIC BLOOD PRESSURE: 115 MMHG | TEMPERATURE: 98.1 F | OXYGEN SATURATION: 97 %

## 2020-01-01 VITALS
RESPIRATION RATE: 20 BRPM | HEART RATE: 86 BPM | SYSTOLIC BLOOD PRESSURE: 102 MMHG | DIASTOLIC BLOOD PRESSURE: 72 MMHG | TEMPERATURE: 98.1 F | OXYGEN SATURATION: 100 %

## 2020-01-01 VITALS
SYSTOLIC BLOOD PRESSURE: 104 MMHG | DIASTOLIC BLOOD PRESSURE: 77 MMHG | BODY MASS INDEX: 21.32 KG/M2 | WEIGHT: 157.4 LBS | TEMPERATURE: 98.2 F | HEIGHT: 72 IN | HEART RATE: 85 BPM | RESPIRATION RATE: 16 BRPM | OXYGEN SATURATION: 96 %

## 2020-01-01 VITALS
WEIGHT: 132 LBS | SYSTOLIC BLOOD PRESSURE: 132 MMHG | RESPIRATION RATE: 16 BRPM | HEART RATE: 75 BPM | DIASTOLIC BLOOD PRESSURE: 90 MMHG | BODY MASS INDEX: 17.9 KG/M2 | TEMPERATURE: 97.4 F | OXYGEN SATURATION: 100 %

## 2020-01-01 VITALS
OXYGEN SATURATION: 93 % | TEMPERATURE: 97.9 F | SYSTOLIC BLOOD PRESSURE: 101 MMHG | DIASTOLIC BLOOD PRESSURE: 71 MMHG | HEART RATE: 74 BPM | RESPIRATION RATE: 20 BRPM

## 2020-01-01 VITALS
RESPIRATION RATE: 18 BRPM | OXYGEN SATURATION: 95 % | SYSTOLIC BLOOD PRESSURE: 123 MMHG | HEART RATE: 70 BPM | TEMPERATURE: 98.1 F | DIASTOLIC BLOOD PRESSURE: 77 MMHG

## 2020-01-01 VITALS
DIASTOLIC BLOOD PRESSURE: 64 MMHG | HEART RATE: 91 BPM | WEIGHT: 148.2 LBS | OXYGEN SATURATION: 96 % | SYSTOLIC BLOOD PRESSURE: 92 MMHG | RESPIRATION RATE: 14 BRPM | TEMPERATURE: 98.1 F | BODY MASS INDEX: 20.1 KG/M2

## 2020-01-01 VITALS
TEMPERATURE: 98.7 F | SYSTOLIC BLOOD PRESSURE: 104 MMHG | DIASTOLIC BLOOD PRESSURE: 61 MMHG | RESPIRATION RATE: 18 BRPM | HEART RATE: 75 BPM

## 2020-01-01 VITALS
DIASTOLIC BLOOD PRESSURE: 64 MMHG | RESPIRATION RATE: 16 BRPM | SYSTOLIC BLOOD PRESSURE: 100 MMHG | TEMPERATURE: 98.2 F | HEART RATE: 81 BPM

## 2020-01-01 VITALS
SYSTOLIC BLOOD PRESSURE: 115 MMHG | TEMPERATURE: 97.8 F | DIASTOLIC BLOOD PRESSURE: 80 MMHG | RESPIRATION RATE: 20 BRPM | HEART RATE: 80 BPM

## 2020-01-01 VITALS
OXYGEN SATURATION: 100 % | TEMPERATURE: 96.2 F | HEART RATE: 73 BPM | SYSTOLIC BLOOD PRESSURE: 135 MMHG | BODY MASS INDEX: 17.83 KG/M2 | DIASTOLIC BLOOD PRESSURE: 84 MMHG | WEIGHT: 131.5 LBS | RESPIRATION RATE: 16 BRPM

## 2020-01-01 VITALS
HEART RATE: 75 BPM | SYSTOLIC BLOOD PRESSURE: 103 MMHG | RESPIRATION RATE: 18 BRPM | TEMPERATURE: 98.3 F | DIASTOLIC BLOOD PRESSURE: 66 MMHG

## 2020-01-01 VITALS
DIASTOLIC BLOOD PRESSURE: 79 MMHG | HEART RATE: 89 BPM | RESPIRATION RATE: 16 BRPM | TEMPERATURE: 98.2 F | SYSTOLIC BLOOD PRESSURE: 117 MMHG

## 2020-01-01 VITALS — SYSTOLIC BLOOD PRESSURE: 98 MMHG | HEART RATE: 69 BPM | DIASTOLIC BLOOD PRESSURE: 65 MMHG | OXYGEN SATURATION: 99 %

## 2020-01-01 VITALS
WEIGHT: 150 LBS | BODY MASS INDEX: 20.34 KG/M2 | DIASTOLIC BLOOD PRESSURE: 62 MMHG | RESPIRATION RATE: 18 BRPM | TEMPERATURE: 98.3 F | SYSTOLIC BLOOD PRESSURE: 91 MMHG | HEART RATE: 94 BPM

## 2020-01-01 VITALS
DIASTOLIC BLOOD PRESSURE: 66 MMHG | HEART RATE: 72 BPM | TEMPERATURE: 98.1 F | SYSTOLIC BLOOD PRESSURE: 102 MMHG | RESPIRATION RATE: 18 BRPM | OXYGEN SATURATION: 97 %

## 2020-01-01 VITALS
DIASTOLIC BLOOD PRESSURE: 78 MMHG | OXYGEN SATURATION: 96 % | SYSTOLIC BLOOD PRESSURE: 121 MMHG | RESPIRATION RATE: 20 BRPM | HEART RATE: 76 BPM | TEMPERATURE: 98.1 F

## 2020-01-01 VITALS
SYSTOLIC BLOOD PRESSURE: 105 MMHG | HEART RATE: 88 BPM | TEMPERATURE: 98.5 F | RESPIRATION RATE: 18 BRPM | DIASTOLIC BLOOD PRESSURE: 71 MMHG

## 2020-01-01 VITALS
WEIGHT: 170 LBS | HEIGHT: 72 IN | BODY MASS INDEX: 23.03 KG/M2 | SYSTOLIC BLOOD PRESSURE: 112 MMHG | OXYGEN SATURATION: 97 % | HEART RATE: 85 BPM | DIASTOLIC BLOOD PRESSURE: 80 MMHG

## 2020-01-01 VITALS
HEIGHT: 72 IN | BODY MASS INDEX: 20.18 KG/M2 | OXYGEN SATURATION: 96 % | WEIGHT: 149 LBS | TEMPERATURE: 98.9 F | DIASTOLIC BLOOD PRESSURE: 70 MMHG | HEART RATE: 65 BPM | SYSTOLIC BLOOD PRESSURE: 105 MMHG | RESPIRATION RATE: 16 BRPM

## 2020-01-01 VITALS
SYSTOLIC BLOOD PRESSURE: 100 MMHG | DIASTOLIC BLOOD PRESSURE: 68 MMHG | RESPIRATION RATE: 20 BRPM | TEMPERATURE: 97.8 F | OXYGEN SATURATION: 95 % | HEART RATE: 92 BPM

## 2020-01-01 VITALS
DIASTOLIC BLOOD PRESSURE: 66 MMHG | TEMPERATURE: 98.3 F | HEIGHT: 72 IN | SYSTOLIC BLOOD PRESSURE: 103 MMHG | HEART RATE: 75 BPM | BODY MASS INDEX: 20.2 KG/M2 | RESPIRATION RATE: 18 BRPM | OXYGEN SATURATION: 97 %

## 2020-01-01 VITALS — BODY MASS INDEX: 21.02 KG/M2 | WEIGHT: 155 LBS

## 2020-01-01 VITALS
RESPIRATION RATE: 18 BRPM | DIASTOLIC BLOOD PRESSURE: 76 MMHG | HEART RATE: 77 BPM | SYSTOLIC BLOOD PRESSURE: 119 MMHG | OXYGEN SATURATION: 97 %

## 2020-01-01 VITALS
HEART RATE: 91 BPM | WEIGHT: 171.8 LBS | SYSTOLIC BLOOD PRESSURE: 125 MMHG | TEMPERATURE: 98.8 F | BODY MASS INDEX: 23.27 KG/M2 | DIASTOLIC BLOOD PRESSURE: 84 MMHG | RESPIRATION RATE: 16 BRPM | HEIGHT: 72 IN | OXYGEN SATURATION: 99 %

## 2020-01-01 VITALS
DIASTOLIC BLOOD PRESSURE: 72 MMHG | RESPIRATION RATE: 20 BRPM | TEMPERATURE: 97.7 F | HEART RATE: 78 BPM | SYSTOLIC BLOOD PRESSURE: 103 MMHG

## 2020-01-01 VITALS — WEIGHT: 128.2 LBS | BODY MASS INDEX: 17.36 KG/M2 | HEIGHT: 72 IN

## 2020-01-01 VITALS
WEIGHT: 132.2 LBS | RESPIRATION RATE: 20 BRPM | DIASTOLIC BLOOD PRESSURE: 62 MMHG | HEART RATE: 80 BPM | HEIGHT: 72 IN | BODY MASS INDEX: 17.9 KG/M2 | SYSTOLIC BLOOD PRESSURE: 109 MMHG | TEMPERATURE: 96.9 F | WEIGHT: 132 LBS | OXYGEN SATURATION: 98 % | BODY MASS INDEX: 17.9 KG/M2

## 2020-01-01 VITALS
BODY MASS INDEX: 18.58 KG/M2 | RESPIRATION RATE: 16 BRPM | HEART RATE: 67 BPM | SYSTOLIC BLOOD PRESSURE: 92 MMHG | WEIGHT: 137 LBS | DIASTOLIC BLOOD PRESSURE: 60 MMHG | TEMPERATURE: 97.6 F | OXYGEN SATURATION: 99 %

## 2020-01-01 VITALS — BODY MASS INDEX: 17.9 KG/M2 | WEIGHT: 132 LBS

## 2020-01-01 VITALS
SYSTOLIC BLOOD PRESSURE: 115 MMHG | TEMPERATURE: 98.1 F | DIASTOLIC BLOOD PRESSURE: 72 MMHG | HEART RATE: 78 BPM | OXYGEN SATURATION: 98 % | RESPIRATION RATE: 20 BRPM

## 2020-01-01 VITALS
DIASTOLIC BLOOD PRESSURE: 66 MMHG | RESPIRATION RATE: 16 BRPM | SYSTOLIC BLOOD PRESSURE: 102 MMHG | TEMPERATURE: 97.3 F | HEART RATE: 80 BPM

## 2020-01-01 VITALS
TEMPERATURE: 98.6 F | SYSTOLIC BLOOD PRESSURE: 123 MMHG | HEART RATE: 77 BPM | RESPIRATION RATE: 20 BRPM | DIASTOLIC BLOOD PRESSURE: 82 MMHG | OXYGEN SATURATION: 96 %

## 2020-01-01 VITALS
HEART RATE: 79 BPM | RESPIRATION RATE: 18 BRPM | SYSTOLIC BLOOD PRESSURE: 107 MMHG | DIASTOLIC BLOOD PRESSURE: 72 MMHG | TEMPERATURE: 98.1 F

## 2020-01-01 VITALS
OXYGEN SATURATION: 96 % | SYSTOLIC BLOOD PRESSURE: 96 MMHG | TEMPERATURE: 98.8 F | DIASTOLIC BLOOD PRESSURE: 67 MMHG | HEART RATE: 86 BPM | RESPIRATION RATE: 16 BRPM

## 2020-01-01 VITALS
DIASTOLIC BLOOD PRESSURE: 69 MMHG | SYSTOLIC BLOOD PRESSURE: 110 MMHG | HEART RATE: 92 BPM | TEMPERATURE: 98.5 F | RESPIRATION RATE: 18 BRPM

## 2020-01-01 VITALS
DIASTOLIC BLOOD PRESSURE: 66 MMHG | OXYGEN SATURATION: 97 % | TEMPERATURE: 96.7 F | SYSTOLIC BLOOD PRESSURE: 113 MMHG | HEART RATE: 92 BPM | RESPIRATION RATE: 16 BRPM

## 2020-01-01 VITALS
RESPIRATION RATE: 18 BRPM | SYSTOLIC BLOOD PRESSURE: 100 MMHG | DIASTOLIC BLOOD PRESSURE: 63 MMHG | HEART RATE: 71 BPM | TEMPERATURE: 98.3 F

## 2020-01-01 DIAGNOSIS — C34.92 STAGE IV SQUAMOUS CELL CARCINOMA OF LEFT LUNG (H): ICD-10-CM

## 2020-01-01 DIAGNOSIS — R97.20 ELEVATED PROSTATE SPECIFIC ANTIGEN (PSA): Primary | ICD-10-CM

## 2020-01-01 DIAGNOSIS — C79.51 BONE METASTASIS: ICD-10-CM

## 2020-01-01 DIAGNOSIS — J43.8 OTHER EMPHYSEMA (H): Primary | ICD-10-CM

## 2020-01-01 DIAGNOSIS — Z12.11 SPECIAL SCREENING FOR MALIGNANT NEOPLASMS, COLON: ICD-10-CM

## 2020-01-01 DIAGNOSIS — R63.4 WEIGHT LOSS: ICD-10-CM

## 2020-01-01 DIAGNOSIS — J69.0 ASPIRATION PNEUMONIA OF RIGHT LUNG, UNSPECIFIED ASPIRATION PNEUMONIA TYPE, UNSPECIFIED PART OF LUNG (H): ICD-10-CM

## 2020-01-01 DIAGNOSIS — R07.89 CHEST WALL PAIN: ICD-10-CM

## 2020-01-01 DIAGNOSIS — R06.83 SNORING: ICD-10-CM

## 2020-01-01 DIAGNOSIS — C34.92 STAGE IV SQUAMOUS CELL CARCINOMA OF LEFT LUNG (H): Primary | ICD-10-CM

## 2020-01-01 DIAGNOSIS — Z51.11 ENCOUNTER FOR ANTINEOPLASTIC CHEMOTHERAPY: Primary | ICD-10-CM

## 2020-01-01 DIAGNOSIS — G93.40 ACUTE ENCEPHALOPATHY: Primary | ICD-10-CM

## 2020-01-01 DIAGNOSIS — Z71.89 COUNSELING AND COORDINATION OF CARE: ICD-10-CM

## 2020-01-01 DIAGNOSIS — C34.12 SQUAMOUS CELL CARCINOMA OF BRONCHUS IN LEFT UPPER LOBE (H): ICD-10-CM

## 2020-01-01 DIAGNOSIS — F02.80 ALZHEIMER'S DEMENTIA WITHOUT BEHAVIORAL DISTURBANCE, UNSPECIFIED TIMING OF DEMENTIA ONSET: ICD-10-CM

## 2020-01-01 DIAGNOSIS — G89.3 CANCER ASSOCIATED PAIN: ICD-10-CM

## 2020-01-01 DIAGNOSIS — I10 ESSENTIAL HYPERTENSION: ICD-10-CM

## 2020-01-01 DIAGNOSIS — Z00.00 MEDICARE ANNUAL WELLNESS VISIT, SUBSEQUENT: Primary | ICD-10-CM

## 2020-01-01 DIAGNOSIS — Z51.11 ENCOUNTER FOR ANTINEOPLASTIC CHEMOTHERAPY: ICD-10-CM

## 2020-01-01 DIAGNOSIS — G25.81 RESTLESS LEGS SYNDROME (RLS): ICD-10-CM

## 2020-01-01 DIAGNOSIS — C34.90 PRIMARY MALIGNANT NEOPLASM OF LUNG METASTATIC TO OTHER SITE, UNSPECIFIED LATERALITY (H): ICD-10-CM

## 2020-01-01 DIAGNOSIS — N40.0 ENLARGED PROSTATE: ICD-10-CM

## 2020-01-01 DIAGNOSIS — C34.12 MALIGNANT NEOPLASM OF UPPER LOBE OF LEFT LUNG (H): ICD-10-CM

## 2020-01-01 DIAGNOSIS — G89.3 CHRONIC PAIN DUE TO NEOPLASM: ICD-10-CM

## 2020-01-01 DIAGNOSIS — C34.12 MALIGNANT NEOPLASM OF UPPER LOBE OF LEFT LUNG (H): Primary | ICD-10-CM

## 2020-01-01 DIAGNOSIS — R41.3 MEMORY LOSS: Primary | ICD-10-CM

## 2020-01-01 DIAGNOSIS — C34.92 SQUAMOUS CELL CARCINOMA OF LUNG, STAGE IV, LEFT (H): ICD-10-CM

## 2020-01-01 DIAGNOSIS — R97.20 ELEVATED PROSTATE SPECIFIC ANTIGEN (PSA): ICD-10-CM

## 2020-01-01 DIAGNOSIS — E03.9 HYPOTHYROIDISM, UNSPECIFIED TYPE: ICD-10-CM

## 2020-01-01 DIAGNOSIS — G25.81 RESTLESS LEG SYNDROME: ICD-10-CM

## 2020-01-01 DIAGNOSIS — Z53.9 ERRONEOUS ENCOUNTER--DISREGARD: Primary | ICD-10-CM

## 2020-01-01 DIAGNOSIS — Z71.89 OTHER SPECIFIED COUNSELING: ICD-10-CM

## 2020-01-01 DIAGNOSIS — C34.91 PRIMARY MALIGNANT NEOPLASM OF RIGHT LUNG METASTATIC TO OTHER SITE (H): Primary | ICD-10-CM

## 2020-01-01 DIAGNOSIS — J18.9 COMMUNITY ACQUIRED PNEUMONIA, UNSPECIFIED LATERALITY: Primary | ICD-10-CM

## 2020-01-01 DIAGNOSIS — G93.40 ACUTE ENCEPHALOPATHY: ICD-10-CM

## 2020-01-01 DIAGNOSIS — C34.90 PRIMARY MALIGNANT NEOPLASM OF LUNG METASTATIC TO OTHER SITE, UNSPECIFIED LATERALITY (H): Primary | ICD-10-CM

## 2020-01-01 DIAGNOSIS — Z79.891 ENCOUNTER FOR LONG-TERM USE OF OPIATE ANALGESIC: ICD-10-CM

## 2020-01-01 DIAGNOSIS — M62.81 MUSCLE WEAKNESS (GENERALIZED): ICD-10-CM

## 2020-01-01 DIAGNOSIS — G47.9 DIFFICULTY SLEEPING: ICD-10-CM

## 2020-01-01 DIAGNOSIS — R06.02 SHORTNESS OF BREATH: Primary | ICD-10-CM

## 2020-01-01 DIAGNOSIS — Z51.5 ENCOUNTER FOR PALLIATIVE CARE: ICD-10-CM

## 2020-01-01 DIAGNOSIS — C34.92 NON-SMALL CELL CANCER OF LEFT LUNG (H): ICD-10-CM

## 2020-01-01 DIAGNOSIS — Z20.822 COVID-19 VIRUS NOT DETECTED: Primary | ICD-10-CM

## 2020-01-01 DIAGNOSIS — Z12.5 SCREENING FOR PROSTATE CANCER: ICD-10-CM

## 2020-01-01 DIAGNOSIS — C34.92 SQUAMOUS CELL LUNG CANCER, LEFT (H): ICD-10-CM

## 2020-01-01 DIAGNOSIS — F32.0 MILD MAJOR DEPRESSION (H): ICD-10-CM

## 2020-01-01 DIAGNOSIS — M89.9 BONE LESION: ICD-10-CM

## 2020-01-01 DIAGNOSIS — R41.82 ALTERED MENTAL STATUS, UNSPECIFIED ALTERED MENTAL STATUS TYPE: ICD-10-CM

## 2020-01-01 DIAGNOSIS — E86.0 DEHYDRATION: ICD-10-CM

## 2020-01-01 DIAGNOSIS — J43.8 OTHER EMPHYSEMA (H): ICD-10-CM

## 2020-01-01 DIAGNOSIS — R06.09 DYSPNEA ON EXERTION: Primary | ICD-10-CM

## 2020-01-01 DIAGNOSIS — R06.00 DYSPNEA: Primary | ICD-10-CM

## 2020-01-01 DIAGNOSIS — H69.91 DYSFUNCTION OF RIGHT EUSTACHIAN TUBE: ICD-10-CM

## 2020-01-01 DIAGNOSIS — E87.1 HYPONATREMIA: ICD-10-CM

## 2020-01-01 DIAGNOSIS — J18.9 PNEUMONIA OF RIGHT LUNG DUE TO INFECTIOUS ORGANISM, UNSPECIFIED PART OF LUNG: ICD-10-CM

## 2020-01-01 DIAGNOSIS — Z11.59 ENCOUNTER FOR SCREENING FOR OTHER VIRAL DISEASES: ICD-10-CM

## 2020-01-01 DIAGNOSIS — R41.3 MEMORY LOSS: ICD-10-CM

## 2020-01-01 DIAGNOSIS — F41.9 ANXIETY: ICD-10-CM

## 2020-01-01 DIAGNOSIS — N18.30 CKD (CHRONIC KIDNEY DISEASE) STAGE 3, GFR 30-59 ML/MIN (H): ICD-10-CM

## 2020-01-01 DIAGNOSIS — G89.3 CANCER ASSOCIATED PAIN: Primary | ICD-10-CM

## 2020-01-01 DIAGNOSIS — G89.3 CANCER RELATED PAIN: Primary | ICD-10-CM

## 2020-01-01 DIAGNOSIS — C79.51 BONE METASTASIS: Primary | ICD-10-CM

## 2020-01-01 DIAGNOSIS — J18.9 COMMUNITY ACQUIRED PNEUMONIA, UNSPECIFIED LATERALITY: ICD-10-CM

## 2020-01-01 DIAGNOSIS — G31.84 MILD COGNITIVE IMPAIRMENT: ICD-10-CM

## 2020-01-01 DIAGNOSIS — E78.5 HYPERLIPIDEMIA LDL GOAL <100: ICD-10-CM

## 2020-01-01 DIAGNOSIS — C34.91 PRIMARY MALIGNANT NEOPLASM OF RIGHT LUNG METASTATIC TO OTHER SITE (H): ICD-10-CM

## 2020-01-01 DIAGNOSIS — C34.90 MALIGNANT NEOPLASM OF LUNG, UNSPECIFIED LATERALITY, UNSPECIFIED PART OF LUNG (H): ICD-10-CM

## 2020-01-01 DIAGNOSIS — Z85.118 HISTORY OF LUNG CANCER: Primary | ICD-10-CM

## 2020-01-01 DIAGNOSIS — G30.9 ALZHEIMER'S DEMENTIA WITHOUT BEHAVIORAL DISTURBANCE, UNSPECIFIED TIMING OF DEMENTIA ONSET: ICD-10-CM

## 2020-01-01 DIAGNOSIS — Z11.59 ENCOUNTER FOR SCREENING FOR OTHER VIRAL DISEASES: Primary | ICD-10-CM

## 2020-01-01 DIAGNOSIS — F05 SUNDOWNING: Primary | ICD-10-CM

## 2020-01-01 DIAGNOSIS — R41.89 COGNITIVE IMPAIRMENT: ICD-10-CM

## 2020-01-01 DIAGNOSIS — R79.81 LOW OXYGEN SATURATION: ICD-10-CM

## 2020-01-01 DIAGNOSIS — C34.90 MALIGNANT NEOPLASM OF LUNG, UNSPECIFIED LATERALITY, UNSPECIFIED PART OF LUNG (H): Primary | ICD-10-CM

## 2020-01-01 DIAGNOSIS — R05.9 COUGH: Primary | ICD-10-CM

## 2020-01-01 DIAGNOSIS — R09.02 HYPOXIA: ICD-10-CM

## 2020-01-01 DIAGNOSIS — K94.23 MALFUNCTION OF GASTROSTOMY TUBE (H): ICD-10-CM

## 2020-01-01 DIAGNOSIS — Z20.822 COVID-19 RULED OUT: Primary | ICD-10-CM

## 2020-01-01 DIAGNOSIS — F05 SUNDOWNING: ICD-10-CM

## 2020-01-01 DIAGNOSIS — R06.00 DYSPNEA: ICD-10-CM

## 2020-01-01 DIAGNOSIS — R50.9 FEVER, UNSPECIFIED FEVER CAUSE: ICD-10-CM

## 2020-01-01 DIAGNOSIS — I95.9 HYPOTENSION, UNSPECIFIED HYPOTENSION TYPE: ICD-10-CM

## 2020-01-01 DIAGNOSIS — N18.30 CKD (CHRONIC KIDNEY DISEASE) STAGE 3, GFR 30-59 ML/MIN (H): Primary | ICD-10-CM

## 2020-01-01 DIAGNOSIS — G31.84 MILD COGNITIVE IMPAIRMENT: Primary | ICD-10-CM

## 2020-01-01 LAB
ABO + RH BLD: NORMAL
ABO + RH BLD: NORMAL
ALBUMIN SERPL-MCNC: 2 G/DL (ref 3.4–5)
ALBUMIN SERPL-MCNC: 2.2 G/DL (ref 3.4–5)
ALBUMIN SERPL-MCNC: 2.3 G/DL (ref 3.4–5)
ALBUMIN SERPL-MCNC: 2.4 G/DL (ref 3.4–5)
ALBUMIN SERPL-MCNC: 2.5 G/DL (ref 3.4–5)
ALBUMIN SERPL-MCNC: 2.5 G/DL (ref 3.4–5)
ALBUMIN SERPL-MCNC: 2.6 G/DL (ref 3.4–5)
ALBUMIN SERPL-MCNC: 2.7 G/DL (ref 3.4–5)
ALBUMIN SERPL-MCNC: 2.8 G/DL (ref 3.4–5)
ALBUMIN SERPL-MCNC: 2.9 G/DL (ref 3.4–5)
ALBUMIN SERPL-MCNC: 3 G/DL (ref 3.4–5)
ALBUMIN SERPL-MCNC: 3.1 G/DL (ref 3.4–5)
ALBUMIN SERPL-MCNC: 3.8 G/DL (ref 3.4–5)
ALBUMIN UR-MCNC: 10 MG/DL
ALBUMIN UR-MCNC: 10 MG/DL
ALBUMIN UR-MCNC: 30 MG/DL
ALBUMIN UR-MCNC: 30 MG/DL
ALBUMIN UR-MCNC: NEGATIVE MG/DL
ALP SERPL-CCNC: 102 U/L (ref 40–150)
ALP SERPL-CCNC: 102 U/L (ref 40–150)
ALP SERPL-CCNC: 104 U/L (ref 40–150)
ALP SERPL-CCNC: 107 U/L (ref 40–150)
ALP SERPL-CCNC: 107 U/L (ref 40–150)
ALP SERPL-CCNC: 108 U/L (ref 40–150)
ALP SERPL-CCNC: 110 U/L (ref 40–150)
ALP SERPL-CCNC: 121 U/L (ref 40–150)
ALP SERPL-CCNC: 122 U/L (ref 40–150)
ALP SERPL-CCNC: 123 U/L (ref 40–150)
ALP SERPL-CCNC: 127 U/L (ref 40–150)
ALP SERPL-CCNC: 70 U/L (ref 40–150)
ALP SERPL-CCNC: 82 U/L (ref 40–150)
ALP SERPL-CCNC: 83 U/L (ref 40–150)
ALP SERPL-CCNC: 90 U/L (ref 40–150)
ALP SERPL-CCNC: 90 U/L (ref 40–150)
ALP SERPL-CCNC: 91 U/L (ref 40–150)
ALP SERPL-CCNC: 98 U/L (ref 40–150)
ALT SERPL W P-5'-P-CCNC: 12 U/L (ref 0–70)
ALT SERPL W P-5'-P-CCNC: 14 U/L (ref 0–70)
ALT SERPL W P-5'-P-CCNC: 15 U/L (ref 0–70)
ALT SERPL W P-5'-P-CCNC: 16 U/L (ref 0–70)
ALT SERPL W P-5'-P-CCNC: 17 U/L (ref 0–70)
ALT SERPL W P-5'-P-CCNC: 17 U/L (ref 0–70)
ALT SERPL W P-5'-P-CCNC: 18 U/L (ref 0–70)
ALT SERPL W P-5'-P-CCNC: 19 U/L (ref 0–70)
ALT SERPL W P-5'-P-CCNC: 20 U/L (ref 0–70)
ALT SERPL W P-5'-P-CCNC: 21 U/L (ref 0–70)
ALT SERPL W P-5'-P-CCNC: 23 U/L (ref 0–70)
ALT SERPL W P-5'-P-CCNC: 24 U/L (ref 0–70)
ALT SERPL W P-5'-P-CCNC: 25 U/L (ref 0–70)
ALT SERPL W P-5'-P-CCNC: 25 U/L (ref 0–70)
ALT SERPL W P-5'-P-CCNC: 30 U/L (ref 0–70)
ANION GAP SERPL CALCULATED.3IONS-SCNC: 1 MMOL/L (ref 3–14)
ANION GAP SERPL CALCULATED.3IONS-SCNC: 2 MMOL/L (ref 3–14)
ANION GAP SERPL CALCULATED.3IONS-SCNC: 3 MMOL/L (ref 3–14)
ANION GAP SERPL CALCULATED.3IONS-SCNC: 4 MMOL/L (ref 3–14)
ANION GAP SERPL CALCULATED.3IONS-SCNC: 5 MMOL/L (ref 3–14)
ANION GAP SERPL CALCULATED.3IONS-SCNC: 6 MMOL/L (ref 3–14)
ANION GAP SERPL CALCULATED.3IONS-SCNC: 7 MMOL/L (ref 3–14)
ANION GAP SERPL CALCULATED.3IONS-SCNC: 8 MMOL/L (ref 3–14)
ANION GAP SERPL CALCULATED.3IONS-SCNC: <1 MMOL/L (ref 3–14)
ANISOCYTOSIS BLD QL SMEAR: ABNORMAL
ANISOCYTOSIS BLD QL SMEAR: SLIGHT
ANISOCYTOSIS BLD QL SMEAR: SLIGHT
APPEARANCE UR: CLEAR
AST SERPL W P-5'-P-CCNC: 10 U/L (ref 0–45)
AST SERPL W P-5'-P-CCNC: 11 U/L (ref 0–45)
AST SERPL W P-5'-P-CCNC: 12 U/L (ref 0–45)
AST SERPL W P-5'-P-CCNC: 12 U/L (ref 0–45)
AST SERPL W P-5'-P-CCNC: 13 U/L (ref 0–45)
AST SERPL W P-5'-P-CCNC: 15 U/L (ref 0–45)
AST SERPL W P-5'-P-CCNC: 16 U/L (ref 0–45)
AST SERPL W P-5'-P-CCNC: 17 U/L (ref 0–45)
AST SERPL W P-5'-P-CCNC: 18 U/L (ref 0–45)
AST SERPL W P-5'-P-CCNC: 5 U/L (ref 0–45)
AST SERPL W P-5'-P-CCNC: 6 U/L (ref 0–45)
AST SERPL W P-5'-P-CCNC: 9 U/L (ref 0–45)
BACTERIA SPEC CULT: ABNORMAL
BACTERIA SPEC CULT: ABNORMAL
BACTERIA SPEC CULT: NO GROWTH
BASE EXCESS BLDV CALC-SCNC: 2.7 MMOL/L
BASE EXCESS BLDV CALC-SCNC: 5.1 MMOL/L
BASOPHILS # BLD AUTO: 0 10E9/L (ref 0–0.2)
BASOPHILS # BLD AUTO: 0.1 10E9/L (ref 0–0.2)
BASOPHILS # BLD AUTO: 0.1 10E9/L (ref 0–0.2)
BASOPHILS NFR BLD AUTO: 0 %
BASOPHILS NFR BLD AUTO: 0 %
BASOPHILS NFR BLD AUTO: 0.1 %
BASOPHILS NFR BLD AUTO: 0.2 %
BASOPHILS NFR BLD AUTO: 0.3 %
BASOPHILS NFR BLD AUTO: 0.4 %
BASOPHILS NFR BLD AUTO: 0.4 %
BASOPHILS NFR BLD AUTO: 0.9 %
BASOPHILS NFR BLD AUTO: 1 %
BASOPHILS NFR BLD AUTO: 3 %
BILIRUB DIRECT SERPL-MCNC: 0.1 MG/DL (ref 0–0.2)
BILIRUB SERPL-MCNC: 0.3 MG/DL (ref 0.2–1.3)
BILIRUB SERPL-MCNC: 0.4 MG/DL (ref 0.2–1.3)
BILIRUB SERPL-MCNC: 0.5 MG/DL (ref 0.2–1.3)
BILIRUB SERPL-MCNC: 0.6 MG/DL (ref 0.2–1.3)
BILIRUB SERPL-MCNC: 0.7 MG/DL (ref 0.2–1.3)
BILIRUB SERPL-MCNC: 0.8 MG/DL (ref 0.2–1.3)
BILIRUB SERPL-MCNC: 0.8 MG/DL (ref 0.2–1.3)
BILIRUB SERPL-MCNC: 0.9 MG/DL (ref 0.2–1.3)
BILIRUB SERPL-MCNC: 1 MG/DL (ref 0.2–1.3)
BILIRUB UR QL STRIP: NEGATIVE
BLD GP AB SCN SERPL QL: NORMAL
BLD PROD TYP BPU: NORMAL
BLD UNIT ID BPU: 0
BLD UNIT ID BPU: 0
BLOOD BANK CMNT PATIENT-IMP: NORMAL
BLOOD PRODUCT CODE: NORMAL
BLOOD PRODUCT CODE: NORMAL
BPU ID: NORMAL
BPU ID: NORMAL
BUN SERPL-MCNC: 10 MG/DL (ref 7–30)
BUN SERPL-MCNC: 11 MG/DL (ref 7–30)
BUN SERPL-MCNC: 12 MG/DL (ref 7–30)
BUN SERPL-MCNC: 13 MG/DL (ref 7–30)
BUN SERPL-MCNC: 14 MG/DL (ref 7–30)
BUN SERPL-MCNC: 15 MG/DL (ref 7–30)
BUN SERPL-MCNC: 15 MG/DL (ref 7–30)
BUN SERPL-MCNC: 17 MG/DL (ref 7–30)
BUN SERPL-MCNC: 20 MG/DL (ref 7–30)
BUN SERPL-MCNC: 22 MG/DL (ref 7–30)
BUN SERPL-MCNC: 24 MG/DL (ref 7–30)
BUN SERPL-MCNC: 8 MG/DL (ref 7–30)
BUN SERPL-MCNC: 9 MG/DL (ref 7–30)
CALCIUM SERPL-MCNC: 10.7 MG/DL (ref 8.5–10.1)
CALCIUM SERPL-MCNC: 7.3 MG/DL (ref 8.5–10.1)
CALCIUM SERPL-MCNC: 7.4 MG/DL (ref 8.5–10.1)
CALCIUM SERPL-MCNC: 7.5 MG/DL (ref 8.5–10.1)
CALCIUM SERPL-MCNC: 7.5 MG/DL (ref 8.5–10.1)
CALCIUM SERPL-MCNC: 7.7 MG/DL (ref 8.5–10.1)
CALCIUM SERPL-MCNC: 7.8 MG/DL (ref 8.5–10.1)
CALCIUM SERPL-MCNC: 7.9 MG/DL (ref 8.5–10.1)
CALCIUM SERPL-MCNC: 7.9 MG/DL (ref 8.5–10.1)
CALCIUM SERPL-MCNC: 8 MG/DL (ref 8.5–10.1)
CALCIUM SERPL-MCNC: 8 MG/DL (ref 8.5–10.1)
CALCIUM SERPL-MCNC: 8.2 MG/DL (ref 8.5–10.1)
CALCIUM SERPL-MCNC: 8.2 MG/DL (ref 8.5–10.1)
CALCIUM SERPL-MCNC: 8.3 MG/DL (ref 8.5–10.1)
CALCIUM SERPL-MCNC: 8.3 MG/DL (ref 8.5–10.1)
CALCIUM SERPL-MCNC: 8.4 MG/DL (ref 8.5–10.1)
CALCIUM SERPL-MCNC: 8.5 MG/DL (ref 8.5–10.1)
CALCIUM SERPL-MCNC: 8.5 MG/DL (ref 8.5–10.1)
CALCIUM SERPL-MCNC: 8.6 MG/DL (ref 8.5–10.1)
CALCIUM SERPL-MCNC: 8.7 MG/DL (ref 8.5–10.1)
CALCIUM SERPL-MCNC: 8.9 MG/DL (ref 8.5–10.1)
CALCIUM SERPL-MCNC: 9 MG/DL (ref 8.5–10.1)
CALCIUM SERPL-MCNC: 9.1 MG/DL (ref 8.5–10.1)
CALCIUM SERPL-MCNC: 9.3 MG/DL (ref 8.5–10.1)
CALCIUM SERPL-MCNC: 9.4 MG/DL (ref 8.5–10.1)
CALCIUM SERPL-MCNC: 9.5 MG/DL (ref 8.5–10.1)
CALCIUM SERPL-MCNC: 9.7 MG/DL (ref 8.5–10.1)
CALCIUM SERPL-MCNC: 9.8 MG/DL (ref 8.5–10.1)
CALCIUM SERPL-MCNC: 9.8 MG/DL (ref 8.5–10.1)
CHLORIDE SERPL-SCNC: 100 MMOL/L (ref 94–109)
CHLORIDE SERPL-SCNC: 101 MMOL/L (ref 94–109)
CHLORIDE SERPL-SCNC: 101 MMOL/L (ref 94–109)
CHLORIDE SERPL-SCNC: 102 MMOL/L (ref 94–109)
CHLORIDE SERPL-SCNC: 102 MMOL/L (ref 94–109)
CHLORIDE SERPL-SCNC: 80 MMOL/L (ref 94–109)
CHLORIDE SERPL-SCNC: 82 MMOL/L (ref 94–109)
CHLORIDE SERPL-SCNC: 85 MMOL/L (ref 94–109)
CHLORIDE SERPL-SCNC: 87 MMOL/L (ref 94–109)
CHLORIDE SERPL-SCNC: 88 MMOL/L (ref 94–109)
CHLORIDE SERPL-SCNC: 89 MMOL/L (ref 94–109)
CHLORIDE SERPL-SCNC: 90 MMOL/L (ref 94–109)
CHLORIDE SERPL-SCNC: 91 MMOL/L (ref 94–109)
CHLORIDE SERPL-SCNC: 91 MMOL/L (ref 94–109)
CHLORIDE SERPL-SCNC: 92 MMOL/L (ref 94–109)
CHLORIDE SERPL-SCNC: 93 MMOL/L (ref 94–109)
CHLORIDE SERPL-SCNC: 94 MMOL/L (ref 94–109)
CHLORIDE SERPL-SCNC: 94 MMOL/L (ref 94–109)
CHLORIDE SERPL-SCNC: 95 MMOL/L (ref 94–109)
CHLORIDE SERPL-SCNC: 95 MMOL/L (ref 94–109)
CHLORIDE SERPL-SCNC: 96 MMOL/L (ref 94–109)
CHLORIDE SERPL-SCNC: 96 MMOL/L (ref 94–109)
CHLORIDE SERPL-SCNC: 97 MMOL/L (ref 94–109)
CHLORIDE SERPL-SCNC: 97 MMOL/L (ref 94–109)
CHLORIDE SERPL-SCNC: 98 MMOL/L (ref 94–109)
CHOLEST SERPL-MCNC: 153 MG/DL
CK SERPL-CCNC: 41 U/L (ref 30–300)
CO2 SERPL-SCNC: 24 MMOL/L (ref 20–32)
CO2 SERPL-SCNC: 26 MMOL/L (ref 20–32)
CO2 SERPL-SCNC: 26 MMOL/L (ref 20–32)
CO2 SERPL-SCNC: 27 MMOL/L (ref 20–32)
CO2 SERPL-SCNC: 28 MMOL/L (ref 20–32)
CO2 SERPL-SCNC: 29 MMOL/L (ref 20–32)
CO2 SERPL-SCNC: 30 MMOL/L (ref 20–32)
CO2 SERPL-SCNC: 30 MMOL/L (ref 20–32)
CO2 SERPL-SCNC: 31 MMOL/L (ref 20–32)
CO2 SERPL-SCNC: 32 MMOL/L (ref 20–32)
CO2 SERPL-SCNC: 32 MMOL/L (ref 20–32)
CO2 SERPL-SCNC: 33 MMOL/L (ref 20–32)
CO2 SERPL-SCNC: 34 MMOL/L (ref 20–32)
CO2 SERPL-SCNC: 36 MMOL/L (ref 20–32)
CO2 SERPL-SCNC: 40 MMOL/L (ref 20–32)
COLOR UR AUTO: YELLOW
COPATH REPORT: NORMAL
CORTIS SERPL-MCNC: 15.3 UG/DL (ref 4–22)
CREAT BLD-MCNC: 1.4 MG/DL (ref 0.66–1.25)
CREAT SERPL-MCNC: 0.39 MG/DL (ref 0.66–1.25)
CREAT SERPL-MCNC: 0.41 MG/DL (ref 0.66–1.25)
CREAT SERPL-MCNC: 0.43 MG/DL (ref 0.66–1.25)
CREAT SERPL-MCNC: 0.46 MG/DL (ref 0.66–1.25)
CREAT SERPL-MCNC: 0.47 MG/DL (ref 0.66–1.25)
CREAT SERPL-MCNC: 0.48 MG/DL (ref 0.66–1.25)
CREAT SERPL-MCNC: 0.49 MG/DL (ref 0.66–1.25)
CREAT SERPL-MCNC: 0.5 MG/DL (ref 0.66–1.25)
CREAT SERPL-MCNC: 0.5 MG/DL (ref 0.66–1.25)
CREAT SERPL-MCNC: 0.54 MG/DL (ref 0.66–1.25)
CREAT SERPL-MCNC: 0.57 MG/DL (ref 0.66–1.25)
CREAT SERPL-MCNC: 0.58 MG/DL (ref 0.66–1.25)
CREAT SERPL-MCNC: 0.59 MG/DL (ref 0.66–1.25)
CREAT SERPL-MCNC: 0.61 MG/DL (ref 0.66–1.25)
CREAT SERPL-MCNC: 0.61 MG/DL (ref 0.66–1.25)
CREAT SERPL-MCNC: 0.62 MG/DL (ref 0.66–1.25)
CREAT SERPL-MCNC: 0.62 MG/DL (ref 0.66–1.25)
CREAT SERPL-MCNC: 0.64 MG/DL (ref 0.66–1.25)
CREAT SERPL-MCNC: 0.67 MG/DL (ref 0.66–1.25)
CREAT SERPL-MCNC: 0.69 MG/DL (ref 0.66–1.25)
CREAT SERPL-MCNC: 0.72 MG/DL (ref 0.66–1.25)
CREAT SERPL-MCNC: 0.77 MG/DL (ref 0.66–1.25)
CREAT SERPL-MCNC: 0.85 MG/DL (ref 0.66–1.25)
CREAT SERPL-MCNC: 0.92 MG/DL (ref 0.66–1.25)
CREAT SERPL-MCNC: 1 MG/DL (ref 0.66–1.25)
CREAT SERPL-MCNC: 1.01 MG/DL (ref 0.66–1.25)
CREAT SERPL-MCNC: 1.03 MG/DL (ref 0.66–1.25)
CREAT SERPL-MCNC: 1.31 MG/DL (ref 0.66–1.25)
CREAT SERPL-MCNC: 1.33 MG/DL (ref 0.66–1.25)
CREAT SERPL-MCNC: 1.34 MG/DL (ref 0.66–1.25)
CREAT UR-MCNC: 74 MG/DL
DEPRECATED CALCIDIOL+CALCIFEROL SERPL-MC: 53 UG/L (ref 20–75)
DIFFERENTIAL METHOD BLD: ABNORMAL
DLCOCOR-%PRED-PRE: 54 %
DLCOCOR-PRE: 14.86 ML/MIN/MMHG
DLCOUNC-%PRED-PRE: 49 %
DLCOUNC-PRE: 13.37 ML/MIN/MMHG
DLCOUNC-PRED: 27.2 ML/MIN/MMHG
DOHLE BOD BLD QL SMEAR: PRESENT
ELLIPTOCYTES BLD QL SMEAR: SLIGHT
EOSINOPHIL # BLD AUTO: 0 10E9/L (ref 0–0.7)
EOSINOPHIL # BLD AUTO: 0.1 10E9/L (ref 0–0.7)
EOSINOPHIL # BLD AUTO: 0.2 10E9/L (ref 0–0.7)
EOSINOPHIL # BLD AUTO: 0.3 10E9/L (ref 0–0.7)
EOSINOPHIL # BLD AUTO: 0.4 10E9/L (ref 0–0.7)
EOSINOPHIL # BLD AUTO: 0.4 10E9/L (ref 0–0.7)
EOSINOPHIL # BLD AUTO: 0.5 10E9/L (ref 0–0.7)
EOSINOPHIL NFR BLD AUTO: 0.5 %
EOSINOPHIL NFR BLD AUTO: 0.6 %
EOSINOPHIL NFR BLD AUTO: 1.1 %
EOSINOPHIL NFR BLD AUTO: 1.7 %
EOSINOPHIL NFR BLD AUTO: 1.8 %
EOSINOPHIL NFR BLD AUTO: 2 %
EOSINOPHIL NFR BLD AUTO: 2.1 %
EOSINOPHIL NFR BLD AUTO: 2.1 %
EOSINOPHIL NFR BLD AUTO: 2.9 %
EOSINOPHIL NFR BLD AUTO: 3.3 %
EOSINOPHIL NFR BLD AUTO: 3.4 %
EOSINOPHIL NFR BLD AUTO: 3.5 %
EOSINOPHIL NFR BLD AUTO: 3.5 %
EOSINOPHIL NFR BLD AUTO: 4 %
ERV-%PRED-PRE: 27 %
ERV-PRE: 0.46 L
ERV-PRED: 1.68 L
ERYTHROCYTE [DISTWIDTH] IN BLOOD BY AUTOMATED COUNT: 13.6 % (ref 10–15)
ERYTHROCYTE [DISTWIDTH] IN BLOOD BY AUTOMATED COUNT: 13.9 % (ref 10–15)
ERYTHROCYTE [DISTWIDTH] IN BLOOD BY AUTOMATED COUNT: 15.2 % (ref 10–15)
ERYTHROCYTE [DISTWIDTH] IN BLOOD BY AUTOMATED COUNT: 15.2 % (ref 10–15)
ERYTHROCYTE [DISTWIDTH] IN BLOOD BY AUTOMATED COUNT: 15.6 % (ref 10–15)
ERYTHROCYTE [DISTWIDTH] IN BLOOD BY AUTOMATED COUNT: 15.9 % (ref 10–15)
ERYTHROCYTE [DISTWIDTH] IN BLOOD BY AUTOMATED COUNT: 16.7 % (ref 10–15)
ERYTHROCYTE [DISTWIDTH] IN BLOOD BY AUTOMATED COUNT: 17 % (ref 10–15)
ERYTHROCYTE [DISTWIDTH] IN BLOOD BY AUTOMATED COUNT: 17.1 % (ref 10–15)
ERYTHROCYTE [DISTWIDTH] IN BLOOD BY AUTOMATED COUNT: 17.3 % (ref 10–15)
ERYTHROCYTE [DISTWIDTH] IN BLOOD BY AUTOMATED COUNT: 17.4 % (ref 10–15)
ERYTHROCYTE [DISTWIDTH] IN BLOOD BY AUTOMATED COUNT: 17.8 % (ref 10–15)
ERYTHROCYTE [DISTWIDTH] IN BLOOD BY AUTOMATED COUNT: 17.8 % (ref 10–15)
ERYTHROCYTE [DISTWIDTH] IN BLOOD BY AUTOMATED COUNT: 17.9 % (ref 10–15)
ERYTHROCYTE [DISTWIDTH] IN BLOOD BY AUTOMATED COUNT: 17.9 % (ref 10–15)
ERYTHROCYTE [DISTWIDTH] IN BLOOD BY AUTOMATED COUNT: 18.2 % (ref 10–15)
ERYTHROCYTE [DISTWIDTH] IN BLOOD BY AUTOMATED COUNT: 18.5 % (ref 10–15)
ERYTHROCYTE [DISTWIDTH] IN BLOOD BY AUTOMATED COUNT: 18.7 % (ref 10–15)
ERYTHROCYTE [DISTWIDTH] IN BLOOD BY AUTOMATED COUNT: 18.8 % (ref 10–15)
ERYTHROCYTE [DISTWIDTH] IN BLOOD BY AUTOMATED COUNT: 19.5 % (ref 10–15)
ERYTHROCYTE [DISTWIDTH] IN BLOOD BY AUTOMATED COUNT: 20 % (ref 10–15)
EXPTIME-PRE: 6.52 SEC
FEF2575-%PRED-PRE: 48 %
FEF2575-PRE: 1.14 L/SEC
FEF2575-PRED: 2.35 L/SEC
FEFMAX-%PRED-PRE: 51 %
FEFMAX-PRE: 4.47 L/SEC
FEFMAX-PRED: 8.61 L/SEC
FEV1-%PRED-PRE: 56 %
FEV1-PRE: 1.76 L
FEV1FEV6-PRE: 72 %
FEV1FEV6-PRED: 77 %
FEV1FVC-PRE: 71 %
FEV1FVC-PRED: 76 %
FEV1SVC-PRE: 70 %
FEV1SVC-PRED: 62 %
FIFMAX-PRE: 3.79 L/SEC
FRACT EXCRET NA UR+SERPL-RTO: 0.4 %
FRCPLETH-%PRED-PRE: 84 %
FRCPLETH-PRE: 3.24 L
FRCPLETH-PRED: 3.84 L
FVC-%PRED-PRE: 59 %
FVC-PRE: 2.47 L
FVC-PRED: 4.11 L
GFR SERPL CREATININE-BSD FRML MDRD: 50 ML/MIN/{1.73_M2}
GFR SERPL CREATININE-BSD FRML MDRD: 53 ML/MIN/{1.73_M2}
GFR SERPL CREATININE-BSD FRML MDRD: 53 ML/MIN/{1.73_M2}
GFR SERPL CREATININE-BSD FRML MDRD: 54 ML/MIN/{1.73_M2}
GFR SERPL CREATININE-BSD FRML MDRD: 72 ML/MIN/{1.73_M2}
GFR SERPL CREATININE-BSD FRML MDRD: 74 ML/MIN/{1.73_M2}
GFR SERPL CREATININE-BSD FRML MDRD: 75 ML/MIN/{1.73_M2}
GFR SERPL CREATININE-BSD FRML MDRD: 83 ML/MIN/{1.73_M2}
GFR SERPL CREATININE-BSD FRML MDRD: 87 ML/MIN/{1.73_M2}
GFR SERPL CREATININE-BSD FRML MDRD: >90 ML/MIN/{1.73_M2}
GLUCOSE SERPL-MCNC: 101 MG/DL (ref 70–99)
GLUCOSE SERPL-MCNC: 103 MG/DL (ref 70–99)
GLUCOSE SERPL-MCNC: 105 MG/DL (ref 70–99)
GLUCOSE SERPL-MCNC: 107 MG/DL (ref 70–99)
GLUCOSE SERPL-MCNC: 109 MG/DL (ref 70–99)
GLUCOSE SERPL-MCNC: 109 MG/DL (ref 70–99)
GLUCOSE SERPL-MCNC: 111 MG/DL (ref 70–99)
GLUCOSE SERPL-MCNC: 112 MG/DL (ref 70–99)
GLUCOSE SERPL-MCNC: 114 MG/DL (ref 70–99)
GLUCOSE SERPL-MCNC: 117 MG/DL (ref 70–99)
GLUCOSE SERPL-MCNC: 119 MG/DL (ref 70–99)
GLUCOSE SERPL-MCNC: 120 MG/DL (ref 70–99)
GLUCOSE SERPL-MCNC: 120 MG/DL (ref 70–99)
GLUCOSE SERPL-MCNC: 121 MG/DL (ref 70–99)
GLUCOSE SERPL-MCNC: 123 MG/DL (ref 70–99)
GLUCOSE SERPL-MCNC: 136 MG/DL (ref 70–99)
GLUCOSE SERPL-MCNC: 146 MG/DL (ref 70–99)
GLUCOSE SERPL-MCNC: 151 MG/DL (ref 70–99)
GLUCOSE SERPL-MCNC: 189 MG/DL (ref 70–99)
GLUCOSE SERPL-MCNC: 72 MG/DL (ref 70–99)
GLUCOSE SERPL-MCNC: 78 MG/DL (ref 70–99)
GLUCOSE SERPL-MCNC: 78 MG/DL (ref 70–99)
GLUCOSE SERPL-MCNC: 79 MG/DL (ref 70–99)
GLUCOSE SERPL-MCNC: 79 MG/DL (ref 70–99)
GLUCOSE SERPL-MCNC: 84 MG/DL (ref 70–99)
GLUCOSE SERPL-MCNC: 85 MG/DL (ref 70–99)
GLUCOSE SERPL-MCNC: 88 MG/DL (ref 70–99)
GLUCOSE SERPL-MCNC: 88 MG/DL (ref 70–99)
GLUCOSE SERPL-MCNC: 91 MG/DL (ref 70–99)
GLUCOSE SERPL-MCNC: 92 MG/DL (ref 70–99)
GLUCOSE SERPL-MCNC: 92 MG/DL (ref 70–99)
GLUCOSE SERPL-MCNC: 95 MG/DL (ref 70–99)
GLUCOSE SERPL-MCNC: 98 MG/DL (ref 70–99)
GLUCOSE SERPL-MCNC: 98 MG/DL (ref 70–99)
GLUCOSE UR STRIP-MCNC: NEGATIVE MG/DL
GRAM STN SPEC: NORMAL
HCO3 BLDV-SCNC: 28 MMOL/L (ref 21–28)
HCO3 BLDV-SCNC: 31 MMOL/L (ref 21–28)
HCT VFR BLD AUTO: 19.5 % (ref 40–53)
HCT VFR BLD AUTO: 23 % (ref 40–53)
HCT VFR BLD AUTO: 24.4 % (ref 40–53)
HCT VFR BLD AUTO: 24.5 % (ref 40–53)
HCT VFR BLD AUTO: 25.4 % (ref 40–53)
HCT VFR BLD AUTO: 26.4 % (ref 40–53)
HCT VFR BLD AUTO: 27 % (ref 40–53)
HCT VFR BLD AUTO: 27.1 % (ref 40–53)
HCT VFR BLD AUTO: 27.4 % (ref 40–53)
HCT VFR BLD AUTO: 27.8 % (ref 40–53)
HCT VFR BLD AUTO: 27.9 % (ref 40–53)
HCT VFR BLD AUTO: 28 % (ref 40–53)
HCT VFR BLD AUTO: 28.4 % (ref 40–53)
HCT VFR BLD AUTO: 30.5 % (ref 40–53)
HCT VFR BLD AUTO: 30.7 % (ref 40–53)
HCT VFR BLD AUTO: 32.1 % (ref 40–53)
HCT VFR BLD AUTO: 32.4 % (ref 40–53)
HCT VFR BLD AUTO: 33.3 % (ref 40–53)
HCT VFR BLD AUTO: 33.7 % (ref 40–53)
HCT VFR BLD AUTO: 34.9 % (ref 40–53)
HCT VFR BLD AUTO: 38.3 % (ref 40–53)
HDLC SERPL-MCNC: 56 MG/DL
HGB BLD-MCNC: 10.2 G/DL (ref 13.3–17.7)
HGB BLD-MCNC: 10.8 G/DL (ref 13.3–17.7)
HGB BLD-MCNC: 10.8 G/DL (ref 13.3–17.7)
HGB BLD-MCNC: 10.9 G/DL (ref 13.3–17.7)
HGB BLD-MCNC: 11.1 G/DL (ref 13.3–17.7)
HGB BLD-MCNC: 11.5 G/DL (ref 13.3–17.7)
HGB BLD-MCNC: 12.4 G/DL (ref 13.3–17.7)
HGB BLD-MCNC: 14 G/DL (ref 13.3–17.7)
HGB BLD-MCNC: 6.1 G/DL (ref 13.3–17.7)
HGB BLD-MCNC: 7.3 G/DL (ref 13.3–17.7)
HGB BLD-MCNC: 7.7 G/DL (ref 13.3–17.7)
HGB BLD-MCNC: 8 G/DL (ref 13.3–17.7)
HGB BLD-MCNC: 8.3 G/DL (ref 13.3–17.7)
HGB BLD-MCNC: 8.7 G/DL (ref 13.3–17.7)
HGB BLD-MCNC: 8.8 G/DL (ref 13.3–17.7)
HGB BLD-MCNC: 8.9 G/DL (ref 13.3–17.7)
HGB BLD-MCNC: 9.1 G/DL (ref 13.3–17.7)
HGB BLD-MCNC: 9.3 G/DL (ref 13.3–17.7)
HGB BLD-MCNC: 9.4 G/DL (ref 13.3–17.7)
HGB BLD-MCNC: 9.5 G/DL (ref 13.3–17.7)
HGB BLD-MCNC: 9.6 G/DL (ref 13.3–17.7)
HGB BLD-MCNC: 9.7 G/DL (ref 13.3–17.7)
HGB UR QL STRIP: NEGATIVE
HYALINE CASTS #/AREA URNS LPF: 3 /LPF (ref 0–2)
HYALINE CASTS #/AREA URNS LPF: 4 /LPF (ref 0–2)
HYPOCHROMIA BLD QL: PRESENT
HYPOCHROMIA BLD QL: PRESENT
IC-%PRED-PRE: 60 %
IC-PRE: 2.05 L
IC-PRED: 3.39 L
IMM GRANULOCYTES # BLD: 0 10E9/L (ref 0–0.4)
IMM GRANULOCYTES # BLD: 0.1 10E9/L (ref 0–0.4)
IMM GRANULOCYTES # BLD: 0.4 10E9/L (ref 0–0.4)
IMM GRANULOCYTES # BLD: 0.4 10E9/L (ref 0–0.4)
IMM GRANULOCYTES NFR BLD: 0 %
IMM GRANULOCYTES NFR BLD: 0.1 %
IMM GRANULOCYTES NFR BLD: 0.1 %
IMM GRANULOCYTES NFR BLD: 0.2 %
IMM GRANULOCYTES NFR BLD: 0.3 %
IMM GRANULOCYTES NFR BLD: 0.4 %
IMM GRANULOCYTES NFR BLD: 0.6 %
IMM GRANULOCYTES NFR BLD: 0.6 %
IMM GRANULOCYTES NFR BLD: 0.7 %
IMM GRANULOCYTES NFR BLD: 0.8 %
IMM GRANULOCYTES NFR BLD: 1 %
IMM GRANULOCYTES NFR BLD: 1.3 %
IMM GRANULOCYTES NFR BLD: 2.1 %
IMM GRANULOCYTES NFR BLD: 2.6 %
INR PPP: 0.99 (ref 0.86–1.14)
INR PPP: 1.05 (ref 0.86–1.14)
INTERPRETATION ECG - MUSE: NORMAL
KETONES UR STRIP-MCNC: 40 MG/DL
KETONES UR STRIP-MCNC: NEGATIVE MG/DL
LABORATORY COMMENT REPORT: NORMAL
LACTATE BLD-SCNC: 0.8 MMOL/L (ref 0.7–2)
LACTATE BLD-SCNC: 0.9 MMOL/L (ref 0.7–2)
LACTATE BLD-SCNC: 0.9 MMOL/L (ref 0.7–2)
LACTATE BLD-SCNC: 1.1 MMOL/L (ref 0.7–2)
LACTATE BLD-SCNC: 1.5 MMOL/L (ref 0.7–2)
LACTATE BLD-SCNC: 1.6 MMOL/L (ref 0.7–2)
LDLC SERPL CALC-MCNC: 72 MG/DL
LEUKOCYTE ESTERASE UR QL STRIP: ABNORMAL
LEUKOCYTE ESTERASE UR QL STRIP: NEGATIVE
LYMPHOCYTES # BLD AUTO: 0.1 10E9/L (ref 0.8–5.3)
LYMPHOCYTES # BLD AUTO: 0.2 10E9/L (ref 0.8–5.3)
LYMPHOCYTES # BLD AUTO: 0.4 10E9/L (ref 0.8–5.3)
LYMPHOCYTES # BLD AUTO: 0.5 10E9/L (ref 0.8–5.3)
LYMPHOCYTES # BLD AUTO: 0.6 10E9/L (ref 0.8–5.3)
LYMPHOCYTES # BLD AUTO: 0.7 10E9/L (ref 0.8–5.3)
LYMPHOCYTES # BLD AUTO: 0.7 10E9/L (ref 0.8–5.3)
LYMPHOCYTES # BLD AUTO: 0.8 10E9/L (ref 0.8–5.3)
LYMPHOCYTES # BLD AUTO: 0.8 10E9/L (ref 0.8–5.3)
LYMPHOCYTES NFR BLD AUTO: 11 %
LYMPHOCYTES NFR BLD AUTO: 2 %
LYMPHOCYTES NFR BLD AUTO: 2.3 %
LYMPHOCYTES NFR BLD AUTO: 2.9 %
LYMPHOCYTES NFR BLD AUTO: 20.2 %
LYMPHOCYTES NFR BLD AUTO: 3 %
LYMPHOCYTES NFR BLD AUTO: 3.1 %
LYMPHOCYTES NFR BLD AUTO: 3.4 %
LYMPHOCYTES NFR BLD AUTO: 4 %
LYMPHOCYTES NFR BLD AUTO: 4.3 %
LYMPHOCYTES NFR BLD AUTO: 4.5 %
LYMPHOCYTES NFR BLD AUTO: 5 %
LYMPHOCYTES NFR BLD AUTO: 5 %
LYMPHOCYTES NFR BLD AUTO: 5.6 %
LYMPHOCYTES NFR BLD AUTO: 7 %
LYMPHOCYTES NFR BLD AUTO: 7.5 %
LYMPHOCYTES NFR BLD AUTO: 8.1 %
LYMPHOCYTES NFR BLD AUTO: 8.6 %
Lab: NORMAL
MACROCYTES BLD QL SMEAR: PRESENT
MAGNESIUM SERPL-MCNC: 1.4 MG/DL (ref 1.6–2.3)
MAGNESIUM SERPL-MCNC: 1.7 MG/DL (ref 1.6–2.3)
MAGNESIUM SERPL-MCNC: 1.7 MG/DL (ref 1.6–2.3)
MAGNESIUM SERPL-MCNC: 2 MG/DL (ref 1.6–2.3)
MAGNESIUM SERPL-MCNC: 2 MG/DL (ref 1.6–2.3)
MCH RBC QN AUTO: 25.1 PG (ref 26.5–33)
MCH RBC QN AUTO: 25.2 PG (ref 26.5–33)
MCH RBC QN AUTO: 25.3 PG (ref 26.5–33)
MCH RBC QN AUTO: 25.4 PG (ref 26.5–33)
MCH RBC QN AUTO: 25.6 PG (ref 26.5–33)
MCH RBC QN AUTO: 25.8 PG (ref 26.5–33)
MCH RBC QN AUTO: 25.9 PG (ref 26.5–33)
MCH RBC QN AUTO: 26 PG (ref 26.5–33)
MCH RBC QN AUTO: 26 PG (ref 26.5–33)
MCH RBC QN AUTO: 26.2 PG (ref 26.5–33)
MCH RBC QN AUTO: 26.3 PG (ref 26.5–33)
MCH RBC QN AUTO: 26.3 PG (ref 26.5–33)
MCH RBC QN AUTO: 26.4 PG (ref 26.5–33)
MCH RBC QN AUTO: 26.5 PG (ref 26.5–33)
MCH RBC QN AUTO: 26.5 PG (ref 26.5–33)
MCH RBC QN AUTO: 26.7 PG (ref 26.5–33)
MCH RBC QN AUTO: 26.9 PG (ref 26.5–33)
MCH RBC QN AUTO: 27.2 PG (ref 26.5–33)
MCH RBC QN AUTO: 28 PG (ref 26.5–33)
MCH RBC QN AUTO: 28.1 PG (ref 26.5–33)
MCH RBC QN AUTO: 29.2 PG (ref 26.5–33)
MCH RBC QN AUTO: 29.9 PG (ref 26.5–33)
MCH RBC QN AUTO: 30.4 PG (ref 26.5–33)
MCHC RBC AUTO-ENTMCNC: 31.3 G/DL (ref 31.5–36.5)
MCHC RBC AUTO-ENTMCNC: 31.6 G/DL (ref 31.5–36.5)
MCHC RBC AUTO-ENTMCNC: 31.7 G/DL (ref 31.5–36.5)
MCHC RBC AUTO-ENTMCNC: 31.7 G/DL (ref 31.5–36.5)
MCHC RBC AUTO-ENTMCNC: 31.8 G/DL (ref 31.5–36.5)
MCHC RBC AUTO-ENTMCNC: 32 G/DL (ref 31.5–36.5)
MCHC RBC AUTO-ENTMCNC: 32 G/DL (ref 31.5–36.5)
MCHC RBC AUTO-ENTMCNC: 32.1 G/DL (ref 31.5–36.5)
MCHC RBC AUTO-ENTMCNC: 32.1 G/DL (ref 31.5–36.5)
MCHC RBC AUTO-ENTMCNC: 32.4 G/DL (ref 31.5–36.5)
MCHC RBC AUTO-ENTMCNC: 32.7 G/DL (ref 31.5–36.5)
MCHC RBC AUTO-ENTMCNC: 32.7 G/DL (ref 31.5–36.5)
MCHC RBC AUTO-ENTMCNC: 33 G/DL (ref 31.5–36.5)
MCHC RBC AUTO-ENTMCNC: 33 G/DL (ref 31.5–36.5)
MCHC RBC AUTO-ENTMCNC: 33.1 G/DL (ref 31.5–36.5)
MCHC RBC AUTO-ENTMCNC: 33.2 G/DL (ref 31.5–36.5)
MCHC RBC AUTO-ENTMCNC: 33.2 G/DL (ref 31.5–36.5)
MCHC RBC AUTO-ENTMCNC: 33.3 G/DL (ref 31.5–36.5)
MCHC RBC AUTO-ENTMCNC: 33.3 G/DL (ref 31.5–36.5)
MCHC RBC AUTO-ENTMCNC: 33.5 G/DL (ref 31.5–36.5)
MCHC RBC AUTO-ENTMCNC: 33.6 G/DL (ref 31.5–36.5)
MCHC RBC AUTO-ENTMCNC: 33.6 G/DL (ref 31.5–36.5)
MCHC RBC AUTO-ENTMCNC: 34.4 G/DL (ref 31.5–36.5)
MCV RBC AUTO: 77 FL (ref 78–100)
MCV RBC AUTO: 77 FL (ref 78–100)
MCV RBC AUTO: 78 FL (ref 78–100)
MCV RBC AUTO: 79 FL (ref 78–100)
MCV RBC AUTO: 80 FL (ref 78–100)
MCV RBC AUTO: 82 FL (ref 78–100)
MCV RBC AUTO: 83 FL (ref 78–100)
MCV RBC AUTO: 84 FL (ref 78–100)
MCV RBC AUTO: 89 FL (ref 78–100)
MCV RBC AUTO: 90 FL (ref 78–100)
MCV RBC AUTO: 92 FL (ref 78–100)
METAMYELOCYTES # BLD: 0 10E9/L
METAMYELOCYTES # BLD: 0.1 10E9/L
METAMYELOCYTES # BLD: 0.6 10E9/L
METAMYELOCYTES NFR BLD MANUAL: 1 %
METAMYELOCYTES NFR BLD MANUAL: 1 %
METAMYELOCYTES NFR BLD MANUAL: 8 %
MICROCYTES BLD QL SMEAR: PRESENT
MICROCYTES BLD QL SMEAR: PRESENT
MONOCYTES # BLD AUTO: 0 10E9/L (ref 0–1.3)
MONOCYTES # BLD AUTO: 0.1 10E9/L (ref 0–1.3)
MONOCYTES # BLD AUTO: 0.3 10E9/L (ref 0–1.3)
MONOCYTES # BLD AUTO: 0.3 10E9/L (ref 0–1.3)
MONOCYTES # BLD AUTO: 0.4 10E9/L (ref 0–1.3)
MONOCYTES # BLD AUTO: 0.5 10E9/L (ref 0–1.3)
MONOCYTES # BLD AUTO: 0.6 10E9/L (ref 0–1.3)
MONOCYTES # BLD AUTO: 0.6 10E9/L (ref 0–1.3)
MONOCYTES # BLD AUTO: 0.7 10E9/L (ref 0–1.3)
MONOCYTES # BLD AUTO: 0.8 10E9/L (ref 0–1.3)
MONOCYTES # BLD AUTO: 0.9 10E9/L (ref 0–1.3)
MONOCYTES # BLD AUTO: 1 10E9/L (ref 0–1.3)
MONOCYTES NFR BLD AUTO: 0.4 %
MONOCYTES NFR BLD AUTO: 10.2 %
MONOCYTES NFR BLD AUTO: 14 %
MONOCYTES NFR BLD AUTO: 3.5 %
MONOCYTES NFR BLD AUTO: 3.5 %
MONOCYTES NFR BLD AUTO: 3.7 %
MONOCYTES NFR BLD AUTO: 3.7 %
MONOCYTES NFR BLD AUTO: 4 %
MONOCYTES NFR BLD AUTO: 4.6 %
MONOCYTES NFR BLD AUTO: 5 %
MONOCYTES NFR BLD AUTO: 5.5 %
MONOCYTES NFR BLD AUTO: 6 %
MONOCYTES NFR BLD AUTO: 6.4 %
MONOCYTES NFR BLD AUTO: 6.4 %
MONOCYTES NFR BLD AUTO: 7 %
MONOCYTES NFR BLD AUTO: 7 %
MONOCYTES NFR BLD AUTO: 7.2 %
MONOCYTES NFR BLD AUTO: 9 %
MUCOUS THREADS #/AREA URNS LPF: PRESENT /LPF
NEUTROPHILS # BLD AUTO: 0.8 10E9/L (ref 1.6–8.3)
NEUTROPHILS # BLD AUTO: 10.6 10E9/L (ref 1.6–8.3)
NEUTROPHILS # BLD AUTO: 11 10E9/L (ref 1.6–8.3)
NEUTROPHILS # BLD AUTO: 11.4 10E9/L (ref 1.6–8.3)
NEUTROPHILS # BLD AUTO: 11.9 10E9/L (ref 1.6–8.3)
NEUTROPHILS # BLD AUTO: 12.1 10E9/L (ref 1.6–8.3)
NEUTROPHILS # BLD AUTO: 12.5 10E9/L (ref 1.6–8.3)
NEUTROPHILS # BLD AUTO: 15.5 10E9/L (ref 1.6–8.3)
NEUTROPHILS # BLD AUTO: 17.1 10E9/L (ref 1.6–8.3)
NEUTROPHILS # BLD AUTO: 2.7 10E9/L (ref 1.6–8.3)
NEUTROPHILS # BLD AUTO: 3 10E9/L (ref 1.6–8.3)
NEUTROPHILS # BLD AUTO: 5.6 10E9/L (ref 1.6–8.3)
NEUTROPHILS # BLD AUTO: 6.7 10E9/L (ref 1.6–8.3)
NEUTROPHILS # BLD AUTO: 6.7 10E9/L (ref 1.6–8.3)
NEUTROPHILS # BLD AUTO: 7.2 10E9/L (ref 1.6–8.3)
NEUTROPHILS # BLD AUTO: 7.4 10E9/L (ref 1.6–8.3)
NEUTROPHILS # BLD AUTO: 8 10E9/L (ref 1.6–8.3)
NEUTROPHILS # BLD AUTO: 8.9 10E9/L (ref 1.6–8.3)
NEUTROPHILS NFR BLD AUTO: 71.9 %
NEUTROPHILS NFR BLD AUTO: 76 %
NEUTROPHILS NFR BLD AUTO: 78 %
NEUTROPHILS NFR BLD AUTO: 81 %
NEUTROPHILS NFR BLD AUTO: 81.2 %
NEUTROPHILS NFR BLD AUTO: 82.5 %
NEUTROPHILS NFR BLD AUTO: 84 %
NEUTROPHILS NFR BLD AUTO: 84.6 %
NEUTROPHILS NFR BLD AUTO: 84.9 %
NEUTROPHILS NFR BLD AUTO: 85.7 %
NEUTROPHILS NFR BLD AUTO: 86 %
NEUTROPHILS NFR BLD AUTO: 86.2 %
NEUTROPHILS NFR BLD AUTO: 86.4 %
NEUTROPHILS NFR BLD AUTO: 87 %
NEUTROPHILS NFR BLD AUTO: 87.9 %
NEUTROPHILS NFR BLD AUTO: 89.1 %
NEUTROPHILS NFR BLD AUTO: 90.5 %
NEUTROPHILS NFR BLD AUTO: 93.5 %
NITRATE UR QL: NEGATIVE
NONHDLC SERPL-MCNC: 97 MG/DL
NRBC # BLD AUTO: 0 10*3/UL
NRBC # BLD AUTO: 0.1 10*3/UL
NRBC BLD AUTO-RTO: 0 /100
NRBC BLD AUTO-RTO: 1 /100
NT-PROBNP SERPL-MCNC: 413 PG/ML (ref 0–900)
NUM BPU REQUESTED: 2
O2/TOTAL GAS SETTING VFR VENT: NORMAL %
OSMOLALITY SERPL: 264 MMOL/KG (ref 280–301)
OSMOLALITY UR: 284 MMOL/KG (ref 100–1200)
OSMOLALITY UR: 602 MMOL/KG (ref 100–1200)
OSMOLALITY UR: 718 MMOL/KG (ref 100–1200)
OXYHGB MFR BLDV: 35 %
PCO2 BLDV: 45 MM HG (ref 40–50)
PCO2 BLDV: 50 MM HG (ref 40–50)
PH BLDV: 7.4 PH (ref 7.32–7.43)
PH BLDV: 7.4 PH (ref 7.32–7.43)
PH UR STRIP: 6 PH (ref 5–7)
PH UR STRIP: 6 PH (ref 5–7)
PH UR STRIP: 6.5 PH (ref 5–7)
PH UR STRIP: 7 PH (ref 5–7)
PH UR STRIP: 8 PH (ref 5–7)
PHOSPHATE SERPL-MCNC: 1.8 MG/DL (ref 2.5–4.5)
PHOSPHATE SERPL-MCNC: 3.2 MG/DL (ref 2.5–4.5)
PHOSPHATE SERPL-MCNC: 3.3 MG/DL (ref 2.5–4.5)
PLATELET # BLD AUTO: 118 10E9/L (ref 150–450)
PLATELET # BLD AUTO: 127 10E9/L (ref 150–450)
PLATELET # BLD AUTO: 177 10E9/L (ref 150–450)
PLATELET # BLD AUTO: 178 10E9/L (ref 150–450)
PLATELET # BLD AUTO: 200 10E9/L (ref 150–450)
PLATELET # BLD AUTO: 231 10E9/L (ref 150–450)
PLATELET # BLD AUTO: 246 10E9/L (ref 150–450)
PLATELET # BLD AUTO: 252 10E9/L (ref 150–450)
PLATELET # BLD AUTO: 267 10E9/L (ref 150–450)
PLATELET # BLD AUTO: 271 10E9/L (ref 150–450)
PLATELET # BLD AUTO: 278 10E9/L (ref 150–450)
PLATELET # BLD AUTO: 280 10E9/L (ref 150–450)
PLATELET # BLD AUTO: 290 10E9/L (ref 150–450)
PLATELET # BLD AUTO: 313 10E9/L (ref 150–450)
PLATELET # BLD AUTO: 343 10E9/L (ref 150–450)
PLATELET # BLD AUTO: 353 10E9/L (ref 150–450)
PLATELET # BLD AUTO: 39 10E9/L (ref 150–450)
PLATELET # BLD AUTO: 39 10E9/L (ref 150–450)
PLATELET # BLD AUTO: 409 10E9/L (ref 150–450)
PLATELET # BLD AUTO: 46 10E9/L (ref 150–450)
PLATELET # BLD AUTO: 469 10E9/L (ref 150–450)
PLATELET # BLD AUTO: 494 10E9/L (ref 150–450)
PLATELET # BLD AUTO: 513 10E9/L (ref 150–450)
PLATELET # BLD AUTO: 60 10E9/L (ref 150–450)
PLATELET # BLD AUTO: 94 10E9/L (ref 150–450)
PLATELET # BLD EST: ABNORMAL 10*3/UL
PO2 BLDV: 24 MM HG (ref 25–47)
PO2 BLDV: 34 MM HG (ref 25–47)
POTASSIUM SERPL-SCNC: 3.3 MMOL/L (ref 3.4–5.3)
POTASSIUM SERPL-SCNC: 3.5 MMOL/L (ref 3.4–5.3)
POTASSIUM SERPL-SCNC: 3.6 MMOL/L (ref 3.4–5.3)
POTASSIUM SERPL-SCNC: 3.7 MMOL/L (ref 3.4–5.3)
POTASSIUM SERPL-SCNC: 3.8 MMOL/L (ref 3.4–5.3)
POTASSIUM SERPL-SCNC: 3.8 MMOL/L (ref 3.4–5.3)
POTASSIUM SERPL-SCNC: 3.9 MMOL/L (ref 3.4–5.3)
POTASSIUM SERPL-SCNC: 4 MMOL/L (ref 3.4–5.3)
POTASSIUM SERPL-SCNC: 4.1 MMOL/L (ref 3.4–5.3)
POTASSIUM SERPL-SCNC: 4.2 MMOL/L (ref 3.4–5.3)
POTASSIUM SERPL-SCNC: 4.3 MMOL/L (ref 3.4–5.3)
POTASSIUM SERPL-SCNC: 4.4 MMOL/L (ref 3.4–5.3)
POTASSIUM SERPL-SCNC: 4.5 MMOL/L (ref 3.4–5.3)
POTASSIUM SERPL-SCNC: 4.6 MMOL/L (ref 3.4–5.3)
POTASSIUM SERPL-SCNC: 4.6 MMOL/L (ref 3.4–5.3)
PROCALCITONIN SERPL-MCNC: 0.07 NG/ML
PROCALCITONIN SERPL-MCNC: 0.08 NG/ML
PROCALCITONIN SERPL-MCNC: 15.81 NG/ML
PROT SERPL-MCNC: 5.7 G/DL (ref 6.8–8.8)
PROT SERPL-MCNC: 6 G/DL (ref 6.8–8.8)
PROT SERPL-MCNC: 6 G/DL (ref 6.8–8.8)
PROT SERPL-MCNC: 6.1 G/DL (ref 6.8–8.8)
PROT SERPL-MCNC: 6.3 G/DL (ref 6.8–8.8)
PROT SERPL-MCNC: 6.3 G/DL (ref 6.8–8.8)
PROT SERPL-MCNC: 6.4 G/DL (ref 6.8–8.8)
PROT SERPL-MCNC: 6.5 G/DL (ref 6.8–8.8)
PROT SERPL-MCNC: 6.7 G/DL (ref 6.8–8.8)
PROT SERPL-MCNC: 6.8 G/DL (ref 6.8–8.8)
PROT SERPL-MCNC: 6.9 G/DL (ref 6.8–8.8)
PROT SERPL-MCNC: 7.2 G/DL (ref 6.8–8.8)
PROT SERPL-MCNC: 7.3 G/DL (ref 6.8–8.8)
PROT SERPL-MCNC: 7.4 G/DL (ref 6.8–8.8)
PROT SERPL-MCNC: 7.6 G/DL (ref 6.8–8.8)
PROT SERPL-MCNC: 7.8 G/DL (ref 6.8–8.8)
PROT SERPL-MCNC: 8.1 G/DL (ref 6.8–8.8)
PSA SERPL-ACNC: 5.59 UG/L (ref 0–4)
PTH-INTACT SERPL-MCNC: 27 PG/ML (ref 18–80)
RBC # BLD AUTO: 2.32 10E12/L (ref 4.4–5.9)
RBC # BLD AUTO: 2.77 10E12/L (ref 4.4–5.9)
RBC # BLD AUTO: 2.91 10E12/L (ref 4.4–5.9)
RBC # BLD AUTO: 3.09 10E12/L (ref 4.4–5.9)
RBC # BLD AUTO: 3.23 10E12/L (ref 4.4–5.9)
RBC # BLD AUTO: 3.29 10E12/L (ref 4.4–5.9)
RBC # BLD AUTO: 3.32 10E12/L (ref 4.4–5.9)
RBC # BLD AUTO: 3.32 10E12/L (ref 4.4–5.9)
RBC # BLD AUTO: 3.34 10E12/L (ref 4.4–5.9)
RBC # BLD AUTO: 3.39 10E12/L (ref 4.4–5.9)
RBC # BLD AUTO: 3.42 10E12/L (ref 4.4–5.9)
RBC # BLD AUTO: 3.46 10E12/L (ref 4.4–5.9)
RBC # BLD AUTO: 3.5 10E12/L (ref 4.4–5.9)
RBC # BLD AUTO: 3.56 10E12/L (ref 4.4–5.9)
RBC # BLD AUTO: 3.58 10E12/L (ref 4.4–5.9)
RBC # BLD AUTO: 3.59 10E12/L (ref 4.4–5.9)
RBC # BLD AUTO: 3.61 10E12/L (ref 4.4–5.9)
RBC # BLD AUTO: 3.65 10E12/L (ref 4.4–5.9)
RBC # BLD AUTO: 3.89 10E12/L (ref 4.4–5.9)
RBC # BLD AUTO: 4.27 10E12/L (ref 4.4–5.9)
RBC # BLD AUTO: 4.65 10E12/L (ref 4.4–5.9)
RBC #/AREA URNS AUTO: 0 /HPF (ref 0–2)
RBC #/AREA URNS AUTO: 0 /HPF (ref 0–2)
RBC #/AREA URNS AUTO: 1 /HPF (ref 0–2)
RBC #/AREA URNS AUTO: 1 /HPF (ref 0–2)
RBC MORPH BLD: ABNORMAL
RESIDUAL VOLUME (RV) (EXTERNAL): 25
RVPLETH-%PRED-PRE: 101 %
RVPLETH-PRE: 2.79 L
RVPLETH-PRED: 2.75 L
SARS-COV-2 PCR COMMENT: NORMAL
SARS-COV-2 RNA SPEC QL NAA+PROBE: NEGATIVE
SARS-COV-2 RNA SPEC QL NAA+PROBE: NORMAL
SARS-COV-2 RNA SPEC QL NAA+PROBE: NOT DETECTED
SODIUM SERPL-SCNC: 115 MMOL/L (ref 133–144)
SODIUM SERPL-SCNC: 116 MMOL/L (ref 133–144)
SODIUM SERPL-SCNC: 117 MMOL/L (ref 133–144)
SODIUM SERPL-SCNC: 120 MMOL/L (ref 133–144)
SODIUM SERPL-SCNC: 120 MMOL/L (ref 133–144)
SODIUM SERPL-SCNC: 122 MMOL/L (ref 133–144)
SODIUM SERPL-SCNC: 123 MMOL/L (ref 133–144)
SODIUM SERPL-SCNC: 124 MMOL/L (ref 133–144)
SODIUM SERPL-SCNC: 125 MMOL/L (ref 133–144)
SODIUM SERPL-SCNC: 125 MMOL/L (ref 133–144)
SODIUM SERPL-SCNC: 126 MMOL/L (ref 133–144)
SODIUM SERPL-SCNC: 127 MMOL/L (ref 133–144)
SODIUM SERPL-SCNC: 128 MMOL/L (ref 133–144)
SODIUM SERPL-SCNC: 129 MMOL/L (ref 133–144)
SODIUM SERPL-SCNC: 130 MMOL/L (ref 133–144)
SODIUM SERPL-SCNC: 131 MMOL/L (ref 133–144)
SODIUM SERPL-SCNC: 131 MMOL/L (ref 133–144)
SODIUM SERPL-SCNC: 133 MMOL/L (ref 133–144)
SODIUM SERPL-SCNC: 133 MMOL/L (ref 133–144)
SODIUM SERPL-SCNC: 134 MMOL/L (ref 133–144)
SODIUM SERPL-SCNC: 136 MMOL/L (ref 133–144)
SODIUM UR-SCNC: 25 MMOL/L
SODIUM UR-SCNC: 69 MMOL/L
SODIUM UR-SCNC: 92 MMOL/L
SODIUM UR-SCNC: 92 MMOL/L
SOURCE: ABNORMAL
SOURCE: NORMAL
SP GR UR STRIP: 1.02 (ref 1–1.03)
SPECIMEN EXP DATE BLD: NORMAL
SPECIMEN SOURCE: ABNORMAL
SPECIMEN SOURCE: NORMAL
SQUAMOUS #/AREA URNS AUTO: <1 /HPF (ref 0–1)
T3FREE SERPL-MCNC: 1.3 PG/ML (ref 2.3–4.2)
T4 FREE SERPL-MCNC: 0.91 NG/DL (ref 0.76–1.46)
T4 FREE SERPL-MCNC: 0.94 NG/DL (ref 0.76–1.46)
T4 FREE SERPL-MCNC: 0.95 NG/DL (ref 0.76–1.46)
T4 FREE SERPL-MCNC: 0.99 NG/DL (ref 0.76–1.46)
T4 FREE SERPL-MCNC: 1.23 NG/DL (ref 0.76–1.46)
T4 FREE SERPL-MCNC: 1.25 NG/DL (ref 0.76–1.46)
T4 FREE SERPL-MCNC: 1.25 NG/DL (ref 0.76–1.46)
T4 FREE SERPL-MCNC: 1.26 NG/DL (ref 0.76–1.46)
T4 FREE SERPL-MCNC: 1.28 NG/DL (ref 0.76–1.46)
T4 FREE SERPL-MCNC: 1.37 NG/DL (ref 0.76–1.46)
T4 FREE SERPL-MCNC: 1.38 NG/DL (ref 0.76–1.46)
T4 FREE SERPL-MCNC: 1.44 NG/DL (ref 0.76–1.46)
TLCPLETH-%PRED-PRE: 70 %
TLCPLETH-PRE: 5.29 L
TLCPLETH-PRED: 7.53 L
TRANSFUSION STATUS PATIENT QL: NORMAL
TRIGL SERPL-MCNC: 123 MG/DL
TSH SERPL DL<=0.005 MIU/L-ACNC: 10.68 MU/L (ref 0.4–4)
TSH SERPL DL<=0.005 MIU/L-ACNC: 10.74 MU/L (ref 0.4–4)
TSH SERPL DL<=0.005 MIU/L-ACNC: 11.21 MU/L (ref 0.4–4)
TSH SERPL DL<=0.005 MIU/L-ACNC: 13.6 MU/L (ref 0.4–4)
TSH SERPL DL<=0.005 MIU/L-ACNC: 3.66 MU/L (ref 0.4–4)
TSH SERPL DL<=0.005 MIU/L-ACNC: 3.66 MU/L (ref 0.4–4)
TSH SERPL DL<=0.005 MIU/L-ACNC: 4.46 MU/L (ref 0.4–4)
TSH SERPL DL<=0.005 MIU/L-ACNC: 4.6 MU/L (ref 0.4–4)
TSH SERPL DL<=0.005 MIU/L-ACNC: 5.61 MU/L (ref 0.4–4)
TSH SERPL DL<=0.005 MIU/L-ACNC: 5.77 MU/L (ref 0.4–4)
TSH SERPL DL<=0.005 MIU/L-ACNC: 6.92 MU/L (ref 0.4–4)
TSH SERPL DL<=0.005 MIU/L-ACNC: 7.06 MU/L (ref 0.4–4)
TSH SERPL DL<=0.005 MIU/L-ACNC: 7.15 MU/L (ref 0.4–4)
TSH SERPL DL<=0.005 MIU/L-ACNC: 7.21 MU/L (ref 0.4–4)
UROBILINOGEN UR STRIP-ACNC: 0.2 EU/DL (ref 0.2–1)
UROBILINOGEN UR STRIP-MCNC: 2 MG/DL (ref 0–2)
UROBILINOGEN UR STRIP-MCNC: 2 MG/DL (ref 0–2)
UROBILINOGEN UR STRIP-MCNC: 4 MG/DL (ref 0–2)
UROBILINOGEN UR STRIP-MCNC: NORMAL MG/DL (ref 0–2)
VA-%PRED-PRE: 56 %
VA-PRE: 3.85 L
VC-%PRED-PRE: 49 %
VC-PRE: 2.51 L
VC-PRED: 5.07 L
WBC # BLD AUTO: 1.1 10E9/L (ref 4–11)
WBC # BLD AUTO: 1.9 10E9/L (ref 4–11)
WBC # BLD AUTO: 10.5 10E9/L (ref 4–11)
WBC # BLD AUTO: 12.1 10E9/L (ref 4–11)
WBC # BLD AUTO: 12.2 10E9/L (ref 4–11)
WBC # BLD AUTO: 12.2 10E9/L (ref 4–11)
WBC # BLD AUTO: 12.8 10E9/L (ref 4–11)
WBC # BLD AUTO: 13.7 10E9/L (ref 4–11)
WBC # BLD AUTO: 13.8 10E9/L (ref 4–11)
WBC # BLD AUTO: 14 10E9/L (ref 4–11)
WBC # BLD AUTO: 14.5 10E9/L (ref 4–11)
WBC # BLD AUTO: 16.6 10E9/L (ref 4–11)
WBC # BLD AUTO: 18.9 10E9/L (ref 4–11)
WBC # BLD AUTO: 19.5 10E9/L (ref 4–11)
WBC # BLD AUTO: 3.5 10E9/L (ref 4–11)
WBC # BLD AUTO: 3.9 10E9/L (ref 4–11)
WBC # BLD AUTO: 4.6 10E9/L (ref 4–11)
WBC # BLD AUTO: 6.9 10E9/L (ref 4–11)
WBC # BLD AUTO: 8 10E9/L (ref 4–11)
WBC # BLD AUTO: 8.4 10E9/L (ref 4–11)
WBC # BLD AUTO: 8.4 10E9/L (ref 4–11)
WBC # BLD AUTO: 8.6 10E9/L (ref 4–11)
WBC # BLD AUTO: 8.6 10E9/L (ref 4–11)
WBC # BLD AUTO: 9.7 10E9/L (ref 4–11)
WBC #/AREA URNS AUTO: 1 /HPF (ref 0–5)
WBC #/AREA URNS AUTO: 2 /HPF (ref 0–5)
WBC #/AREA URNS AUTO: 9 /HPF (ref 0–5)
WBC #/AREA URNS AUTO: <1 /HPF (ref 0–5)

## 2020-01-01 PROCEDURE — 96413 CHEMO IV INFUSION 1 HR: CPT

## 2020-01-01 PROCEDURE — 25000128 H RX IP 250 OP 636: Performed by: STUDENT IN AN ORGANIZED HEALTH CARE EDUCATION/TRAINING PROGRAM

## 2020-01-01 PROCEDURE — 99215 OFFICE O/P EST HI 40 MIN: CPT | Performed by: INTERNAL MEDICINE

## 2020-01-01 PROCEDURE — 84100 ASSAY OF PHOSPHORUS: CPT | Performed by: INTERNAL MEDICINE

## 2020-01-01 PROCEDURE — C1769 GUIDE WIRE: HCPCS

## 2020-01-01 PROCEDURE — 97110 THERAPEUTIC EXERCISES: CPT | Mod: GP | Performed by: PHYSICAL THERAPIST

## 2020-01-01 PROCEDURE — 96415 CHEMO IV INFUSION ADDL HR: CPT

## 2020-01-01 PROCEDURE — 88305 TISSUE EXAM BY PATHOLOGIST: CPT | Performed by: RADIOLOGY

## 2020-01-01 PROCEDURE — 70553 MRI BRAIN STEM W/O & W/DYE: CPT

## 2020-01-01 PROCEDURE — 85025 COMPLETE CBC W/AUTO DIFF WBC: CPT | Performed by: NURSE PRACTITIONER

## 2020-01-01 PROCEDURE — 84443 ASSAY THYROID STIM HORMONE: CPT | Performed by: INTERNAL MEDICINE

## 2020-01-01 PROCEDURE — 83935 ASSAY OF URINE OSMOLALITY: CPT | Performed by: EMERGENCY MEDICINE

## 2020-01-01 PROCEDURE — 99214 OFFICE O/P EST MOD 30 MIN: CPT | Mod: 95 | Performed by: STUDENT IN AN ORGANIZED HEALTH CARE EDUCATION/TRAINING PROGRAM

## 2020-01-01 PROCEDURE — 250N000013 HC RX MED GY IP 250 OP 250 PS 637: Performed by: INTERNAL MEDICINE

## 2020-01-01 PROCEDURE — 99207 ZZC NON-BILLABLE SERV PER CHARTING: CPT | Performed by: STUDENT IN AN ORGANIZED HEALTH CARE EDUCATION/TRAINING PROGRAM

## 2020-01-01 PROCEDURE — 77293 RESPIRATOR MOTION MGMT SIMUL: CPT

## 2020-01-01 PROCEDURE — 25000132 ZZH RX MED GY IP 250 OP 250 PS 637: Mod: GY | Performed by: STUDENT IN AN ORGANIZED HEALTH CARE EDUCATION/TRAINING PROGRAM

## 2020-01-01 PROCEDURE — 36415 COLL VENOUS BLD VENIPUNCTURE: CPT | Performed by: INTERNAL MEDICINE

## 2020-01-01 PROCEDURE — 25800030 ZZH RX IP 258 OP 636: Performed by: EMERGENCY MEDICINE

## 2020-01-01 PROCEDURE — 99232 SBSQ HOSP IP/OBS MODERATE 35: CPT | Performed by: HOSPITALIST

## 2020-01-01 PROCEDURE — 85018 HEMOGLOBIN: CPT | Performed by: INTERNAL MEDICINE

## 2020-01-01 PROCEDURE — G0463 HOSPITAL OUTPT CLINIC VISIT: HCPCS | Mod: 25

## 2020-01-01 PROCEDURE — 25000128 H RX IP 250 OP 636: Performed by: INTERNAL MEDICINE

## 2020-01-01 PROCEDURE — 36415 COLL VENOUS BLD VENIPUNCTURE: CPT | Performed by: STUDENT IN AN ORGANIZED HEALTH CARE EDUCATION/TRAINING PROGRAM

## 2020-01-01 PROCEDURE — 85027 COMPLETE CBC AUTOMATED: CPT | Performed by: INTERNAL MEDICINE

## 2020-01-01 PROCEDURE — 84300 ASSAY OF URINE SODIUM: CPT | Performed by: INTERNAL MEDICINE

## 2020-01-01 PROCEDURE — 84295 ASSAY OF SERUM SODIUM: CPT | Performed by: HOSPITALIST

## 2020-01-01 PROCEDURE — G0008 ADMIN INFLUENZA VIRUS VAC: HCPCS | Performed by: NURSE PRACTITIONER

## 2020-01-01 PROCEDURE — 25800030 ZZH RX IP 258 OP 636: Performed by: NURSE PRACTITIONER

## 2020-01-01 PROCEDURE — 25000125 ZZHC RX 250: Performed by: NURSE PRACTITIONER

## 2020-01-01 PROCEDURE — 25000125 ZZHC RX 250: Performed by: INTERNAL MEDICINE

## 2020-01-01 PROCEDURE — 96360 HYDRATION IV INFUSION INIT: CPT

## 2020-01-01 PROCEDURE — 82550 ASSAY OF CK (CPK): CPT | Performed by: INTERNAL MEDICINE

## 2020-01-01 PROCEDURE — 97165 OT EVAL LOW COMPLEX 30 MIN: CPT | Mod: GO

## 2020-01-01 PROCEDURE — 80048 BASIC METABOLIC PNL TOTAL CA: CPT | Performed by: INTERNAL MEDICINE

## 2020-01-01 PROCEDURE — 85025 COMPLETE CBC W/AUTO DIFF WBC: CPT | Performed by: INTERNAL MEDICINE

## 2020-01-01 PROCEDURE — 84145 PROCALCITONIN (PCT): CPT | Performed by: INTERNAL MEDICINE

## 2020-01-01 PROCEDURE — 25000128 H RX IP 250 OP 636: Performed by: EMERGENCY MEDICINE

## 2020-01-01 PROCEDURE — 99204 OFFICE O/P NEW MOD 45 MIN: CPT | Mod: 25 | Performed by: UROLOGY

## 2020-01-01 PROCEDURE — 74230 X-RAY XM SWLNG FUNCJ C+: CPT

## 2020-01-01 PROCEDURE — 250N000011 HC RX IP 250 OP 636: Performed by: NURSE PRACTITIONER

## 2020-01-01 PROCEDURE — 34300033 ZZH RX 343: Performed by: RADIOLOGY

## 2020-01-01 PROCEDURE — 12000000 ZZH R&B MED SURG/OB

## 2020-01-01 PROCEDURE — U0003 INFECTIOUS AGENT DETECTION BY NUCLEIC ACID (DNA OR RNA); SEVERE ACUTE RESPIRATORY SYNDROME CORONAVIRUS 2 (SARS-COV-2) (CORONAVIRUS DISEASE [COVID-19]), AMPLIFIED PROBE TECHNIQUE, MAKING USE OF HIGH THROUGHPUT TECHNOLOGIES AS DESCRIBED BY CMS-2020-01-R: HCPCS | Performed by: EMERGENCY MEDICINE

## 2020-01-01 PROCEDURE — 27210735 ZZH ACCESSORY CR12

## 2020-01-01 PROCEDURE — 84439 ASSAY OF FREE THYROXINE: CPT | Performed by: INTERNAL MEDICINE

## 2020-01-01 PROCEDURE — 99214 OFFICE O/P EST MOD 30 MIN: CPT | Performed by: NURSE PRACTITIONER

## 2020-01-01 PROCEDURE — 81445 SO NEO GSAP 5-50DNA/DNA&RNA: CPT | Performed by: RADIOLOGY

## 2020-01-01 PROCEDURE — 99204 OFFICE O/P NEW MOD 45 MIN: CPT | Performed by: PHYSICIAN ASSISTANT

## 2020-01-01 PROCEDURE — 99232 SBSQ HOSP IP/OBS MODERATE 35: CPT | Performed by: INTERNAL MEDICINE

## 2020-01-01 PROCEDURE — 80053 COMPREHEN METABOLIC PANEL: CPT | Performed by: EMERGENCY MEDICINE

## 2020-01-01 PROCEDURE — 99214 OFFICE O/P EST MOD 30 MIN: CPT | Performed by: INTERNAL MEDICINE

## 2020-01-01 PROCEDURE — 80048 BASIC METABOLIC PNL TOTAL CA: CPT | Performed by: STUDENT IN AN ORGANIZED HEALTH CARE EDUCATION/TRAINING PROGRAM

## 2020-01-01 PROCEDURE — G0463 HOSPITAL OUTPT CLINIC VISIT: HCPCS

## 2020-01-01 PROCEDURE — 96367 TX/PROPH/DG ADDL SEQ IV INF: CPT

## 2020-01-01 PROCEDURE — 97535 SELF CARE MNGMENT TRAINING: CPT | Mod: GO | Performed by: OCCUPATIONAL THERAPIST

## 2020-01-01 PROCEDURE — 99442 PR PHYSICIAN TELEPHONE EVALUATION 11-20 MIN: CPT | Mod: 95 | Performed by: INTERNAL MEDICINE

## 2020-01-01 PROCEDURE — 25000132 ZZH RX MED GY IP 250 OP 250 PS 637: Mod: GY | Performed by: INTERNAL MEDICINE

## 2020-01-01 PROCEDURE — 99285 EMERGENCY DEPT VISIT HI MDM: CPT | Mod: 25

## 2020-01-01 PROCEDURE — 85025 COMPLETE CBC W/AUTO DIFF WBC: CPT | Performed by: EMERGENCY MEDICINE

## 2020-01-01 PROCEDURE — 71046 X-RAY EXAM CHEST 2 VIEWS: CPT

## 2020-01-01 PROCEDURE — 27210738 ZZH ACCESSORY CR2

## 2020-01-01 PROCEDURE — 71045 X-RAY EXAM CHEST 1 VIEW: CPT

## 2020-01-01 PROCEDURE — 25800030 ZZH RX IP 258 OP 636: Performed by: INTERNAL MEDICINE

## 2020-01-01 PROCEDURE — 25000132 ZZH RX MED GY IP 250 OP 250 PS 637: Mod: GY | Performed by: NURSE PRACTITIONER

## 2020-01-01 PROCEDURE — 51798 US URINE CAPACITY MEASURE: CPT | Performed by: UROLOGY

## 2020-01-01 PROCEDURE — 77412 RADIATION TX DELIVERY LVL 3: CPT

## 2020-01-01 PROCEDURE — 99221 1ST HOSP IP/OBS SF/LOW 40: CPT | Performed by: INTERNAL MEDICINE

## 2020-01-01 PROCEDURE — 99358 PROLONG SERVICE W/O CONTACT: CPT | Performed by: INTERNAL MEDICINE

## 2020-01-01 PROCEDURE — 84295 ASSAY OF SERUM SODIUM: CPT | Performed by: INTERNAL MEDICINE

## 2020-01-01 PROCEDURE — 80053 COMPREHEN METABOLIC PANEL: CPT | Performed by: NURSE PRACTITIONER

## 2020-01-01 PROCEDURE — 99204 OFFICE O/P NEW MOD 45 MIN: CPT | Performed by: HOSPITALIST

## 2020-01-01 PROCEDURE — 97530 THERAPEUTIC ACTIVITIES: CPT | Mod: GP | Performed by: PHYSICAL THERAPIST

## 2020-01-01 PROCEDURE — 96361 HYDRATE IV INFUSION ADD-ON: CPT

## 2020-01-01 PROCEDURE — 82533 TOTAL CORTISOL: CPT | Performed by: INTERNAL MEDICINE

## 2020-01-01 PROCEDURE — 97116 GAIT TRAINING THERAPY: CPT | Mod: GP

## 2020-01-01 PROCEDURE — 90662 IIV NO PRSV INCREASED AG IM: CPT | Performed by: NURSE PRACTITIONER

## 2020-01-01 PROCEDURE — 99223 1ST HOSP IP/OBS HIGH 75: CPT | Mod: AI | Performed by: INTERNAL MEDICINE

## 2020-01-01 PROCEDURE — 25000125 ZZHC RX 250: Performed by: EMERGENCY MEDICINE

## 2020-01-01 PROCEDURE — 40000141 ZZH STATISTIC PERIPHERAL IV START W/O US GUIDANCE

## 2020-01-01 PROCEDURE — 97166 OT EVAL MOD COMPLEX 45 MIN: CPT | Mod: GO | Performed by: OCCUPATIONAL THERAPIST

## 2020-01-01 PROCEDURE — 70450 CT HEAD/BRAIN W/O DYE: CPT

## 2020-01-01 PROCEDURE — 82565 ASSAY OF CREATININE: CPT | Performed by: INTERNAL MEDICINE

## 2020-01-01 PROCEDURE — 82803 BLOOD GASES ANY COMBINATION: CPT | Performed by: EMERGENCY MEDICINE

## 2020-01-01 PROCEDURE — 87070 CULTURE OTHR SPECIMN AEROBIC: CPT | Performed by: INTERNAL MEDICINE

## 2020-01-01 PROCEDURE — 92610 EVALUATE SWALLOWING FUNCTION: CPT | Mod: GN | Performed by: SPEECH-LANGUAGE PATHOLOGIST

## 2020-01-01 PROCEDURE — 999N000157 HC STATISTIC RCP TIME EA 10 MIN

## 2020-01-01 PROCEDURE — 97530 THERAPEUTIC ACTIVITIES: CPT | Mod: GP

## 2020-01-01 PROCEDURE — 71260 CT THORAX DX C+: CPT

## 2020-01-01 PROCEDURE — 78816 PET IMAGE W/CT FULL BODY: CPT | Mod: PS

## 2020-01-01 PROCEDURE — 82565 ASSAY OF CREATININE: CPT

## 2020-01-01 PROCEDURE — 87635 SARS-COV-2 COVID-19 AMP PRB: CPT | Performed by: EMERGENCY MEDICINE

## 2020-01-01 PROCEDURE — 258N000003 HC RX IP 258 OP 636: Performed by: INTERNAL MEDICINE

## 2020-01-01 PROCEDURE — 25000128 H RX IP 250 OP 636: Performed by: RADIOLOGY

## 2020-01-01 PROCEDURE — 36415 COLL VENOUS BLD VENIPUNCTURE: CPT | Performed by: HOSPITALIST

## 2020-01-01 PROCEDURE — A9552 F18 FDG: HCPCS | Performed by: INTERNAL MEDICINE

## 2020-01-01 PROCEDURE — P9016 RBC LEUKOCYTES REDUCED: HCPCS | Performed by: INTERNAL MEDICINE

## 2020-01-01 PROCEDURE — 99231 SBSQ HOSP IP/OBS SF/LOW 25: CPT | Performed by: INTERNAL MEDICINE

## 2020-01-01 PROCEDURE — A9585 GADOBUTROL INJECTION: HCPCS | Performed by: INTERNAL MEDICINE

## 2020-01-01 PROCEDURE — 92526 ORAL FUNCTION THERAPY: CPT | Mod: GN | Performed by: SPEECH-LANGUAGE PATHOLOGIST

## 2020-01-01 PROCEDURE — 96375 TX/PRO/DX INJ NEW DRUG ADDON: CPT

## 2020-01-01 PROCEDURE — 77295 3-D RADIOTHERAPY PLAN: CPT

## 2020-01-01 PROCEDURE — 87040 BLOOD CULTURE FOR BACTERIA: CPT | Performed by: EMERGENCY MEDICINE

## 2020-01-01 PROCEDURE — 96417 CHEMO IV INFUS EACH ADDL SEQ: CPT

## 2020-01-01 PROCEDURE — 83935 ASSAY OF URINE OSMOLALITY: CPT | Performed by: INTERNAL MEDICINE

## 2020-01-01 PROCEDURE — 25000125 ZZHC RX 250: Performed by: STUDENT IN AN ORGANIZED HEALTH CARE EDUCATION/TRAINING PROGRAM

## 2020-01-01 PROCEDURE — 96365 THER/PROPH/DIAG IV INF INIT: CPT

## 2020-01-01 PROCEDURE — 85048 AUTOMATED LEUKOCYTE COUNT: CPT | Performed by: HOSPITALIST

## 2020-01-01 PROCEDURE — 93005 ELECTROCARDIOGRAM TRACING: CPT

## 2020-01-01 PROCEDURE — 85049 AUTOMATED PLATELET COUNT: CPT | Performed by: INTERNAL MEDICINE

## 2020-01-01 PROCEDURE — 97110 THERAPEUTIC EXERCISES: CPT | Mod: GP

## 2020-01-01 PROCEDURE — 85610 PROTHROMBIN TIME: CPT | Performed by: PHYSICIAN ASSISTANT

## 2020-01-01 PROCEDURE — 25000128 H RX IP 250 OP 636: Performed by: NURSE PRACTITIONER

## 2020-01-01 PROCEDURE — 0DH63UZ INSERTION OF FEEDING DEVICE INTO STOMACH, PERCUTANEOUS APPROACH: ICD-10-PCS | Performed by: RADIOLOGY

## 2020-01-01 PROCEDURE — 80053 COMPREHEN METABOLIC PANEL: CPT | Performed by: INTERNAL MEDICINE

## 2020-01-01 PROCEDURE — 94640 AIRWAY INHALATION TREATMENT: CPT

## 2020-01-01 PROCEDURE — 36415 COLL VENOUS BLD VENIPUNCTURE: CPT

## 2020-01-01 PROCEDURE — 88305 TISSUE EXAM BY PATHOLOGIST: CPT | Mod: 26 | Performed by: RADIOLOGY

## 2020-01-01 PROCEDURE — 99207 PR NO CHARGE LOS: CPT | Performed by: NURSE PRACTITIONER

## 2020-01-01 PROCEDURE — 81001 URINALYSIS AUTO W/SCOPE: CPT | Performed by: NURSE PRACTITIONER

## 2020-01-01 PROCEDURE — 84439 ASSAY OF FREE THYROXINE: CPT | Mod: GZ | Performed by: NURSE PRACTITIONER

## 2020-01-01 PROCEDURE — 99207 ZZC NON-BILLABLE SERV PER CHARTING: CPT | Performed by: INTERNAL MEDICINE

## 2020-01-01 PROCEDURE — 99152 MOD SED SAME PHYS/QHP 5/>YRS: CPT

## 2020-01-01 PROCEDURE — 99239 HOSP IP/OBS DSCHRG MGMT >30: CPT | Performed by: STUDENT IN AN ORGANIZED HEALTH CARE EDUCATION/TRAINING PROGRAM

## 2020-01-01 PROCEDURE — 86901 BLOOD TYPING SEROLOGIC RH(D): CPT | Performed by: INTERNAL MEDICINE

## 2020-01-01 PROCEDURE — 81001 URINALYSIS AUTO W/SCOPE: CPT | Performed by: EMERGENCY MEDICINE

## 2020-01-01 PROCEDURE — 96374 THER/PROPH/DIAG INJ IV PUSH: CPT

## 2020-01-01 PROCEDURE — 80061 LIPID PANEL: CPT | Performed by: INTERNAL MEDICINE

## 2020-01-01 PROCEDURE — 27210742 ZZH CATH CR1

## 2020-01-01 PROCEDURE — 78816 PET IMAGE W/CT FULL BODY: CPT | Mod: 52

## 2020-01-01 PROCEDURE — 83605 ASSAY OF LACTIC ACID: CPT | Performed by: EMERGENCY MEDICINE

## 2020-01-01 PROCEDURE — 71275 CT ANGIOGRAPHY CHEST: CPT

## 2020-01-01 PROCEDURE — 83735 ASSAY OF MAGNESIUM: CPT | Performed by: EMERGENCY MEDICINE

## 2020-01-01 PROCEDURE — 250N000011 HC RX IP 250 OP 636: Performed by: INTERNAL MEDICINE

## 2020-01-01 PROCEDURE — A9270 NON-COVERED ITEM OR SERVICE: HCPCS | Performed by: INTERNAL MEDICINE

## 2020-01-01 PROCEDURE — 25800030 ZZH RX IP 258 OP 636: Performed by: STUDENT IN AN ORGANIZED HEALTH CARE EDUCATION/TRAINING PROGRAM

## 2020-01-01 PROCEDURE — 40001009 ZZH VIDEO/TELEPHONE VISIT; NO CHARGE: Mod: 25

## 2020-01-01 PROCEDURE — 87086 URINE CULTURE/COLONY COUNT: CPT | Performed by: EMERGENCY MEDICINE

## 2020-01-01 PROCEDURE — 97535 SELF CARE MNGMENT TRAINING: CPT | Mod: GO

## 2020-01-01 PROCEDURE — 25000125 ZZHC RX 250: Performed by: RADIOLOGY

## 2020-01-01 PROCEDURE — A9270 NON-COVERED ITEM OR SERVICE: HCPCS | Mod: GY | Performed by: INTERNAL MEDICINE

## 2020-01-01 PROCEDURE — 85027 COMPLETE CBC AUTOMATED: CPT | Performed by: STUDENT IN AN ORGANIZED HEALTH CARE EDUCATION/TRAINING PROGRAM

## 2020-01-01 PROCEDURE — 40000854 ZZH STATISTIC SIMPLE TUBE INSERTION/CHARGE, PORT, CATH, FISTULOGRAM

## 2020-01-01 PROCEDURE — 83605 ASSAY OF LACTIC ACID: CPT | Performed by: INTERNAL MEDICINE

## 2020-01-01 PROCEDURE — 999N001193 HC VIDEO/TELEPHONE VISIT; NO CHARGE

## 2020-01-01 PROCEDURE — 77387 GUIDANCE FOR RADJ TX DLVR: CPT

## 2020-01-01 PROCEDURE — 97161 PT EVAL LOW COMPLEX 20 MIN: CPT | Mod: GP

## 2020-01-01 PROCEDURE — 82570 ASSAY OF URINE CREATININE: CPT | Performed by: EMERGENCY MEDICINE

## 2020-01-01 PROCEDURE — 27210908 ZZH NEEDLE CR4

## 2020-01-01 PROCEDURE — 40000275 ZZH STATISTIC RCP TIME EA 10 MIN

## 2020-01-01 PROCEDURE — A9585 GADOBUTROL INJECTION: HCPCS | Performed by: RADIOLOGY

## 2020-01-01 PROCEDURE — 83735 ASSAY OF MAGNESIUM: CPT | Performed by: INTERNAL MEDICINE

## 2020-01-01 PROCEDURE — 84295 ASSAY OF SERUM SODIUM: CPT | Performed by: STUDENT IN AN ORGANIZED HEALTH CARE EDUCATION/TRAINING PROGRAM

## 2020-01-01 PROCEDURE — 272N000078 HC NUTRITION PRODUCT INTERMEDIATE LITER

## 2020-01-01 PROCEDURE — 250N000009 HC RX 250: Performed by: INTERNAL MEDICINE

## 2020-01-01 PROCEDURE — 85610 PROTHROMBIN TIME: CPT | Performed by: RADIOLOGY

## 2020-01-01 PROCEDURE — 83970 ASSAY OF PARATHORMONE: CPT | Performed by: INTERNAL MEDICINE

## 2020-01-01 PROCEDURE — 120N000001 HC R&B MED SURG/OB

## 2020-01-01 PROCEDURE — 84443 ASSAY THYROID STIM HORMONE: CPT | Performed by: HOSPITALIST

## 2020-01-01 PROCEDURE — 96377 APPLICATON ON-BODY INJECTOR: CPT | Mod: 59

## 2020-01-01 PROCEDURE — 85049 AUTOMATED PLATELET COUNT: CPT | Performed by: RADIOLOGY

## 2020-01-01 PROCEDURE — 99207 ZZC CDG-MDM COMPONENT: MEETS MODERATE - UP CODED: CPT | Performed by: INTERNAL MEDICINE

## 2020-01-01 PROCEDURE — 84300 ASSAY OF URINE SODIUM: CPT | Performed by: EMERGENCY MEDICINE

## 2020-01-01 PROCEDURE — 27210300 ZZH CANNULA HIGH FLOW, ADULT

## 2020-01-01 PROCEDURE — 99213 OFFICE O/P EST LOW 20 MIN: CPT | Mod: 25 | Performed by: INTERNAL MEDICINE

## 2020-01-01 PROCEDURE — 87205 SMEAR GRAM STAIN: CPT | Performed by: INTERNAL MEDICINE

## 2020-01-01 PROCEDURE — 25000128 H RX IP 250 OP 636

## 2020-01-01 PROCEDURE — 99291 CRITICAL CARE FIRST HOUR: CPT

## 2020-01-01 PROCEDURE — 84132 ASSAY OF SERUM POTASSIUM: CPT | Performed by: INTERNAL MEDICINE

## 2020-01-01 PROCEDURE — 97116 GAIT TRAINING THERAPY: CPT | Mod: GP | Performed by: PHYSICAL THERAPIST

## 2020-01-01 PROCEDURE — 25000125 ZZHC RX 250

## 2020-01-01 PROCEDURE — 83605 ASSAY OF LACTIC ACID: CPT | Performed by: HOSPITALIST

## 2020-01-01 PROCEDURE — 88360 TUMOR IMMUNOHISTOCHEM/MANUAL: CPT | Performed by: RADIOLOGY

## 2020-01-01 PROCEDURE — 82805 BLOOD GASES W/O2 SATURATION: CPT | Performed by: EMERGENCY MEDICINE

## 2020-01-01 PROCEDURE — 82306 VITAMIN D 25 HYDROXY: CPT | Performed by: INTERNAL MEDICINE

## 2020-01-01 PROCEDURE — 27210915 ZZ H TUBE GASTRO CR4

## 2020-01-01 PROCEDURE — 87040 BLOOD CULTURE FOR BACTERIA: CPT | Performed by: NURSE PRACTITIONER

## 2020-01-01 PROCEDURE — C9803 HOPD COVID-19 SPEC COLLECT: HCPCS

## 2020-01-01 PROCEDURE — 25800030 ZZH RX IP 258 OP 636: Performed by: RADIOLOGY

## 2020-01-01 PROCEDURE — 86850 RBC ANTIBODY SCREEN: CPT | Performed by: INTERNAL MEDICINE

## 2020-01-01 PROCEDURE — 81001 URINALYSIS AUTO W/SCOPE: CPT | Performed by: INTERNAL MEDICINE

## 2020-01-01 PROCEDURE — 87106 FUNGI IDENTIFICATION YEAST: CPT | Performed by: INTERNAL MEDICINE

## 2020-01-01 PROCEDURE — 49440 PLACE GASTROSTOMY TUBE PERC: CPT

## 2020-01-01 PROCEDURE — 99239 HOSP IP/OBS DSCHRG MGMT >30: CPT | Performed by: HOSPITALIST

## 2020-01-01 PROCEDURE — 99232 SBSQ HOSP IP/OBS MODERATE 35: CPT | Performed by: STUDENT IN AN ORGANIZED HEALTH CARE EDUCATION/TRAINING PROGRAM

## 2020-01-01 PROCEDURE — 27210429 ZZH NUTRITION PRODUCT INTERMEDIATE LITER

## 2020-01-01 PROCEDURE — 99443: CPT | Performed by: INTERNAL MEDICINE

## 2020-01-01 PROCEDURE — U0003 INFECTIOUS AGENT DETECTION BY NUCLEIC ACID (DNA OR RNA); SEVERE ACUTE RESPIRATORY SYNDROME CORONAVIRUS 2 (SARS-COV-2) (CORONAVIRUS DISEASE [COVID-19]), AMPLIFIED PROBE TECHNIQUE, MAKING USE OF HIGH THROUGHPUT TECHNOLOGIES AS DESCRIBED BY CMS-2020-01-R: HCPCS | Performed by: INTERNAL MEDICINE

## 2020-01-01 PROCEDURE — 72157 MRI CHEST SPINE W/O & W/DYE: CPT

## 2020-01-01 PROCEDURE — 84439 ASSAY OF FREE THYROXINE: CPT | Performed by: HOSPITALIST

## 2020-01-01 PROCEDURE — 83605 ASSAY OF LACTIC ACID: CPT | Performed by: STUDENT IN AN ORGANIZED HEALTH CARE EDUCATION/TRAINING PROGRAM

## 2020-01-01 PROCEDURE — 25500064 ZZH RX 255 OP 636: Performed by: RADIOLOGY

## 2020-01-01 PROCEDURE — 99238 HOSP IP/OBS DSCHRG MGMT 30/<: CPT | Performed by: HOSPITALIST

## 2020-01-01 PROCEDURE — 85025 COMPLETE CBC W/AUTO DIFF WBC: CPT | Performed by: STUDENT IN AN ORGANIZED HEALTH CARE EDUCATION/TRAINING PROGRAM

## 2020-01-01 PROCEDURE — 25000132 ZZH RX MED GY IP 250 OP 250 PS 637: Mod: GY | Performed by: HOSPITALIST

## 2020-01-01 PROCEDURE — 99214 OFFICE O/P EST MOD 30 MIN: CPT | Mod: 95 | Performed by: HOSPITALIST

## 2020-01-01 PROCEDURE — 77300 RADIATION THERAPY DOSE PLAN: CPT

## 2020-01-01 PROCEDURE — 94640 AIRWAY INHALATION TREATMENT: CPT | Mod: 76

## 2020-01-01 PROCEDURE — 80048 BASIC METABOLIC PNL TOTAL CA: CPT | Performed by: HOSPITALIST

## 2020-01-01 PROCEDURE — 343N000001 HC RX 343: Performed by: INTERNAL MEDICINE

## 2020-01-01 PROCEDURE — 25500064 ZZH RX 255 OP 636: Performed by: INTERNAL MEDICINE

## 2020-01-01 PROCEDURE — 84145 PROCALCITONIN (PCT): CPT | Performed by: EMERGENCY MEDICINE

## 2020-01-01 PROCEDURE — 97162 PT EVAL MOD COMPLEX 30 MIN: CPT | Mod: GP

## 2020-01-01 PROCEDURE — 40001009 ZZH VIDEO/TELEPHONE VISIT; NO CHARGE

## 2020-01-01 PROCEDURE — 99239 HOSP IP/OBS DSCHRG MGMT >30: CPT | Performed by: INTERNAL MEDICINE

## 2020-01-01 PROCEDURE — 77334 RADIATION TREATMENT AID(S): CPT

## 2020-01-01 PROCEDURE — G0103 PSA SCREENING: HCPCS | Performed by: INTERNAL MEDICINE

## 2020-01-01 PROCEDURE — 99222 1ST HOSP IP/OBS MODERATE 55: CPT | Performed by: INTERNAL MEDICINE

## 2020-01-01 PROCEDURE — 99207 PR NO CHARGE LOS: CPT

## 2020-01-01 PROCEDURE — 96377 APPLICATON ON-BODY INJECTOR: CPT

## 2020-01-01 PROCEDURE — 99223 1ST HOSP IP/OBS HIGH 75: CPT | Performed by: INTERNAL MEDICINE

## 2020-01-01 PROCEDURE — 00000159 ZZHCL STATISTIC H-SEND OUTS PREP: Performed by: RADIOLOGY

## 2020-01-01 PROCEDURE — A9552 F18 FDG: HCPCS | Performed by: RADIOLOGY

## 2020-01-01 PROCEDURE — G0498 CHEMO EXTEND IV INFUS W/PUMP: HCPCS

## 2020-01-01 PROCEDURE — 99215 OFFICE O/P EST HI 40 MIN: CPT | Performed by: NURSE PRACTITIONER

## 2020-01-01 PROCEDURE — 99231 SBSQ HOSP IP/OBS SF/LOW 25: CPT | Performed by: NURSE PRACTITIONER

## 2020-01-01 PROCEDURE — 80048 BASIC METABOLIC PNL TOTAL CA: CPT | Performed by: EMERGENCY MEDICINE

## 2020-01-01 PROCEDURE — 86923 COMPATIBILITY TEST ELECTRIC: CPT | Performed by: INTERNAL MEDICINE

## 2020-01-01 PROCEDURE — 84481 FREE ASSAY (FT-3): CPT | Performed by: INTERNAL MEDICINE

## 2020-01-01 PROCEDURE — 86900 BLOOD TYPING SEROLOGIC ABO: CPT | Performed by: INTERNAL MEDICINE

## 2020-01-01 PROCEDURE — 250N000013 HC RX MED GY IP 250 OP 250 PS 637: Performed by: HOSPITALIST

## 2020-01-01 PROCEDURE — G0439 PPPS, SUBSEQ VISIT: HCPCS | Performed by: INTERNAL MEDICINE

## 2020-01-01 PROCEDURE — 258N000003 HC RX IP 258 OP 636: Performed by: EMERGENCY MEDICINE

## 2020-01-01 PROCEDURE — 88342 IMHCHEM/IMCYTCHM 1ST ANTB: CPT | Mod: XU | Performed by: RADIOLOGY

## 2020-01-01 PROCEDURE — 88360 TUMOR IMMUNOHISTOCHEM/MANUAL: CPT | Mod: 26 | Performed by: RADIOLOGY

## 2020-01-01 PROCEDURE — 40000983 ZZH STATISTIC HFNC ADULT NON-CPAP

## 2020-01-01 PROCEDURE — 80076 HEPATIC FUNCTION PANEL: CPT | Performed by: INTERNAL MEDICINE

## 2020-01-01 PROCEDURE — 34300033 ZZH RX 343: Performed by: INTERNAL MEDICINE

## 2020-01-01 PROCEDURE — 83930 ASSAY OF BLOOD OSMOLALITY: CPT | Performed by: EMERGENCY MEDICINE

## 2020-01-01 PROCEDURE — 83880 ASSAY OF NATRIURETIC PEPTIDE: CPT | Performed by: INTERNAL MEDICINE

## 2020-01-01 PROCEDURE — 88342 IMHCHEM/IMCYTCHM 1ST ANTB: CPT | Mod: 26 | Performed by: RADIOLOGY

## 2020-01-01 PROCEDURE — 99207 ZZC CDG-CODE CATEGORY CHANGED: CPT | Performed by: INTERNAL MEDICINE

## 2020-01-01 PROCEDURE — 81003 URINALYSIS AUTO W/O SCOPE: CPT | Performed by: UROLOGY

## 2020-01-01 PROCEDURE — 72158 MRI LUMBAR SPINE W/O & W/DYE: CPT

## 2020-01-01 PROCEDURE — 12000011 ZZH R&B MS OVERFLOW

## 2020-01-01 PROCEDURE — 84443 ASSAY THYROID STIM HORMONE: CPT | Mod: GZ | Performed by: NURSE PRACTITIONER

## 2020-01-01 PROCEDURE — 77336 RADIATION PHYSICS CONSULT: CPT

## 2020-01-01 PROCEDURE — 40000893 ZZH STATISTIC PT IP EVAL DEFER: Performed by: PHYSICAL THERAPIST

## 2020-01-01 PROCEDURE — 96366 THER/PROPH/DIAG IV INF ADDON: CPT

## 2020-01-01 PROCEDURE — 99442 ZZC PHYSICIAN TELEPHONE EVALUATION 11-20 MIN: CPT | Performed by: NURSE PRACTITIONER

## 2020-01-01 PROCEDURE — 92523 SPEECH SOUND LANG COMPREHEN: CPT | Mod: GN | Performed by: SPEECH-LANGUAGE PATHOLOGIST

## 2020-01-01 PROCEDURE — 27211108 CT PLEURA PERCUTANEOUS BIOPSY

## 2020-01-01 RX ORDER — DIPHENHYDRAMINE HCL 50 MG
50 CAPSULE ORAL ONCE
Status: CANCELLED
Start: 2020-01-01

## 2020-01-01 RX ORDER — POTASSIUM CHLORIDE 1.5 G/1.58G
20-40 POWDER, FOR SOLUTION ORAL
Status: DISCONTINUED | OUTPATIENT
Start: 2020-01-01 | End: 2020-01-01 | Stop reason: HOSPADM

## 2020-01-01 RX ORDER — EPINEPHRINE 0.3 MG/.3ML
0.3 INJECTION SUBCUTANEOUS EVERY 5 MIN PRN
Status: CANCELLED | OUTPATIENT
Start: 2020-01-01

## 2020-01-01 RX ORDER — LEVOTHYROXINE SODIUM 112 UG/1
112 TABLET ORAL DAILY
COMMUNITY
End: 2020-01-01 | Stop reason: ALTCHOICE

## 2020-01-01 RX ORDER — IOPAMIDOL 755 MG/ML
58 INJECTION, SOLUTION INTRAVASCULAR ONCE
Status: COMPLETED | OUTPATIENT
Start: 2020-01-01 | End: 2020-01-01

## 2020-01-01 RX ORDER — GABAPENTIN 250 MG/5ML
300 SOLUTION ORAL 3 TIMES DAILY
Qty: 540 ML | Refills: 4 | Status: SHIPPED | OUTPATIENT
Start: 2020-01-01

## 2020-01-01 RX ORDER — PROCHLORPERAZINE 25 MG
12.5 SUPPOSITORY, RECTAL RECTAL EVERY 12 HOURS PRN
Status: DISCONTINUED | OUTPATIENT
Start: 2020-01-01 | End: 2020-01-01 | Stop reason: HOSPADM

## 2020-01-01 RX ORDER — OXYCODONE HYDROCHLORIDE 5 MG/1
5 TABLET ORAL EVERY 6 HOURS PRN
Qty: 60 TABLET | Refills: 0 | Status: SHIPPED | OUTPATIENT
Start: 2020-01-01 | End: 2020-01-01

## 2020-01-01 RX ORDER — HEPARIN SODIUM (PORCINE) LOCK FLUSH IV SOLN 100 UNIT/ML 100 UNIT/ML
5 SOLUTION INTRAVENOUS
Status: CANCELLED | OUTPATIENT
Start: 2020-01-01

## 2020-01-01 RX ORDER — LORAZEPAM 2 MG/ML
0.5 INJECTION INTRAMUSCULAR EVERY 4 HOURS PRN
Status: CANCELLED
Start: 2020-01-01

## 2020-01-01 RX ORDER — LEVOTHYROXINE SODIUM 112 UG/1
TABLET ORAL
Qty: 90 TABLET | Refills: 3 | Status: SHIPPED | OUTPATIENT
Start: 2020-01-01 | End: 2020-01-01 | Stop reason: DRUGHIGH

## 2020-01-01 RX ORDER — POTASSIUM CL/LIDO/0.9 % NACL 10MEQ/0.1L
10 INTRAVENOUS SOLUTION, PIGGYBACK (ML) INTRAVENOUS
Status: DISCONTINUED | OUTPATIENT
Start: 2020-01-01 | End: 2020-01-01 | Stop reason: HOSPADM

## 2020-01-01 RX ORDER — POLYETHYLENE GLYCOL 3350 17 G/17G
17 POWDER, FOR SOLUTION ORAL DAILY PRN
Status: DISCONTINUED | OUTPATIENT
Start: 2020-01-01 | End: 2020-01-01 | Stop reason: HOSPADM

## 2020-01-01 RX ORDER — OXYCODONE HCL 5 MG/5 ML
5 SOLUTION, ORAL ORAL EVERY 6 HOURS PRN
Status: DISCONTINUED | OUTPATIENT
Start: 2020-01-01 | End: 2020-01-01 | Stop reason: HOSPADM

## 2020-01-01 RX ORDER — MEPERIDINE HYDROCHLORIDE 25 MG/ML
25 INJECTION INTRAMUSCULAR; INTRAVENOUS; SUBCUTANEOUS EVERY 30 MIN PRN
Status: CANCELLED | OUTPATIENT
Start: 2020-01-01

## 2020-01-01 RX ORDER — NALOXONE HYDROCHLORIDE 0.4 MG/ML
.1-.4 INJECTION, SOLUTION INTRAMUSCULAR; INTRAVENOUS; SUBCUTANEOUS
Status: CANCELLED | OUTPATIENT
Start: 2020-01-01

## 2020-01-01 RX ORDER — LIDOCAINE 40 MG/G
CREAM TOPICAL
Status: CANCELLED | OUTPATIENT
Start: 2020-01-01

## 2020-01-01 RX ORDER — AMOXICILLIN 250 MG
1 CAPSULE ORAL
Status: DISCONTINUED | OUTPATIENT
Start: 2020-01-01 | End: 2020-01-01 | Stop reason: ALTCHOICE

## 2020-01-01 RX ORDER — HEPARIN SODIUM,PORCINE 10 UNIT/ML
5 VIAL (ML) INTRAVENOUS
Status: CANCELLED | OUTPATIENT
Start: 2020-01-01

## 2020-01-01 RX ORDER — ALBUTEROL SULFATE 90 UG/1
2 AEROSOL, METERED RESPIRATORY (INHALATION) EVERY 6 HOURS PRN
Qty: 6.7 G | Refills: 3 | Status: SHIPPED | OUTPATIENT
Start: 2020-01-01

## 2020-01-01 RX ORDER — FENTANYL 12.5 UG/1
1 PATCH TRANSDERMAL
Qty: 10 PATCH | Refills: 0 | Status: ON HOLD | OUTPATIENT
Start: 2020-01-01 | End: 2020-01-01

## 2020-01-01 RX ORDER — SODIUM CHLORIDE 9 MG/ML
INJECTION, SOLUTION INTRAVENOUS CONTINUOUS
Status: DISCONTINUED | OUTPATIENT
Start: 2020-01-01 | End: 2020-01-01 | Stop reason: HOSPADM

## 2020-01-01 RX ORDER — POTASSIUM CHLORIDE 7.45 MG/ML
10 INJECTION INTRAVENOUS
Status: DISCONTINUED | OUTPATIENT
Start: 2020-01-01 | End: 2020-01-01 | Stop reason: HOSPADM

## 2020-01-01 RX ORDER — LEVOFLOXACIN 750 MG/1
750 TABLET, FILM COATED ORAL DAILY
Qty: 5 TABLET | Refills: 0 | Status: SHIPPED | OUTPATIENT
Start: 2020-01-01

## 2020-01-01 RX ORDER — DEXTROSE MONOHYDRATE 100 MG/ML
INJECTION, SOLUTION INTRAVENOUS CONTINUOUS PRN
Status: DISCONTINUED | OUTPATIENT
Start: 2020-01-01 | End: 2020-01-01 | Stop reason: HOSPADM

## 2020-01-01 RX ORDER — MAGNESIUM SULFATE HEPTAHYDRATE 40 MG/ML
4 INJECTION, SOLUTION INTRAVENOUS EVERY 4 HOURS PRN
Status: DISCONTINUED | OUTPATIENT
Start: 2020-01-01 | End: 2020-01-01 | Stop reason: HOSPADM

## 2020-01-01 RX ORDER — OXYCODONE HCL 5 MG/5 ML
5-10 SOLUTION, ORAL ORAL EVERY 4 HOURS PRN
Qty: 500 ML | Refills: 0 | Status: SHIPPED | OUTPATIENT
Start: 2020-01-01 | End: 2020-01-01

## 2020-01-01 RX ORDER — ACETAMINOPHEN 325 MG/1
650 TABLET ORAL EVERY 4 HOURS PRN
Status: DISCONTINUED | OUTPATIENT
Start: 2020-01-01 | End: 2020-01-01

## 2020-01-01 RX ORDER — DIPHENHYDRAMINE HYDROCHLORIDE 50 MG/ML
50 INJECTION INTRAMUSCULAR; INTRAVENOUS
Status: CANCELLED
Start: 2020-01-01

## 2020-01-01 RX ORDER — FENTANYL 12.5 UG/1
1 PATCH TRANSDERMAL
Qty: 10 PATCH | Refills: 0 | Status: SHIPPED | OUTPATIENT
Start: 2020-01-01 | End: 2020-01-01

## 2020-01-01 RX ORDER — LEVOTHYROXINE SODIUM 125 UG/1
125 TABLET ORAL DAILY
Status: DISCONTINUED | OUTPATIENT
Start: 2020-01-01 | End: 2020-01-01 | Stop reason: HOSPADM

## 2020-01-01 RX ORDER — BENZONATATE 100 MG/1
100 CAPSULE ORAL 3 TIMES DAILY PRN
Qty: 30 CAPSULE | Refills: 1 | Status: SHIPPED | OUTPATIENT
Start: 2020-01-01

## 2020-01-01 RX ORDER — FENTANYL CITRATE 50 UG/ML
25-50 INJECTION, SOLUTION INTRAMUSCULAR; INTRAVENOUS EVERY 5 MIN PRN
Status: DISCONTINUED | OUTPATIENT
Start: 2020-01-01 | End: 2020-01-01 | Stop reason: HOSPADM

## 2020-01-01 RX ORDER — CETIRIZINE HYDROCHLORIDE 5 MG/1
10 TABLET ORAL DAILY
Status: DISCONTINUED | OUTPATIENT
Start: 2020-01-01 | End: 2020-01-01 | Stop reason: HOSPADM

## 2020-01-01 RX ORDER — OXYCODONE HCL 5 MG/5 ML
5 SOLUTION, ORAL ORAL EVERY 6 HOURS PRN
Qty: 200 ML | Refills: 0 | Status: SHIPPED | OUTPATIENT
Start: 2020-01-01 | End: 2020-01-01

## 2020-01-01 RX ORDER — ZOLEDRONIC ACID 0.04 MG/ML
4 INJECTION, SOLUTION INTRAVENOUS ONCE
Status: CANCELLED | OUTPATIENT
Start: 2020-01-01

## 2020-01-01 RX ORDER — GABAPENTIN 300 MG/1
300 CAPSULE ORAL AT BEDTIME
Qty: 30 CAPSULE | Refills: 3 | Status: SHIPPED | OUTPATIENT
Start: 2020-01-01 | End: 2020-01-01

## 2020-01-01 RX ORDER — SODIUM CHLORIDE 9 MG/ML
1000 INJECTION, SOLUTION INTRAVENOUS CONTINUOUS PRN
Status: CANCELLED
Start: 2020-01-01

## 2020-01-01 RX ORDER — SERTRALINE HYDROCHLORIDE 25 MG/1
50 TABLET, FILM COATED ORAL AT BEDTIME
Status: DISCONTINUED | OUTPATIENT
Start: 2020-01-01 | End: 2020-01-01 | Stop reason: HOSPADM

## 2020-01-01 RX ORDER — ALBUTEROL SULFATE 90 UG/1
1-2 AEROSOL, METERED RESPIRATORY (INHALATION)
Status: CANCELLED
Start: 2020-01-01

## 2020-01-01 RX ORDER — AMOXICILLIN 250 MG
1 CAPSULE ORAL 2 TIMES DAILY PRN
Status: DISCONTINUED | OUTPATIENT
Start: 2020-01-01 | End: 2020-01-01 | Stop reason: HOSPADM

## 2020-01-01 RX ORDER — CETIRIZINE HYDROCHLORIDE 10 MG/1
10 TABLET ORAL AT BEDTIME
Status: DISCONTINUED | OUTPATIENT
Start: 2020-01-01 | End: 2020-01-01 | Stop reason: HOSPADM

## 2020-01-01 RX ORDER — FLUMAZENIL 0.1 MG/ML
0.2 INJECTION, SOLUTION INTRAVENOUS
Status: CANCELLED | OUTPATIENT
Start: 2020-01-01

## 2020-01-01 RX ORDER — SODIUM CHLORIDE 9 MG/ML
INJECTION, SOLUTION INTRAVENOUS CONTINUOUS
Status: ACTIVE | OUTPATIENT
Start: 2020-01-01 | End: 2020-01-01

## 2020-01-01 RX ORDER — ALBUTEROL SULFATE 0.83 MG/ML
2.5 SOLUTION RESPIRATORY (INHALATION)
Status: CANCELLED | OUTPATIENT
Start: 2020-01-01

## 2020-01-01 RX ORDER — LORAZEPAM 2 MG/ML
0.5 CONCENTRATE ORAL EVERY 4 HOURS PRN
Qty: 30 ML | Refills: 0 | Status: SHIPPED | OUTPATIENT
Start: 2020-01-01 | End: 2020-01-01

## 2020-01-01 RX ORDER — BENZONATATE 100 MG/1
100 CAPSULE ORAL 3 TIMES DAILY PRN
Status: DISCONTINUED | OUTPATIENT
Start: 2020-01-01 | End: 2020-01-01 | Stop reason: HOSPADM

## 2020-01-01 RX ORDER — ALBUTEROL SULFATE 0.83 MG/ML
2.5 SOLUTION RESPIRATORY (INHALATION)
Status: DISCONTINUED | OUTPATIENT
Start: 2020-01-01 | End: 2020-01-01

## 2020-01-01 RX ORDER — ZOLEDRONIC ACID 0.04 MG/ML
4 INJECTION, SOLUTION INTRAVENOUS ONCE
Status: COMPLETED | OUTPATIENT
Start: 2020-01-01 | End: 2020-01-01

## 2020-01-01 RX ORDER — SODIUM CHLORIDE 9 MG/ML
INJECTION, SOLUTION INTRAVENOUS CONTINUOUS
Status: DISCONTINUED | OUTPATIENT
Start: 2020-01-01 | End: 2020-01-01

## 2020-01-01 RX ORDER — ONDANSETRON 2 MG/ML
4 INJECTION INTRAMUSCULAR; INTRAVENOUS EVERY 6 HOURS PRN
Status: DISCONTINUED | OUTPATIENT
Start: 2020-01-01 | End: 2020-01-01 | Stop reason: HOSPADM

## 2020-01-01 RX ORDER — NALOXONE HYDROCHLORIDE 0.4 MG/ML
.1-.4 INJECTION, SOLUTION INTRAMUSCULAR; INTRAVENOUS; SUBCUTANEOUS
Status: DISCONTINUED | OUTPATIENT
Start: 2020-01-01 | End: 2020-01-01

## 2020-01-01 RX ORDER — LEVOTHYROXINE SODIUM 125 UG/1
TABLET ORAL
Qty: 90 TABLET | Refills: 3 | OUTPATIENT
Start: 2020-01-01

## 2020-01-01 RX ORDER — GABAPENTIN 250 MG/5ML
300 SOLUTION ORAL 3 TIMES DAILY
Status: DISCONTINUED | OUTPATIENT
Start: 2020-01-01 | End: 2020-01-01 | Stop reason: HOSPADM

## 2020-01-01 RX ORDER — CEFTRIAXONE 1 G/1
1 INJECTION, POWDER, FOR SOLUTION INTRAMUSCULAR; INTRAVENOUS ONCE
Status: COMPLETED | OUTPATIENT
Start: 2020-01-01 | End: 2020-01-01

## 2020-01-01 RX ORDER — PROCHLORPERAZINE MALEATE 5 MG
5 TABLET ORAL EVERY 6 HOURS PRN
Status: DISCONTINUED | OUTPATIENT
Start: 2020-01-01 | End: 2020-01-01 | Stop reason: HOSPADM

## 2020-01-01 RX ORDER — ALBUTEROL SULFATE 0.83 MG/ML
2.5 SOLUTION RESPIRATORY (INHALATION) EVERY 6 HOURS PRN
Qty: 120 VIAL | Refills: 3 | Status: SHIPPED | OUTPATIENT
Start: 2020-01-01

## 2020-01-01 RX ORDER — DONEPEZIL HYDROCHLORIDE 10 MG/1
10 TABLET, ORALLY DISINTEGRATING ORAL DAILY
Qty: 30 TABLET | Refills: 2 | Status: SHIPPED | OUTPATIENT
Start: 2020-01-01

## 2020-01-01 RX ORDER — AMOXICILLIN 250 MG
2 CAPSULE ORAL 2 TIMES DAILY PRN
Status: DISCONTINUED | OUTPATIENT
Start: 2020-01-01 | End: 2020-01-01 | Stop reason: HOSPADM

## 2020-01-01 RX ORDER — ONDANSETRON HYDROCHLORIDE 4 MG/5ML
8 SOLUTION ORAL 2 TIMES DAILY PRN
Qty: 100 ML | Refills: 0 | Status: SHIPPED | OUTPATIENT
Start: 2020-01-01

## 2020-01-01 RX ORDER — HYDROMORPHONE HYDROCHLORIDE 1 MG/ML
0.3 INJECTION, SOLUTION INTRAMUSCULAR; INTRAVENOUS; SUBCUTANEOUS ONCE
Status: COMPLETED | OUTPATIENT
Start: 2020-01-01 | End: 2020-01-01

## 2020-01-01 RX ORDER — POTASSIUM CHLORIDE 1500 MG/1
20-40 TABLET, EXTENDED RELEASE ORAL
Status: DISCONTINUED | OUTPATIENT
Start: 2020-01-01 | End: 2020-01-01 | Stop reason: HOSPADM

## 2020-01-01 RX ORDER — LIDOCAINE 40 MG/G
CREAM TOPICAL
Status: DISCONTINUED | OUTPATIENT
Start: 2020-01-01 | End: 2020-01-01 | Stop reason: HOSPADM

## 2020-01-01 RX ORDER — OXYCODONE HYDROCHLORIDE 5 MG/1
5-10 TABLET ORAL
Status: DISCONTINUED | OUTPATIENT
Start: 2020-01-01 | End: 2020-01-01

## 2020-01-01 RX ORDER — ROSUVASTATIN CALCIUM 10 MG/1
10 TABLET, COATED ORAL DAILY
Qty: 90 TABLET | Refills: 3 | Status: SHIPPED | OUTPATIENT
Start: 2020-01-01 | End: 2020-01-01

## 2020-01-01 RX ORDER — FENTANYL 12.5 UG/1
12 PATCH TRANSDERMAL
Status: DISCONTINUED | OUTPATIENT
Start: 2020-01-01 | End: 2020-01-01 | Stop reason: HOSPADM

## 2020-01-01 RX ORDER — LEVOTHYROXINE SODIUM 112 UG/1
112 TABLET ORAL
Status: DISCONTINUED | OUTPATIENT
Start: 2020-01-01 | End: 2020-01-01 | Stop reason: HOSPADM

## 2020-01-01 RX ORDER — ACETAMINOPHEN 325 MG/1
650 TABLET ORAL EVERY 4 HOURS PRN
Status: DISCONTINUED | OUTPATIENT
Start: 2020-01-01 | End: 2020-01-01 | Stop reason: HOSPADM

## 2020-01-01 RX ORDER — CETIRIZINE HYDROCHLORIDE 10 MG/1
10 TABLET ORAL DAILY PRN
Status: ON HOLD | COMMUNITY
End: 2020-01-01

## 2020-01-01 RX ORDER — BISACODYL 5 MG
5 TABLET, DELAYED RELEASE (ENTERIC COATED) ORAL DAILY PRN
Status: DISCONTINUED | OUTPATIENT
Start: 2020-01-01 | End: 2020-01-01 | Stop reason: HOSPADM

## 2020-01-01 RX ORDER — DIPHENHYDRAMINE HCL 25 MG
50 CAPSULE ORAL ONCE
Status: CANCELLED
Start: 2020-01-01

## 2020-01-01 RX ORDER — SODIUM CHLORIDE, SODIUM LACTATE, POTASSIUM CHLORIDE, CALCIUM CHLORIDE 600; 310; 30; 20 MG/100ML; MG/100ML; MG/100ML; MG/100ML
INJECTION, SOLUTION INTRAVENOUS CONTINUOUS
Status: DISCONTINUED | OUTPATIENT
Start: 2020-01-01 | End: 2020-01-01

## 2020-01-01 RX ORDER — LORAZEPAM 0.5 MG/1
0.5 TABLET ORAL EVERY 4 HOURS PRN
Qty: 30 TABLET | Refills: 3 | Status: SHIPPED | OUTPATIENT
Start: 2020-01-01 | End: 2020-01-01

## 2020-01-01 RX ORDER — MORPHINE SULFATE 4 MG/ML
4 INJECTION, SOLUTION INTRAMUSCULAR; INTRAVENOUS
Status: COMPLETED | OUTPATIENT
Start: 2020-01-01 | End: 2020-01-01

## 2020-01-01 RX ORDER — ALBUTEROL SULFATE 90 UG/1
2 AEROSOL, METERED RESPIRATORY (INHALATION) EVERY 6 HOURS PRN
Qty: 6.7 G | Refills: 1 | Status: SHIPPED | OUTPATIENT
Start: 2020-01-01 | End: 2020-01-01

## 2020-01-01 RX ORDER — EPINEPHRINE 1 MG/ML
0.3 INJECTION, SOLUTION INTRAMUSCULAR; SUBCUTANEOUS EVERY 5 MIN PRN
Status: CANCELLED | OUTPATIENT
Start: 2020-01-01

## 2020-01-01 RX ORDER — POTASSIUM CHLORIDE 29.8 MG/ML
20 INJECTION INTRAVENOUS
Status: DISCONTINUED | OUTPATIENT
Start: 2020-01-01 | End: 2020-01-01 | Stop reason: HOSPADM

## 2020-01-01 RX ORDER — PIPERACILLIN SODIUM, TAZOBACTAM SODIUM 3; .375 G/15ML; G/15ML
3.38 INJECTION, POWDER, LYOPHILIZED, FOR SOLUTION INTRAVENOUS ONCE
Status: COMPLETED | OUTPATIENT
Start: 2020-01-01 | End: 2020-01-01

## 2020-01-01 RX ORDER — GADOBUTROL 604.72 MG/ML
7.5 INJECTION INTRAVENOUS ONCE
Status: COMPLETED | OUTPATIENT
Start: 2020-01-01 | End: 2020-01-01

## 2020-01-01 RX ORDER — ALBUTEROL SULFATE 0.83 MG/ML
2.5 SOLUTION RESPIRATORY (INHALATION) ONCE
Status: COMPLETED | OUTPATIENT
Start: 2020-01-01 | End: 2020-01-01

## 2020-01-01 RX ORDER — FLUMAZENIL 0.1 MG/ML
0.2 INJECTION, SOLUTION INTRAVENOUS
Status: DISCONTINUED | OUTPATIENT
Start: 2020-01-01 | End: 2020-01-01 | Stop reason: HOSPADM

## 2020-01-01 RX ORDER — BENZONATATE 100 MG/1
100 CAPSULE ORAL 3 TIMES DAILY PRN
Qty: 10 CAPSULE | Refills: 1 | Status: SHIPPED | OUTPATIENT
Start: 2020-01-01 | End: 2020-01-01

## 2020-01-01 RX ORDER — CEFTRIAXONE 2 G/1
2 INJECTION, POWDER, FOR SOLUTION INTRAMUSCULAR; INTRAVENOUS EVERY 24 HOURS
Status: DISCONTINUED | OUTPATIENT
Start: 2020-01-01 | End: 2020-01-01

## 2020-01-01 RX ORDER — PIPERACILLIN SODIUM, TAZOBACTAM SODIUM 3; .375 G/15ML; G/15ML
3.38 INJECTION, POWDER, LYOPHILIZED, FOR SOLUTION INTRAVENOUS EVERY 6 HOURS
Status: DISCONTINUED | OUTPATIENT
Start: 2020-01-01 | End: 2020-01-01 | Stop reason: HOSPADM

## 2020-01-01 RX ORDER — DONEPEZIL HYDROCHLORIDE 5 MG/1
5 TABLET, FILM COATED ORAL AT BEDTIME
COMMUNITY
Start: 2020-01-01 | End: 2020-01-01

## 2020-01-01 RX ORDER — GABAPENTIN 100 MG/1
100 CAPSULE ORAL 3 TIMES DAILY
Qty: 10 CAPSULE | Status: CANCELLED | OUTPATIENT
Start: 2020-01-01

## 2020-01-01 RX ORDER — METOCLOPRAMIDE HYDROCHLORIDE 5 MG/ML
5 INJECTION INTRAMUSCULAR; INTRAVENOUS EVERY 6 HOURS PRN
Status: DISCONTINUED | OUTPATIENT
Start: 2020-01-01 | End: 2020-01-01 | Stop reason: HOSPADM

## 2020-01-01 RX ORDER — EPINEPHRINE 1 MG/ML
0.3 INJECTION, SOLUTION, CONCENTRATE INTRAVENOUS EVERY 5 MIN PRN
Status: CANCELLED | OUTPATIENT
Start: 2020-01-01

## 2020-01-01 RX ORDER — FENTANYL 12.5 UG/1
PATCH TRANSDERMAL
COMMUNITY
Start: 2020-01-01 | End: 2020-01-01

## 2020-01-01 RX ORDER — GABAPENTIN 250 MG/5ML
300 SOLUTION ORAL 3 TIMES DAILY
Qty: 540 ML | Refills: 0 | Status: SHIPPED | OUTPATIENT
Start: 2020-01-01 | End: 2020-01-01

## 2020-01-01 RX ORDER — NICOTINE POLACRILEX 4 MG
15-30 LOZENGE BUCCAL
Status: DISCONTINUED | OUTPATIENT
Start: 2020-01-01 | End: 2020-01-01 | Stop reason: HOSPADM

## 2020-01-01 RX ORDER — HYDROCHLOROTHIAZIDE 12.5 MG/1
12.5 TABLET ORAL DAILY
Qty: 90 TABLET | Refills: 3 | Status: SHIPPED | OUTPATIENT
Start: 2020-01-01 | End: 2020-01-01

## 2020-01-01 RX ORDER — CETIRIZINE HYDROCHLORIDE 5 MG/1
10 TABLET ORAL DAILY
Qty: 473 ML | Refills: 0 | Status: SHIPPED | OUTPATIENT
Start: 2020-01-01

## 2020-01-01 RX ORDER — BISACODYL 10 MG
10 SUPPOSITORY, RECTAL RECTAL DAILY PRN
Status: DISCONTINUED | OUTPATIENT
Start: 2020-01-01 | End: 2020-01-01 | Stop reason: HOSPADM

## 2020-01-01 RX ORDER — LOSARTAN POTASSIUM 100 MG/1
100 TABLET ORAL DAILY
Qty: 90 TABLET | Refills: 3 | Status: SHIPPED | OUTPATIENT
Start: 2020-01-01 | End: 2020-01-01

## 2020-01-01 RX ORDER — ONDANSETRON 8 MG/1
8 TABLET, FILM COATED ORAL EVERY 8 HOURS PRN
Qty: 10 TABLET | Refills: 3 | Status: ON HOLD | OUTPATIENT
Start: 2020-01-01 | End: 2020-01-01

## 2020-01-01 RX ORDER — FLUTICASONE PROPIONATE 50 MCG
1 SPRAY, SUSPENSION (ML) NASAL DAILY PRN
Status: DISCONTINUED | OUTPATIENT
Start: 2020-01-01 | End: 2020-01-01 | Stop reason: HOSPADM

## 2020-01-01 RX ORDER — DRONABINOL 2.5 MG/1
2.5 CAPSULE ORAL
Qty: 60 CAPSULE | Refills: 0 | Status: SHIPPED | OUTPATIENT
Start: 2020-01-01 | End: 2020-01-01

## 2020-01-01 RX ORDER — FLUCONAZOLE 100 MG/1
200 TABLET ORAL ONCE
Qty: 1 TABLET | Refills: 0 | Status: SHIPPED | OUTPATIENT
Start: 2020-01-01 | End: 2020-01-01

## 2020-01-01 RX ORDER — DONEPEZIL HYDROCHLORIDE 5 MG/1
5 TABLET, FILM COATED ORAL DAILY
Status: DISCONTINUED | OUTPATIENT
Start: 2020-01-01 | End: 2020-01-01 | Stop reason: HOSPADM

## 2020-01-01 RX ORDER — ACETAMINOPHEN 325 MG/1
650 TABLET ORAL ONCE
Status: COMPLETED | OUTPATIENT
Start: 2020-01-01 | End: 2020-01-01

## 2020-01-01 RX ORDER — TIOTROPIUM BROMIDE AND OLODATEROL 3.124; 2.736 UG/1; UG/1
2 SPRAY, METERED RESPIRATORY (INHALATION) DAILY
Qty: 1 INHALER | Refills: 11 | Status: ON HOLD | OUTPATIENT
Start: 2020-01-01 | End: 2020-01-01

## 2020-01-01 RX ORDER — OXYCODONE HYDROCHLORIDE 5 MG/1
5 TABLET ORAL EVERY 4 HOURS PRN
Status: DISCONTINUED | OUTPATIENT
Start: 2020-01-01 | End: 2020-01-01 | Stop reason: HOSPADM

## 2020-01-01 RX ORDER — GABAPENTIN 300 MG/1
300 CAPSULE ORAL 3 TIMES DAILY
Qty: 90 CAPSULE | Refills: 3 | Status: ON HOLD | OUTPATIENT
Start: 2020-01-01 | End: 2020-01-01

## 2020-01-01 RX ORDER — LIDOCAINE HYDROCHLORIDE 10 MG/ML
INJECTION, SOLUTION INFILTRATION; PERINEURAL
Status: COMPLETED
Start: 2020-01-01 | End: 2020-01-01

## 2020-01-01 RX ORDER — LOSARTAN POTASSIUM 100 MG/1
TABLET ORAL
Qty: 90 TABLET | Refills: 3 | Status: SHIPPED | OUTPATIENT
Start: 2020-01-01 | End: 2020-01-01

## 2020-01-01 RX ORDER — SODIUM CHLORIDE 9 MG/ML
INJECTION, SOLUTION INTRAVENOUS ONCE
Status: COMPLETED | OUTPATIENT
Start: 2020-01-01 | End: 2020-01-01

## 2020-01-01 RX ORDER — ALBUTEROL SULFATE 90 UG/1
2 AEROSOL, METERED RESPIRATORY (INHALATION) EVERY 6 HOURS PRN
Status: DISCONTINUED | OUTPATIENT
Start: 2020-01-01 | End: 2020-01-01 | Stop reason: HOSPADM

## 2020-01-01 RX ORDER — OXYCODONE HYDROCHLORIDE 5 MG/1
5 TABLET ORAL EVERY 4 HOURS PRN
Qty: 120 TABLET | Refills: 0 | Status: SHIPPED | OUTPATIENT
Start: 2020-01-01 | End: 2020-01-01

## 2020-01-01 RX ORDER — METHYLPREDNISOLONE SODIUM SUCCINATE 125 MG/2ML
125 INJECTION, POWDER, LYOPHILIZED, FOR SOLUTION INTRAMUSCULAR; INTRAVENOUS
Status: CANCELLED
Start: 2020-01-01

## 2020-01-01 RX ORDER — MORPHINE SULFATE 2 MG/ML
1 INJECTION, SOLUTION INTRAMUSCULAR; INTRAVENOUS
Status: DISCONTINUED | OUTPATIENT
Start: 2020-01-01 | End: 2020-01-01 | Stop reason: HOSPADM

## 2020-01-01 RX ORDER — NALOXONE HYDROCHLORIDE 0.4 MG/ML
.1-.4 INJECTION, SOLUTION INTRAMUSCULAR; INTRAVENOUS; SUBCUTANEOUS
Status: DISCONTINUED | OUTPATIENT
Start: 2020-01-01 | End: 2020-01-01 | Stop reason: HOSPADM

## 2020-01-01 RX ORDER — PROCHLORPERAZINE MALEATE 10 MG
10 TABLET ORAL EVERY 6 HOURS PRN
Qty: 30 TABLET | Refills: 3 | Status: SHIPPED | OUTPATIENT
Start: 2020-01-01 | End: 2020-01-01

## 2020-01-01 RX ORDER — AMOXICILLIN 250 MG
2 CAPSULE ORAL
Status: DISCONTINUED | OUTPATIENT
Start: 2020-01-01 | End: 2020-01-01 | Stop reason: ALTCHOICE

## 2020-01-01 RX ORDER — CETIRIZINE HYDROCHLORIDE 10 MG/1
10 TABLET ORAL DAILY
Status: DISCONTINUED | OUTPATIENT
Start: 2020-01-01 | End: 2020-01-01 | Stop reason: HOSPADM

## 2020-01-01 RX ORDER — LORAZEPAM 0.5 MG/1
0.5 TABLET ORAL EVERY 4 HOURS PRN
Qty: 60 TABLET | Refills: 3 | Status: ON HOLD | OUTPATIENT
Start: 2020-01-01 | End: 2020-01-01

## 2020-01-01 RX ORDER — BISACODYL 5 MG/1
5 TABLET, DELAYED RELEASE ORAL DAILY PRN
Status: DISCONTINUED | OUTPATIENT
Start: 2020-01-01 | End: 2020-01-01

## 2020-01-01 RX ORDER — LEVOFLOXACIN 5 MG/ML
500 INJECTION, SOLUTION INTRAVENOUS EVERY 24 HOURS
Status: DISCONTINUED | OUTPATIENT
Start: 2020-01-01 | End: 2020-01-01

## 2020-01-01 RX ORDER — DEXTROSE MONOHYDRATE 25 G/50ML
25-50 INJECTION, SOLUTION INTRAVENOUS
Status: DISCONTINUED | OUTPATIENT
Start: 2020-01-01 | End: 2020-01-01 | Stop reason: HOSPADM

## 2020-01-01 RX ORDER — FENTANYL CITRATE 50 UG/ML
100 INJECTION, SOLUTION INTRAMUSCULAR; INTRAVENOUS
Status: DISCONTINUED | OUTPATIENT
Start: 2020-01-01 | End: 2020-01-01 | Stop reason: HOSPADM

## 2020-01-01 RX ORDER — PIPERACILLIN SODIUM, TAZOBACTAM SODIUM 4; .5 G/20ML; G/20ML
4.5 INJECTION, POWDER, LYOPHILIZED, FOR SOLUTION INTRAVENOUS ONCE
Status: COMPLETED | OUTPATIENT
Start: 2020-01-01 | End: 2020-01-01

## 2020-01-01 RX ORDER — DONEPEZIL HYDROCHLORIDE 5 MG/1
5 TABLET, FILM COATED ORAL AT BEDTIME
Status: DISCONTINUED | OUTPATIENT
Start: 2020-01-01 | End: 2020-01-01 | Stop reason: HOSPADM

## 2020-01-01 RX ORDER — ONDANSETRON 4 MG/1
4 TABLET, ORALLY DISINTEGRATING ORAL EVERY 6 HOURS PRN
Status: DISCONTINUED | OUTPATIENT
Start: 2020-01-01 | End: 2020-01-01 | Stop reason: HOSPADM

## 2020-01-01 RX ORDER — AMOXICILLIN AND CLAVULANATE POTASSIUM 400; 57 MG/5ML; MG/5ML
875 POWDER, FOR SUSPENSION ORAL 2 TIMES DAILY
Qty: 152.6 ML | Refills: 0 | Status: ON HOLD | OUTPATIENT
Start: 2020-01-01 | End: 2020-01-01

## 2020-01-01 RX ORDER — CEFDINIR 300 MG/1
300 CAPSULE ORAL ONCE
Status: COMPLETED | OUTPATIENT
Start: 2020-01-01 | End: 2020-01-01

## 2020-01-01 RX ORDER — GABAPENTIN 300 MG/1
300 CAPSULE ORAL 3 TIMES DAILY
Status: DISCONTINUED | OUTPATIENT
Start: 2020-01-01 | End: 2020-01-01 | Stop reason: HOSPADM

## 2020-01-01 RX ORDER — FLUCONAZOLE 100 MG/1
100 TABLET ORAL DAILY
Status: DISCONTINUED | OUTPATIENT
Start: 2020-01-01 | End: 2020-01-01 | Stop reason: HOSPADM

## 2020-01-01 RX ORDER — NICOTINE POLACRILEX 4 MG
15-30 LOZENGE BUCCAL
Status: DISCONTINUED | OUTPATIENT
Start: 2020-01-01 | End: 2020-01-01

## 2020-01-01 RX ORDER — LEVOFLOXACIN 750 MG/1
750 TABLET, FILM COATED ORAL DAILY
Qty: 7 TABLET | Refills: 0 | Status: SHIPPED | OUTPATIENT
Start: 2020-01-01 | End: 2020-01-01

## 2020-01-01 RX ORDER — DONEPEZIL HYDROCHLORIDE 10 MG/1
TABLET, ORALLY DISINTEGRATING ORAL
Qty: 45 TABLET | Refills: 1 | Status: SHIPPED | OUTPATIENT
Start: 2020-01-01 | End: 2020-01-01

## 2020-01-01 RX ORDER — BISACODYL 5 MG
15 TABLET, DELAYED RELEASE (ENTERIC COATED) ORAL DAILY PRN
Status: DISCONTINUED | OUTPATIENT
Start: 2020-01-01 | End: 2020-01-01 | Stop reason: HOSPADM

## 2020-01-01 RX ORDER — ZINC SULFATE 50(220)MG
220 CAPSULE ORAL DAILY
Status: DISCONTINUED | OUTPATIENT
Start: 2020-01-01 | End: 2020-01-01 | Stop reason: HOSPADM

## 2020-01-01 RX ORDER — FENTANYL CITRATE 50 UG/ML
25-50 INJECTION, SOLUTION INTRAMUSCULAR; INTRAVENOUS EVERY 5 MIN PRN
Status: DISCONTINUED | OUTPATIENT
Start: 2020-01-01 | End: 2020-01-01

## 2020-01-01 RX ORDER — ALUMINA, MAGNESIA, AND SIMETHICONE 2400; 2400; 240 MG/30ML; MG/30ML; MG/30ML
30 SUSPENSION ORAL EVERY 4 HOURS PRN
Status: DISCONTINUED | OUTPATIENT
Start: 2020-01-01 | End: 2020-01-01 | Stop reason: HOSPADM

## 2020-01-01 RX ORDER — ZOLEDRONIC ACID 0.04 MG/ML
4 INJECTION, SOLUTION INTRAVENOUS ONCE
Status: CANCELLED | OUTPATIENT
Start: 2020-01-01 | End: 2020-01-01

## 2020-01-01 RX ORDER — GADOBUTROL 604.72 MG/ML
8 INJECTION INTRAVENOUS ONCE
Status: COMPLETED | OUTPATIENT
Start: 2020-01-01 | End: 2020-01-01

## 2020-01-01 RX ORDER — LEVOTHYROXINE SODIUM 112 UG/1
112 TABLET ORAL DAILY
Status: DISCONTINUED | OUTPATIENT
Start: 2020-01-01 | End: 2020-01-01 | Stop reason: HOSPADM

## 2020-01-01 RX ORDER — LORAZEPAM 2 MG/ML
0.5 CONCENTRATE ORAL EVERY 4 HOURS PRN
Qty: 30 ML | Refills: 0 | Status: SHIPPED | OUTPATIENT
Start: 2020-01-01

## 2020-01-01 RX ORDER — BISACODYL 5 MG
10 TABLET, DELAYED RELEASE (ENTERIC COATED) ORAL DAILY PRN
Status: DISCONTINUED | OUTPATIENT
Start: 2020-01-01 | End: 2020-01-01 | Stop reason: HOSPADM

## 2020-01-01 RX ORDER — LEVOFLOXACIN 500 MG/1
500 TABLET, FILM COATED ORAL EVERY 24 HOURS
Qty: 3 TABLET | Refills: 0 | Status: SHIPPED | OUTPATIENT
Start: 2020-01-01 | End: 2020-01-01

## 2020-01-01 RX ORDER — AMOXICILLIN AND CLAVULANATE POTASSIUM 400; 57 MG/5ML; MG/5ML
875 POWDER, FOR SUSPENSION ORAL 2 TIMES DAILY
Status: COMPLETED | OUTPATIENT
Start: 2020-01-01 | End: 2020-01-01

## 2020-01-01 RX ORDER — LEVOFLOXACIN 500 MG/1
500 TABLET, FILM COATED ORAL DAILY
Qty: 7 TABLET | Refills: 0 | Status: SHIPPED | OUTPATIENT
Start: 2020-01-01 | End: 2020-01-01

## 2020-01-01 RX ORDER — OXYCODONE HYDROCHLORIDE 5 MG/1
TABLET ORAL
COMMUNITY
Start: 2020-01-01 | End: 2020-01-01

## 2020-01-01 RX ORDER — ALBUTEROL SULFATE 90 UG/1
2 AEROSOL, METERED RESPIRATORY (INHALATION) EVERY 4 HOURS PRN
Status: DISCONTINUED | OUTPATIENT
Start: 2020-01-01 | End: 2020-01-01 | Stop reason: HOSPADM

## 2020-01-01 RX ORDER — FUROSEMIDE 10 MG/ML
20 INJECTION INTRAMUSCULAR; INTRAVENOUS ONCE
Status: COMPLETED | OUTPATIENT
Start: 2020-01-01 | End: 2020-01-01

## 2020-01-01 RX ORDER — CETIRIZINE HYDROCHLORIDE 10 MG/1
10 TABLET ORAL DAILY PRN
Status: DISCONTINUED | OUTPATIENT
Start: 2020-01-01 | End: 2020-01-01 | Stop reason: HOSPADM

## 2020-01-01 RX ORDER — LEVOTHYROXINE SODIUM 112 UG/1
112 TABLET ORAL DAILY
COMMUNITY

## 2020-01-01 RX ORDER — AMOXICILLIN 250 MG
1 CAPSULE ORAL 2 TIMES DAILY
Status: DISCONTINUED | OUTPATIENT
Start: 2020-01-01 | End: 2020-01-01

## 2020-01-01 RX ORDER — ALBUTEROL SULFATE 90 UG/1
2 AEROSOL, METERED RESPIRATORY (INHALATION) EVERY 6 HOURS PRN
Qty: 6.7 G | Refills: 3 | Status: SHIPPED | OUTPATIENT
Start: 2020-01-01 | End: 2020-01-01

## 2020-01-01 RX ORDER — NICOTINE POLACRILEX 4 MG
15-30 LOZENGE BUCCAL
Status: CANCELLED | OUTPATIENT
Start: 2020-01-01

## 2020-01-01 RX ORDER — ALBUTEROL SULFATE 90 UG/1
2 AEROSOL, METERED RESPIRATORY (INHALATION) EVERY 6 HOURS PRN
Status: DISCONTINUED | OUTPATIENT
Start: 2020-01-01 | End: 2020-01-01

## 2020-01-01 RX ORDER — GUAR GUM
1 PACKET (EA) ORAL 3 TIMES DAILY
Status: DISCONTINUED | OUTPATIENT
Start: 2020-01-01 | End: 2020-01-01 | Stop reason: HOSPADM

## 2020-01-01 RX ORDER — LORAZEPAM 0.5 MG/1
0.5 TABLET ORAL EVERY 4 HOURS PRN
Status: DISCONTINUED | OUTPATIENT
Start: 2020-01-01 | End: 2020-01-01 | Stop reason: HOSPADM

## 2020-01-01 RX ORDER — OXYCODONE HYDROCHLORIDE 5 MG/1
5 TABLET ORAL EVERY 6 HOURS PRN
Status: DISCONTINUED | OUTPATIENT
Start: 2020-01-01 | End: 2020-01-01 | Stop reason: HOSPADM

## 2020-01-01 RX ORDER — CEFDINIR 300 MG/1
300 CAPSULE ORAL 2 TIMES DAILY
Qty: 10 CAPSULE | Refills: 0 | Status: SHIPPED | OUTPATIENT
Start: 2020-01-01 | End: 2020-01-01

## 2020-01-01 RX ORDER — IPRATROPIUM BROMIDE AND ALBUTEROL SULFATE 2.5; .5 MG/3ML; MG/3ML
3 SOLUTION RESPIRATORY (INHALATION) EVERY 4 HOURS PRN
Status: DISCONTINUED | OUTPATIENT
Start: 2020-01-01 | End: 2020-01-01 | Stop reason: HOSPADM

## 2020-01-01 RX ORDER — FENTANYL 37.5 UG/H
37.5 PATCH, EXTENDED RELEASE TRANSDERMAL
Qty: 10 PATCH | Refills: 0 | Status: SHIPPED | OUTPATIENT
Start: 2020-01-01

## 2020-01-01 RX ORDER — FLUTICASONE PROPIONATE 50 MCG
1-2 SPRAY, SUSPENSION (ML) NASAL DAILY
Status: DISCONTINUED | OUTPATIENT
Start: 2020-01-01 | End: 2020-01-01 | Stop reason: HOSPADM

## 2020-01-01 RX ORDER — HYDROMORPHONE HYDROCHLORIDE 1 MG/ML
0.2 INJECTION, SOLUTION INTRAMUSCULAR; INTRAVENOUS; SUBCUTANEOUS EVERY 4 HOURS PRN
Status: DISCONTINUED | OUTPATIENT
Start: 2020-01-01 | End: 2020-01-01

## 2020-01-01 RX ORDER — DONEPEZIL HYDROCHLORIDE 10 MG/1
5 TABLET, ORALLY DISINTEGRATING ORAL DAILY
COMMUNITY
End: 2020-01-01

## 2020-01-01 RX ORDER — FLUTICASONE PROPIONATE 50 MCG
1 SPRAY, SUSPENSION (ML) NASAL DAILY PRN
COMMUNITY

## 2020-01-01 RX ORDER — DEXTROSE MONOHYDRATE 25 G/50ML
25-50 INJECTION, SOLUTION INTRAVENOUS
Status: DISCONTINUED | OUTPATIENT
Start: 2020-01-01 | End: 2020-01-01

## 2020-01-01 RX ORDER — LORAZEPAM 2 MG/ML
0.5 CONCENTRATE ORAL EVERY 4 HOURS PRN
Status: DISCONTINUED | OUTPATIENT
Start: 2020-01-01 | End: 2020-01-01 | Stop reason: HOSPADM

## 2020-01-01 RX ORDER — HYDROMORPHONE HYDROCHLORIDE 1 MG/ML
0.3 INJECTION, SOLUTION INTRAMUSCULAR; INTRAVENOUS; SUBCUTANEOUS
Status: COMPLETED | OUTPATIENT
Start: 2020-01-01 | End: 2020-01-01

## 2020-01-01 RX ORDER — DONEPEZIL HYDROCHLORIDE 5 MG/1
5 TABLET, ORALLY DISINTEGRATING ORAL DAILY
Qty: 30 TABLET | Refills: 0 | Status: SHIPPED | OUTPATIENT
Start: 2020-01-01 | End: 2020-01-01

## 2020-01-01 RX ORDER — ALBUTEROL SULFATE 0.83 MG/ML
2.5 SOLUTION RESPIRATORY (INHALATION) EVERY 6 HOURS PRN
Qty: 120 VIAL | Refills: 3 | Status: SHIPPED | OUTPATIENT
Start: 2020-01-01 | End: 2020-01-01

## 2020-01-01 RX ORDER — SERTRALINE HYDROCHLORIDE 20 MG/ML
50 SOLUTION ORAL AT BEDTIME
Qty: 75 ML | Refills: 0 | Status: SHIPPED | OUTPATIENT
Start: 2020-01-01 | End: 2020-01-01

## 2020-01-01 RX ORDER — HYDROCHLOROTHIAZIDE 12.5 MG/1
TABLET ORAL
Qty: 30 TABLET | Refills: 3 | Status: SHIPPED | OUTPATIENT
Start: 2020-01-01 | End: 2020-01-01

## 2020-01-01 RX ORDER — GUAR GUM
1 PACKET (EA) ORAL 3 TIMES DAILY
Qty: 90 PACKET | Refills: 0 | Status: SHIPPED | OUTPATIENT
Start: 2020-01-01 | End: 2020-01-01

## 2020-01-01 RX ORDER — FENTANYL CITRATE 50 UG/ML
INJECTION, SOLUTION INTRAMUSCULAR; INTRAVENOUS
Status: COMPLETED
Start: 2020-01-01 | End: 2020-01-01

## 2020-01-01 RX ORDER — OXYCODONE HYDROCHLORIDE 5 MG/1
5 TABLET ORAL EVERY 4 HOURS PRN
Qty: 120 TABLET | Refills: 0 | Status: ON HOLD | OUTPATIENT
Start: 2020-01-01 | End: 2020-01-01

## 2020-01-01 RX ORDER — IOPAMIDOL 755 MG/ML
80 INJECTION, SOLUTION INTRAVASCULAR ONCE
Status: COMPLETED | OUTPATIENT
Start: 2020-01-01 | End: 2020-01-01

## 2020-01-01 RX ORDER — LOSARTAN POTASSIUM 100 MG/1
50 TABLET ORAL DAILY
Qty: 90 TABLET | Refills: 3 | Status: ON HOLD
Start: 2020-01-01 | End: 2020-01-01

## 2020-01-01 RX ORDER — ALBUTEROL SULFATE 0.83 MG/ML
2.5 SOLUTION RESPIRATORY (INHALATION) EVERY 6 HOURS PRN
Status: DISCONTINUED | OUTPATIENT
Start: 2020-01-01 | End: 2020-01-01 | Stop reason: HOSPADM

## 2020-01-01 RX ORDER — OXYCODONE HCL 5 MG/5 ML
5-10 SOLUTION, ORAL ORAL
Status: DISCONTINUED | OUTPATIENT
Start: 2020-01-01 | End: 2020-01-01 | Stop reason: HOSPADM

## 2020-01-01 RX ORDER — SERTRALINE HYDROCHLORIDE 25 MG/1
50 TABLET, FILM COATED ORAL DAILY
Status: DISCONTINUED | OUTPATIENT
Start: 2020-01-01 | End: 2020-01-01 | Stop reason: HOSPADM

## 2020-01-01 RX ORDER — DEXTROSE MONOHYDRATE 25 G/50ML
25-50 INJECTION, SOLUTION INTRAVENOUS
Status: CANCELLED | OUTPATIENT
Start: 2020-01-01

## 2020-01-01 RX ORDER — AMOXICILLIN 250 MG
2 CAPSULE ORAL 2 TIMES DAILY
Status: DISCONTINUED | OUTPATIENT
Start: 2020-01-01 | End: 2020-01-01

## 2020-01-01 RX ORDER — ROSUVASTATIN CALCIUM 10 MG/1
TABLET, COATED ORAL
Qty: 90 TABLET | Refills: 3 | Status: SHIPPED | OUTPATIENT
Start: 2020-01-01 | End: 2020-01-01

## 2020-01-01 RX ORDER — LEVOFLOXACIN 500 MG/1
500 TABLET, FILM COATED ORAL EVERY 24 HOURS
Status: DISCONTINUED | OUTPATIENT
Start: 2020-01-01 | End: 2020-01-01 | Stop reason: HOSPADM

## 2020-01-01 RX ORDER — BISACODYL 5 MG/1
5 TABLET, DELAYED RELEASE ORAL DAILY PRN
COMMUNITY

## 2020-01-01 RX ORDER — CEFDINIR 300 MG/1
300 CAPSULE ORAL 2 TIMES DAILY
Qty: 9 CAPSULE | Refills: 0 | Status: SHIPPED | OUTPATIENT
Start: 2020-01-01 | End: 2020-01-01

## 2020-01-01 RX ORDER — ACETAMINOPHEN 325 MG/1
650 TABLET ORAL EVERY 4 HOURS PRN
Status: ON HOLD | COMMUNITY
Start: 2020-01-01 | End: 2020-01-01

## 2020-01-01 RX ORDER — CEFDINIR 300 MG/1
300 CAPSULE ORAL 2 TIMES DAILY
Status: DISCONTINUED | OUTPATIENT
Start: 2020-01-01 | End: 2020-01-01 | Stop reason: HOSPADM

## 2020-01-01 RX ORDER — METOCLOPRAMIDE 5 MG/1
5 TABLET ORAL EVERY 6 HOURS PRN
Status: DISCONTINUED | OUTPATIENT
Start: 2020-01-01 | End: 2020-01-01 | Stop reason: HOSPADM

## 2020-01-01 RX ORDER — GADOBUTROL 604.72 MG/ML
6 INJECTION INTRAVENOUS ONCE
Status: COMPLETED | OUTPATIENT
Start: 2020-01-01 | End: 2020-01-01

## 2020-01-01 RX ORDER — FENTANYL CITRATE 50 UG/ML
25-50 INJECTION, SOLUTION INTRAMUSCULAR; INTRAVENOUS EVERY 5 MIN PRN
Status: CANCELLED | OUTPATIENT
Start: 2020-01-01

## 2020-01-01 RX ORDER — GADOBUTROL 604.72 MG/ML
6 INJECTION INTRAVENOUS ONCE
Status: DISCONTINUED | OUTPATIENT
Start: 2020-01-01 | End: 2020-01-01 | Stop reason: CLARIF

## 2020-01-01 RX ORDER — TIOTROPIUM BROMIDE AND OLODATEROL 3.124; 2.736 UG/1; UG/1
2 SPRAY, METERED RESPIRATORY (INHALATION) DAILY
Qty: 1 INHALER | Refills: 11 | Status: SHIPPED | OUTPATIENT
Start: 2020-01-01 | End: 2020-01-01

## 2020-01-01 RX ORDER — SERTRALINE HYDROCHLORIDE 20 MG/ML
50 SOLUTION ORAL AT BEDTIME
Status: DISCONTINUED | OUTPATIENT
Start: 2020-01-01 | End: 2020-01-01 | Stop reason: HOSPADM

## 2020-01-01 RX ORDER — DONEPEZIL HYDROCHLORIDE 5 MG/1
5 TABLET, FILM COATED ORAL AT BEDTIME
Qty: 90 TABLET | Refills: 3 | Status: ON HOLD | OUTPATIENT
Start: 2020-01-01 | End: 2020-01-01

## 2020-01-01 RX ORDER — GABAPENTIN 300 MG/1
300 CAPSULE ORAL 3 TIMES DAILY
Status: DISCONTINUED | OUTPATIENT
Start: 2020-01-01 | End: 2020-01-01 | Stop reason: ALTCHOICE

## 2020-01-01 RX ORDER — AZITHROMYCIN 500 MG/1
500 INJECTION, POWDER, LYOPHILIZED, FOR SOLUTION INTRAVENOUS ONCE
Status: COMPLETED | OUTPATIENT
Start: 2020-01-01 | End: 2020-01-01

## 2020-01-01 RX ORDER — OXYCODONE HCL 5 MG/5 ML
5-10 SOLUTION, ORAL ORAL EVERY 4 HOURS PRN
Qty: 500 ML | Refills: 0 | Status: SHIPPED | OUTPATIENT
Start: 2020-01-01

## 2020-01-01 RX ORDER — LANOLIN ALCOHOL/MO/W.PET/CERES
3 CREAM (GRAM) TOPICAL
Status: DISCONTINUED | OUTPATIENT
Start: 2020-01-01 | End: 2020-01-01 | Stop reason: HOSPADM

## 2020-01-01 RX ORDER — LEVOTHYROXINE SODIUM 125 UG/1
125 TABLET ORAL DAILY
Qty: 30 TABLET | Refills: 11 | Status: SHIPPED | OUTPATIENT
Start: 2020-01-01 | End: 2020-01-01

## 2020-01-01 RX ORDER — FLUTICASONE PROPIONATE 50 MCG
1-2 SPRAY, SUSPENSION (ML) NASAL DAILY
Qty: 48 G | Refills: 3 | Status: SHIPPED | OUTPATIENT
Start: 2020-01-01 | End: 2020-01-01

## 2020-01-01 RX ADMIN — SODIUM CHLORIDE 1000 ML: 9 INJECTION, SOLUTION INTRAVENOUS at 15:17

## 2020-01-01 RX ADMIN — PIPERACILLIN SODIUM AND TAZOBACTAM SODIUM 3.38 G: 3; .375 INJECTION, POWDER, LYOPHILIZED, FOR SOLUTION INTRAVENOUS at 05:03

## 2020-01-01 RX ADMIN — LORAZEPAM 0.5 MG: 2 LIQUID ORAL at 21:36

## 2020-01-01 RX ADMIN — GABAPENTIN 300 MG: 250 SUSPENSION ORAL at 22:34

## 2020-01-01 RX ADMIN — SODIUM CHLORIDE 200 MG: 9 INJECTION, SOLUTION INTRAVENOUS at 10:18

## 2020-01-01 RX ADMIN — ALBUTEROL SULFATE 2.5 MG: 2.5 SOLUTION RESPIRATORY (INHALATION) at 16:32

## 2020-01-01 RX ADMIN — SODIUM CHLORIDE 200 MG: 9 INJECTION, SOLUTION INTRAVENOUS at 09:49

## 2020-01-01 RX ADMIN — MIDAZOLAM HYDROCHLORIDE 0.5 MG: 1 INJECTION, SOLUTION INTRAMUSCULAR; INTRAVENOUS at 13:10

## 2020-01-01 RX ADMIN — SODIUM CHLORIDE 1000 ML: 9 INJECTION, SOLUTION INTRAVENOUS at 12:39

## 2020-01-01 RX ADMIN — HYDROMORPHONE HYDROCHLORIDE 0.3 MG: 1 INJECTION, SOLUTION INTRAMUSCULAR; INTRAVENOUS; SUBCUTANEOUS at 05:03

## 2020-01-01 RX ADMIN — OXYCODONE HYDROCHLORIDE 5 MG: 5 SOLUTION ORAL at 15:52

## 2020-01-01 RX ADMIN — OXYCODONE HYDROCHLORIDE 5 MG: 5 TABLET ORAL at 18:58

## 2020-01-01 RX ADMIN — SODIUM CHLORIDE 1000 ML: 9 INJECTION, SOLUTION INTRAVENOUS at 14:49

## 2020-01-01 RX ADMIN — FLUDEOXYGLUCOSE F-18 12 MCI.: 500 INJECTION, SOLUTION INTRAVENOUS at 11:05

## 2020-01-01 RX ADMIN — ENOXAPARIN SODIUM 40 MG: 40 INJECTION SUBCUTANEOUS at 22:59

## 2020-01-01 RX ADMIN — GABAPENTIN 300 MG: 250 SUSPENSION ORAL at 08:23

## 2020-01-01 RX ADMIN — SODIUM CHLORIDE 1000 ML: 9 INJECTION, SOLUTION INTRAVENOUS at 08:26

## 2020-01-01 RX ADMIN — ALBUTEROL SULFATE 2.5 MG: 2.5 SOLUTION RESPIRATORY (INHALATION) at 18:11

## 2020-01-01 RX ADMIN — GABAPENTIN 300 MG: 250 SUSPENSION ORAL at 21:04

## 2020-01-01 RX ADMIN — GABAPENTIN 300 MG: 300 CAPSULE ORAL at 08:57

## 2020-01-01 RX ADMIN — OXYCODONE HYDROCHLORIDE 5 MG: 5 SOLUTION ORAL at 21:42

## 2020-01-01 RX ADMIN — FENTANYL CITRATE 50 MCG: 50 INJECTION, SOLUTION INTRAMUSCULAR; INTRAVENOUS at 15:22

## 2020-01-01 RX ADMIN — SODIUM CHLORIDE 1000 ML: 9 INJECTION, SOLUTION INTRAVENOUS at 14:19

## 2020-01-01 RX ADMIN — ENOXAPARIN SODIUM 40 MG: 40 INJECTION SUBCUTANEOUS at 11:44

## 2020-01-01 RX ADMIN — SODIUM CHLORIDE: 9 INJECTION, SOLUTION INTRAVENOUS at 22:59

## 2020-01-01 RX ADMIN — CETIRIZINE HYDROCHLORIDE 10 MG: 10 TABLET, FILM COATED ORAL at 18:58

## 2020-01-01 RX ADMIN — SODIUM CHLORIDE: 234 INJECTION INTRAMUSCULAR; INTRAVENOUS; SUBCUTANEOUS at 04:00

## 2020-01-01 RX ADMIN — GADOBUTROL 8 ML: 604.72 INJECTION INTRAVENOUS at 13:37

## 2020-01-01 RX ADMIN — LEVOTHYROXINE SODIUM 125 MCG: 100 TABLET ORAL at 12:35

## 2020-01-01 RX ADMIN — ALBUTEROL SULFATE 2 PUFF: 90 AEROSOL, METERED RESPIRATORY (INHALATION) at 16:53

## 2020-01-01 RX ADMIN — LIDOCAINE HYDROCHLORIDE 10 ML: 10 INJECTION, SOLUTION INFILTRATION; PERINEURAL at 15:34

## 2020-01-01 RX ADMIN — GABAPENTIN 300 MG: 250 SUSPENSION ORAL at 08:04

## 2020-01-01 RX ADMIN — MIDAZOLAM HYDROCHLORIDE 0.5 MG: 1 INJECTION, SOLUTION INTRAMUSCULAR; INTRAVENOUS at 13:20

## 2020-01-01 RX ADMIN — PACLITAXEL 370 MG: 6 INJECTION, SOLUTION INTRAVENOUS at 12:46

## 2020-01-01 RX ADMIN — DONEPEZIL HYDROCHLORIDE 5 MG: 5 TABLET ORAL at 21:56

## 2020-01-01 RX ADMIN — GADOBUTROL 8 ML: 604.72 INJECTION INTRAVENOUS at 11:39

## 2020-01-01 RX ADMIN — MIDAZOLAM 0.5 MG: 1 INJECTION INTRAMUSCULAR; INTRAVENOUS at 11:09

## 2020-01-01 RX ADMIN — SODIUM CHLORIDE 250 ML: 9 INJECTION, SOLUTION INTRAVENOUS at 09:17

## 2020-01-01 RX ADMIN — MULTIVITAMIN 15 ML: LIQUID ORAL at 19:48

## 2020-01-01 RX ADMIN — ZINC SULFATE 220 MG (50 MG) CAPSULE 220 MG: CAPSULE at 18:58

## 2020-01-01 RX ADMIN — SERTRALINE HYDROCHLORIDE 50 MG: 25 TABLET, FILM COATED ORAL at 21:21

## 2020-01-01 RX ADMIN — SODIUM CHLORIDE 1000 ML: 9 INJECTION, SOLUTION INTRAVENOUS at 11:49

## 2020-01-01 RX ADMIN — FENTANYL 1 PATCH: 12 PATCH TRANSDERMAL at 21:37

## 2020-01-01 RX ADMIN — SODIUM CHLORIDE: 9 INJECTION, SOLUTION INTRAVENOUS at 18:56

## 2020-01-01 RX ADMIN — SODIUM CHLORIDE 1000 ML: 9 INJECTION, SOLUTION INTRAVENOUS at 09:56

## 2020-01-01 RX ADMIN — LEVOTHYROXINE SODIUM 125 MCG: 100 TABLET ORAL at 08:13

## 2020-01-01 RX ADMIN — AZITHROMYCIN MONOHYDRATE 500 MG: 500 INJECTION, POWDER, LYOPHILIZED, FOR SOLUTION INTRAVENOUS at 05:17

## 2020-01-01 RX ADMIN — OXYCODONE HYDROCHLORIDE 5 MG: 5 TABLET ORAL at 23:10

## 2020-01-01 RX ADMIN — SODIUM CHLORIDE 1000 ML: 9 INJECTION, SOLUTION INTRAVENOUS at 10:51

## 2020-01-01 RX ADMIN — SODIUM CHLORIDE 250 ML: 9 INJECTION, SOLUTION INTRAVENOUS at 15:17

## 2020-01-01 RX ADMIN — OXYCODONE HYDROCHLORIDE 5 MG: 5 TABLET ORAL at 06:25

## 2020-01-01 RX ADMIN — LIDOCAINE HYDROCHLORIDE 10 ML: 10 INJECTION, SOLUTION INFILTRATION; PERINEURAL at 11:10

## 2020-01-01 RX ADMIN — ACETAMINOPHEN 650 MG: 325 TABLET ORAL at 09:40

## 2020-01-01 RX ADMIN — LIDOCAINE HYDROCHLORIDE 5 ML: 10; .005 INJECTION, SOLUTION EPIDURAL; INFILTRATION; INTRACAUDAL; PERINEURAL at 15:37

## 2020-01-01 RX ADMIN — IPRATROPIUM BROMIDE AND ALBUTEROL SULFATE 3 ML: .5; 3 SOLUTION RESPIRATORY (INHALATION) at 18:01

## 2020-01-01 RX ADMIN — OXYCODONE HYDROCHLORIDE 5 MG: 5 TABLET ORAL at 13:50

## 2020-01-01 RX ADMIN — ZOLEDRONIC ACID 3.5 MG: 4 INJECTION, SOLUTION, CONCENTRATE INTRAVENOUS at 10:59

## 2020-01-01 RX ADMIN — IOPAMIDOL 58 ML: 755 INJECTION, SOLUTION INTRAVENOUS at 17:19

## 2020-01-01 RX ADMIN — LEVOTHYROXINE SODIUM 112 MCG: 112 TABLET ORAL at 19:50

## 2020-01-01 RX ADMIN — CEFTRIAXONE SODIUM 1 G: 1 INJECTION, POWDER, FOR SOLUTION INTRAMUSCULAR; INTRAVENOUS at 04:24

## 2020-01-01 RX ADMIN — SERTRALINE HYDROCHLORIDE 50 MG: 25 TABLET ORAL at 21:35

## 2020-01-01 RX ADMIN — AMOXICILLIN AND CLAVULANATE POTASSIUM 875 MG: 400; 57 POWDER, FOR SUSPENSION ORAL at 08:23

## 2020-01-01 RX ADMIN — DONEPEZIL HYDROCHLORIDE 5 MG: 5 TABLET, FILM COATED ORAL at 22:25

## 2020-01-01 RX ADMIN — ALBUTEROL SULFATE 2.5 MG: 2.5 SOLUTION RESPIRATORY (INHALATION) at 20:39

## 2020-01-01 RX ADMIN — FENTANYL CITRATE 25 MCG: 50 INJECTION, SOLUTION INTRAMUSCULAR; INTRAVENOUS at 11:09

## 2020-01-01 RX ADMIN — OXYCODONE HYDROCHLORIDE 5 MG: 5 SOLUTION ORAL at 22:25

## 2020-01-01 RX ADMIN — DONEPEZIL HYDROCHLORIDE 5 MG: 5 TABLET, FILM COATED ORAL at 21:38

## 2020-01-01 RX ADMIN — SODIUM CHLORIDE: 9 INJECTION, SOLUTION INTRAVENOUS at 11:17

## 2020-01-01 RX ADMIN — MIDAZOLAM HYDROCHLORIDE 0.5 MG: 1 INJECTION, SOLUTION INTRAMUSCULAR; INTRAVENOUS at 13:30

## 2020-01-01 RX ADMIN — PIPERACILLIN AND TAZOBACTAM 3.38 G: 3; .375 INJECTION, POWDER, FOR SOLUTION INTRAVENOUS at 17:38

## 2020-01-01 RX ADMIN — GABAPENTIN 300 MG: 300 CAPSULE ORAL at 22:47

## 2020-01-01 RX ADMIN — OXYCODONE HYDROCHLORIDE 5 MG: 5 SOLUTION ORAL at 14:57

## 2020-01-01 RX ADMIN — ENOXAPARIN SODIUM 40 MG: 40 INJECTION SUBCUTANEOUS at 08:49

## 2020-01-01 RX ADMIN — AZITHROMYCIN MONOHYDRATE 250 MG: 500 INJECTION, POWDER, LYOPHILIZED, FOR SOLUTION INTRAVENOUS at 01:40

## 2020-01-01 RX ADMIN — OXYCODONE HYDROCHLORIDE 5 MG: 5 SOLUTION ORAL at 22:53

## 2020-01-01 RX ADMIN — CETIRIZINE HYDROCHLORIDE 10 MG: 10 TABLET, FILM COATED ORAL at 21:15

## 2020-01-01 RX ADMIN — SODIUM CHLORIDE 1000 ML: 9 INJECTION, SOLUTION INTRAVENOUS at 13:15

## 2020-01-01 RX ADMIN — ALBUTEROL SULFATE 2 PUFF: 90 AEROSOL, METERED RESPIRATORY (INHALATION) at 23:28

## 2020-01-01 RX ADMIN — LEVOTHYROXINE SODIUM 125 MCG: 125 TABLET ORAL at 09:37

## 2020-01-01 RX ADMIN — GABAPENTIN 300 MG: 300 CAPSULE ORAL at 08:56

## 2020-01-01 RX ADMIN — SODIUM CHLORIDE 1000 ML: 9 INJECTION, SOLUTION INTRAVENOUS at 13:27

## 2020-01-01 RX ADMIN — LEVOTHYROXINE SODIUM 112 MCG: 112 TABLET ORAL at 06:58

## 2020-01-01 RX ADMIN — ALBUTEROL SULFATE 2.5 MG: 2.5 SOLUTION RESPIRATORY (INHALATION) at 14:09

## 2020-01-01 RX ADMIN — SERTRALINE HYDROCHLORIDE 50 MG: 25 TABLET ORAL at 21:27

## 2020-01-01 RX ADMIN — GABAPENTIN 300 MG: 300 CAPSULE ORAL at 21:56

## 2020-01-01 RX ADMIN — CEFTRIAXONE SODIUM 2 G: 2 INJECTION, POWDER, FOR SOLUTION INTRAMUSCULAR; INTRAVENOUS at 00:05

## 2020-01-01 RX ADMIN — GABAPENTIN 300 MG: 300 CAPSULE ORAL at 20:28

## 2020-01-01 RX ADMIN — LEVOTHYROXINE SODIUM 125 MCG: 125 TABLET ORAL at 10:16

## 2020-01-01 RX ADMIN — PEGFILGRASTIM 6 MG: KIT SUBCUTANEOUS at 15:29

## 2020-01-01 RX ADMIN — OXYCODONE HYDROCHLORIDE 5 MG: 5 SOLUTION ORAL at 15:31

## 2020-01-01 RX ADMIN — SERTRALINE HYDROCHLORIDE 50 MG: 25 TABLET ORAL at 21:03

## 2020-01-01 RX ADMIN — ENOXAPARIN SODIUM 40 MG: 40 INJECTION SUBCUTANEOUS at 11:49

## 2020-01-01 RX ADMIN — ACETAMINOPHEN 650 MG: 325 TABLET, FILM COATED ORAL at 00:08

## 2020-01-01 RX ADMIN — ALBUTEROL SULFATE 2 PUFF: 90 AEROSOL, METERED RESPIRATORY (INHALATION) at 10:49

## 2020-01-01 RX ADMIN — CETIRIZINE HYDROCHLORIDE 10 MG: 1 SOLUTION ORAL at 21:28

## 2020-01-01 RX ADMIN — FLUTICASONE PROPIONATE 2 SPRAY: 50 SPRAY, METERED NASAL at 09:21

## 2020-01-01 RX ADMIN — FENTANYL 1 PATCH: 12 PATCH TRANSDERMAL at 14:15

## 2020-01-01 RX ADMIN — OXYCODONE HYDROCHLORIDE 5 MG: 5 SOLUTION ORAL at 02:15

## 2020-01-01 RX ADMIN — GABAPENTIN 300 MG: 250 SUSPENSION ORAL at 07:57

## 2020-01-01 RX ADMIN — ACETAMINOPHEN 650 MG: 325 SUSPENSION ORAL at 14:57

## 2020-01-01 RX ADMIN — FENTANYL CITRATE 25 MCG: 50 INJECTION INTRAMUSCULAR; INTRAVENOUS at 11:09

## 2020-01-01 RX ADMIN — SODIUM CHLORIDE 85 ML: 9 INJECTION, SOLUTION INTRAVENOUS at 17:19

## 2020-01-01 RX ADMIN — CARBOPLATIN 500 MG: 10 INJECTION, SOLUTION INTRAVENOUS at 13:24

## 2020-01-01 RX ADMIN — DONEPEZIL HYDROCHLORIDE 5 MG: 5 TABLET, FILM COATED ORAL at 22:47

## 2020-01-01 RX ADMIN — DONEPEZIL HYDROCHLORIDE 5 MG: 5 TABLET ORAL at 22:59

## 2020-01-01 RX ADMIN — FENTANYL 1 PATCH: 12 PATCH TRANSDERMAL at 21:07

## 2020-01-01 RX ADMIN — SODIUM CHLORIDE 1000 ML: 9 INJECTION, SOLUTION INTRAVENOUS at 11:01

## 2020-01-01 RX ADMIN — SODIUM CHLORIDE 1000 ML: 9 INJECTION, SOLUTION INTRAVENOUS at 15:01

## 2020-01-01 RX ADMIN — SERTRALINE HYDROCHLORIDE 50 MG: 50 TABLET ORAL at 22:44

## 2020-01-01 RX ADMIN — DONEPEZIL HYDROCHLORIDE 5 MG: 5 TABLET, FILM COATED ORAL at 19:48

## 2020-01-01 RX ADMIN — SODIUM CHLORIDE 1000 ML: 9 INJECTION, SOLUTION INTRAVENOUS at 09:03

## 2020-01-01 RX ADMIN — ENOXAPARIN SODIUM 40 MG: 40 INJECTION SUBCUTANEOUS at 13:51

## 2020-01-01 RX ADMIN — PACLITAXEL 370 MG: 6 INJECTION, SOLUTION INTRAVENOUS at 11:43

## 2020-01-01 RX ADMIN — SODIUM CHLORIDE 1000 ML: 9 INJECTION, SOLUTION INTRAVENOUS at 16:23

## 2020-01-01 RX ADMIN — SODIUM CHLORIDE: 9 INJECTION, SOLUTION INTRAVENOUS at 17:40

## 2020-01-01 RX ADMIN — LEVOTHYROXINE SODIUM 112 MCG: 112 TABLET ORAL at 21:11

## 2020-01-01 RX ADMIN — GABAPENTIN 300 MG: 250 SUSPENSION ORAL at 09:37

## 2020-01-01 RX ADMIN — FLUCONAZOLE 100 MG: 100 TABLET ORAL at 17:03

## 2020-01-01 RX ADMIN — IPRATROPIUM BROMIDE AND ALBUTEROL SULFATE 3 ML: .5; 3 SOLUTION RESPIRATORY (INHALATION) at 08:14

## 2020-01-01 RX ADMIN — PIPERACILLIN SODIUM AND TAZOBACTAM SODIUM 3.38 G: 3; .375 INJECTION, POWDER, LYOPHILIZED, FOR SOLUTION INTRAVENOUS at 13:41

## 2020-01-01 RX ADMIN — SODIUM CHLORIDE 250 ML: 9 INJECTION, SOLUTION INTRAVENOUS at 09:55

## 2020-01-01 RX ADMIN — Medication 1 SPRAY: at 09:11

## 2020-01-01 RX ADMIN — SODIUM CHLORIDE 1000 ML: 9 INJECTION, SOLUTION INTRAVENOUS at 08:34

## 2020-01-01 RX ADMIN — LEVOTHYROXINE SODIUM 125 MCG: 125 TABLET ORAL at 09:14

## 2020-01-01 RX ADMIN — PIPERACILLIN SODIUM AND TAZOBACTAM SODIUM 3.38 G: 3; .375 INJECTION, POWDER, LYOPHILIZED, FOR SOLUTION INTRAVENOUS at 17:34

## 2020-01-01 RX ADMIN — DOCUSATE SODIUM 50 MG AND SENNOSIDES 8.6 MG 1 TABLET: 8.6; 5 TABLET, FILM COATED ORAL at 22:46

## 2020-01-01 RX ADMIN — CETIRIZINE HYDROCHLORIDE 10 MG: 10 TABLET, FILM COATED ORAL at 09:20

## 2020-01-01 RX ADMIN — OXYCODONE HYDROCHLORIDE 5 MG: 5 SOLUTION ORAL at 10:26

## 2020-01-01 RX ADMIN — FLUTICASONE PROPIONATE 1 SPRAY: 50 SPRAY, METERED NASAL at 21:07

## 2020-01-01 RX ADMIN — LORAZEPAM 0.5 MG: 0.5 TABLET ORAL at 01:55

## 2020-01-01 RX ADMIN — LEVOTHYROXINE SODIUM 125 MCG: 100 TABLET ORAL at 06:11

## 2020-01-01 RX ADMIN — SODIUM CHLORIDE 1000 ML: 9 INJECTION, SOLUTION INTRAVENOUS at 10:48

## 2020-01-01 RX ADMIN — OXYCODONE HYDROCHLORIDE 5 MG: 5 TABLET ORAL at 11:03

## 2020-01-01 RX ADMIN — LEVOFLOXACIN 500 MG: 5 INJECTION, SOLUTION INTRAVENOUS at 00:04

## 2020-01-01 RX ADMIN — PIPERACILLIN AND TAZOBACTAM 4.5 G: 4; .5 INJECTION, POWDER, FOR SOLUTION INTRAVENOUS at 19:23

## 2020-01-01 RX ADMIN — MAGNESIUM SULFATE HEPTAHYDRATE 4 G: 40 INJECTION, SOLUTION INTRAVENOUS at 12:30

## 2020-01-01 RX ADMIN — ACETAMINOPHEN 650 MG: 325 TABLET ORAL at 13:40

## 2020-01-01 RX ADMIN — MULTIVITAMIN 15 ML: LIQUID ORAL at 09:37

## 2020-01-01 RX ADMIN — GABAPENTIN 300 MG: 300 CAPSULE ORAL at 22:46

## 2020-01-01 RX ADMIN — Medication 1 MG: at 03:18

## 2020-01-01 RX ADMIN — ZOLEDRONIC ACID 4 MG: 0.04 INJECTION, SOLUTION INTRAVENOUS at 10:57

## 2020-01-01 RX ADMIN — GABAPENTIN 300 MG: 250 SUSPENSION ORAL at 22:32

## 2020-01-01 RX ADMIN — FLUDEOXYGLUCOSE F-18 14.5 MCI.: 500 INJECTION, SOLUTION INTRAVENOUS at 14:59

## 2020-01-01 RX ADMIN — SODIUM CHLORIDE 200 MG: 9 INJECTION, SOLUTION INTRAVENOUS at 09:48

## 2020-01-01 RX ADMIN — GABAPENTIN 300 MG: 300 CAPSULE ORAL at 21:21

## 2020-01-01 RX ADMIN — SERTRALINE HYDROCHLORIDE 50 MG: 50 TABLET ORAL at 22:47

## 2020-01-01 RX ADMIN — DEXAMETHASONE SODIUM PHOSPHATE: 10 INJECTION, SOLUTION INTRAMUSCULAR; INTRAVENOUS at 09:16

## 2020-01-01 RX ADMIN — POTASSIUM CHLORIDE 40 MEQ: 1.5 POWDER, FOR SOLUTION ORAL at 10:26

## 2020-01-01 RX ADMIN — VANCOMYCIN HYDROCHLORIDE 2000 MG: 5 INJECTION, POWDER, LYOPHILIZED, FOR SOLUTION INTRAVENOUS at 20:44

## 2020-01-01 RX ADMIN — LIDOCAINE HYDROCHLORIDE 9 ML: 10 INJECTION, SOLUTION INFILTRATION; PERINEURAL at 13:30

## 2020-01-01 RX ADMIN — SODIUM CHLORIDE 200 MG: 9 INJECTION, SOLUTION INTRAVENOUS at 15:24

## 2020-01-01 RX ADMIN — SODIUM CHLORIDE 1000 ML: 9 INJECTION, SOLUTION INTRAVENOUS at 10:54

## 2020-01-01 RX ADMIN — SODIUM CHLORIDE: 234 INJECTION INTRAMUSCULAR; INTRAVENOUS; SUBCUTANEOUS at 19:49

## 2020-01-01 RX ADMIN — GABAPENTIN 300 MG: 300 CAPSULE ORAL at 12:28

## 2020-01-01 RX ADMIN — GABAPENTIN 300 MG: 250 SUSPENSION ORAL at 18:06

## 2020-01-01 RX ADMIN — PACLITAXEL 370 MG: 6 INJECTION, SOLUTION INTRAVENOUS at 10:21

## 2020-01-01 RX ADMIN — SODIUM CHLORIDE: 9 INJECTION, SOLUTION INTRAVENOUS at 18:07

## 2020-01-01 RX ADMIN — MULTIVITAMIN 15 ML: LIQUID ORAL at 08:56

## 2020-01-01 RX ADMIN — DIPHENHYDRAMINE HYDROCHLORIDE 50 MG: 50 INJECTION INTRAMUSCULAR; INTRAVENOUS at 11:28

## 2020-01-01 RX ADMIN — ENOXAPARIN SODIUM 40 MG: 40 INJECTION SUBCUTANEOUS at 12:29

## 2020-01-01 RX ADMIN — FENTANYL CITRATE 25 MCG: 50 INJECTION, SOLUTION INTRAMUSCULAR; INTRAVENOUS at 13:20

## 2020-01-01 RX ADMIN — OXYCODONE HYDROCHLORIDE 10 MG: 5 TABLET ORAL at 22:43

## 2020-01-01 RX ADMIN — SODIUM CHLORIDE 1000 ML: 9 INJECTION, SOLUTION INTRAVENOUS at 10:10

## 2020-01-01 RX ADMIN — MULTIVITAMIN 15 ML: LIQUID ORAL at 13:42

## 2020-01-01 RX ADMIN — SERTRALINE HYDROCHLORIDE 50 MG: 20 SOLUTION ORAL at 22:32

## 2020-01-01 RX ADMIN — OXYCODONE HYDROCHLORIDE 5 MG: 5 TABLET ORAL at 14:36

## 2020-01-01 RX ADMIN — Medication 1 MG: at 00:05

## 2020-01-01 RX ADMIN — DONEPEZIL HYDROCHLORIDE 5 MG: 5 TABLET, FILM COATED ORAL at 19:50

## 2020-01-01 RX ADMIN — FENTANYL 1 PATCH: 12 PATCH TRANSDERMAL at 22:42

## 2020-01-01 RX ADMIN — PIPERACILLIN SODIUM AND TAZOBACTAM SODIUM 3.38 G: 3; .375 INJECTION, POWDER, LYOPHILIZED, FOR SOLUTION INTRAVENOUS at 11:51

## 2020-01-01 RX ADMIN — SODIUM CHLORIDE 200 MG: 9 INJECTION, SOLUTION INTRAVENOUS at 10:05

## 2020-01-01 RX ADMIN — SODIUM CHLORIDE 1000 ML: 9 INJECTION, SOLUTION INTRAVENOUS at 12:00

## 2020-01-01 RX ADMIN — GABAPENTIN 300 MG: 300 CAPSULE ORAL at 08:09

## 2020-01-01 RX ADMIN — ALBUTEROL SULFATE 2 PUFF: 90 AEROSOL, METERED RESPIRATORY (INHALATION) at 23:48

## 2020-01-01 RX ADMIN — SODIUM CHLORIDE 200 MG: 9 INJECTION, SOLUTION INTRAVENOUS at 12:08

## 2020-01-01 RX ADMIN — OXYCODONE HYDROCHLORIDE 5 MG: 5 SOLUTION ORAL at 08:35

## 2020-01-01 RX ADMIN — GABAPENTIN 300 MG: 250 SUSPENSION ORAL at 21:11

## 2020-01-01 RX ADMIN — SODIUM CHLORIDE 200 MG: 9 INJECTION, SOLUTION INTRAVENOUS at 10:50

## 2020-01-01 RX ADMIN — SODIUM CHLORIDE 1000 ML: 9 INJECTION, SOLUTION INTRAVENOUS at 10:40

## 2020-01-01 RX ADMIN — DIPHENHYDRAMINE HYDROCHLORIDE 50 MG: 50 INJECTION INTRAMUSCULAR; INTRAVENOUS at 09:50

## 2020-01-01 RX ADMIN — Medication 1 PACKET: at 09:37

## 2020-01-01 RX ADMIN — AZITHROMYCIN MONOHYDRATE 250 MG: 500 INJECTION, POWDER, LYOPHILIZED, FOR SOLUTION INTRAVENOUS at 01:12

## 2020-01-01 RX ADMIN — GLUCAGON HYDROCHLORIDE 1 MG: KIT at 15:33

## 2020-01-01 RX ADMIN — GABAPENTIN 300 MG: 250 SUSPENSION ORAL at 21:28

## 2020-01-01 RX ADMIN — AMOXICILLIN AND CLAVULANATE POTASSIUM 875 MG: 400; 57 POWDER, FOR SUSPENSION ORAL at 21:28

## 2020-01-01 RX ADMIN — LEVOTHYROXINE SODIUM 112 MCG: 112 TABLET ORAL at 19:49

## 2020-01-01 RX ADMIN — LEVOTHYROXINE SODIUM 125 MCG: 125 TABLET ORAL at 08:56

## 2020-01-01 RX ADMIN — FENTANYL 1 PATCH: 12 PATCH TRANSDERMAL at 12:31

## 2020-01-01 RX ADMIN — SODIUM CHLORIDE: 234 INJECTION INTRAMUSCULAR; INTRAVENOUS; SUBCUTANEOUS at 12:19

## 2020-01-01 RX ADMIN — HYDROMORPHONE HYDROCHLORIDE 0.3 MG: 1 INJECTION, SOLUTION INTRAMUSCULAR; INTRAVENOUS; SUBCUTANEOUS at 00:37

## 2020-01-01 RX ADMIN — GABAPENTIN 300 MG: 250 SUSPENSION ORAL at 18:57

## 2020-01-01 RX ADMIN — CETIRIZINE HYDROCHLORIDE 10 MG: 10 TABLET, FILM COATED ORAL at 21:56

## 2020-01-01 RX ADMIN — OXYCODONE HYDROCHLORIDE 5 MG: 5 TABLET ORAL at 22:42

## 2020-01-01 RX ADMIN — CETIRIZINE HYDROCHLORIDE 10 MG: 1 SOLUTION ORAL at 19:48

## 2020-01-01 RX ADMIN — SODIUM PHOSPHATE, MONOBASIC, MONOHYDRATE 20 MMOL: 276; 142 INJECTION, SOLUTION INTRAVENOUS at 14:50

## 2020-01-01 RX ADMIN — SODIUM CHLORIDE 1000 ML: 9 INJECTION, SOLUTION INTRAVENOUS at 13:05

## 2020-01-01 RX ADMIN — SODIUM CHLORIDE 1000 ML: 9 INJECTION, SOLUTION INTRAVENOUS at 12:27

## 2020-01-01 RX ADMIN — INFLUENZA A VIRUS A/MICHIGAN/45/2015 X-275 (H1N1) ANTIGEN (FORMALDEHYDE INACTIVATED), INFLUENZA A VIRUS A/SINGAPORE/INFIMH-16-0019/2016 IVR-186 (H3N2) ANTIGEN (FORMALDEHYDE INACTIVATED), INFLUENZA B VIRUS B/PHUKET/3073/2013 ANTIGEN (FORMALDEHYDE INACTIVATED), AND INFLUENZA B VIRUS B/MARYLAND/15/2016 BX-69A ANTIGEN (FORMALDEHYDE INACTIVATED) 0.7 ML: 60; 60; 60; 60 INJECTION, SUSPENSION INTRAMUSCULAR at 15:03

## 2020-01-01 RX ADMIN — Medication 1 SPRAY: at 14:35

## 2020-01-01 RX ADMIN — DEXAMETHASONE SODIUM PHOSPHATE: 10 INJECTION, SOLUTION INTRAMUSCULAR; INTRAVENOUS at 11:10

## 2020-01-01 RX ADMIN — AMOXICILLIN AND CLAVULANATE POTASSIUM 875 MG: 400; 57 POWDER, FOR SUSPENSION ORAL at 21:11

## 2020-01-01 RX ADMIN — FENTANYL 1 PATCH: 12 PATCH TRANSDERMAL at 07:55

## 2020-01-01 RX ADMIN — GABAPENTIN 300 MG: 250 SUSPENSION ORAL at 08:32

## 2020-01-01 RX ADMIN — HYDROMORPHONE HYDROCHLORIDE 0.3 MG: 1 INJECTION, SOLUTION INTRAMUSCULAR; INTRAVENOUS; SUBCUTANEOUS at 20:49

## 2020-01-01 RX ADMIN — ACETAMINOPHEN 650 MG: 325 TABLET, FILM COATED ORAL at 00:05

## 2020-01-01 RX ADMIN — GABAPENTIN 300 MG: 250 SUSPENSION ORAL at 15:52

## 2020-01-01 RX ADMIN — CETIRIZINE HYDROCHLORIDE 10 MG: 1 SOLUTION ORAL at 19:49

## 2020-01-01 RX ADMIN — LEVOTHYROXINE SODIUM 112 MCG: 112 TABLET ORAL at 21:27

## 2020-01-01 RX ADMIN — ACETAMINOPHEN 650 MG: 325 TABLET, FILM COATED ORAL at 08:57

## 2020-01-01 RX ADMIN — SODIUM CHLORIDE 1000 ML: 9 INJECTION, SOLUTION INTRAVENOUS at 14:43

## 2020-01-01 RX ADMIN — CARBOPLATIN 550 MG: 10 INJECTION, SOLUTION INTRAVENOUS at 14:47

## 2020-01-01 RX ADMIN — LEVOTHYROXINE SODIUM 125 MCG: 100 TABLET ORAL at 11:01

## 2020-01-01 RX ADMIN — OXYCODONE HYDROCHLORIDE 5 MG: 5 SOLUTION ORAL at 09:36

## 2020-01-01 RX ADMIN — CARBOPLATIN 550 MG: 10 INJECTION, SOLUTION INTRAVENOUS at 15:47

## 2020-01-01 RX ADMIN — GABAPENTIN 300 MG: 300 CAPSULE ORAL at 13:50

## 2020-01-01 RX ADMIN — PIPERACILLIN SODIUM AND TAZOBACTAM SODIUM 3.38 G: 3; .375 INJECTION, POWDER, LYOPHILIZED, FOR SOLUTION INTRAVENOUS at 23:59

## 2020-01-01 RX ADMIN — ENOXAPARIN SODIUM 40 MG: 40 INJECTION SUBCUTANEOUS at 11:20

## 2020-01-01 RX ADMIN — ACETAMINOPHEN 650 MG: 325 SUSPENSION ORAL at 15:31

## 2020-01-01 RX ADMIN — GABAPENTIN 300 MG: 250 SUSPENSION ORAL at 21:42

## 2020-01-01 RX ADMIN — PIPERACILLIN SODIUM AND TAZOBACTAM SODIUM 3.38 G: 3; .375 INJECTION, POWDER, LYOPHILIZED, FOR SOLUTION INTRAVENOUS at 00:22

## 2020-01-01 RX ADMIN — FUROSEMIDE 20 MG: 10 INJECTION, SOLUTION INTRAMUSCULAR; INTRAVENOUS at 23:07

## 2020-01-01 RX ADMIN — FILGRASTIM 480 MCG: 480 INJECTION, SOLUTION INTRAVENOUS; SUBCUTANEOUS at 14:30

## 2020-01-01 RX ADMIN — PIPERACILLIN SODIUM AND TAZOBACTAM SODIUM 3.38 G: 3; .375 INJECTION, POWDER, LYOPHILIZED, FOR SOLUTION INTRAVENOUS at 00:32

## 2020-01-01 RX ADMIN — GABAPENTIN 300 MG: 250 SUSPENSION ORAL at 15:32

## 2020-01-01 RX ADMIN — DEXAMETHASONE SODIUM PHOSPHATE: 10 INJECTION, SOLUTION INTRAMUSCULAR; INTRAVENOUS at 10:06

## 2020-01-01 RX ADMIN — PIPERACILLIN SODIUM AND TAZOBACTAM SODIUM 3.38 G: 3; .375 INJECTION, POWDER, LYOPHILIZED, FOR SOLUTION INTRAVENOUS at 05:57

## 2020-01-01 RX ADMIN — SODIUM CHLORIDE 1000 ML: 9 INJECTION, SOLUTION INTRAVENOUS at 15:00

## 2020-01-01 RX ADMIN — OXYCODONE HYDROCHLORIDE 5 MG: 5 TABLET ORAL at 21:20

## 2020-01-01 RX ADMIN — SODIUM CHLORIDE, POTASSIUM CHLORIDE, SODIUM LACTATE AND CALCIUM CHLORIDE 1000 ML: 600; 310; 30; 20 INJECTION, SOLUTION INTRAVENOUS at 18:10

## 2020-01-01 RX ADMIN — DONEPEZIL HYDROCHLORIDE 5 MG: 5 TABLET, FILM COATED ORAL at 21:11

## 2020-01-01 RX ADMIN — PIPERACILLIN SODIUM AND TAZOBACTAM SODIUM 3.38 G: 3; .375 INJECTION, POWDER, LYOPHILIZED, FOR SOLUTION INTRAVENOUS at 18:57

## 2020-01-01 RX ADMIN — CEFTRIAXONE SODIUM 2 G: 2 INJECTION, POWDER, FOR SOLUTION INTRAMUSCULAR; INTRAVENOUS at 00:37

## 2020-01-01 RX ADMIN — HYDROMORPHONE HYDROCHLORIDE 0.3 MG: 1 INJECTION, SOLUTION INTRAMUSCULAR; INTRAVENOUS; SUBCUTANEOUS at 09:23

## 2020-01-01 RX ADMIN — AMOXICILLIN AND CLAVULANATE POTASSIUM 875 MG: 400; 57 POWDER, FOR SUSPENSION ORAL at 07:57

## 2020-01-01 RX ADMIN — SODIUM CHLORIDE 1000 ML: 9 INJECTION, SOLUTION INTRAVENOUS at 11:33

## 2020-01-01 RX ADMIN — SERTRALINE HYDROCHLORIDE 50 MG: 50 TABLET ORAL at 21:17

## 2020-01-01 RX ADMIN — OXYCODONE HYDROCHLORIDE 5 MG: 5 TABLET ORAL at 15:02

## 2020-01-01 RX ADMIN — ZINC SULFATE 220 MG (50 MG) CAPSULE 220 MG: CAPSULE at 12:28

## 2020-01-01 RX ADMIN — SERTRALINE HYDROCHLORIDE 50 MG: 50 TABLET ORAL at 21:56

## 2020-01-01 RX ADMIN — ALBUTEROL SULFATE 2 PUFF: 90 AEROSOL, METERED RESPIRATORY (INHALATION) at 08:27

## 2020-01-01 RX ADMIN — FAMOTIDINE 20 MG: 20 INJECTION, SOLUTION INTRAVENOUS at 10:01

## 2020-01-01 RX ADMIN — OXYCODONE HYDROCHLORIDE 5 MG: 5 TABLET ORAL at 20:36

## 2020-01-01 RX ADMIN — CEFDINIR 300 MG: 300 CAPSULE ORAL at 14:12

## 2020-01-01 RX ADMIN — SERTRALINE HYDROCHLORIDE 50 MG: 20 SOLUTION ORAL at 22:34

## 2020-01-01 RX ADMIN — SODIUM CHLORIDE 1000 ML: 9 INJECTION, SOLUTION INTRAVENOUS at 10:30

## 2020-01-01 RX ADMIN — ONDANSETRON 4 MG: 4 TABLET, ORALLY DISINTEGRATING ORAL at 17:08

## 2020-01-01 RX ADMIN — CETIRIZINE HYDROCHLORIDE 10 MG: 1 SOLUTION ORAL at 21:11

## 2020-01-01 RX ADMIN — FLUDEOXYGLUCOSE F-18 11.01 MCI.: 500 INJECTION, SOLUTION INTRAVENOUS at 10:28

## 2020-01-01 RX ADMIN — PIPERACILLIN SODIUM AND TAZOBACTAM SODIUM 3.38 G: 3; .375 INJECTION, POWDER, LYOPHILIZED, FOR SOLUTION INTRAVENOUS at 18:49

## 2020-01-01 RX ADMIN — GABAPENTIN 300 MG: 300 CAPSULE ORAL at 17:39

## 2020-01-01 RX ADMIN — SODIUM CHLORIDE 1000 ML: 9 INJECTION, SOLUTION INTRAVENOUS at 04:21

## 2020-01-01 RX ADMIN — GLUCAGON HYDROCHLORIDE 1 MG: KIT at 13:19

## 2020-01-01 RX ADMIN — Medication 1 MG: at 20:25

## 2020-01-01 RX ADMIN — FAMOTIDINE 20 MG: 20 INJECTION, SOLUTION INTRAVENOUS at 10:35

## 2020-01-01 RX ADMIN — SODIUM CHLORIDE 1000 ML: 9 INJECTION, SOLUTION INTRAVENOUS at 12:07

## 2020-01-01 RX ADMIN — PIPERACILLIN SODIUM AND TAZOBACTAM SODIUM 3.38 G: 3; .375 INJECTION, POWDER, LYOPHILIZED, FOR SOLUTION INTRAVENOUS at 06:25

## 2020-01-01 RX ADMIN — DONEPEZIL HYDROCHLORIDE 5 MG: 5 TABLET, FILM COATED ORAL at 22:44

## 2020-01-01 RX ADMIN — FENTANYL CITRATE 25 MCG: 50 INJECTION, SOLUTION INTRAMUSCULAR; INTRAVENOUS at 13:10

## 2020-01-01 RX ADMIN — OXYCODONE HYDROCHLORIDE 5 MG: 5 TABLET ORAL at 23:23

## 2020-01-01 RX ADMIN — SODIUM CHLORIDE 1000 ML: 9 INJECTION, SOLUTION INTRAVENOUS at 14:37

## 2020-01-01 RX ADMIN — GABAPENTIN 300 MG: 300 CAPSULE ORAL at 22:00

## 2020-01-01 RX ADMIN — GADOBUTROL 6 ML: 604.72 INJECTION INTRAVENOUS at 09:54

## 2020-01-01 RX ADMIN — GABAPENTIN 300 MG: 300 CAPSULE ORAL at 13:14

## 2020-01-01 RX ADMIN — OXYCODONE HYDROCHLORIDE 5 MG: 5 SOLUTION ORAL at 20:16

## 2020-01-01 RX ADMIN — LEVOTHYROXINE SODIUM 125 MCG: 100 TABLET ORAL at 08:09

## 2020-01-01 RX ADMIN — GABAPENTIN 300 MG: 300 CAPSULE ORAL at 09:14

## 2020-01-01 RX ADMIN — PIPERACILLIN SODIUM AND TAZOBACTAM SODIUM 3.38 G: 3; .375 INJECTION, POWDER, LYOPHILIZED, FOR SOLUTION INTRAVENOUS at 23:42

## 2020-01-01 RX ADMIN — SODIUM CHLORIDE 250 ML: 9 INJECTION, SOLUTION INTRAVENOUS at 09:48

## 2020-01-01 RX ADMIN — SODIUM CHLORIDE 1000 ML: 9 INJECTION, SOLUTION INTRAVENOUS at 15:04

## 2020-01-01 RX ADMIN — ENOXAPARIN SODIUM 40 MG: 40 INJECTION SUBCUTANEOUS at 13:14

## 2020-01-01 RX ADMIN — ZOLEDRONIC ACID 4 MG: 0.04 INJECTION, SOLUTION INTRAVENOUS at 10:52

## 2020-01-01 RX ADMIN — ALBUTEROL SULFATE 2 PUFF: 90 AEROSOL, METERED RESPIRATORY (INHALATION) at 01:59

## 2020-01-01 RX ADMIN — OXYCODONE HYDROCHLORIDE 5 MG: 5 TABLET ORAL at 08:57

## 2020-01-01 RX ADMIN — DIPHENHYDRAMINE HYDROCHLORIDE 50 MG: 50 INJECTION INTRAMUSCULAR; INTRAVENOUS at 10:21

## 2020-01-01 RX ADMIN — FLUDEOXYGLUCOSE F-18 11.43 MCI.: 500 INJECTION, SOLUTION INTRAVENOUS at 09:29

## 2020-01-01 RX ADMIN — CEFTRIAXONE SODIUM 2 G: 2 INJECTION, POWDER, FOR SOLUTION INTRAMUSCULAR; INTRAVENOUS at 00:27

## 2020-01-01 RX ADMIN — GABAPENTIN 300 MG: 250 SUSPENSION ORAL at 09:58

## 2020-01-01 RX ADMIN — SODIUM CHLORIDE 1000 ML: 9 INJECTION, SOLUTION INTRAVENOUS at 11:19

## 2020-01-01 RX ADMIN — AZITHROMYCIN MONOHYDRATE 250 MG: 500 INJECTION, POWDER, LYOPHILIZED, FOR SOLUTION INTRAVENOUS at 02:11

## 2020-01-01 RX ADMIN — FENTANYL CITRATE 25 MCG: 50 INJECTION, SOLUTION INTRAMUSCULAR; INTRAVENOUS at 13:30

## 2020-01-01 RX ADMIN — MORPHINE SULFATE 4 MG: 4 INJECTION, SOLUTION INTRAMUSCULAR; INTRAVENOUS at 16:28

## 2020-01-01 RX ADMIN — ALBUTEROL SULFATE 2.5 MG: 2.5 SOLUTION RESPIRATORY (INHALATION) at 07:05

## 2020-01-01 RX ADMIN — LEVOFLOXACIN 500 MG: 500 TABLET, FILM COATED ORAL at 17:03

## 2020-01-01 RX ADMIN — PEGFILGRASTIM 6 MG: KIT SUBCUTANEOUS at 15:11

## 2020-01-01 RX ADMIN — SODIUM CHLORIDE 200 MG: 9 INJECTION, SOLUTION INTRAVENOUS at 10:51

## 2020-01-01 RX ADMIN — POTASSIUM CHLORIDE 20 MEQ: 1.5 POWDER, FOR SOLUTION ORAL at 13:19

## 2020-01-01 RX ADMIN — SODIUM CHLORIDE 1000 ML: 9 INJECTION, SOLUTION INTRAVENOUS at 10:04

## 2020-01-01 RX ADMIN — SERTRALINE HYDROCHLORIDE 50 MG: 50 TABLET ORAL at 09:20

## 2020-01-01 RX ADMIN — PIPERACILLIN SODIUM AND TAZOBACTAM SODIUM 3.38 G: 3; .375 INJECTION, POWDER, LYOPHILIZED, FOR SOLUTION INTRAVENOUS at 05:48

## 2020-01-01 RX ADMIN — LEVOTHYROXINE SODIUM 125 MCG: 100 TABLET ORAL at 08:57

## 2020-01-01 RX ADMIN — AZITHROMYCIN MONOHYDRATE 250 MG: 500 INJECTION, POWDER, LYOPHILIZED, FOR SOLUTION INTRAVENOUS at 01:41

## 2020-01-01 RX ADMIN — ZOLEDRONIC ACID 4 MG: 0.04 INJECTION, SOLUTION INTRAVENOUS at 09:55

## 2020-01-01 RX ADMIN — CEFTRIAXONE SODIUM 2 G: 2 INJECTION, POWDER, FOR SOLUTION INTRAMUSCULAR; INTRAVENOUS at 23:10

## 2020-01-01 RX ADMIN — DONEPEZIL HYDROCHLORIDE 5 MG: 5 TABLET, FILM COATED ORAL at 21:21

## 2020-01-01 RX ADMIN — GABAPENTIN 300 MG: 300 CAPSULE ORAL at 09:01

## 2020-01-01 RX ADMIN — OXYCODONE HYDROCHLORIDE 5 MG: 5 SOLUTION ORAL at 21:04

## 2020-01-01 RX ADMIN — MULTIVITAMIN 15 ML: LIQUID ORAL at 09:57

## 2020-01-01 RX ADMIN — OXYCODONE HYDROCHLORIDE 5 MG: 5 SOLUTION ORAL at 22:05

## 2020-01-01 RX ADMIN — MULTIVITAMIN 15 ML: LIQUID ORAL at 08:04

## 2020-01-01 RX ADMIN — DONEPEZIL HYDROCHLORIDE 5 MG: 5 TABLET, FILM COATED ORAL at 22:00

## 2020-01-01 RX ADMIN — SERTRALINE HYDROCHLORIDE 50 MG: 25 TABLET, FILM COATED ORAL at 22:00

## 2020-01-01 RX ADMIN — SODIUM CHLORIDE 500 ML: 9 INJECTION, SOLUTION INTRAVENOUS at 10:00

## 2020-01-01 RX ADMIN — DONEPEZIL HYDROCHLORIDE 5 MG: 5 TABLET, FILM COATED ORAL at 22:31

## 2020-01-01 RX ADMIN — FAMOTIDINE 20 MG: 20 INJECTION, SOLUTION INTRAVENOUS at 11:44

## 2020-01-01 RX ADMIN — GABAPENTIN 300 MG: 300 CAPSULE ORAL at 16:44

## 2020-01-01 RX ADMIN — OXYCODONE HYDROCHLORIDE 5 MG: 5 TABLET ORAL at 13:31

## 2020-01-01 RX ADMIN — SODIUM CHLORIDE: 9 INJECTION, SOLUTION INTRAVENOUS at 09:49

## 2020-01-01 RX ADMIN — ZINC SULFATE 220 MG (50 MG) CAPSULE 220 MG: CAPSULE at 08:57

## 2020-01-01 RX ADMIN — OXYCODONE HYDROCHLORIDE 5 MG: 5 SOLUTION ORAL at 06:33

## 2020-01-01 RX ADMIN — PEGFILGRASTIM 6 MG: KIT SUBCUTANEOUS at 13:24

## 2020-01-01 RX ADMIN — FENTANYL 1 PATCH: 12 PATCH TRANSDERMAL at 02:14

## 2020-01-01 RX ADMIN — SERTRALINE HYDROCHLORIDE 50 MG: 25 TABLET ORAL at 21:11

## 2020-01-01 RX ADMIN — GADOBUTROL 7.5 ML: 604.72 INJECTION INTRAVENOUS at 16:10

## 2020-01-01 RX ADMIN — ZOLEDRONIC ACID 4 MG: 0.04 INJECTION, SOLUTION INTRAVENOUS at 11:26

## 2020-01-01 RX ADMIN — ALBUTEROL SULFATE 2.5 MG: 2.5 SOLUTION RESPIRATORY (INHALATION) at 22:57

## 2020-01-01 RX ADMIN — FENTANYL 1 PATCH: 12 PATCH TRANSDERMAL at 13:12

## 2020-01-01 RX ADMIN — FENTANYL CITRATE 50 MCG: 50 INJECTION, SOLUTION INTRAMUSCULAR; INTRAVENOUS at 15:34

## 2020-01-01 RX ADMIN — DONEPEZIL HYDROCHLORIDE 5 MG: 5 TABLET ORAL at 21:15

## 2020-01-01 RX ADMIN — GABAPENTIN 300 MG: 300 CAPSULE ORAL at 17:18

## 2020-01-01 RX ADMIN — SODIUM CHLORIDE 200 MG: 9 INJECTION, SOLUTION INTRAVENOUS at 09:17

## 2020-01-01 RX ADMIN — OXYCODONE HYDROCHLORIDE 5 MG: 5 TABLET ORAL at 06:11

## 2020-01-01 RX ADMIN — IOPAMIDOL 80 ML: 755 INJECTION, SOLUTION INTRAVENOUS at 12:22

## 2020-01-01 RX ADMIN — PIPERACILLIN SODIUM AND TAZOBACTAM SODIUM 3.38 G: 3; .375 INJECTION, POWDER, LYOPHILIZED, FOR SOLUTION INTRAVENOUS at 12:01

## 2020-01-01 ASSESSMENT — ENCOUNTER SYMPTOMS
CONFUSION: 1
FEVER: 0
ABDOMINAL PAIN: 0
FEVER: 0
HEMATURIA: 0
FEVER: 0
FEVER: 0
DIFFICULTY URINATING: 0
ABDOMINAL PAIN: 1
SHORTNESS OF BREATH: 0
BLOOD IN STOOL: 0
NUMBNESS: 0
SORE THROAT: 0
COUGH: 1
CONSTIPATION: 1
HALLUCINATIONS: 1
COUGH: 1
CONFUSION: 1
SHORTNESS OF BREATH: 1
WEAKNESS: 1
WEAKNESS: 0
SHORTNESS OF BREATH: 1
FEVER: 1
VOMITING: 0
NAUSEA: 0

## 2020-01-01 ASSESSMENT — ACTIVITIES OF DAILY LIVING (ADL)
ADLS_ACUITY_SCORE: 22
ADLS_ACUITY_SCORE: 27
ADLS_ACUITY_SCORE: 13
ADLS_ACUITY_SCORE: 15
ADLS_ACUITY_SCORE: 26
ADLS_ACUITY_SCORE: 27
FALL_HISTORY_WITHIN_LAST_SIX_MONTHS: NO
ADLS_ACUITY_SCORE: 17
ADLS_ACUITY_SCORE: 24
AMBULATION: 0-->INDEPENDENT
ADLS_ACUITY_SCORE: 16
COGNITION: 0 - NO COGNITION ISSUES REPORTED
ADLS_ACUITY_SCORE: 27
ADLS_ACUITY_SCORE: 13
ADLS_ACUITY_SCORE: 24
ADLS_ACUITY_SCORE: 27
ADLS_ACUITY_SCORE: 29
ADLS_ACUITY_SCORE: 26
ADLS_ACUITY_SCORE: 16
TRANSFERRING: 0-->INDEPENDENT
ADLS_ACUITY_SCORE: 22
ADLS_ACUITY_SCORE: 26
ADLS_ACUITY_SCORE: 16
ADLS_ACUITY_SCORE: 27
ADLS_ACUITY_SCORE: 24
ADLS_ACUITY_SCORE: 27
ADLS_ACUITY_SCORE: 22
ADLS_ACUITY_SCORE: 29
ADLS_ACUITY_SCORE: 17
ADLS_ACUITY_SCORE: 27
ADLS_ACUITY_SCORE: 22
ADLS_ACUITY_SCORE: 24
ADLS_ACUITY_SCORE: 27
ADLS_ACUITY_SCORE: 29
RETIRED_EATING: 0-->INDEPENDENT
ADLS_ACUITY_SCORE: 16
ADLS_ACUITY_SCORE: 26
ADLS_ACUITY_SCORE: 28
ADLS_ACUITY_SCORE: 27
ADLS_ACUITY_SCORE: 22
ADLS_ACUITY_SCORE: 16
ADLS_ACUITY_SCORE: 24
ADLS_ACUITY_SCORE: 11
ADLS_ACUITY_SCORE: 29
ADLS_ACUITY_SCORE: 25
ADLS_ACUITY_SCORE: 24
ADLS_ACUITY_SCORE: 11
ADLS_ACUITY_SCORE: 25
ADLS_ACUITY_SCORE: 13
ADLS_ACUITY_SCORE: 27
ADLS_ACUITY_SCORE: 27
ADLS_ACUITY_SCORE: 11
ADLS_ACUITY_SCORE: 14
ADLS_ACUITY_SCORE: 11
ADLS_ACUITY_SCORE: 26
ADLS_ACUITY_SCORE: 16
ADLS_ACUITY_SCORE: 13
ADLS_ACUITY_SCORE: 27
ADLS_ACUITY_SCORE: 23
ADLS_ACUITY_SCORE: 29
ADLS_ACUITY_SCORE: 28
ADLS_ACUITY_SCORE: 27
TOILETING: 0-->INDEPENDENT
ADLS_ACUITY_SCORE: 29
ADLS_ACUITY_SCORE: 22
ADLS_ACUITY_SCORE: 31
ADLS_ACUITY_SCORE: 27
ADLS_ACUITY_SCORE: 28
CURRENT_FUNCTION: NO ASSISTANCE NEEDED
ADLS_ACUITY_SCORE: 29
BATHING: 0-->INDEPENDENT
SWALLOWING: 0-->SWALLOWS FOODS/LIQUIDS WITHOUT DIFFICULTY
ADLS_ACUITY_SCORE: 29
ADLS_ACUITY_SCORE: 11
ADLS_ACUITY_SCORE: 29
ADLS_ACUITY_SCORE: 24
ADLS_ACUITY_SCORE: 18
ADLS_ACUITY_SCORE: 23
RETIRED_COMMUNICATION: 0-->UNDERSTANDS/COMMUNICATES WITHOUT DIFFICULTY
ADLS_ACUITY_SCORE: 15
ADLS_ACUITY_SCORE: 27
ADLS_ACUITY_SCORE: 16
ADLS_ACUITY_SCORE: 18
DRESS: 0-->INDEPENDENT
ADLS_ACUITY_SCORE: 17
ADLS_ACUITY_SCORE: 29
ADLS_ACUITY_SCORE: 17
ADLS_ACUITY_SCORE: 29
ADLS_ACUITY_SCORE: 16
ADLS_ACUITY_SCORE: 27
ADLS_ACUITY_SCORE: 28
ADLS_ACUITY_SCORE: 18
ADLS_ACUITY_SCORE: 27
ADLS_ACUITY_SCORE: 13
ADLS_ACUITY_SCORE: 17
ADLS_ACUITY_SCORE: 28
ADLS_ACUITY_SCORE: 31
ADLS_ACUITY_SCORE: 29

## 2020-01-01 ASSESSMENT — MIFFLIN-ST. JEOR
SCORE: 1463.99
SCORE: 1623.08
SCORE: 1584.52
SCORE: 1401.26
SCORE: 1430.75
SCORE: 1567.28
SCORE: 1386.75
SCORE: 1564.11
SCORE: 1581.79
SCORE: 1416.69
SCORE: 1501.96
SCORE: 1377.68
SCORE: 1389.47
SCORE: 1420.31
SCORE: 1423.04
SCORE: 1387.66
SCORE: 1369.64
SCORE: 1395.82
SCORE: 1574.54
SCORE: 1592.68
SCORE: 1614.01

## 2020-01-01 ASSESSMENT — PAIN SCALES - GENERAL
PAINLEVEL: MODERATE PAIN (5)
PAINLEVEL: MODERATE PAIN (4)
PAINLEVEL: EXTREME PAIN (9)
PAINLEVEL: SEVERE PAIN (7)
PAINLEVEL: SEVERE PAIN (6)
PAINLEVEL: MILD PAIN (3)
PAINLEVEL: EXTREME PAIN (9)
PAINLEVEL: MODERATE PAIN (5)
PAINLEVEL: NO PAIN (0)
PAINLEVEL: NO PAIN (0)
PAINLEVEL: MODERATE PAIN (5)
PAINLEVEL: MODERATE PAIN (5)
PAINLEVEL: NO PAIN (0)
PAINLEVEL: NO PAIN (0)
PAINLEVEL: SEVERE PAIN (7)
PAINLEVEL: EXTREME PAIN (8)
PAINLEVEL: MILD PAIN (2)
PAINLEVEL: MILD PAIN (2)
PAINLEVEL: MODERATE PAIN (5)
PAINLEVEL: EXTREME PAIN (8)
PAINLEVEL: NO PAIN (0)
PAINLEVEL: EXTREME PAIN (8)
PAINLEVEL: SEVERE PAIN (7)
PAINLEVEL: NO PAIN (0)
PAINLEVEL: MODERATE PAIN (4)
PAINLEVEL: SEVERE PAIN (7)
PAINLEVEL: NO PAIN (0)
PAINLEVEL: SEVERE PAIN (7)
PAINLEVEL: MODERATE PAIN (4)
PAINLEVEL: SEVERE PAIN (6)
PAINLEVEL: EXTREME PAIN (9)
PAINLEVEL: SEVERE PAIN (6)

## 2020-01-01 ASSESSMENT — PATIENT HEALTH QUESTIONNAIRE - PHQ9: SUM OF ALL RESPONSES TO PHQ QUESTIONS 1-9: 4

## 2020-01-14 NOTE — TELEPHONE ENCOUNTER
Routing refill request to provider for review/approval because:  Labs out of range:  Brittany Alatorre

## 2020-01-14 NOTE — TELEPHONE ENCOUNTER
"Requested Prescriptions   Pending Prescriptions Disp Refills     hydrochlorothiazide (HYDRODIURIL) 12.5 MG tablet [Pharmacy Med Name: HYDROCHLOROTHIAZIDE 12.5 MG TB] 90 tablet 3     Sig: TAKE 1 TABLET BY MOUTH EVERY DAY  Last Written Prescription Date:  1/15/19  Last Fill Quantity: 90 tab,  # refills: 3   Last office visit: 9/16/2019 with prescribing provider:  Chauncey   Future Office Visit:         Diuretics (Including Combos) Protocol Failed - 1/14/2020  2:31 AM        Failed - Normal serum creatinine on file in past 12 months     Recent Labs   Lab Test 09/16/19  1412   CR 1.33*              Failed - Normal serum sodium on file in past 12 months     Recent Labs   Lab Test 09/16/19  1412   *              Passed - Blood pressure under 140/90 in past 12 months     BP Readings from Last 3 Encounters:   10/25/19 130/82   09/16/19 120/80   08/30/19 (!) 156/94                 Passed - Recent (12 mo) or future (30 days) visit within the authorizing provider's specialty     Patient has had an office visit with the authorizing provider or a provider within the authorizing providers department within the previous 12 mos or has a future within next 30 days. See \"Patient Info\" tab in inbasket, or \"Choose Columns\" in Meds & Orders section of the refill encounter.              Passed - Medication is active on med list        Passed - Patient is age 18 or older        Passed - Normal serum potassium on file in past 12 months     Recent Labs   Lab Test 09/16/19  1412   POTASSIUM 3.8                     "

## 2020-01-15 NOTE — TELEPHONE ENCOUNTER
" Had lab done in Dec 2019 by Dr Grossman showing low sodium. No f/u lab done. Also due for labs re\": thyroid, statin use, etc. Pt to have a fasting blood test appt done in the next 1-2 weeks. Labs ordered. Med refilled to cover for 30 days . Will address further RFs after lab results  available to review. Assist pt with scheduling  lab appt  "

## 2020-02-03 NOTE — PROGRESS NOTES
SUBJECTIVE:   Agapito Fairbanks is a 69 year old male who presents for Preventive Visit.  Are you in the first 12 months of your Medicare coverage?  No    Healthy Habits:     In general, how would you rate your overall health?  Good    Frequency of exercise:  2-3 days/week    Duration of exercise:  45-60 minutes    Taking medications regularly:  Yes    Barriers to taking medications:  None    Medication side effects:  Not applicable and None    Ability to successfully perform activities of daily living:  No assistance needed    Home Safety:  No safety concerns identified    Hearing Impairment:  Difficulty understanding soft or whispered speech    In the past 6 months, have you been bothered by leaking of urine?  No    In general, how would you rate your overall mental or emotional health?  Good      PHQ-2 Total Score: 0    Additional concerns today:  No    Do you feel safe in your environment? Yes    Have you ever done Advance Care Planning? (For example, a Health Directive, POLST, or a discussion with a medical provider or your loved ones about your wishes): Yes, patient states has an Advance Care Planning document and will bring a copy to the clinic.      Fall risk  Fallen 2 or more times in the past year?: No  Any fall with injury in the past year?: No    Cognitive Screening   1) Repeat 3 items (Leader, Season, Table)    2) Clock draw: ABNORMAL no 12  3) 3 item recall: Recalls 1 object   Results: ABNORMAL clock, 1-2 items recalled: PROBABLE COGNITIVE IMPAIRMENT, **INFORM PROVIDER**    Mini-CogTM Copyright HEMANT De La Fuente. Licensed by the author for use in [facility]; reprinted with permission (shanna@.[facility]). All rights reserved.      Do you have sleep apnea, excessive snoring or daytime drowsiness?: no    Reviewed and updated as needed this visit by clinical staff         Reviewed and updated as needed this visit by Provider        Social History     Tobacco Use     Smoking status: Former Smoker      Packs/day: 1.50     Years: 30.00     Pack years: 45.00     Last attempt to quit: 1998     Years since quittin.1     Smokeless tobacco: Never Used   Substance Use Topics     Alcohol use: No     If you drink alcohol do you typically have >3 drinks per day or >7 drinks per week? No    Alcohol Use 2018   Prescreen: >3 drinks/day or >7 drinks/week? Yes   Prescreen: >3 drinks/day or >7 drinks/week? -   AUDIT SCORE  4             Current providers sharing in care for this patient include:   Patient Care Team:  Los Love MD as PCP - General  Aureliano Hobson MD as MD (Otolaryngology)  Sebastien Chavira MD as MD (Neurology)  Sudhakar Grossman MD as Assigned PCP    The following health maintenance items are reviewed in Epic and correct as of today:  Health Maintenance   Topic Date Due     AORTIC ANEURYSM SCREENING (SYSTEM ASSIGNED)  2013     ADVANCE CARE PLANNING  2016     COLONOSCOPY  2019     MEDICARE ANNUAL WELLNESS VISIT  2019     PHQ-2  2020     MICROALBUMIN  01/10/2020     FALL RISK ASSESSMENT  2020     BMP  2021     LIPID  2021     DTAP/TDAP/TD IMMUNIZATION (3 - Td) 2026     HEPATITIS C SCREENING  Completed     INFLUENZA VACCINE  Completed     PNEUMOCOCCAL IMMUNIZATION 65+ LOW/MEDIUM RISK  Completed     ZOSTER IMMUNIZATION  Completed     IPV IMMUNIZATION  Aged Out     MENINGITIS IMMUNIZATION  Aged Out     Labs reviewed in EPIC    Component      Latest Ref Rng & Units 2018 2018 2018 1/10/2019   Sodium      133 - 144 mmol/L 135  132 (L)    Potassium      3.4 - 5.3 mmol/L 3.5  4.9    Chloride      94 - 109 mmol/L 99  100    Carbon Dioxide      20 - 32 mmol/L 29  27    Anion Gap      3 - 14 mmol/L 7  5    Glucose      70 - 99 mg/dL 94  110 (H)    Urea Nitrogen      7 - 30 mg/dL 12  10    Creatinine      0.66 - 1.25 mg/dL 1.34 (H)  1.28 (H)    GFR Estimate      >60 mL/min/1.73:m2 53 (L)  56 (L)    GFR Estimate If Black      >60  mL/min/1.73:m2 64  67    Calcium      8.5 - 10.1 mg/dL 8.5  9.4    Bilirubin Total      0.2 - 1.3 mg/dL   1.5 (H)    Albumin      3.4 - 5.0 g/dL   4.4    Protein Total      6.8 - 8.8 g/dL   8.1    Alkaline Phosphatase      40 - 150 U/L   79    ALT      0 - 70 U/L   33    AST      0 - 45 U/L   27    Cholesterol      <200 mg/dL       Triglycerides      <150 mg/dL       HDL Cholesterol      >39 mg/dL       LDL Cholesterol Calculated      <100 mg/dL       Non HDL Cholesterol      <130 mg/dL       PSA Free      ng/mL    1.5   Prostate Specific Antigen      0.0 - 4.0 ng/mL    4.4 (H)   PSAPCT      %    34   PSA      0 - 4 ug/L  4.59 (H)     TSH      0.40 - 4.00 mU/L       Parathyroid Hormone Intact      18 - 80 pg/mL       Hemoglobin      13.3 - 17.7 g/dL       Phosphorus      2.5 - 4.5 mg/dL       Vitamin D Deficiency screening      20 - 75 ug/L       T4 Free      0.76 - 1.46 ng/dL         Component      Latest Ref Rng & Units 1/22/2020   Sodium      133 - 144 mmol/L 134   Potassium      3.4 - 5.3 mmol/L 3.8   Chloride      94 - 109 mmol/L 100   Carbon Dioxide      20 - 32 mmol/L 28   Anion Gap      3 - 14 mmol/L 6   Glucose      70 - 99 mg/dL 120 (H)   Urea Nitrogen      7 - 30 mg/dL 14   Creatinine      0.66 - 1.25 mg/dL 1.34 (H)   GFR Estimate      >60 mL/min/1.73:m2 53 (L)   GFR Estimate If Black      >60 mL/min/1.73:m2 61   Calcium      8.5 - 10.1 mg/dL 9.3   Bilirubin Total      0.2 - 1.3 mg/dL 0.6   Albumin      3.4 - 5.0 g/dL 3.8   Protein Total      6.8 - 8.8 g/dL 7.6   Alkaline Phosphatase      40 - 150 U/L 90   ALT      0 - 70 U/L 30   AST      0 - 45 U/L 18   Cholesterol      <200 mg/dL 153   Triglycerides      <150 mg/dL 123   HDL Cholesterol      >39 mg/dL 56   LDL Cholesterol Calculated      <100 mg/dL 72   Non HDL Cholesterol      <130 mg/dL 97   PSA Free      ng/mL    Prostate Specific Antigen      0.0 - 4.0 ng/mL    PSAPCT      %    PSA      0 - 4 ug/L 5.59 (H)   TSH      0.40 - 4.00 mU/L 5.77 (H)    Parathyroid Hormone Intact      18 - 80 pg/mL 27   Hemoglobin      13.3 - 17.7 g/dL 14.0   Phosphorus      2.5 - 4.5 mg/dL 3.3   Vitamin D Deficiency screening      20 - 75 ug/L 53   T4 Free      0.76 - 1.46 ng/dL 0.94       Review of Systems  CONSTITUTIONAL: NEGATIVE for fever, chills, change in weight  INTEGUMENTARY/SKIN:  Has cyst removed from back last week and had multiple areas  Skin treated with LN. Seeing Dr Eduardo ([facility]) next week for f/u  EYES: NEGATIVE for vision changes or irritation. Has glasses. Due for eye exam  ENT/MOUTH: NEGATIVE for ear, mouth and throat problems except for chronic hoarseness ever since previous throat cancer treatment. Had f/u ENT appts for years after that without change  RESP: NEGATIVE for significant cough or SOB. HAs snoring at night and some fatigue. Due for sleep study  CV: NEGATIVE for chest pain, palpitations or peripheral edema  GI: NEGATIVE for nausea, abdominal pain, heartburn, or change in bowel habits. Overdue for colonoscopy with hx polpys  : NEGATIVE for frequency, dysuria, or hematuria. Elevated PSA with recent labs  MUSCULOSKELETAL: NEGATIVE for significant arthralgias or myalgia except for mild right lateral epicondylitis. Saw ortho recently  NEURO: NEGATIVE for weakness, dizziness or paresthesias. On Donepizil for memory loss. Followed by Neurology. Pt feels memory sx stable  ENDOCRINE: NEGATIVE for temperature intolerance. On statin. Thyroid function abnormal and needs med dose change  HEME: NEGATIVE for bleeding problems  PSYCHIATRIC: NEGATIVE for changes in mood or affect though wife disagrees some and feels pt occ complaining of depression sx. On Sertraline 25mg daily PHQ=4    OBJECTIVE:   /80   Pulse 74   Temp 98.6  F (37  C) (Temporal)   Resp 16   Ht 1.829 m (6')   Wt 82.1 kg (181 lb)   SpO2 98%   BMI 24.55 kg/m   Estimated body mass index is 25.5 kg/m  as calculated from the following:    Height as of 8/10/19: 1.829 m  (6').    Weight as of 10/25/19: 85.3 kg (188 lb).  Physical Exam  General appearance - alert, no distress  Skin - No rashes. Scar on back from recent cyst removal  Head - normocephalic, atraumatic  Eyes - KAITLIN, EOMI, fundi exam with nondilated pupils negative.  Ears - External ears normal. Canals clear. TM's normal.  Nose/Sinuses - Nares normal. Septum midline. Mucosa normal. No drainage or sinus tenderness.  Oropharynx - No erythema, no adenopathy, no exudates. Chronic voice hoarseness unchanged from many years ago. (Previous throat cancer treatment)  Neck - Supple without adenopathy or thyromegaly. No bruits.  Lungs - Clear to auscultation without wheezes/rhonchi.  Heart - Regular rate and rhythm without murmurs, clicks, or gallops.  Nodes - No supraclavicular, axillary, or inguinal adenopathy palpable.  Abdomen - Abdomen soft, non-tender. BS normal. No masses or hepatosplenomegaly palpable. No bruits.  Extremities -No cyanosis, clubbing or edema.    Musculoskeletal - Spine ROM normal. Muscular strength intact.   Peripheral pulses - radial=4/4, femoral=4/4, posterior tibial=4/4, dorsalis pedis=4/4,  Neuro - Gait normal. Reflexes normal and symmetric. Sensation grossly WNL. 2/3 recall 5 min. Formal 6CIT deferred as being followed by Neurology for memory issues  Genital - Normal-appearing male external genitalia. No scrotal masses or inguinal hernia palpable.   Rectal - Guaic negative stool. Normal tone. Prostate 1 plus in size to palpation. No rectal masses or prostate nodularity palpable       ASSESSMENT / PLAN:   1. Medicare annual wellness visit, subsequent  See below for healthcare maintenance.  Otherwise up-to-date     2. CKD (chronic kidney disease) stage 3, GFR 30-59 ml/min (H)  Stable, repeat labs in 6 months  - Basic metabolic panel; Future  - CBC with platelets; Future    3. Mild major depression (H)   PHQ at goal but wife feels doesn't fully reflect pt mood. Pt willing to try higher dose Sertraline.  Will raise to 50mg daily  - sertraline (ZOLOFT) 50 MG tablet; Take 1 tablet (50 mg) by mouth daily  Dispense: 90 tablet; Refill: 3    4. Hypothyroidism, unspecified type  Thyroid function too low.  Will increase to 125 mcg daily.  Repeat labs 6 weeks  - levothyroxine (SYNTHROID/LEVOTHROID) 125 MCG tablet; Take 1 tablet (125 mcg) by mouth daily  Dispense: 30 tablet; Refill: 11  - TSH with free T4 reflex; Future    5. Memory loss  Stable with meds.  Patient will follow-up with neurology as scheduled    6. Essential hypertension  Controlled.  Continue current medication  - hydrochlorothiazide (HYDRODIURIL) 12.5 MG tablet; Take 1 tablet (12.5 mg) by mouth daily  Dispense: 90 tablet; Refill: 3  - losartan (COZAAR) 100 MG tablet; Take 1 tablet (100 mg) by mouth daily  Dispense: 90 tablet; Refill: 3    7. Elevated prostate specific antigen (PSA)  PSA worsening.  Chronic rise.  No nodule palpable.  Patient to see urology to discuss possible MRI of the prostate versus biopsy versus other  - UROLOGY ADULT REFERRAL    8. Hyperlipidemia LDL goal <100  Controlled.  Continue current medication.  Repeat lab 1 year  - rosuvastatin (CRESTOR) 10 MG tablet; Take 1 tablet (10 mg) by mouth daily  Dispense: 90 tablet; Refill: 3  - Comprehensive metabolic panel; Future  - Lipid panel reflex to direct LDL Fasting; Future    9. Snoring  Has some daytime fatigue.  With that and snoring, concern for possible sleep apnea.  If present, apnea will also been some memory issues.  Patient  To see sleep clinic for further evaluation  - SLEEP EVALUATION & MANAGEMENT REFERRAL - ADULT -[facility] [phone]  (Age 18 and up); Future    10. Dysfunction of right eustachian tube  Symptomatic with medication.  Continue Flonase  - fluticasone (FLONASE) 50 MCG/ACT nasal spray; Spray 1-2 sprays into both nostrils daily  Dispense: 48 g; Refill: 3    11. Special screening for malignant neoplasms, colon  Due for colon cancer screening  -  GASTROENTEROLOGY ADULT REF PROCEDURE ONLY Other; [facility] [phone]        COUNSELING:  Reviewed preventive health counseling, as reflected in patient instructions    Estimated body mass index is 25.5 kg/m  as calculated from the following:    Height as of 8/10/19: 1.829 m (6').    Weight as of 10/25/19: 85.3 kg (188 lb).         reports that he quit smoking about 22 years ago. He has a 45.00 pack-year smoking history. He has never used smokeless tobacco.      Appropriate preventive services were discussed with this patient, including applicable screening as appropriate for cardiovascular disease, diabetes, osteopenia/osteoporosis, and glaucoma.  As appropriate for age/gender, discussed screening for colorectal cancer, prostate cancer, breast cancer, and cervical cancer. Checklist reviewing preventive services available has been given to the patient.    Reviewed patients plan of care and provided an AVS. The Basic Care Plan (routine screening as documented in Health Maintenance) for Agapito meets the Care Plan requirement. This Care Plan has been established and reviewed with the Patient and spouse..    Counseling Resources:  ATP IV Guidelines  Pooled Cohorts Equation Calculator  Breast Cancer Risk Calculator  FRAX Risk Assessment  ICSI Preventive Guidelines  Dietary Guidelines for Americans, 2010  [employer] MyPlate  ASA Prophylaxis  Lung CA Screening      PLAN:   Stop Levothyroxine 112mcg tab   Start Levothyroxine 125mcg tab, 1 tab daily for thyroid   Repeat nonfasting thyroid lab in 6 weeks   Referal to  Sleep clinic re: possible sleep apea with snoring as cause for fatigue. Call for appt  Referal to Urology (Dr Denise) re: ellevated PSA.  Possible MRI prostate vs biopsy vs other. Call for appt  Change Sertraline  To 50mg tab, 1 tab daily when run out of the 25mg tabs  taking now    Continue other  medications  Colonoscopy  with  [facility]. They will call to schedule. If not heard from them in 1 week,  then call [phone].   Pt was informed regarding extra E&M billing for management of new or established medical issues not related to today's wellness visit  See me in 6 months for follow-up or earlier as needed  Follow-up with neurology as scheduled  Repeat nonfasting kidney labs in 6 months      Los Love MD  [facility]    Identified Health Risks:

## 2020-02-03 NOTE — PATIENT INSTRUCTIONS
Stop Levothyroxine 112mcg tab   Start Levothyroxine 125mcg tab, 1 tab daily for thyroid   Repeat nonfasting thyroid lab in 6 weeks   Referal to  Sleep clinic re: possible sleep apea with snoring as cause for fatigue. Call for appt  Referal to Urology (Dr Denise) re: ellevated PSA.  Possible MRI prostate vs biopsy vs other. Call for appt  Change Sertraline  To 50mg tab, 1 tab daily when run out of the 25mg tabs  taking now    Continue other  medications  Colonoscopy  with  [facility]. They will call to schedule. If not heard from them in 1 week, then call [phone].   Pt was informed regarding extra E&M billing for management of new or established medical issues not related to today's wellness visit  See me in 6 months for follow-up or earlier as needed  Follow-up with neurology as scheduled  Repeat nonfasting kidney labs in 6 months

## 2020-02-09 PROBLEM — R06.83 SNORING: Status: ACTIVE | Noted: 2020-01-01

## 2020-02-09 PROBLEM — R97.20 ELEVATED PROSTATE SPECIFIC ANTIGEN (PSA): Status: ACTIVE | Noted: 2020-01-01

## 2020-02-09 PROBLEM — F32.0 MILD MAJOR DEPRESSION (H): Status: ACTIVE | Noted: 2020-01-01

## 2020-02-09 PROBLEM — H69.91 DYSFUNCTION OF RIGHT EUSTACHIAN TUBE: Status: ACTIVE | Noted: 2020-01-01

## 2020-02-13 NOTE — TELEPHONE ENCOUNTER
"Requested Prescriptions   Pending Prescriptions Disp Refills     losartan (COZAAR) 100 MG tablet [Pharmacy Med Name: LOSARTAN TAB 100MG]  Last Written Prescription Date:  02/03/2020  Last Fill Quantity: 90,  # refills: 03   Last Office Visit: 2/3/2020   Future Office Visit:      90 tablet 3     Sig: TAKE 1 TABLET DAILY       Angiotensin-II Receptors Failed - 2/12/2020  7:11 PM        Failed - Normal serum creatinine on file in past 12 months     Recent Labs   Lab Test 01/22/20  0852   CR 1.34*             Passed - Last blood pressure under 140/90 in past 12 months     BP Readings from Last 3 Encounters:   02/03/20 132/80   10/25/19 130/82   09/16/19 120/80                 Passed - Recent (12 mo) or future (30 days) visit within the authorizing provider's specialty     Patient has had an office visit with the authorizing provider or a provider within the authorizing providers department within the previous 12 mos or has a future within next 30 days. See \"Patient Info\" tab in inbasket, or \"Choose Columns\" in Meds & Orders section of the refill encounter.              Passed - Medication is active on med list        Passed - Patient is age 18 or older        Passed - Normal serum potassium on file in past 12 months     Recent Labs   Lab Test 01/22/20  0852   POTASSIUM 3.8                    levothyroxine (SYNTHROID/LEVOTHROID) 112 MCG tablet [Pharmacy Med Name: LEVOTHYROXIN TAB 0.112MG]  Last Written Prescription Date:  02/03/2020  Last Fill Quantity: 30,  # refills: 11   Last Office Visit: 2/3/2020   Future Office Visit:      90 tablet 3     Sig: TAKE 1 TABLET DAILY       Thyroid Protocol Failed - 2/12/2020  7:11 PM        Failed - Normal TSH on file in past 12 months     Recent Labs   Lab Test 01/22/20  0852   TSH 5.77*              Passed - Patient is 12 years or older        Passed - Recent (12 mo) or future (30 days) visit within the authorizing provider's specialty     Patient has had an office visit with the " "authorizing provider or a provider within the authorizing providers department within the previous 12 mos or has a future within next 30 days. See \"Patient Info\" tab in inbasket, or \"Choose Columns\" in Meds & Orders section of the refill encounter.              Passed - Medication is active on med list        rosuvastatin (CRESTOR) 10 MG tablet [Pharmacy Med Name: ROSUVASTATIN TAB 10MG]  Last Written Prescription Date:  02/03/2020  Last Fill Quantity: 90,  # refills: 03   Last Office Visit: 2/3/2020   Future Office Visit:      90 tablet 3     Sig: TAKE 1 TABLET DAILY       Statins Protocol Passed - 2/12/2020  7:11 PM        Passed - LDL on file in past 12 months     Recent Labs   Lab Test 01/22/20  0852   LDL 72             Passed - No abnormal creatine kinase in past 12 months     Recent Labs   Lab Test 11/29/17  0943                   Passed - Recent (12 mo) or future (30 days) visit within the authorizing provider's specialty     Patient has had an office visit with the authorizing provider or a provider within the authorizing providers department within the previous 12 mos or has a future within next 30 days. See \"Patient Info\" tab in inbasket, or \"Choose Columns\" in Meds & Orders section of the refill encounter.              Passed - Medication is active on med list        Passed - Patient is age 18 or older          "

## 2020-02-14 NOTE — TELEPHONE ENCOUNTER
patient neurologist retired in Dec 2019 and they need provider to refill this asap-  He is now out and they need this asap- he is completely out and wife can tell the mental difference- memory is much worse    Please send asap  Julianne FERRARA RN BSN  [facility]  [phone]

## 2020-02-19 NOTE — LETTER
8/30/2019       RE: Agapito Fairbanks  [address]  [facility] [address]     Dear Colleague,    Thank you for referring your patient, Agapito Fairbanks, to the [facility] at [facility]. Please see a copy of my visit note below.    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 He is accompanied to today's visit by his wife Millicent, who assists in providing the history.      Mr. Fairbanks has a history of lung cancer and laryngeal cancer s/p radiation therapy. [facility] with two years of progressive memory loss. 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     Neither he nor Millicent have noticed any changes in his cognitive abilities. Overall, he is doing much better than he was two years ago, when he was drinking more alcohol including vodka daily. Elisamonserrat does worry that he does not always follow conversations well.     Millicent is not sure if he is taking donepezil or Zoloft. She does not oversee his medications. He had a couple of bouts of diarrhea while driving and worries that this was a side effect of donepezil.     Millicent has noticed that he has a short temper and is more irritable, and does not smile. Because of this, she wonders if he is taking the Zoloft.     He has not developed any motor symptoms, but can be fidgety with his hands.    He is exercising frequently, including weights.     Medical review of systems: He was seen in the ED recently for difficulty swallowing, which self resolved, and is planning to have an endoscopy evaluation. The comprehensive review of systems is otherwise reviewed and negative.     Patient Active Problem List   Diagnosis     Testicular hypofunction     Impotence of organic origin     Essential  C/o feeling dizzy like the room is spinning since yesterday. Also cough that started yesterday. Had physical therapy the night before and sx started shortly after. hypertension     ESOPHAGEAL REFLUX     CKD (chronic kidney disease) stage 3, GFR 30-59 ml/min (H)     HYPERLIPIDEMIA LDL GOAL <100     Hypothyroidism     Advanced directives, counseling/discussion     History of lung cancer     History of laryngeal cancer     Talipes cavus     Memory loss         Past Medical History:   Diagnosis Date     Acute gastritis with hemorrhage 11/02     Basal cell carcinoma, leg      left pretibial area     CKD (chronic kidney disease) stage 3, GFR 30-59 ml/min (H)     creat baseline approx 1.4     Hearing loss      Helicobacter pylori (H. 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     Mild major depression (H)      Other and unspecified malignant neoplasm of skin of other and unspecified parts of face      Basal Cell CA nasal area 4/04, chest 3/05, right chest 10/09     Other testicular hypofunction      Squamous cell carcinoma  Jan 2012     RLE s/p excision     Tobacco use disorder     quit 1998     Unspecified cataract     left     Unspecified essential hypertension      Unspecified retinal detachment     Bilateral   Cisplatin induced kidney disease   Evaluated yearly with endoscope for vocal cord cancer in remission  He had normal childhood development and reports no major head injuries.     Past Surgical History:   Procedure Laterality Date     C NONSPECIFIC PROCEDURE  5/01, 9/01    B retinal surg.     C NONSPECIFIC PROCEDURE  12/01    L cataract     C NONSPECIFIC PROCEDURE  1983    right ankle fx repair     C NONSPECIFIC PROCEDURE  3/05    Right hemilaryngectomy (laser) for Squamous Cell CA T1N0     C NONSPECIFIC PROCEDURE  3/05, 10/09    Moh's procedure for Basal Cell CA chest     C NONSPECIFIC PROCEDURE  4/04    Moh's procedure  for Basal Cell CA ear lobe     C NONSPECIFIC PROCEDURE  3/05,     right vocal cord squamous cell CA excision     C NONSPECIFIC PROCEDURE      Phacoemulsification of the lens with posterior chamber lens implant, right eye.      C NONSPECIFIC PROCEDURE  10/09     Moh's procedufe for BCC left pretibial area     C NONSPECIFIC PROCEDURE      Left thoracoscopic wedge resection, Open completion left upper lobe segmentectomy      THORACIC SURGERY      lung,throat       Current Outpatient Medications   Medication Sig Dispense Refill     Donepezil HCl (ARICEPT PO) Patient take prn       fluticasone (FLONASE) 50 MCG/ACT nasal spray Spray 1-2 sprays into both nostrils daily (Patient taking differently: Spray 1-2 sprays into both nostrils daily prn) 16 g 0     hydrochlorothiazide (HYDRODIURIL) 12.5 MG tablet Take 1 tablet (12.5 mg) by mouth daily 90 tablet 3     levothyroxine (SYNTHROID/LEVOTHROID) 112 MCG tablet Take 1 tablet (112 mcg) by mouth daily 90 tablet 3     losartan (COZAAR) 100 MG tablet Take 1 tablet (100 mg) by mouth daily 90 tablet 3     MULTI VITAMIN MENS OR 1 TABLET DAILY       rosuvastatin (CRESTOR) 10 MG tablet Take 1 tablet (10 mg) by mouth daily 90 tablet 3     sertraline (ZOLOFT) 25 MG tablet Take 25 mg daily for 2 weeks, then 50mg 90 tablet 3     triamcinolone (KENALOG) 0.1 % external cream APPLY  CREAM EXTERNALLY TO AFFECTED AREA THREE TIMES DAILY AS NEEDED SPARINGLY 80 g 1      Donepezil dosage is around 5 mg, started about 1 year ago  Sertraline started 6-8 weeks ago, mood elevated    Allergies   Allergen Reactions     Simvastatin      myalgias     Lisinopril Rash     rash       Family History   Problem Relation Age of Onset     Colon Cancer Mother          age 65, in      Family History Negative Father         mild memory problems,  age 87, in      Family History Negative Brother      Family History Negative Brother      Cancer Brother         hx prostate ca   3 brothers -  older is 72. Younger brothers are 65, 68. No memory problems that they are aware of, but they are not in contact often  One daughter, in good health.    Social History     Socioeconomic History     Marital status:      Spouse name: Not on file     Number of children: Not on file     Years of education: Not on file     Highest education level: Not on file   Occupational History     Not on file   Social Needs     Financial resource strain: Not on file     Food insecurity:     Worry: Not on file     Inability: Not on file     Transportation needs:     Medical: Not on file     Non-medical: Not on file   Tobacco Use     Smoking status: Former Smoker     Packs/day: 1.50     Years: 30.00     Pack years: 45.00     Last attempt to quit: 1998     Years since quittin.6     Smokeless tobacco: Never Used   Substance and Sexual Activity     Alcohol use: No     Drug use: No     Sexual activity: Yes     Partners: Female     Comment: vidya with TL   Lifestyle     Physical activity:     Days per week: Not on file     Minutes per session: Not on file     Stress: Not on file   Relationships     Social connections:     Talks on phone: Not on file     Gets together: Not on file     Attends [employer] service: Not on file     Active member of club or organization: Not on file     Attends meetings of clubs or organizations: Not on file     Relationship status: Not on file     Intimate partner violence:     Fear of current or ex partner: Not on file     Emotionally abused: Not on file     Physically abused: Not on file     Forced sexual activity: Not on file   Other Topics Concern     Parent/sibling w/ CABG, MI or angioplasty before 65F 55M? Not Asked   Social History Narrative    High school education.    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     General Physical examination:  BP (!) 156/94 (BP Location: Right arm, Patient Position: Sitting, Cuff Size: Adult Regular)   Pulse 70   Wt 186 lb 3.2 oz (84.5 kg)   BMI 25.25 kg/m      BP checks at home 120s-130s/70s    General: Mr. Fairbanks is well appearing and is appropriately groomed and dressed. His voice is slightly hoarse s/p vocal cord tumor resection  Chest: Clear to auscultation bilaterally.  Heart: Regular rhythm with no murmurs, rubs, or gallops.  Ext: warm, no edema  Skin: clean, dry, intact    Neurological examination:  Mental status: Mr. Fairbanks  is awake, alert, and appropriate, with fluent speech, no word finding difficulties and no difficulty in answering questions. He has mildly diminished insight into his memory loss. His memory for remote events is intact. His memory for recent events is generally intact, including a band they saw at the [facility] and his recent visit to the ED. Attention and concentration are intact. His fund of knowledge is fair. No apraxia is noted.  Cranial nerves: Pupils are equal, round and reactive to light bilaterally. Visual fields are full to confrontation with no visual extinction. Extraocular movements are intact. Smooth pursuit is intact. Saccades are normal in initiation, velocity and amplitude both vertically and horizontally. Facial sensation is intact to light touch. Facial strength is full bilaterally. Hearing is intact to finger rub bilaterally.   Motor examination: Bulk is normal throughout. Axial and upper and lower extremity tone is normal. No pronator drift is noted. Strength is 5/5 and symmetric throughout. No fasciculations are noted. There is no tremor or other involuntary movement.  Sensory examination: Sensation is intact to vibratory sense and proprioception. There is no cortical sensory loss by  "graphesthesia, or neglect.  Reflexes: Reflexes are symmetric and 1+ throughout. Plantar response is flexor bilaterally.  Coordination: Finger-nose-finger and heel-knee-shin testing is normal with no dysmetria bilaterally.  Rapid finger tapping, opening and closing of fist and pronation-supination are not slowed. Foot tapping is not slowed.  Gait: He has an upright and steady stance with normal stride length and arm swing.  There is no retropulsion.    Neuropsychological evaluation in Feb 2019 by Dr. Yakov Larios:  \"Substantial dysfunction in learning and memory, ranging from borderline to very impaired. There are also significant but somewhat more variable weaknesses in executive functioning. Visuoconstructional abilities are below expectations and may partly reflect dysexecutive behavioral interference. Reduced insight into recent functioning.\"    Labs:  Orders Only on 01/23/2019   Component Date Value Ref Range Status     Treponema Antibodies 01/23/2019 Nonreactive  NR^Nonreactive Final     HIV Antigen Antibody Combo 01/23/2019 Nonreactive  NR^Nonreactive     Final     Methylmalonic Acid 01/23/2019 0.13  0.00 - 0.40 umol/L Final     Vitamin B12 01/23/2019 737  193 - 986 pg/mL Final   Orders Only on 01/10/2019   Component Date Value Ref Range Status     PSA Free 01/10/2019 1.5  ng/mL Final     Prostate Specific Antigen 01/10/2019 4.4* 0.0 - 4.0 ng/mL Final     PSAPCT 01/10/2019 34  % Final     Cholesterol 01/10/2019 180  <200 mg/dL Final     Triglycerides 01/10/2019 77  <150 mg/dL Final     HDL Cholesterol 01/10/2019 71  >39 mg/dL Final     LDL Cholesterol Calculated 01/10/2019 94  <100 mg/dL Final     Non HDL Cholesterol 01/10/2019 109  <130 mg/dL Final     TSH 01/10/2019 1.52  0.40 - 4.00 mU/L Final     Sodium 01/10/2019 135  133 - 144 mmol/L Final     Potassium 01/10/2019 4.1  3.4 - 5.3 mmol/L Final     Chloride 01/10/2019 101  94 - 109 mmol/L Final     Carbon Dioxide 01/10/2019 31  20 - 32 mmol/L Final     " Anion Gap 01/10/2019 3  3 - 14 mmol/L Final     Glucose 01/10/2019 104* 70 - 99 mg/dL Final     Urea Nitrogen 01/10/2019 16  7 - 30 mg/dL Final     Creatinine 01/10/2019 1.40* 0.66 - 1.25 mg/dL Final     GFR Estimate 01/10/2019 50* >60 mL/min/[1.73_m2] Final     GFR Estimate If Black 01/10/2019 58* >60 mL/min/[1.73_m2] Final     Calcium 01/10/2019 9.5  8.5 - 10.1 mg/dL Final     Hemoglobin 01/10/2019 14.6  13.3 - 17.7 g/dL Final     Creatinine Urine 01/10/2019 75  mg/dL Final     Albumin Urine mg/L 01/10/2019 <5  mg/L Final     Albumin Urine mg/g Cr 01/10/2019 Unable to calculate due to low value  0 - 17 mg/g Cr Final   Office Visit on 06/05/2018   Component Date Value Ref Range Status     Color Urine 06/05/2018 Yellow   Final     Appearance Urine 06/05/2018 Clear   Final     Glucose Urine 06/05/2018 Negative  NEG^Negative mg/dL Final     Bilirubin Urine 06/05/2018 Negative  NEG^Negative Final     Ketones Urine 06/05/2018 Negative  NEG^Negative mg/dL Final     Specific Gravity Urine 06/05/2018 1.015  1.003 - 1.035 Final     pH Urine 06/05/2018 6.5  5.0 - 7.0 pH Final     Protein Albumin Urine 06/05/2018 Negative  NEG^Negative mg/dL Final     Urobilinogen Urine 06/05/2018 0.2  0.2 - 1.0 EU/dL Final     Nitrite Urine 06/05/2018 Negative  NEG^Negative Final     Blood Urine 06/05/2018 Negative  NEG^Negative Final     Leukocyte Esterase Urine 06/05/2018 Negative  NEG^Negative Final     Source 06/05/2018 Midstream Urine   Final     WBC Urine 06/05/2018 0 - 5  OTO5^0 - 5 /HPF Final     RBC Urine 06/05/2018 O - 2  OTO2^O - 2 /HPF Final     WBC 06/05/2018 7.8  4.0 - 11.0 10e9/L Final     RBC Count 06/05/2018 4.75  4.4 - 5.9 10e12/L Final     Hemoglobin 06/05/2018 15.2  13.3 - 17.7 g/dL Final     Hematocrit 06/05/2018 45.3  40.0 - 53.0 % Final     MCV 06/05/2018 95  78 - 100 fl Final     MCH 06/05/2018 32.0  26.5 - 33.0 pg Final     MCHC 06/05/2018 33.6  31.5 - 36.5 g/dL Final     RDW 06/05/2018 13.3  10.0 - 15.0 % Final      Platelet Count 06/05/2018 253  150 - 450 10e9/L Final     Diff Method 06/05/2018 Automated Method   Final     % Neutrophils 06/05/2018 77.4  % Final     % Lymphocytes 06/05/2018 13.6  % Final     % Monocytes 06/05/2018 6.8  % Final     % Eosinophils 06/05/2018 1.9  % Final     % Basophils 06/05/2018 0.3  % Final     Absolute Neutrophil 06/05/2018 6.0  1.6 - 8.3 10e9/L Final     Absolute Lymphocytes 06/05/2018 1.1  0.8 - 5.3 10e9/L Final     Absolute Monocytes 06/05/2018 0.5  0.0 - 1.3 10e9/L Final     Absolute Eosinophils 06/05/2018 0.2  0.0 - 0.7 10e9/L Final     Absolute Basophils 06/05/2018 0.0  0.0 - 0.2 10e9/L Final     Sodium 06/05/2018 132* 133 - 144 mmol/L Final     Potassium 06/05/2018 4.9  3.4 - 5.3 mmol/L Final     Chloride 06/05/2018 100  94 - 109 mmol/L Final     Carbon Dioxide 06/05/2018 27  20 - 32 mmol/L Final     Anion Gap 06/05/2018 5  3 - 14 mmol/L Final     Glucose 06/05/2018 110* 70 - 99 mg/dL Final     Urea Nitrogen 06/05/2018 10  7 - 30 mg/dL Final     Creatinine 06/05/2018 1.28* 0.66 - 1.25 mg/dL Final     GFR Estimate 06/05/2018 56* >60 mL/min/1.7m2 Final     GFR Estimate If Black 06/05/2018 67  >60 mL/min/1.7m2 Final     Calcium 06/05/2018 9.4  8.5 - 10.1 mg/dL Final     Bilirubin Total 06/05/2018 1.5* 0.2 - 1.3 mg/dL Final     Albumin 06/05/2018 4.4  3.4 - 5.0 g/dL Final     Protein Total 06/05/2018 8.1  6.8 - 8.8 g/dL Final     Alkaline Phosphatase 06/05/2018 79  40 - 150 U/L Final     ALT 06/05/2018 33  0 - 70 U/L Final     AST 06/05/2018 27  0 - 45 U/L Final     TSH 06/05/2018 2.10  0.40 - 4.00 mU/L Final   Orders Only on 04/17/2018   Component Date Value Ref Range Status     Cholesterol 04/17/2018 212* <200 mg/dL Final     Triglycerides 04/17/2018 61  <150 mg/dL Final     HDL Cholesterol 04/17/2018 74  >39 mg/dL Final     LDL Cholesterol Calculated 04/17/2018 126* <100 mg/dL Final     Non HDL Cholesterol 04/17/2018 138* <130 mg/dL Final     Sodium 04/17/2018 135  133 - 144 mmol/L Final      Potassium 04/17/2018 4.1  3.4 - 5.3 mmol/L Final     Chloride 04/17/2018 97  94 - 109 mmol/L Final     Carbon Dioxide 04/17/2018 29  20 - 32 mmol/L Final     Anion Gap 04/17/2018 9  3 - 14 mmol/L Final     Glucose 04/17/2018 91  70 - 99 mg/dL Final     Urea Nitrogen 04/17/2018 13  7 - 30 mg/dL Final     Creatinine 04/17/2018 1.30* 0.66 - 1.25 mg/dL Final     GFR Estimate 04/17/2018 55* >60 mL/min/1.7m2 Final     GFR Estimate If Black 04/17/2018 66  >60 mL/min/1.7m2 Final     Calcium 04/17/2018 9.0  8.5 - 10.1 mg/dL Final     Bilirubin Total 04/17/2018 1.0  0.2 - 1.3 mg/dL Final     Albumin 04/17/2018 4.1  3.4 - 5.0 g/dL Final     Protein Total 04/17/2018 7.4  6.8 - 8.8 g/dL Final     Alkaline Phosphatase 04/17/2018 67  40 - 150 U/L Final     ALT 04/17/2018 23  0 - 70 U/L Final     AST 04/17/2018 17  0 - 45 U/L Final     TSH 04/17/2018 5.48* 0.40 - 4.00 mU/L Final     PSA 04/17/2018 4.59* 0 - 4 ug/L Final     T4 Free 04/17/2018 0.87  0.76 - 1.46 ng/dL Final   Orders Only on 01/24/2018   Component Date Value Ref Range Status     TSH 01/24/2018 4.24* 0.40 - 4.00 mU/L Final     Vitamin D Deficiency screening 01/24/2018 38  20 - 75 ug/L Final     Hemoglobin 01/24/2018 14.0  13.3 - 17.7 g/dL Final     Sodium 01/24/2018 135  133 - 144 mmol/L Final     Potassium 01/24/2018 3.5  3.4 - 5.3 mmol/L Final     Chloride 01/24/2018 99  94 - 109 mmol/L Final     Carbon Dioxide 01/24/2018 29  20 - 32 mmol/L Final     Anion Gap 01/24/2018 7  3 - 14 mmol/L Final     Glucose 01/24/2018 94  70 - 99 mg/dL Final     Urea Nitrogen 01/24/2018 12  7 - 30 mg/dL Final     Creatinine 01/24/2018 1.34* 0.66 - 1.25 mg/dL Final     GFR Estimate 01/24/2018 53* >60 mL/min/1.7m2 Final     GFR Estimate If Black 01/24/2018 64  >60 mL/min/1.7m2 Final     Calcium 01/24/2018 8.5  8.5 - 10.1 mg/dL Final     T4 Free 01/24/2018 0.94  0.76 - 1.46 ng/dL Final   Orders Only on 11/29/2017   Component Date Value Ref Range Status     Cholesterol 11/29/2017 162   <200 mg/dL Final     Triglycerides 11/29/2017 102  <150 mg/dL Final     HDL Cholesterol 11/29/2017 79  >39 mg/dL Final     LDL Cholesterol Calculated 11/29/2017 63  <100 mg/dL Final     Non HDL Cholesterol 11/29/2017 83  <130 mg/dL Final     Bilirubin Direct 11/29/2017 0.2  0.0 - 0.2 mg/dL Final     Bilirubin Total 11/29/2017 0.8  0.2 - 1.3 mg/dL Final     Albumin 11/29/2017 4.0  3.4 - 5.0 g/dL Final     Protein Total 11/29/2017 7.6  6.8 - 8.8 g/dL Final     Alkaline Phosphatase 11/29/2017 75  40 - 150 U/L Final     ALT 11/29/2017 31  0 - 70 U/L Final     AST 11/29/2017 22  0 - 45 U/L Final     CK Total 11/29/2017 175  30 - 300 U/L Final   Orders Only on 10/02/2017   Component Date Value Ref Range Status     Cholesterol 10/02/2017 220* <200 mg/dL Final     Triglycerides 10/02/2017 74  <150 mg/dL Final     HDL Cholesterol 10/02/2017 74  >39 mg/dL Final     LDL Cholesterol Calculated 10/02/2017 131* <100 mg/dL Final     Non HDL Cholesterol 10/02/2017 146* <130 mg/dL Final     Vitamin B12 10/02/2017 504  193 - 986 pg/mL Final   Orders Only on 08/22/2017   Component Date Value Ref Range Status     Cholesterol 08/22/2017 211* <200 mg/dL Final     Triglycerides 08/22/2017 125  <150 mg/dL Final     HDL Cholesterol 08/22/2017 73  >39 mg/dL Final     LDL Cholesterol Calculated 08/22/2017 113* <100 mg/dL Final     Non HDL Cholesterol 08/22/2017 138* <130 mg/dL Final     Creatinine Urine 08/22/2017 106  mg/dL Final     Albumin Urine mg/L 08/22/2017 6  mg/L Final     Albumin Urine mg/g Cr 08/22/2017 5.20  0 - 17 mg/g Cr Final   Orders Only on 07/11/2017   Component Date Value Ref Range Status     Cholesterol 07/11/2017 217* <200 mg/dL Final     Triglycerides 07/11/2017 163* <150 mg/dL Final     HDL Cholesterol 07/11/2017 64  >39 mg/dL Final     LDL Cholesterol Calculated 07/11/2017 120* <100 mg/dL Final     Non HDL Cholesterol 07/11/2017 153* <130 mg/dL Final     Bilirubin Direct 07/11/2017 0.2  0.0 - 0.2 mg/dL Final      Bilirubin Total 07/11/2017 1.3  0.2 - 1.3 mg/dL Final     Albumin 07/11/2017 4.1  3.4 - 5.0 g/dL Final     Protein Total 07/11/2017 7.2  6.8 - 8.8 g/dL Final     Alkaline Phosphatase 07/11/2017 69  40 - 150 U/L Final     ALT 07/11/2017 27  0 - 70 U/L Final     AST 07/11/2017 18  0 - 45 U/L Final     CK Total 07/11/2017 231  30 - 300 U/L Final     Sodium 07/11/2017 133  133 - 144 mmol/L Final     Potassium 07/11/2017 3.6  3.4 - 5.3 mmol/L Final     Chloride 07/11/2017 94  94 - 109 mmol/L Final     Carbon Dioxide 07/11/2017 31  20 - 32 mmol/L Final     Anion Gap 07/11/2017 8  3 - 14 mmol/L Final     Glucose 07/11/2017 110* 70 - 99 mg/dL Final     Urea Nitrogen 07/11/2017 17  7 - 30 mg/dL Final     Creatinine 07/11/2017 1.39* 0.66 - 1.25 mg/dL Final     GFR Estimate 07/11/2017 51* >60 mL/min/1.7m2 Final     GFR Estimate If Black 07/11/2017 61  >60 mL/min/1.7m2 Final     Calcium 07/11/2017 8.9  8.5 - 10.1 mg/dL Final   Office Visit on 05/16/2017   Component Date Value Ref Range Status     Sodium 05/16/2017 133  133 - 144 mmol/L Final     Potassium 05/16/2017 3.9  3.4 - 5.3 mmol/L Final     Chloride 05/16/2017 95  94 - 109 mmol/L Final     Carbon Dioxide 05/16/2017 30  20 - 32 mmol/L Final     Anion Gap 05/16/2017 8  3 - 14 mmol/L Final     Glucose 05/16/2017 111* 70 - 99 mg/dL Final     Urea Nitrogen 05/16/2017 15  7 - 30 mg/dL Final     Creatinine 05/16/2017 1.34* 0.66 - 1.25 mg/dL Final     GFR Estimate 05/16/2017 53* >60 mL/min/1.7m2 Final     GFR Estimate If Black 05/16/2017 64  >60 mL/min/1.7m2 Final     Calcium 05/16/2017 8.9  8.5 - 10.1 mg/dL Final   There may be more visits with results that are not included.     Imaging:  Brain MRI 3/14/19 Thin rim of periventricular white matter disease, more so on the right, mild hippocampal atrophy, all findings similar to that of brain MRI 12/14/17    CSF studies icluded normal protein, glucose, cell counts, negative Lyme, negative autoimmune panel.   [facility]  evaluation revealed low Abeta42 334.35 pg/ml, t-tau of 246.2 pg/ml and normal p-tau if 47.75 pg/ml. These results are borderline in regards to AD pathophysiology.     Impression:  Mr. Fairbanks is a 69 year old right-handed male with history of lung cancer and laryngeal cancer who [facility] with two years of progressive memory loss. His exam is notable for mild short-term memory loss and is otherwise normal. Neuropsychological testing shows impairments in learning and memory and milder impairments in executive function and visuospatial abilities. Brain MRI shows mild periventricular white matter disease and hippocampal atrophy. After CSF evaluation, he continues to meets criteria for mild cognitive impairment with intermediate likelihood of Alzheimer's pathophysiology. Relative stability since reducing alcohol intake is a good sign; however, MRI findings and cognitive testing indicate risk for progression.     Recommendations:    Millicent should provide more medication oversight    Continue morning dosing of donepezil 5 mg. Take more fiber/benafiber in diet. If loose stools become a persistent problem, we can switch to rivastigmine patch.     Continue Zoloft    Limit alcohol intake to 1 drink or less a day, and not before bed.    Encourage cardiovascular exercise and Mediterranean diet.    Repeat neuropsychological testing in about 1 year    Mr. Fairbanks expressed interest in being contacted in the future about clinical studies for which he may be eligible     Follow-up: Return in about 8 months.    I spent a total of 40 minutes face-to-face with the patient, and over half of that time was spent counseling regarding the symptoms, diagnosis, medication risks, lifestyle modification, therapeutic options, and prognosis.    Again, thank you for allowing me to participate in the care of your patient.      Sincerely,    Josep Still MD

## 2020-02-25 NOTE — PROGRESS NOTES
[facility]  Main Office: [address]  [address]  [place] [address]       CHIEF COMPLAINT:  Elevated PSA    HISTORY:   I was asked by Dr Love to see this 69 year old gentleman who presents with an elevated PSA.  His PSA has been rising continuously since 2006.  It first began elevated in 2018 when it was 4.59.  This year it is now 5.59.  He has no urinary symptoms or complaints.  He reports a normal urinary stream.  No nocturia.  No frequency or urgency.  He has no history of gross hematuria or infections.  He does have a family history of prostate cancer and that a brother was treated for prostate cancer.  He himself has a history of throat cancer and lung cancer, for which he was treated many years ago.      PAST MEDICAL HISTORY:   Past Medical History:   Diagnosis Date     Acute gastritis with hemorrhage 11/02     Basal cell carcinoma, leg      left pretibial area     CKD (chronic kidney disease) stage 3, GFR 30-59 ml/min (H)     creat baseline approx 1.4     Hearing loss      Helicobacter pylori (H. 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     Mild major depression (H)      Other and unspecified malignant neoplasm of skin of other and unspecified parts of face      Basal Cell CA nasal area 4/04, chest 3/05, right chest 10/09     Other testicular hypofunction      Squamous cell carcinoma  Jan 2012     RLE s/p excision     Tobacco use disorder     quit 1998     Unspecified cataract     left     Unspecified essential hypertension      Unspecified retinal detachment     Bilateral       PAST SURGICAL HISTORY:   Past Surgical History:   Procedure Laterality Date     C NONSPECIFIC PROCEDURE  5/01, 9/01    B retinal surg.     C NONSPECIFIC PROCEDURE       L cataract     C NONSPECIFIC PROCEDURE      right ankle fx repair     C NONSPECIFIC PROCEDURE  3/05    Right hemilaryngectomy (laser) for Squamous Cell CA T1N0     C NONSPECIFIC PROCEDURE  3/05, 10/09    Moh's procedure for Basal Cell CA chest     C NONSPECIFIC PROCEDURE      Moh's procedure for Basal Cell CA ear lobe     C NONSPECIFIC PROCEDURE  3/05,     right vocal cord squamous cell CA excision     C NONSPECIFIC PROCEDURE      Phacoemulsification of the lens with posterior chamber lens implant, right eye.      C NONSPECIFIC PROCEDURE  10/09     Moh's procedufe for BCC left pretibial area     C NONSPECIFIC PROCEDURE      Left thoracoscopic wedge resection, Open completion left upper lobe segmentectomy      THORACIC SURGERY      lung,throat       FAMILY HISTORY:   Family History   Problem Relation Age of Onset     Colon Cancer Mother          age 65, in      Family History Negative Father         mild memory problems,  age 87, in      Family History Negative Brother      Family History Negative Brother      Cancer Brother         hx prostate ca       SOCIAL HISTORY:   Social History     Tobacco Use     Smoking status: Former Smoker     Packs/day: 1.50     Years: 30.00     Pack years: 45.00     Last attempt to quit: 1998     Years since quittin.1     Smokeless tobacco: Never Used   Substance Use Topics     Alcohol use: No          Allergies   Allergen Reactions     Simvastatin      myalgias     Lisinopril Rash     rash         Current Outpatient Medications:      donepezil (ARICEPT) 5 MG tablet, Take 1 tablet (5 mg) by mouth At Bedtime, Disp: 90 tablet, Rfl: 3     fluticasone (FLONASE) 50 MCG/ACT nasal spray, Spray 1-2 sprays into both nostrils daily, Disp: 48 g, Rfl: 3     hydrochlorothiazide (HYDRODIURIL) 12.5 MG tablet, Take 1 tablet (12.5 mg) by mouth daily, Disp: 90 tablet, Rfl: 3     levothyroxine (SYNTHROID/LEVOTHROID) 112 MCG tablet, TAKE 1 TABLET  DAILY, Disp: 90 tablet, Rfl: 3     levothyroxine (SYNTHROID/LEVOTHROID) 125 MCG tablet, Take 1 tablet (125 mcg) by mouth daily, Disp: 30 tablet, Rfl: 11     losartan (COZAAR) 100 MG tablet, TAKE 1 TABLET DAILY, Disp: 90 tablet, Rfl: 3     MULTI VITAMIN MENS OR, 1 TABLET DAILY, Disp: , Rfl:      rosuvastatin (CRESTOR) 10 MG tablet, TAKE 1 TABLET DAILY, Disp: 90 tablet, Rfl: 3     sertraline (ZOLOFT) 50 MG tablet, Take 1 tablet (50 mg) by mouth daily, Disp: 90 tablet, Rfl: 3     triamcinolone (KENALOG) 0.1 % external cream, APPLY  CREAM EXTERNALLY TO AFFECTED AREA THREE TIMES DAILY AS NEEDED SPARINGLY, Disp: 80 g, Rfl: 1    Review Of Systems:  Skin: No rash, pruritis, or skin pigmentation  Eyes: No changes in vision  Ears/Nose/Throat: No changes in hearing, no nosebleeds  Respiratory: No shortness of breath, dyspnea on exertion, cough, or hemoptysis  Cardiovascular: No chest pain or palpitations  Gastrointestinal: No diarrhea or constipation. No abdominal pain. No hematochezia  Genitourinary: see HPI  Musculoskeletal: No pain or swelling of joints, normal range of motion  Neurologic: No weakness or tremors  Psychiatric: No recent changes in memory or mood  Hematologic/Lymphatic/Immunologic: No easy bruising or enlarged lymph nodes  Endocrine: No weight gain or loss      PHYSICAL EXAM:    There were no vitals taken for this visit.  General appearance: In NAD, conversant  HEENT: Normocephalic and atraumatic, anicteric sclera  Cardiovascular: Not examined  Respiratory: normal, non-labored breathing  Gastrointestinal: negative, Abdomen soft, non-tender, and non-distended.   Musculoskeletal: Not Examined  Peripheral Vascular/extremity: No peripheral edema  Skin: Normal temperature, turgor, and texture. No rash  Psychiatric: Appropriate affect, alert and oriented to person, place, and time    Penis: Normal  Scrotal skin: Normal, no lesions  Testicles: Normal to palpation bilaterally  Epididymis: Normal to palpation  bilaterally  Lymphatic: Normal inguinal lymph nodes    Digital Rectal Exam:  The prostate is moderately enlarged, benign and symmetric to palpation  Cystoscopy: Not done      PSA: 5.59    UA RESULTS:  Recent Labs   Lab Test 06/05/18  1331   COLOR Yellow   APPEARANCE Clear   URINEGLC Negative   URINEBILI Negative   URINEKETONE Negative   SG 1.015   UBLD Negative   URINEPH 6.5   PROTEIN Negative   UROBILINOGEN 0.2   NITRITE Negative   LEUKEST Negative   RBCU O - 2   WBCU 0 - 5       Bladder Scan: 25mL    Other Labs:      Imaging Studies: None      CLINICAL IMPRESSION:   Elevated PSA    PLAN:   His PSA has been rising continuously since 2006 and has now been in the abnormal range for nearly 2 years.  In light of this I recommended further evaluation for potential prostate cancer.  We discussed options of prostate biopsy and prostate MRI and I recommended that he begin his evaluation with an MRI of the prostate.  We discussed this procedure and he wishes to proceed.  I will contact him when the MRI results are available.  He understands that if there are concerning the MRI he will need to proceed with a prostate biopsy and we discussed this procedure today along with its risks, including bleeding and infection.      Agapito Villafana MD

## 2020-02-25 NOTE — NURSING NOTE
Chief Complaint   Patient presents with     Elevated PSA     Post Void Residual: 25    Jeanette López, EMT

## 2020-02-25 NOTE — LETTER
2/25/2020       RE: Agapito Fairbanks  [address] [address]     Dear Colleague,    Thank you for referring your patient, Agapito Fairbanks, to the [facility] [place] at [facility]. Please see a copy of my visit note below.    [facility]  Main Office: [address]  [address]  [place] [address]       CHIEF COMPLAINT:  Elevated PSA    HISTORY:   I was asked by Dr Love to see this 69 year old gentleman who presents with an elevated PSA.  His PSA has been rising continuously since 2006.  It first began elevated in 2018 when it was 4.59.  This year it is now 5.59.  He has no urinary symptoms or complaints.  He reports a normal urinary stream.  No nocturia.  No frequency or urgency.  He has no history of gross hematuria or infections.  He does have a family history of prostate cancer and that a brother was treated for prostate cancer.  He himself has a history of throat cancer and lung cancer, for which he was treated many years ago.      PAST MEDICAL HISTORY:   Past Medical History:   Diagnosis Date     Acute gastritis with hemorrhage 11/02     Basal cell carcinoma, leg      left pretibial area     CKD (chronic kidney disease) stage 3, GFR 30-59 ml/min (H)     creat baseline approx 1.4     Hearing loss      Helicobacter pylori (H. 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     Mild major depression (H)      Other and unspecified malignant neoplasm of skin of other and unspecified parts of face      Basal Cell CA nasal area 4/04, chest 3/05, right chest 10/09     Other testicular hypofunction      Squamous cell carcinoma  Jan 2012     RLE s/p excision     Tobacco  use disorder     quit      Unspecified cataract     left     Unspecified essential hypertension      Unspecified retinal detachment     Bilateral       PAST SURGICAL HISTORY:   Past Surgical History:   Procedure Laterality Date     C NONSPECIFIC PROCEDURE  ,     B retinal surg.     C NONSPECIFIC PROCEDURE      L cataract     C NONSPECIFIC PROCEDURE      right ankle fx repair     C NONSPECIFIC PROCEDURE  3/05    Right hemilaryngectomy (laser) for Squamous Cell CA T1N0     C NONSPECIFIC PROCEDURE  3/05, 10/09    Moh's procedure for Basal Cell CA chest     C NONSPECIFIC PROCEDURE      Moh's procedure for Basal Cell CA ear lobe     C NONSPECIFIC PROCEDURE  3/05,     right vocal cord squamous cell CA excision     C NONSPECIFIC PROCEDURE      Phacoemulsification of the lens with posterior chamber lens implant, right eye.      C NONSPECIFIC PROCEDURE  10/09     Moh's procedufe for BCC left pretibial area     C NONSPECIFIC PROCEDURE      Left thoracoscopic wedge resection, Open completion left upper lobe segmentectomy      THORACIC SURGERY      lung,throat       FAMILY HISTORY:   Family History   Problem Relation Age of Onset     Colon Cancer Mother          age 65, in      Family History Negative Father         mild memory problems,  age 87, in      Family History Negative Brother      Family History Negative Brother      Cancer Brother         hx prostate ca       SOCIAL HISTORY:   Social History     Tobacco Use     Smoking status: Former Smoker     Packs/day: 1.50     Years: 30.00     Pack years: 45.00     Last attempt to quit: 1998     Years since quittin.1     Smokeless tobacco: Never Used   Substance Use Topics     Alcohol use: No          Allergies   Allergen Reactions     Simvastatin      myalgias     Lisinopril Rash     rash         Current Outpatient Medications:      donepezil (ARICEPT) 5 MG tablet, Take 1 tablet (5 mg) by mouth At Bedtime, Disp: 90  tablet, Rfl: 3     fluticasone (FLONASE) 50 MCG/ACT nasal spray, Spray 1-2 sprays into both nostrils daily, Disp: 48 g, Rfl: 3     hydrochlorothiazide (HYDRODIURIL) 12.5 MG tablet, Take 1 tablet (12.5 mg) by mouth daily, Disp: 90 tablet, Rfl: 3     levothyroxine (SYNTHROID/LEVOTHROID) 112 MCG tablet, TAKE 1 TABLET DAILY, Disp: 90 tablet, Rfl: 3     levothyroxine (SYNTHROID/LEVOTHROID) 125 MCG tablet, Take 1 tablet (125 mcg) by mouth daily, Disp: 30 tablet, Rfl: 11     losartan (COZAAR) 100 MG tablet, TAKE 1 TABLET DAILY, Disp: 90 tablet, Rfl: 3     MULTI VITAMIN MENS OR, 1 TABLET DAILY, Disp: , Rfl:      rosuvastatin (CRESTOR) 10 MG tablet, TAKE 1 TABLET DAILY, Disp: 90 tablet, Rfl: 3     sertraline (ZOLOFT) 50 MG tablet, Take 1 tablet (50 mg) by mouth daily, Disp: 90 tablet, Rfl: 3     triamcinolone (KENALOG) 0.1 % external cream, APPLY  CREAM EXTERNALLY TO AFFECTED AREA THREE TIMES DAILY AS NEEDED SPARINGLY, Disp: 80 g, Rfl: 1    Review Of Systems:  Skin: No rash, pruritis, or skin pigmentation  Eyes: No changes in vision  Ears/Nose/Throat: No changes in hearing, no nosebleeds  Respiratory: No shortness of breath, dyspnea on exertion, cough, or hemoptysis  Cardiovascular: No chest pain or palpitations  Gastrointestinal: No diarrhea or constipation. No abdominal pain. No hematochezia  Genitourinary: see HPI  Musculoskeletal: No pain or swelling of joints, normal range of motion  Neurologic: No weakness or tremors  Psychiatric: No recent changes in memory or mood  Hematologic/Lymphatic/Immunologic: No easy bruising or enlarged lymph nodes  Endocrine: No weight gain or loss      PHYSICAL EXAM:    There were no vitals taken for this visit.  General appearance: In NAD, conversant  HEENT: Normocephalic and atraumatic, anicteric sclera  Cardiovascular: Not examined  Respiratory: normal, non-labored breathing  Gastrointestinal: negative, Abdomen soft, non-tender, and non-distended.   Musculoskeletal: Not  Examined  Peripheral Vascular/extremity: No peripheral edema  Skin: Normal temperature, turgor, and texture. No rash  Psychiatric: Appropriate affect, alert and oriented to person, place, and time    Penis: Normal  Scrotal skin: Normal, no lesions  Testicles: Normal to palpation bilaterally  Epididymis: Normal to palpation bilaterally  Lymphatic: Normal inguinal lymph nodes    Digital Rectal Exam:  The prostate is moderately enlarged, benign and symmetric to palpation  Cystoscopy: Not done      PSA: 5.59    UA RESULTS:  Recent Labs   Lab Test 06/05/18  1331   COLOR Yellow   APPEARANCE Clear   URINEGLC Negative   URINEBILI Negative   URINEKETONE Negative   SG 1.015   UBLD Negative   URINEPH 6.5   PROTEIN Negative   UROBILINOGEN 0.2   NITRITE Negative   LEUKEST Negative   RBCU O - 2   WBCU 0 - 5       Bladder Scan: 25mL    Other Labs:      Imaging Studies: None      CLINICAL IMPRESSION:   Elevated PSA    PLAN:   His PSA has been rising continuously since 2006 and has now been in the abnormal range for nearly 2 years.  In light of this I recommended further evaluation for potential prostate cancer.  We discussed options of prostate biopsy and prostate MRI and I recommended that he begin his evaluation with an MRI of the prostate.  We discussed this procedure and he wishes to proceed.  I will contact him when the MRI results are available.  He understands that if there are concerning the MRI he will need to proceed with a prostate biopsy and we discussed this procedure today along with its risks, including bleeding and infection.      Agapito Villafana MD

## 2020-03-09 NOTE — TELEPHONE ENCOUNTER
[facility] Call Center    Phone Message    May a detailed message be left on voicemail: yes     Reason for Call: Other: Agapito's wife Millicent calling to request if the MRI Agapito is having tomorrow (3/10/20) can be a larger scan of his back. Agapito has been having lower back pain and generally not feeling well, so they are wondering if the MRI that Agapito is getting would  if he has pneumonia. Reshma wasn't sure what to do or how to go about this, but she wanted to ask Dr. Villafana's team if this was a possibility. Please give Millicent a call back at your earliest convenience.     Action Taken: Message routed to:  Other: [facility]    Travel Screening: Not Applicable

## 2020-03-09 NOTE — TELEPHONE ENCOUNTER
Spoke to wife  Instructed her to have him call primary for ? Chest xray  And office visit  If he feels he has pneumonia    The MRI of prostate would not be diagnostic for this..Mecca Swan LPN

## 2020-03-10 NOTE — DISCHARGE INSTRUCTIONS
MRI Contrast Discharge Instructions    The IV contrast you received today will pass out of your body in your  urine. This will happen in the next 24 hours. You will not feel this process.  Your urine will not change color.    Drink at least 4 extra glasses of water or juice today (unless your doctor  has restricted your fluids). This reduces the stress on your kidneys.  You may take your regular medicines.    If you are on dialysis: It is best to have dialysis today.    If you have a reaction: Most reactions happen right away. If you have  any new symptoms after leaving the hospital (such as hives or swelling),  call your hospital at the correct number below. Or call your family doctor.  If you have breathing distress or wheezing, call 911.    Special instructions: ***    I have read and understand the above information.    Signature:______________________________________ Date:___________    Staff:__________________________________________ Date:___________     Time:__________    [place] [facility] Departments:    ___[place]: [phone]  ___[facility]: [phone]  ___[place]: [phone] ___[facility]: [phone]  ___[facility]: [phone]  ___[facility]: [phone]  ___[phone]  ___[facility]: [phone]  ___[phone]

## 2020-03-11 NOTE — PROGRESS NOTES
Sleep Consultation:    Date on this visit: 3/11/2020    Agapito Fairbanks is sent by [facility] for a sleep consultation regarding snoring.    Primary Physician: [facility]     Agapito Fairbanks reports snoring and fatigue for a couple of years.  His medical history is significant for HTN, CKD st 3, hypothyroidism, depression, memory loss/mild cognitive impairment, history of laryngeal ([id number]) and lung cancer (s/p lobectomy). He presents with his wife.    Dr. Still, his neurologist, advised him to be evaluated for apnea to see if that is contributing to his cognitive impairment.     Agapito goes to sleep at 11:30 PM to midnight during the week. He wakes up at 10:00 AM without an alarm. He was getting up around 7-8 AM for work on some days, but has not been working lately. He falls asleep in 20 minutes.  Agapito denies difficulty falling asleep.  He wakes up 1-2 times a night for 10 minutes before falling back to sleep.  Agapito wakes up to go to the bathroom.  On weekends, his sleep schedule is the same.  Patient gets an average of 9 hours of sleep per night. He used to drink late into the night and would go to bed at 1-2 AM. That has moved earlier over the last couple of years.    Patient does not use electronics in bed, watch TV in bed, worry in bed about anything and read in bed.     Agapito works part-time as a . That company has not been busy, so he has not been working lately. He lives with his wife and a dog.      Agapito does snore regularly in the morning and snoring is loud. Patient does have a regular bed partner. His wife says it sounds like he is gagging at night.  He does not have witnessed apneas. They occasionally sleep separately due to his snoring.  Patient sleeps on his side. He has occasional morning dry mouth (due to salivary glands and restless legs (sporadic, rarely interferes with sleep, wife observes a lot of twitching/kicking in his sleep). He denies snort arousals, morning  headaches and morning confusion. Agapito denies any bruxism, sleep walking, sleep talking, sleep paralysis, cataplexy and hypnogogic/hypnopompic hallucinations. He had one episode of dream enactment. He kicked his wife while dreaming someone was picking on him. His sense of smell is normal.    Agapito has reflux and heartburn, denies difficulty breathing through his nose, claustrophobia and depression.      Agapito has kept a stable weight in the last few years.  Patient describes themself as a night person.  He would prefer to go to sleep at 12:00 AM and wake up at 10:00 AM.  Patient's [place] Sleepiness score 2/24 inconsistent with daytime sleepiness. He does not think fatigue is a significant issue. Last fall, he was complaining about that to his primary more often. He thinks some of that may be related to inactivity in the winter.     Agapito denies taking naps. He takes rare inadvertant naps.  He denies dozing while driving.  Patient was counseled on the importance of driving while alert, to pull over if drowsy, or nap before getting into the vehicle if sleepy.  He uses 2 cups/day of coffee, 2 sodas/day. Last caffeine intake is usually before 8-9 PM.    Allergies:    Allergies   Allergen Reactions     Simvastatin      myalgias     Lisinopril Rash     rash       Medications:    Current Outpatient Medications   Medication Sig Dispense Refill     donepezil (ARICEPT) 5 MG tablet Take 1 tablet (5 mg) by mouth At Bedtime 90 tablet 3     fluticasone (FLONASE) 50 MCG/ACT nasal spray Spray 1-2 sprays into both nostrils daily 48 g 3     hydrochlorothiazide (HYDRODIURIL) 12.5 MG tablet Take 1 tablet (12.5 mg) by mouth daily 90 tablet 3     levothyroxine (SYNTHROID/LEVOTHROID) 112 MCG tablet TAKE 1 TABLET DAILY 90 tablet 3     levothyroxine (SYNTHROID/LEVOTHROID) 125 MCG tablet Take 1 tablet (125 mcg) by mouth daily 30 tablet 11     losartan (COZAAR) 100 MG tablet TAKE 1 TABLET DAILY 90 tablet 3     MULTI VITAMIN MENS OR 1  TABLET DAILY       rosuvastatin (CRESTOR) 10 MG tablet TAKE 1 TABLET DAILY 90 tablet 3     sertraline (ZOLOFT) 50 MG tablet Take 1 tablet (50 mg) by mouth daily 90 tablet 3     triamcinolone (KENALOG) 0.1 % external cream APPLY  CREAM EXTERNALLY TO AFFECTED AREA THREE TIMES DAILY AS NEEDED SPARINGLY 80 g 1       Problem List:  Patient Active Problem List    Diagnosis Date Noted     Elevated prostate specific antigen (PSA) 02/09/2020     Priority: Medium     Snoring 02/09/2020     Priority: Medium     Dysfunction of right eustachian tube 02/09/2020     Priority: Medium     Mild major depression (H) 02/09/2020     Priority: Medium     Memory loss 04/10/2019     Priority: Medium     History of lung cancer 01/29/2012     Priority: Medium     History of laryngeal cancer 01/29/2012     Priority: Medium     Advanced directives, counseling/discussion 05/13/2011     Priority: Medium     Advance Care Planning 1/20/2017: ACP Review of Chart / Resources Provided:  Reviewed chart for advance care plan.  Agapito Fairbanks has an up to date advance care plan on file.  Added by Mecca Sharma        Advance Directive Problem List Overview:   Name Relationship Phone    Primary Health Care Agent            Alternative Health Care Agent          Patient states has Advance Directive and will bring in a copy to clinic. 5/13/2011          Hypothyroidism      Priority: Medium     HYPERLIPIDEMIA LDL GOAL <100 10/31/2010     Priority: Medium     CKD (chronic kidney disease) stage 3, GFR 30-59 ml/min (H)      Priority: Medium     creat baseline approx 1.4       ESOPHAGEAL REFLUX 08/10/2007     Priority: Medium     Talipes cavus 11/03/2006     Priority: Medium     Overview:   metatarsalagia       Essential hypertension 09/22/2004     Priority: Medium     Problem list name updated by automated process. Provider to review       Impotence of organic origin 04/04/2003     Priority: Medium     Testicular hypofunction 12/23/2002     Priority:  Medium     Problem list name updated by automated process. Provider to review          Past Medical/Surgical History:  Past Medical History:   Diagnosis Date     Acute gastritis with hemorrhage 11/02     Basal cell carcinoma, leg      left pretibial area     CKD (chronic kidney disease) stage 3, GFR 30-59 ml/min (H)     creat baseline approx 1.4     Hearing loss      Helicobacter pylori (H. 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     Mild major depression (H)      Other and unspecified malignant neoplasm of skin of other and unspecified parts of face      Basal Cell CA nasal area 4/04, chest 3/05, right chest 10/09     Other testicular hypofunction      Squamous cell carcinoma  Jan 2012     RLE s/p excision     Tobacco use disorder     quit 1998     Unspecified cataract     left     Unspecified essential hypertension      Unspecified retinal detachment     Bilateral     Past Surgical History:   Procedure Laterality Date     C NONSPECIFIC PROCEDURE  5/01, 9/01    B retinal surg.     C NONSPECIFIC PROCEDURE  12/01    L cataract     C NONSPECIFIC PROCEDURE  1983    right ankle fx repair     C NONSPECIFIC PROCEDURE  3/05    Right hemilaryngectomy (laser) for Squamous Cell CA T1N0     C NONSPECIFIC PROCEDURE  3/05, 10/09    Moh's procedure for Basal Cell CA chest     C NONSPECIFIC PROCEDURE  4/04    Moh's procedure for Basal Cell CA ear lobe     C NONSPECIFIC PROCEDURE  3/05, 12/05    right vocal cord squamous cell CA excision     C NONSPECIFIC PROCEDURE  12/07    Phacoemulsification of the lens with posterior chamber lens implant, right eye.      C NONSPECIFIC PROCEDURE  10/09     Moh's procedufe for BCC left pretibial area     C NONSPECIFIC PROCEDURE  12/09    Left  thoracoscopic wedge resection, Open completion left upper lobe segmentectomy      THORACIC SURGERY      lung,throat       Social History:  Social History     Socioeconomic History     Marital status:      Spouse name: Not on file     Number of children: Not on file     Years of education: Not on file     Highest education level: Not on file   Occupational History     Not on file   Social Needs     Financial resource strain: Not on file     Food insecurity     Worry: Not on file     Inability: Not on file     Transportation needs     Medical: Not on file     Non-medical: Not on file   Tobacco Use     Smoking status: Former Smoker     Packs/day: 1.50     Years: 30.00     Pack years: 45.00     Last attempt to quit: 1998     Years since quittin.2     Smokeless tobacco: Never Used   Substance and Sexual Activity     Alcohol use: No     Drug use: No     Sexual activity: Yes     Partners: Female     Comment: vidya with TL   Lifestyle     Physical activity     Days per week: Not on file     Minutes per session: Not on file     Stress: Not on file   Relationships     Social connections     Talks on phone: Not on file     Gets together: Not on file     Attends Christianity service: Not on file     Active member of club or organization: Not on file     Attends meetings of clubs or organizations: Not on file     Relationship status: Not on file     Intimate partner violence     Fear of current or ex partner: Not on file     Emotionally abused: Not on file     Physically abused: Not on file     Forced sexual activity: Not on file   Other Topics Concern     Parent/sibling w/ CABG, MI or angioplasty before 65F 55M? Not Asked   Social History Narrative    High school education.    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 [employer]    Working part-time as of 2019    Met wife in      Seldom drinks more than 1 drink every now and then    1 daughter in [facility]       Family History:  Family History   Problem Relation Age of Onset     Colon Cancer Mother          age 65, in      Family History Negative Father         mild memory problems,  age 87, in      Family History Negative Brother      Family History Negative Brother      Cancer Brother         hx prostate ca       Review of Systems:  A complete review of systems reviewed by me is negative with the exeption of what has been mentioned in the history of present illness.  CONSTITUTIONAL: NEGATIVE for weight gain/loss, fever, chills, sweats or night sweats, drug allergies.  EYES: NEGATIVE for changes in vision, blind spots, double vision.  ENT: NEGATIVE for ear pain, sore throat, sinus pain, post-nasal drip, runny nose, bloody nose  CARDIAC: NEGATIVE for fast heartbeats or fluttering in chest, chest pain or pressure, breathlessness when lying flat.  CARDIAC:  POSITIVE for  swollen legs, swollen feet and HTN  NEUROLOGIC: NEGATIVE headaches, weakness or numbness in the arms or legs.  NEUROLOGIC:  POSITIVE for  Foot neuropathy  DERMATOLOGIC: POSITIVE for rashes, new moles or change in mole(s)  PULMONARY: NEGATIVE SOB at rest, SOB with activity, productive cough, coughing up blood, wheezing or whistling when breathing.    PULMONARY:  POSITIVE for  dry cough  GASTROINTESTINAL: NEGATIVE for nausea or vomitting, loose or watery stools, fat or grease in stools, constipation, abdominal pain, bowel movements black in color or blood noted.  GENITOURINARY: NEGATIVE for pain during urination, blood in urine, irregular menstrual periods.  GENITOURINARY:  POSITIVE for  urinating more frequently than usual  MUSCULOSKELETAL: NEGATIVE for muscle pain, bone or joint pain.  MUSCULOSKELETAL:  POSITIVE for  swollen joints  ENDOCRINE: NEGATIVE for increased thirst or urination, diabetes.  LYMPHATIC: NEGATIVE for swollen lymph nodes, lumps or  bumps in the breasts or nipple discharge.    Physical Examination:  Vitals: BP (!) 140/91   Pulse 68   Resp 16   Ht 1.829 m (6')   Wt 83 kg (183 lb)   SpO2 97%   BMI 24.82 kg/m      Neck Cir (cm): 38 cm    [place] Total Score 3/11/2020   Total score - [place] 2       ELIEZER Total Score: 3 (03/11/20 0902)    GENERAL APPEARANCE: healthy, alert, no distress and cooperative  EYES: Eyes grossly normal to inspection, PERRL, conjunctivae and sclerae normal and lids and lashes normal  HENT: nose and mouth without ulcers or lesions, soft palate dependent and oral mucous membranes moist  NECK: no adenopathy, no asymmetry, masses. Scar tissue from radiation on neck. Tthyroid normal to palpation and trachea midline and normal to palpation  RESP: lungs clear to auscultation - no rales, rhonchi or wheezes  CV: regular rates and rhythm, normal S1 S2, no S3 or S4, no murmur, click or rub and no irregular beats  LYMPHATICS: no cervical adenopathy  MS: extremities normal- no gross deformities noted  NEURO: Normal strength and tone, mentation intact, speech normal and cranial nerves 2-12 intact  Mallampati Class: III.  Tonsillar Stage: 1  hidden by pillars.    Impression/Plan:    (G31.84) Mild cognitive impairment  (primary encounter diagnosis), (R06.83) Snoring, (I10) Essential hypertension  Comment: Mr. Fairbanks presents for evaluation of sleep apnea. He has been evaluated for mild cognitive impairment over the last couple of years. His neurologist recommended he be evaluated for apnea. He does snore regularly and loudly. It is observed most when his wife wakes for work. She says it sounds like he is gagging, but she denies dillon pauses in breathing. He denies daytime sleepiness, but had been complaining of fatigue to his primary over the last year or two. He attributes that to seasonal inactivity. He does spend 10 hours in bed and seems to sleep reasonably well through that time. He has HTN. His STOP BANG score is 4: snoring,  HTN, age >50 (69), male. He has one brother with ANCA and his brother is not overweight. His BMI is normal at 24 and neck is <40 cm (38 cm). He had one episode of dream enactment, kicking his wife while dreaming someone was picking on him. He denies anosmia.  Plan: Comprehensive Sleep Study        Split night PSG with CPAP/BiPAP titration if indicated. 4-limb protocol to evaluate muscle tone in REM.    (G25.81) Restless legs syndrome (RLS)  Comment: occasional nights with urge to move legs or kick inadvertently. His wife observe twitching/kicking in his sleep.   Plan: We will observe for PLMs on the sleep study.      Literature provided regarding sleep apnea.      He will follow up with me in approximately two weeks after his sleep study has been competed to review the results and discuss plan of care.       Polysomnography reviewed.  Obstructive sleep apnea reviewed.  Complications of untreated sleep apnea were reviewed.    Bennett Goltz, PA-C    CC: MD Josep Cowart MD

## 2020-03-11 NOTE — PATIENT INSTRUCTIONS
"MY TREATMENT INFORMATION FOR SLEEP APNEA-  Agapito Fairbanks    DOCTOR : Bennett Ezra Goltz, PA-C  SLEEP CENTER : [facility]     MY CONTACT NUMBER: [phone]    Am I having a sleep study at a sleep center?  Make sure you have an appointment for the study before you leave!    Am I having a home sleep study?  Watch this video:  [email or web address]/[email or web address]?v=[email or web address]  Please verify your insurance coverage with your insurance carrier    Frequently asked questions:  1. What is Obstructive Sleep Apnea (ANCA)? ANCA is the most common type of sleep apnea. Apnea means, \"without breath.\"  Apnea is most often caused by narrowing or collapse of the upper airway as muscles relax during sleep.   Almost everyone has occasional apneas. Most people with sleep apnea have had brief interruptions at night frequently for many years.  The severity of sleep apnea is related to how frequent and severe the events are.   2. What are the consequences of ANCA? Symptoms include: feeling sleepy during the day, snoring loudly, gasping or stopping of breathing, trouble sleeping, and occasionally morning headaches or heartburn at night.  Sleepiness can be serious and even increase the risk of falling asleep while driving. Other health consequences may include development of high blood pressure and other cardiovascular disease in persons who are susceptible. Untreated ANCA  can contribute to heart disease, stroke and diabetes.   3. What are the treatment options? In most situations, sleep apnea is a lifelong disease that must be managed with daily therapy. Medications are not effective for sleep apnea and surgery is generally not considered until other therapies have been tried. Your treatment is your choice . Continuous Positive Airway (CPAP) works right away and is the therapy that is effective in nearly everyone. An oral device to hold your jaw forward is usually the next most reliable option. Other options include postioning devices (to keep you " off your back), weight loss, and surgery including a tongue pacing device. There is more detail about some of these options below.    Important tips for using CPAP and similar devices   Know your equipment:  CPAP is continuous positive airway pressure that prevents obstructive sleep apnea by keeping the throat from collapsing while you are sleeping. In most cases, the device is  smart  and can slowly self-adjusts if your throat collapses and keeps a record every day of how well you are treated-this information is available to you and your care team.  BPAP is bilevel positive airway pressure that keeps your throat open and also assists each breath with a pressure boost to maintain adequate breathing.  Special kinds of BPAP are used in patients who have inadequate breathing from lung or heart disease. In most cases, the device is  smart  and can slowly self-adjusts to assist breathing. Like CPAP, the device keeps a record of how well you are treated.  Your mask is your connection to the device. You get to choose what feels most comfortable and the staff will help to make sure if fits. Here: are some examples of the different masks that are available:       Key points to remember on your journey with sleep apnea:  1. Sleep study.  PAP devices often need to be adjusted during a sleep study to show that they are effective and adjusted right.  2. Good tips to remember: Try wearing just the mask during a quiet time during the day so your body adapts to wearing it. A humidifier is recommended for comfort in most cases to prevent drying of your nose and throat. Allergy medication from your provider may help you if you are having nasal congestion.  3. Getting settled-in. It takes more than one night for most of us to get used to wearing a mask. Try wearing just the mask during a quiet time during the day so your body adapts to wearing it. A humidifier is recommended for comfort in most cases. Our team will work with you  carefully on the first day and will be in contact within 4 days and again at 2 and 4 weeks for advice and remote device adjustments. Your therapy is evaluated by the device each day.   4. Use it every night. The more you are able to sleep naturally for 7-8 hours, the more likely you will have good sleep and to prevent health risks or symptoms from sleep apnea. Even if you use it 4 hours it helps. Occasionally all of us are unable to use a medical therapy, in sleep apnea, it is not dangerous to miss one night.   5. Communicate. Call our skilled team on the number provided on the first day if your visit for problems that make it difficult to wear the device. Over 2 out of 3 patients can learn to wear the device long-term with help from our team. Remember to call our team or your sleep providers if you are unable to wear the device as we may have other solutions for those who cannot adapt to mask CPAP therapy. It is recommended that you sleep your sleep provider within the first 3 months and yearly after that if you are not having problems.   6. Use it for your health. We encourage use of CPAP masks during daytime quiet periods to allow your face and brain to adapt to the sensation of CPAP so that it will be a more natural sensation to awaken to at night or during naps. This can be very useful during the first few weeks or months of adapting to CPAP though it does not help medically to wear CPAP during wakefulness and  should not be used as a strategy just to meet guidelines.  7. Take care of your equipment. Make sure you clean your mask and tubing using directions every day and that your filter and mask are replaced as recommended or if they are not working.     BESIDES CPAP, WHAT OTHER THERAPIES ARE THERE?    Positioning Device  Positioning devices are generally used when sleep apnea is mild and only occurs on your back.This example shows a pillow that straps around the waist. It may be appropriate for those whose  sleep study shows milder sleep apnea that occurs primarily when lying flat on one's back. Preliminary studies have shown benefit but effectiveness at home may need to be verified by a home sleep test. These devices are generally not covered by medical insurance.  Examples of devices that maintain sleeping on the back to prevent snoring and mild sleep apnea.    Belt type body positioner  Http://"[employer]Selero/    Electronic reminder  Http://nightshifttherapy.com/  Http://www.Problemcity.com.Selero.au/      Oral Appliance  What is oral appliance therapy?  An oral appliance device fits on your teeth at night like a retainer used after having braces. The device is made by a specialized dentist and requires several visits over 1-2 months before a manufactured device is made to fit your teeth and is adjusted to prevent your sleep apnea. Once an oral device is working properly, snoring should be improved. A home sleep test may be recommended at that time if to determine whether the sleep apnea is adequately treated.       Some things to remember:  -Oral devices are often, but not always, covered by your medical insurance. Be sure to check with your insurance provider.   -If you are referred for oral therapy, you will be given a list of specialized dentists to consider or you may choose to visit the Web site of the American Academy of Dental Sleep Medicine  -Oral devices are less likely to work if you have severe sleep apnea or are extremely overweight.     More detailed information  An oral appliance is a small acrylic device that fits over the upper and lower teeth  (similar to a retainer or a mouth guard). This device slightly moves jaw forward, which moves the base of the tongue forward, opens the airway, improves breathing for effective treat snoring and obstructive sleep apnea in perhaps 7 out of 10 people .  The best working devices are custom-made by a dental device  after a mold is made of the teeth 1, 2, 3.  When  is an oral appliance indicated?  Oral appliance therapy is recommended as a first-line treatment for patients with primary snoring, mild sleep apnea, and for patients with moderate sleep apnea who prefer appliance therapy to use of CPAP4, 5. Severity of sleep apnea is determined by sleep testing and is based on the number of respiratory events per hour of sleep.   How successful is oral appliance therapy?  The success rate of oral appliance therapy in patients with mild sleep apnea is 75-80% while in patients with moderate sleep apnea it is 50-70%. The chance of success in patients with severe sleep apnea is 40-50%. The research also shows that oral appliances have a beneficial effect on the cardiovascular health of ANCA patients at the same magnitude as CPAP therapy7.  Oral appliances should be a second-line treatment in cases of severe sleep apnea, but if not completely successful then a combination therapy utilizing CPAP plus oral appliance therapy may be effective. Oral appliances tend to be effective in a broad range of patients although studies show that the patients who have the highest success are females, younger patients, those with milder disease, and less severe obesity. 3, 6.   Finding a dentist that practices dental sleep medicine  Specific training is available through the American Academy of Dental Sleep Medicine for dentists interested in working in the field of sleep. To find a dentist who is educated in the field of sleep and the use of oral appliances, near you, visit the Web site of the American Academy of Dental Sleep Medicine.    References  1. Skyler et al. Objectively measured vs self-reported compliance during oral appliance therapy for sleep-disordered breathing. Chest 2013; 144(5): 1643-1451.  2. Jasno et al. Objective measurement of compliance during oral appliance therapy for sleep-disordered breathing. Thorax 2013; 68(1): 91-96.  3. Corina et al. Mandibular advancement  devices in 620 men and women with ANCA and snoring: tolerability and predictors of treatment success. Chest 2004; 125: 6584-7272.  4. Maira et al. Oral appliances for snoring and ANCA: a review. Sleep 2006; 29: 244-262.  5. Kathya et al. Oral appliance treatment for ANCA: an update. J Clin Sleep Med 2014; 10(2): 215-227.  6. Kathya et al. Predictors of OSAH treatment outcome. J Dent Res 2007; 86: 5089-4812.  Weight Loss:    Weight loss is a long-term strategy that may improve sleep apnea in some patients.    Weight management is a personal decision and the decision should be based on your interest and the potential benefits.  If you are interested in exploring weight loss strategies, the following discussion covers the impact on weight loss on sleep apnea and the approaches that may be successful.    Being overweight does not necessarily mean you will have health consequences.  Those who have BMI over 35 or over 27 with existing medical conditions carries greater risk.   Weight loss decreases severity of sleep apnea in most people with obesity. For those with mild obesity who have developed snoring with weight gain, even 15-30 pound weight loss can improve and occasionally eliminate sleep apnea.  Structured and life-long dietary and health habits are necessary to lose weight and keep healthier weight levels.     Though there may be significant health benefits from weight loss, long-term weight loss is very difficult to achieve- studies show success with dietary management in less than 10% of people. In addition, substantial weight loss may require years of dietary control and may be difficult if patients have severe obesity. In these cases, surgical management may be considered.  Finally, older individuals who have tolerated obesity without health complications may be less likely to benefit from weight loss strategies.        Your BMI is Body mass index is 24.82 kg/m .  Weight management is a personal  decision.  If you are interested in exploring weight loss strategies, the following discussion covers the approaches that may be successful. Body mass index (BMI) is one way to tell whether you are at a healthy weight, overweight, or obese. It measures your weight in relation to your height.  A BMI of 18.5 to 24.9 is in the healthy range. A person with a BMI of 25 to 29.9 is considered overweight, and someone with a BMI of 30 or greater is considered obese. More than two-thirds of American adults are considered overweight or obese.  Being overweight or obese increases the risk for further weight gain. Excess weight may lead to heart disease and diabetes.  Creating and following plans for healthy eating and physical activity may help you improve your health.  Weight control is part of healthy lifestyle and includes exercise, emotional health, and healthy eating habits. Careful eating habits lifelong are the mainstay of weight control. Though there are significant health benefits from weight loss, long-term weight loss with diet alone may be very difficult to achieve- studies show long-term success with dietary management in less than 10% of people. Attaining a healthy weight may be especially difficult to achieve in those with severe obesity. In some cases, medications, devices and surgical management might be considered.  What can you do?  If you are overweight or obese and are interested in methods for weight loss, you should discuss this with your provider.     Consider reducing daily calorie intake by 500 calories.     Keep a food journal.     Avoiding skipping meals, consider cutting portions instead.    Diet combined with exercise helps maintain muscle while optimizing fat loss. Strength training is particularly important for building and maintaining muscle mass. Exercise helps reduce stress, increase energy, and improves fitness. Increasing exercise without diet control, however, may not burn enough calories  to loose weight.       Start walking three days a week 10-20 minutes at a time    Work towards walking thirty minutes five days a week     Eventually, increase the speed of your walking for 1-2 minutes at time    In addition, we recommend that you review healthy lifestyles and methods for weight loss available through the National Institutes of Health patient information sites:  http://win.niddk.nih.gov/publications/index.htm    And look into health and wellness programs that may be available through your health insurance provider, employer, local community center, or tra club.    Surgery:  Surgery for obstructive sleep apnea is considered generally only when other therapies fail to work. Surgery may be discussed with you if you are having a difficult time tolerating CPAP and or when there is an abnormal structure that requires surgical correction.  Nose and throat surgeries often enlarge the airway to prevent collapse.  Most of these surgeries create pain for 1-2 weeks and up to half of the most common surgeries are not effective throughout life.  You should carefully discuss the benefits and drawbacks to surgery with your sleep provider and surgeon to determine if it is the best solution for you.   More information  Surgery for ANCA is directed at areas that are responsible for narrowing or complete obstruction of the airway during sleep.  There are a wide range of procedures available to enlarge and/or stabilize the airway to prevent blockage of breathing in the three major areas where it can occur: the palate, tongue, and nasal regions.  Successful surgical treatment depends on the accurate identification of the factors responsible for obstructive sleep apnea in each person.  A personalized approach is required because there is no single treatment that works well for everyone.  Because of anatomic variation, consultation with an examination by a sleep surgeon is a critical first step in determining what  surgical options are best for each patient.  In some cases, examination during sedation may be recommended in order to guide the selection of procedures.  Patients will be counseled about risks and benefits as well as the typical recovery course after surgery. Surgery is typically not a cure for a person s ANCA.  However, surgery will often significantly improve one s ANCA severity (termed  success rate ).  Even in the absence of a cure, surgery will decrease the cardiovascular risk associated with OSA7; improve overall quality of life8 (sleepiness, functionality, sleep quality, etc).  Palate Procedures:  Patients with ANCA often have narrowing of their airway in the region of their tonsils and uvula.  The goals of palate procedures are to widen the airway in this region as well as to help the tissues resist collapse.  Modern palate procedure techniques focus on tissue conservation and soft tissue rearrangement, rather than tissue removal.  Often the uvula is preserved in this procedure. Residual sleep apnea is common in patient after pharyngoplasty with an average reduction in sleep apnea events of 33%2.    Tongue Procedures:  ExamWhile patients are awake, the muscles that surround the throat are active and keep this region open for breathing. These muscles relax during sleep, allowing the tongue and other structures to collapse and block breathing.  There are several different tongue procedures available.  Selection of a tongue base procedure depends on characteristics seen on physical exam.  Generally, procedures are aimed at removing bulky tissues in this area or preventing the back of the tongue from falling back during sleep.  Success rates for tongue surgery range from 50-62%3.  Hypoglossal Nerve Stimulation:  Hypoglossal nerve stimulation has recently received approval from the [place] Food and Drug Administration for the treatment of obstructive sleep apnea.  This is based on research showing that the  system was safe and effective in treating sleep apnea6.  Results showed that the median AHI score decreased 68%, from 29.3 to 9.0. This therapy uses an implant system that senses breathing patterns and delivers mild stimulation to airway muscles, which keeps the airway open during sleep.  The system consists of three fully implanted components: a small generator (similar in size to a pacemaker), a breathing sensor, and a stimulation lead.  Using a small handheld remote, a patient turns the therapy on before bed and off upon awakening.    Candidates for this device must be greater than 22 years of age, have moderate to severe ANCA (AHI between 20-65), BMI less than 32, have tried CPAP/oral appliance without success, and have appropriate upper airway anatomy (determined by a sleep endoscopy performed by Dr. Salmon).  Hypoglossal Nerve Stimulation Pathway:    The sleep surgeon s office will work with the patient through the insurance prior-authorization process (including communications and appeals).    Nasal Procedures:  Nasal obstruction can interfere with nasal breathing during the day and night.  Studies have shown that relief of nasal obstruction can improve the ability of some patients to tolerate positive airway pressure therapy for obstructive sleep apnea1.  Treatment options include medications such as nasal saline, topical corticosteroid and antihistamine sprays, and oral medications such as antihistamines or decongestants. Non-surgical treatments can include external nasal dilators for selected patients. If these are not successful by themselves, surgery can improve the nasal airway either alone or in combination with these other options.  Combination Procedures:  Combination of surgical procedures and other treatments may be recommended, particularly if patients have more than one area of narrowing or persistent positional disease.  The success rate of combination surgery ranges from 66-80%2,3.     References  1. Tejinder OAKES. The Role of the Nose in Snoring and Obstructive Sleep Apnoea: An Update.  Eur Arch Otorhinolaryngol. 2011; 268: 1365-73.  2.  Soraya SM; Babatunde JA; Ave JR; Pallanch JF; Moisés MB; Dara SG; Abe FLYNN. Surgical modifications of the upper airway for obstructive sleep apnea in adults: a systematic review and meta-analysis. SLEEP 2010;33(10):1062-9514. Buck ZHOU. Hypopharyngeal surgery in obstructive sleep apnea: an evidence-based medicine review.  Arch Otolaryngol Head Neck Surg. 2006 Feb;132(2):206-13.  3. Jose Luis YH1, Analy Y, Guido RENETTA. The efficacy of anatomically based multilevel surgery for obstructive sleep apnea. Otolaryngol Head Neck Surg. 2003 Oct;129(4):327-35.  4. Buck ZHOU, Goldberg A. Hypopharyngeal Surgery in Obstructive Sleep Apnea: An Evidence-Based Medicine Review. Arch Otolaryngol Head Neck Surg. 2006 Feb;132(2):206-13.  5. Edgar PJ et al. Upper-Airway Stimulation for Obstructive Sleep Apnea.  N Engl J Med. 2014 Jan 9;370(2):139-49.  6. Cyrus Y et al. Increased Incidence of Cardiovascular Disease in Middle-aged Men with Obstructive Sleep Apnea. Am J Respir Crit Care Med; 2002 166: 159-165  7. Aurelia EM et al. Studying Life Effects and Effectiveness of Palatopharyngoplasty (SLEEP) study: Subjective Outcomes of Isolated Uvulopalatopharyngoplasty. Otolaryngol Head Neck Surg. 2011; 144: 623-631.

## 2020-03-11 NOTE — PROGRESS NOTES
"  Sleep Consultation:    Date on this visit: 3/11/2020    Agapito Fairbanks is sent by Los Love for a sleep consultation regarding snoring.    Primary Physician: Los Love     Agapito Fairbanks reports snoring and fatigue for ***.  His medical history is significant for HTN, CKD st 3, hypothyroidism, depression, memory loss/mild cognitive impairment, history of laryngeal and lung cancer (s/p lobectomy).     Agapito goes to sleep at 11:30 PM during the week. He wakes up at 10:00 AM {WITH/WITHOUT:623705::\"with\"} an alarm. He falls asleep in 20 minutes.  Agapito {HAS/DENIES/NA:608320} difficulty falling asleep.  He wakes up 2 times a night for 10 minutes before falling back to sleep.  Agapito wakes up to go to the bathroom.  On weekends, his sleep schedule is the same.  Patient gets an average of 9 hours of sleep per night.     Patient does not use electronics in bed, watch TV in bed, worry in bed about {WORRY:051322} and read in bed.     Agapito works part-time as a . He lives with his wife and a dog.      Agapito does snore some nights (about half of nights) and snoring is {SNORE SOUND:065520}. Patient {DOES/NOT:820578} have a regular bed partner. There {IS/IS NOT:9024} report of {SLEEP BEHAVIOR:155411}.  He {DOES/DOES NOT:548182::\"does not\"} have witnessed apneas. They {OCCASIONALLY:907480} sleep separately.  Patient sleeps on his side. He {HAS/DENIES SLEEP DISTURBANCE :825330}. Agapito {Parasomnia:879330}.    Agapito {HAS/DENIES ROS:213297}.      Agapito has gained {NUMBERS 0-5,5-10,10-15:267359} pounds {TIMEFRAME:662910}.  Patient describes themself as {MORNING/NIGHT:964090} person.  He would prefer to go to sleep at {TIMES OF DAY:472121} {AM/PM:448253} and wake up at {TIMES OF DAY:974977} {AM/PM:378000}.  Patient's Lockhart Sleepiness score 2 inconsistent with daytime sleepiness.      Agapito denies taking naps. He takes {NAPS:286293} inadvertant naps.  He {ADMIT/DENY:897989} {DOZIN} " {DRIVIN}. The most recent episode was {NUMBERS 0-12:092361} {DAY:1009} ago.  Patient was counseled on the importance of driving while alert, to pull over if drowsy, or nap before getting into the vehicle if sleepy.  He uses {CAFFEINE INTAKE:386854}. Last caffeine intake is usually before ***.    Allergies:    Allergies   Allergen Reactions     Simvastatin      myalgias     Lisinopril Rash     rash       Medications:    Current Outpatient Medications   Medication Sig Dispense Refill     donepezil (ARICEPT) 5 MG tablet Take 1 tablet (5 mg) by mouth At Bedtime 90 tablet 3     fluticasone (FLONASE) 50 MCG/ACT nasal spray Spray 1-2 sprays into both nostrils daily 48 g 3     hydrochlorothiazide (HYDRODIURIL) 12.5 MG tablet Take 1 tablet (12.5 mg) by mouth daily 90 tablet 3     levothyroxine (SYNTHROID/LEVOTHROID) 112 MCG tablet TAKE 1 TABLET DAILY 90 tablet 3     levothyroxine (SYNTHROID/LEVOTHROID) 125 MCG tablet Take 1 tablet (125 mcg) by mouth daily 30 tablet 11     losartan (COZAAR) 100 MG tablet TAKE 1 TABLET DAILY 90 tablet 3     MULTI VITAMIN MENS OR 1 TABLET DAILY       rosuvastatin (CRESTOR) 10 MG tablet TAKE 1 TABLET DAILY 90 tablet 3     sertraline (ZOLOFT) 50 MG tablet Take 1 tablet (50 mg) by mouth daily 90 tablet 3     triamcinolone (KENALOG) 0.1 % external cream APPLY  CREAM EXTERNALLY TO AFFECTED AREA THREE TIMES DAILY AS NEEDED SPARINGLY 80 g 1       Problem List:  Patient Active Problem List    Diagnosis Date Noted     Elevated prostate specific antigen (PSA) 2020     Priority: Medium     Snoring 2020     Priority: Medium     Dysfunction of right eustachian tube 2020     Priority: Medium     Mild major depression (H) 2020     Priority: Medium     Memory loss 04/10/2019     Priority: Medium     History of lung cancer 2012     Priority: Medium     History of laryngeal cancer 2012     Priority: Medium     Advanced directives, counseling/discussion 2011      Priority: Medium     Advance Care Planning 1/20/2017: ACP Review of Chart / Resources Provided:  Reviewed chart for advance care plan.  Agapito Fairbanks has an up to date advance care plan on file.  Added by Mecca Sharma        Advance Directive Problem List Overview:   Name Relationship Phone    Primary Health Care Agent            Alternative Health Care Agent          Patient states has Advance Directive and will bring in a copy to clinic. 5/13/2011          Hypothyroidism      Priority: Medium     HYPERLIPIDEMIA LDL GOAL <100 10/31/2010     Priority: Medium     CKD (chronic kidney disease) stage 3, GFR 30-59 ml/min (H)      Priority: Medium     creat baseline approx 1.4       ESOPHAGEAL REFLUX 08/10/2007     Priority: Medium     Talipes cavus 11/03/2006     Priority: Medium     Overview:   metatarsalagia       Essential hypertension 09/22/2004     Priority: Medium     Problem list name updated by automated process. 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 Provider to review          Past Medical/Surgical History:  Past Medical History:   Diagnosis Date     Acute gastritis with hemorrhage 11/02     Basal cell carcinoma, leg      left pretibial area     CKD (chronic kidney disease) stage 3, GFR 30-59 ml/min (H)     creat baseline approx 1.4     Hearing loss      Helicobacter pylori (H. 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     Mild major depression (H)      Other and unspecified malignant neoplasm of skin of other and unspecified parts of face      Basal Cell CA nasal area 4/04, chest 3/05, right  chest 10/09     Other testicular hypofunction      Squamous cell carcinoma  2012     RLE s/p excision     Tobacco use disorder     quit      Unspecified cataract     left     Unspecified essential hypertension      Unspecified retinal detachment     Bilateral     Past Surgical History:   Procedure Laterality Date     C NONSPECIFIC PROCEDURE  ,     B retinal surg.     C NONSPECIFIC PROCEDURE      L cataract     C NONSPECIFIC PROCEDURE      right ankle fx repair     C NONSPECIFIC PROCEDURE  3/05    Right hemilaryngectomy (laser) for Squamous Cell CA T1N0     C NONSPECIFIC PROCEDURE  3/05, 10/09    Moh's procedure for Basal Cell CA chest     C NONSPECIFIC PROCEDURE      Moh's procedure for Basal Cell CA ear lobe     C NONSPECIFIC PROCEDURE  3/05,     right vocal cord squamous cell CA excision     C NONSPECIFIC PROCEDURE      Phacoemulsification of the lens with posterior chamber lens implant, right eye.      C NONSPECIFIC PROCEDURE  10/09     Moh's procedufe for BCC left pretibial area     C NONSPECIFIC PROCEDURE      Left thoracoscopic wedge resection, Open completion left upper lobe segmentectomy      THORACIC SURGERY      lung,throat       Social History:  Social History     Socioeconomic History     Marital status:      Spouse name: Not on file     Number of children: Not on file     Years of education: Not on file     Highest education level: Not on file   Occupational History     Not on file   Social Needs     Financial resource strain: Not on file     Food insecurity     Worry: Not on file     Inability: Not on file     Transportation needs     Medical: Not on file     Non-medical: Not on file   Tobacco Use     Smoking status: Former Smoker     Packs/day: 1.50     Years: 30.00     Pack years: 45.00     Last attempt to quit: 1998     Years since quittin.2     Smokeless tobacco: Never Used   Substance and Sexual Activity     Alcohol use: No     Drug use:  No     Sexual activity: Yes     Partners: Female     Comment: vidya with TL   Lifestyle     Physical activity     Days per week: Not on file     Minutes per session: Not on file     Stress: Not on file   Relationships     Social connections     Talks on phone: Not on file     Gets together: Not on file     Attends Spiritism service: Not on file     Active member of club or organization: Not on file     Attends meetings of clubs or organizations: Not on file     Relationship status: Not on file     Intimate partner violence     Fear of current or ex partner: Not on file     Emotionally abused: Not on file     Physically abused: Not on file     Forced sexual activity: Not on file   Other Topics Concern     Parent/sibling w/ CABG, MI or angioplasty before 65F 55M? Not Asked   Social History Narrative    High school education.    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       Family History:  Family History   Problem Relation Age of Onset     Colon Cancer Mother          age 65, in      Family History Negative Father         mild memory problems,  age 87, in      Family History Negative Brother      Family History Negative Brother      Cancer Brother         hx prostate ca       Review of Systems:  A complete review of systems reviewed by me is negative with the exeption of what has been mentioned in the history of present illness.  CONSTITUTIONAL: NEGATIVE for weight gain/loss, fever, chills, sweats or night sweats, drug allergies.  EYES: NEGATIVE for changes in vision, blind spots, double vision.  ENT: NEGATIVE for ear pain, sore throat, sinus pain, post-nasal drip, runny nose, bloody nose  CARDIAC: NEGATIVE for fast  heartbeats or fluttering in chest, chest pain or pressure, breathlessness when lying flat.  CARDIAC:  POSITIVE for  swollen legs, swollen feet and HTN  NEUROLOGIC: NEGATIVE headaches, weakness or numbness in the arms or legs.  NEUROLOGIC:  POSITIVE for  Foot neuropathy  DERMATOLOGIC: POSITIVE for rashes, new moles or change in mole(s)  PULMONARY: NEGATIVE SOB at rest, SOB with activity, productive cough, coughing up blood, wheezing or whistling when breathing.    PULMONARY:  POSITIVE for  dry cough  GASTROINTESTINAL: NEGATIVE for nausea or vomitting, loose or watery stools, fat or grease in stools, constipation, abdominal pain, bowel movements black in color or blood noted.  GENITOURINARY: NEGATIVE for pain during urination, blood in urine, irregular menstrual periods.  GENITOURINARY:  POSITIVE for  urinating more frequently than usual  MUSCULOSKELETAL: NEGATIVE for muscle pain, bone or joint pain.  MUSCULOSKELETAL:  POSITIVE for  swollen joints  ENDOCRINE: NEGATIVE for increased thirst or urination, diabetes.  LYMPHATIC: NEGATIVE for swollen lymph nodes, lumps or bumps in the breasts or nipple discharge.    Physical Examination:  Vitals: BP (!) 140/91   Pulse 68   Resp 16   Ht 1.829 m (6')   Wt 83 kg (183 lb)   SpO2 97%   BMI 24.82 kg/m    BMI= Body mass index is 24.82 kg/m .    Neck Cir (cm): 38 cm    [place] Total Score 3/11/2020   Total score - [place] 2       ELIEZER Total Score: 3 (03/11/20 0902)    {SHORT EXAM:632951}  Mallampati Class: {KATHLEEN NUMERAL I-IV:524772}.  Tonsillar Stage: {NUMBERS 1-4:282477}.    Impression/Plan:    ***  Literature provided regarding {SLEEP APNEA:434858}.      He will follow up with me in approximately two weeks after his sleep study has been competed to review the results and discuss plan of care.       Polysomnography reviewed.  Obstructive sleep apnea reviewed.  Complications of untreated sleep apnea were reviewed.    Bennett Goltz, PA-C    CC: Los Love

## 2020-03-27 NOTE — TELEPHONE ENCOUNTER
ONCOLOGY INTAKE: Records Information      APPT INFORMATION:  Referring provider:  Dr. Robson Irwin  Referring provider s clinic:  [facility]  Reason for visit/diagnosis:  Lung Cancer  Has patient been notified of appointment date and time?: NA    RECORDS INFORMATION:  Were the records received with the referral (via [employer])? Internal Referral    ADDITIONAL INFORMATION:  Left VM with hours and phone. Please schedule patient with Dr. Mullins at [facility] after 04/02.

## 2020-04-03 NOTE — TELEPHONE ENCOUNTER
Dr Irwin called asking for approval of CT guided Left upper lobe lung biopsy. Procedure approved by laurie Mello in Epic and schedulers were notified. Await scheduling

## 2020-04-08 NOTE — TELEPHONE ENCOUNTER
Oncology/Surgical Oncology Referral Request:      Specialty Requested: Medical Oncology      Referring Provider: Dr. Irwin     Referring Clinic/Organization: Other - [facility]     Records location: Care Everywhere      Requested Provider (if specified):      Referring Clinic is requesting sooner appointment. Per [facility] Dr. Mullins and Dr. Irwin discussed this patient and would like to get him in sooner than May 4.

## 2020-04-08 NOTE — TELEPHONE ENCOUNTER
ONCOLOGY INTAKE: Records Information      APPT INFORMATION:  Referring provider:  Dr. 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 No    Has patient been seen for any external appt for this diagnosis? Yes    If yes, where? [facility]    Has patient had any imaging or procedures outside of [place] for this condition? Yes      If Yes, where? [facility]    ADDITIONAL INFORMATION:  None.

## 2020-04-08 NOTE — TELEPHONE ENCOUNTER
Oncology/Surgical Oncology Referral Request:     Specialty Requested: Medical Oncology     Referring Provider: Dr. Irwin    Referring Clinic/Organization: Other - [place] [facility]    Records location: Care Everywhere     Requested Provider (if specified):     Referring Clinic is requesting sooner appointment. Per [facility] Dr. Mullins and Dr. Irwin discussed this patient and would like to get him in sooner than May 4.

## 2020-04-09 NOTE — TELEPHONE ENCOUNTER
RECORDS STATUS - ALL OTHER DIAGNOSIS      RECORDS RECEIVED FROM: Epic/CE   DATE RECEIVED: 5/4/2020    NOTES STATUS DETAILS   OFFICE NOTE from referring provider Complete  Dr. Dc [facility] at [facility]   OFFICE NOTE from medical oncologist N/A    DISCHARGE SUMMARY from hospital N./A    DISCHARGE REPORT from the ER     OPERATIVE REPORT N/A    MEDICATION LIST Complete Psychiatric   CLINICAL TRIAL TREATMENTS TO DATE N/A    LABS     PATHOLOGY REPORTS Scheduled- Bx  4/13/2020    ANYTHING RELATED TO DIAGNOSIS Complete Labs last updated on 4/7/2020    GENONOMIC TESTING     TYPE:     IMAGING (NEED IMAGES & REPORT)     CT SCANS Scheduled CT Pleura 4/13/2020    MRI Complete MR Thoracic Spine 4/6/2020   MR Lumbar Spine 4/6/2020   MRI Brain 3/14/2019   MAMMO     ULTRASOUND     PET Complete 4/2/2020 PET Oncology      Action    Action Taken 4/9/2020 10:13am     I called pt Agapito - his wife answered the phone and mentioned Dr. Irwin. I believe Dr. Irwin is internal and his records are in [facility].

## 2020-04-13 NOTE — PRE-PROCEDURE
GENERAL PRE-PROCEDURE:   Procedure:  Left lung mass biopsy  Date/Time:  4/13/2020 9:50 AM    Verbal consent obtained?: Yes    Written consent obtained?: Yes    Risks and benefits: Risks, benefits and alternatives were discussed    Consent given by:  Patient  Patient states understanding of procedure being performed: Yes    Patient's understanding of procedure matches consent: Yes    Procedure consent matches procedure scheduled: Yes    Expected level of sedation:  Moderate  Appropriately NPO:  Yes  ASA Class:  Class 2- mild systemic disease, no acute problems, no functional limitations  Mallampati  :  Grade 2- soft palate, base of uvula, tonsillar pillars, and portion of posterior pharyngeal wall visible  Lungs:  Lungs clear with good breath sounds bilaterally  Heart:  Normal heart sounds and rate  History & Physical reviewed:  History and physical reviewed and no updates needed  Statement of review:  I have reviewed the lab findings, diagnostic data, medications, and the plan for sedation

## 2020-04-13 NOTE — PROCEDURES
[facility]    Procedure: Ct guided left lung biopsy.     Date/Time: 4/13/2020 11:32 AM  Performed by: Mecca Mello DO  Authorized by: Mecca Mello DO     UNIVERSAL PROTOCOL   Site Marked: Yes  Prior Images Obtained and Reviewed:  Yes  Required items: Required blood products, implants, devices and special equipment available    Patient identity confirmed:  Verbally with patient, arm band, provided demographic data and hospital-assigned identification number  Patient was reevaluated immediately before administering moderate or deep sedation or anesthesia  Confirmation Checklist:  Patient's identity using two indicators, relevant allergies, procedure was appropriate and matched the consent or emergent situation and correct equipment/implants were available  Time out: Immediately prior to the procedure a time out was called    Universal Protocol: the Joint Commission Universal Protocol was followed    Preparation: Patient was prepped and draped in usual sterile fashion           ANESTHESIA    Anesthesia: Local infiltration  Local Anesthetic:  Lidocaine 1% without epinephrine      SEDATION    Patient Sedated: Yes    Sedation Type:  Moderate (conscious) sedation  Vital signs: Vital signs monitored during sedation    See dictated procedure note for full details.  Findings: Left lung biopsy.     Specimens: none and core needle biopsy specimens sent for pathological analysis    Complications: None    Condition: Stable    Plan: Chest xray in 2 hours.     PROCEDURE   Patient Tolerance:  Patient tolerated the procedure well with no immediate complications    Length of time physician/provider present for 1:1 monitoring during sedation: 15

## 2020-04-13 NOTE — TELEPHONE ENCOUNTER
RECORDS STATUS - ALL OTHER DIAGNOSIS      RECORDS RECEIVED FROM: Epic/   DATE RECEIVED: 4/16/2020    NOTES STATUS DETAILS   OFFICE NOTE from referring provider Complete [facility]   OFFICE NOTE from medical oncologist N/A    DISCHARGE SUMMARY from hospital N/A    DISCHARGE REPORT from the ER     OPERATIVE REPORT Complete 4/13/2020 CT Guided Left Lung Biopsy     PFT 11/23/2009   MEDICATION LIST Complete [facility]   CLINICAL TRIAL TREATMENTS TO DATE     LABS     PATHOLOGY REPORTS Complete 4/13/2020 (In Process)    ANYTHING RELATED TO DIAGNOSIS Complete  Labs last updated on 4/13/2020    GENONOMIC TESTING     TYPE:     IMAGING (NEED IMAGES & REPORT)     CT SCANS     Xray Lumbar Spine Complete 3/16/2020    MRI Complete MR Thoracic Spine 4/6/2020   MR Lumbar Spine 4/6/2020   MRI Brain 3/14/2019   MAMMO     ULTRASOUND     PET Complete  4/2/2020 PET Oncology

## 2020-04-16 NOTE — PROGRESS NOTES
Visit Date:   04/16/2020      This consult has been requested by Dr. Irwin for lung cancer.      Mr. Fairbanks is a 70-year-old gentleman with history of different malignancies.  I reviewed his chart for the last many years and his history of malignancy is being summarized below.    1.  In 2005, he was evaluated by ENT for hoarseness.   -On 02/22/2005, he had laryngoscopy. There was right anterior true vocal cord lesion with extension to the anterior commissure on the right. 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   -On 03/18/2005, he had endoscopic laser assisted vertical hemilaryngectomy right. Pathology from vocal cord revealed squamous cell carcinoma, moderately differentiated, invasive  -Unfortunately, he had recurrence. 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 Pathology revealed invasive moderately differentiated squamous cell carcinoma.   -Patient received concurrent chemoradiation. He received 6000 cGy completed on 06/21/2006. He received cisplatin with radiation.   2.  He had a PET scan done on 10/24/2009.  It revealed enlarging spiculated nodule in posterior left upper lobe with borderline hypermetabolic activity.   -He had left upper lobe segmentectomy and mediastinal lymph node dissection on 12/10/2009.  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  No lymphovascular present.  Margins negative.  Lymph nodes were all benign.   3.  The patient developed a lesion in his back.  He was seen by a dermatologist.  Biopsy on 01/31/2020 revealed squamous cell carcinoma. He subsequently underwent a Mohs surgical procedure on 02/28/2020.  Lesion measured 2.7 cm.  The patient was recommended postoperative radiation.   4.  For further workup, PET scan was done on 04/02/2020.  It reveals a hypermetabolic 6.4 cm mass in left upper lobe.  The mass invades into the mediastinum.   There is a mildly enlarged hypermetabolic single mediastinal lymph node.  There is hypermetabolic lesion in L4 vertebral body and left second rib.  There is hypermetabolic nodule in left parotid gland.   -MRI of the thoracic and lumbar spine was done on 04/06/2020.  It revealed destructive metastatic lesions involving L4 vertebral body.  There was also multiple other osseous metastatic disease.   5.  CT-guided biopsy of the left lung mass was done on 04/13/2020.  Pathology reveals moderately differentiated squamous cell carcinoma.  TPS score of 60%.  He has high PD-L1 expression.   6.  The patient undergoing palliative radiation.      The patient is a former smoker.  He quit about 20 years ago.      REVIEW OF SYSTEMS:  The patient has back pain.  He is getting radiation.  Hopefully, the pain will improve.  He would like some long-acting pain medication.  He is currently taking oxycodone.      No headache.  No dizziness.  No ear pain or sore throat.  No change in his voice.  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  No chest pain.  No cough.  No shortness of breath at rest.  No nausea or vomiting.  Appetite is fairly good.  No urinary or bowel complaints.  No bleeding.  All other review of systems negative.      ALLERGIES:  REVIEWED.      MEDICATIONS:  Reviewed.      PAST MEDICAL HISTORY:   1.  Recurrent head and neck cancer.  No evidence of disease.   2.  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   3.  Basal cell carcinoma of the skin.   4.  Chronic kidney disease.   5.  Hyperlipidemia.   6.  Hypothyroidism.   7.  Depression.   8.  Cataracts.   9.  Hypertension.   10.  Retinal detachment, for which he had surgery.   11.  Right ankle fracture, which was repaired.      SOCIAL HISTORY:   -He is .   -He quit smoking about 20 years ago.  Smoked a pack and a half for 30 years.   -He quit alcohol a year ago.      PHYSICAL EXAMINATION:   GENERAL:  He is alert and oriented  x 3.   This was a virtual visit.      ASSESSMENT:   1.  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   2.  Back pain secondary to bone metastasis.  He is on palliative radiation.   3.  History of recurrent head and neck cancer.  No evidence of disease.   4.  History of left lung adenosquamous carcinoma, status post resection.   5.  Other medical problems including hypertension and chronic kidney disease.      RECOMMENDATION:   1.  I had a long discussion with the patient over the phone.  His wife was also on the phone.      I reviewed with him the result of the scans.  Biopsy report was reviewed in detail.  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      2.  Discussed regarding treatment.  I explained to them that this is an incurable situation.  Treatment will help to control disease, palliate symptoms and prolong life.  It is incurable.      We discussed regarding different options.  Discussed regarding chemotherapy, immunotherapy or targeted agents.      PD-L1 staining is 60%.  Based on that, he will be a candidate for single-agent pembrolizumab.  This was discussed in detail.  Discussed regarding side effects of pembrolizumab.  It can cause diarrhea, pneumonitis, hepatitis, nephritis, skin rash, fatigue and other side effects.      We also discussed regarding targeting agents if he has actionable mutation.  Those results are pending.      The patient is currently getting palliative radiation.  He will be done in about a week.  After that, we will plan on starting pembrolizumab mild.  He is agreeable for it.  If next generation sequencing reveals any actionable mutation, then we will treat it accordingly.      We also discussed regarding prognosis.  I explained to the patient that if we do not treat, his life expectancy will be limited to less than 6 months.  With treatment, I am hoping his life expectancy should be more than 18 months.  PD-L1 expression is  high, and I am hoping he will respond well to pembrolizumab.      3.  Discussed regarding getting a brain MRI for staging.  He is agreeable for it      4.  Discussed regarding pain control.  We will start him on a Duragesic patch.  He will continue oxycodone as needed.      5.  Discussed regarding Palliative Care consult.  He is agreeable for it.  That appointment will be scheduled.      Discussed regarding living will/healthcare directive.  The patient mentioned that he has one.  Advised him to bring us a copy      6.  The patient and his wife had multiple questions, which were all answered.  We will see him in clinic when he comes to start chemotherapy.      Thanks for the consult.        This was a telephone visit.  Telephone visit was for 31 minutes between 1:45 p.m. and 2:15 p.m.  Another 60 minutes were spent reviewing all his medical records.     ADDENDUM: NGS panel could not be done as biopsy quantity was insufficient. Will consider re-biopsy in future when there is progression.        JENNIFER CEDENO MD             D: 2020   T: 2020   MT: EVON      Name:     CHRISTI MANN   MRN:      [id number]        Account:      [id number]   :      1950           Visit Date:   2020      Document: [id number]       cc: Robson Irwin MD

## 2020-04-16 NOTE — PROGRESS NOTES
"Agapito Fairbanks is a 70 year old male who is being evaluated via a billable video visit.      The patient has been notified of following:     \"This video visit will be conducted via a call between you and your physician/provider. We have found that certain health care needs can be provided without the need for an in-person physical exam.  This service lets us provide the care you need with a video conversation.  If a prescription is necessary we can send it directly to your pharmacy.  If lab work is needed we can place an order for that and you can then stop by our lab to have the test done at a later time.    Video visits are billed at different rates depending on your insurance coverage.  Please reach out to your insurance provider with any questions.    If during the course of the call the physician/provider feels a video visit is not appropriate, you will not be charged for this service.\"    Patient has given verbal consent for Video visit? Yes    How would you like to obtain your AVS? Ольга    Patient would like the video invitation sent by: Send to e-mail at:[email or web address]     Video Start Time: 1:21 PM    Additional provider notes: No refills needed at this time.      Video-Visit Details    Type of service:  Video Visit    Video End Time     Originating Location (pt. Location): Home    Distant Location (provider location):  [facility]     Mode of Communication:  Video Conference via Kelle Steve CMA    "

## 2020-04-17 PROBLEM — C34.92: Status: ACTIVE | Noted: 2020-01-01

## 2020-04-18 NOTE — PATIENT INSTRUCTIONS
1. Schedule brain MRI.  2. Schedule palliative care consult.  3. Duragesic patch for pain control.  -Continue oxycodone as needed.  4. Start pembrolizumab week of April 27.  5. See me when coming to start pembrolizumab.

## 2020-04-18 NOTE — PROGRESS NOTES
Visit Date:   04/16/2020      This consult has been requested by Dr. Irwin for lung cancer.      Mr. Fairbanks is a 70-year-old gentleman with history of different malignancies.  I reviewed his chart for the last many years and his history of malignancy is being summarized below.    1.  In 2005, he was evaluated by ENT for hoarseness.   -On 02/22/2005, he had laryngoscopy. There was right anterior true vocal cord lesion with extension to the anterior commissure on the right. 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   -On 03/18/2005, he had endoscopic laser assisted vertical hemilaryngectomy right. Pathology from vocal cord revealed squamous cell carcinoma, moderately differentiated, invasive  -Unfortunately, he had recurrence. 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 Pathology revealed invasive moderately differentiated squamous cell carcinoma.   -Patient received concurrent chemoradiation. He received 6000 cGy completed on 06/21/2006. He received cisplatin with radiation.   2.  He had a PET scan done on 10/24/2009.  It revealed enlarging spiculated nodule in posterior left upper lobe with borderline hypermetabolic activity.   -He had left upper lobe segmentectomy and mediastinal lymph node dissection on 12/10/2009.  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  No lymphovascular present.  Margins negative.  Lymph nodes were all benign.   3.  The patient developed a lesion in his back.  He was seen by a dermatologist.  Biopsy on 01/31/2020 revealed squamous cell carcinoma. He subsequently underwent a Mohs surgical procedure on 02/28/2020.  Lesion measured 2.7 cm.  The patient was recommended postoperative radiation.   4.  For further workup, PET scan was done on 04/02/2020.  It reveals a hypermetabolic 6.4 cm mass in left upper lobe.  The mass invades into the mediastinum.   There is a mildly enlarged hypermetabolic single mediastinal lymph node.  There is hypermetabolic lesion in L4 vertebral body and left second rib.  There is hypermetabolic nodule in left parotid gland.   -MRI of the thoracic and lumbar spine was done on 04/06/2020.  It revealed destructive metastatic lesions involving L4 vertebral body.  There was also multiple other osseous metastatic disease.   5.  CT-guided biopsy of the left lung mass was done on 04/13/2020.  Pathology reveals moderately differentiated squamous cell carcinoma.  TPS score of 60%.  He has high PD-L1 expression.   6.  The patient undergoing palliative radiation.      The patient is a former smoker.  He quit about 20 years ago.      REVIEW OF SYSTEMS:  The patient has back pain.  He is getting radiation.  Hopefully, the pain will improve.  He would like some long-acting pain medication.  He is currently taking oxycodone.      No headache.  No dizziness.  No ear pain or sore throat.  No change in his voice.  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  No chest pain.  No cough.  No shortness of breath at rest.  No nausea or vomiting.  Appetite is fairly good.  No urinary or bowel complaints.  No bleeding.  All other review of systems negative.      ALLERGIES:  REVIEWED.      MEDICATIONS:  Reviewed.      PAST MEDICAL HISTORY:   1.  Recurrent head and neck cancer.  No evidence of disease.   2.  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   3.  Basal cell carcinoma of the skin.   4.  Chronic kidney disease.   5.  Hyperlipidemia.   6.  Hypothyroidism.   7.  Depression.   8.  Cataracts.   9.  Hypertension.   10.  Retinal detachment, for which he had surgery.   11.  Right ankle fracture, which was repaired.      SOCIAL HISTORY:   -He is .   -He quit smoking about 20 years ago.  Smoked a pack and a half for 30 years.   -He quit alcohol a year ago.      PHYSICAL EXAMINATION:   GENERAL:  He is alert and oriented  x 3.   This was a virtual visit.      ASSESSMENT:   1.  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   2.  Back pain secondary to bone metastasis.  He is on palliative radiation.   3.  History of recurrent head and neck cancer.  No evidence of disease.   4.  History of left lung adenosquamous carcinoma, status post resection.   5.  Other medical problems including hypertension and chronic kidney disease.      RECOMMENDATION:   1.  I had a long discussion with the patient over the phone.  His wife was also on the phone.      I reviewed with him the result of the scans.  Biopsy report was reviewed in detail.  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      2.  Discussed regarding treatment.  I explained to them that this is an incurable situation.  Treatment will help to control disease, palliate symptoms and prolong life.  It is incurable.      We discussed regarding different options.  Discussed regarding chemotherapy, immunotherapy or targeted agents.      PD-L1 staining is 60%.  Based on that, he will be a candidate for single-agent pembrolizumab.  This was discussed in detail.  Discussed regarding side effects of pembrolizumab.  It can cause diarrhea, pneumonitis, hepatitis, nephritis, skin rash, fatigue and other side effects.      We also discussed regarding targeting agents if he has actionable mutation.  Those results are pending.      The patient is currently getting palliative radiation.  He will be done in about a week.  After that, we will plan on starting pembrolizumab mild.  He is agreeable for it.  If next generation sequencing reveals any actionable mutation, then we will treat it accordingly.      We also discussed regarding prognosis.  I explained to the patient that if we do not treat, his life expectancy will be limited to less than 6 months.  With treatment, I am hoping his life expectancy should be more than 18 months.  PD-L1 expression is  high, and I am hoping he will respond well to pembrolizumab.      3.  Discussed regarding getting a brain MRI for staging.  He is agreeable for it      4.  Discussed regarding pain control.  We will start him on a Duragesic patch.  He will continue oxycodone as needed.      5.  Discussed regarding Palliative Care consult.  He is agreeable for it.  That appointment will be scheduled.      Discussed regarding living will/healthcare directive.  The patient mentioned that he has one.  Advised him to bring us a copy      6.  The patient and his wife had multiple questions, which were all answered.  We will see him in clinic when he comes to start chemotherapy.      Thanks for the consult.        This was a telephone visit.  Telephone visit was for 31 minutes between 1:45 p.m. and 2:15 p.m.  Another 60 minutes were spent reviewing all his medical records.     ADDENDUM: NGS panel could not be done as biopsy quantity was insufficient. Will consider re-biopsy in future when there is progression.        JENNIFER CEDENO MD             D: 2020   T: 2020   MT: EVON      Name:     CHRISTI MANN   MRN:      [id number]        Account:      [id number]   :      1950           Visit Date:   2020      Document: [id number]       cc: Robson Irwin MD

## 2020-04-20 NOTE — TELEPHONE ENCOUNTER
Left voicemail message for patient requesting a return call regarding scheduled appointments. Please review and adjust as needed.

## 2020-04-20 NOTE — TELEPHONE ENCOUNTER
Wife Maira called today to inquire about medication approvals.    She states Agapito was to start on Fentanyl patch.  When she called [facility] to see if the medication was ready, she was told that it needs a prior authorization.  Routing to [facility].    She states that Agapito is on his last two pills of oxycodone.  Inquired if he needs Dr. Mullins to refill it, but she stated she would like him to get the Fentanyl patch.     She is also wondering if the pembrolizumab has been approved, as she would like to get the appointments scheduled because she is still working and needs to plan.  Routing to [facility].    Finally, Maira reports that Agapito has memory impairment.  She is wondering if it would be possible for her to attend his infusion appointments with him, as he need frequent redirection.  Will discuss with infusion charge.    Also, routing to [facility] Bustill[facility] for awareness and follow-up.    Stef Be, NAHEEDN, RN, OCN  Oncology Care Coordinator  [facility]

## 2020-04-20 NOTE — TELEPHONE ENCOUNTER
Patient seen on 4/16 and discharge order was written for patient to start Keytruda the week of 4/27. I have copied and pasted the below instructions and sent it in a staff message to scheduling coordinators to  assist with making appointments.    Schedule brain MRI.  2. Schedule palliative care consult.  3. Duragesic patch for pain control.  -Continue oxycodone as needed.  4. Start pembrolizumab week of April 27.  5. See me when coming to start pembrolizumab.    [facility] pharmacy liaison is aware of the PA needed for Fentanyl and has received approval and is is contacting [facility].    Cecile Alanis RN

## 2020-04-20 NOTE — TELEPHONE ENCOUNTER
Prior Authorization Approval    Authorization Effective Date: 1/21/2020  Authorization Expiration Date: 4/20/2020  Medication: fentanyl 12mcg/hr patch_APPROVED till 4/20/2021  Approved Dose/Quantity: 10 patches for 30 days  Reference #: Key: [id number] - PA Case ID: [id number]   Insurance Company: [employer] - Phone [phone] Fax [phone]  Expected CoPay: ?     CoPay Card Available: No    Foundation Assistance Needed: n/a  Which Pharmacy is filling the prescription (Not needed for infusion/clinic administered): [facility]ARMACY [id number] [place], [place]  [address]  Pharmacy Notified: Yes, they will call the patient when this is ready.  Patient Notified: No

## 2020-04-20 NOTE — TELEPHONE ENCOUNTER
Keytruda has been secured.      [facility]    Infusion  - [place] [address], [address]  [facility] [address]  Phone [phone] Fax [phone]  [email or web address]   [email or web address]

## 2020-04-23 NOTE — PROGRESS NOTES
Writer called to follow up with some questions and further evaluate patient for safety of receiving chemotherapy without a visitor (wife) a system wide policy currently in place due to the COVID19.   Wife, Maiar, has expressed concerns about patient's ability to have chemotherapy independently.    Cecile Alanis RN

## 2020-04-27 NOTE — TELEPHONE ENCOUNTER
"Patient is currently scheduled for an appointment at [facility] in [place].  Called patient to review current visitor restrictions and complete COVID-19 Patient Infection/Travel Screening Tool.     Due to the recent public health concerns around COVID-19 and in an effort to keep our patients and staff safe and healthy, we are implementing a screening process for the patients that come to our clinic.      I am going to ask you a few questions, please answer yes or no.  Your honesty about any symptoms is critical, as it keeps patients and staff healthy.      Do you have a:  Fever (or reported chills)?  No  Cough?  No  Shortness of breath?  No  Rash?  No    In the last month, have you been in contact with someone who was confirmed or suspected to have Coronavirus/COVID-19?  No    Have you traveled internationally in the last month?  No  If so, where?  N/A     I also wanted to let you know that to protect our patients from the flu and other common illnesses, [facility] enforce visitor restrictions year round, but due to the community spread of COVID-19 in [place], we are taking additional precautionary steps to ensure the health of our patients.  At this time, NO visitors are allowed on our hospital and clinic campuses.     Patient PASSED the screening assessment.    Patient instructed to come to the clinic as planned for their scheduled appointment and to call the clinic if any symptoms develop prior to their appointment.    \"COVID-19 is contagious and can be dangerous for our patients and staff.  Please send us a MyChart message or call our clinic before coming in if you feel any of the following symptoms: fever, cough, congestion, runny nose, sore throat, muscle aches and pains, or shortness of breath.  If you are already at our clinic, it is very important that you be honest about any symptoms you are experiencing to ensure your safety and that of other patients and staff who " "treat you.  If you do have symptoms, we will have a nurse and/or provider asses you to determine next steps.\"    Edith George, CMA on 4/27/2020 at 11:18 AM    "

## 2020-04-28 NOTE — TELEPHONE ENCOUNTER
Wife Millicent called today to report that Agapito has been hypotensive for past 2 weeks.    She states they first became aware of this issue when he was in for a lung biopsy 2 weeks ago, his BP was 83/53 and he required IV fluid bolus before the procedure.  His most recent BP checked at home was 88/57.  She states he does say he feels lightheaded at times.    He takes anti-hypertensive medications (losartan and hydrochlorothiazide) and has not stopped taking them despite the hypotension.  He took them this morning.  Advised them to hold any further doses until we hear back from provider (next doses due tomorrow AM).    She wonders if this will affect his infusion appointment tomorrow, as he is scheduled for Keytruda C1D1, along with lab and MD appointment.    Routing to Dr. Mullins to advise re: hypotension and RNDAVION Bustillos for awareness.  Wife can be called back on her cell [phone].    Stef Be, NAHEEDN, RN, OCN  Oncology Care Coordinator  [facility]

## 2020-04-28 NOTE — TELEPHONE ENCOUNTER
Pt to stop taking hydrochlorathiazide and reduce Losartan to 100mg tab, 1/2 tab (total 50mg) daily.  Increase water intake by  2 glasses per day  If SBP often < 110 oafter 3 days, then also stop taking the Losartan  Email update in Sc[place] or call nurseline in 1 week with blood pressure status and whether taking the lower dose of Losartan or not

## 2020-04-28 NOTE — TELEPHONE ENCOUNTER
"Writer spoke with Millicent and was updated with the below message. Patient's blood pressure has been flucuating a bit day to day, but more consistently hypotensive. He gets lightheaded and dizzy with the hypotensive episodes.     Writer advised that patient not take b/p medications tomorrow and to call primary  For further instructions on managing b/p and the medications. Writer informed, Millicent, that patient will still receive infusion tomorrow, vital sign will be monitored and if needed additional IVF\"s can be given.    Writer instrructed to be drinking adequate amount of fluids and to make position changes slowly to avoid being lightheaded.     Patient,s wife verbalized understanding and agreed to plan.    Cecile Alanis RN    "

## 2020-04-28 NOTE — TELEPHONE ENCOUNTER
Patient's wife called back. Provider's message given. She agreed with this plan. She wanted to note that pt is having immunotherapy keytruda for metastatic lung cancer tomorrow. She wanted to make sure PCP knew this.

## 2020-04-28 NOTE — TELEPHONE ENCOUNTER
Maira, wife calling     Patient is completing cancer and infusion treatments and has had low BP when being checked over the last week.     Please see Oncology nurse note from today 4/28/2020.     Oncology told patient/wife to not take either BP medication before coming to the clinic tomorrow.     Past tomorrow, further instruction needed by PCP. Oncology does not want to make any changes without PCP input.     Currently takes Losartan and hydrochlorothiazide for BP.     Call WIFE back on her cell phone.

## 2020-04-28 NOTE — PROGRESS NOTES
Medical Assistant Note:  Agapito Fairbanks presents today for BLOOD DRAW.    Patient seen by provider today: No.   present during visit today: Not Applicable.    Concerns: No Concerns.    Procedure:  Lab draw site: LAC, Needle type: BF, Gauge: 23.    Post Assessment:  Labs drawn without difficulty: Yes.    Discharge Plan:  Departure Mode: Ambulatory.    Face to Face Time: 5 MIN.    Araceli Steve, CMA

## 2020-04-29 NOTE — PROGRESS NOTES
Infusion Nursing Note:  Agapito Fairbanks presents today for C1D1 keytruda, zometa.    Patient seen by provider today: Yes: Harpreet   present during visit today: Not Applicable.    Note: N/A.    Intravenous Access:  Peripheral IV placed.    Treatment Conditions:  Lab Results   Component Value Date     04/28/2020                   Lab Results   Component Value Date    POTASSIUM 3.5 04/28/2020           Lab Results   Component Value Date    MAG 2.0 12/14/2009            Lab Results   Component Value Date    CR 1.31 04/28/2020                   Lab Results   Component Value Date    SUSIE 9.5 04/28/2020                Lab Results   Component Value Date    BILITOTAL 0.6 04/28/2020           Lab Results   Component Value Date    ALBUMIN 3.0 04/28/2020                    Lab Results   Component Value Date    ALT 25 04/28/2020           Lab Results   Component Value Date    AST 11 04/28/2020       Results reviewed, labs MET treatment parameters, ok to proceed with treatment.      Post Infusion Assessment:  Patient tolerated infusion without incident.  Blood return noted pre and post infusion.  Site patent and intact, free from redness, edema or discomfort.  No evidence of extravasations.  Access discontinued per protocol.       Discharge Plan:   Discharge instructions reviewed with: Family.  Patient and/or family verbalized understanding of discharge instructions and all questions answered.  Copy of AVS reviewed with patient and/or family.  Patient will return 5/19/20 for next appointment.  Patient discharged in stable condition accompanied by: wife.  Departure Mode: Ambulatory.    Ashleigh Pollard RN

## 2020-04-29 NOTE — PROGRESS NOTES
Oncology Rooming Note    April 29, 2020 9:33 GABE Fairbanks is a 70 year old male who presents for:    Chief Complaint   Patient presents with     Oncology Clinic Visit     Initial Vitals: /82   Pulse 86   Temp 98.1  F (36.7  C) (Oral)   Resp 16   Ht 1.829 m (6')   Wt 78.7 kg (173 lb 6.4 oz)   SpO2 97%   BMI 23.52 kg/m   Estimated body mass index is 23.52 kg/m  as calculated from the following:    Height as of this encounter: 1.829 m (6').    Weight as of this encounter: 78.7 kg (173 lb 6.4 oz). Body surface area is 2 meters squared.  No Pain (0) Comment: Data Unavailable   No LMP for male patient.  Allergies reviewed: Yes  Medications reviewed: Yes    Medications: Medication refills not needed today.  Pharmacy name entered into EPIC:    [id number] - [place], [place] - [address] [facility] [facility] [place], [place] - [address] AT PORTAL TO [facility] SITES    Clinical concerns: no      Edith George, [facility]

## 2020-04-29 NOTE — PROGRESS NOTES
Visit Date:   04/29/2020     ONCOLOGY HISTORY: Mr. 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  High PD-L1 expression.  No EGFR mutation.  ALK and ROS1 could not be done because of insufficient sample.   1.  In 2005, he was evaluated by ENT for hoarseness.   -On 02/22/2005, he had laryngoscopy. There was right anterior true vocal cord lesion with extension to the anterior commissure on the right. 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   -On 03/18/2005, he had endoscopic laser assisted vertical hemilaryngectomy right. Pathology from vocal cord revealed squamous cell carcinoma, moderately differentiated, invasive  -Unfortunately, he had recurrence. 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 Pathology revealed invasive moderately differentiated squamous cell carcinoma.   -Patient received concurrent chemoradiation. He received 6000 cGy completed on 06/21/2006. He received cisplatin with radiation.   2.  He had a PET scan done on 10/24/2009.  It revealed enlarging spiculated nodule in posterior left upper lobe with borderline hypermetabolic activity.   -He had left upper lobe segmentectomy and mediastinal lymph node dissection on 12/10/2009.  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  No lymphovascular present.  Margins negative.  Lymph nodes were all benign.   3.  The patient developed a lesion in his back.  He was seen by a dermatologist.  Biopsy on 01/31/2020 revealed squamous cell carcinoma. He subsequently underwent a Mohs surgical procedure on 02/28/2020.  Lesion measured 2.7 cm.  The patient was recommended postoperative radiation.   4.  For further workup, PET scan was done on 04/02/2020.  It reveals a hypermetabolic 6.4 cm mass in left upper lobe.  The mass invades into the mediastinum.  There is a mildly enlarged hypermetabolic single  mediastinal lymph node.  There is hypermetabolic lesion in L4 vertebral body and left second rib.  There is hypermetabolic nodule in left parotid gland.   -MRI of the thoracic and lumbar spine was done on 04/06/2020.  It revealed destructive metastatic lesions involving L4 vertebral body.  There was also multiple other osseous metastatic disease.   5.  CT-guided biopsy of the left lung mass was done on 04/13/2020.  Pathology reveals moderately differentiated squamous cell carcinoma.  TPS score of 60%.  He has high PD-L1 expression.   6.  The patient received palliative radiation.   7. Pembrolizumab started on 04/29/2020.     SUBJECTIVE:  Mr. 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  High PD-L1 expression.  No EGFR mutation.  ALK and ROS1 could not be done because of insufficient sample.  The patient is here to start pembrolizumab.  He completed radiation to his back and left parotid nodule.      The patient presents to the clinic with his wife.  The patient has memory problem.  He is alert and oriented, but he cannot remember things.  He forgets very easily.  He asked questions repetitively.  Wife mentioned that he cannot be left alone for a long time as he is forgetful.  She had a list of questions which the patient wanted to ask.  Wife mentioned that she has the power of .  The patient is going to meet with her  regarding updating healthcare directive and also for legal guardianship.      The patient has pain in the back. It has not resolved.  This is due to bone metastasis.  The patient received radiation.  He is on fentanyl patch 12 mcg.  Oxycodone is used periodically.  He also has some discomfort in the left cheek where he got radiation.      No headache.  Intermittent dizziness.  No neck pain.  No chest pain.  No shortness of breath.  The patient has chronic intermittent cough.  He had head and neck cancer.  He had surgery,  radiation and chemotherapy.  Since then, he always has some intermittent cough, nonproductive.  Occasionally, he will have some blood in his sputum.  No abdominal pain.  Some nausea.  No vomiting.  Appetite has been decreased.  In the last few months, he has lost about 10-15 pounds of weight.  No urinary or bowel complaints.      As per the wife, patient's fatigue has been getting worse.  Also, his mood has changed.  He is more irritable.  His appetite has been decreased and he lost weight. All other review of systems is negative.      The patient wants me to examine the face area and the back where he has discomfort.      PHYSICAL EXAMINATION:   GENERAL:  He is alert.  He was oriented x 3.   FACE:  No swelling.  No facial droop.  In the left face, there is erythema of the skin where he had radiation.  No open wound.   THROAT:  No ulcer.  No thrush.   NECK:  Supple. No tenderness or lymphadenopathy.   CHEST:  No deformity or tenderness.  A scar samina from previous surgery.   BACK:  There is some erythema at the site of radiation.  No open ulcer.   LUNGS:  Good air entry bilaterally.  No wheezing.   HEART:  Regular.   GI: Abdomen is soft and non-tender. No mass.   EXTREMITIES:  No edema.   SKIN:  No petechia.  No suspicious skin lesion.      LABORATORY DATA:  Reviewed.      ASSESSMENT:    1.  A 70-year-old gentleman with metastatic lung squamous cell carcinoma with high PD-L1 expression.   2.  Back pain and pain in the left cheek from metastatic disease.   3.  Fatigue.   4.  Weight loss.   5.  Irritability.   6.  Forgetfulness.   7.  Chronic kidney disease.   8.  History of recurrent head and neck cancer.  9. History of left lung adenosquamous cell carcinoma.      PLAN:   1.  I reviewed with the patient and his wife regarding lung cancer.  PET scan was reviewed.  Pictures shown.  Pathology result was discussed.  I explained to the patient that he has metastatic lung cancer.  This is incurable.  Treatment will help to  control disease, palliate symptoms and prolong life.  Again reviewed regarding treatment.  As PD-L1 expression is high, we will start him on pembrolizumab.  Side effects reviewed.  He is agreeable to take pembrolizumab.  I am hoping it will help with the tumor.   2.  The patient had a skin biopsy with his dermatologist.  He will try to get the sample from there and see if we can check for ALK and ROS1.   3.  Discussed regarding pain.  We will increase the fentanyl patch to 25 mcg.  He will continue to take oxycodone as needed.   4.  The patient's appetite is decreased.  I advised him to eat high-calorie food.  If his appetite does not improve, we will consider either Marinol or medical marijuana.   5.  He has been on Crestor.  I advised him to stop it.   6.  He has bone metastasis.  We will start him on Zometa.  Side effects reviewed.  He does not have any dental or jaw related symptoms.  It can cause flu-like symptoms, dental infection, osteonecrosis of jaw and other problems.  The patient agreeable for it.   7.  The patient is going to meet with Palliative Care.     8.  Discussed regarding living will and legal guardianship.  While the patient is alert and oriented, he does have cognitive impairment.  He is very forgetful.  He is irritable. Even in the clinic, he kept asking things multiple times.  Based on all this, it will be best for patient to sign a legal guardianship.  Wife mentioned that they will be meeting with the .  Because of patient's memory issue, wife was allowed to come to the clinic today.   9.  He will be seen with the next treatment.  The patient/wife is advised to call us with any questions or concerns.  I advised him to go to the emergency room if he has fever, infection, diarrhea, skin rash, chest pain, shortness of breath, worsening cough or any other concerns.         JENNIFER CEDENO MD             D: 04/29/2020   T: 04/29/2020   MT: RENETTA      Name:     CHRISTI MANN   MRN:       [id number]        Account:      [id number]   :      1950           Visit Date:   2020      Document: [id number]

## 2020-04-29 NOTE — PATIENT INSTRUCTIONS
1. Start pembrolizumab.  2. Stop crestor.  3. Increase fentanyl patch to 25 mcg every 72 hours.  4. Continue oxycodone as needed.  5. Get biopsy specimen from dermatology office and send it for NGS panel.  6. Follow-up with next treatment.  7. Patient will bring us a copy of living will.  8. Start zometa every 6 weeks.    Patient in [facility], room with curtain. Speak to wife to make appts.

## 2020-04-29 NOTE — LETTER
4/29/2020         RE: Agapito Fairbanks  [address]  [place] [address]        Dear Colleague,    Thank you for referring your patient, Agapito Fairbanks, to the [facility]. Please see a copy of my visit note below.    Oncology Rooming Note    April 29, 2020 9:33 AM   Agapito Fairbanks is a 70 year old male who presents for:    Chief Complaint   Patient presents with     Oncology Clinic Visit     Initial Vitals: /82   Pulse 86   Temp 98.1  F (36.7  C) (Oral)   Resp 16   Ht 1.829 m (6')   Wt 78.7 kg (173 lb 6.4 oz)   SpO2 97%   BMI 23.52 kg/m   Estimated body mass index is 23.52 kg/m  as calculated from the following:    Height as of this encounter: 1.829 m (6').    Weight as of this encounter: 78.7 kg (173 lb 6.4 oz). Body surface area is 2 meters squared.  No Pain (0) Comment: Data Unavailable   No LMP for male patient.  Allergies reviewed: Yes  Medications reviewed: Yes    Medications: Medication refills not needed today.  Pharmacy name entered into [employer]:    [id number] - [place], [place] - [address] [facility] [place], [place] - [address] [facility]    Clinical concerns: no      Edith George, [facility]              Visit Date:   04/29/2020     ONCOLOGY HISTORY: Mr. 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  High PD-L1 expression.  No EGFR mutation.  ALK and ROS1 could not be done because of insufficient sample.   1.  In 2005, he was evaluated by ENT for hoarseness.   -On 02/22/2005, he had laryngoscopy. There was right anterior true vocal cord lesion with extension to the anterior commissure on the right. 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   -On 03/18/2005, he had endoscopic laser assisted vertical hemilaryngectomy right. Pathology from vocal cord revealed squamous cell carcinoma, moderately differentiated,  invasive  -Unfortunately, he had recurrence. 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 Pathology revealed invasive moderately differentiated squamous cell carcinoma.   -Patient received concurrent chemoradiation. He received 6000 cGy completed on 06/21/2006. He received cisplatin with radiation.   2.  He had a PET scan done on 10/24/2009.  It revealed enlarging spiculated nodule in posterior left upper lobe with borderline hypermetabolic activity.   -He had left upper lobe segmentectomy and mediastinal lymph node dissection on 12/10/2009.  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  No lymphovascular present.  Margins negative.  Lymph nodes were all benign.   3.  The patient developed a lesion in his back.  He was seen by a dermatologist.  Biopsy on 01/31/2020 revealed squamous cell carcinoma. He subsequently underwent a Mohs surgical procedure on 02/28/2020.  Lesion measured 2.7 cm.  The patient was recommended postoperative radiation.   4.  For further workup, PET scan was done on 04/02/2020.  It reveals a hypermetabolic 6.4 cm mass in left upper lobe.  The mass invades into the mediastinum.  There is a mildly enlarged hypermetabolic single mediastinal lymph node.  There is hypermetabolic lesion in L4 vertebral body and left second rib.  There is hypermetabolic nodule in left parotid gland.   -MRI of the thoracic and lumbar spine was done on 04/06/2020.  It revealed destructive metastatic lesions involving L4 vertebral body.  There was also multiple other osseous metastatic disease.   5.  CT-guided biopsy of the left lung mass was done on 04/13/2020.  Pathology reveals moderately differentiated squamous cell carcinoma.  TPS score of 60%.  He has high PD-L1 expression.   6.  The patient received palliative radiation.   7. Pembrolizumab started on 04/29/2020.     SUBJECTIVE:  Mr. 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  High PD-L1 expression.  No EGFR mutation.  ALK and ROS1 could not be done because of insufficient sample.  The patient is here to start pembrolizumab.  He completed radiation to his back and left parotid nodule.      The patient presents to the clinic with his wife.  The patient has memory problem.  He is alert and oriented, but he cannot remember things.  He forgets very easily.  He asked questions repetitively.  Wife mentioned that he cannot be left alone for a long time as he is forgetful.  She had a list of questions which the patient wanted to ask.  Wife mentioned that she has the power of .  The patient is going to meet with her  regarding updating healthcare directive and also for legal guardianship.      The patient has pain in the back. It has not resolved.  This is due to bone metastasis.  The patient received radiation.  He is on fentanyl patch 12 mcg.  Oxycodone is used periodically.  He also has some discomfort in the left cheek where he got radiation.      No headache.  Intermittent dizziness.  No neck pain.  No chest pain.  No shortness of breath.  The patient has chronic intermittent cough.  He had head and neck cancer.  He had surgery, radiation and chemotherapy.  Since then, he always has some intermittent cough, nonproductive.  Occasionally, he will have some blood in his sputum.  No abdominal pain.  Some nausea.  No vomiting.  Appetite has been decreased.  In the last few months, he has lost about 10-15 pounds of weight.  No urinary or bowel complaints.      As per the wife, patient's fatigue has been getting worse.  Also, his mood has changed.  He is more irritable.  His appetite has been decreased and he lost weight. All other review of systems is negative.      The patient wants me to examine the face area and the back where he has discomfort.      PHYSICAL EXAMINATION:   GENERAL:  He is alert.  He  was oriented x 3.   FACE:  No swelling.  No facial droop.  In the left face, there is erythema of the skin where he had radiation.  No open wound.   THROAT:  No ulcer.  No thrush.   NECK:  Supple. No tenderness or lymphadenopathy.   CHEST:  No deformity or tenderness.  A scar samina from previous surgery.   BACK:  There is some erythema at the site of radiation.  No open ulcer.   LUNGS:  Good air entry bilaterally.  No wheezing.   HEART:  Regular.   GI: Abdomen is soft and non-tender. No mass.   EXTREMITIES:  No edema.   SKIN:  No petechia.  No suspicious skin lesion.      LABORATORY DATA:  Reviewed.      ASSESSMENT:    1.  A 70-year-old gentleman with metastatic lung squamous cell carcinoma with high PD-L1 expression.   2.  Back pain and pain in the left cheek from metastatic disease.   3.  Fatigue.   4.  Weight loss.   5.  Irritability.   6.  Forgetfulness.   7.  Chronic kidney disease.   8.  History of recurrent head and neck cancer.  9. History of left lung adenosquamous cell carcinoma.      PLAN:   1.  I reviewed with the patient and his wife regarding lung cancer.  PET scan was reviewed.  Pictures shown.  Pathology result was discussed.  I explained to the patient that he has metastatic lung cancer.  This is incurable.  Treatment will help to control disease, palliate symptoms and prolong life.  Again reviewed regarding treatment.  As PD-L1 expression is high, we will start him on pembrolizumab.  Side effects reviewed.  He is agreeable to take pembrolizumab.  I am hoping it will help with the tumor.   2.  The patient had a skin biopsy with his dermatologist.  He will try to get the sample from there and see if we can check for ALK and ROS1.   3.  Discussed regarding pain.  We will increase the fentanyl patch to 25 mcg.  He will continue to take oxycodone as needed.   4.  The patient's appetite is decreased.  I advised him to eat high-calorie food.  If his appetite does not improve, we will consider either  Marinol or medical marijuana.   5.  He has been on Crestor.  I advised him to stop it.   6.  He has bone metastasis.  We will start him on Zometa.  Side effects reviewed.  He does not have any dental or jaw related symptoms.  It can cause flu-like symptoms, dental infection, osteonecrosis of jaw and other problems.  The patient agreeable for it.   7.  The patient is going to meet with Palliative Care.     8.  Discussed regarding living will and legal guardianship.  While the patient is alert and oriented, he does have cognitive impairment.  He is very forgetful.  He is irritable. Even in the clinic, he kept asking things multiple times.  Based on all this, it will be best for patient to sign a legal guardianship.  Wife mentioned that they will be meeting with the .  Because of patient's memory issue, wife was allowed to come to the clinic today.   9.  He will be seen with the next treatment.  The patient/wife is advised to call us with any questions or concerns.  I advised him to go to the emergency room if he has fever, infection, diarrhea, skin rash, chest pain, shortness of breath, worsening cough or any other concerns.         JENNIFER CEDENO MD             D: 2020   T: 2020   MT: RENETTA      Name:     CHRISTI MANN   MRN:      [id number]        Account:      [id number]   :      1950           Visit Date:   2020      Document: [id number]      Visit Date:   2020     ONCOLOGY HISTORY: Mr. 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  High PD-L1 expression.  No EGFR mutation.  ALK and ROS1 could not be done because of insufficient sample.   1.  In , he was evaluated by ENT for hoarseness.   -On 2005, he had laryngoscopy. There was right anterior true vocal cord lesion with extension to the anterior commissure on the right. 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   -On 2005, he had endoscopic  laser assisted vertical hemilaryngectomy right. Pathology from vocal cord revealed squamous cell carcinoma, moderately differentiated, invasive  -Unfortunately, he had recurrence. 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 Pathology revealed invasive moderately differentiated squamous cell carcinoma.   -Patient received concurrent chemoradiation. He received 6000 cGy completed on 06/21/2006. He received cisplatin with radiation.   2.  He had a PET scan done on 10/24/2009.  It revealed enlarging spiculated nodule in posterior left upper lobe with borderline hypermetabolic activity.   -He had left upper lobe segmentectomy and mediastinal lymph node dissection on 12/10/2009.  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  No lymphovascular present.  Margins negative.  Lymph nodes were all benign.   3.  The patient developed a lesion in his back.  He was seen by a dermatologist.  Biopsy on 01/31/2020 revealed squamous cell carcinoma. He subsequently underwent a Mohs surgical procedure on 02/28/2020.  Lesion measured 2.7 cm.  The patient was recommended postoperative radiation.   4.  For further workup, PET scan was done on 04/02/2020.  It reveals a hypermetabolic 6.4 cm mass in left upper lobe.  The mass invades into the mediastinum.  There is a mildly enlarged hypermetabolic single mediastinal lymph node.  There is hypermetabolic lesion in L4 vertebral body and left second rib.  There is hypermetabolic nodule in left parotid gland.   -MRI of the thoracic and lumbar spine was done on 04/06/2020.  It revealed destructive metastatic lesions involving L4 vertebral body.  There was also multiple other osseous metastatic disease.   5.  CT-guided biopsy of the left lung mass was done on 04/13/2020.  Pathology reveals moderately differentiated squamous cell carcinoma.  TPS score of 60%.  He has high PD-L1 expression.   6.   The patient received palliative radiation.   7. Pembrolizumab started on 04/29/2020.     SUBJECTIVE:  Mr. 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  High PD-L1 expression.  No EGFR mutation.  ALK and ROS1 could not be done because of insufficient sample.  The patient is here to start pembrolizumab.  He completed radiation to his back and left parotid nodule.      The patient presents to the clinic with his wife.  The patient has memory problem.  He is alert and oriented, but he cannot remember things.  He forgets very easily.  He asked questions repetitively.  Wife mentioned that he cannot be left alone for a long time as he is forgetful.  She had a list of questions which the patient wanted to ask.  Wife mentioned that she has the power of .  The patient is going to meet with her  regarding updating healthcare directive and also for legal guardianship.      The patient has pain in the back. It has not resolved.  This is due to bone metastasis.  The patient received radiation.  He is on fentanyl patch 12 mcg.  Oxycodone is used periodically.  He also has some discomfort in the left cheek where he got radiation.      No headache.  Intermittent dizziness.  No neck pain.  No chest pain.  No shortness of breath.  The patient has chronic intermittent cough.  He had head and neck cancer.  He had surgery, radiation and chemotherapy.  Since then, he always has some intermittent cough, nonproductive.  Occasionally, he will have some blood in his sputum.  No abdominal pain.  Some nausea.  No vomiting.  Appetite has been decreased.  In the last few months, he has lost about 10-15 pounds of weight.  No urinary or bowel complaints.      As per the wife, patient's fatigue has been getting worse.  Also, his mood has changed.  He is more irritable.  His appetite has been decreased and he lost weight. All other review of systems is negative.      The  patient wants me to examine the face area and the back where he has discomfort.      PHYSICAL EXAMINATION:   GENERAL:  He is alert.  He was oriented x 3.   FACE:  No swelling.  No facial droop.  In the left face, there is erythema of the skin where he had radiation.  No open wound.   THROAT:  No ulcer.  No thrush.   NECK:  Supple. No tenderness or lymphadenopathy.   CHEST:  No deformity or tenderness.  A scar samina from previous surgery.   BACK:  There is some erythema at the site of radiation.  No open ulcer.   LUNGS:  Good air entry bilaterally.  No wheezing.   HEART:  Regular.   GI: Abdomen is soft and non-tender. No mass.   EXTREMITIES:  No edema.   SKIN:  No petechia.  No suspicious skin lesion.      LABORATORY DATA:  Reviewed.      ASSESSMENT:    1.  A 70-year-old gentleman with metastatic lung squamous cell carcinoma with high PD-L1 expression.   2.  Back pain and pain in the left cheek from metastatic disease.   3.  Fatigue.   4.  Weight loss.   5.  Irritability.   6.  Forgetfulness.   7.  Chronic kidney disease.   8.  History of recurrent head and neck cancer.  9. History of left lung adenosquamous cell carcinoma.      PLAN:   1.  I reviewed with the patient and his wife regarding lung cancer.  PET scan was reviewed.  Pictures shown.  Pathology result was discussed.  I explained to the patient that he has metastatic lung cancer.  This is incurable.  Treatment will help to control disease, palliate symptoms and prolong life.  Again reviewed regarding treatment.  As PD-L1 expression is high, we will start him on pembrolizumab.  Side effects reviewed.  He is agreeable to take pembrolizumab.  I am hoping it will help with the tumor.   2.  The patient had a skin biopsy with his dermatologist.  He will try to get the sample from there and see if we can check for ALK and ROS1.   3.  Discussed regarding pain.  We will increase the fentanyl patch to 25 mcg.  He will continue to take oxycodone as needed.   4.  The  patient's appetite is decreased.  I advised him to eat high-calorie food.  If his appetite does not improve, we will consider either Marinol or medical marijuana.   5.  He has been on Crestor.  I advised him to stop it.   6.  He has bone metastasis.  We will start him on Zometa.  Side effects reviewed.  He does not have any dental or jaw related symptoms.  It can cause flu-like symptoms, dental infection, osteonecrosis of jaw and other problems.  The patient agreeable for it.   7.  The patient is going to meet with Palliative Care.     8.  Discussed regarding living will and legal guardianship.  While the patient is alert and oriented, he does have cognitive impairment.  He is very forgetful.  He is irritable. Even in the clinic, he kept asking things multiple times.  Based on all this, it will be best for patient to sign a legal guardianship.  Wife mentioned that they will be meeting with the .  Because of patient's memory issue, wife was allowed to come to the clinic today.   9.  He will be seen with the next treatment.  The patient/wife is advised to call us with any questions or concerns.  I advised him to go to the emergency room if he has fever, infection, diarrhea, skin rash, chest pain, shortness of breath, worsening cough or any other concerns.         JENNIFER CEDENO MD             D: 2020   T: 2020   MT: RENETTA      Name:     CHRISTI MANN   MRN:      [id number]        Account:      [id number]   :      1950           Visit Date:   2020      Document: [id number]          Again, thank you for allowing me to participate in the care of your patient.        Sincerely,        Jennifer Cedeno MD

## 2020-04-29 NOTE — PROGRESS NOTES
It was not noted with the assessment that paitient would be unsafe to receive treatment independently.     Wife, Millicent verbalized further that she would like to talk with Dr. Mullins .     Cecile Alanis RN

## 2020-04-30 NOTE — TELEPHONE ENCOUNTER
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 This clinician called Roque and wife Millicent with the goal of introducing psychosocial services as a part of care team.     Intervention:   Roque is a  gentleman and father of one daughter (Shade Joe), and grandfather of two grandchildren (ages 23 & 19). Roque has 2 brothers who live out of state. Roque enjoys reading the newspaper, taking his dog for walks, and [id number]. Roque's wife Millicent works in real estate, and continues to work during Stay At Home order.     Wife reports that she and Roque have coped with cancer in the past, during which time Roque struggled with eating and there were many discussions about feeding tube. Wife reports that she has already seen that Roque has lost great deal of weight and has not been eating as much. This clinician oriented to RD services, which she reported she was not interested in at present time.     Wife reports greatest concerns are connecting with Elder  as she has ongoing concerns about Roque's memory. Wife reports Roque was diagnosed with \"mild cognitive impairment\" 2 years ago, which she believes has gotten much worse in the context of new cancer diagnosis, and that Roque presently has difficulty holding on to new information. Wife reports that memory improved with Roque's cessation of alcohol, and she reports he has not started drinking again, but worries about his substituting pain medication for a new addiction. This clinician encouraged open dialogue with palliative care provider about history of alcohol use and plan for pain management moving forward.     Millicent reports that she attended [facility] Caregiver Support Group for others who have loved ones with " "memory changes, and found the social connection helpful. This clinician oriented wife to social supports in area. Wife reported she anticipates concern in future about having people to stay home with Roque, but is presently appreciative of Roque's friend who took him out today for take-out lunch and to eat outdoors.     Roque denies concerns at present time, and reports that his mood has been, \"good.\" Family aware of ongoing support available as needed. This clinician sent email to Elliott ([email or web address]) with social work contact information per her request.     Resources Provided:  [employer]  Cancer Legal Care  Onc SW contact information    Plan:  1) Provided patient/family with writer's contact information and availability. Family knows to reach out to this clinician in the event of psychosocial distress or need. This clinician will plan for psychosocial connection in 2 weeks.   2) Ongoing collaboration with multidisciplinary care team.      Please call or page if needs or concerns arise.     SAI Villeda, [facility] Center[facility]  Direct Phone: [phone]  Pager: [phone]  "

## 2020-05-01 PROBLEM — G93.40 ACUTE ENCEPHALOPATHY: Status: ACTIVE | Noted: 2020-01-01

## 2020-05-01 PROBLEM — N39.0 URINARY TRACT INFECTION: Status: ACTIVE | Noted: 2020-01-01

## 2020-05-01 PROBLEM — I95.9 HYPOTENSION, UNSPECIFIED HYPOTENSION TYPE: Status: ACTIVE | Noted: 2020-01-01

## 2020-05-01 NOTE — ED PROVIDER NOTES
History     Chief Complaint:  Hypotension and fever    HPI   Agapito Fairbanks is a 70 year old male with stage IV metastatic lung cancer on chemotherapy who presents with fever, hypotension, and increased confusion. The patient has a cognitive delay, and his wife reports that his chemotherapy medication was recently increased, and she believes his symptoms may be due to this.  The patient himself does not notice anything. She reports that he had a fever at home and that she took his blood pressure and it was low.  No known COVID exposure.     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 Pylori  Laryngeal carcinoma  Tobacco use disorder    Past Surgical History:    R. 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  PCP: Los Love   Marital Status:        Review of Systems   Unable to perform ROS: Mental status change   Constitutional: Positive for fever.   Psychiatric/Behavioral: Positive for confusion.       Physical Exam     Patient Vitals for the past 24 hrs:   BP Temp Temp src Pulse Heart Rate Resp SpO2 Height Weight   05/01/20 2020 -- -- -- -- -- -- 95 % -- --   05/01/20 2015 104/67 -- -- 79 -- -- 95 % -- --   05/01/20 2010 -- -- -- -- -- -- 98 % -- --   05/01/20 2005 -- -- -- -- -- -- 97 % -- --   05/01/20 2000 129/68 -- -- 93 -- -- 95 % -- --   05/01/20 1955 -- -- -- -- -- -- 95 %  -- --   05/01/20 1950 -- -- -- -- -- -- 93 % -- --   05/01/20 1945 97/60 -- -- 78 -- -- 96 % -- --   05/01/20 1940 -- -- -- -- -- -- 96 % -- --   05/01/20 1935 -- -- -- -- -- -- 98 % -- --   05/01/20 1930 136/86 -- -- 93 -- -- (!) 87 % -- --   05/01/20 1925 123/66 -- -- 82 -- -- 98 % -- --   05/01/20 1920 -- -- -- -- -- -- 96 % -- --   05/01/20 1915 119/73 -- -- 74 -- -- 98 % -- --   05/01/20 1910 -- -- -- -- -- -- 97 % -- --   05/01/20 1900 118/72 -- -- 77 -- -- 96 % -- --   05/01/20 1743 123/72 99.4  F (37.4  C) Oral 83 83 16 98 % 1.829 m (6') 79.4 kg (175 lb)       Physical Exam  General: Well-nourished, appears to be resting comfortably when I enter the room  Eyes: PERRL, conjunctivae pink no scleral icterus or conjunctival injection  ENT:  Moist mucus membranes, posterior oropharynx clear without erythema or exudates  Respiratory:  Lungs clear to auscultation bilaterally, no crackles/rubs/wheezes.  Good air movement  CV: Normal rate and rhythm, no murmurs/rubs/gallops  GI:  Abdomen soft and non-distended.  Normoactive BS.  No tenderness, guarding or rebound  Skin: Warm, dry.  No rashes or petechiae  Musculoskeletal: No peripheral edema or calf tenderness  Neuro: Alert and oriented to person/place, forgetful and mildly confuse. PERRL, EOMI no nystagmus, no aphasia/facial droop/dysarthria, tongue midline, symmetric palatal elevation, normal strength at SCM/trapezius/BUE/BLE, normal coordination to FNF at BUE, normal casual gait, negative romberg, sensation intact to LT over face/BUE/BLE. Neck supple.   Psychiatric: Normal affect      Emergency Department Course   ECG (18:18:00):  Rate 77 bpm. MT interval 158. QRS duration 82. QT/QTc 364/411. P-R-T axes 42 -18 20. Sinus rhythm. Minimal voltage criteria for LVH, may be normal variant. No significant change when compared to EKG dated 08/10/2019. Interpreted at 1820 by Bridget Orta MD.    Imaging:  Radiographic findings were communicated with the patient who voiced  understanding of the findings.    X-ray chest port, 1 view:  IMPRESSION: Left upper lobe mass again evident. Remaining lungs clear.  Result per radiology.     CT-scan head w/o contrast:  IMPRESSION:   No evidence of acute ischemia or hemorrhage (ASPECTS 10).  As read by Radiology.     Laboratory:  Laboratory findings were communicated with the patient who voiced understanding of the findings.    COVID-19 Virus: Pending    Blood gas venous: AWNL    (1756) Lactic Acid: 0.8  Magnesium: 2.0    Blood Culture x2: Pending  CBC: HGB 10.9 (L), o/w WNL (WBC 10.5, )   CMP: sodium 127 (L), chloride 93 (L), Glucose 103 (H), Creatinine 1.33 (H), GFR 53 (L), calcium 7.7 (L), albumin 2.7 (L), protein total 6.7 (L)    Urine Culture: Pending  UA: albumin 30 (!), leukocyte esterase small (!), WBC 9 (H), mucous present (!), hyaline casts 4 (H), o/w Negative    Interventions:  1810 NS 1 L IV Bolus    1923 Zosyn 4.5 g IV    2044 Vancomycin 2000 mg IV    Emergency Department Course:  Past medical records, nursing notes, and vitals reviewed.  1740: I performed an exam of the patient and obtained history, as documented above.    The patient was sent for a chest xray and head CT while in the emergency department, findings above.  IV inserted and blood drawn. This was sent to laboratory for testing, findings above.   Urinalysis and culture obtained and sent for evaluation.    1900: I rechecked the patient.    Findings and plan explained to the Patient and spouse who consents to admission.   2127: Discussed the patient with Dr. Figueroa, who will admit the patient to a medicine bed for further monitoring, evaluation, and treatment.     Impression & Plan      Medical Decision Making:  Agapito Fairbanks is a 70 year old male with metastatic lung cancer who comes today with low-grade fever as well as hypotension at home.  His blood pressure has been normal here.  Lactic was normal.  Given he is immunosuppressed on chemotherapy for his  malignancy, I did treat him with broad-spectrum antibiotics after cultures were obtained.  It is possible that the source of the infection is from his urine.  A urine culture is pending at this time.  He is not neutropenic.  Head CT shows no acute abnormalities.  He does not appear to have meningitis or encephalitis and his wife reports that he is near his baseline for his cognitive issues.  Chest x-ray shows recurrent mass but no new findings.  We will admit him for further monitoring and treatment to be sure that he returns to his baseline.  COVID testing is pending at this time.  I updated his wife who was in agreement with the plan.    Diagnosis:    ICD-10-CM    1. Altered mental status, unspecified altered mental status type  R41.82 Blood culture     Blood culture     Urine Culture Aerobic Bacterial     COVID-19 Virus (Coronavirus) by PCR Nasopharyngeal     SARS-CoV-2 COVID-19 Virus (Coronavirus) RT-PCR     SARS-CoV-2 COVID-19 Virus (Coronavirus) RT-PCR     Procalcitonin     Procalcitonin     CANCELED: Procalcitonin   2. Fever, unspecified fever cause  R50.9    3. Malignant neoplasm of lung, unspecified laterality, unspecified part of lung (H)  C34.90    4. Hyponatremia  E87.1    5. Malignant neoplasm of upper lobe of left lung (H)  C34.12        Disposition:  Admitted to Dr. Figueroa.    Della Duke  5/1/2020    EMERGENCY DEPARTMENT    Scribe Disclosure:  Della MCCALL, am serving as a scribe at 5:37 PM on 5/1/2020 to document services personally performed by Bridget Orta MD, based on my observations and the provider's statements to me.         Bridget Orta MD  05/04/20 [id number]

## 2020-05-01 NOTE — ED NOTES
Talked to his wife she is not leaving until someone talks to her, she wants us to know that he has cognitive delay and unable to cognitively tell you his story.  So he is here for fever, and hypotension, with some increased confusion/disoriantation due to maybe his increase in chemo medications as he is stage 4 met lung cancer.  He also has two fent patches on his back. PLEASE call him back as so as you can with an update.    Millicent [phone]

## 2020-05-01 NOTE — TELEPHONE ENCOUNTER
[facility] Telephone Triage Note    Assessment: Spouse/Partner Maira called in to triage reporting the following symptoms: temp 100.2, hypotensive 80/47, syncope requiring wife to lower him to the floor, and change in the sound of his cough hissing (especially at night).  Patient has newly diagnosed metastatic squamous cell carcinoma of the lung.  Patient received C1D1 Pembrolizumab on 4/29/2020.    Recommendations:  ER Visit recommended.  Pt plans on being seen at  [facility] ER, report called to Manav.  Wife reports she will drive him.  Encouraged her to call 911, if she is unable to safely transport him.  Wife verbalized understanding of advice given.

## 2020-05-01 NOTE — ED TRIAGE NOTES
Wife states that patient is receiving treatment for lung cancer. SBPs in 70s with fever. Wife states that the patient has memory issues so please call her for information about what is going on with him.

## 2020-05-02 NOTE — PROGRESS NOTES
Transfer    S- Transfer to 8800-1 from [facility].    B- Admitted with weakness and fever, Covid negative, ready to transfer to station 88 (recent cancer diagnosis and treatment).    A- Brief systems assessment: Pleasant, oriented to place and situation, forgetful to time.  Up independently, Orthostatic BP's were negative.  Apparently had put on 2 fentanyl patches - these are not currently on as assessed by noc nurse and this writer.  Lungs sound diminished, Tele NSR 70's.  Denies pain today.  Poor intake, breakfast has been ordered for him and a supplement ordered for between meals as spouse reports poor intake since treatments started.  Skin is sarah with redness/erythema on L side of facial cheek anterior to ear and on lumbar spine.  Spouse would like a call from the physician today.  Report given to Leonardo.  Patient took morning meds and nasal inhaler sent with chart.    R- Transfer to 8800-1     Code status: Full Code  Skin: as above   Fall Risk: No  Isolation: None  Patient belongings: has glasses, cell phone, 2 bags of clothing/shoes with patient.   Medication drips upon transfer: NS at 100/hr  Bedside Report Letter Given and explained to pt: No

## 2020-05-02 NOTE — DISCHARGE SUMMARY
[facility]  Hospitalist Discharge Summary      Date of Admission:  5/1/2020  Date of Discharge:  5/2/2020  Discharging Provider: Deepti Doan MD      Discharge Diagnoses   Low grade fever and confusion, resolved  No clear source of infection    Follow-ups Needed After Discharge   Follow-up Appointments     Follow-up and recommended labs and tests       Follow up with Dr Mullins within 7 days.               Discharge Disposition   Discharged to home  Condition at discharge: Stable      Hospital Course   This is a 70-year-old male with history of stage IV squamous cell carcinoma of the left lung, history of laryngeal carcinoma of the tongue, hypothyroidism, hyperlipidemia, chronic kidney disease stage III, hypertension, mild cognitive impairment, depression on Keytruda for lung cancer.  Last dose was on 04/29/2020, now comes to the ER with complaint of feeling weak, tired, low-grade fever up to 100 degrees for a night and some mild confusion.      Covid was negative. Urine culture was negative. Patient felt improved on day 2 of admission. PT felt he was strong enough to discharge to home. He was seen by oncology who recommended to complete a 5 day course of levaquin.          Consultations This Hospital Stay   PHARMACY TO DOSE VANCO  PHYSICAL THERAPY ADULT IP CONSULT  OCCUPATIONAL THERAPY ADULT IP CONSULT  HEMATOLOGY & ONCOLOGY IP CONSULT    Code Status   Full Code    Time Spent on this Encounter   I, Deepti Doan MD, personally saw the patient today and spent less than or equal to 30 minutes discharging this patient.       Deepti Doan MD  [facility]  ______________________________________________________________________    Physical Exam   Vital Signs: Temp: 98.6  F (37  C) Temp src: Oral BP: 105/60 Pulse: 79 Heart Rate: 73 Resp: 16 SpO2: 94 % O2 Device: None (Room air)    Weight: 177 lbs 6.4 oz  Constitutional: Awake, alert, cooperative, no apparent distress.  Eyes:  Conjunctiva and pupils examined and normal.  HEENT: Moist mucous membranes, normal dentition.  Respiratory: Air entry equal bilaterally, no crackles but occasional wheezing.  Cardiovascular: Regular rate and rhythm, normal S1 and S2, and no murmur noted.  GI: Soft, non-distended, non-tender, normal bowel sounds.  Skin: Erythema on left side of face/neck below ear  Musculoskeletal: No joint swelling, erythema or tenderness.  Neurologic: Cranial nerves 2-12 intact, normal strength and sensation.  Psychiatric: Alert, oriented to person, place and time, no obvious anxiety or depression.       Primary Care Physician   Los Love    Discharge Orders      Reason for your hospital stay    Low grade fever and shortness of breath     Follow-up and recommended labs and tests     Follow up with Dr Mullins within 7 days.     Activity    Your activity upon discharge: activity as tolerated     Full Code     Diet    Follow this diet upon discharge: Orders Placed This Encounter      Snacks/Supplements Adult: Boost Plus; Between Meals      Combination Diet Regular Diet Adult       Significant Results and Procedures   Most Recent 3 CBC's:  Recent Labs   Lab Test 05/02/20  0845 05/01/20  1756 04/13/20  0910 01/22/20  0852 09/16/19  1412   WBC 6.9 10.5  --   --  8.0   HGB 10.8* 10.9*  --  14.0 14.8   MCV 89 90  --   --  91    313 409  --  302     Most Recent 3 BMP's:  Recent Labs   Lab Test 05/02/20  0845 05/01/20  1756 04/28/20  0913    127* 131*   POTASSIUM 3.9 4.2 3.5   CHLORIDE 101 93* 97   CO2 24 29 29   BUN 14 24 22   CR 0.92 1.33* 1.31*   ANIONGAP 8 5 5   SUSIE 7.3* 7.7* 9.5   GLC 98 103* 112*   ,   Results for orders placed or performed during the hospital encounter of 05/01/20   XR Chest Port 1 View    Narrative    CHEST ONE VIEW  5/1/2020 6:34 PM     HISTORY: Fever    COMPARISON: April 13, 2020      Impression    IMPRESSION: Left upper lobe mass again evident. Remaining lungs clear.    AMARI FAROOQ MD   Head CT w/o  contrast    Narrative    CT SCAN OF THE HEAD WITHOUT CONTRAST   5/1/2020 7:19 PM     HISTORY: Confusion, fever, lung cancer on chemotherapy.    TECHNIQUE:  Axial images of the head and coronal reformations without  IV contrast material. Radiation dose for this scan was reduced using  automated exposure control, adjustment of the mA and/or kV according  to patient size, or iterative reconstruction technique.    COMPARISON: Head MRI 4/21/2020    FINDINGS:  Mild volume loss is present. The cerebral hemispheres, brainstem, and  cerebellum otherwise demonstrate normal morphology and attenuation. No  evidence of acute ischemia, hemorrhage, mass, mass effect or  hydrocephalus. The visualized calvarium, tympanic cavities, mastoid  cavities, and paranasal sinuses are unremarkable.      Impression    IMPRESSION:   No evidence of acute ischemia or hemorrhage (ASPECTS 10).    Findings were discussed by phone between Dr. Broussard and Dr. Orta at  5/1/2020 7:25 PM.    BETO BROUSSARD MD       Discharge Medications   Discharge Medication List as of 5/2/2020  2:30 PM      START taking these medications    Details   levofloxacin (LEVAQUIN) 500 MG tablet Take 1 tablet (500 mg) by mouth every 24 hours Take daily with dinner, Disp-3 tablet,R-0, E-Prescribe         CONTINUE these medications which have NOT CHANGED    Details   bisacodyl (DULCOLAX) 5 MG EC tablet Take 5 mg by mouth daily as needed for constipation, Historical      cetirizine (ZYRTEC ALLERGY) 10 MG tablet Take 10 mg by mouth daily, Historical      donepezil (ARICEPT) 5 MG tablet Take 1 tablet (5 mg) by mouth At Bedtime, Disp-90 tablet,R-3, E-Prescribe      fentaNYL (DURAGESIC) 12 mcg/hr 72 hr patch Place 1 patch onto the skin every 72 hours remove old patch., Disp-10 patch,R-0, E-Prescribe      fluticasone (FLONASE) 50 MCG/ACT nasal spray Spray 1-2 sprays into both nostrils daily, Disp-48 g,R-3, E-Prescribe      levothyroxine (SYNTHROID/LEVOTHROID) 112 MCG tablet Take 112  mcg by mouth daily, Historical      MULTI VITAMIN MENS OR 1 TABLET DAILY, Historical      oxyCODONE (ROXICODONE) 5 MG tablet Take 1 tablet (5 mg) by mouth every 6 hours as needed for severe pain, Disp-60 tablet,R-0, E-Prescribe      sertraline (ZOLOFT) 50 MG tablet Take 1 tablet (50 mg) by mouth daily, Disp-90 tablet,R-3, E-PrescribeStop RFs of the 25mg tab      triamcinolone (KENALOG) 0.1 % external cream APPLY  CREAM EXTERNALLY TO AFFECTED AREA THREE TIMES DAILY AS NEEDED SPARINGLYDisp-80 g,[id number]         STOP taking these medications       losartan (COZAAR) 100 MG tablet Comments:   Reason for Stopping:             Allergies   Allergies   Allergen Reactions     Simvastatin      myalgias     Lisinopril Rash     rash

## 2020-05-02 NOTE — ED NOTES
Report received. Patient visualized. Vital signs stable. Patients antibiotic infusion complete. Patient given urinal per request. RN verified allergies with patients wife. Patient refused participation in research study when offered. Will hang second antibiotic when it arrives.

## 2020-05-02 NOTE — H&P
Admitted:     05/01/2020      PRIMARY CARE PHYSICIAN:   Los Love MD      HISTORY OF PRESENT ILLNESS:  This is a 70-year-old male with history of stage IV squamous cell carcinoma of the left lung, history of laryngeal carcinoma of the tongue, hypothyroidism, hyperlipidemia, chronic kidney disease stage III, hypertension, mild cognitive impairment, depression on Keytruda for lung cancer.  Last dose was on 04/29/2020, now comes to the ER with complaint of feeling weak, tired, low-grade fever up to 100 degrees for a night and some mild confusion.      The patient is a very poor historian.  Most of the history is obtained from the medical records, ER physician, and talking to the patient and wife.  The patient was started on fentanyl patch 12 mcg about 10 days ago after his lung biopsy as per the wife.  Since that they noted that the patient sometimes feeling fatigued and tired.  The patient also noticed some shortness of breath for the last couple of days and cough with mild wheezing yesterday.  Today, his wife checked his blood pressure and systolic blood pressure in low 80s and a few times he almost fainted as per the patient's wife, but never passed out or fell down.  Feeling weak, tired, cough, short of breath, and was somewhat mildly confused, so brought to the ER.  The patient denies any history of headache, dizziness, lightheadedness, chest pain, orthopnea, PND, palpitation, abdominal pain, dysuria, hematuria, constipation, diarrhea.  No sick contact, but he had immunotherapy with Keytruda on 04/29/2020.  According to the patient's wife the Keytruda does was recently increased.  The rest of the review of system is negative.      ASSESSMENT AND PLAN:   1.  Acute metabolic encephalopathy:  I think this is multifactorial.  I think this is related to his chemotherapy, hyponatremia, fentanyl patch, and low-grade fever and the combination of all.  He is already improved at this time.  Conscious, alert and  oriented.  Not in acute encephalopathy at this time.  Most likely IV fluids and antibiotic that he received in the ER.   2.  Possible urinary tract infection:  The patient's UA has WBC of 9, small leukocyte, negative nitrate.  Very borderline, not symptomatic.  We will obtain the blood culture and keep him on IV levofloxacin at this time.   3.  Possible bronchitis/febrile illness/rule out COVID-19.  The patient has low-grade fever of 100, cough, mild shortness of breath.  Chest x-ray shows left upper lobe mass, but not otherwise clear.  On examination, no crackles, and occasional wheezing, may have mild bronchitis.  X-ray finding does not look like COVID.  I will check procalcitonin.  He received IV vancomycin and Zosyn in the ER.  I will hold the vancomycin and Zosyn for now.  Keep him on IV levofloxacin for possible bronchitis.  We will keep him on airborne and contact precautions until the COVID-19 comes back negative.  I will check procalcitonin level as well.  Blood cultures already been obtained.   4.  Hypertension/and generalized weakness:  I think again this is multifactorial and is related to recent prescription for fentanyl patch, which is adding to his encephalopathy as well as weakness, tiredness, and maybe low blood pressure.  I will hold the losartan for now, keep him on IV fluids, and blood pressure already stabilized at this time, hold the fentanyl patche at this time.  Lactic acid is normal.   5.  Hypothyroidism:  On levothyroxine.  We will continue with that.   6.  History of memory loss/ cognitive impairment, on Aricept 5 mg daily.  We will continue with that.   7.  History of depression, on Zoloft.  We will continue with that.   8.  Hyponatremia:  Again, this is most likely secondary to Keytruda.  Keep her on IV.  Keep him on IV fluids, check urine osmolarity, urine spot sodium.  Once blood pressure stabilizes, we would like to keep him on free water restriction of 1500 mL in 24 hours.   9.  Deep  venous thrombosis prophylaxis with Lovenox.   10.  CODE STATUS:  Full code as per the patient wishes.   11. The case was discussed with the ER physician and the nursing staff taking care of the patient.         JAYY PAULINO MD             D: 2020   T: 2020   MT: SALO      Name:     CHRISTI MANN   MRN:      [id number]        Account:      [id number]   :      1950        Admitted:     2020                   Document: [id number]       cc: Los Love MD

## 2020-05-02 NOTE — PROVIDER NOTIFICATION
Notified Dr. Figueroa that patient's COVID-19 results are back and are negative. Pt can be taken out of isolation and transferred.

## 2020-05-02 NOTE — CONSULTS
[facility]    Hematology / Oncology Consultation     Date of Admission:  5/1/2020  Date of Consult (When I saw the patient): 5/2/20    Assessment & Plan   Agapito Fairbanks is a 70 year old male who was admitted on 5/1/2020. I was asked to see the patient for non small cell lung cancer admitted with fevers.    Fever overnight- none since admission  Non small cell lung cancer on immunotherapy with pembrolizumab (first dose 4/29/20)  Encephalopathy  Multiple medical comorbidities (dementia, hypertension, depression, previous squamous cell cancer of tongue, CKD stage III     Agapito received his first dose of pembrolizumab on 4/29/20. He had fevers at home. On questioning he denies having any problems or concerns. He is happy and wishes to go home. He is hemodynamically stable. Pembrolizumab is not typically associated with fevers and not a few days after first dose. He is not immunosuppressed. His counts are all adequate. His pain medication - fentanyl was recently adjusted and could be responsible for the confusion/fatigue. We could discharge him home on a course of oral levaquin. I have reached out to Dr. Mullins and his nurse to follow with him.     I have reviewed his case with Dr. Doan and agree with the planned discharge.     Over 60 min of time spent with more than 50% time spent in counseling and coordinating care.      Rizwan Jeter MD    Code Status    Full Code    Reason for Consult   Reason for consult: I was asked by Dr. Figueroa to evaluate this patient for non small cell lung cancer admitted with fevers and confusion.    Primary Care Physician   Los Love    Chief Complaint   fever    Unable to obtain a history from the patient due to dementia; history per charts and also from patient    History of Present Illness   Agapito Fairbanks is a 70 year old male who presents with fatigue and fevers.     Agapito was brought to ED by his wife with fevers, fatigue, hypotension at home. He  has been doing better since admission. He denied any concerns or any symptoms at the time of my evaluation. He was started on broad spectrum antibiotics and has been treated with IV fluids.     Past Medical History   Past medical history reviewed   Past Medical History:   Diagnosis Date     Acute gastritis with hemorrhage 11/02     Basal cell carcinoma, leg      left pretibial area     CKD (chronic kidney disease) stage 3, GFR 30-59 ml/min (H)     creat baseline approx 1.4     Hearing loss      Helicobacter pylori (H. 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)      Tobacco use disorder     quit 1998     Unspecified cataract     left     Unspecified essential hypertension      Unspecified retinal detachment     Bilateral        Past Surgical History   I have reviewed this patient's surgical history and updated it with pertinent information if needed.  Past Surgical History:   Procedure Laterality Date     C NONSPECIFIC PROCEDURE  5/01, 9/01    B retinal surg.     C NONSPECIFIC PROCEDURE  12/01    L cataract     C NONSPECIFIC PROCEDURE  1983    right ankle fx repair     C NONSPECIFIC PROCEDURE  3/05    Right hemilaryngectomy (laser) for Squamous Cell CA T1N0     C NONSPECIFIC PROCEDURE  3/05, 10/09    Moh's procedure for Basal Cell CA chest     C NONSPECIFIC PROCEDURE  4/04    Moh's procedure for Basal Cell CA ear lobe     C NONSPECIFIC  PROCEDURE  3/05,     right vocal cord squamous cell CA excision     C NONSPECIFIC PROCEDURE      Phacoemulsification of the lens with posterior chamber lens implant, right eye.      C NONSPECIFIC PROCEDURE  10/09     Moh's procedufe for BCC left pretibial area     C NONSPECIFIC PROCEDURE      Left thoracoscopic wedge resection, Open completion left upper lobe segmentectomy      THORACIC SURGERY      lung,throat       Prior to Admission Medications   Prior to Admission Medications   Prescriptions Last Dose Informant Patient Reported? Taking?   MULTI VITAMIN MENS OR 2020 at AM Spouse/Significant Other Yes Yes   Si TABLET DAILY   bisacodyl (DULCOLAX) 5 MG EC tablet Past Month at Unknown time Spouse/Significant Other Yes Yes   Sig: Take 5 mg by mouth daily as needed for constipation   cetirizine (ZYRTEC ALLERGY) 10 MG tablet 2020 at Unknown time Spouse/Significant Other Yes Yes   Sig: Take 10 mg by mouth daily   donepezil (ARICEPT) 5 MG tablet 2020 at pm Spouse/Significant Other No Yes   Sig: Take 1 tablet (5 mg) by mouth At Bedtime   fentaNYL (DURAGESIC) 12 mcg/hr 72 hr patch 2020 at [facility] 2 patches for the first time at 2100 Spouse/Significant Other No Yes   Sig: Place 1 patch onto the skin every 72 hours remove old patch.   fluticasone (FLONASE) 50 MCG/ACT nasal spray Past Week at Unknown time Spouse/Significant Other No Yes   Sig: Spray 1-2 sprays into both nostrils daily   levothyroxine (SYNTHROID/LEVOTHROID) 112 MCG tablet 2020 at AM Spouse/Significant Other Yes Yes   Sig: Take 112 mcg by mouth daily   losartan (COZAAR) 100 MG tablet 2020 at AM Spouse/Significant Other No Yes   Sig: Take 0.5 tablets (50 mg) by mouth daily   Patient taking differently: Take 50 mg by mouth daily Stop if SBP not > 110   oxyCODONE (ROXICODONE) 5 MG tablet 2020 at Unknown time Spouse/Significant Other No Yes   Sig: Take 1 tablet (5 mg) by mouth every 6 hours as needed for severe pain    sertraline (ZOLOFT) 50 MG tablet 2020 at Unknown time Spouse/Significant Other No Yes   Sig: Take 1 tablet (50 mg) by mouth daily   triamcinolone (KENALOG) 0.1 % external cream 2020 at PM Spouse/Significant Other No Yes   Sig: APPLY  CREAM EXTERNALLY TO AFFECTED AREA THREE TIMES DAILY AS NEEDED SPARINGLY      Facility-Administered Medications: None     Allergies   Allergies   Allergen Reactions     Simvastatin      myalgias     Lisinopril Rash     rash       Social History   I have reviewed this patient's social history and updated it with pertinent information if needed. Agapito Fairbanks  reports that he quit smoking about 22 years ago. He has a 45.00 pack-year smoking history. He has never used smokeless tobacco. He reports current alcohol use. He reports that he does not use drugs.    Family History   I have reviewed this patient's family history and updated it with pertinent information if needed.   Family History   Problem Relation Age of Onset     Colon Cancer Mother          age 65, in      Family History Negative Father         mild memory problems,  age 87, in      Family History Negative Brother      Family History Negative Brother      Cancer Brother         hx prostate ca     Sleep Apnea Brother        Review of Systems   The 10 point Review of Systems is negative other than noted in the HPI or here.      Physical Exam   Temp: 98.6  F (37  C) Temp src: Oral BP: 105/60 Pulse: 79 Heart Rate: 73 Resp: 16 SpO2: 94 % O2 Device: None (Room air)    Vital Signs with Ranges  Temp:  [97.6  F (36.4  C)-99.4  F (37.4  C)] 98.6  F (37  C)  Pulse:  [74-93] 79  Heart Rate:  [72-85] 73  Resp:  [15-18] 16  BP: ()/(60-86) 105/60  SpO2:  [87 %-98 %] 94 %  177 lbs 6.4 oz    Middle aged male in no acute distress  Breathing comfortably, no tachypnea  Speech and hearing normal  Pleasant mood and congruent affect  Moving all extremities, no focal neurologic deficits apparent    Data   Recent  Labs   Lab Test 05/02/20  0845 05/01/20 1756 04/28/20  0913 01/22/20  0852 09/16/19  1412    127* 131* 134 130*   POTASSIUM 3.9 4.2 3.5 3.8 3.8   CHLORIDE 101 93* 97 100 96   CO2 24 29 29 28 30   ANIONGAP 8 5 5 6 4   BUN 14 24 22 14 13   CR 0.92 1.33* 1.31* 1.34* 1.33*   GLC 98 103* 112* 120* 104*   SUSIE 7.3* 7.7* 9.5 9.3 9.3     Recent Labs   Lab Test 05/02/20 0845 05/01/20 1756 01/22/20  0852   MAG 2.0 2.0  --    PHOS  --   --  3.3     Recent Labs   Lab Test 05/02/20 0845 05/01/20 1756 04/13/20  0910 01/22/20  0852 09/16/19  1412 05/10/19  0940  06/05/18  1352  05/08/17  1522   WBC 6.9 10.5  --   --  8.0 6.2  --  7.8  --  7.0   HGB 10.8* 10.9*  --  14.0 14.8 14.5   < > 15.2   < > 14.2    313 409  --  302 251  --  253  --  263   MCV 89 90  --   --  91 92  --  95  --  94   NEUTROPHIL 81.2 84.6  --   --   --  73.5  --  77.4  --  69.7    < > = values in this interval not displayed.     Recent Labs   Lab Test 05/02/20 0845 05/01/20 1756 04/28/20  0913   BILITOTAL 0.5 0.4 0.6   ALKPHOS 70 83 90   ALT 15 19 25   AST 10 10 11   ALBUMIN 2.3* 2.7* 3.0*     TSH   Date Value Ref Range Status   04/28/2020 4.60 (H) 0.40 - 4.00 mU/L Final   01/22/2020 5.77 (H) 0.40 - 4.00 mU/L Final   01/10/2019 1.52 0.40 - 4.00 mU/L Final     No results for input(s): CEA in the last 18121 hours.  Results for orders placed or performed during the hospital encounter of 05/01/20   XR Chest Port 1 View    Narrative    CHEST ONE VIEW  5/1/2020 6:34 PM     HISTORY: Fever    COMPARISON: April 13, 2020      Impression    IMPRESSION: Left upper lobe mass again evident. Remaining lungs clear.    AMARI FAROOQ MD   Head CT w/o contrast    Narrative    CT SCAN OF THE HEAD WITHOUT CONTRAST   5/1/2020 7:19 PM     HISTORY: Confusion, fever, lung cancer on chemotherapy.    TECHNIQUE:  Axial images of the head and coronal reformations without  IV contrast material. Radiation dose for this scan was reduced using  automated exposure control,  adjustment of the mA and/or kV according  to patient size, or iterative reconstruction technique.    COMPARISON: Head MRI 4/21/2020    FINDINGS:  Mild volume loss is present. The cerebral hemispheres, brainstem, and  cerebellum otherwise demonstrate normal morphology and attenuation. No  evidence of acute ischemia, hemorrhage, mass, mass effect or  hydrocephalus. The visualized calvarium, tympanic cavities, mastoid  cavities, and paranasal sinuses are unremarkable.      Impression    IMPRESSION:   No evidence of acute ischemia or hemorrhage (ASPECTS 10).    Findings were discussed by phone between Dr. Broussard and Dr. Orta at  5/1/2020 7:25 PM.    BETO BROUSSARD MD

## 2020-05-02 NOTE — PROGRESS NOTES
RECEIVING UNIT ED HANDOFF REVIEW    ED Nurse Handoff Report was reviewed by: Delphine Bunn RN on May 1, 2020 at 9:48 PM

## 2020-05-02 NOTE — PLAN OF CARE
Discharge Planner OT   Patient plan for discharge: home w/ spouse  Current status: OT orders received and chart reviewed. Pt lives w/ his wife and is ind w/ all mobility and ADL's at baseline. Pt has cognitive impairment- per chart has very poor short term memory.     Per PT who completed OT screen, pt ind w/ ambulation and ADL's, was alert and oriented to all and able to answer safety questions appropriately. No skilled IP OT needs identified as pt at/near functional baseline  Barriers to return to prior living situation: none  Recommendations for discharge: home  Rationale for recommendations: pt at/near baseline and is anticipated to safely return home upon discharge.        Entered by: Massiel Snow 05/02/2020 2:19 PM

## 2020-05-02 NOTE — PROGRESS NOTES
Patient discharged at 1730 to home.  IV was discontinued. Pain at time of discharge was denied. Belongings returned to patient.  Discharge instructions and medications reviewed with patient and wife via telephone.  Patient verbalized understanding and all questions were answered.  Prescriptions sent to [facility].  At time of discharge, patient condition was stable and left the unit in a wheelchair escorted by transport.

## 2020-05-02 NOTE — PHARMACY-ADMISSION MEDICATION HISTORY
Pharmacy Medication History  Admission medication history interview status for the 5/1/2020  admission is complete. See EPIC admission navigator for prior to admission medications     Medication history sources: Patient's family/friend (spouse Millicent)  Medication history source reliability: Good  Adherence assessment: Good    Significant changes made to the medication list:  Removed Hydrochlorothiazide and Crestor.   Added Bisacodyl, Zyrtec.  Changed dose of Levothyroxine from 125 mcg to 112 mcg.     Additional medication history information:   4/30 Patient applied two Fentanyl patches 12 mcg/hr at 9 pm.     Medication reconciliation completed by provider prior to medication history? No    Time spent in this activity: 30 minutes      Prior to Admission medications    Medication Sig Last Dose Taking? Auth Provider   bisacodyl (DULCOLAX) 5 MG EC tablet Take 5 mg by mouth daily as needed for constipation Past Month at Unknown time Yes Unknown, Entered By History   cetirizine (ZYRTEC ALLERGY) 10 MG tablet Take 10 mg by mouth daily 5/1/2020 at Unknown time Yes Unknown, Entered By History   donepezil (ARICEPT) 5 MG tablet Take 1 tablet (5 mg) by mouth At Bedtime 4/30/2020 at pm Yes Los Love MD   fentaNYL (DURAGESIC) 12 mcg/hr 72 hr patch Place 1 patch onto the skin every 72 hours remove old patch. 4/30/2020 at [facility] 2 patches for the first time at 2100 Yes Wilver Mullins MD   fluticasone (FLONASE) 50 MCG/ACT nasal spray Spray 1-2 sprays into both nostrils daily Past Week at Unknown time Yes Los Love MD   levothyroxine (SYNTHROID/LEVOTHROID) 112 MCG tablet Take 112 mcg by mouth daily 5/1/2020 at AM Yes Unknown, Entered By History   losartan (COZAAR) 100 MG tablet Take 0.5 tablets (50 mg) by mouth daily  Patient taking differently: Take 50 mg by mouth daily Stop if SBP not > 110 5/1/2020 at AM Yes Los Love MD   MULTI VITAMIN MENS OR 1 TABLET DAILY 5/1/2020 at AM Yes Reported, Patient   oxyCODONE  (ROXICODONE) 5 MG tablet Take 1 tablet (5 mg) by mouth every 6 hours as needed for severe pain 4/30/2020 at Unknown time Yes Wilver Mullins MD   sertraline (ZOLOFT) 50 MG tablet Take 1 tablet (50 mg) by mouth daily 4/30/2020 at Unknown time Yes Los Love MD   triamcinolone (KENALOG) 0.1 % external cream APPLY  CREAM EXTERNALLY TO AFFECTED AREA THREE TIMES DAILY AS NEEDED SPARINGLY 4/30/2020 at PM Yes Los Love MD Sharon Chandler, PharmD, BCPS

## 2020-05-02 NOTE — H&P
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  Last dose was on 04/29/2020, now comes to the ER with complaint of feeling weak, tired, low-grade fever up to 100 degrees for a night and some mild confusion.      ASSESSMENT AND PLAN:   1.  Acute metabolic encephalopathy:  I think this is multifactorial.  I think this is related to his chemotherapy, hyponatremia, fentanyl patch, and low-grade fever and the combination of all.  He is already improved at this time.  Conscious, alert and oriented.  Not in acute encephalopathy at this time.  Most likely IV fluids and antibiotic that he received in the ER.   2.  Possible urinary tract infection:  The patient's UA has WBC of 9, small leukocyte, negative nitrate.  Very borderline, not symptomatic.  We will obtain the blood culture and keep him on IV levofloxacin at this time.   3.  Possible bronchitis/febrile illness/rule out COVID-19.  The patient has low-grade fever of 100, cough, mild shortness of breath.  Chest x-ray shows left upper lobe mass, but not otherwise clear.  On examination, no crackles, and occasional wheezing, may have mild bronchitis.  X-ray finding does not look like COVID.  I will check procalcitonin.  He received IV vancomycin and Zosyn in the ER.  I will hold the vancomycin and Zosyn for now.  Keep him on IV levofloxacin for possible bronchitis.  We will keep him on airborne and contact precautions until the COVID-19 comes back negative.  I will check procalcitonin level as well.  Blood cultures already been obtained. Keytruda  sometime cause fever as well. Will follow   4.  Hypertension/and  generalized weakness:  I think again this is multifactorial and is related to recent prescription for fentanyl patch, which is adding to his encephalopathy as well as weakness, tiredness, and maybe low blood pressure.  I will hold the losartan for now, keep him on IV fluids, and blood pressure already stabilized at this time, hold the fentanyl patche at this time.  Lactic acid is normal.   5.  Hypothyroidism:  On levothyroxine.  We will continue with that.   6.  History of memory loss/ cognitive impairment, on Aricept 5 mg daily.  We will continue with that.   7.  History of depression, on Zoloft.  We will continue with that.   8.  Hyponatremia:  Again, this is most likely secondary to Keytruda.  Keep her on IV.  Keep him on IV fluids, check urine osmolarity, urine spot sodium.  Once blood pressure stabilizes, we would like to keep him on free water restriction of 1500 mL in 24 hours.   9.  Deep venous thrombosis prophylaxis with Lovenox.   10.  CODE STATUS:  Full code as per the patient wishes.   11. The case was discussed with the ER physician and the nursing staff taking care of the patient.         KIRIT FIGUEROA MD           DVT Prophylaxis: Enoxaparin (Lovenox) SQ  Code Status: Full Code    Disposition: Expected discharge in 1-2 days once stable.    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  Last dose was on 04/29/2020, now comes to the ER with complaint of feeling weak, tired, low-grade  fever up to 100 degrees for a night and some mild confusion.      The patient is a very poor historian.  Most of the history is obtained from the medical records, ER physician, and talking to the patient and wife.  The patient was started on fentanyl patch 12 mcg about 10 days ago after his lung biopsy as per the wife.  Since that they noted that the patient sometimes feeling fatigued and tired.  The patient also noticed some shortness of breath for the last couple of days and cough with mild wheezing yesterday.  Today, his wife checked his blood pressure and systolic blood pressure in low 80s and a few times he almost fainted as per the patient's wife, but never passed out or fell down.  Feeling weak, tired, cough, short of breath, and was somewhat mildly confused, so brought to the ER.  The patient denies any history of headache, dizziness, lightheadedness, chest pain, orthopnea, PND, palpitation, abdominal pain, dysuria, hematuria, constipation, diarrhea.  No sick contact, but he had immunotherapy with Keytruda on 04/29/2020.  According to the patient's wife the Keytruda does was recently increased.  The rest of the review of system is negative.       Past Medical History    I have reviewed this patient's medical history and updated it with pertinent information if needed.   Past Medical History:   Diagnosis Date     Acute gastritis with hemorrhage 11/02     Basal cell carcinoma, leg      left pretibial area     CKD (chronic kidney disease) stage 3, GFR 30-59 ml/min (H)     creat baseline approx 1.4     Hearing loss      Helicobacter pylori (H. 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     Mild major depression (H)      Other and unspecified malignant neoplasm  of skin of other and unspecified parts of face      Basal Cell CA nasal area , chest 3/05, right chest 10/09     Other testicular hypofunction      Squamous cell carcinoma  2012     RLE s/p excision     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)      Tobacco use disorder     quit      Unspecified cataract     left     Unspecified essential hypertension      Unspecified retinal detachment     Bilateral       Past Surgical History   I have reviewed this patient's surgical history and updated it with pertinent information if needed.  Past Surgical History:   Procedure Laterality Date     C NONSPECIFIC PROCEDURE  ,     B retinal surg.     C NONSPECIFIC PROCEDURE      L cataract     C NONSPECIFIC PROCEDURE      right ankle fx repair     C NONSPECIFIC PROCEDURE  3/05    Right hemilaryngectomy (laser) for Squamous Cell CA T1N0     C NONSPECIFIC PROCEDURE  3/05, 10/09    Moh's procedure for Basal Cell CA chest     C NONSPECIFIC PROCEDURE      Moh's procedure for Basal Cell CA ear lobe     C NONSPECIFIC PROCEDURE  3/05,     right vocal cord squamous cell CA excision     C NONSPECIFIC PROCEDURE      Phacoemulsification of the lens with posterior chamber lens implant, right eye.      C NONSPECIFIC PROCEDURE  10/09     Moh's procedufe for BCC left pretibial area     C NONSPECIFIC PROCEDURE      Left thoracoscopic wedge resection, Open completion left upper lobe segmentectomy      THORACIC SURGERY      lung,throat       Prior to Admission Medications   Prior to Admission Medications   Prescriptions Last Dose Informant Patient Reported? Taking?   MULTI VITAMIN MENS OR 2020 at AM Spouse/Significant Other Yes Yes   Si TABLET DAILY   bisacodyl (DULCOLAX) 5 MG EC tablet Past Month at Unknown time Spouse/Significant Other Yes Yes   Sig: Take 5 mg by mouth daily as needed for constipation   cetirizine (ZYRTEC ALLERGY) 10 MG tablet 2020 at Unknown time Spouse/Significant  Other Yes Yes   Sig: Take 10 mg by mouth daily   donepezil (ARICEPT) 5 MG tablet 4/30/2020 at pm Spouse/Significant Other No Yes   Sig: Take 1 tablet (5 mg) by mouth At Bedtime   fentaNYL (DURAGESIC) 12 mcg/hr 72 hr patch 4/30/2020 at [employer] 2 patches for the first time at 2100 Spouse/Significant Other No Yes   Sig: Place 1 patch onto the skin every 72 hours remove old patch.   fluticasone (FLONASE) 50 MCG/ACT nasal spray Past Week at Unknown time Spouse/Significant Other No Yes   Sig: Spray 1-2 sprays into both nostrils daily   levothyroxine (SYNTHROID/LEVOTHROID) 112 MCG tablet 5/1/2020 at AM Spouse/Significant Other Yes Yes   Sig: Take 112 mcg by mouth daily   losartan (COZAAR) 100 MG tablet 5/1/2020 at AM Spouse/Significant Other No Yes   Sig: Take 0.5 tablets (50 mg) by mouth daily   Patient taking differently: Take 50 mg by mouth daily Stop if SBP not > 110   oxyCODONE (ROXICODONE) 5 MG tablet 4/30/2020 at Unknown time Spouse/Significant Other No Yes   Sig: Take 1 tablet (5 mg) by mouth every 6 hours as needed for severe pain   sertraline (ZOLOFT) 50 MG tablet 4/30/2020 at Unknown time Spouse/Significant Other No Yes   Sig: Take 1 tablet (50 mg) by mouth daily   triamcinolone (KENALOG) 0.1 % external cream 4/30/2020 at PM Spouse/Significant Other No Yes   Sig: APPLY  CREAM EXTERNALLY TO AFFECTED AREA THREE TIMES DAILY AS NEEDED SPARINGLY      Facility-Administered Medications: None     Allergies   Allergies   Allergen Reactions     Simvastatin      myalgias     Lisinopril Rash     rash       Social History   I have reviewed this patient's social history and updated it with pertinent information if needed. Agapito Fairbanks  reports that he quit smoking about 22 years ago. He has a 45.00 pack-year smoking history. He has never used smokeless tobacco. He reports current alcohol use. He reports that he does not use drugs.    Family History   I have reviewed this patient's family history and updated it with  pertinent information if needed.   Family History   Problem Relation Age of Onset     Colon Cancer Mother          age 65, in      Family History Negative Father         mild memory problems,  age 87, in      Family History Negative Brother      Family History Negative Brother      Cancer Brother         hx prostate ca     Sleep Apnea Brother        Review of Systems   CONSTITUTIONAL:  positive for  fevers, fatigue and malaise  EYES:  negative  HEENT:  negative  RESPIRATORY:  positive for  dry cough and dyspnea  CARDIOVASCULAR:  negative  GASTROINTESTINAL:  negative  GENITOURINARY:  negative  INTEGUMENT/BREAST:  negative  HEMATOLOGIC/LYMPHATIC:  negative  ALLERGIC/IMMUNOLOGIC:  negative  ENDOCRINE:  negative  MUSCULOSKELETAL:  negative  NEUROLOGICAL:  negative      Physical Exam   Temp: 99.4  F (37.4  C) Temp src: Oral BP: 106/65 Pulse: 93 Heart Rate: 83 Resp: 16 SpO2: 95 % O2 Device: None (Room air)    Vital Signs with Ranges  Temp:  [99.4  F (37.4  C)] 99.4  F (37.4  C)  Pulse:  [74-93] 93  Heart Rate:  [83] 83  Resp:  [16] 16  BP: ()/(60-86) 106/65  SpO2:  [87 %-98 %] 95 %  175 lbs 0 oz    Constitutional: Awake, alert, cooperative, no apparent distress.  Eyes: Conjunctiva and pupils examined and normal.  HEENT: Moist mucous membranes, normal dentition.  Respiratory: Air entry equal bilaterally, no crackles but occasional wheezing.  Cardiovascular: Regular rate and rhythm, normal S1 and S2, and no murmur noted.  GI: Soft, non-distended, non-tender, normal bowel sounds.  Lymph/Hematologic: No anterior cervical or supraclavicular adenopathy.  Skin: No rashes, no cyanosis, no edema.  Musculoskeletal: No joint swelling, erythema or tenderness.  Neurologic: Cranial nerves 2-12 intact, normal strength and sensation.  Psychiatric: Alert, oriented to person, place and time, no obvious anxiety or depression.    Data   Data reviewed today:  I personally reviewed the EKG tracing showing NSR< no acute  ischemic changes .  Recent Labs   Lab 05/01/20  1756 04/28/20  0913   WBC 10.5  --    HGB 10.9*  --    MCV 90  --      --    * 131*   POTASSIUM 4.2 3.5   CHLORIDE 93* 97   CO2 29 29   BUN 24 22   CR 1.33* 1.31*   ANIONGAP 5 5   SUSIE 7.7* 9.5   * 112*   ALBUMIN 2.7* 3.0*   PROTTOTAL 6.7* 7.3   BILITOTAL 0.4 0.6   ALKPHOS 83 90   ALT 19 25   AST 10 11       Recent Results (from the past 24 hour(s))   XR Chest Port 1 View    Narrative    CHEST ONE VIEW  5/1/2020 6:34 PM     HISTORY: Fever    COMPARISON: April 13, 2020      Impression    IMPRESSION: Left upper lobe mass again evident. Remaining lungs clear.    AMARI FAROOQ MD   Head CT w/o contrast    Narrative    CT SCAN OF THE HEAD WITHOUT CONTRAST   5/1/2020 7:19 PM     HISTORY: Confusion, fever, lung cancer on chemotherapy.    TECHNIQUE:  Axial images of the head and coronal reformations without  IV contrast material. Radiation dose for this scan was reduced using  automated exposure control, adjustment of the mA and/or kV according  to patient size, or iterative reconstruction technique.    COMPARISON: Head MRI 4/21/2020    FINDINGS:  Mild volume loss is present. The cerebral hemispheres, brainstem, and  cerebellum otherwise demonstrate normal morphology and attenuation. No  evidence of acute ischemia, hemorrhage, mass, mass effect or  hydrocephalus. The visualized calvarium, tympanic cavities, mastoid  cavities, and paranasal sinuses are unremarkable.      Impression    IMPRESSION:   No evidence of acute ischemia or hemorrhage (ASPECTS 10).    Findings were discussed by phone between Dr. Broussard and Dr. Orta at  5/1/2020 7:25 PM.    BETO BROUSSARD MD

## 2020-05-02 NOTE — ED NOTES
Update given to patients wife Maira via phone. She is concerned that patients BP may have been low at home due to Fentanyl patched he wears for pain, she says he currently has 2 Fentanyl patched on his back. Will update MD.

## 2020-05-02 NOTE — ED NOTES
[facility]  ED Nurse Handoff Report    ED Chief complaint: Hypotension and Fever      ED Diagnosis:   Final diagnoses:   Altered mental status, unspecified altered mental status type   Fever, unspecified fever cause   Malignant neoplasm of lung, unspecified laterality, unspecified part of lung (H)       Code Status: to be assessed by admitting provider     Allergies:   Allergies   Allergen Reactions     Simvastatin      myalgias     Lisinopril Rash     rash       Patient Story: Patient arrives to the ED today with complaints of fever, hypotension and increased confusion. Per patients wife patient has had a fever as high as 102 at home and systolic BP on home machine of 70. Patient has stage four lung cancer for which he receives chemotherapy and wife report he has had an increase in his chemo dose recently as well.   Focused Assessment:    Respiratory: Stage four lung cancer   Cardiac: Hypotensive at home however BP WDL in the ED   Neuro: Patient has some confusion at baseline however wife reports his confusion has worsened at this time.   GI: WDL   : Increased urine frequency     Treatments and/or interventions provided: fluids, antibiotic    Patient's response to treatments and/or interventions: none     To be done/followed up on inpatient unit:  continue to monitor     Does this patient have any cognitive concerns?: Mild confusion      Activity level - Baseline/Home:  Independent  Activity Level - Current:   Independent    Patient's Preferred language: English   Needed?: No    Isolation: modified droplet and contact   Infection: rule out COVID   Bariatric?: No    Vital Signs:   Vitals:    05/01/20 2005 05/01/20 2010 05/01/20 2015 05/01/20 2020   BP:   104/67    Pulse:   79    Resp:       Temp:       TempSrc:       SpO2: 97% 98% 95% 95%   Weight:       Height:           Cardiac Rhythm:     Was the PSS-3 completed:   Yes  What interventions are required if any?               Family  Comments: wife Millicent is very informative and would like to be updated on plan of care   OBS brochure/video discussed/provided to patient/family: No              Name of person given brochure if not patient: n.a              Relationship to patient: n.a    For the majority of the shift this patient's behavior was Green.   Behavioral interventions performed were none .    ED NURSE PHONE NUMBER: *[phone]

## 2020-05-02 NOTE — PLAN OF CARE
Discharge Planner PT   Patient plan for discharge: Home with spouse  Current status: PT orders received, chart reviewed, PT screen completed. At baseline pt lives with spouse in a condo with elevator access, IND with all mobility and ADLs at baseline. Known cognitive impairment.     Nursing notes indicate pt has been up IND. Pt currently AxO x4, able to answer general safety questions appropriately, demonstrates IND with ADLs. Pt demonstrates IND with bed mobility, transfers, >300' ambulation. Steady with static and dynamic balance challenges. Appears to be at baseline, PT eval not indicated.  Barriers to return to prior living situation: None  Recommendations for discharge: Home  Rationale for recommendations: Pt mobilizing at baseline, steady on feet with good activity tolerance. Anticipate safe disch to home with spouse. No IP PT needs identified. Orders completed.       Entered by: Maria Ines Key 05/02/2020 11:13 AM

## 2020-05-02 NOTE — PLAN OF CARE
DATE & TIME: 05-2-2020 5:54 AM    Cognitive Concerns/ Orientation : A/Ox4   BEHAVIOR & AGGRESSION TOOL COLOR: Green, calm and cooperative  CIWA SCORE: n/a   ABNL VS/O2: VSS on RA  MOBILITY: Independent. Denies generalized weakness/dizziness/lightheadedness.   PAIN MANAGMENT: Tylenol x 1 given for lower back pain.   DIET: Regular   BOWEL/BLADDER: Continent. Denies abdominal pain. Denies nausea. No BM this shift  ABNL LAB/BG: Na 127; Cr 1.33  DRAIN/DEVICES: PIV infusing with NS at 100 ml/hr  TELEMETRY RHYTHM: NSR  SKIN: WDL except rash to back; lower back with small opening from mole removal-band aid applied  TESTS/PROCEDURES: n/a  D/C DAY/GOALS/PLACE: pending progress, 1-2 days per MD note  OTHER IMPORTANT INFO: n/a

## 2020-05-03 NOTE — PROGRESS NOTES
Visit Date:   04/29/2020     ONCOLOGY HISTORY: Mr. 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  High PD-L1 expression.  No EGFR mutation.  ALK and ROS1 could not be done because of insufficient sample.   1.  In 2005, he was evaluated by ENT for hoarseness.   -On 02/22/2005, he had laryngoscopy. There was right anterior true vocal cord lesion with extension to the anterior commissure on the right. 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   -On 03/18/2005, he had endoscopic laser assisted vertical hemilaryngectomy right. Pathology from vocal cord revealed squamous cell carcinoma, moderately differentiated, invasive  -Unfortunately, he had recurrence. 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 Pathology revealed invasive moderately differentiated squamous cell carcinoma.   -Patient received concurrent chemoradiation. He received 6000 cGy completed on 06/21/2006. He received cisplatin with radiation.   2.  He had a PET scan done on 10/24/2009.  It revealed enlarging spiculated nodule in posterior left upper lobe with borderline hypermetabolic activity.   -He had left upper lobe segmentectomy and mediastinal lymph node dissection on 12/10/2009.  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  No lymphovascular present.  Margins negative.  Lymph nodes were all benign.   3.  The patient developed a lesion in his back.  He was seen by a dermatologist.  Biopsy on 01/31/2020 revealed squamous cell carcinoma. He subsequently underwent a Mohs surgical procedure on 02/28/2020.  Lesion measured 2.7 cm.  The patient was recommended postoperative radiation.   4.  For further workup, PET scan was done on 04/02/2020.  It reveals a hypermetabolic 6.4 cm mass in left upper lobe.  The mass invades into the mediastinum.  There is a mildly enlarged hypermetabolic single  mediastinal lymph node.  There is hypermetabolic lesion in L4 vertebral body and left second rib.  There is hypermetabolic nodule in left parotid gland.   -MRI of the thoracic and lumbar spine was done on 04/06/2020.  It revealed destructive metastatic lesions involving L4 vertebral body.  There was also multiple other osseous metastatic disease.   5.  CT-guided biopsy of the left lung mass was done on 04/13/2020.  Pathology reveals moderately differentiated squamous cell carcinoma.  TPS score of 60%.  He has high PD-L1 expression.   6.  The patient received palliative radiation.   7. Pembrolizumab started on 04/29/2020.     SUBJECTIVE:  Mr. 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  High PD-L1 expression.  No EGFR mutation.  ALK and ROS1 could not be done because of insufficient sample.  The patient is here to start pembrolizumab.  He completed radiation to his back and left parotid nodule.      The patient presents to the clinic with his wife.  The patient has memory problem.  He is alert and oriented, but he cannot remember things.  He forgets very easily.  He asked questions repetitively.  Wife mentioned that he cannot be left alone for a long time as he is forgetful.  She had a list of questions which the patient wanted to ask.  Wife mentioned that she has the power of .  The patient is going to meet with her  regarding updating healthcare directive and also for legal guardianship.      The patient has pain in the back. It has not resolved.  This is due to bone metastasis.  The patient received radiation.  He is on fentanyl patch 12 mcg.  Oxycodone is used periodically.  He also has some discomfort in the left cheek where he got radiation.      No headache.  Intermittent dizziness.  No neck pain.  No chest pain.  No shortness of breath.  The patient has chronic intermittent cough.  He had head and neck cancer.  He had surgery,  radiation and chemotherapy.  Since then, he always has some intermittent cough, nonproductive.  Occasionally, he will have some blood in his sputum.  No abdominal pain.  Some nausea.  No vomiting.  Appetite has been decreased.  In the last few months, he has lost about 10-15 pounds of weight.  No urinary or bowel complaints.      As per the wife, patient's fatigue has been getting worse.  Also, his mood has changed.  He is more irritable.  His appetite has been decreased and he lost weight. All other review of systems is negative.      The patient wants me to examine the face area and the back where he has discomfort.      PHYSICAL EXAMINATION:   GENERAL:  He is alert.  He was oriented x 3.   FACE:  No swelling.  No facial droop.  In the left face, there is erythema of the skin where he had radiation.  No open wound.   THROAT:  No ulcer.  No thrush.   NECK:  Supple. No tenderness or lymphadenopathy.   CHEST:  No deformity or tenderness.  A scar samina from previous surgery.   BACK:  There is some erythema at the site of radiation.  No open ulcer.   LUNGS:  Good air entry bilaterally.  No wheezing.   HEART:  Regular.   GI: Abdomen is soft and non-tender. No mass.   EXTREMITIES:  No edema.   SKIN:  No petechia.  No suspicious skin lesion.      LABORATORY DATA:  Reviewed.      ASSESSMENT:    1.  A 70-year-old gentleman with metastatic lung squamous cell carcinoma with high PD-L1 expression.   2.  Back pain and pain in the left cheek from metastatic disease.   3.  Fatigue.   4.  Weight loss.   5.  Irritability.   6.  Forgetfulness.   7.  Chronic kidney disease.   8.  History of recurrent head and neck cancer.  9. History of left lung adenosquamous cell carcinoma.      PLAN:   1.  I reviewed with the patient and his wife regarding lung cancer.  PET scan was reviewed.  Pictures shown.  Pathology result was discussed.  I explained to the patient that he has metastatic lung cancer.  This is incurable.  Treatment will help to  control disease, palliate symptoms and prolong life.  Again reviewed regarding treatment.  As PD-L1 expression is high, we will start him on pembrolizumab.  Side effects reviewed.  He is agreeable to take pembrolizumab.  I am hoping it will help with the tumor.   2.  The patient had a skin biopsy with his dermatologist.  He will try to get the sample from there and see if we can check for ALK and ROS1.   3.  Discussed regarding pain.  We will increase the fentanyl patch to 25 mcg.  He will continue to take oxycodone as needed.   4.  The patient's appetite is decreased.  I advised him to eat high-calorie food.  If his appetite does not improve, we will consider either Marinol or medical marijuana.   5.  He has been on Crestor.  I advised him to stop it.   6.  He has bone metastasis.  We will start him on Zometa.  Side effects reviewed.  He does not have any dental or jaw related symptoms.  It can cause flu-like symptoms, dental infection, osteonecrosis of jaw and other problems.  The patient agreeable for it.   7.  The patient is going to meet with Palliative Care.     8.  Discussed regarding living will and legal guardianship.  While the patient is alert and oriented, he does have cognitive impairment.  He is very forgetful.  He is irritable. Even in the clinic, he kept asking things multiple times.  Based on all this, it will be best for patient to sign a legal guardianship.  Wife mentioned that they will be meeting with the .  Because of patient's memory issue, wife was allowed to come to the clinic today.   9.  He will be seen with the next treatment.  The patient/wife is advised to call us with any questions or concerns.  I advised him to go to the emergency room if he has fever, infection, diarrhea, skin rash, chest pain, shortness of breath, worsening cough or any other concerns.         JENNIFER CEDENO MD             D: 04/29/2020   T: 04/29/2020   MT: RENETTA      Name:     CHRISTI MANN   MRN:       [id number]        Account:      [id number]   :      1950           Visit Date:   2020      Document: [id number]

## 2020-05-04 NOTE — PROGRESS NOTES
Writer received a return call fom patient's wife, Millicent, she states patient is weak with a poor appetite which is not new.  She is concerned about the weakness and we discussed that it is many factors could be affecting his strength, lack of appetite, the disease, low blood pressures, and his body fighting off an illness.     Patient is scheduled to see Dr. Hernandez in palliative care on Wednesday. Writer verbalized if patient develops worsening or any new symptoms she needs to call.    Cecile Alanis RN

## 2020-05-04 NOTE — TELEPHONE ENCOUNTER
Pt has appt with Oncology in 3 days. Recently admitted with encephalopathy though related to his chemotherapy and  pain medication (both prescribed by Oncology) and hyponatremia likely also related to meds/reduced oral intake. Hyponatremia as resolved. Also on Levaquin but UC negative. Also taking abx for possible bacterial component to initial fever and will be finishing abx 5/5.  Lung CA management being addressed by Oncology. Therefore I would first have pt f/u Oncology and can f/u with me also if issues not addressed by Oncology to try and save multiple clinic appt visits with immunosupression from chemo. . Had normal Hgb prior to chemotherapy. Likely  Hgb drop related to chemo and hydration with IVF from hospitalization. If pt seeing any blood in stools, then inform Oncologist. Otherwise will await Oncology appt f/u.

## 2020-05-04 NOTE — PROGRESS NOTES
[facility]  Hospitalist Discharge Summary       Date of Admission:  5/1/2020  Date of Discharge:  5/2/2020  Discharging Provider: Deepti Doan MD    Writer called to follow up on the above admission and LVM requesting a return call. Patient was discharged on Diflucan and Levaquin.     Next appointment with Dr. Mullins is 5/19.    Cecile Alanis RN

## 2020-05-04 NOTE — TELEPHONE ENCOUNTER
"Hospital/TCU/ED for chronic condition Discharge Protocol    \"Hi, my name is Bridger Quigley RN, a registered nurse, and I am calling from [facility].  I am calling to follow up and see how things are going for you after your recent emergency visit/hospital/TCU stay.\"    Tell me how you are doing now that you are home?\" good. Wife reports extreme increased fatigue.     Discharge Instructions    \"Let's review your discharge instructions.  What is/are the follow-up recommendations?  Pt. Response:    \"Has an appointment with your primary care provider been scheduled?\"   Has follow up appinment with oncologist.  Wife expressed concerns with pt's low hgb. She denies hematuria or blood in stool. Advised wife to contact oncology for follow up. She verbalized agreement with plan. No further action needed unless further directives from provider.     \"When you see the provider, I would recommend that you bring your medications with you.\"    Medications    \"Tell me what changed about your medicines when you discharged?\"    Changes to chronic meds?    0-1    \"What questions do you have about your medications?\"    None     New diagnoses of heart failure, COPD, diabetes, or MI?    No              Post Discharge Medication Reconciliation Status: discharge medications reconciled and changed, per note/orders (see AVS).    Was MTM referral placed (*Make sure to put transitions as reason for referral)?   No    Call Summary    \"What questions or concerns do you have about your recent visit and your follow-up care?\"     See above.     \"If you have questions or things don't continue to improve, we encourage you contact us through the main clinic number (give number).  Even if the clinic is not open, triage nurses are available 24/7 to help you.     We would like you to know that our clinic has extended hours (provide information).  We also have urgent care (provide details on closest location and hours/contact info)\"      \"Thank " "you for your time and take care!\"             "

## 2020-05-04 NOTE — TELEPHONE ENCOUNTER
Pt's wife was called with providers message. She verbalized understating and agreement with plan. Will follow up with Oncology first.

## 2020-05-05 NOTE — LETTER
[place] CARE COORDINATION  ***  May 5, 2020    Agapito GENI Fairbanks  [address]   [place] [address]      Dear Agapito,    I am a clinic {coordinator or CHW:[id number]} who works with Los Love MD at ***. {ccoutreach:[id number]} Below is a description of clinic care coordination and how I can further assist you.      The clinic care coordination team is made up of a registered nurse,  and community health worker who understand the health care system. The goal of clinic care coordination is to help you manage your health and improve access to the health care system in the most efficient manner. The team can assist you in meeting your health care goals by providing education, coordinating services, strengthening the communication among your providers and supporting you with any resource needs.    Please feel free to contact {CC ME CHW:[id number]} at *** with any questions or concerns. We are focused on providing you with the highest-quality healthcare experience possible and that all starts with you.     Sincerely,     ***    Enclosed: {ccenclosed:[id number]}

## 2020-05-05 NOTE — PROGRESS NOTES
"Palliative Care Outpatient Clinic Consultation Note    Patient:  Agapito Fairbanks    This note was written using voice recognition. I did proof read, but might have missed some things. Please contact me for major errors.    Chief Complaint:   Agapito Fairbanks 70 year old male who is presenting to the palliative medicine clinic today at the request of Dr Mullins for a palliative care consultation secondary to lung cancer.       The patient's primary care provider is:  Los Love     The patient is here with his wife Millicent    History of Present Illness:  Medical History:  - stage IV NSCLC diagnosed 2009 on PET for laryngeal cancer; s/p resection 2009    - mass in SKYLA seen on PET April 2020 invading mediastinum with metastasis to L4, L 2nd rib s/p palliative radiation (completed late April)   - on pembrolizumab since 4/29/20  - h/o laryngeal carcinoma 2005 s/p R laryngectomy; repeat resection followed by chemoradiation after recurrence Dec 2005  - mild cognitive impairment   - CKD III  - mild cognitive impairment     Introduced the scope of our practice. Discussed our potential roles for symptom management, support/coping, and decisional support (aka goals of care).    He has pain in his mid back related to bone mets. Completed radiation almost two weeks ago. The pain has improved markedly. He was started on fentanyl, but never found it to be particularly helpful. He hasn't placed the patch for the last 3 days without a change in pain control. He rarely takes oxycodone now.   He did feel \"wired\" after oxycodone, but it did help with pain control.   He has been using OTC pain patches and tylenol for pain with limited relief.   At this time the pain isn't interfering wth activities or his ability to focus.     He has been having trouble falling asleep. He is uncertain as to why that is. It remains unclear to me whether this is more related to physical discomfort, generalized restlessness, or worries/racing thoughts. " He seems to feel decently well rested. In light of his MCI it is difficult to get more information at this time. He hasn't taken medications for sleep.   He sleeps alright once he does fall asleep, but shorter than in the past. He appears to get about 5h per night. Not much daytime naps.   This has been going on for about 2 weeks.   At baseline he slept well.     His appetite is poor. He has lost 5-10 pounds. This has been a point of conflict for Roque and Millicent. He has never been interested in food other than when he cooked himself, which he hasn't been doing lately. He is very set on only eating when he feels hungry and doesn't want to commit to eating on a schedule.   They are curious about medical cannabis. We discuss that it can worsen his cognitive status, which they would like to avoid. In light of prolonged processing times we agree to start the process of registration.    He has some rattling in his left chest for the last couple weeks. This is positional and tends to be associated with a sensation of increased work of breathing.   Some relief when he uses his wife's albuterol inhalers.    He has known mild cognitive impairment and had been following with Dr Still (neurology, last seen Aug 2019). Millicent describes that his cognitive function had decreased years ago, but then stabilized. She feels that Agapito is currently able to make his own decisions with her as a guide and witness. Agapito agrees with this, also that he has difficulties with his memory. He doesn't endorse difficulties processing more complex information, but also doesn't actively fight Millicent on this. Both report that he has completed a health care directive listing her as his agent. He has also signed a POA for her to manage finances should he decline further.     Patient's Disease Understanding: with minimal prompting, he is able to state that he has lung cancer that is metastatic to his back. Also that the goal of his current  treatment is to slow the cancer down rather than a cure.    Functional Status:  1 (Restricted in physically strenuous activity but ambulatory and able to carry out work of a light or sedentary nature)    Social History  Living Situation: with Millicent    Actual/Potential Caregiver(s): Millicent    Support System: friends    Hobbies: used to golf; enjoys eating out; enjoys meeting friends    Substance Use/History of misuse: h/o EtOH abuse    Social History     Tobacco Use     Smoking status: Former Smoker     Packs/day: 1.50     Years: 30.00     Pack years: 45.00     Last attempt to quit: 1998     Years since quittin.3     Smokeless tobacco: Never Used   Substance Use Topics     Alcohol use: Yes     Comment: 2-3 times per week, 1-2 beers     Drug use: No       Advance Care Planning:  Goals of Care: wants to live as long as possible. He also would like to remain as active as possible. At this point in time he is not willing to set any treatment limitations which Millicent agrees to    Decision Making Capacity: limited. He needs diligent conversations to participate in moderately complex decision making (cancer treatment, etc). He agrees that it has been helpful to have Millicent witness conversations. Currently both of them feel he can make his own decisions.   They do acknowledge that this may change. I also caution them that some decisions might be too complex for him to make alone even now. They don't comment on that.     Advance Directive:    He has a document at home that he completed several years ago. It lists Millicent as his agent, they are not sure who is the alternate. They would like to update the form.       Recommendations & Counseliny/o man with metastatic lung cancer and mild cognitive impairment.     Pain: good response to radiation. Discussed that he shouldn't need opioids anymore other than on rare occasions.   - continue with OTC pain meds (tylenol, patches; knows to avoid NSAIDs)  - gabapentin  "300mg HS for musculoskeletal pain and sleep    Nutrition: poor appetite due to cancer. His situation is exacerbated by behavioural and cognitive limitations. He is unable/unwilling to engage with a shift to eating on a schedule. This has caused some conflict between Millicent and him.   - will explore what options there are with the nutritionist  - registered for medical cannabis    Sleep: unclear what the issue is  - discussed making a sleep diary  - gabapentin 300mg HS    Breathing: has episodes of mild dyspnea associated with a \"rattle\". This are positional and respond to albuterol  - albuterol prn  - advised to monitor and discuss with Dr Mullins should it persist over the next 2 weeks    Advance care planning, decision making:  - at this time he can make his own decisions for moderately complex decisions, but needs Millicent to be present for the conversations around these. Millicent wants to support this format which seems a good arrangement at this time  - provided blank health care directive form so he can update this  - no treatment limitations at this time, but they agree to revisit code status at his next visit    Return to clinic in 6 weeks.    Data and Chart Review:    REVIEW OF SYSTEMS:   ROS: 10 point ROS neg other than the symptoms noted above in the HPI and here.      Physical Exam:  /77 (BP Location: Left arm, Patient Position: Sitting, Cuff Size: Adult Regular)   Pulse 89   Temp 98.5  F (36.9  C) (Oral)   Resp 16   Ht 1.829 m (6')   Wt 79.7 kg (175 lb 9.6 oz)   SpO2 97%   BMI 23.82 kg/m       CONSTIT: awake, appears comfortable  EENT: MMM, EOMI, no icterus, takes mask of repeatedly  RESP: reg, nl effort, no cough  MSK:moves x4, nl gait  SKIN:  warm, no rash, no obvious lesions  NEURO: alert, oriented x3  PSYCH: appropriate affect, memory limited and thought process intact    Per screening 2/3/20:  Cognitive Screening   1) Repeat 3 items (Leader, Season, Table)    2) Clock draw: ABNORMAL no " 12  3) 3 item recall: Recalls 1 object   Results: ABNORMAL clock, 1-2 items recalled: PROBABLE COGNITIVE IMPAIRMENT, **INFORM PROVIDER**     Mini-CogTM Copyright S Sudhakar. Licensed by the author for use in [facility]; reprinted with permission ([email or web address]@[email or web address]). All rights reserved.         Medications, current, pertinent:  Fentanyl Patch 12mcg/hr  Oxycodone 5mg q6h prn    Sertraline 50mg daily    : ok    Allergies   Allergen Reactions     Simvastatin      myalgias     Lisinopril Rash     rash     Past Medical History:   Diagnosis Date     Acute gastritis with hemorrhage 11/02     Basal cell carcinoma, leg      left pretibial area     CKD (chronic kidney disease) stage 3, GFR 30-59 ml/min (H)     creat baseline approx 1.4     Hearing loss      Helicobacter pylori (H. 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     C NONSPECIFIC PROCEDURE  12/01    L cataract     C NONSPECIFIC PROCEDURE  1983    right ankle fx repair     C NONSPECIFIC PROCEDURE  3/05    Right hemilaryngectomy (laser) for Squamous Cell CA T1N0     C NONSPECIFIC PROCEDURE  3/05, 10/09     Moh's procedure for Basal Cell CA chest     C NONSPECIFIC PROCEDURE      Moh's procedure for Basal Cell CA ear lobe     C NONSPECIFIC PROCEDURE  3/05,     right vocal cord squamous cell CA excision     C NONSPECIFIC PROCEDURE      Phacoemulsification of the lens with posterior chamber lens implant, right eye.      C NONSPECIFIC PROCEDURE  10/09     Moh's procedufe for BCC left pretibial area     C NONSPECIFIC PROCEDURE      Left thoracoscopic wedge resection, Open completion left upper lobe segmentectomy      THORACIC SURGERY      lung,throat       Family History  Family History   Problem Relation Age of Onset     Colon Cancer Mother          age 65, in      Family History Negative Father         mild memory problems,  age 87, in      Family History Negative Brother      Family History Negative Brother      Cancer Brother         hx prostate ca     Sleep Apnea Brother        Lab and imaging data Reviewed:  Comments:       Marlene Hernandez MD  Palliative Medicine  Pager [phone]

## 2020-05-05 NOTE — LETTER
Health Care Home - Access Care Plan    About Me:    Patient Name:  Agapito Mann    YOB: 1950  Age:                      70 year old   Krunal MRN:     [id number] Telephone Information:   Home Phone [phone]   Mobile [phone]       Address:  [address]  [place] [address] Email address:  [email or web address]      Emergency Contact(s)   Name Relationship Lgl Grd Work Phone Home Phone [place] Phone   1. BERTA MANN Spouse   [phone]   2. PT DECLINED Declined                 Health Maintenance: Routine Health maintenance Reviewed: Due/Overdue   Health Maintenance Due   Topic Date Due     AORTIC ANEURYSM SCREENING (SYSTEM ASSIGNED)  03/31/2013     COLORECTAL CANCER SCREENING  02/19/2019     MICROALBUMIN  01/10/2020     My Access Plan  Medical Emergency 911   Questions or concerns during clinic hours Primary Clinic Line, I will call the clinic directly: [facility] [phone]   24 Hour Appointment Line [phone] or  [phone] [phone]) (toll free)   24 Hour Nurse Line [phone] (toll free)   Questions or concerns outside clinic hours 24 Hour Appointment Line, I will call the after-hours on-call line:   [facility] [phone] or [phone]) (toll-free)   Preferred Urgent Care [facility], [phone]   Preferred Hospital [facility]  [phone]   Preferred Pharmacy [facility]pharmacy [facility][id number] - [facility] [address]     Behavioral Health Crisis Line The National Suicide Prevention Lifeline at 1-455.588.7243 or 916       My Care Team Members  Patient Care Team       Relationship Specialty Notifications Start Los Decker MD PCP - General   3/21/11     Phone: [phone] Fax: [phone]         [address] [facility] [address]    Aureliano Hobson MD MD Otolaryngology  10/18/19     Phone: [phone] Fax:  [phone]         [address] [address]    Joshua Sims MD MD Urology  2/3/20     Phone: [phone] Fax: [phone] [address] [facility] [address] [facility] [address]    Los Love MD Assigned PCP   2/16/20     Phone: [phone] Fax: [phone]         [address] [place] [address]           My Medical and Care Information  Problem List   Patient Active Problem List   Diagnosis     Testicular hypofunction     Impotence of organic origin     Essential hypertension     ESOPHAGEAL REFLUX     CKD (chronic kidney disease) stage 3, GFR 30-59 ml/min (H)     HYPERLIPIDEMIA LDL GOAL <100     Hypothyroidism     Advanced directives, counseling/discussion     History of lung cancer     History of laryngeal cancer     Talipes cavus     Memory loss     Elevated prostate specific antigen (PSA)     Snoring     Dysfunction of right eustachian tube     Mild major depression (H)     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)     Bone metastasis (H)     Acute encephalopathy     Urinary tract infection     Hypotension, unspecified hypotension type      Current Medications:  Current Outpatient Medications   Medication     albuterol (PROAIR HFA/PROVENTIL HFA/VENTOLIN HFA) 108 (90 Base) MCG/ACT inhaler     bisacodyl (DULCOLAX) 5 MG EC tablet     cetirizine (ZYRTEC ALLERGY) 10 MG tablet     donepezil (ARICEPT) 5 MG tablet     fluticasone (FLONASE) 50 MCG/ACT nasal spray     gabapentin (NEURONTIN) 300 MG capsule     levofloxacin (LEVAQUIN) 500 MG tablet     levothyroxine (SYNTHROID/LEVOTHROID) 112 MCG tablet     MULTI VITAMIN MENS OR     oxyCODONE (ROXICODONE) 5 MG tablet     sertraline (ZOLOFT) 50 MG tablet     triamcinolone (KENALOG) 0.1 % external cream     No current facility-administered medications for this visit.

## 2020-05-05 NOTE — UTILIZATION REVIEW
Admission Status; Secondary Review Determination       As part of the [facility] Utilization review plan, a self-audit is done on Medicare inpatient admission with less than 2 midnights stay. The 2014 IPPS Final Rule allows outpatient billing in the event that a hospital determines that an inpatient admission was not medically necessary under utilization review process.          (x) Outpatient status would be Appropriate- Short Stay- Post discharge review.     RATIONALE FOR DETERMINATION   70-year-old male with history of stage IV squamous cell carcinoma of the left lung, history of laryngeal carcinoma of the tongue, hypothyroidism, hyperlipidemia, chronic kidney disease stage III, hypertension, mild cognitive impairment, depression on Keytruda for lung cancer.  comes to the ER with complaint of feeling weak, tired, low-grade fever up to 100 degrees for a night and some mild confusion. By the time of admission he was already improved per admission note.  Conscious, alert and oriented.  Not in acute encephalopathy at this time. Workup was negative.  This account and medical record number for a Medicare beneficiary was an inpatient short stay (<2 midnights) and didn't meet medical necessity for inpatient admission. We recommend billing outpatient services based on the severity of illness, intensity of service provided and the inpatient length of stay.  Please contact me within one week of receiving this letter only if you disagree with this determination. If you concur, no further action is needed.          The information on this document is developed by the utilization review team in order for the business office to ensure compliance.  This only denotes the appropriateness of proper admission status and does not reflect the quality of care rendered.         The definitions of Inpatient Status and Observation Status used in making the determination above are those provided in the CMS Coverage Manual, Chapter 1 and  Chapter 6, section 70.4.      Sincerely,       QUIRINO ESTRADA MD    System Medical Director  Utilization  Management  [facility].

## 2020-05-05 NOTE — PROGRESS NOTES
Clinic Care Coordination Contact  [facility]/Voicemail    Referral Source: IP Report  Patient chart reviewed by Care Coordination following hospital discharge due to COVID-19 testing performed; COVID-19 testing negative.  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    Clinical Data: Care Coordinator Outreach  Outreach attempted x 1.  Left message on patient's voicemail with call back information and requested return call.  Also sent [employer] message.    COVID-19 GetWell Loop:  Testing Results: negative  Email on file OR Smartphone: Yes  Does the patient speak English: Yes  Is the patient pregnant: N/A  Candidate for GetWell Loop: Yes  GetWell Loop referral placed per criteria above.    Plan: Care Coordinator will try to reach patient again in 3-5 business days.    Leonardo Bardales RN  Clinic Care Coordinator  [facility]  Ph: [phone]

## 2020-05-05 NOTE — LETTER
"[facility] CARE COORDINATION  [address] [place] [address]  [phone]  [phone]            May 8, 2020          Dear Ольга Proxy Patient,    We received a request to activate you as a proxy for another patient of [facility] or [place]. In order to do so, we need to activate your [employer] account as well.    Your access code is: [unfilled]       Please access the [employer] Private website:  -  [place]: [email or web address]  -  [employer] [email or web address].    Below the ID and password fields, select the \"Sign Up Now\" as New User.  You will be prompted to enter the access code listed above as well as additional personal information.  Please follow the directions carefully when creating your username and password.    Once your account is activated, you can access the proxy accounts under \"Shared Medical Records\".    If you allow your access code to , or if you have any questions please call [employer] support at [phone] during normal clinic hours.     Sincerely,        [employer] Customer Service    "

## 2020-05-06 NOTE — PROGRESS NOTES
Writer called [facility] and they are requesting the order for NGS panel to be faxed:    Attn: tabby Mnbennett Massiel Hernadez  Fax# [phone]    The requested order and specific instructions and contact information on the order.    Writer will fax order firsr thing in the morning of 5/7.    Cecile Alanis RN

## 2020-05-06 NOTE — PROGRESS NOTES
Writer called and spoke with patient's wife, Millicent, regarding the request to have additional testing on dermatology biopsy.     Contact information:  [facility]  Dr. Eduardo  Ph.[phone]    Millicent, also states she is concernred about poor appetite, weakness,  and difficulty with being able to sleep at night..  Patient's pain has improved and she has taken patient off of the pain patch.The blood pressure has been good, checking it daily, today's was 117/70    Patient is scheduled to see palliative care provider Dr. Hernandez tomorrow who will address concerns.    Cecile Alanis RN

## 2020-05-07 NOTE — PATIENT INSTRUCTIONS
"Patient Instructions      Expand All Collapse All    Thank you for coming into the Palliative Care Clinic today.     1. Pain:   - stop using the fentanyl patches  - continue using over the counter topical patches as needed and helpful  - start gabapentin 300mg (1 capsule) at bedtime  - continue oxycodone as needed for severe pain     2. Sleep:   - please keep a \"sleep diary\" including:      - the time of falling asleep and waking up      - the sleep quality and how well rested you feel      - what \"as needed\" medications you take in the afternoon, evening, overnight      - what seems to keep you awake    3. Appetite:  - I registered you for medical cannabis    4. Breathing:   - use the albuterol inhaler as needed  - discuss this with Dr Mullins at you visit on 5/19    5. Health care directive: please complete the health care directive.   - I'd like to discuss this further at your next visit as well. You can look at the sheet with some questions prior to that      Return to clinic in 6 weeks for a follow up.     You can reach the Palliative Care Team during business hours at the following number:    - [phone]    To reach the Palliative Care Provider on-call After-hours or on holidays and weekends, call: [phone].  Please note that we are not able to provide pain medication refills on evenings or weekends.             "

## 2020-05-07 NOTE — PROGRESS NOTES
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    Weight as of this encounter: 79.7 kg (175 lb 9.6 oz). Body surface area is 2.01 meters squared.  Moderate Pain (5) Comment: Data Unavailable   No LMP for male patient.  Allergies reviewed: Yes  Medications reviewed: Yes    Medications: Medication refills not needed today.  Pharmacy name entered into EPIC:    [facility]PHARMACY #[id number] - [place], [place] - [address] [facility] [facility] [place], [place] - [address] AT PORTAL TO REGISTERED [facility] SITES    Clinical concerns: NO          Ana Arellano CMA

## 2020-05-07 NOTE — LETTER
"    5/7/2020         RE: Agapito Fairbanks  [address]  [place] [address]        Dear Colleague,    Thank you for referring your patient, Agapito Fairbanks, to the [facility]. Please see a copy of my visit note below.    Palliative Care Outpatient Clinic Consultation Note    Patient:  Agapito Fairbanks    This note was written using voice recognition. I did proof read, but might have missed some things. Please contact me for major errors.    Chief Complaint:   Agapito Fairbanks 70 year old male who is presenting to the palliative medicine clinic today at the request of Dr Mullins for a palliative care consultation secondary to lung cancer.       The patient's primary care provider is:  Los Love     The patient is here with his wife Millicent    History of Present Illness:  Medical History:  - stage IV NSCLC diagnosed 2009 on PET for laryngeal cancer; s/p resection 2009    - mass in SKYLA seen on PET April 2020 invading mediastinum with metastasis to L4, L 2nd rib s/p palliative radiation (completed late April)   - on pembrolizumab since 4/29/20  - h/o laryngeal carcinoma 2005 s/p R laryngectomy; repeat resection followed by chemoradiation after recurrence Dec 2005  - mild cognitive impairment   - CKD III  - mild cognitive impairment     Introduced the scope of our practice. Discussed our potential roles for symptom management, support/coping, and decisional support (aka goals of care).    He has pain in his mid back related to bone mets. Completed radiation almost two weeks ago. The pain has improved markedly. He was started on fentanyl, but never found it to be particularly helpful. He hasn't placed the patch for the last 3 days without a change in pain control. He rarely takes oxycodone now.   He did feel \"wired\" after oxycodone, but it did help with pain control.   He has been using OTC pain patches and tylenol for pain with limited relief.   At this time the pain isn't interfering " wth activities or his ability to focus.     He has been having trouble falling asleep. He is uncertain as to why that is. It remains unclear to me whether this is more related to physical discomfort, generalized restlessness, or worries/racing thoughts. He seems to feel decently well rested. In light of his MCI it is difficult to get more information at this time. He hasn't taken medications for sleep.   He sleeps alright once he does fall asleep, but shorter than in the past. He appears to get about 5h per night. Not much daytime naps.   This has been going on for about 2 weeks.   At baseline he slept well.     His appetite is poor. He has lost 5-10 pounds. This has been a point of conflict for Roque and Millicent. He has never been interested in food other than when he cooked himself, which he hasn't been doing lately. He is very set on only eating when he feels hungry and doesn't want to commit to eating on a schedule.   They are curious about medical cannabis. We discuss that it can worsen his cognitive status, which they would like to avoid. In light of prolonged processing times we agree to start the process of registration.    He has some rattling in his left chest for the last couple weeks. This is positional and tends to be associated with a sensation of increased work of breathing.   Some relief when he uses his wife's albuterol inhalers.    He has known mild cognitive impairment and had been following with Dr Still (neurology, last seen Aug 2019). Millicent describes that his cognitive function had decreased years ago, but then stabilized. She feels that Agapito is currently able to make his own decisions with her as a guide and witness. Agapito agrees with this, also that he has difficulties with his memory. He doesn't endorse difficulties processing more complex information, but also doesn't actively fight Millicent on this. Both report that he has completed a health care directive listing her as his agent. He  has also signed a POA for her to manage finances should he decline further.     Patient's Disease Understanding: with minimal prompting, he is able to state that he has lung cancer that is metastatic to his back. Also that the goal of his current treatment is to slow the cancer down rather than a cure.    Functional Status:  1 (Restricted in physically strenuous activity but ambulatory and able to carry out work of a light or sedentary nature)    Social History  Living Situation: with Millicent    Actual/Potential Caregiver(s): Millicent    Support System: friends    Hobbies: used to golf; enjoys eating out; enjoys meeting friends    Substance Use/History of misuse: h/o EtOH abuse    Social History     Tobacco Use     Smoking status: Former Smoker     Packs/day: 1.50     Years: 30.00     Pack years: 45.00     Last attempt to quit: 1998     Years since quittin.3     Smokeless tobacco: Never Used   Substance Use Topics     Alcohol use: Yes     Comment: 2-3 times per week, 1-2 beers     Drug use: No       Advance Care Planning:  Goals of Care: wants to live as long as possible. He also would like to remain as active as possible. At this point in time he is not willing to set any treatment limitations which Millicent agrees to    Decision Making Capacity: limited. He needs diligent conversations to participate in moderately complex decision making (cancer treatment, etc). He agrees that it has been helpful to have Millicent witness conversations. Currently both of them feel he can make his own decisions.   They do acknowledge that this may change. I also caution them that some decisions might be too complex for him to make alone even now. They don't comment on that.     Advance Directive:    He has a document at home that he completed several years ago. It lists Millicent as his agent, they are not sure who is the alternate. They would like to update the form.       Recommendations & Counseliny/o man with metastatic  "lung cancer and mild cognitive impairment.     Pain: good response to radiation. Discussed that he shouldn't need opioids anymore other than on rare occasions.   - continue with OTC pain meds (tylenol, patches; knows to avoid NSAIDs)  - gabapentin 300mg HS for musculoskeletal pain and sleep    Nutrition: poor appetite due to cancer. His situation is exacerbated by behavioural and cognitive limitations. He is unable/unwilling to engage with a shift to eating on a schedule. This has caused some conflict between Millicent and him.   - will explore what options there are with the nutritionist  - registered for medical cannabis    Sleep: unclear what the issue is  - discussed making a sleep diary  - gabapentin 300mg HS    Breathing: has episodes of mild dyspnea associated with a \"rattle\". This are positional and respond to albuterol  - albuterol prn  - advised to monitor and discuss with Dr Mullins should it persist over the next 2 weeks    Advance care planning, decision making:  - at this time he can make his own decisions for moderately complex decisions, but needs Millicent to be present for the conversations around these. Millicent wants to support this format which seems a good arrangement at this time  - provided blank health care directive form so he can update this  - no treatment limitations at this time, but they agree to revisit code status at his next visit    Return to clinic in 6 weeks.    Data and Chart Review:    REVIEW OF SYSTEMS:   ROS: 10 point ROS neg other than the symptoms noted above in the HPI and here.      Physical Exam:  /77 (BP Location: Left arm, Patient Position: Sitting, Cuff Size: Adult Regular)   Pulse 89   Temp 98.5  F (36.9  C) (Oral)   Resp 16   Ht 1.829 m (6')   Wt 79.7 kg (175 lb 9.6 oz)   SpO2 97%   BMI 23.82 kg/m       CONSTIT: awake, appears comfortable  EENT: MMM, EOMI, no icterus, takes mask of repeatedly  RESP: reg, nl effort, no cough  MSK:moves x4, nl gait  SKIN:  warm, no " rash, no obvious lesions  NEURO: alert, oriented x3  PSYCH: appropriate affect, memory limited and thought process intact    Per screening 2/3/20:  Cognitive Screening   1) Repeat 3 items (Leader, Season, Table)    2) Clock draw: ABNORMAL no 12  3) 3 item recall: Recalls 1 object   Results: ABNORMAL clock, 1-2 items recalled: PROBABLE COGNITIVE IMPAIRMENT, **INFORM PROVIDER**     Mini-CogTM Copyright HEMANT De La Fuente. Licensed by the author for use in [facility]; reprinted with permission ([email or web address]). All rights reserved.         Medications, current, pertinent:  Fentanyl Patch 12mcg/hr  Oxycodone 5mg q6h prn    Sertraline 50mg daily    : ok    Allergies   Allergen Reactions     Simvastatin      myalgias     Lisinopril Rash     rash     Past Medical History:   Diagnosis Date     Acute gastritis with hemorrhage 11/02     Basal cell carcinoma, leg      left pretibial area     CKD (chronic kidney disease) stage 3, GFR 30-59 ml/min (H)     creat baseline approx 1.4     Hearing loss      Helicobacter pylori (H. 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      C NONSPECIFIC PROCEDURE      L cataract     C NONSPECIFIC PROCEDURE      right ankle fx repair     C NONSPECIFIC PROCEDURE  3/05    Right hemilaryngectomy (laser) for Squamous Cell CA T1N0     C NONSPECIFIC PROCEDURE  3/05, 10/09    Moh's procedure for Basal Cell CA chest     C NONSPECIFIC PROCEDURE      Moh's procedure for Basal Cell CA ear lobe     C NONSPECIFIC PROCEDURE  3/05,     right vocal cord squamous cell CA excision     C NONSPECIFIC PROCEDURE      Phacoemulsification of the lens with posterior chamber lens implant, right eye.      C NONSPECIFIC PROCEDURE  10/09     Moh's procedufe for BCC left pretibial area     C NONSPECIFIC PROCEDURE      Left thoracoscopic wedge resection, Open completion left upper lobe segmentectomy      THORACIC SURGERY      lung,throat       Family History  Family History   Problem Relation Age of Onset     Colon Cancer Mother          age 65, in      Family History Negative Father         mild memory problems,  age 87, in      Family History Negative Brother      Family History Negative Brother      Cancer Brother         hx prostate ca     Sleep Apnea Brother        Lab and imaging data Reviewed:  Comments:       Marlene Hernandez MD  Palliative Medicine  Pager [phone]      Oncology Rooming Note    May 7, 2020 8:11 AM   Agapito Fairbanks is a 70 year old male who presents for:    Chief Complaint   Patient presents with     Palliative     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    Weight as of this encounter: 79.7 kg (175 lb 9.6 oz). Body surface area is 2.01  meters squared.  Moderate Pain (5) Comment: Data Unavailable   No LMP for male patient.  Allergies reviewed: Yes  Medications reviewed: Yes    Medications: Medication refills not needed today.  Pharmacy name entered into ServiceFrame:    [id number] - [place], [place] - [address] [facility] - [place], [place] - [address] AT [address] TO [address] [facility] [address]    Clinical concerns: NO          Ana Arellano CMA              Again, thank you for allowing me to participate in the care of your patient.        Sincerely,        Marlene Hernandez MD

## 2020-05-08 NOTE — PROGRESS NOTES
"Clinic Care Coordination Contact    Clinic Care Coordination Contact  OUTREACH    Referral Information:  Referral Source: IP Report  Primary Diagnosis: Other (encephalopathy)  Chief Complaint   Patient presents with     Clinic Care Coordination - Post Hospital     encephalopathy     Universal Utilization: reviewed on this date  Difficulty keeping appointments: No  Compliance Concerns: No  No-Show Concerns: No  No PCP office visit in Past Year: No  Utilization    Last refreshed: 5/7/2020  3:18 PM:  Hospital Admissions 1           Last refreshed: 5/7/2020  3:18 PM:  ED Visits 2           Last refreshed: 5/7/2020  3:18 PM:  No Show Count (past year) 0              Current as of: 5/7/2020  3:18 PM          CC RN outreach to patient following recent hospitalization for acute encephalopathy; COVID-19 testing performed, negative result.    Per chart review, hospital follow-up call completed by PCP clinic triage RN on 5/4/20 with patient's wife.  The patient's wife has also been in close contact with patient's Oncology team.    CC RN called and spoke with patient's wife; introduced self, discussed role of Care Coordination, and explained reason for call.    The patient's wife stated that it has been \"a lot\" managing the patient's cancer and encephalopathy lately in the setting of the COVID-19.  However, she state the patient \"has been doing really well today and yesterday\" and wonders if it's the patient's Keytruda helping.    CC RN discussed hospital follow-up; the patient's wife confirmed his upcoming appointments and feels they are well-connected and support at this time.  She denied need for additional assistance from Care Coordination but agreed to contact CC RN if they have any questions or concerns.    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  Patient's wife helps to manage medications; she denied medication questions/cocnerns at this time.     Informal Support system: Spouse   Socioeconomic History     Marital status:      Spouse name: Not on file     Number of children: Not on file     Years of education: Not on file     Highest education level: Not on file     Tobacco Use     Smoking status: Former Smoker     Packs/day: 1.50     Years: 30.00     Pack years: 45.00     Last attempt to quit: 1998     Years since quittin.3     Smokeless tobacco: Never Used   Substance and Sexual Activity     Alcohol use: Yes     Comment: 2-3 times per week, 1-2 beers     Drug use: No     Sexual activity: Yes     Partners: Female     Comment: vidya with TL     The patient's wife did request information on how she can be proxy for patient's MyChart; information sent to patient's wife via e-mail.    Patient/Caregiver understanding: Yes     Future Appointments              In 1 week  LAB DRAW 1 [facility]    In 1 week Wilver Mullins MD [facility] [place] [facility]    In 1 week  INFUSION CHAIR 17 [facility]    In 1 month  LAB DRAW 1 [facility]    In 1 month  INFUSION CHAIR 19 [facility]    In 1 month Marlene Hernandez MD [facility] [place] [facility]        Plan: The patient will continue following up with his care teams as scheduled.  The patient/wife will contact CC RN with additional questions or concerns.  CC RN will make no further scheduled patient outreaches at this time but will remain available should any patient needs arise.    Leonardo Bardales, RN  Clinic Care Coordinator  [facility]  [place]  [facility]  Ph: [phone]

## 2020-05-11 NOTE — TELEPHONE ENCOUNTER
"Pt wife called to report another \" cancer spot\" on her husbands back developed over the weekend. She says there was 1 that was biopsied not too long ago and wonders what should be done about the new spot.   Will route message to oncologist for recommendation.   Erin Hughes RN   "

## 2020-05-11 NOTE — TELEPHONE ENCOUNTER
"Writer spoke with Dr. Mullins and he states that patient is on treatment and the \"cancer spots\", should resolve over time.     Writer returned call to patient's wife and she updated me that she called dermatology office and patient \"cancer spot\", was evaluated and it is scheduled to be removed this Wednesday, 5/13.    Writer will update. Dr. Mullins.    Cecile Alanis RN    "

## 2020-05-12 NOTE — PROGRESS NOTES
Writer received a call from [facility] pathology manager Massiel Hernadez and request was received to have specimen from 3/3 skin biopsy to be sent to the  o .    Cecile Alanis RN

## 2020-05-18 NOTE — PROGRESS NOTES
Writer received the below note and called patient to do the screening and to reminder of the below message that patient does not meet the criteria to have a visitor.     Writer [facility] requesting a return call.    Cecile Alanis RN

## 2020-05-18 NOTE — PROGRESS NOTES
"Patient is currently scheduled for an appointment at [facility] in [place].  Called patient to review current visitor restrictions and complete COVID-19 Patient Infection/Travel Screening Tool.     Due to the recent public health concerns around COVID-19 and in an effort to keep our patients and staff safe and healthy, we are implementing a screening process for the patients that come to our clinic.      I am going to ask you a few questions, please answer yes or no.  Your honesty about any symptoms is critical, as it keeps patients and staff healthy.      Do you have a:  Fever (or reported chills)?      New cough (started within the past 14 days)?  No  New shortness of breath (started within the past 14 days)?  No  Rash?  No    In the last month, have you been in contact with someone who was confirmed or suspected to have Coronavirus/COVID-19?  Yes . Tested and NEGATIVE on 5/1  Have you traveled internationally in the last month?  No  If so, where?  N/A     I also wanted to let you know that to protect our patients from the flu and other common illnesses, [facility] enforce visitor restrictions year round, but due to the community spread of COVID-19 in [place], we are taking additional precautionary steps to ensure the health of our patients.  At this time, NO visitors are allowed on our hospital and clinic campuses.     Patient PASSED the screening assessment.    Patient instructed to come to the clinic as planned for their scheduled appointment and to call the clinic if any symptoms develop prior to their appointment.    \"Per CDC Guidelines, we are asking all patients that are coming into the building to wear a cloth covering that covers your mouth and nose.  You will be screened again at the entrance to the clinic for any Covid 19 symptoms. If you screen positive to any Covid 19 symptoms during our screening process you will be provided a surgical mask to wear during your " "time in the building.\"    \"COVID-19 is contagious and can be dangerous for our patients and staff.  Please send us a [facility] message or call our clinic before coming in if you feel any of the following symptoms: fever, cough, congestion, runny nose, sore throat, muscle aches and pains, or shortness of breath.  If you are already at our clinic, it is very important that you be honest about any symptoms you are experiencing to ensure your safety and that of other patients and staff who treat you.  If you do have symptoms, we will have a nurse and/or provider asses you to determine next steps.\"    Cecile Alanis RN on 5/18/2020 at 3:04 PM      "

## 2020-05-18 NOTE — PROGRESS NOTES
Writer listened to patient's wife, Millicent,  Regarding patient's poor appetite, weight loss and concerns regarding patient's ability to be independent at these appointments.     Patient has been registered for medicinal cannabis and deleted the e-mail. Writer will inquire with Dr. Mullins about needing patient to be registered again.     Cecile Alanis RN

## 2020-05-19 NOTE — PROGRESS NOTES
Oncology Rooming Note    May 19, 2020 1:47 PM   Agapito Fairbanks is a 70 year old male who presents for:    Chief Complaint   Patient presents with     Oncology Clinic Visit     Initial Vitals: /84   Pulse 91   Temp 98.8  F (37.1  C)   Resp 16   Ht 1.829 m (6')   Wt 77.9 kg (171 lb 12.8 oz)   SpO2 99%   BMI 23.30 kg/m   Estimated body mass index is 23.3 kg/m  as calculated from the following:    Height as of this encounter: 1.829 m (6').    Weight as of this encounter: 77.9 kg (171 lb 12.8 oz). Body surface area is 1.99 meters squared.  No Pain (0) Comment: Data Unavailable   No LMP for male patient.  Allergies reviewed: Yes  Medications reviewed: Yes    Medications: Medication refills not needed today.  Pharmacy name entered into EPIC:    [id number] - [place], [place] [facility] [address] [facility] [place], [place] - [address] AT [address] TO [facility] S[address]    Clinical concerns:  doctor was notified.      Kika Telles MA

## 2020-05-19 NOTE — LETTER
5/19/2020         RE: Agapito Fairbanks  [address]  [place] [address]        Dear Colleague,    Thank you for referring your patient, Agapito Fairbanks, to the [facility]. Please see a copy of my visit note below.    Oncology Rooming Note    May 19, 2020 1:47 PM   Agapito Fairbanks is a 70 year old male who presents for:    Chief Complaint   Patient presents with     Oncology Clinic Visit     Initial Vitals: /84   Pulse 91   Temp 98.8  F (37.1  C)   Resp 16   Ht 1.829 m (6')   Wt 77.9 kg (171 lb 12.8 oz)   SpO2 99%   BMI 23.30 kg/m   Estimated body mass index is 23.3 kg/m  as calculated from the following:    Height as of this encounter: 1.829 m (6').    Weight as of this encounter: 77.9 kg (171 lb 12.8 oz). Body surface area is 1.99 meters squared.  No Pain (0) Comment: Data Unavailable   No LMP for male patient.  Allergies reviewed: Yes  Medications reviewed: Yes    Medications: Medication refills not needed today.  Pharmacy name entered into Video Recruit:    [id number] - [place], [place] [facility] [address] [place] - [address] [facility]    Clinical concerns:  doctor was notified.      Kika Telles MA              Visit Date:   05/19/2020     ONCOLOGY HISTORY: Mr. 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  High PD-L1 expression.  No EGFR mutation.  ALK and ROS1 could not be done because of insufficient sample.   1.  In 2005, he was evaluated by ENT for hoarseness.   -On 02/22/2005, he had laryngoscopy. There was right anterior true vocal cord lesion with extension to the anterior commissure on the right. 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   -On 03/18/2005, he had endoscopic laser assisted vertical hemilaryngectomy right. Pathology from vocal cord revealed squamous cell carcinoma, moderately  differentiated, invasive  -Unfortunately, he had recurrence. 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 Pathology revealed invasive moderately differentiated squamous cell carcinoma.   -Patient received concurrent chemoradiation. He received 6000 cGy completed on 06/21/2006. He received cisplatin with radiation.   2.  He had a PET scan done on 10/24/2009.  It revealed enlarging spiculated nodule in posterior left upper lobe with borderline hypermetabolic activity.   -He had left upper lobe segmentectomy and mediastinal lymph node dissection on 12/10/2009.  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  No lymphovascular present.  Margins negative.  Lymph nodes were all benign.   3.  The patient developed a lesion in his back.  He was seen by a dermatologist.  Biopsy on 01/31/2020 revealed squamous cell carcinoma. He subsequently underwent a Mohs surgical procedure on 02/28/2020.  Lesion measured 2.7 cm.  The patient was recommended postoperative radiation.   4.  For further workup, PET scan was done on 04/02/2020.  It reveals a hypermetabolic 6.4 cm mass in left upper lobe.  The mass invades into the mediastinum.  There is a mildly enlarged hypermetabolic single mediastinal lymph node.  There is hypermetabolic lesion in L4 vertebral body and left second rib.  There is hypermetabolic nodule in left parotid gland.   -MRI of the thoracic and lumbar spine was done on 04/06/2020.  It revealed destructive metastatic lesions involving L4 vertebral body.  There was also multiple other osseous metastatic disease.   5.  CT-guided biopsy of left lung mass on 04/13/2020 reveals moderately differentiated squamous cell carcinoma.    -TPS score of 60%. High PD-L1 expression.   -NEGATIVE for BRAF, EGFR, ERBB2, IDH1, IDH2, KRAS, MET, NRAS, RET     -ALK and ROS1 could not be done because of insufficient sample.   6.  Radiation to  lumbar spine. 3000 cGy between 04/07/2020 and 04/20/2020.  -Radiation to left parotid between 04/09/2020 and 04/22/2020. 3000 cGy.   7. Pembrolizumab started on 04/29/2020.     SUBJECTIVE:  Mr. 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  He was started on pembrolizumab on 04/29/2020.  He has been tolerating the treatment well.      The patient has fatigue.  This will go along with his age and multiple medical problems.  No headache.  Intermittent lightheadedness.  No chest pain.  No shortness of breath at rest.  Some cough which is nonproductive.  No abdominal pain, nausea or vomiting.  Appetite is fairly good.  No urinary or bowel complaints.  He has back pain.  He is taking oxycodone as needed. He does not use much as it causes constipation.      The patient recently had an enlarging skin lesion.  He had moh's surgery.  Dermatologist had called.  It is metastatic adenocarcinoma.      Next generation sequencing was ordered.  No EGFR mutation.  ALK and ROS1 could not be done because of insufficient sample.      PHYSICAL EXAMINATION:   GENERAL:  He is alert.  He was oriented x 3.   FACE:  No swelling.  No facial droop.  In the left face, there is minimal erythema of the skin where he had radiation.    THROAT:  No ulcer.  No thrush.   NECK:  Supple. No tenderness. No lymphadenopathy.   CHEST:  No deformity or tenderness. Scar samina from previous surgery.   BACK:  There is radiation changes at the site of radiation.  No open ulcer.   LUNGS:  Good air entry bilaterally.  No wheezing.   HEART:  Regular.   GI: Abdomen is soft and non-tender. No mass.   EXTREMITIES:  No edema.   SKIN:  No petechia.     LABORATORY DATA:  Reviewed.      ASSESSMENT:   1.  A 70-year-old gentleman with metastatic squamous cell carcinoma with high PD-L1 expression on pembrolizumab.    2.  Back pain controlled with oxycodone.   3.  Fatigue secondary to malignancy, multiple  other medical problems.   4.  Other medical problems including chronic kidney disease and forgetfulness.      PLAN:   1.  The patient clinically is stable.  He will continue on pembrolizumab.  Side effects reviewed.  Overall, he tolerated it well.   2.  The patient had skin cancer removed from his back.  I explained to the patient that hopefully pembrolizumab will help and he should not have recurrent skin cancer.   3.  The patient occasionally gets cough.  I told him to let us know if he has chronic cough, chest pain, shortness of breath or fever.  With pembrolizumab, he can get pneumonitis.   4.  Wife had multiple questions, which were all answered.  Plan is to continue pembrolizumab.  After 4 cycles, will plan on getting scans.  I will see him with the next treatment.         JENNIFER CEDENO MD             D: 2020   T: 2020   MT:       Name:     CHRISTI MANN   MRN:      [id number]        Account:      [id number]   :      1950           Visit Date:   2020      Document: [id number]      Visit Date:   2020     ONCOLOGY HISTORY: Mr. 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  High PD-L1 expression.  No EGFR mutation.  ALK and ROS1 could not be done because of insufficient sample.   1.  In , he was evaluated by ENT for hoarseness.   -On 2005, he had laryngoscopy. There was right anterior true vocal cord lesion with extension to the anterior commissure on the right. 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   -On 2005, he had endoscopic laser assisted vertical hemilaryngectomy right. Pathology from vocal cord revealed squamous cell carcinoma, moderately differentiated, invasive  -Unfortunately, he had recurrence. 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 Pathology revealed invasive moderately differentiated squamous cell carcinoma.    -Patient received concurrent chemoradiation. He received 6000 cGy completed on 06/21/2006. He received cisplatin with radiation.   2.  He had a PET scan done on 10/24/2009.  It revealed enlarging spiculated nodule in posterior left upper lobe with borderline hypermetabolic activity.   -He had left upper lobe segmentectomy and mediastinal lymph node dissection on 12/10/2009.  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  No lymphovascular present.  Margins negative.  Lymph nodes were all benign.   3.  The patient developed a lesion in his back.  He was seen by a dermatologist.  Biopsy on 01/31/2020 revealed squamous cell carcinoma. He subsequently underwent a Mohs surgical procedure on 02/28/2020.  Lesion measured 2.7 cm.  The patient was recommended postoperative radiation.   4.  For further workup, PET scan was done on 04/02/2020.  It reveals a hypermetabolic 6.4 cm mass in left upper lobe.  The mass invades into the mediastinum.  There is a mildly enlarged hypermetabolic single mediastinal lymph node.  There is hypermetabolic lesion in L4 vertebral body and left second rib.  There is hypermetabolic nodule in left parotid gland.   -MRI of the thoracic and lumbar spine was done on 04/06/2020.  It revealed destructive metastatic lesions involving L4 vertebral body.  There was also multiple other osseous metastatic disease.   5.  CT-guided biopsy of left lung mass on 04/13/2020 reveals moderately differentiated squamous cell carcinoma.    -TPS score of 60%. High PD-L1 expression.   -NEGATIVE for BRAF, EGFR, ERBB2, IDH1, IDH2, KRAS, MET, NRAS, RET     -ALK and ROS1 could not be done because of insufficient sample.   6.  Radiation to lumbar spine. 3000 cGy between 04/07/2020 and 04/20/2020.  -Radiation to left parotid between 04/09/2020 and 04/22/2020. 3000 cGy.   7. Pembrolizumab started on 04/29/2020.     SUBJECTIVE:  Mr. Fairbanks is a 70-year-old gentleman with metastatic  squamous cell carcinoma of the lung with high PD-L1 expression.  He was started on pembrolizumab on 04/29/2020.  He has been tolerating the treatment well.      The patient has fatigue.  This will go along with his age and multiple medical problems.  No headache.  Intermittent lightheadedness.  No chest pain.  No shortness of breath at rest.  Some cough which is nonproductive.  No abdominal pain, nausea or vomiting.  Appetite is fairly good.  No urinary or bowel complaints.  He has back pain.  He is taking oxycodone as needed. He does not use much as it causes constipation.      The patient recently had an enlarging skin lesion.  He had moh's surgery.  Dermatologist had called.  It is metastatic adenocarcinoma.      Next generation sequencing was ordered.  No EGFR mutation.  ALK and ROS1 could not be done because of insufficient sample.      PHYSICAL EXAMINATION:   GENERAL:  He is alert.  He was oriented x 3.   FACE:  No swelling.  No facial droop.  In the left face, there is minimal erythema of the skin where he had radiation.    THROAT:  No ulcer.  No thrush.   NECK:  Supple. No tenderness. No lymphadenopathy.   CHEST:  No deformity or tenderness. Scar samina from previous surgery.   BACK:  There is radiation changes at the site of radiation.  No open ulcer.   LUNGS:  Good air entry bilaterally.  No wheezing.   HEART:  Regular.   GI: Abdomen is soft and non-tender. No mass.   EXTREMITIES:  No edema.   SKIN:  No petechia.     LABORATORY DATA:  Reviewed.      ASSESSMENT:   1.  A 70-year-old gentleman with metastatic squamous cell carcinoma with high PD-L1 expression on pembrolizumab.    2.  Back pain controlled with oxycodone.   3.  Fatigue secondary to malignancy, multiple other medical problems.   4.  Other medical problems including chronic kidney disease and forgetfulness.      PLAN:   1.  The patient clinically is stable.  He will continue on pembrolizumab.  Side effects reviewed.  Overall, he tolerated it well.    2.  The patient had skin cancer removed from his back.  I explained to the patient that hopefully pembrolizumab will help and he should not have recurrent skin cancer.   3.  The patient occasionally gets cough.  I told him to let us know if he has chronic cough, chest pain, shortness of breath or fever.  With pembrolizumab, he can get pneumonitis.   4.  Wife had multiple questions, which were all answered.  Plan is to continue pembrolizumab.  After 4 cycles, will plan on getting scans.  I will see him with the next treatment.         JENNIFER CEDENO MD             D: 2020   T: 2020   MT:       Name:     CHRISTI MANN   MRN:      [id number]        Account:      [id number]   :      1950           Visit Date:   2020      Document: [id number]          Again, thank you for allowing me to participate in the care of your patient.        Sincerely,        Jennifer Cedeno MD

## 2020-05-19 NOTE — PROGRESS NOTES
Medical Assistant Note:  Agapito ARECHIGA Fairbanks presents today for blood draw.    Patient seen by provider today: Yes: sampson.   present during visit today: Not Applicable.    Concerns: No Concerns.    Procedure:  Lab draw site: lac, Needle type: bf, Gauge: 23.    Post Assessment:  Labs drawn without difficulty: Yes.    Discharge Plan:  Departure Mode: Ambulatory.    Face to Face Time: 5 min  .    Araceli Steve, CMA

## 2020-05-19 NOTE — PATIENT INSTRUCTIONS
1. Continue pembrolizumab.  -Continue zometa.  2. Follow-up with next treatment.    Patient prefers a call to schedule

## 2020-05-20 NOTE — PROGRESS NOTES
Patient here for his Keytruda today and is requesting  New script of Oxycodone to be sent to [facility] in [place].     Script routed to Dr. Mullins to review and sign if appropriate.     Cecile Alanis RN

## 2020-05-20 NOTE — PROGRESS NOTES
Infusion Nursing Note:  Agapito Fairbanks presents today for C2D1 Keytruda.    Patient seen by provider today: No, virtual visit with Dr. Mullins on 5/19/20    Note: Patient reports feeling well and has no new concerns to report since his virtual visit with Dr. Mullins on 5/19/20.  Patient reports intermittent upper back and shoulder pain which he controls with Oxycodone. Patient requesting a refill on Oxycodone.  Tressa Alanis RN made aware of patient request.    Intravenous Access:  Peripheral IV placed.      Treatment Conditions:  Lab Results   Component Value Date    HGB 10.8 05/02/2020     Lab Results   Component Value Date    WBC 6.9 05/02/2020      Lab Results   Component Value Date    ANEU 5.6 05/02/2020     Lab Results   Component Value Date     05/02/2020      Lab Results   Component Value Date     05/19/2020                   Lab Results   Component Value Date    POTASSIUM 4.5 05/19/2020           Lab Results   Component Value Date    MAG 2.0 05/02/2020            Lab Results   Component Value Date    CR 1.01 05/19/2020                   Lab Results   Component Value Date    SUSIE 8.7 05/19/2020                Lab Results   Component Value Date    BILITOTAL 0.5 05/19/2020           Lab Results   Component Value Date    ALBUMIN 2.5 05/19/2020                    Lab Results   Component Value Date    ALT 20 05/19/2020           Lab Results   Component Value Date    AST 11 05/19/2020       Results reviewed, labs MET treatment parameters, ok to proceed with treatment.      Post Infusion Assessment:  Patient tolerated infusion without incident.  Blood return noted pre and post infusion.  Site patent and intact, free from redness, edema or discomfort.  No evidence of extravasations.  Access discontinued per protocol.    Discharge Plan:   Patient declined prescription refills.  Discharge instructions reviewed with: Patient.  Patient and/or family verbalized understanding of discharge instructions and all  questions answered.  Copy of AVS reviewed with patient and/or family.  Patient will return 6/9/20 for labs and to see Brett Manrique NP for next appointment.  Patient discharged in stable condition accompanied by: self.  Departure Mode: Ambulatory.    Mariya Connors, RN, RN

## 2020-05-20 NOTE — PROGRESS NOTES
Visit Date:   05/19/2020     ONCOLOGY HISTORY: Mr. 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  High PD-L1 expression.  No EGFR mutation.  ALK and ROS1 could not be done because of insufficient sample.   1.  In 2005, he was evaluated by ENT for hoarseness.   -On 02/22/2005, he had laryngoscopy. There was right anterior true vocal cord lesion with extension to the anterior commissure on the right. 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   -On 03/18/2005, he had endoscopic laser assisted vertical hemilaryngectomy right. Pathology from vocal cord revealed squamous cell carcinoma, moderately differentiated, invasive  -Unfortunately, he had recurrence. 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 Pathology revealed invasive moderately differentiated squamous cell carcinoma.   -Patient received concurrent chemoradiation. He received 6000 cGy completed on 06/21/2006. He received cisplatin with radiation.   2.  He had a PET scan done on 10/24/2009.  It revealed enlarging spiculated nodule in posterior left upper lobe with borderline hypermetabolic activity.   -He had left upper lobe segmentectomy and mediastinal lymph node dissection on 12/10/2009.  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  No lymphovascular present.  Margins negative.  Lymph nodes were all benign.   3.  The patient developed a lesion in his back.  He was seen by a dermatologist.  Biopsy on 01/31/2020 revealed squamous cell carcinoma. He subsequently underwent a Mohs surgical procedure on 02/28/2020.  Lesion measured 2.7 cm.  The patient was recommended postoperative radiation.   4.  For further workup, PET scan was done on 04/02/2020.  It reveals a hypermetabolic 6.4 cm mass in left upper lobe.  The mass invades into the mediastinum.  There is a mildly enlarged hypermetabolic single  mediastinal lymph node.  There is hypermetabolic lesion in L4 vertebral body and left second rib.  There is hypermetabolic nodule in left parotid gland.   -MRI of the thoracic and lumbar spine was done on 04/06/2020.  It revealed destructive metastatic lesions involving L4 vertebral body.  There was also multiple other osseous metastatic disease.   5.  CT-guided biopsy of left lung mass on 04/13/2020 reveals moderately differentiated squamous cell carcinoma.    -TPS score of 60%. High PD-L1 expression.   -NEGATIVE for BRAF, EGFR, ERBB2, IDH1, IDH2, KRAS, MET, NRAS, RET     -ALK and ROS1 could not be done because of insufficient sample.   6.  Radiation to lumbar spine. 3000 cGy between 04/07/2020 and 04/20/2020.  -Radiation to left parotid between 04/09/2020 and 04/22/2020. 3000 cGy.   7. Pembrolizumab started on 04/29/2020.     SUBJECTIVE:  Mr. 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  He was started on pembrolizumab on 04/29/2020.  He has been tolerating the treatment well.      The patient has fatigue.  This will go along with his age and multiple medical problems.  No headache.  Intermittent lightheadedness.  No chest pain.  No shortness of breath at rest.  Some cough which is nonproductive.  No abdominal pain, nausea or vomiting.  Appetite is fairly good.  No urinary or bowel complaints.  He has back pain.  He is taking oxycodone as needed. He does not use much as it causes constipation.      The patient recently had an enlarging skin lesion.  He had moh's surgery.  Dermatologist had called.  It is metastatic adenocarcinoma.      Next generation sequencing was ordered.  No EGFR mutation.  ALK and ROS1 could not be done because of insufficient sample.      PHYSICAL EXAMINATION:   GENERAL:  He is alert.  He was oriented x 3.   FACE:  No swelling.  No facial droop.  In the left face, there is minimal erythema of the skin where he had  radiation.    THROAT:  No ulcer.  No thrush.   NECK:  Supple. No tenderness. No lymphadenopathy.   CHEST:  No deformity or tenderness. Scar samina from previous surgery.   BACK:  There is radiation changes at the site of radiation.  No open ulcer.   LUNGS:  Good air entry bilaterally.  No wheezing.   HEART:  Regular.   GI: Abdomen is soft and non-tender. No mass.   EXTREMITIES:  No edema.   SKIN:  No petechia.     LABORATORY DATA:  Reviewed.      ASSESSMENT:   1.  A 70-year-old gentleman with metastatic squamous cell carcinoma with high PD-L1 expression on pembrolizumab.    2.  Back pain controlled with oxycodone.   3.  Fatigue secondary to malignancy, multiple other medical problems.   4.  Other medical problems including chronic kidney disease and forgetfulness.      PLAN:   1.  The patient clinically is stable.  He will continue on pembrolizumab.  Side effects reviewed.  Overall, he tolerated it well.   2.  The patient had skin cancer removed from his back.  I explained to the patient that hopefully pembrolizumab will help and he should not have recurrent skin cancer.   3.  The patient occasionally gets cough.  I told him to let us know if he has chronic cough, chest pain, shortness of breath or fever.  With pembrolizumab, he can get pneumonitis.   4.  Wife had multiple questions, which were all answered.  Plan is to continue pembrolizumab.  After 4 cycles, will plan on getting scans.  I will see him with the next treatment.         JENNIFER CEDENO MD             D: 2020   T: 2020   MT:       Name:     CHRISTI MANN   MRN:      [id number]        Account:      [id number]   :      1950           Visit Date:   2020      Document: [id number]

## 2020-05-21 NOTE — PROGRESS NOTES
Tressa,   I called Millicent today and she was concerned about Roque's elevated glucose. If you could give her a call to review this that would be great.     Writer received the above message  And noted a non fasting glucose in CMP drawn on 05/19/2020 was 150.       Writer called and spoke with patient's spouse, Millicent, and verified that we will keep monitoring it and that a non fasting of 150 was not concerning at this time. A PCP can check a fasting glucose or a HGBA1C.  Millicent verbalized being ok with explanation, and did not feel the need to call the PCP at this time.    We will continue to monitor CMP.    Cecile Alanis RN

## 2020-05-21 NOTE — TELEPHONE ENCOUNTER
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 This clinician called for planned psychosocial check-in and support.     Intervention:   Wife reported concern today in seeing elevated sugars and has questions about why this might be, and whether she should be talking directly with Roque's GP. Wife also expressed concern about Roque's comprehension of appointment with Dr. Mullins, and expressed that she would like to be phoned in at beginning of appointments, not at the appointment's close like yesterday. No other concerns reported or expressed.     Plan:  1) RNCC to follow-up with Millicent regarding plan for elevated glucose  2) This clinician to explore with nurse management what changes can be implemented to support call initiation given current visitor policy.   3) SW will continue to be available as needed for ongoing guidance and support. Family aware of how to reach out in the case of distress or need.     Please call or page if needs or concerns arise.     SAI Villeda, [facility] Center[facility]  Direct Phone: [phone]  Pager: [phone]

## 2020-05-25 NOTE — PROGRESS NOTES
Visit Date:   05/19/2020     ONCOLOGY HISTORY: Mr. 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  High PD-L1 expression.  No EGFR mutation.  ALK and ROS1 could not be done because of insufficient sample.   1.  In 2005, he was evaluated by ENT for hoarseness.   -On 02/22/2005, he had laryngoscopy. There was right anterior true vocal cord lesion with extension to the anterior commissure on the right. 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   -On 03/18/2005, he had endoscopic laser assisted vertical hemilaryngectomy right. Pathology from vocal cord revealed squamous cell carcinoma, moderately differentiated, invasive  -Unfortunately, he had recurrence. 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 Pathology revealed invasive moderately differentiated squamous cell carcinoma.   -Patient received concurrent chemoradiation. He received 6000 cGy completed on 06/21/2006. He received cisplatin with radiation.   2.  He had a PET scan done on 10/24/2009.  It revealed enlarging spiculated nodule in posterior left upper lobe with borderline hypermetabolic activity.   -He had left upper lobe segmentectomy and mediastinal lymph node dissection on 12/10/2009.  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  No lymphovascular present.  Margins negative.  Lymph nodes were all benign.   3.  The patient developed a lesion in his back.  He was seen by a dermatologist.  Biopsy on 01/31/2020 revealed squamous cell carcinoma. He subsequently underwent a Mohs surgical procedure on 02/28/2020.  Lesion measured 2.7 cm.  The patient was recommended postoperative radiation.   4.  For further workup, PET scan was done on 04/02/2020.  It reveals a hypermetabolic 6.4 cm mass in left upper lobe.  The mass invades into the mediastinum.  There is a mildly enlarged hypermetabolic single  mediastinal lymph node.  There is hypermetabolic lesion in L4 vertebral body and left second rib.  There is hypermetabolic nodule in left parotid gland.   -MRI of the thoracic and lumbar spine was done on 04/06/2020.  It revealed destructive metastatic lesions involving L4 vertebral body.  There was also multiple other osseous metastatic disease.   5.  CT-guided biopsy of left lung mass on 04/13/2020 reveals moderately differentiated squamous cell carcinoma.    -TPS score of 60%. High PD-L1 expression.   -NEGATIVE for BRAF, EGFR, ERBB2, IDH1, IDH2, KRAS, MET, NRAS, RET     -ALK and ROS1 could not be done because of insufficient sample.   6.  Radiation to lumbar spine. 3000 cGy between 04/07/2020 and 04/20/2020.  -Radiation to left parotid between 04/09/2020 and 04/22/2020. 3000 cGy.   7. Pembrolizumab started on 04/29/2020.     SUBJECTIVE:  Mr. 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  He was started on pembrolizumab on 04/29/2020.  He has been tolerating the treatment well.      The patient has fatigue.  This will go along with his age and multiple medical problems.  No headache.  Intermittent lightheadedness.  No chest pain.  No shortness of breath at rest.  Some cough which is nonproductive.  No abdominal pain, nausea or vomiting.  Appetite is fairly good.  No urinary or bowel complaints.  He has back pain.  He is taking oxycodone as needed. He does not use much as it causes constipation.      The patient recently had an enlarging skin lesion.  He had moh's surgery.  Dermatologist had called.  It is metastatic adenocarcinoma.      Next generation sequencing was ordered.  No EGFR mutation.  ALK and ROS1 could not be done because of insufficient sample.      PHYSICAL EXAMINATION:   GENERAL:  He is alert.  He was oriented x 3.   FACE:  No swelling.  No facial droop.  In the left face, there is minimal erythema of the skin where he had  radiation.    THROAT:  No ulcer.  No thrush.   NECK:  Supple. No tenderness. No lymphadenopathy.   CHEST:  No deformity or tenderness. Scar samina from previous surgery.   BACK:  There is radiation changes at the site of radiation.  No open ulcer.   LUNGS:  Good air entry bilaterally.  No wheezing.   HEART:  Regular.   GI: Abdomen is soft and non-tender. No mass.   EXTREMITIES:  No edema.   SKIN:  No petechia.     LABORATORY DATA:  Reviewed.      ASSESSMENT:   1.  A 70-year-old gentleman with metastatic squamous cell carcinoma with high PD-L1 expression on pembrolizumab.    2.  Back pain controlled with oxycodone.   3.  Fatigue secondary to malignancy, multiple other medical problems.   4.  Other medical problems including chronic kidney disease and forgetfulness.      PLAN:   1.  The patient clinically is stable.  He will continue on pembrolizumab.  Side effects reviewed.  Overall, he tolerated it well.   2.  The patient had skin cancer removed from his back.  I explained to the patient that hopefully pembrolizumab will help and he should not have recurrent skin cancer.   3.  The patient occasionally gets cough.  I told him to let us know if he has chronic cough, chest pain, shortness of breath or fever.  With pembrolizumab, he can get pneumonitis.   4.  Wife had multiple questions, which were all answered.  Plan is to continue pembrolizumab.  After 4 cycles, will plan on getting scans.  I will see him with the next treatment.         JENNIFER CEDENO MD             D: 2020   T: 2020   MT:       Name:     CHRISTI MANN   MRN:      [id number]        Account:      [id number]   :      1950           Visit Date:   2020      Document: [id number]

## 2020-06-08 NOTE — TELEPHONE ENCOUNTER
Patient is currently scheduled for an appointment at [facility] in [place].  Called patient to review current visitor restrictions and complete COVID-19 Patient Infection (Travel) Screening Tool.    No answer, voicemail message left.  Instructed patient to call the clinic to complete pre-screening.

## 2020-06-09 NOTE — LETTER
6/9/2020         RE: Agapito Fairbanks  [address]  [place] [address]        Dear Colleague,    Thank you for referring your patient, Agapito Fairbanks, to the [facility]. Please see a copy of my visit note below.    Oncology Rooming Note    June 9, 2020 11:12 AM   Agapito Fairbanks is a 70 year old male who presents for:    Chief Complaint   Patient presents with     Oncology Clinic Visit     Initial Vitals: /74   Pulse 95   Temp 98.9  F (37.2  C) (Oral)   Resp 18   Wt 73.4 kg (161 lb 12.8 oz)   SpO2 97%   BMI 21.94 kg/m   Estimated body mass index is 21.94 kg/m  as calculated from the following:    Height as of 5/19/20: 1.829 m (6').    Weight as of this encounter: 73.4 kg (161 lb 12.8 oz). Body surface area is 1.93 meters squared.  Moderate Pain (5) Comment: Data Unavailable   No LMP for male patient.  Allergies reviewed: Yes  Medications reviewed: Yes    Medications: MEDICATION REFILLS NEEDED TODAY. Provider was notified.  Oxycodone  Pharmacy name entered into EPIC:    [id number] - [place], [place] [facility] [address] [facility] [address] AT PORTAL TO [facility] SITES    Clinical concerns: shoulder pain Dr. Mullins was notified.      Shari J. Schoenberger, [facility]              Visit Date:   06/09/2020      SUBJECTIVE:  Mr. 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  He is currently on pembrolizumab.  He is tolerating it well.      Wife was on the phone.  She has a few concerns.  One is weakness.  The patient has been more weak.  He is still ambulating well.  He has not been falling down.  Also, his appetite has been decreased.  He has been losing weight.      The patient also has some pain in the left shoulder blade area.  It is not a pain in the shoulder joint.  The patient can move his left upper extremity without any problem.  He takes 1 or 2 oxycodone a  day.  Denies any trauma or fall.      No headache.  Some lightheadedness.  No visual problem.  No chest pain.  No shortness of breath at rest.  The patient sometimes has some wheezing.  No abdominal pain, nausea or vomiting.  Appetite is fairly good.  No urinary or bowel complaints.  No bleeding.  No fever or chills.      All other review of systems negative.      PHYSICAL EXAMINATION:  Unchanged.   CHEST:  Examined the scapular area.  There is no tenderness.  No signs of infection.  Skin is normal.      ASSESSMENT:   1.  A 70-year-old gentleman with metastatic squamous cell carcinoma of the lung, on pembrolizumab.   2.  Fatigue secondary to malignancy and pembrolizumab.   3.  Weight loss and decreased appetite secondary to malignancy, and pembrolizumab.   4.  Left scapular pain.  This is likely of muscular origin.   5.  Multiple other medical problems including history of head and neck cancer.      PLAN:   1.  I had a long discussion with the patient and his wife.  Discussed regarding his fatigue, decreased appetite and weight loss.  These are secondary to malignancy and pembrolizumab.  As his malignancy gets control; hopefully, some of them will improve.  The patient follows up with Palliative Care which he will continue.   2.  Discussed regarding pain in the shoulder blade.  This is likely of musculoskeletal origin.  I want to rule out metastatic disease.  Will get a CT chest.     3.  The patient will continue on oxycodone as needed.     4.  Will continue on pembrolizumab.  Overall, he is tolerating it well.   5.  Wife had a few questions, which were all answered.  Will inform her of the result of CT chest.  I will see him in 3 weeks' time.         JENNIFER CEDENO MD             D: 2020   T: 2020   MT: CHAU      Name:     CHRISTI MANN   MRN:      [id number]        Account:      [id number]   :      1950           Visit Date:   2020      Document: [id number]       Again, thank you  for allowing me to participate in the care of your patient.        Sincerely,        Wilver Mullins MD

## 2020-06-09 NOTE — PROGRESS NOTES
Visit Date:   06/09/2020     ONCOLOGY HISTORY: Mr. 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  High PD-L1 expression.  No EGFR mutation.  ALK and ROS1 could not be done because of insufficient sample.   1.  In 2005, he was evaluated by ENT for hoarseness.   -On 02/22/2005, he had laryngoscopy. There was right anterior true vocal cord lesion with extension to the anterior commissure on the right. 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   -On 03/18/2005, he had endoscopic laser assisted vertical hemilaryngectomy right. Pathology from vocal cord revealed squamous cell carcinoma, moderately differentiated, invasive  -Unfortunately, he had recurrence. 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 Pathology revealed invasive moderately differentiated squamous cell carcinoma.   -Patient received concurrent chemoradiation. He received 6000 cGy completed on 06/21/2006. He received cisplatin with radiation.   2.  He had a PET scan done on 10/24/2009.  It revealed enlarging spiculated nodule in posterior left upper lobe with borderline hypermetabolic activity.   -He had left upper lobe segmentectomy and mediastinal lymph node dissection on 12/10/2009.  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  No lymphovascular present.  Margins negative.  Lymph nodes were all benign.   3.  The patient developed a lesion in his back.  He was seen by a dermatologist.  Biopsy on 01/31/2020 revealed squamous cell carcinoma. He subsequently underwent a Mohs surgical procedure on 02/28/2020.  Lesion measured 2.7 cm.  The patient was recommended postoperative radiation.   4.  For further workup, PET scan was done on 04/02/2020.  It reveals a hypermetabolic 6.4 cm mass in left upper lobe.  The mass invades into the mediastinum.  There is a mildly enlarged hypermetabolic single  mediastinal lymph node.  There is hypermetabolic lesion in L4 vertebral body and left second rib.  There is hypermetabolic nodule in left parotid gland.   -MRI of the thoracic and lumbar spine was done on 04/06/2020.  It revealed destructive metastatic lesions involving L4 vertebral body.  There was also multiple other osseous metastatic disease.   5.  CT-guided biopsy of left lung mass on 04/13/2020 reveals moderately differentiated squamous cell carcinoma.    -TPS score of 60%. High PD-L1 expression.   -NEGATIVE for BRAF, EGFR, ERBB2, IDH1, IDH2, KRAS, MET, NRAS, RET     -ALK and ROS1 could not be done because of insufficient sample.   6.  Radiation to lumbar spine. 3000 cGy between 04/07/2020 and 04/20/2020.  -Radiation to left parotid between 04/09/2020 and 04/22/2020. 3000 cGy.   7. Pembrolizumab started on 04/29/2020.     SUBJECTIVE:  Mr. 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  He is currently on pembrolizumab.  He is tolerating it well.      Wife was on the phone.  She has a few concerns.  One is weakness.  The patient has been more weak.  He is still ambulating well.  He has not been falling down.  Also, his appetite has been decreased.  He has been losing weight.      The patient also has some pain in the left shoulder blade area.  It is not a pain in the shoulder joint.  The patient can move his left upper extremity without any problem.  He takes 1 or 2 oxycodone a day.  Denies any trauma or fall.      No headache.  Some lightheadedness.  No visual problem.  No chest pain.  No shortness of breath at rest.  The patient sometimes has some wheezing.  No abdominal pain, nausea or vomiting.  Appetite is fairly good.  No urinary or bowel complaints.  No bleeding.  No fever or chills.      All other review of systems negative.      PHYSICAL EXAMINATION:   GENERAL:  He is alert.  He was oriented x 3.   FACE:  No swelling.  No facial droop. THROAT:  No ulcer.  No  thrush.   NECK:  Supple. No tenderness. No lymphadenopathy.   LUNGS:  Good air entry bilaterally.  No wheezing.   HEART:  Regular.   GI: Abdomen is soft and non-tender. No mass.   EXTREMITIES:  No edema.   SKIN:  No petechia.  CHEST:  Examined the scapular area.  There is no tenderness.  No signs of infection.  Skin is normal.      ASSESSMENT:   1.  A 70-year-old gentleman with metastatic squamous cell carcinoma of the lung on pembrolizumab.   2.  Fatigue secondary to malignancy and pembrolizumab.   3.  Weight loss and decreased appetite secondary to malignancy, and pembrolizumab.   4.  Left scapular pain.  This is likely of muscular origin.   5.  Multiple other medical problems including history of head and neck cancer.      PLAN:   1.  I had a long discussion with the patient and his wife.  Discussed regarding his fatigue, decreased appetite and weight loss.  These are secondary to malignancy and pembrolizumab.  As his malignancy gets controlled, hopefully, some of the symptoms will improve.  The patient follows up with Palliative Care which he will continue.   2.  Discussed regarding pain in the shoulder blade.  This is likely of musculoskeletal origin.  I want to rule out metastatic disease.  Will get a CT chest.     3.  The patient will continue on oxycodone as needed.     4.  He will continue on pembrolizumab.  Overall, he is tolerating it well.   5.  Wife had a few questions, which were all answered.  Will inform her of the result of CT chest.  I will see him in 3 weeks' time.     ADDENDUM: CT chest done on 06/10/2020 reveals worsening disease. Spoke to patient and wife. Worsening appearance of disease is due to inflammatory response. No change of treatment needed. No metastasis in scapula. Continue pain medication.        JENNIFER CEDENO MD             D: 2020   T: 2020   MT: CHAU      Name:     CHRISTI MANN   MRN:      [id number]        Account:      [id number]   :      1950            Visit Date:   06/09/2020      Document: [id number]

## 2020-06-09 NOTE — PATIENT INSTRUCTIONS
1. Continue pembrolizumab.  2. Continue zometa.  3. CT chest tomorrow.  4. Continue oxycodone.  5. Follow-up with next treatment.

## 2020-06-09 NOTE — PROGRESS NOTES
Oncology Rooming Note    June 9, 2020 11:12 AM   Agapito Fairbanks is a 70 year old male who presents for:    Chief Complaint   Patient presents with     Oncology Clinic Visit     Initial Vitals: /74   Pulse 95   Temp 98.9  F (37.2  C) (Oral)   Resp 18   Wt 73.4 kg (161 lb 12.8 oz)   SpO2 97%   BMI 21.94 kg/m   Estimated body mass index is 21.94 kg/m  as calculated from the following:    Height as of 5/19/20: 1.829 m (6').    Weight as of this encounter: 73.4 kg (161 lb 12.8 oz). Body surface area is 1.93 meters squared.  Moderate Pain (5) Comment: Data Unavailable   No LMP for male patient.  Allergies reviewed: Yes  Medications reviewed: Yes    Medications: MEDICATION REFILLS NEEDED TODAY. Provider was notified.  Oxycodone  Pharmacy name entered into EPIC:    [facility][id number] - [place], [place] [facility] [address] [facility] [address] AT PORTAL TO [facility] SITES    Clinical concerns: shoulder pain Dr. Mullins was notified.      Shari J. Schoenberger, [facility]

## 2020-06-09 NOTE — TELEPHONE ENCOUNTER
[facility] Telephone T[facility]    Assessment: Spouse/Partner Millicent called in to triage reporting the following symptoms: increased weakness, weight loss, pain. Patient scheduled for labs and virtual visit today with Dr. Mullins. Millicent feels that he should be assessed in person as he is having worsening symptoms.     Recommendations: Discussed with Dr. Mullins who agrees to in-person visit. Will see patient in person ~11:00AM today. Writer updated MAs and patient's spouse on plan.      Follow-Up: Instructed patient to seek care immediately for worsening symptoms, including: fever, chest pain, shortness of breath, dizziness. Patient voiced understanding of advice and/or instructions given.     Deepti Moser, NAHEEDN, RN, PHN

## 2020-06-10 NOTE — TELEPHONE ENCOUNTER
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 This clinician called for psychosocial check-in and support.     Intervention:   Wife Millicent reported appreciation for being a part of discussion by phone at time of Agapito's last appointment. Denies other concerns or needs at present.     Plan:  1) SW will continue to be available as needed for ongoing guidance and support. Family aware of how to reach out in the case of distress or need. No further outreach needed at present time.   2) Ongoing collaboration with multidisciplinary care team.     Please call or page if needs or concerns arise.     SAI Villeda, LICSW  Direct Phone: [phone]  Pager: [phone]

## 2020-06-10 NOTE — PROGRESS NOTES
Infusion Nursing Note:  Agapito Fairbanks presents today for Cycle 3 Day 1 Keytruda, Zometa.    Patient seen by provider today: Yes: Dr. Mullins, 6/9/20.   present during visit today: Not Applicable.    Note: N/A.    Intravenous Access:  Peripheral IV placed.    Treatment Conditions:  Lab Results   Component Value Date     06/09/2020                   Lab Results   Component Value Date    POTASSIUM 4.1 06/09/2020           Lab Results   Component Value Date    MAG 2.0 05/02/2020            Lab Results   Component Value Date    CR 1.03 06/09/2020                   Lab Results   Component Value Date    SUSIE 9.7 06/09/2020                Lab Results   Component Value Date    BILITOTAL 0.7 06/09/2020           Lab Results   Component Value Date    ALBUMIN 2.7 06/09/2020                    Lab Results   Component Value Date    ALT 21 06/09/2020           Lab Results   Component Value Date    AST 13 06/09/2020       Results reviewed, labs MET treatment parameters, ok to proceed with treatment.      Post Infusion Assessment:  Patient tolerated infusion without incident.  Blood return noted pre and post infusion.  Site patent and intact, free from redness, edema or discomfort.  No evidence of extravasations.  Access discontinued per protocol.       Discharge Plan:   Patient declined prescription refills.  Discharge instructions reviewed with: Patient.  Patient verbalized understanding of discharge instructions and all questions answered.  AVS to patient via TE2T.  Patient will return 6/30/20 for next appointment.   Patient discharged in stable condition accompanied by: self.  Departure Mode: Ambulatory.    Mecca Alejandro RN

## 2020-06-16 NOTE — PROGRESS NOTES
Palliative Care Outpatient Clinic Progress Note    Patient Name: Agapito Fairbanks is being evaluated via a billable telephone visit.    Primary Provider:  Los Love  Primary Oncologist: Dr Mullins  Last seen by palliative care: myself, 5/7/20    Chief Complaint: shoulder pain    Medical History/Summary:  - stage IV NSCLC diagnosed 2009 on PET for laryngeal cancer; s/p resection 2009               - mass in SKYLA seen on PET April 2020 invading mediastinum with metastasis to L4, L 2nd rib s/p palliative radiation (completed late April)              - on pembrolizumab since 4/29/20  - h/o laryngeal carcinoma 2005 s/p R laryngectomy; repeat resection followed by chemoradiation after recurrence Dec 2005  - mild cognitive impairment   - CKD III  - mild cognitive impairment, follows with neurology (Dr Still)       Interim History:  Patient is on the call with his wife Dione.     He has developed constant dull pain in his left shoulder since the last time we met. He is taking oxycodone 5mg once/twice per day. It shows effect after about 1/2 hour. He tends to fall asleep after these doses, which seems a combination of the medication making him sleep and the better pain control allowing for sleep.   He started the fentanyl patch again 6 days ago. The pain seemed a bit better controlled the first 1-2 days.   In addition he has been applying OTC Selonpas patches and a cream he received from his chiropractor. The oxycodone has been the most helpful.   He has developed significant constipation. He has daily stools, but these are hard. He takes dulcolax po with each dose of oxycodone    His back pain that was the main issue last time has improved markedly.    His energy has been very low lately.     Social History:  Pertinent changes to social history/social situation since last visit: no changes    Key support resources: Dione    Advance Directive Status:  Not discussed today. No treatment limitations per our conversation at  his last visit.     Social History     Tobacco Use     Smoking status: Former Smoker     Packs/day: 1.50     Years: 30.00     Pack years: 45.00     Last attempt to quit: 1998     Years since quittin.4     Smokeless tobacco: Never Used   Substance Use Topics     Alcohol use: Yes     Comment: 2-3 times per week, 1-2 beers     Drug use: No       Functional Status:  2 (Ambulatory and capable of all selfcare but unable to carry out any work activities; may need help with IADLs up and about > 50% of waking hours)    Impression & Recommendations & Counseliny/o man with metastatic lung cancer and mild cognitive impairment    Pain: new shoulder pain deemed referred pain from growing lung mass. No bone disease on recent imaging.   - increase oxycodone to q4h prn  - continue fentanyl 12mcg/hr  - increase gabapentin with goal of 300mg tid    Constipation: goal is one soft, formed stool daily  - take miralax up to twice daily  - take senna 2-4 tablets up to twice daily    Return to clinic in 6 weeks    Data / Chart Review:    Review of Systems:   ROS: 10 point ROS neg other than the symptoms noted above in the HPI and pertinents here.         Physical Exam:   Physical Exam:  Vital Signs not checked, virtual visit    CONSTIT: awake, appears comfortable  EENT: MMM, EOMI, no icterus  RESP: reg, nl effort, no cough  MSK: sitting on couch  SKIN: no rash, no obvious lesions  NEURO: alert, oriented x3  PSYCH: appropriate affect, memory and thought process intact    The rest of a comprehensive physical examination is deferred  due to public health emergency video visit restrictions.      Allergies   Allergen Reactions     Simvastatin      myalgias     Lisinopril Rash     rash       Current pertinent medications:  Oxycodone 5mg q6h prn  Gabapentin 300mg HS     Sertraline 50mg daily    : ok      Past Medical History:   Diagnosis Date     Acute gastritis with hemorrhage      Basal cell carcinoma, leg      left  pretibial area     CKD (chronic kidney disease) stage 3, GFR 30-59 ml/min (H)     creat baseline approx 1.4     Hearing loss      Helicobacter pylori (H. 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     C NONSPECIFIC PROCEDURE  12/01    L cataract     C NONSPECIFIC PROCEDURE  1983    right ankle fx repair     C NONSPECIFIC PROCEDURE  3/05    Right hemilaryngectomy (laser) for Squamous Cell CA T1N0     C NONSPECIFIC PROCEDURE  3/05, 10/09    Moh's procedure for Basal Cell CA chest     C NONSPECIFIC PROCEDURE  4/04    Moh's procedure for Basal Cell CA ear lobe     C NONSPECIFIC PROCEDURE  3/05, 12/05    right vocal cord squamous cell CA excision     C NONSPECIFIC PROCEDURE  12/07    Phacoemulsification of the lens with posterior chamber lens implant, right eye.      C NONSPECIFIC PROCEDURE  10/09     Moh's procedufe for BCC left pretibial area     C NONSPECIFIC PROCEDURE  12/09    Left thoracoscopic wedge resection, Open completion left upper lobe segmentectomy      THORACIC SURGERY      lung,throat         Lab and imaging data reviewed:  Comments:   GFR  "72          Opioid Risk Tool:   Opioid Risk Tool (ORT):    Family History of Substance Abuse:        Alcohol = 0 pt (no)       Illegal Drugs = 0 pt (no)       Prescription Drugs = 0 pt (no)      Personal History of Substance Abuse:       Alcohol = 0 pt (no). Some chart documentation of \"heavier than normal use\". Pt denies trouble with use today.       Illegal Drugs = 0 pt (no)       Prescription Drugs = 0 pt (no)        Age: 0      Psychological Disease: 0 pt (none)      ORT Score = 0        0-3: Low risk for opioid abuse       4-7: Moderate risk for opioid abuse       >/= 8: High risk for opioid abuse      [facility] Outpatient Palliative Care Opioid Prescribing Safety Plan    Opioid Safety Education: Reviewed by Marlene Hernandez MD  On 6/18/2020   Opioid Risk Assessment: Performed by Marlene Hernandez MD on 6/16/2020.  ORT score of 0.   Mood Disorder Assessment: Performed by Marlene Hernandez MD on 6/16/2020.     reviewed with every prescription, last reviewed by Marlene Hernandez MD on 06/16/2020     Additional recommendations based on patient's prognosis and indication for opioids include the following:      Expected prognosis >1 year  Risk: Low (ORT<4)  Indication for Opioid Use: Moderate or Strong  Baseline UDT performed on: virtual visit  Naloxone Script provided if patient also on benzodiazepine: sent today  Indications for Tapering reviewed: yes, as non-opioid therapies show effect      Telephone visit 36 min    Marlene Hernandez MD  Palliative Medicine  Pager [phone]  "

## 2020-06-18 NOTE — LETTER
6/18/2020         RE: Agapito Fairbanks  [address]  [place] [address]        Dear Colleague,    Thank you for referring your patient, Agapito Fairbanks, to the [facility]. Please see a copy of my visit note below.    Palliative Care Outpatient Clinic Progress Note    Patient Name: Agapito Fairbanks is being evaluated via a billable telephone visit.    Primary Provider:  Los Love  Primary Oncologist: Dr Mullins  Last seen by palliative care: myself, 5/7/20    Chief Complaint: shoulder pain    Medical History/Summary:  - stage IV NSCLC diagnosed 2009 on PET for laryngeal cancer; s/p resection 2009               - mass in SKYLA seen on PET April 2020 invading mediastinum with metastasis to L4, L 2nd rib s/p palliative radiation (completed late April)              - on pembrolizumab since 4/29/20  - h/o laryngeal carcinoma 2005 s/p R laryngectomy; repeat resection followed by chemoradiation after recurrence Dec 2005  - mild cognitive impairment   - CKD III  - mild cognitive impairment, follows with neurology (Dr Still)       Interim History:  Patient is on the call with his wife Dione.     He has developed constant dull pain in his left shoulder since the last time we met. He is taking oxycodone 5mg once/twice per day. It shows effect after about 1/2 hour. He tends to fall asleep after these doses, which seems a combination of the medication making him sleep and the better pain control allowing for sleep.   He started the fentanyl patch again 6 days ago. The pain seemed a bit better controlled the first 1-2 days.   In addition he has been applying OTC Selonpas patches and a cream he received from his chiropractor. The oxycodone has been the most helpful.   He has developed significant constipation. He has daily stools, but these are hard. He takes dulcolax po with each dose of oxycodone    His back pain that was the main issue last time has improved markedly.    His energy has  been very low lately.     Social History:  Pertinent changes to social history/social situation since last visit: no changes    Key support resources: Dione    Advance Directive Status:  Not discussed today. No treatment limitations per our conversation at his last visit.     Social History     Tobacco Use     Smoking status: Former Smoker     Packs/day: 1.50     Years: 30.00     Pack years: 45.00     Last attempt to quit: 1998     Years since quittin.4     Smokeless tobacco: Never Used   Substance Use Topics     Alcohol use: Yes     Comment: 2-3 times per week, 1-2 beers     Drug use: No       Functional Status:  2 (Ambulatory and capable of all selfcare but unable to carry out any work activities; may need help with IADLs up and about > 50% of waking hours)    Impression & Recommendations & Counseliny/o man with metastatic lung cancer and mild cognitive impairment    Pain: new shoulder pain deemed referred pain from growing lung mass. No bone disease on recent imaging.   - increase oxycodone to q4h prn  - continue fentanyl 12mcg/hr  - increase gabapentin with goal of 300mg tid    Constipation: goal is one soft, formed stool daily  - take miralax up to twice daily  - take senna 2-4 tablets up to twice daily    Return to clinic in 6 weeks    Data / Chart Review:    Review of Systems:   ROS: 10 point ROS neg other than the symptoms noted above in the HPI and pertinents here.         Physical Exam:   Physical Exam:  Vital Signs not checked, virtual visit    CONSTIT: awake, appears comfortable  EENT: MMM, EOMI, no icterus  RESP: reg, nl effort, no cough  MSK: sitting on couch  SKIN: no rash, no obvious lesions  NEURO: alert, oriented x3  PSYCH: appropriate affect, memory and thought process intact    The rest of a comprehensive physical examination is deferred  due to public health emergency video visit restrictions.      Allergies   Allergen Reactions     Simvastatin      myalgias     Lisinopril Rash      rash       Current pertinent medications:  Oxycodone 5mg q6h prn  Gabapentin 300mg HS     Sertraline 50mg daily    : ok      Past Medical History:   Diagnosis Date     Acute gastritis with hemorrhage 11/02     Basal cell carcinoma, leg      left pretibial area     CKD (chronic kidney disease) stage 3, GFR 30-59 ml/min (H)     creat baseline approx 1.4     Hearing loss      Helicobacter pylori (H. 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     C NONSPECIFIC PROCEDURE  12/01    L cataract     C NONSPECIFIC PROCEDURE  1983    right ankle fx repair     C NONSPECIFIC PROCEDURE  3/05    Right hemilaryngectomy (laser) for Squamous Cell CA T1N0     C NONSPECIFIC PROCEDURE  3/05, 10/09    Moh's procedure for Basal Cell CA chest     C NONSPECIFIC PROCEDURE  4/04    Moh's procedure for Basal Cell CA ear lobe     C NONSPECIFIC PROCEDURE  3/05, 12/05    right vocal cord squamous cell CA excision     C NONSPECIFIC PROCEDURE  12/07    Phacoemulsification of the lens with posterior chamber lens implant, right eye.      C NONSPECIFIC PROCEDURE  10/09     Moh's procedufe  "for BCC left pretibial area     C NONSPECIFIC PROCEDURE  12/09    Left thoracoscopic wedge resection, Open completion left upper lobe segmentectomy      THORACIC SURGERY      lung,throat         Lab and imaging data reviewed:  Comments:   GFR 72          Opioid Risk Tool:   Opioid Risk Tool (ORT):    Family History of Substance Abuse:        Alcohol = 0 pt (no)       Illegal Drugs = 0 pt (no)       Prescription Drugs = 0 pt (no)      Personal History of Substance Abuse:       Alcohol = 0 pt (no). Some chart documentation of \"heavier than normal use\". Pt denies trouble with use today.       Illegal Drugs = 0 pt (no)       Prescription Drugs = 0 pt (no)        Age: 0      Psychological Disease: 0 pt (none)      ORT Score = 0        0-3: Low risk for opioid abuse       4-7: Moderate risk for opioid abuse       >/= 8: High risk for opioid abuse      [facility] Outpatient Palliative Care Opioid Prescribing Safety Plan    Opioid Safety Education: Reviewed by Marlene Hernandez MD  On 6/18/2020   Opioid Risk Assessment: Performed by Marlene Hernandez MD on 6/16/2020.  ORT score of 0.   Mood Disorder Assessment: Performed by Marlene Hernandez MD on 6/16/2020.     reviewed with every prescription, last reviewed by Marlene Hernandez MD on 06/16/2020     Additional recommendations based on patient's prognosis and indication for opioids include the following:      Expected prognosis >1 year  Risk: Low (ORT<4)  Indication for Opioid Use: Moderate or Strong  Baseline UDT performed on: virtual visit  Naloxone Script provided if patient also on benzodiazepine: sent today  Indications for Tapering reviewed: yes, as non-opioid therapies show effect      Telephone visit 36 min    Marlene Hernandez MD  Palliative Medicine  Pager [phone]    Agapito Fairbanks is a 70 year old male who is being evaluated via a billable video visit.      The patient has been notified of following:     \"This video visit will be conducted via a call " "between you and your physician/provider. We have found that certain health care needs can be provided without the need for an in-person physical exam.  This service lets us provide the care you need with a video conversation.  If a prescription is necessary we can send it directly to your pharmacy.  If lab work is needed we can place an order for that and you can then stop by our lab to have the test done at a later time.    Video visits are billed at different rates depending on your insurance coverage.  Please reach out to your insurance provider with any questions.    If during the course of the call the physician/provider feels a video visit is not appropriate, you will not be charged for this service.\"    Patient has given verbal consent for Video visit? Yes    Will anyone else be joining your video visit? No      Video-Visit Details    Type of service:  Video Visit       D   [phone]    Originating Location (pt. Location): Home    Distant Location (provider location):  [facility]     Platform used for Video Visit: [employer] [facility], [facility]          Again, thank you for allowing me to participate in the care of your patient.        Sincerely,        Marlene Hernandez MD    "

## 2020-06-18 NOTE — PATIENT INSTRUCTIONS
Patient Instructions      Expand All Collapse All    Thank you for coming into the Palliative Care Clinic today.     1. Pain:   - you can take oxycodone 5mg up to every 4 hours as needed  - continue placing the fentanyl patch 12mcg/hr every 3 days  - increase gabapentin as follows:        Day 1-3: take 300mg twice a day. Starting day 4: take 300mg three times a day  - I prescribed naloxone nasal spray as a rescue medication as I do for all my patients who take oxycodone or similar medications. The pharmacist will explain the correct use of this medication.     2. Constipation: goal is one soft, formed stool daily  - take senna 1-3 tablets up to twice daily  - take miralax up to twice daily      Call if your symptoms change and the medications we have prescribed are not working. Do not take your medications at any other dose or frequently than how they are prescribed.     Call us at least a week in advance if you need a medication refill    Please bring all your pill bottles to your next appointment.     Failure to follow these instructions may result in the palliative care team not being able to prescribe your medications.    Return to clinic in 6 weeks for a follow up.     You can reach the Palliative Care Team during business hours at the following number:    - [phone]    To reach the Palliative Care Provider on-call After-hours or on holidays and weekends, call: [phone].  Please note that we are not able to provide pain medication refills on evenings or weekends.

## 2020-06-18 NOTE — LETTER
6/18/2020         RE: Agapito Fairbanks  [address]  [place] [address]        Dear Colleague,    Thank you for referring your patient, Agapito Fairbanks, to the [facility]. Please see a copy of my visit note below.    Palliative Care Outpatient Clinic Progress Note    Patient Name: Agapito Fairbanks is being evaluated via a billable telephone visit.    Primary Provider:  Los Love  Primary Oncologist: Dr Mullins  Last seen by palliative care: myself, 5/7/20    Chief Complaint: shoulder pain    Medical History/Summary:  - stage IV NSCLC diagnosed 2009 on PET for laryngeal cancer; s/p resection 2009               - mass in SKYLA seen on PET April 2020 invading mediastinum with metastasis to L4, L 2nd rib s/p palliative radiation (completed late April)              - on pembrolizumab since 4/29/20  - h/o laryngeal carcinoma 2005 s/p R laryngectomy; repeat resection followed by chemoradiation after recurrence Dec 2005  - mild cognitive impairment   - CKD III  - mild cognitive impairment, follows with neurology (Dr Still)       Interim History:  Patient is on the call with his wife Dione.     He has developed constant dull pain in his left shoulder since the last time we met. He is taking oxycodone 5mg once/twice per day. It shows effect after about 1/2 hour. He tends to fall asleep after these doses, which seems a combination of the medication making him sleep and the better pain control allowing for sleep.   He started the fentanyl patch again 6 days ago. The pain seemed a bit better controlled the first 1-2 days.   In addition he has been applying OTC Selonpas patches and a cream he received from his chiropractor. The oxycodone has been the most helpful.   He has developed significant constipation. He has daily stools, but these are hard. He takes dulcolax po with each dose of oxycodone    His back pain that was the main issue last time has improved markedly.    His energy has  been very low lately.     Social History:  Pertinent changes to social history/social situation since last visit: no changes    Key support resources: Dione    Advance Directive Status:  Not discussed today. No treatment limitations per our conversation at his last visit.     Social History     Tobacco Use     Smoking status: Former Smoker     Packs/day: 1.50     Years: 30.00     Pack years: 45.00     Last attempt to quit: 1998     Years since quittin.4     Smokeless tobacco: Never Used   Substance Use Topics     Alcohol use: Yes     Comment: 2-3 times per week, 1-2 beers     Drug use: No       Functional Status:  2 (Ambulatory and capable of all selfcare but unable to carry out any work activities; may need help with IADLs up and about > 50% of waking hours)    Impression & Recommendations & Counseliny/o man with metastatic lung cancer and mild cognitive impairment    Pain: new shoulder pain deemed referred pain from growing lung mass. No bone disease on recent imaging.   - increase oxycodone to q4h prn  - continue fentanyl 12mcg/hr  - increase gabapentin with goal of 300mg tid    Constipation: goal is one soft, formed stool daily  - take miralax up to twice daily  - take senna 2-4 tablets up to twice daily    Return to clinic in 6 weeks    Data / Chart Review:    Review of Systems:   ROS: 10 point ROS neg other than the symptoms noted above in the HPI and pertinents here.         Physical Exam:   Physical Exam:  Vital Signs not checked, virtual visit    CONSTIT: awake, appears comfortable  EENT: MMM, EOMI, no icterus  RESP: reg, nl effort, no cough  MSK: sitting on couch  SKIN: no rash, no obvious lesions  NEURO: alert, oriented x3  PSYCH: appropriate affect, memory and thought process intact    The rest of a comprehensive physical examination is deferred  due to public health emergency video visit restrictions.      Allergies   Allergen Reactions     Simvastatin      myalgias     Lisinopril Rash      rash       Current pertinent medications:  Oxycodone 5mg q6h prn  Gabapentin 300mg HS     Sertraline 50mg daily    : ok      Past Medical History:   Diagnosis Date     Acute gastritis with hemorrhage 11/02     Basal cell carcinoma, leg      left pretibial area     CKD (chronic kidney disease) stage 3, GFR 30-59 ml/min (H)     creat baseline approx 1.4     Hearing loss      Helicobacter pylori (H. 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     C NONSPECIFIC PROCEDURE  12/01    L cataract     C NONSPECIFIC PROCEDURE  1983    right ankle fx repair     C NONSPECIFIC PROCEDURE  3/05    Right hemilaryngectomy (laser) for Squamous Cell CA T1N0     C NONSPECIFIC PROCEDURE  3/05, 10/09    Moh's procedure for Basal Cell CA chest     C NONSPECIFIC PROCEDURE  4/04    Moh's procedure for Basal Cell CA ear lobe     C NONSPECIFIC PROCEDURE  3/05, 12/05    right vocal cord squamous cell CA excision     C NONSPECIFIC PROCEDURE  12/07    Phacoemulsification of the lens with posterior chamber lens implant, right eye.      C NONSPECIFIC PROCEDURE  10/09     Moh's procedufe  "for BCC left pretibial area     C NONSPECIFIC PROCEDURE  12/09    Left thoracoscopic wedge resection, Open completion left upper lobe segmentectomy      THORACIC SURGERY      lung,throat         Lab and imaging data reviewed:  Comments:   GFR 72          Opioid Risk Tool:   Opioid Risk Tool (ORT):    Family History of Substance Abuse:        Alcohol = 0 pt (no)       Illegal Drugs = 0 pt (no)       Prescription Drugs = 0 pt (no)      Personal History of Substance Abuse:       Alcohol = 0 pt (no). Some chart documentation of \"heavier than normal use\". Pt denies trouble with use today.       Illegal Drugs = 0 pt (no)       Prescription Drugs = 0 pt (no)        Age: 0      Psychological Disease: 0 pt (none)      ORT Score = 0        0-3: Low risk for opioid abuse       4-7: Moderate risk for opioid abuse       >/= 8: High risk for opioid abuse      [facility] Opioid Prescribing Safety Plan    Opioid Safety Education: Reviewed by Marlene Hernandez MD  On 6/18/2020   Opioid Risk Assessment: Performed by Marlene Hernandez MD on 6/16/2020.  ORT score of 0.   Mood Disorder Assessment: Performed by Marlene Hernandez MD on 6/16/2020.     reviewed with every prescription, last reviewed by Marlene Hernandez MD on 06/16/2020     Additional recommendations based on patient's prognosis and indication for opioids include the following:      Expected prognosis >1 year  Risk: Low (ORT<4)  Indication for Opioid Use: Moderate or Strong  Baseline UDT performed on: virtual visit  Naloxone Script provided if patient also on benzodiazepine: sent today  Indications for Tapering reviewed: yes, as non-opioid therapies show effect      Telephone visit 36 min    Marlene Hernandez MD  Palliative Medicine  Pager [phone]    Agapito Fairbanks is a 70 year old male who is being evaluated via a billable video visit.      The patient has been notified of following:     \"This video visit will be conducted via a call " "between you and your physician/provider. We have found that certain health care needs can be provided without the need for an in-person physical exam.  This service lets us provide the care you need with a video conversation.  If a prescription is necessary we can send it directly to your pharmacy.  If lab work is needed we can place an order for that and you can then stop by our lab to have the test done at a later time.    Video visits are billed at different rates depending on your insurance coverage.  Please reach out to your insurance provider with any questions.    If during the course of the call the physician/provider feels a video visit is not appropriate, you will not be charged for this service.\"    Patient has given verbal consent for Video visit? Yes    Will anyone else be joining your video visit? No      Video-Visit Details    Type of service:  Video Visit       D   [phone]    Originating Location (pt. Location): Home    Distant Location (provider location):  [facility]     Platform used for Video Visit: Doximity Shari J. Schoenberger, [facility]          Again, thank you for allowing me to participate in the care of your patient.        Sincerely,        Marlene Hernandez MD    "

## 2020-06-18 NOTE — PROGRESS NOTES
"Agapito Fairbanks is a 70 year old male who is being evaluated via a billable video visit.      The patient has been notified of following:     \"This video visit will be conducted via a call between you and your physician/provider. We have found that certain health care needs can be provided without the need for an in-person physical exam.  This service lets us provide the care you need with a video conversation.  If a prescription is necessary we can send it directly to your pharmacy.  If lab work is needed we can place an order for that and you can then stop by our lab to have the test done at a later time.    Video visits are billed at different rates depending on your insurance coverage.  Please reach out to your insurance provider with any questions.    If during the course of the call the physician/provider feels a video visit is not appropriate, you will not be charged for this service.\"    Patient has given verbal consent for Video visit? Yes    Will anyone else be joining your video visit? No      Video-Visit Details    Type of service:  Video Visit       D   [phone]    Originating Location (pt. Location): Home    Distant Location (provider location):  [facility]     Platform used for Video Visit: D[employer]imity Shari J. Schoenberger, [facility]        "

## 2020-06-20 NOTE — PROGRESS NOTES
Visit Date:   06/09/2020     ONCOLOGY HISTORY: Mr. 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  High PD-L1 expression.  No EGFR mutation.  ALK and ROS1 could not be done because of insufficient sample.   1.  In 2005, he was evaluated by ENT for hoarseness.   -On 02/22/2005, he had laryngoscopy. There was right anterior true vocal cord lesion with extension to the anterior commissure on the right. 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   -On 03/18/2005, he had endoscopic laser assisted vertical hemilaryngectomy right. Pathology from vocal cord revealed squamous cell carcinoma, moderately differentiated, invasive  -Unfortunately, he had recurrence. 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 Pathology revealed invasive moderately differentiated squamous cell carcinoma.   -Patient received concurrent chemoradiation. He received 6000 cGy completed on 06/21/2006. He received cisplatin with radiation.   2.  He had a PET scan done on 10/24/2009.  It revealed enlarging spiculated nodule in posterior left upper lobe with borderline hypermetabolic activity.   -He had left upper lobe segmentectomy and mediastinal lymph node dissection on 12/10/2009.  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  No lymphovascular present.  Margins negative.  Lymph nodes were all benign.   3.  The patient developed a lesion in his back.  He was seen by a dermatologist.  Biopsy on 01/31/2020 revealed squamous cell carcinoma. He subsequently underwent a Mohs surgical procedure on 02/28/2020.  Lesion measured 2.7 cm.  The patient was recommended postoperative radiation.   4.  For further workup, PET scan was done on 04/02/2020.  It reveals a hypermetabolic 6.4 cm mass in left upper lobe.  The mass invades into the mediastinum.  There is a mildly enlarged hypermetabolic single  mediastinal lymph node.  There is hypermetabolic lesion in L4 vertebral body and left second rib.  There is hypermetabolic nodule in left parotid gland.   -MRI of the thoracic and lumbar spine was done on 04/06/2020.  It revealed destructive metastatic lesions involving L4 vertebral body.  There was also multiple other osseous metastatic disease.   5.  CT-guided biopsy of left lung mass on 04/13/2020 reveals moderately differentiated squamous cell carcinoma.    -TPS score of 60%. High PD-L1 expression.   -NEGATIVE for BRAF, EGFR, ERBB2, IDH1, IDH2, KRAS, MET, NRAS, RET     -ALK and ROS1 could not be done because of insufficient sample.   6.  Radiation to lumbar spine. 3000 cGy between 04/07/2020 and 04/20/2020.  -Radiation to left parotid between 04/09/2020 and 04/22/2020. 3000 cGy.   7. Pembrolizumab started on 04/29/2020.     SUBJECTIVE:  Mr. 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  He is currently on pembrolizumab.  He is tolerating it well.      Wife was on the phone.  She has a few concerns.  One is weakness.  The patient has been more weak.  He is still ambulating well.  He has not been falling down.  Also, his appetite has been decreased.  He has been losing weight.      The patient also has some pain in the left shoulder blade area.  It is not a pain in the shoulder joint.  The patient can move his left upper extremity without any problem.  He takes 1 or 2 oxycodone a day.  Denies any trauma or fall.      No headache.  Some lightheadedness.  No visual problem.  No chest pain.  No shortness of breath at rest.  The patient sometimes has some wheezing.  No abdominal pain, nausea or vomiting.  Appetite is fairly good.  No urinary or bowel complaints.  No bleeding.  No fever or chills.      All other review of systems negative.      PHYSICAL EXAMINATION:   GENERAL:  He is alert.  He was oriented x 3.   FACE:  No swelling.  No facial droop. THROAT:  No ulcer.  No  thrush.   NECK:  Supple. No tenderness. No lymphadenopathy.   LUNGS:  Good air entry bilaterally.  No wheezing.   HEART:  Regular.   GI: Abdomen is soft and non-tender. No mass.   EXTREMITIES:  No edema.   SKIN:  No petechia.  CHEST:  Examined the scapular area.  There is no tenderness.  No signs of infection.  Skin is normal.      ASSESSMENT:   1.  A 70-year-old gentleman with metastatic squamous cell carcinoma of the lung on pembrolizumab.   2.  Fatigue secondary to malignancy and pembrolizumab.   3.  Weight loss and decreased appetite secondary to malignancy, and pembrolizumab.   4.  Left scapular pain.  This is likely of muscular origin.   5.  Multiple other medical problems including history of head and neck cancer.      PLAN:   1.  I had a long discussion with the patient and his wife.  Discussed regarding his fatigue, decreased appetite and weight loss.  These are secondary to malignancy and pembrolizumab.  As his malignancy gets controlled, hopefully, some of the symptoms will improve.  The patient follows up with Palliative Care which he will continue.   2.  Discussed regarding pain in the shoulder blade.  This is likely of musculoskeletal origin.  I want to rule out metastatic disease.  Will get a CT chest.     3.  The patient will continue on oxycodone as needed.     4.  He will continue on pembrolizumab.  Overall, he is tolerating it well.   5.  Wife had a few questions, which were all answered.  Will inform her of the result of CT chest.  I will see him in 3 weeks' time.     ADDENDUM: CT chest done on 06/10/2020 reveals worsening disease. Spoke to patient and wife. Worsening appearance of disease is due to inflammatory response. No change of treatment needed. No metastasis in scapula. Continue pain medication.        JENNIFER CEDENO MD             D: 2020   T: 2020   MT: CHAU      Name:     CHRISTI MANN   MRN:      [id number]        Account:      [id number]   :      1950            Visit Date:   06/09/2020      Document: [id number]

## 2020-06-29 NOTE — PROGRESS NOTES
"Patient's wife, Millicent,  called concerned about patient's increasing weakness. Patient has not be drinking or eating much for the last week or so.  His shortness of breath has increased with 02 sats dropping to 84%,and blood pressure has been low 90\"s /50's. The wife stated she was about to go to the ED this weekend but he seemed \" a bit better, and I don't want to cry almendarez\".    I advise that going to the ED , and I was informed his 02 sat are 94% today and blood pressure 111/58 today and redirecting the conversation to set up fluids, not wanting to go to the ED.     Writer has scheduled patient for IVF's tomorrow to coordinate with exam with Dr. Mullins. Will to go forward with home 02 sat testing tomorrow.     Writer to update Dr. Mullins and advise with any further instructions.     Cecile Alanis RN    "

## 2020-06-29 NOTE — TELEPHONE ENCOUNTER
"Patient is currently scheduled for an appointment at [facility] in [place].  Called patient to review current visitor restrictions and complete COVID-19 Patient Infection/Travel Screening Tool.     Due to the recent public health concerns around COVID-19 and in an effort to keep our patients and staff safe and healthy, we are implementing a screening process for the patients that come to our clinic.      I am going to ask you a few questions, please answer yes or no.  Your honesty about any symptoms is critical, as it keeps patients and staff healthy.      Do you have a:  Fever (or reported chills)?  No  New cough (started within the past 14 days)?  No (coughs because he has lung cancer. Ongoing)  New shortness of breath (started within the past 14 days)?  No  Rash?  No    In the last month, have you been in contact with someone who was confirmed or suspected to have Coronavirus/COVID-19?  No    Have you traveled internationally in the last month?  No  If so, where?  N/A     I also wanted to let you know that to protect our patients from the flu and other common illnesses, [facility] enforce visitor restrictions year round, but due to the community spread of COVID-19 in [place], we are taking additional precautionary steps to ensure the health of our patients.  At this time, NO visitors are allowed on our hospital and clinic campuses.     Patient PASSED the screening assessment.    Patient instructed to come to the clinic as planned for their scheduled appointment and to call the clinic if any symptoms develop prior to their appointment.    \"Per CDC Guidelines, we are asking all patients that are coming into the building to wear a cloth covering that covers your mouth and nose.  You will be screened again at the entrance to the clinic for any Covid 19 symptoms. If you screen positive to any Covid 19 symptoms during our screening process you will be provided a surgical mask to " "wear during your time in the building.\"    \"COVID-19 is contagious and can be dangerous for our patients and staff.  Please send us a [employer] message or call our clinic before coming in if you feel any of the following symptoms: fever, cough, congestion, runny nose, sore throat, muscle aches and pains, or shortness of breath.  If you are already at our clinic, it is very important that you be honest about any symptoms you are experiencing to ensure your safety and that of other patients and staff who treat you.  If you do have symptoms, we will have a nurse and/or provider asses you to determine next steps.\"    Edith George, CMA on 6/29/2020 at 2:26 PM    "

## 2020-06-30 PROBLEM — N39.0 URINARY TRACT INFECTION: Status: RESOLVED | Noted: 2020-01-01 | Resolved: 2020-01-01

## 2020-06-30 NOTE — PROGRESS NOTES
Pt oxygen tested on room air with portable oximeter. Oxygen level 95-96% on room air hr 85. Pt walked from rm 2 to Dr Short's office then back to rm 2. Denied any distress or sob.  Pt will not qualify for home oxygen. Results given to Dr. Mullins

## 2020-06-30 NOTE — PATIENT INSTRUCTIONS
1. Continue pembrolizumab.  2. Continue zometa.  3. Check for home oxygen.  4. Continue oxycodone.  5. PET scan in 3 weeks.  6. Follow-up with next treatment.    Patient prefers a call to schedule

## 2020-06-30 NOTE — PROGRESS NOTES
Infusion Nursing Note:  Agapito Fairbanks presents today for labs/IVF.    Patient seen by provider today: Yes: Dr. Mullins   present during visit today: Not Applicable.    Note: N/A.    Intravenous Access:  Labs drawn without difficulty.  Peripheral IV placed.    Treatment Conditions:  Not Applicable.      Post Infusion Assessment:  Patient tolerated infusion without incident.  Blood return noted pre and post infusion.  Site patent and intact, free from redness, edema or discomfort.  Access discontinued per protocol.       Discharge Plan:   Discharge instructions reviewed with: Patient.  Patient and/or family verbalized understanding of discharge instructions and all questions answered.  AVS to patient via [employer].  Patient will return 7/1/20 for next appointment.   Patient discharged in stable condition accompanied by: self.  Departure Mode: Ambulatory.    Dayday Yanez RN

## 2020-06-30 NOTE — LETTER
6/30/2020         RE: Agapito Fairbanks  [address]  [place] [address]        Dear Colleague,    Thank you for referring your patient, Agapito Fairbanks, to the [facility]. Please see a copy of my visit note below.    Oncology Rooming Note    June 30, 2020 2:25 PM   Agapito Fairbanks is a 70 year old male who presents for:    Chief Complaint   Patient presents with     Oncology Clinic Visit     Initial Vitals: /77   Pulse 85   Temp 98.2  F (36.8  C)   Resp 16   Ht 1.829 m (6')   Wt 71.4 kg (157 lb 6.4 oz)   SpO2 95%   BMI 21.35 kg/m   Estimated body mass index is 21.35 kg/m  as calculated from the following:    Height as of this encounter: 1.829 m (6').    Weight as of this encounter: 71.4 kg (157 lb 6.4 oz). Body surface area is 1.9 meters squared.  Mild Pain (3) Comment: Data Unavailable   No LMP for male patient.  Allergies reviewed: Yes  Medications reviewed: Yes    Medications: Medication refills not needed today.  Pharmacy name entered into Qitio:    [facility]AC[facility] #[id number] - [place], [place] [facility] [address] [facility] [place], [place] - [address] AT PORTAL TO [facility] SITES    Clinical concerns:  doctor was notified.      Kika Telles MA              Pt oxygen tested on room air with portable oximeter. Oxygen level 95-96% on room air hr 85. Pt walked from rm 2 to Dr Short's office then back to rm 2. Denied any distress or sob.  Pt will not qualify for home oxygen. Results given to Dr. Mullins     Visit Date:   06/30/2020     ONCOLOGY HISTORY: Mr. 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  High PD-L1 expression.  No EGFR mutation.  ALK and ROS1 could not be done because of insufficient sample.   1.  In 2005, he was evaluated by ENT for hoarseness.   -On 02/22/2005, he had laryngoscopy. There was right anterior true vocal cord lesion with extension to the anterior commissure on the  right. 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   -On 03/18/2005, he had endoscopic laser assisted vertical hemilaryngectomy right. Pathology from vocal cord revealed squamous cell carcinoma, moderately differentiated, invasive  -Unfortunately, he had recurrence. 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 Pathology revealed invasive moderately differentiated squamous cell carcinoma.   -Patient received concurrent chemoradiation. He received 6000 cGy completed on 06/21/2006. He received cisplatin with radiation.   2.  He had a PET scan done on 10/24/2009.  It revealed enlarging spiculated nodule in posterior left upper lobe with borderline hypermetabolic activity.   -He had left upper lobe segmentectomy and mediastinal lymph node dissection on 12/10/2009.  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  No lymphovascular present.  Margins negative.  Lymph nodes were all benign.   3.  The patient developed a lesion in his back.  He was seen by a dermatologist.  Biopsy on 01/31/2020 revealed squamous cell carcinoma. He subsequently underwent a Mohs surgical procedure on 02/28/2020.  Lesion measured 2.7 cm.  The patient was recommended postoperative radiation.   4.  For further workup, PET scan was done on 04/02/2020.  It reveals a hypermetabolic 6.4 cm mass in left upper lobe.  The mass invades into the mediastinum.  There is a mildly enlarged hypermetabolic single mediastinal lymph node.  There is hypermetabolic lesion in L4 vertebral body and left second rib.  There is hypermetabolic nodule in left parotid gland.   -MRI of the thoracic and lumbar spine was done on 04/06/2020.  It revealed destructive metastatic lesions involving L4 vertebral body.  There was also multiple other osseous metastatic disease.   5.  CT-guided biopsy of left lung mass on 04/13/2020 reveals moderately  differentiated squamous cell carcinoma.    -TPS score of 60%. High PD-L1 expression.   -NEGATIVE for BRAF, EGFR, ERBB2, IDH1, IDH2, KRAS, MET, NRAS, RET     -ALK and ROS1 could not be done because of insufficient sample.   6.  Radiation to lumbar spine. 3000 cGy between 04/07/2020 and 04/20/2020.  -Radiation to left parotid between 04/09/2020 and 04/22/2020. 3000 cGy.   7. Pembrolizumab started on 04/29/2020.     SUBJECTIVE:  Mr. 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      His wife was on the phone.  The patient says that overall he is doing well.  As per the wife, the patient has been more fatigued.  He is weaker.  Also, his appetite has not been good.  He does not feel like eating.  He has been slowly losing weight.  The patient also has been having some pain in the left shoulder/scapular area.  On directly asking, the patient says that overall he is doing good.  He has some fatigue but not excessive.  The patient says that he does not have an appetite and that is why he does not eat, but he mentioned that his pain in the shoulder is controlled.      He had a CT of the chest done on 06/10/2020.  That revealed an increase in size of the left mediastinal mass.  There were also some lung nodules.  I told the patient that immunotherapy can initially cause an increase in the size of the tumor.  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      REVIEW OF SYSTEMS:  No headache.  Some dizziness.  No visual problem.  No shortness of breath at rest.  He gets short of breath on exertion.  No cough.  No abdominal pain, nausea or vomiting.  No urinary or bowel complaints.  All other review of systems are negative.      PHYSICAL EXAMINATION:   GENERAL:  He is alert, oriented x 3.   VITAL SIGNS:  Reviewed.  ECOG PS of 2.   The rest of the systems were not examined.      LABORATORY DATA:  Reviewed.      ASSESSMENT:   1.  A 70-year-old gentleman with metastatic lung squamous  cell carcinoma.   2.  Fatigue.   3.  Weight loss.   4.  Left shoulder/scapular pain.      PLAN:   1.  I discussed regarding lung cancer.  Overall, the patient's condition is stable.  His fatigue and left scapular pain are all due to malignancy. He is on pembrolizumab.  He is tolerating it well.  He will continue on it.      2.  Discussed regarding getting a PET scan in about 3 weeks' time.  He is agreeable for it.  That will be scheduled.      3.  He will continue on Zometa for bone metastasis.      4.  For pain, he will continue on oxycodone.      5.  The wife wanted him to be checked to see if he qualifies for home oxygen.  He does not qualify for it based on pulse ox.  The patient also does not feel that he needs home oxygen.      6.  I will see him in 3 weeks' time.  I advised him to call us with any questions or concerns.         JENNIFER CEDENO MD             D: 2020   T: 2020   MT: LUZ      Name:     CHRISTI MANN   MRN:      [id number]        Account:      [id number]   :      1950           Visit Date:   2020      Document: [id number]      Visit Date:   2020     ONCOLOGY HISTORY: Mr. 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  High PD-L1 expression.  No EGFR mutation.  ALK and ROS1 could not be done because of insufficient sample.   1.  In , he was evaluated by ENT for hoarseness.   -On 2005, he had laryngoscopy. There was right anterior true vocal cord lesion with extension to the anterior commissure on the right. 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   -On 2005, he had endoscopic laser assisted vertical hemilaryngectomy right. Pathology from vocal cord revealed squamous cell carcinoma, moderately differentiated, invasive  -Unfortunately, he had recurrence. 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 Pathology revealed  invasive moderately differentiated squamous cell carcinoma.   -Patient received concurrent chemoradiation. He received 6000 cGy completed on 06/21/2006. He received cisplatin with radiation.   2.  He had a PET scan done on 10/24/2009.  It revealed enlarging spiculated nodule in posterior left upper lobe with borderline hypermetabolic activity.   -He had left upper lobe segmentectomy and mediastinal lymph node dissection on 12/10/2009.  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  No lymphovascular present.  Margins negative.  Lymph nodes were all benign.   3.  The patient developed a lesion in his back.  He was seen by a dermatologist.  Biopsy on 01/31/2020 revealed squamous cell carcinoma. He subsequently underwent a Mohs surgical procedure on 02/28/2020.  Lesion measured 2.7 cm.  The patient was recommended postoperative radiation.   4.  For further workup, PET scan was done on 04/02/2020.  It reveals a hypermetabolic 6.4 cm mass in left upper lobe.  The mass invades into the mediastinum.  There is a mildly enlarged hypermetabolic single mediastinal lymph node.  There is hypermetabolic lesion in L4 vertebral body and left second rib.  There is hypermetabolic nodule in left parotid gland.   -MRI of the thoracic and lumbar spine was done on 04/06/2020.  It revealed destructive metastatic lesions involving L4 vertebral body.  There was also multiple other osseous metastatic disease.   5.  CT-guided biopsy of left lung mass on 04/13/2020 reveals moderately differentiated squamous cell carcinoma.    -TPS score of 60%. High PD-L1 expression.   -NEGATIVE for BRAF, EGFR, ERBB2, IDH1, IDH2, KRAS, MET, NRAS, RET     -ALK and ROS1 could not be done because of insufficient sample.   6.  Radiation to lumbar spine. 3000 cGy between 04/07/2020 and 04/20/2020.  -Radiation to left parotid between 04/09/2020 and 04/22/2020. 3000 cGy.   7. Pembrolizumab started on 04/29/2020.     SUBJECTIVE:   Mr. 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      His wife was on the phone.  The patient says that overall he is doing well.  As per the wife, the patient has been more fatigued.  He is weaker.  Also, his appetite has not been good.  He does not feel like eating.  He has been slowly losing weight.  The patient also has been having some pain in the left shoulder/scapular area.  On directly asking, the patient says that overall he is doing good.  He has some fatigue but not excessive.  The patient says that he does not have an appetite and that is why he does not eat, but he mentioned that his pain in the shoulder is controlled.      He had a CT of the chest done on 06/10/2020.  That revealed an increase in size of the left mediastinal mass.  There were also some lung nodules.  I told the patient that immunotherapy can initially cause an increase in the size of the tumor.  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      REVIEW OF SYSTEMS:  No headache.  Some dizziness.  No visual problem.  No shortness of breath at rest.  He gets short of breath on exertion.  No cough.  No abdominal pain, nausea or vomiting.  No urinary or bowel complaints.  All other review of systems are negative.      PHYSICAL EXAMINATION:   GENERAL:  He is alert, oriented x 3.   VITAL SIGNS:  Reviewed.  ECOG PS of 2.   The rest of the systems were not examined.      LABORATORY DATA:  Reviewed.      ASSESSMENT:   1.  A 70-year-old gentleman with metastatic lung squamous cell carcinoma.   2.  Fatigue.   3.  Weight loss.   4.  Left shoulder/scapular pain.      PLAN:   1.  I discussed regarding lung cancer.  Overall, the patient's condition is stable.  His fatigue and left scapular pain are all due to malignancy. He is on pembrolizumab.  He is tolerating it well.  He will continue on it.      2.  Discussed regarding getting a PET scan in about 3 weeks' time.  He is agreeable for it.  That will  be scheduled.      3.  He will continue on Zometa for bone metastasis.      4.  For pain, he will continue on oxycodone.      5.  The wife wanted him to be checked to see if he qualifies for home oxygen.  He does not qualify for it based on pulse ox.  The patient also does not feel that he needs home oxygen.      6.  I will see him in 3 weeks' time.  I advised him to call us with any questions or concerns.         JENNIFER CEDENO MD             D: 2020   T: 2020   MT: LUZ      Name:     CHRISTI MANN   MRN:      [id number]        Account:      [id number]   :      1950           Visit Date:   2020      Document: [id number]        Again, thank you for allowing me to participate in the care of your patient.        Sincerely,        Jennifer Cedeno MD

## 2020-06-30 NOTE — PROGRESS NOTES
Oncology Rooming Note    June 30, 2020 2:25 PM   Agapito Fairbanks is a 70 year old male who presents for:    Chief Complaint   Patient presents with     Oncology Clinic Visit     Initial Vitals: /77   Pulse 85   Temp 98.2  F (36.8  C)   Resp 16   Ht 1.829 m (6')   Wt 71.4 kg (157 lb 6.4 oz)   SpO2 95%   BMI 21.35 kg/m   Estimated body mass index is 21.35 kg/m  as calculated from the following:    Height as of this encounter: 1.829 m (6').    Weight as of this encounter: 71.4 kg (157 lb 6.4 oz). Body surface area is 1.9 meters squared.  Mild Pain (3) Comment: Data Unavailable   No LMP for male patient.  Allergies reviewed: Yes  Medications reviewed: Yes    Medications: Medication refills not needed today.  Pharmacy name entered into EPIC:    [facility]/PHARMACY #[id number] - [place], [place] [facility] [address] [facility] [place], [place] - [address] AT [address]AL TO [facility] SITES    Clinical concerns:  doctor was notified.      Kika Telles MA

## 2020-07-01 NOTE — PROGRESS NOTES
Visit Date:   06/30/2020     ONCOLOGY HISTORY: Mr. 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  High PD-L1 expression.  No EGFR mutation.  ALK and ROS1 could not be done because of insufficient sample.   1.  In 2005, he was evaluated by ENT for hoarseness.   -On 02/22/2005, he had laryngoscopy. There was right anterior true vocal cord lesion with extension to the anterior commissure on the right. 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   -On 03/18/2005, he had endoscopic laser assisted vertical hemilaryngectomy right. Pathology from vocal cord revealed squamous cell carcinoma, moderately differentiated, invasive  -Unfortunately, he had recurrence. 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 Pathology revealed invasive moderately differentiated squamous cell carcinoma.   -Patient received concurrent chemoradiation. He received 6000 cGy completed on 06/21/2006. He received cisplatin with radiation.   2.  He had a PET scan done on 10/24/2009.  It revealed enlarging spiculated nodule in posterior left upper lobe with borderline hypermetabolic activity.   -He had left upper lobe segmentectomy and mediastinal lymph node dissection on 12/10/2009.  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  No lymphovascular present.  Margins negative.  Lymph nodes were all benign.   3.  The patient developed a lesion in his back.  He was seen by a dermatologist.  Biopsy on 01/31/2020 revealed squamous cell carcinoma. He subsequently underwent a Mohs surgical procedure on 02/28/2020.  Lesion measured 2.7 cm.  The patient was recommended postoperative radiation.   4.  For further workup, PET scan was done on 04/02/2020.  It reveals a hypermetabolic 6.4 cm mass in left upper lobe.  The mass invades into the mediastinum.  There is a mildly enlarged hypermetabolic single  mediastinal lymph node.  There is hypermetabolic lesion in L4 vertebral body and left second rib.  There is hypermetabolic nodule in left parotid gland.   -MRI of the thoracic and lumbar spine was done on 04/06/2020.  It revealed destructive metastatic lesions involving L4 vertebral body.  There was also multiple other osseous metastatic disease.   5.  CT-guided biopsy of left lung mass on 04/13/2020 reveals moderately differentiated squamous cell carcinoma.    -TPS score of 60%. High PD-L1 expression.   -NEGATIVE for BRAF, EGFR, ERBB2, IDH1, IDH2, KRAS, MET, NRAS, RET     -ALK and ROS1 could not be done because of insufficient sample.   6.  Radiation to lumbar spine. 3000 cGy between 04/07/2020 and 04/20/2020.  -Radiation to left parotid between 04/09/2020 and 04/22/2020. 3000 cGy.   7. Pembrolizumab started on 04/29/2020.     SUBJECTIVE:  Mr. 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      His wife was on the phone.  The patient says that overall he is doing well.  As per the wife, the patient has been more fatigued.  He is weaker.  Also, his appetite has not been good.  He does not feel like eating.  He has been slowly losing weight.  The patient also has been having some pain in the left shoulder/scapular area.  On directly asking, the patient says that overall he is doing good.  He has some fatigue but not excessive.  The patient says that he does not have an appetite and that is why he does not eat, but he mentioned that his pain in the shoulder is controlled.      He had a CT of the chest done on 06/10/2020.  That revealed an increase in size of the left mediastinal mass.  There were also some lung nodules.  I told the patient that immunotherapy can initially cause an increase in the size of the tumor.  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      REVIEW OF SYSTEMS:  No headache.  Some dizziness.  No visual problem.  No shortness of breath at rest.  He  gets short of breath on exertion.  No cough.  No abdominal pain, nausea or vomiting.  No urinary or bowel complaints.  All other review of systems are negative.      PHYSICAL EXAMINATION:   GENERAL:  He is alert, oriented x 3.   VITAL SIGNS:  Reviewed.  ECOG PS of 2.   The rest of the systems were not examined.      LABORATORY DATA:  Reviewed.      ASSESSMENT:   1.  A 70-year-old gentleman with metastatic lung squamous cell carcinoma.   2.  Fatigue.   3.  Weight loss.   4.  Left shoulder/scapular pain.      PLAN:   1.  I discussed regarding lung cancer.  Overall, the patient's condition is stable.  His fatigue and left scapular pain are all due to malignancy. He is on pembrolizumab.  He is tolerating it well.  He will continue on it.      2.  Discussed regarding getting a PET scan in about 3 weeks' time.  He is agreeable for it.  That will be scheduled.      3.  He will continue on Zometa for bone metastasis.      4.  For pain, he will continue on oxycodone.      5.  The wife wanted him to be checked to see if he qualifies for home oxygen.  He does not qualify for it based on pulse ox.  The patient also does not feel that he needs home oxygen.      6.  I will see him in 3 weeks' time.  I advised him to call us with any questions or concerns.         JENNIFER CEDENO MD             D: 2020   T: 2020   MT: LUZ      Name:     CHRISTI MANN   MRN:      [id number]        Account:      [id number]   :      1950           Visit Date:   2020      Document: [id number]

## 2020-07-01 NOTE — PROGRESS NOTES
Infusion Nursing Note:  Agapito Fairbanks presents today for Cycle 4 Day 1 Keytruda.    Patient seen by provider today: Yes: Dr. Mullins on 6/30.   present during visit today: Not Applicable.    Note: N/A.    Intravenous Access:  Peripheral IV placed.    Treatment Conditions:  Lab Results   Component Value Date     06/30/2020                   Lab Results   Component Value Date    POTASSIUM 4.6 06/30/2020           Lab Results   Component Value Date    MAG 2.0 05/02/2020            Lab Results   Component Value Date    CR 1.00 06/30/2020                   Lab Results   Component Value Date    SUSIE 9.8 06/30/2020                Lab Results   Component Value Date    BILITOTAL 0.7 06/30/2020           Lab Results   Component Value Date    ALBUMIN 2.8 06/30/2020                    Lab Results   Component Value Date    ALT 18 06/30/2020           Lab Results   Component Value Date    AST 12 06/30/2020       Results reviewed, labs MET treatment parameters, ok to proceed with treatment.      Post Infusion Assessment:  Patient tolerated infusion without incident.  Blood return noted pre and post infusion.  Site patent and intact, free from redness, edema or discomfort.  No evidence of extravasations.  Access discontinued per protocol.       Discharge Plan:   Patient declined prescription refills.  Discharge instructions reviewed with: Patient.  Patient verbalized understanding of discharge instructions and all questions answered.  AVS to patient via eCommHubT.  Patient will return 7/20/20 for next appointment.   Patient discharged in stable condition accompanied by: self.  Departure Mode: Ambulatory.    Mecca Alejandro RN

## 2020-07-06 NOTE — PROGRESS NOTES
Visit Date:   06/30/2020     ONCOLOGY HISTORY: Mr. 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  High PD-L1 expression.  No EGFR mutation.  ALK and ROS1 could not be done because of insufficient sample.   1.  In 2005, he was evaluated by ENT for hoarseness.   -On 02/22/2005, he had laryngoscopy. There was right anterior true vocal cord lesion with extension to the anterior commissure on the right. 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   -On 03/18/2005, he had endoscopic laser assisted vertical hemilaryngectomy right. Pathology from vocal cord revealed squamous cell carcinoma, moderately differentiated, invasive  -Unfortunately, he had recurrence. 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 Pathology revealed invasive moderately differentiated squamous cell carcinoma.   -Patient received concurrent chemoradiation. He received 6000 cGy completed on 06/21/2006. He received cisplatin with radiation.   2.  He had a PET scan done on 10/24/2009.  It revealed enlarging spiculated nodule in posterior left upper lobe with borderline hypermetabolic activity.   -He had left upper lobe segmentectomy and mediastinal lymph node dissection on 12/10/2009.  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  No lymphovascular present.  Margins negative.  Lymph nodes were all benign.   3.  The patient developed a lesion in his back.  He was seen by a dermatologist.  Biopsy on 01/31/2020 revealed squamous cell carcinoma. He subsequently underwent a Mohs surgical procedure on 02/28/2020.  Lesion measured 2.7 cm.  The patient was recommended postoperative radiation.   4.  For further workup, PET scan was done on 04/02/2020.  It reveals a hypermetabolic 6.4 cm mass in left upper lobe.  The mass invades into the mediastinum.  There is a mildly enlarged hypermetabolic single  mediastinal lymph node.  There is hypermetabolic lesion in L4 vertebral body and left second rib.  There is hypermetabolic nodule in left parotid gland.   -MRI of the thoracic and lumbar spine was done on 04/06/2020.  It revealed destructive metastatic lesions involving L4 vertebral body.  There was also multiple other osseous metastatic disease.   5.  CT-guided biopsy of left lung mass on 04/13/2020 reveals moderately differentiated squamous cell carcinoma.    -TPS score of 60%. High PD-L1 expression.   -NEGATIVE for BRAF, EGFR, ERBB2, IDH1, IDH2, KRAS, MET, NRAS, RET     -ALK and ROS1 could not be done because of insufficient sample.   6.  Radiation to lumbar spine. 3000 cGy between 04/07/2020 and 04/20/2020.  -Radiation to left parotid between 04/09/2020 and 04/22/2020. 3000 cGy.   7. Pembrolizumab started on 04/29/2020.     SUBJECTIVE:  Mr. 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      His wife was on the phone.  The patient says that overall he is doing well.  As per the wife, the patient has been more fatigued.  He is weaker.  Also, his appetite has not been good.  He does not feel like eating.  He has been slowly losing weight.  The patient also has been having some pain in the left shoulder/scapular area.  On directly asking, the patient says that overall he is doing good.  He has some fatigue but not excessive.  The patient says that he does not have an appetite and that is why he does not eat, but he mentioned that his pain in the shoulder is controlled.      He had a CT of the chest done on 06/10/2020.  That revealed an increase in size of the left mediastinal mass.  There were also some lung nodules.  I told the patient that immunotherapy can initially cause an increase in the size of the tumor.  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      REVIEW OF SYSTEMS:  No headache.  Some dizziness.  No visual problem.  No shortness of breath at rest.  He  gets short of breath on exertion.  No cough.  No abdominal pain, nausea or vomiting.  No urinary or bowel complaints.  All other review of systems are negative.      PHYSICAL EXAMINATION:   GENERAL:  He is alert, oriented x 3.   VITAL SIGNS:  Reviewed.  ECOG PS of 2.   The rest of the systems were not examined.      LABORATORY DATA:  Reviewed.      ASSESSMENT:   1.  A 70-year-old gentleman with metastatic lung squamous cell carcinoma.   2.  Fatigue.   3.  Weight loss.   4.  Left shoulder/scapular pain.      PLAN:   1.  I discussed regarding lung cancer.  Overall, the patient's condition is stable.  His fatigue and left scapular pain are all due to malignancy. He is on pembrolizumab.  He is tolerating it well.  He will continue on it.      2.  Discussed regarding getting a PET scan in about 3 weeks' time.  He is agreeable for it.  That will be scheduled.      3.  He will continue on Zometa for bone metastasis.      4.  For pain, he will continue on oxycodone.      5.  The wife wanted him to be checked to see if he qualifies for home oxygen.  He does not qualify for it based on pulse ox.  The patient also does not feel that he needs home oxygen.      6.  I will see him in 3 weeks' time.  I advised him to call us with any questions or concerns.         JENNIFER CEDENO MD             D: 2020   T: 2020   MT: LUZ      Name:     CHRISTI MANN   MRN:      [id number]        Account:      [id number]   :      1950           Visit Date:   2020      Document: [id number]

## 2020-07-07 NOTE — PROGRESS NOTES
Writer received a call from Allison of [facility] [phone] and provided me information that the out of pocket cost would be $33.53 per day and $90 per visit, and they would need to have a port placed if this is longer than short term.    Writer called patient's wife and requested a return call at her convenience.    Cecile Alanis RN\

## 2020-07-07 NOTE — PROGRESS NOTES
"Writer received a call from patient's wife, Millicent, regarding patient needing IVF's. She denies that patient is having nausea, vomiting or diarrhea,but just weak and not drinking much and the fluids helped him last week. \"They helped for about 4 days\", writer was able to get patient scheduled at [facility] tomorrow at 1pm.    Writer also discussed with Millicent about [facility] comng out 2 times a week if he qualifies. Millicent verbalized being open to [facility]. An order has been placed.    Cecile Alanis RN    "

## 2020-07-08 NOTE — PROGRESS NOTES
Infusion Nursing Note:  Agapito Fairbanks presents today for IVF.    Patient seen by provider today: No   present during visit today: Not Applicable.    Note: N/A.    Intravenous Access:  Peripheral IV placed.    Treatment Conditions:  Not Applicable.      Post Infusion Assessment:  Patient tolerated infusion without incident.  Blood return noted pre and post infusion.  Site patent and intact, free from redness, edema or discomfort.  No evidence of extravasations.  Access discontinued per protocol.       Discharge Plan:   Patient discharged in stable condition accompanied by: self.  Departure Mode: Ambulatory.    Joy Acosta RN

## 2020-07-13 NOTE — PROGRESS NOTES
Infusion Nursing Note:  Agapito Fairbanks presents today for IVF.    Patient seen by provider today: No   present during visit today: Not Applicable.    Note: N/A.    Intravenous Access:  Peripheral IV placed.    Treatment Conditions:  Not Applicable.      Post Infusion Assessment:  Patient tolerated infusion without incident.  Blood return noted pre and post infusion.  Site patent and intact, free from redness, edema or discomfort.  No evidence of extravasations.  Access discontinued per protocol.       Discharge Plan:   Discharge instructions reviewed with: Patient.  Patient and/or family verbalized understanding of discharge instructions and all questions answered.  Patient discharged in stable condition accompanied by: self.  Departure Mode: Ambulatory.    Margarita Jean Baptiste RN

## 2020-07-14 NOTE — TELEPHONE ENCOUNTER
This clinician received voicemail from Millicent 7/13/20. This clinician returned Millicent's call, leaving detailed voicemail with social work contact information and availability. Will await return call.     Please page in the case of psychosocial distress or need.     SAI Villeda, LICSW  Phone: [phone]  Pager: [phone]    [facility]: M, Thu  *every other Tue, 8am-4:30pm  [facility]: W, F, *every other Tue, 8am-4:30pm

## 2020-07-15 NOTE — TELEPHONE ENCOUNTER
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 This clinician called Millicent today for planned psychosocial check-in and support.     Intervention:   Millicent expressed significant distress upon reading [employer] message regarding CT scan that she understood meant \"the cancer is progressing.\" Millicent reported that she understood that if there was any progression on treatment, that Roque had a prognosis of 6 months or less, and she wonders if this means that the treatment has not been effective for Roque. Millicent reports that she cannot discuss with Roque, as he has memory impairment and does not have a good understanding that his treatment is not curative. Millicent reports that she has been distressed by his not eating (only consuming 500-600 calories daily), and his weakness. Millicent reports that Roque spends \"all day\" in the recliner, and that she does not feel that he is safe to leave home for walk without her.     Millicent with multiple questions about hospice and coverage under insurance plan. This clinician informed Millicent that hospice is 100% covered service when provided at home, which Millicent reports is her intention to provide. This clinician provided education about members of hospice team, frequency of visits, and 24/7 phone availability. This clinician informed Millicent that family can have informational session with hospice at anytime. Provided information about support options that can be available for enhanced family support if needed, private pay as family does not have LTC insurance.     Provided emotional support to Millicent surrounding emotional distress that has come as a result of Roque's not eating. Validated expressed concerns, and reflected that Millicent has done everything that she can to support his intake, " discussing options with providers, getting creative about food options, and trying medical cannabis. This clinician acknowledged that loss of appetite is a part of the process, and discussed shifting thoughts of guilt around her thoughts that she could do more. Millicent receptive to discussion with RD.     Resources Provided:  Cancer Legal Care- verify that there are no other legal documents needed    Plan:  1) Millicent to bring in copy of health care proxy into next appointment.   2) This clinician to follow-up with Millicent for caregiver support after upcoming appointment with Dr. Mullins.   3) Referral to RD for dietary support  4) Ongoing collaboration with multidisciplinary treatment team.     Please call or page if needs or concerns arise.     SAI Villeda, [facility] Hospital[facility]  Direct Phone: [phone]  Pager: [phone]

## 2020-07-16 NOTE — PROGRESS NOTES
Writer spoke sarmad Ricks and she is aware of referral and a refill of the inhaler has been called in. Writer also advise that patient try Mucinex to help with the mucus that seems to bother the patient.     Writer sent message with Pulmonology contact information requesting to call on Monday , 7/20, if they had not heard from the scheduling.    Cecile Alanis RN

## 2020-07-16 NOTE — PROGRESS NOTES
Writer spoke with Dr. Mullins and he advise pulmonology consult and approved refilling inhalers at this time.    Writer returned call to, Millicent, and MARÍA with the above information requesting a return call.     Order for pulmonary consult has been placed.     Cecile Alanis RN

## 2020-07-16 NOTE — PROGRESS NOTES
Writer received a call from patient's spouse, Millicent, who is concerned about the frequency patient is running out of inhalers. He is more often using at night when he is having shortness of breath despite having head of bed being elevated or sleeping .    They are requesting an order for a nebulizer.    Writer will have Dr. Mullins advise.     Cecile lAanis RN

## 2020-07-20 NOTE — TELEPHONE ENCOUNTER
RECORDS RECEIVED FROM: internal    DATE RECEIVED: 8.7.20    NOTES STATUS DETAILS   OFFICE NOTE from referring provider Internal  Wilver Mullins   OFFICE NOTE from other specialist na    DISCHARGE SUMMARY from hospital na    DISCHARGE REPORT from the ER n    MEDICATION LIST Internal     IMAGING  (NEED IMAGES AND REPORTS)     CT SCAN Internal  6.10.20    CHEST XRAY (CXR) Internal  5.1.20, 4.13.20,    TESTS     PULMONARY FUNCTION TESTING (PFT) In process         Action 7.20.20 sv    Action Taken Message sent to pulm nurse to place PFT

## 2020-07-21 PROBLEM — Z51.11 ENCOUNTER FOR ANTINEOPLASTIC CHEMOTHERAPY: Status: ACTIVE | Noted: 2020-01-01

## 2020-07-21 NOTE — TELEPHONE ENCOUNTER
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 This clinician received call from Millicent today.     Intervention:   Wife Millicent expressed that she is very emotionally distraught after meeting with Dr. Mullins today, quite distressed about Roque starting chemotherapy, worry that he will do poorly. She is wondering about options vs. second opinion. She expressed today that her outstanding questions are:     1) What is Roque's life expectancy with no treatment?  2) Are there any other kinds of treatment that might be helpful for Roque?  3) How many chemotherapy treatments will he receive?     She reiterated that she wants Roque's last days to be \"good time\" together, and said that feels alone with making these decisions as Roque cannot engage with her due to memory impairment.     Discussed with RNDAVION Bustillos, who made recommendation for Dr. Mullins to call wife directly to discuss concerns.     Plan:  1) Dr. Mullins to call wife Millicent  2) This clinician to follow-up with family tomorrow for psychosocial check-in and emotional support.     Please call or page if needs or concerns arise.     SAI Villeda, [facility] Hospital[facility]  Direct Phone: [phone]  Pager: [phone]  "

## 2020-07-21 NOTE — PROGRESS NOTES
Oncology Rooming Note    July 21, 2020 8:45 AM   Agapito Fairbanks is a 70 year old male who presents for:    Chief Complaint   Patient presents with     Oncology Clinic Visit     Initial Vitals: There were no vitals taken for this visit. Estimated body mass index is 21.35 kg/m  as calculated from the following:    Height as of 6/30/20: 1.829 m (6').    Weight as of 6/30/20: 71.4 kg (157 lb 6.4 oz). There is no height or weight on file to calculate BSA.  Data Unavailable Comment: Data Unavailable   No LMP for male patient.  Allergies reviewed: Yes  Medications reviewed: Yes    Medications: MEDICATION REFILLS NEEDED TODAY. Provider was notified.  Pharmacy name entered into [employer]:    [id number] - [place], [place] - [address] [facility] - [facility] [address] AT PORTAL TO [facility] SITES    Clinical concerns: Thrush, Chest pain  Dr. Mullins was notified.      Shari J. Schoenberger, [facility]

## 2020-07-21 NOTE — PROGRESS NOTES
Visit Date:   07/21/2020     ONCOLOGY HISTORY: Mr. 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  High PD-L1 expression.  No EGFR mutation.  ALK and ROS1 could not be done because of insufficient sample.   1.  In 2005, he was evaluated by ENT for hoarseness.   -On 02/22/2005, he had laryngoscopy. There was right anterior true vocal cord lesion with extension to the anterior commissure on the right. 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   -On 03/18/2005, he had endoscopic laser assisted vertical hemilaryngectomy right. Pathology from vocal cord revealed squamous cell carcinoma, moderately differentiated, invasive  -Unfortunately, he had recurrence. 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 Pathology revealed invasive moderately differentiated squamous cell carcinoma.   -Patient received concurrent chemoradiation. He received 6000 cGy completed on 06/21/2006. He received cisplatin with radiation.   2.  He had a PET scan done on 10/24/2009.  It revealed enlarging spiculated nodule in posterior left upper lobe with borderline hypermetabolic activity.   -He had left upper lobe segmentectomy and mediastinal lymph node dissection on 12/10/2009.  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  No lymphovascular present.  Margins negative.  Lymph nodes were all benign.   3.  The patient developed a lesion in his back.  He was seen by a dermatologist.  Biopsy on 01/31/2020 revealed squamous cell carcinoma. He subsequently underwent a Mohs surgical procedure on 02/28/2020.  Lesion measured 2.7 cm.  The patient was recommended postoperative radiation.   4.  For further workup, PET scan was done on 04/02/2020.  It reveals a hypermetabolic 6.4 cm mass in left upper lobe.  The mass invades into the mediastinum.  There is a mildly enlarged hypermetabolic single  mediastinal lymph node.  There is hypermetabolic lesion in L4 vertebral body and left second rib.  There is hypermetabolic nodule in left parotid gland.   -MRI of the thoracic and lumbar spine was done on 04/06/2020.  It revealed destructive metastatic lesions involving L4 vertebral body.  There was also multiple other osseous metastatic disease.   5.  CT-guided biopsy of left lung mass on 04/13/2020 reveals moderately differentiated squamous cell carcinoma.    -TPS score of 60%. High PD-L1 expression.   -NEGATIVE for BRAF, EGFR, ERBB2, IDH1, IDH2, KRAS, MET, NRAS, RET     -ALK and ROS1 could not be done because of insufficient sample.   6.  Radiation to lumbar spine. 3000 cGy between 04/07/2020 and 04/20/2020.  -Radiation to left parotid between 04/09/2020 and 04/22/2020. 3000 cGy.   7. Pembrolizumab started on 04/29/2020.     SUBJECTIVE:  Mr. 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  He was started on pembrolizumab in 04/2020.  He has received 4 cycles.      PET scan on 07/20/2020 reveals progression of disease.  Left upper lobe mass has increased in size.  There are new hypermetabolic left lung metastasis.  There is new hypermetabolic bone metastasis.  There is a new left axillary lymph node.  There is muscular metastasis near midline in the right back.      The patient clinically has not been doing well.  As per the wife, he has been getting weaker.  His appetite has decreased.  The patient has been losing weight.  The patient also has pain in left anterior chest.      No headache.  No dizziness.  No abdominal pain.  No vomiting.  No urinary complaint.  He has constipation.  No bleeding.      PHYSICAL EXAMINATION:   GENERAL:  He is alert, oriented x3.   VITAL SIGNS:  Reviewed.  ECOG PS of 2.   The rest of the systems not examined.      ASSESSMENT:  A 70-year-old gentleman with:     1.  Metastatic squamous cell carcinoma of the lung, which  is progressing.   2.  Decreased appetite and weight loss secondary to malignancy.   3.  Left anterior chest pain secondary to malignancy.      PLAN:   1.  I had a long discussion with the patient and his wife.  PET scan was reviewed.  PET scan reveals progression of disease.  They were new lung, bone, and muscle metastases.   2.  Discussed regarding treatment.  I explained to them that Keytruda alone is not working.  Discussed regarding adding chemotherapy.  I would favor adding carboplatin and Taxol to Keytruda.  I still want to continue Keytruda, as PD-L1 expression was more than 60% and he has received only 4 doses of Keytruda.  The patient and wife are agreeable for this plan.  Side effects of chemotherapy including hair loss, nausea, vomiting, gastric ulcer, neuropathy, neutropenia, infection, anemia, thrombocytopenia and other side effects discussed.  The patient is agreeable for it.  Treatment will be started in the next few days.   3.  He is on Zometa for bone metastasis, which will be continued.  No jaw or dental related symptoms.   4.  Discussed regarding his decreased appetite.  I advised him to eat and drink high-calorie food.  Discussed regarding Marinol.  The patient has tried medical marijuana.  It has not helped.  Marinol will not help then.   5.  For pain, he will continue on oxycodone.  Prescription refilled.   6.  Wife had multiple questions, which were all answered.  I will see him with next cycle of chemotherapy.  In between, he will see our nurse practitioner.      TOTAL FACE-TO-FACE TIME SPENT:  40 minutes, more than 50% of the time was spent in counseling and coordination of care.         JENNIFER CEDENO MD             D: 2020   T: 2020   MT: RENETTA      Name:     CHRISTI MANN   MRN:      [id number]        Account:      [id number]   :      1950           Visit Date:   2020      Document: [id number]

## 2020-07-21 NOTE — PATIENT INSTRUCTIONS
1. Add carboplatin and taxol to keytruda.   2. See Brett Manrique next week with labs.  3. See me in 1 month.    Pt is in S infusion. Shari Schoenberger, CMA

## 2020-07-21 NOTE — PROGRESS NOTES
Received request from Sonia, and positive distress screen regarding patient's concern for ability.  Called and left RD contact information on patient's voicemail.  Await return call from patient.    Erica Zaragoza, RD, LD  Clinical Dietitian  [facility]  [phone] (direct)

## 2020-07-21 NOTE — PROGRESS NOTES
Infusion Nursing Note:  Agapito Fairbanks presents today for IVF. Zometa and labs.    Patient seen by provider today: Yes: Dr. Mullins   present during visit today: Not Applicable.    Note: N/A.    Intravenous Access:  Peripheral IV placed.    Treatment Conditions:  Not Applicable.      Post Infusion Assessment:  Patient tolerated infusion without incident.  Blood return noted pre and post infusion.  Site patent and intact, free from redness, edema or discomfort.  No evidence of extravasations.  Access discontinued per protocol.       Discharge Plan:   Prescription refills given for Ativan, Compazine and Zofran.  Discharge instructions reviewed with: Family.  Patient verbalized understanding of discharge instructions and all questions answered.  AVS to patient via [employer].  Patient will return 7/22/20 for next appointment.   Patient discharged in stable condition accompanied by: self.  Departure Mode: Ambulatory.    Mecca Alejandro RN

## 2020-07-21 NOTE — LETTER
7/21/2020         RE: Agapito Fairbanks  [address]  [place] [address]        Dear Colleague,    Thank you for referring your patient, Agapito Fairbanks, to the [facility]. Please see a copy of my visit note below.    Oncology Rooming Note    July 21, 2020 8:45 AM   Agapito Fairbanks is a 70 year old male who presents for:    Chief Complaint   Patient presents with     Oncology Clinic Visit     Initial Vitals: There were no vitals taken for this visit. Estimated body mass index is 21.35 kg/m  as calculated from the following:    Height as of 6/30/20: 1.829 m (6').    Weight as of 6/30/20: 71.4 kg (157 lb 6.4 oz). There is no height or weight on file to calculate BSA.  Data Unavailable Comment: Data Unavailable   No LMP for male patient.  Allergies reviewed: Yes  Medications reviewed: Yes    Medications: MEDICATION REFILLS NEEDED TODAY. Provider was notified.  Pharmacy name entered into [employer]:    [facility][id number] - [facility] [address] [facility] [address]AL TO [facility] SITES    Clinical concerns: Thrush, Chest pain  Dr. Mullins was notified.      Shari J. Schoenberger, [facility]              Visit Date:   07/21/2020     ONCOLOGY HISTORY: Mr. 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  High PD-L1 expression.  No EGFR mutation.  ALK and ROS1 could not be done because of insufficient sample.   1.  In 2005, he was evaluated by ENT for hoarseness.   -On 02/22/2005, he had laryngoscopy. There was right anterior true vocal cord lesion with extension to the anterior commissure on the right. 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   -On 03/18/2005, he had endoscopic laser assisted vertical hemilaryngectomy right. Pathology from vocal cord revealed squamous cell carcinoma, moderately differentiated, invasive  -Unfortunately, he had  recurrence. 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 Pathology revealed invasive moderately differentiated squamous cell carcinoma.   -Patient received concurrent chemoradiation. He received 6000 cGy completed on 06/21/2006. He received cisplatin with radiation.   2.  He had a PET scan done on 10/24/2009.  It revealed enlarging spiculated nodule in posterior left upper lobe with borderline hypermetabolic activity.   -He had left upper lobe segmentectomy and mediastinal lymph node dissection on 12/10/2009.  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  No lymphovascular present.  Margins negative.  Lymph nodes were all benign.   3.  The patient developed a lesion in his back.  He was seen by a dermatologist.  Biopsy on 01/31/2020 revealed squamous cell carcinoma. He subsequently underwent a Mohs surgical procedure on 02/28/2020.  Lesion measured 2.7 cm.  The patient was recommended postoperative radiation.   4.  For further workup, PET scan was done on 04/02/2020.  It reveals a hypermetabolic 6.4 cm mass in left upper lobe.  The mass invades into the mediastinum.  There is a mildly enlarged hypermetabolic single mediastinal lymph node.  There is hypermetabolic lesion in L4 vertebral body and left second rib.  There is hypermetabolic nodule in left parotid gland.   -MRI of the thoracic and lumbar spine was done on 04/06/2020.  It revealed destructive metastatic lesions involving L4 vertebral body.  There was also multiple other osseous metastatic disease.   5.  CT-guided biopsy of left lung mass on 04/13/2020 reveals moderately differentiated squamous cell carcinoma.    -TPS score of 60%. High PD-L1 expression.   -NEGATIVE for BRAF, EGFR, ERBB2, IDH1, IDH2, KRAS, MET, NRAS, RET     -ALK and ROS1 could not be done because of insufficient sample.   6.  Radiation to lumbar spine. 3000 cGy between 04/07/2020 and  04/20/2020.  -Radiation to left parotid between 04/09/2020 and 04/22/2020. 3000 cGy.   7. Pembrolizumab started on 04/29/2020.     SUBJECTIVE:  Mr. 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  He was started on pembrolizumab in 04/2020.  He has received 4 cycles.      PET scan on 07/20/2020 reveals progression of disease.  Left upper lobe mass has increased in size.  There are new hypermetabolic left lung metastasis.  There is new hypermetabolic bone metastasis.  There is a new left axillary lymph node.  There is muscular metastasis near midline in the right back.      The patient clinically has not been doing well.  As per the wife, he has been getting weaker.  His appetite has decreased.  The patient has been losing weight.  The patient also has pain in left anterior chest.      No headache.  No dizziness.  No abdominal pain.  No vomiting.  No urinary complaint.  He has constipation.  No bleeding.      PHYSICAL EXAMINATION:   GENERAL:  He is alert, oriented x3.   VITAL SIGNS:  Reviewed.  ECOG PS of 2.   The rest of the systems not examined.      ASSESSMENT:  A 70-year-old gentleman with:     1.  Metastatic squamous cell carcinoma of the lung, which is progressing.   2.  Decreased appetite and weight loss secondary to malignancy.   3.  Left anterior chest pain secondary to malignancy.      PLAN:   1.  I had a long discussion with the patient and his wife.  PET scan was reviewed.  PET scan reveals progression of disease.  They were new lung, bone, and muscle metastases.   2.  Discussed regarding treatment.  I explained to them that Keytruda alone is not working.  Discussed regarding adding chemotherapy.  I would favor adding carboplatin and Taxol to Keytruda.  I still want to continue Keytruda, as PD-L1 expression was more than 60% and he has received only 4 doses of Keytruda.  The patient and wife are agreeable for this plan.  Side effects of  chemotherapy including hair loss, nausea, vomiting, gastric ulcer, neuropathy, neutropenia, infection, anemia, thrombocytopenia and other side effects discussed.  The patient is agreeable for it.  Treatment will be started in the next few days.   3.  He is on Zometa for bone metastasis, which will be continued.  No jaw or dental related symptoms.   4.  Discussed regarding his decreased appetite.  I advised him to eat and drink high-calorie food.  Discussed regarding Marinol.  The patient has tried medical marijuana.  It has not helped.  Marinol will not help then.   5.  For pain, he will continue on oxycodone.  Prescription refilled.   6.  Wife had multiple questions, which were all answered.  I will see him with next cycle of chemotherapy.  In between, he will see our nurse practitioner.      TOTAL FACE-TO-FACE TIME SPENT:  40 minutes, more than 50% of the time was spent in counseling and coordination of care.         JENNIFER CEDENO MD             D: 2020   T: 2020   MT: RENETTA      Name:     CHRISTI MANN   MRN:      [id number]        Account:      [id number]   :      1950           Visit Date:   2020      Document: [id number]      Visit Date:   2020     ONCOLOGY HISTORY: Mr. 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  High PD-L1 expression.  No EGFR mutation.  ALK and ROS1 could not be done because of insufficient sample.   1.  In , he was evaluated by ENT for hoarseness.   -On 2005, he had laryngoscopy. There was right anterior true vocal cord lesion with extension to the anterior commissure on the right. 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   -On 2005, he had endoscopic laser assisted vertical hemilaryngectomy right. Pathology from vocal cord revealed squamous cell carcinoma, moderately differentiated, invasive  -Unfortunately, he had recurrence. On 2005, he had laser-assisted  microdirect laryngoscopy and excision of right true vocal cord tumor. Pathology revealed invasive moderately differentiated squamous cell carcinoma.   -Patient received concurrent chemoradiation. He received 6000 cGy completed on 06/21/2006. He received cisplatin with radiation.   2.  He had a PET scan done on 10/24/2009.  It revealed enlarging spiculated nodule in posterior left upper lobe with borderline hypermetabolic activity.   -He had left upper lobe segmentectomy and mediastinal lymph node dissection on 12/10/2009.  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  No lymphovascular present.  Margins negative.  Lymph nodes were all benign.   3.  The patient developed a lesion in his back.  He was seen by a dermatologist.  Biopsy on 01/31/2020 revealed squamous cell carcinoma. He subsequently underwent a Mohs surgical procedure on 02/28/2020.  Lesion measured 2.7 cm.  The patient was recommended postoperative radiation.   4.  For further workup, PET scan was done on 04/02/2020.  It reveals a hypermetabolic 6.4 cm mass in left upper lobe.  The mass invades into the mediastinum.  There is a mildly enlarged hypermetabolic single mediastinal lymph node.  There is hypermetabolic lesion in L4 vertebral body and left second rib.  There is hypermetabolic nodule in left parotid gland.   -MRI of the thoracic and lumbar spine was done on 04/06/2020.  It revealed destructive metastatic lesions involving L4 vertebral body.  There was also multiple other osseous metastatic disease.   5.  CT-guided biopsy of left lung mass on 04/13/2020 reveals moderately differentiated squamous cell carcinoma.    -TPS score of 60%. High PD-L1 expression.   -NEGATIVE for BRAF, EGFR, ERBB2, IDH1, IDH2, KRAS, MET, NRAS, RET     -ALK and ROS1 could not be done because of insufficient sample.   6.  Radiation to lumbar spine. 3000 cGy between 04/07/2020 and 04/20/2020.  -Radiation to left parotid between  04/09/2020 and 04/22/2020. 3000 cGy.   7. Pembrolizumab started on 04/29/2020.     SUBJECTIVE:  Mr. 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  He was started on pembrolizumab in 04/2020.  He has received 4 cycles.      PET scan on 07/20/2020 reveals progression of disease.  Left upper lobe mass has increased in size.  There are new hypermetabolic left lung metastasis.  There is new hypermetabolic bone metastasis.  There is a new left axillary lymph node.  There is muscular metastasis near midline in the right back.      The patient clinically has not been doing well.  As per the wife, he has been getting weaker.  His appetite has decreased.  The patient has been losing weight.  The patient also has pain in left anterior chest.      No headache.  No dizziness.  No abdominal pain.  No vomiting.  No urinary complaint.  He has constipation.  No bleeding.      PHYSICAL EXAMINATION:   GENERAL:  He is alert, oriented x3.   VITAL SIGNS:  Reviewed.  ECOG PS of 2.   The rest of the systems not examined.      ASSESSMENT:  A 70-year-old gentleman with:     1.  Metastatic squamous cell carcinoma of the lung, which is progressing.   2.  Decreased appetite and weight loss secondary to malignancy.   3.  Left anterior chest pain secondary to malignancy.      PLAN:   1.  I had a long discussion with the patient and his wife.  PET scan was reviewed.  PET scan reveals progression of disease.  They were new lung, bone, and muscle metastases.   2.  Discussed regarding treatment.  I explained to them that Keytruda alone is not working.  Discussed regarding adding chemotherapy.  I would favor adding carboplatin and Taxol to Keytruda.  I still want to continue Keytruda, as PD-L1 expression was more than 60% and he has received only 4 doses of Keytruda.  The patient and wife are agreeable for this plan.  Side effects of chemotherapy including hair loss, nausea,  vomiting, gastric ulcer, neuropathy, neutropenia, infection, anemia, thrombocytopenia and other side effects discussed.  The patient is agreeable for it.  Treatment will be started in the next few days.   3.  He is on Zometa for bone metastasis, which will be continued.  No jaw or dental related symptoms.   4.  Discussed regarding his decreased appetite.  I advised him to eat and drink high-calorie food.  Discussed regarding Marinol.  The patient has tried medical marijuana.  It has not helped.  Marinol will not help then.   5.  For pain, he will continue on oxycodone.  Prescription refilled.   6.  Wife had multiple questions, which were all answered.  I will see him with next cycle of chemotherapy.  In between, he will see our nurse practitioner.      TOTAL FACE-TO-FACE TIME SPENT:  40 minutes, more than 50% of the time was spent in counseling and coordination of care.         JENNIFER CEDENO MD             D: 2020   T: 2020   MT: RENETTA      Name:     CHRISTI MANN   MRN:      [id number]        Account:      [id number]   :      1950           Visit Date:   2020      Document: [id number]          Again, thank you for allowing me to participate in the care of your patient.        Sincerely,        Jennifer Cedeno MD

## 2020-07-22 NOTE — TELEPHONE ENCOUNTER
Social Work Progress Note      Data/Intervention:  Patient Name:  Agapito Fairbanks  /Age:  1950 (70 year old)    Reason for Follow-Up:  Roque is a 70-year-old gentleman with a diagnosis of metastatic squamous cell carcinoma of the lung with high PD-L1 expression. This clinician called Millicent today for caregiver support.     Intervention:   Millicent reported that she is scheduled for second opinion through [facility] on 20 prior to scheduled infusion. Millicent acknowledged that she had reached out to prior , and made plan for spiritual connection 20. Millicent acknowledged that she is feeling at a place of more peace having these appointments secured. Millicent acknowledged that she is feeling somewhat overwhelmed by bringing Roque to all of his appointments and continuing to maintain work, receptive to resources for transportation support and private duty assistance, which were emailed to Millicent.     Plan:  1) This clinician will continue to be available for psychosocial support of Roque and Millicent as they continue to adjust to treatment and make plans as to next steps in care.   2) Ongoing collaboration with multidisciplinary care team.     Please call or page if needs or concerns arise.     SAI Villeda, LICSW  Direct Phone: [phone]  Pager: [phone]

## 2020-07-23 NOTE — PROGRESS NOTES
Follow up call to patient.  Spoke with patient's wife.  States patient is not eating and at most eating 800 calories when he chose to eat.  Per wife, patient does not have desire to eat and refusing to eat.  Supported patient with the challenge of seeing  not eat.  Wife statesab have second opinion tomorrow.  Informed wife RD available as needed.  Wife verbalized understanding of plan.    Erica Zaragoza, RD, LD  Clinical Dietitian  [facility]  [phone] (direct)

## 2020-07-23 NOTE — TELEPHONE ENCOUNTER
Patient scheduled tomorrow for IVF/Keytruda, although pt is supposed to start a new regimen to include Keytruda, Taxol and Carboplatin.  RN spoke to eMghan, pharmacy, and Dr. Mullins, whom want all 3 drugs to start on the same day to keep his schedule aligned.  RN attempted to contact pt today to verify this information and to explain to him that his appt tomorrow will only consist of IVF, although patient didn't answer call.  RN left voicemail explaining above and to call with additional questions.

## 2020-07-23 NOTE — TELEPHONE ENCOUNTER
Patient's wife, Millicent, returned call.  Roque has an appointment tomorrow for a second opinion.  Millicent is concerned about skipping 1 week of treatment due to his advanced disease, as well as concerns for tolerating the Carbo/Taxol due to his weakness and lack of appetite.  Millicent is requesting to only get Keytruda tomorrow, then start C1D1 Carbo/Taxol only on 7/29/2020.  After that appointment, pt would return to every 21 days Carbo/Taxol/Keytruda.  Dr. Mullins and Meghan aware.  Notes placed in springboard and treatment plan to be updated accordingly.

## 2020-07-24 NOTE — PROGRESS NOTES
Infusion Nursing Note:  Agapito Fairbanks presents today for C1D1 Keytruda.    Patient seen by provider today: No   present during visit today: Not Applicable.    Note: N/A.    Intravenous Access:  Peripheral IV placed.    Treatment Conditions:  Lab Results   Component Value Date    HGB 12.4 07/21/2020     Lab Results   Component Value Date    WBC 14.5 07/21/2020      Lab Results   Component Value Date    ANEU 12.5 07/21/2020     Lab Results   Component Value Date     07/21/2020      Lab Results   Component Value Date     07/21/2020                   Lab Results   Component Value Date    POTASSIUM 4.1 07/21/2020           Lab Results   Component Value Date    MAG 2.0 05/02/2020            Lab Results   Component Value Date    CR 0.77 07/21/2020                   Lab Results   Component Value Date    SUSIE 10.7 07/21/2020                Lab Results   Component Value Date    BILITOTAL 0.7 07/21/2020           Lab Results   Component Value Date    ALBUMIN 2.9 07/21/2020                    Lab Results   Component Value Date    ALT 15 07/21/2020           Lab Results   Component Value Date    AST 17 07/21/2020       Results reviewed, labs MET treatment parameters, ok to proceed with treatment.      Post Infusion Assessment:  Patient tolerated infusion without incident.  Blood return noted pre and post infusion.  Site patent and intact, free from redness, edema or discomfort.  No evidence of extravasations.  Access discontinued per protocol.       Discharge Plan:   Discharge instructions reviewed with: Patient.  Patient and/or family verbalized understanding of discharge instructions and all questions answered.  Copy of AVS reviewed with patient and/or family.  Patient will return 7/27/20 for next appointment.  Patient discharged in stable condition accompanied by: self.  Departure Mode: Ambulatory.    Dayday Yanez RN

## 2020-07-26 NOTE — PROGRESS NOTES
Visit Date:   07/21/2020     ONCOLOGY HISTORY: Mr. 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  High PD-L1 expression.  No EGFR mutation.  ALK and ROS1 could not be done because of insufficient sample.   1.  In 2005, he was evaluated by ENT for hoarseness.   -On 02/22/2005, he had laryngoscopy. There was right anterior true vocal cord lesion with extension to the anterior commissure on the right. 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   -On 03/18/2005, he had endoscopic laser assisted vertical hemilaryngectomy right. Pathology from vocal cord revealed squamous cell carcinoma, moderately differentiated, invasive  -Unfortunately, he had recurrence. 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 Pathology revealed invasive moderately differentiated squamous cell carcinoma.   -Patient received concurrent chemoradiation. He received 6000 cGy completed on 06/21/2006. He received cisplatin with radiation.   2.  He had a PET scan done on 10/24/2009.  It revealed enlarging spiculated nodule in posterior left upper lobe with borderline hypermetabolic activity.   -He had left upper lobe segmentectomy and mediastinal lymph node dissection on 12/10/2009.  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  No lymphovascular present.  Margins negative.  Lymph nodes were all benign.   3.  The patient developed a lesion in his back.  He was seen by a dermatologist.  Biopsy on 01/31/2020 revealed squamous cell carcinoma. He subsequently underwent a Mohs surgical procedure on 02/28/2020.  Lesion measured 2.7 cm.  The patient was recommended postoperative radiation.   4.  For further workup, PET scan was done on 04/02/2020.  It reveals a hypermetabolic 6.4 cm mass in left upper lobe.  The mass invades into the mediastinum.  There is a mildly enlarged hypermetabolic single  mediastinal lymph node.  There is hypermetabolic lesion in L4 vertebral body and left second rib.  There is hypermetabolic nodule in left parotid gland.   -MRI of the thoracic and lumbar spine was done on 04/06/2020.  It revealed destructive metastatic lesions involving L4 vertebral body.  There was also multiple other osseous metastatic disease.   5.  CT-guided biopsy of left lung mass on 04/13/2020 reveals moderately differentiated squamous cell carcinoma.    -TPS score of 60%. High PD-L1 expression.   -NEGATIVE for BRAF, EGFR, ERBB2, IDH1, IDH2, KRAS, MET, NRAS, RET     -ALK and ROS1 could not be done because of insufficient sample.   6.  Radiation to lumbar spine. 3000 cGy between 04/07/2020 and 04/20/2020.  -Radiation to left parotid between 04/09/2020 and 04/22/2020. 3000 cGy.   7. Pembrolizumab started on 04/29/2020.     SUBJECTIVE:  Mr. 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  He was started on pembrolizumab in 04/2020.  He has received 4 cycles.      PET scan on 07/20/2020 reveals progression of disease.  Left upper lobe mass has increased in size.  There are new hypermetabolic left lung metastasis.  There is new hypermetabolic bone metastasis.  There is a new left axillary lymph node.  There is muscular metastasis near midline in the right back.      The patient clinically has not been doing well.  As per the wife, he has been getting weaker.  His appetite has decreased.  The patient has been losing weight.  The patient also has pain in left anterior chest.      No headache.  No dizziness.  No abdominal pain.  No vomiting.  No urinary complaint.  He has constipation.  No bleeding.      PHYSICAL EXAMINATION:   GENERAL:  He is alert, oriented x3.   VITAL SIGNS:  Reviewed.  ECOG PS of 2.   The rest of the systems not examined.      ASSESSMENT:  A 70-year-old gentleman with:     1.  Metastatic squamous cell carcinoma of the lung, which  is progressing.   2.  Decreased appetite and weight loss secondary to malignancy.   3.  Left anterior chest pain secondary to malignancy.      PLAN:   1.  I had a long discussion with the patient and his wife.  PET scan was reviewed.  PET scan reveals progression of disease.  They were new lung, bone, and muscle metastases.   2.  Discussed regarding treatment.  I explained to them that Keytruda alone is not working.  Discussed regarding adding chemotherapy.  I would favor adding carboplatin and Taxol to Keytruda.  I still want to continue Keytruda, as PD-L1 expression was more than 60% and he has received only 4 doses of Keytruda.  The patient and wife are agreeable for this plan.  Side effects of chemotherapy including hair loss, nausea, vomiting, gastric ulcer, neuropathy, neutropenia, infection, anemia, thrombocytopenia and other side effects discussed.  The patient is agreeable for it.  Treatment will be started in the next few days.   3.  He is on Zometa for bone metastasis, which will be continued.  No jaw or dental related symptoms.   4.  Discussed regarding his decreased appetite.  I advised him to eat and drink high-calorie food.  Discussed regarding Marinol.  The patient has tried medical marijuana.  It has not helped.  Marinol will not help then.   5.  For pain, he will continue on oxycodone.  Prescription refilled.   6.  Wife had multiple questions, which were all answered.  I will see him with next cycle of chemotherapy.  In between, he will see our nurse practitioner.      TOTAL FACE-TO-FACE TIME SPENT:  40 minutes, more than 50% of the time was spent in counseling and coordination of care.         JENNIFER CEDENO MD             D: 2020   T: 2020   MT: RENETTA      Name:     CHRISTI MANN   MRN:      [id number]        Account:      [id number]   :      1950           Visit Date:   2020      Document: [id number]

## 2020-07-27 NOTE — PROGRESS NOTES
Infusion Nursing Note:  Agapito Fairbanks presents today for IVF.    Patient seen by provider today: Yes: Brett Manrique NP   present during visit today: Not Applicable.    Note: Brett ordered for blood cultures to be drawn, and UA.     Intravenous Access:  Peripheral IV placed.    Treatment Conditions:  Not Applicable.      Post Infusion Assessment:  Patient tolerated infusion without incident.  Blood return noted pre and post infusion.  Site patent and intact, free from redness, edema or discomfort.  No evidence of extravasations.  Access discontinued per protocol.       Discharge Plan:   Prescriptions filled for Levaquin and Tessalon.  Discharge instructions reviewed with: Patient.  Patient and/or family verbalized understanding of discharge instructions and all questions answered.  Patient discharged in stable condition accompanied by: self-wife to meet him downstairs  Departure Mode: Ambulatory.    Amanda Vora RN

## 2020-07-27 NOTE — PROGRESS NOTES
"Oncology/Hematology Visit Note  Jul 27, 2020    Reason for Visit: follow up of   Metastatic squamous cell carcinoma of the lung  Patient started pembrolizumab on 04/29/2020  PET scan on July 20, 2020 shows progression of the disease  Per  recommendation carboplatin and Taxol to be added to Keytruda  He is scheduled to get carboplatin and Taxol on 07/ 29    Interval History:  Patient and wife he continues to feel weak.  Weakness has not progressed.  He continues to have cough, sometimes the cough is productive with clear sputum.  Wife and  he has had cough for months now. wife states that yesterday he had coughed up a small amount of blood.  Patient and wife denies any productive cough now  Patient denies fever chills sweats.  Denies nausea vomiting diarrhea or abdominal pain      Review of Systems:  14 point ROS of systems including Constitutional, Eyes, Respiratory, Cardiovascular, Gastroenterology, Genitourinary, Integumentary, Muscularskeletal, Psychiatric were all negative except for pertinent positives noted in my HPI.        Physical Examination:  General: The patient is a pleasant male in no acute distress.  /66   Pulse 75   Temp 98.3  F (36.8  C) (Oral)   Resp 18   Ht 1.829 m (6' 0.01\")   SpO2 97%   BMI 20.20 kg/m    HEENT: EOMI, PERRL. Sclerae are anicteric. Oral mucosa is pink and moist with no lesions or thrush.   Lymph: Neck is supple with no lymphadenopathy in the cervical or supraclavicular areas.   Heart: Regular rate and rhythm.   Lungs: Clear to auscultation bilaterally.   GI: Bowel sounds present, soft, nontender with no palpable hepatosplenomegaly or masses.   Extremities: No lower extremity edema noted bilaterally.   Skin: No rashes, petechiae, or bruising noted on exposed skin.    Laboratory Data:  Results for orders placed or performed in visit on 07/27/20 (from the past 24 hour(s))   CBC with platelets differential   Result Value Ref Range    WBC 13.8 (H) 4.0 - 11.0 " 10e9/L    RBC Count 4.27 (L) 4.4 - 5.9 10e12/L    Hemoglobin 11.5 (L) 13.3 - 17.7 g/dL    Hematocrit 34.9 (L) 40.0 - 53.0 %    MCV 82 78 - 100 fl    MCH 26.9 26.5 - 33.0 pg    MCHC 33.0 31.5 - 36.5 g/dL    RDW 15.2 (H) 10.0 - 15.0 %    Platelet Count 494 (H) 150 - 450 10e9/L    Diff Method Automated Method     % Neutrophils 86.2 %    % Lymphocytes 3.1 %    % Monocytes 7.2 %    % Eosinophils 2.9 %    % Basophils 0.2 %    % Immature Granulocytes 0.4 %    Nucleated RBCs 0 0 /100    Absolute Neutrophil 11.9 (H) 1.6 - 8.3 10e9/L    Absolute Lymphocytes 0.4 (L) 0.8 - 5.3 10e9/L    Absolute Monocytes 1.0 0.0 - 1.3 10e9/L    Absolute Eosinophils 0.4 0.0 - 0.7 10e9/L    Absolute Basophils 0.0 0.0 - 0.2 10e9/L    Abs Immature Granulocytes 0.1 0 - 0.4 10e9/L    Absolute Nucleated RBC 0.0    Comprehensive metabolic panel   Result Value Ref Range    Sodium 127 (L) 133 - 144 mmol/L    Potassium 3.7 3.4 - 5.3 mmol/L    Chloride 92 (L) 94 - 109 mmol/L    Carbon Dioxide 29 20 - 32 mmol/L    Anion Gap 6 3 - 14 mmol/L    Glucose 111 (H) 70 - 99 mg/dL    Urea Nitrogen 12 7 - 30 mg/dL    Creatinine 0.85 0.66 - 1.25 mg/dL    GFR Estimate 87 >60 mL/min/[1.73_m2]    GFR Estimate If Black >90 >60 mL/min/[1.73_m2]    Calcium 8.9 8.5 - 10.1 mg/dL    Bilirubin Total 0.6 0.2 - 1.3 mg/dL    Albumin 2.7 (L) 3.4 - 5.0 g/dL    Protein Total 7.4 6.8 - 8.8 g/dL    Alkaline Phosphatase 98 40 - 150 U/L    ALT 17 0 - 70 U/L    AST 11 0 - 45 U/L   TSH with free T4 reflex   Result Value Ref Range    TSH 11.21 (H) 0.40 - 4.00 mU/L   T4 free   Result Value Ref Range    T4 Free 1.25 0.76 - 1.46 ng/dL         Assessment and Plan:      This is a 70-year-old male with      Metastatic lung squamous cell carcinoma  Progressed on single  agent Keytruda  07/29-patient will get carboplatin and Taxol followed by Neulasta  Side effects of chemo and Neulasta discussed in detail with patient and wife both verbalized understanding and agreeable to proceed with  treatment  -Schedule patient with me next week follow-up after chemo  -Schedule on August 19 for next cycle of treatment Keytruda Taxol and carboplatin    Bone metastasis  Continue with Zometa every 4 to 6 weeks    Cough-chronic   secondary to progression of lung cancer   07/20 PET/CT  scan-no infectious or inflammatory process noted in the report  07/24-pulmonary function test  08/07- appointment with pulmonologist  -Continue with inhaler.  Order Tessalon as needed for  cough  Patient wife advised to call our clinic or go to ER in the event of worsening of cough shortness of breath chest pain      Leukocytosis   Patient denies obvious signs or symptoms of infection.    07/20-PET/CT scan revealed progression of the cancer.  but no pneumonia infectious or inflammatory  process reported.   -get blood cultures and UA  -start empirically  Levaquin 500 mg for 7 days  Possible leukocytosis  Might be related to  progression of the cancer and metastatic disease   -signs and symptoms of infection discussed with patient and wife.  They are advised to call our clinic in the event of fever chills sweats or any signs or symptoms of infection      TSH elevation  T4 is normal  Patient is currently taking Synthroid 112 mcg  If he continues to have elevation in TSH  With next blood draw we may recommend increasing the dose of Synthroid  Patient advised to check with his PCP    Hyponatremia  Baseline sodium 127 -130  Patient is asymptomatic. Signs  symptoms of hyponatremia discussed  -Patient is getting NS bolus hydration      Low appetite  -Patient is taking Marinol  refer to nutritionist  IV fluids 2-3 times per week    Fatigue  Again he denies signs or symptoms of infection.  Likely secondary to progression of the disease and multiple other medical problems  Call if worsening of symptoms        ALEXANDRIA Berg [facility]     Chart documentation with Dragon Voice recognition Software. Although  reviewed after completion, some words and grammatical errors may remain.

## 2020-07-27 NOTE — LETTER
"    7/27/2020         RE: Agapito Fairbanks  [address]  [place] [address]        Dear Colleague,    Thank you for referring your patient, Agapito Fairbanks, to the [facility]. Please see a copy of my visit note below.    Oncology Rooming Note    July 27, 2020 1:06 PM   Agapito Fairbanks is a 70 year old male who presents for:    Chief Complaint   Patient presents with     Oncology Clinic Visit     Initial Vitals: /66   Pulse 75   Temp 98.3  F (36.8  C) (Oral)   Resp 18   Ht 1.829 m (6' 0.01\")   SpO2 97%   BMI 20.20 kg/m   Estimated body mass index is 20.2 kg/m  as calculated from the following:    Height as of this encounter: 1.829 m (6' 0.01\").    Weight as of 7/24/20: 67.6 kg (149 lb). Body surface area is 1.85 meters squared.  No Pain (0) Comment: Data Unavailable   No LMP for male patient.  Allergies reviewed: {ALLERGIES:037791}  Medications reviewed: {MEDICATIONS:218713}    Medications: {REFILLS NEEDED:069704}  Pharmacy name entered into OLSET:    [id number] - [place], [place] - [address] [facility] - [facility] [address] AT PORTAL TO [facility] [address] [address]    Clinical concerns: *** {PROVIDER NOTIFIED?:935802}      Shari J. Schoenberger, [facility]              Oncology/Hematology Visit Note  Jul 27, 2020    Reason for Visit: follow up of   Metastatic squamous cell carcinoma of the lung  Patient started pembrolizumab on 04/29/2020  PET scan on July 20, 2020 shows progression of the disease  Per  recommendation carboplatin and Taxol to be added to Keytruda  He is scheduled to get carboplatin and Taxol on 07/ 29    Interval History:  Patient and wife he continues to feel weak.  Weakness has not progressed.  He continues to have cough, sometimes the cough is productive with clear sputum.  Wife and  he has had cough for months now. wife " "states that yesterday he had coughed up a small amount of blood.  Patient and wife denies any productive cough now  Patient denies fever chills sweats.  Denies nausea vomiting diarrhea or abdominal pain      Review of Systems:  14 point ROS of systems including Constitutional, Eyes, Respiratory, Cardiovascular, Gastroenterology, Genitourinary, Integumentary, Muscularskeletal, Psychiatric were all negative except for pertinent positives noted in my HPI.        Physical Examination:  General: The patient is a pleasant male in no acute distress.  /66   Pulse 75   Temp 98.3  F (36.8  C) (Oral)   Resp 18   Ht 1.829 m (6' 0.01\")   SpO2 97%   BMI 20.20 kg/m    HEENT: EOMI, PERRL. Sclerae are anicteric. Oral mucosa is pink and moist with no lesions or thrush.   Lymph: Neck is supple with no lymphadenopathy in the cervical or supraclavicular areas.   Heart: Regular rate and rhythm.   Lungs: Clear to auscultation bilaterally.   GI: Bowel sounds present, soft, nontender with no palpable hepatosplenomegaly or masses.   Extremities: No lower extremity edema noted bilaterally.   Skin: No rashes, petechiae, or bruising noted on exposed skin.    Laboratory Data:  Results for orders placed or performed in visit on 07/27/20 (from the past 24 hour(s))   CBC with platelets differential   Result Value Ref Range    WBC 13.8 (H) 4.0 - 11.0 10e9/L    RBC Count 4.27 (L) 4.4 - 5.9 10e12/L    Hemoglobin 11.5 (L) 13.3 - 17.7 g/dL    Hematocrit 34.9 (L) 40.0 - 53.0 %    MCV 82 78 - 100 fl    MCH 26.9 26.5 - 33.0 pg    MCHC 33.0 31.5 - 36.5 g/dL    RDW 15.2 (H) 10.0 - 15.0 %    Platelet Count 494 (H) 150 - 450 10e9/L    Diff Method Automated Method     % Neutrophils 86.2 %    % Lymphocytes 3.1 %    % Monocytes 7.2 %    % Eosinophils 2.9 %    % Basophils 0.2 %    % Immature Granulocytes 0.4 %    Nucleated RBCs 0 0 /100    Absolute Neutrophil 11.9 (H) 1.6 - 8.3 10e9/L    Absolute Lymphocytes 0.4 (L) 0.8 - 5.3 10e9/L    Absolute " Monocytes 1.0 0.0 - 1.3 10e9/L    Absolute Eosinophils 0.4 0.0 - 0.7 10e9/L    Absolute Basophils 0.0 0.0 - 0.2 10e9/L    Abs Immature Granulocytes 0.1 0 - 0.4 10e9/L    Absolute Nucleated RBC 0.0    Comprehensive metabolic panel   Result Value Ref Range    Sodium 127 (L) 133 - 144 mmol/L    Potassium 3.7 3.4 - 5.3 mmol/L    Chloride 92 (L) 94 - 109 mmol/L    Carbon Dioxide 29 20 - 32 mmol/L    Anion Gap 6 3 - 14 mmol/L    Glucose 111 (H) 70 - 99 mg/dL    Urea Nitrogen 12 7 - 30 mg/dL    Creatinine 0.85 0.66 - 1.25 mg/dL    GFR Estimate 87 >60 mL/min/[1.73_m2]    GFR Estimate If Black >90 >60 mL/min/[1.73_m2]    Calcium 8.9 8.5 - 10.1 mg/dL    Bilirubin Total 0.6 0.2 - 1.3 mg/dL    Albumin 2.7 (L) 3.4 - 5.0 g/dL    Protein Total 7.4 6.8 - 8.8 g/dL    Alkaline Phosphatase 98 40 - 150 U/L    ALT 17 0 - 70 U/L    AST 11 0 - 45 U/L   TSH with free T4 reflex   Result Value Ref Range    TSH 11.21 (H) 0.40 - 4.00 mU/L   T4 free   Result Value Ref Range    T4 Free 1.25 0.76 - 1.46 ng/dL         Assessment and Plan:      This is a 70-year-old male with      Metastatic lung squamous cell carcinoma  Progressed on single  agent Keytruda  07/29-patient will get carboplatin and Taxol followed by Neulasta  Side effects of chemo and Neulasta discussed in detail with patient and wife both verbalized understanding and agreeable to proceed with treatment  -Schedule patient with me next week follow-up after chemo  -Schedule on August 19 for next cycle of treatment Keytruda Taxol and carboplatin    Bone metastasis  Continue with Zometa every 4 to 6 weeks    Cough-chronic   secondary to progression of lung cancer   07/20 PET/CT  scan-no infectious or inflammatory process noted in the report  07/24-pulmonary function test  08/07- appointment with pulmonologist  -Continue with inhaler.  Order Tessalon as needed for  cough  Patient wife advised to call our clinic or go to ER in the event of worsening of cough shortness of breath chest  pain      Leukocytosis   Patient denies obvious signs or symptoms of infection.    07/20-PET/CT scan revealed progression of the cancer.  but no pneumonia infectious or inflammatory  process reported.   -get blood cultures and UA  -start empirically  Levaquin 500 mg for 7 days  Possible leukocytosis  Might be related to  progression of the cancer and metastatic disease   -signs and symptoms of infection discussed with patient and wife.  They are advised to call our clinic in the event of fever chills sweats or any signs or symptoms of infection      TSH elevation  T4 is normal  Patient is currently taking Synthroid 112 mcg  If he continues to have elevation in TSH  With next blood draw we may recommend increasing the dose of Synthroid  Patient advised to check with his PCP    Hyponatremia  Baseline sodium 127 -130  Patient is asymptomatic. Signs  symptoms of hyponatremia discussed  -Patient is getting NS bolus hydration      Low appetite  -Patient is taking Marinol  refer to nutritionist  IV fluids 2-3 times per week    Fatigue  Again he denies signs or symptoms of infection.  Likely secondary to progression of the disease and multiple other medical problems  Call if worsening of symptoms        ALEXANDRIA Berg CNP  [facility]     Chart documentation with Dragon Voice recognition Software. Although reviewed after completion, some words and grammatical errors may remain.          Again, thank you for allowing me to participate in the care of your patient.        Sincerely,        ALEXANDRIA Berg CNP

## 2020-07-29 NOTE — PROGRESS NOTES
Infusion Nursing Note:  Agapito Fairbanks presents today for C1D1 carbo, taxol.    Patient seen by provider today: No   present during visit today: Not Applicable.    Note: N/A.    Intravenous Access:  Peripheral IV placed.    Treatment Conditions:  Lab Results   Component Value Date    HGB 11.5 07/27/2020     Lab Results   Component Value Date    WBC 13.8 07/27/2020      Lab Results   Component Value Date    ANEU 11.9 07/27/2020     Lab Results   Component Value Date     07/27/2020      Lab Results   Component Value Date     07/27/2020                   Lab Results   Component Value Date    POTASSIUM 3.7 07/27/2020           Lab Results   Component Value Date    MAG 2.0 05/02/2020            Lab Results   Component Value Date    CR 0.85 07/27/2020                   Lab Results   Component Value Date    SUSIE 8.9 07/27/2020                Lab Results   Component Value Date    BILITOTAL 0.6 07/27/2020           Lab Results   Component Value Date    ALBUMIN 2.7 07/27/2020                    Lab Results   Component Value Date    ALT 17 07/27/2020           Lab Results   Component Value Date    AST 11 07/27/2020       Results reviewed, labs MET treatment parameters, ok to proceed with treatment.    ONPRO  Was placed on patient's: back of right arm.    Was placed at 130 PM    ONPRO injector device Lot number: [id number]    Patient education included: what patient can expect after application, what colored lights mean on the device, when to remove device, when and where to call with questions or issues, all patients questions answered and that Neulasta administration will occur at 430 Thur 7/30.    Patient tolerated administration not well  Pt was sleeping during entire appt, and during onpro video, onpro instructions given to wife over phone and paper instructions sent with pt.      Post Infusion Assessment:  Patient tolerated infusion without incident.  Blood return noted pre and post infusion.  Site  patent and intact, free from redness, edema or discomfort.  No evidence of extravasations.  Access discontinued per protocol.       Discharge Plan:   Discharge instructions reviewed with: patients wife.  Patient and/or family verbalized understanding of discharge instructions and all questions answered.  Copy of AVS reviewed with patient and/or family.   Patient discharged in stable condition accompanied by: wife.  Departure Mode: Ambulatory.    Ashleigh Pollard RN

## 2020-07-29 NOTE — PATIENT INSTRUCTIONS
Your On-body Neulasta Injector was applied to your R arm at 130pm.  At approximately 430pm on Thursday 7/30, your On-body Injector will beep to let you know your dose delivery will begin in 2 minutes.  Your medication will be delivered over the next 45 minutes.  You can remove your Injector at 530pm on Thursday.  Please make sure your Injector has a solid green light or has turned off prior to removing the device.  Please contact your provider at [phone] with questions or concerns.

## 2020-07-29 NOTE — PROGRESS NOTES
Counseled patient's wife on take-home anti-nausea medications (Zofran, Compazine and Ativan). Educated on directions of use and potential side effects.      Ronnie Díaz  Fourth-Year Student Pharmacist   Lukasz De JesusD.

## 2020-07-30 NOTE — PROGRESS NOTES
"Writer called and spoke with Millicent to follow up on treatment of CArbo/ Taxol.     Patient was resting at the time I called but has been doing \"ok\", no nausea,vomitng or diarrhea. Patient is not eating or drinking much and currently scheduled for IVf's tomorrow. Writer encouraged Millicent to communicate with us if we need to add additional appointments for IVF's.    Laura verbalized understanding.    Writer will continue to follow patient.    Cecile Alanis RN    "

## 2020-07-31 NOTE — TELEPHONE ENCOUNTER
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 This clinician called family home today with goal of providing psychosocial check-in with new treatment start.     Intervention:   Millicent reported that family had a positive visit with provider at [facility] where Millicent reported Roque was very involved in decision making. Millicent reported that they heard from that oncologist that if Roque were to not choose chemotherapy treatment at present time that his prognosis would likely be close to 4 months and he would recommend enrollment in hospice, and that if he elects to pursue chemotherapy treatment his prognosis might be closer to 9 months. Millicent reported that Roque was adamant about wanting to continue chemotherapy at present time, which Millicent reports was relieving to have Roque engaged in decision making. Millicent reported that she was appreciative the [facility] expressed how advanced Roque's cancer is.     Millicent reported that she has been coping well this week with busy week at work, and spiritual support from former . Millicent starting to explore options for companionship services to support Roque when she needs to work. This clinician also discussed meal assistance options for when she is working. Millicent appreciative of check-in, and knows to reach out in case of distress or need.     Resources Provided:  [facility]  [facility]    Plan:  1) Previously provided patient/family with writer's contact information and availability. Will continue to be available as needed for ongoing psychosocial support.   2) Ongoing collaboration with multidisciplinary care team.       Please call or page if needs or concerns arise.     SAI Villeda, [facility] HospitalSW  Direct Phone:  [phone]  Pager: [phone]

## 2020-07-31 NOTE — PROGRESS NOTES
Infusion Nursing Note:  Agapito Fairbanks presents today for IVF.    Patient seen by provider today: No   present during visit today: Not Applicable.    Note: N/A.    Intravenous Access:  Peripheral IV placed.    Treatment Conditions:  Not Applicable.      Post Infusion Assessment:  Patient tolerated infusion without incident.  Blood return noted pre and post infusion.  Site patent and intact, free from redness, edema or discomfort.  No evidence of extravasations.  Access discontinued per protocol.       Discharge Plan:   Discharge instructions reviewed with: Patient.  Patient and/or family verbalized understanding of discharge instructions and all questions answered.  Patient discharged in stable condition accompanied by: self.  Departure Mode: Ambulatory.    Ashleigh Pollard RN

## 2020-08-02 NOTE — TELEPHONE ENCOUNTER
Pt wife called to ask for IVF. Called back to see how he was doing and are his symptoms.  Erin Hughes RN

## 2020-08-02 NOTE — PROGRESS NOTES
Infusion Nursing Note:  Agapito Fairbanks presents today for IVF.    Patient seen by provider today: No   present during visit today: Not Applicable.    Note: Patient's wife called today to add patient on for IVF due to decreased oral intake and continued weakness.      Intravenous Access:  Peripheral IV placed.    Treatment Conditions:  Not Applicable.      Post Infusion Assessment:  Patient tolerated infusion without incident.  Blood return noted pre and post infusion.  Site patent and intact, free from redness, edema or discomfort.  No evidence of extravasations.  Access discontinued per protocol.       Discharge Plan:   Discharge instructions reviewed with: Patient and Family.  Patient and/or family verbalized understanding of discharge instructions and all questions answered.  Copy of AVS reviewed with patient and/or family.  Patient will return 8/4/20 for next appointment.  Patient discharged in stable condition accompanied by: wife.  Departure Mode: [place].    Beau Warren RN

## 2020-08-02 NOTE — TELEPHONE ENCOUNTER
Spoke to patients wife and she reports he's been weak and dizzy. Not eating very much had a yogurt for dinner last night. Denies n/v, or diarrhea. Reports his temps have been normal.   Will come in for hydration today.   Will route to care team for follow up during the weak.   Erin Hughes RN

## 2020-08-03 NOTE — TELEPHONE ENCOUNTER
Writer has adended not and will have 02 testing done tomorrow during in person exam.     Cecile Alanis RN

## 2020-08-03 NOTE — TELEPHONE ENCOUNTER
Wife Millicent feels Roque needs home oxygen ordered. Wants pt tested during office visit tomorrow.   Will route this request/concern to appropriate staff.   Erin Hughes RN

## 2020-08-04 NOTE — LETTER
"    8/4/2020         RE: Agapito Fairbanks  [address]  [facility] [address]        Dear Colleague,    Thank you for referring your patient, Agapito Fairbanks, to the [facility]. Please see a copy of my visit note below.    Oncology Rooming Note    August 4, 2020 10:38 AM   Agapito Fairbanks is a 70 year old male who presents for:    Chief Complaint   Patient presents with     Oncology Clinic Visit     Bone metastasis (H)     Initial Vitals: BP 96/67 (BP Location: Left arm, Patient Position: Sitting, Cuff Size: Adult Regular)   Pulse 86   Temp 98.8  F (37.1  C) (Oral)   Resp 16   SpO2 96%  Estimated body mass index is 20.34 kg/m  as calculated from the following:    Height as of 7/27/20: 1.829 m (6' 0.01\").    Weight as of 7/29/20: 68 kg (150 lb). There is no height or weight on file to calculate BSA.  Moderate Pain (4) Comment: Data Unavailable   No LMP for male patient.  Allergies reviewed: Yes  Medications reviewed: Yes    Medications: MEDICATION REFILLS NEEDED TODAY. Provider was notified. Refill FENTANYL  Pharmacy name entered into [employer]:    [facility]Y #[id number] - [place], [place] - [address] [facility] - [facility] [address] AT PORTAL TO [facility] [address] [address]    Clinical concerns: no      Ana Arellano CMA                Oxygen Saturation Test:    O2 at Rest- no supplemental O2:  96%    Walk with no supplemental O2:  81-90%    Walk with 2 LPM of O2:  93%-94%    Shari Schoenberger, CMA      Oncology/Hematology Visit Note  Aug 4, 2020    Reason for Visit: follow up of   Metastatic squamous cell carcinoma of the lung  Patient started pembrolizumab on 04/29/2020  PET scan on July 20, 2020 shows progression of the disease  Per  recommendation carboplatin and Taxol to be added to Keytruda  carboplatin and Taxol given  on 07/ 29    Future cycles he will be " getting Keytruda carboplatin and Taxol    Interval History:  Overall patient notes he has been tolerating treatment well.  Continues to have nonproductive chronic cough.  Patient reports cough has not gotten worsen.  Denies shortness of breath denies chest pain.  Patient denies nausea vomiting diarrhea or abdominal pain  Continues to be weak.  He is able to take some small short walks  Appetite continues to be low where he has been eating small portions throughout the day      Review of Systems:  14 point ROS of systems including Constitutional, Eyes, Respiratory, Cardiovascular, Gastroenterology, Genitourinary, Integumentary, Muscularskeletal, Psychiatric were all negative except for pertinent positives noted in my HPI.        Physical Examination:  General: The patient is a pleasant male in no acute distress.  BP 96/67 (BP Location: Left arm, Patient Position: Sitting, Cuff Size: Adult Regular)   Pulse 86   Temp 98.8  F (37.1  C) (Oral)   Resp 16   SpO2 96%   HEENT: EOMI, PERRL. Sclerae are anicteric. Oral mucosa is pink and moist with no lesions or thrush.   Lymph: Neck is supple with no lymphadenopathy in the cervical or supraclavicular areas.   Heart: Regular rate and rhythm.   Lungs: Clear to auscultation bilaterally.   GI: Bowel sounds present, soft, nontender with no palpable hepatosplenomegaly or masses.   Extremities: No lower extremity edema noted bilaterally.   Skin: No rashes, petechiae, or bruising noted on exposed skin.    Laboratory Data:  Results for orders placed or performed in visit on 08/04/20 (from the past 24 hour(s))   Comprehensive metabolic panel   Result Value Ref Range    Sodium 127 (L) 133 - 144 mmol/L    Potassium 4.0 3.4 - 5.3 mmol/L    Chloride 95 94 - 109 mmol/L    Carbon Dioxide 29 20 - 32 mmol/L    Anion Gap 3 3 - 14 mmol/L    Glucose 105 (H) 70 - 99 mg/dL    Urea Nitrogen 12 7 - 30 mg/dL    Creatinine 0.72 0.66 - 1.25 mg/dL    GFR Estimate >90 >60 mL/min/[1.73_m2]    GFR  Estimate If Black >90 >60 mL/min/[1.73_m2]    Calcium 7.8 (L) 8.5 - 10.1 mg/dL    Bilirubin Total 1.0 0.2 - 1.3 mg/dL    Albumin 2.4 (L) 3.4 - 5.0 g/dL    Protein Total 6.3 (L) 6.8 - 8.8 g/dL    Alkaline Phosphatase 102 40 - 150 U/L    ALT 12 0 - 70 U/L    AST 6 0 - 45 U/L   CBC with platelets differential   Result Value Ref Range    WBC 9.7 4.0 - 11.0 10e9/L    RBC Count 3.89 (L) 4.4 - 5.9 10e12/L    Hemoglobin 10.2 (L) 13.3 - 17.7 g/dL    Hematocrit 30.7 (L) 40.0 - 53.0 %    MCV 79 78 - 100 fl    MCH 26.2 (L) 26.5 - 33.0 pg    MCHC 33.2 31.5 - 36.5 g/dL    RDW 15.6 (H) 10.0 - 15.0 %    Platelet Count 290 150 - 450 10e9/L    Diff Method Automated Method     % Neutrophils 82.5 %    % Lymphocytes 2.3 %    % Monocytes 10.2 %    % Eosinophils 3.5 %    % Basophils 0.2 %    % Immature Granulocytes 1.3 %    Nucleated RBCs 0 0 /100    Absolute Neutrophil 8.0 1.6 - 8.3 10e9/L    Absolute Lymphocytes 0.2 (L) 0.8 - 5.3 10e9/L    Absolute Monocytes 1.0 0.0 - 1.3 10e9/L    Absolute Eosinophils 0.3 0.0 - 0.7 10e9/L    Absolute Basophils 0.0 0.0 - 0.2 10e9/L    Abs Immature Granulocytes 0.1 0 - 0.4 10e9/L    Absolute Nucleated RBC 0.0          Assessment and Plan:      This is a 70-year-old male with      Metastatic lung squamous cell carcinoma  Progressed on single  agent Keytruda  07/29-patient will got carboplatin and Taxol followed by Neulasta.  Patient reports overall he has been tolerating treatment well  -Schedule on August 19 for next cycle of treatment Keytruda Taxol and carboplatin     Bone metastasis  Continue with Zometa every 4 to 6 weeks    Cough-chronic -stable  secondary to progression of lung cancer   07/20 PET/CT  scan-no infectious or inflammatory process noted in the report  07/24-pulmonary function test  08/07- appointment with pulmonologist  -Continue with inhaler.  -Patient qualifies this for home oxygen      TSH elevation  T4 is normal  Patient is currently taking Synthroid 112 mcg  If he continues to have  elevation in TSH  With next blood draw we may recommend increasing the dose of Synthroid  Patient advised to check with his PCP    Hyponatremia  Baseline sodium 127 -130  Patient is asymptomatic. Signs  symptoms of hyponatremia discussed  -Patient is getting NS bolus hydration      Low appetite  -Patient is taking Marinol  refer to nutritionist  IV fluids 2-3 times per week    Fatigue  he denies signs or symptoms of infection.  Likely secondary to progression of the disease and multiple other medical problems, treatment  Refer to physical therapy and Occupational Therapy          ALEXANDRIA Berg CNP  [facility]     Chart documentation with Dragon Voice recognition Software. Although reviewed after completion, some words and grammatical errors may remain.          Again, thank you for allowing me to participate in the care of your patient.        Sincerely,        ALEXANDRIA Berg CNP

## 2020-08-04 NOTE — PROGRESS NOTES
Writer faxed DME order for Oxygen to [place] location at [phone] and phone [phone].    Cecile Alanis RN

## 2020-08-04 NOTE — PROGRESS NOTES
Infusion Nursing Note:  Agapito Fairbanks presents today for IVF.    Patient seen by provider today: Yes: Brett Manrique NP   present during visit today: Not Applicable.    Note: N/A.    Intravenous Access:  Peripheral IV placed.    Treatment Conditions:  Not Applicable.      Post Infusion Assessment:  Patient tolerated infusion without incident.  Blood return noted pre and post infusion.  Site patent and intact, free from redness, edema or discomfort.  No evidence of extravasations.  Access discontinued per protocol.       Discharge Plan:   Patient declined prescription refills.  Discharge instructions reviewed with: Patient.  Patient verbalized understanding of discharge instructions and all questions answered.  Copy of AVS reviewed with patient.  Patient will return 8/7/20 for next appointment.  Patient discharged in stable condition accompanied by: self.  Departure Mode: Wheelchair.    Mecca Alejandro RN

## 2020-08-04 NOTE — PROGRESS NOTES
Oncology/Hematology Visit Note  Aug 4, 2020    Reason for Visit: follow up of   Metastatic squamous cell carcinoma of the lung  Patient started pembrolizumab on 04/29/2020  PET scan on July 20, 2020 shows progression of the disease  Per  recommendation carboplatin and Taxol to be added to Keytruda  carboplatin and Taxol given  on 07/ 29    Future cycles he will be getting Keytruda carboplatin and Taxol    Interval History:  Overall patient notes he has been tolerating treatment well.  Continues to have nonproductive chronic cough.  Patient reports cough has not gotten worsen.  Denies shortness of breath denies chest pain.  Patient denies nausea vomiting diarrhea or abdominal pain  Continues to be weak.  He is able to take some small short walks  Appetite continues to be low where he has been eating small portions throughout the day      Review of Systems:  14 point ROS of systems including Constitutional, Eyes, Respiratory, Cardiovascular, Gastroenterology, Genitourinary, Integumentary, Muscularskeletal, Psychiatric were all negative except for pertinent positives noted in my HPI.        Physical Examination:  General: The patient is a pleasant male in no acute distress.  BP 96/67 (BP Location: Left arm, Patient Position: Sitting, Cuff Size: Adult Regular)   Pulse 86   Temp 98.8  F (37.1  C) (Oral)   Resp 16   SpO2 96%   HEENT: EOMI, PERRL. Sclerae are anicteric. Oral mucosa is pink and moist with no lesions or thrush.   Lymph: Neck is supple with no lymphadenopathy in the cervical or supraclavicular areas.   Heart: Regular rate and rhythm.   Lungs: Clear to auscultation bilaterally.   GI: Bowel sounds present, soft, nontender with no palpable hepatosplenomegaly or masses.   Extremities: No lower extremity edema noted bilaterally.   Skin: No rashes, petechiae, or bruising noted on exposed skin.    Laboratory Data:  Results for orders placed or performed in visit on 08/04/20 (from the past 24 hour(s))    Comprehensive metabolic panel   Result Value Ref Range    Sodium 127 (L) 133 - 144 mmol/L    Potassium 4.0 3.4 - 5.3 mmol/L    Chloride 95 94 - 109 mmol/L    Carbon Dioxide 29 20 - 32 mmol/L    Anion Gap 3 3 - 14 mmol/L    Glucose 105 (H) 70 - 99 mg/dL    Urea Nitrogen 12 7 - 30 mg/dL    Creatinine 0.72 0.66 - 1.25 mg/dL    GFR Estimate >90 >60 mL/min/[1.73_m2]    GFR Estimate If Black >90 >60 mL/min/[1.73_m2]    Calcium 7.8 (L) 8.5 - 10.1 mg/dL    Bilirubin Total 1.0 0.2 - 1.3 mg/dL    Albumin 2.4 (L) 3.4 - 5.0 g/dL    Protein Total 6.3 (L) 6.8 - 8.8 g/dL    Alkaline Phosphatase 102 40 - 150 U/L    ALT 12 0 - 70 U/L    AST 6 0 - 45 U/L   CBC with platelets differential   Result Value Ref Range    WBC 9.7 4.0 - 11.0 10e9/L    RBC Count 3.89 (L) 4.4 - 5.9 10e12/L    Hemoglobin 10.2 (L) 13.3 - 17.7 g/dL    Hematocrit 30.7 (L) 40.0 - 53.0 %    MCV 79 78 - 100 fl    MCH 26.2 (L) 26.5 - 33.0 pg    MCHC 33.2 31.5 - 36.5 g/dL    RDW 15.6 (H) 10.0 - 15.0 %    Platelet Count 290 150 - 450 10e9/L    Diff Method Automated Method     % Neutrophils 82.5 %    % Lymphocytes 2.3 %    % Monocytes 10.2 %    % Eosinophils 3.5 %    % Basophils 0.2 %    % Immature Granulocytes 1.3 %    Nucleated RBCs 0 0 /100    Absolute Neutrophil 8.0 1.6 - 8.3 10e9/L    Absolute Lymphocytes 0.2 (L) 0.8 - 5.3 10e9/L    Absolute Monocytes 1.0 0.0 - 1.3 10e9/L    Absolute Eosinophils 0.3 0.0 - 0.7 10e9/L    Absolute Basophils 0.0 0.0 - 0.2 10e9/L    Abs Immature Granulocytes 0.1 0 - 0.4 10e9/L    Absolute Nucleated RBC 0.0          Assessment and Plan:      This is a 70-year-old male with      Metastatic lung squamous cell carcinoma  Progressed on single  agent Keytruda  07/29-patient will got carboplatin and Taxol followed by Neulasta.  Patient reports overall he has been tolerating treatment well  -Schedule on August 19 for next cycle of treatment Keytruda Taxol and carboplatin     Bone metastasis  Continue with Zometa every 4 to 6  weeks    Cough-chronic -stable  secondary to progression of lung cancer   07/20 PET/CT  scan-no infectious or inflammatory process noted in the report  07/24-pulmonary function test  08/07- appointment with pulmonologist  -Continue with inhaler.  -Patient qualifies this for home oxygen      TSH elevation  T4 is normal  Patient is currently taking Synthroid 112 mcg  If he continues to have elevation in TSH  With next blood draw we may recommend increasing the dose of Synthroid  Patient advised to check with his PCP    Hyponatremia  Baseline sodium 127 -130  Patient is asymptomatic. Signs  symptoms of hyponatremia discussed  -Patient is getting NS bolus hydration      Low appetite  -Patient is taking Marinol  refer to nutritionist  IV fluids 2-3 times per week    Fatigue  he denies signs or symptoms of infection.  Likely secondary to progression of the disease and multiple other medical problems, treatment  Refer to physical therapy and Occupational Therapy      Addendum  I certify that this patient, Roque Fairbanks  has been under my care and that I had face-to-face encounter with this patient on 08/04/2020  Mr. Fairbanks  is now in a chronic stable state and continues to require supplemental oxygen due to chronic disease, lung cancer.  This patient has been treated in part or in hole for the following:  lung carcinoma and hypoxia  Patient will need home O2 for  diagnosis of hypoxemia   Portability is ordered patient is mobile within the home    ALEXANDRIA Berg [facility]     Chart documentation with Dragon Voice recognition Software. Although reviewed after completion, some words and grammatical errors may remain.

## 2020-08-04 NOTE — PROGRESS NOTES
"Oncology Rooming Note    August 4, 2020 10:38 AM   Agapito Fairbanks is a 70 year old male who presents for:    Chief Complaint   Patient presents with     Oncology Clinic Visit     Bone metastasis (H)     Initial Vitals: BP 96/67 (BP Location: Left arm, Patient Position: Sitting, Cuff Size: Adult Regular)   Pulse 86   Temp 98.8  F (37.1  C) (Oral)   Resp 16   SpO2 96%  Estimated body mass index is 20.34 kg/m  as calculated from the following:    Height as of 7/27/20: 1.829 m (6' 0.01\").    Weight as of 7/29/20: 68 kg (150 lb). There is no height or weight on file to calculate BSA.  Moderate Pain (4) Comment: Data Unavailable   No LMP for male patient.  Allergies reviewed: Yes  Medications reviewed: Yes    Medications: MEDICATION REFILLS NEEDED TODAY. Provider was notified. Refill FENTANYL  Pharmacy name entered into [employer]:    [id number] - [place], [place] [facility] [address] [facility] [address] AT PORTAL TO REGISTERED [facility] SITES  [place] [facility] [address] [place] [address]    Clinical concerns: no      Ana Arellano CMA              "

## 2020-08-04 NOTE — PROGRESS NOTES
Oxygen Saturation Test:    O2 at Rest- no supplemental O2:  96%    Walk with no supplemental O2  decreased to 81%    Walk with 2 LPM of O2:  93%-94%    Shari Schoenberger, CMA

## 2020-08-06 NOTE — PROGRESS NOTES
"Writer spoke with patient's wife, Millicent, following up on patient's shortness of breath since oxygen has been obtained and pain management. Writer updated that patient's pain is managed \"fairly well\" and the shortness of breath has improved. Patient does not eat much and is in bed most of the day. Patient has lower extremity edema in the ankles . We discussed elevation and that his albumin level is low which can cause the body to \"3rd space fluid\". We discuss some nutrition options of getting some more protein.    Plan to complete handicap parking permit and place an order for homecare PT/OT.    Cecile Alanis RN    "

## 2020-08-06 NOTE — PROGRESS NOTES
"Agapito Fairbanks is a 70 year old male who is being evaluated via a billable telephone visit.      The patient has been notified of following:     \"This telephone visit will be conducted via a call between you and your physician/provider. We have found that certain health care needs can be provided without the need for a physical exam.  This service lets us provide the care you need with a short phone conversation.  If a prescription is necessary we can send it directly to your pharmacy.  If lab work is needed we can place an order for that and you can then stop by our lab to have the test done at a later time.    Telephone visits are billed at different rates depending on your insurance coverage. During this emergency period, for some insurers they may be billed the same as an in-person visit.  Please reach out to your insurance provider with any questions.    If during the course of the call the physician/provider feels a telephone visit is not appropriate, you will not be charged for this service.\"    Patient has given verbal consent for Telephone visit?  Yes    What phone number would you like to be contacted at? [phone]    How would you like to obtain your AVS? Mail a copy    Phone call duration: 30 minutes    Ana Cristina Richard MD    [facility]  Initial Clinic Note        Assessment and Plan:  Agapito Fairbanks is a 70 year old male with a history of metastatic lung cancer who is presenting today for a phone visit to discuss worsening dyspnea on exertion and cough.    While the patient's PFTs do appear restrictive, I suspect that is more related to prior thoracic surgery.  He does have evidence of emphysema on his CT scan and has a longstanding history of cough and smoking.  He is currently treated with albuterol only.  Also has presence of some hemoptysis which I believe is related to his lung cancer.     I am also concerned that he has a component of heart failure " based on new symptoms of orthopnea and lower extremity edema, and finding of enlarged heart on his recent PET scan.    Plan:   -Add Stiolto (LABA-LAMA) to patient's inhaler regimen, in addition to albuterol.  -I am ordering a nebulizer for albuterol nebs, to be tried 4 times daily  -Continue Tessalon for cough  -Echocardiogram to be obtained at the patient's convenience to assess systolic and diastolic function, I am concerned that a component of heart failure is playing a role in patient's presentation  -I also ordered an NT proBNP to be added onto any of his future lab draws; I told the patient's wife they do not need to make a separate trip for this lab.    We will plan on a follow-up phone call in about 3 months to assess his response to Stiolto and follow-up results of his echo.    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 Hemilaryngectomy in 3/2005.   - recurred 12/2005, treated with microdirect laryngoscopy and excision of the R. True vocal cord tumor, then radiation + chemo (cisplatin).   - PET in 2009: SKYLA spiculated nodule, and underwent SKYLA segmentectomy, mediastinal LN dissection.  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   - February 2/20: Back lesion, underwent Mohs; found to be squamous cell ca.  Underwent radiation.   - PET scan 4/2020- SKYLA hypermetabolic mass, and hypermetabolic lesions in the bones.  Biopsy of the L. Lung mass 4/13/20 was moderately differentiated squamous cell Ca,  started pembrolizumab 4/29/20. Now adding on carboplatin/taxol due to evidence of disease progression on PET scan in 7/2020    History of more recent dyspnea/cough:   Roque is present for a phone call today, however his wife does the majority of the talking.  Roque's wife describes that his dyspnea on exertion and cough seem to have changed quite a bit since his diagnosis of lung cancer this past spring.  She reports that he had what she describes as a smoker's cough a decade ago when he first had his lung cancer, but his current cough and shortness of breath are quite different.  His wife endorses significant dyspnea on exertion which has progressed since March.  They live in a condo and it is about 100 steps to the elevator, and his wife notices that he is very winded by the time they get to the elevator.  He is still able to care for himself, but he takes significantly more time than previously.  In addition to shortness of breath, the patient has a sporadic cough.  It is productive of thick mucus, and occasional streaks of blood are present.  He does not wheeze, but his wife describes a rattle.    He has been using albuterol for the cough, and goes through an inhaler every 1 to 2 weeks.  She thinks that he does get relief from this inhaler, although sometimes he takes 2 to 3 puffs before he feels better.  His wife is giving him 1 Tessalon Maliha at night and she thinks this is helping.  Roque is also prescribed fentanyl and oxycodone for cancer related pain, his wife does not feel like these things are helpful for his cough. Roque's cough is improved with sitting up.  He is also started sleeping either with a wedge in his bed, or is sleeping in a recliner.  His wife also notices new ankle swelling.  The symptoms of sleeping sitting up and ankle swelling are new in the past few weeks.    No recent fevers, sweats, or chills.  No respiratory complications.  Had a temp in March, COVID was ruled out.  Wife is working  but is practicing social distancing.  Of note, his wife tells me that Roque is going in for IV fluids 3 times a week due to dehydration.    Exposure History  Tobacco: quit smoking in 2000.  2ppd.    Other inhaled substances: none  Occupation: sold real estate.  No occupational exposures.  Retired 1 year ago.   Pets: dog  Allergies: seasonal allergies only, mild.  Improved with zyrtec  Hobbies: none  Travel: none  Home: no issues with mold/leaking water    PMH:  Past Medical History:   Diagnosis Date     Acute gastritis with hemorrhage 11/02     Basal cell carcinoma, leg      left pretibial area     CKD (chronic kidney disease) stage 3, GFR 30-59 ml/min (H)     creat baseline approx 1.4     Hearing loss      Helicobacter pylori (H. 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)      Tobacco use disorder     quit 1998     Unspecified cataract     left     Unspecified essential hypertension      Unspecified retinal detachment     Bilateral       Allergies:  Allergies   Allergen Reactions     Simvastatin      myalgias     Lisinopril Rash     rash       Social History:  Social History     Socioeconomic History     Marital status:      Spouse name: Not on file     Number of children: Not on file     Years of education: Not on file     Highest education level: Not on file   Occupational History     Not on file   Social Needs     Financial  resource strain: Not on file     Food insecurity     Worry: Not on file     Inability: Not on file     Transportation needs     Medical: Not on file     Non-medical: Not on file   Tobacco Use     Smoking status: Former Smoker     Packs/day: 1.50     Years: 30.00     Pack years: 45.00     Last attempt to quit: 1998     Years since quittin.6     Smokeless tobacco: Never Used   Substance and Sexual Activity     Alcohol use: Yes     Comment: 2-3 times per week, 1-2 beers     Drug use: No     Sexual activity: Yes     Partners: Female     Comment: vidya with TL   Lifestyle     Physical activity     Days per week: Not on file     Minutes per session: Not on file     Stress: Not on file   Relationships     Social connections     Talks on phone: Not on file     Gets together: Not on file     Attends Confucianist service: Not on file     Active member of club or organization: Not on file     Attends meetings of clubs or organizations: Not on file     Relationship status: Not on file     Intimate partner violence     Fear of current or ex partner: Not on file     Emotionally abused: Not on file     Physically abused: Not on file     Forced sexual activity: Not on file   Other Topics Concern     Parent/sibling w/ CABG, MI or angioplasty before 65F 55M? Not Asked   Social History Narrative    High school education.    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 [employer]    Working part-time as of 2019    Met wife in     Seldom drinks more than 1 drink every now and then    1 daughter in [facility]       Medications:  Current Outpatient Medications   Medication Sig Dispense Refill     albuterol (PROAIR HFA/PROVENTIL HFA/VENTOLIN HFA) 108 (90 Base) MCG/ACT inhaler Inhale 2 puffs into the lungs every 6 hours as needed for shortness of breath / dyspnea or wheezing 6.7 g 3      benzonatate (TESSALON) 100 MG capsule Take 1 capsule (100 mg) by mouth 3 times daily as needed for cough 10 capsule 1     bisacodyl (DULCOLAX) 5 MG EC tablet Take 5 mg by mouth daily as needed for constipation       cetirizine (ZYRTEC ALLERGY) 10 MG tablet Take 10 mg by mouth daily       donepezil (ARICEPT) 5 MG tablet Take 1 tablet (5 mg) by mouth At Bedtime 90 tablet 3     fentaNYL (DURAGESIC) 12 mcg/hr 72 hr patch Place 1 patch onto the skin every 72 hours 10 patch 0     fluticasone (FLONASE) 50 MCG/ACT nasal spray Spray 1-2 sprays into both nostrils daily (Patient not taking: Reported on 8/4/2020) 48 g 3     gabapentin (NEURONTIN) 300 MG capsule Take 1 capsule (300 mg) by mouth 3 times daily 90 capsule 3     levothyroxine (SYNTHROID/LEVOTHROID) 112 MCG tablet Take 112 mcg by mouth daily       LORazepam (ATIVAN) 0.5 MG tablet Take 1 tablet (0.5 mg) by mouth every 4 hours as needed (Anxiety, Nausea/Vomiting or Sleep) 30 tablet 3     MULTI VITAMIN MENS OR 1 TABLET DAILY       naloxone (NARCAN) 4 MG/0.1ML nasal spray Spray 1 spray (4 mg) into one nostril alternating nostrils as needed for opioid reversal every 2-3 minutes until assistance arrives (Patient not taking: Reported on 8/4/2020) 0.2 mL 1     ondansetron (ZOFRAN) 8 MG tablet Take 1 tablet (8 mg) by mouth every 8 hours as needed (Nausea/Vomiting) (Patient not taking: Reported on 8/4/2020) 10 tablet 3     oxyCODONE (ROXICODONE) 5 MG tablet Take 1 tablet (5 mg) by mouth every 4 hours as needed for severe pain 120 tablet 0     prochlorperazine (COMPAZINE) 10 MG tablet Take 1 tablet (10 mg) by mouth every 6 hours as needed (Nausea/Vomiting) 30 tablet 3     sertraline (ZOLOFT) 50 MG tablet Take 1 tablet (50 mg) by mouth daily 90 tablet 3     triamcinolone (KENALOG) 0.1 % external cream APPLY  CREAM EXTERNALLY TO AFFECTED AREA THREE TIMES DAILY AS NEEDED SPARINGLY 80 g 1       Family History:  Family History   Problem Relation Age of Onset     Colon Cancer Mother           age 65, in      Family History Negative Father         mild memory problems,  age 87, in      Family History Negative Brother      Family History Negative Brother      Cancer Brother         hx prostate ca     Sleep Apnea Brother        ROS: Complete 12 point ROS negative unless mentioned in HPI    Physical Exam:  There were no vitals taken for this visit.    Patient only speaks a few words over her phone call today, I did not hear any coughing.    Labs and Radiology:    Labs from 2020 personally reviewed.  Notable for hyponatremia to 127, hypoalbuminemia to 2.4, and low total protein at 6.3; other chemistries are normal.  This is stable compared to .  CBC is notable for mild anemia to 10.2.  His hemoglobin has trended down slowly since the end of July.    PET scan obtained on 2020 personally reviewed.  Notable for moderate apical emphysema.  He is status post left upper lobe segmentectomy.  There is a 3.4 x 3.6 cm mass in the remaining left upper lobe.  He has multiple pulmonary nodules bilaterally which are concerning for metastases.  He has a small amount of bibasilar groundglass opacity as well.  His heart appears enlarged.    PFT's:  Most Recent [facility] Pulmonary Function Testing    FVC-Pred   Date Value Ref Range Status   2020 4.11 L      FVC-Pre   Date Value Ref Range Status   2020 2.47 L      FVC-%Pred-Pre   Date Value Ref Range Status   2020 59 %      FEV1-Pre   Date Value Ref Range Status   2020 1.76 L      FEV1-%Pred-Pre   Date Value Ref Range Status   2020 56 %      FEV1FVC-Pred   Date Value Ref Range Status   2020 76 %      FEV1FVC-Pre   Date Value Ref Range Status   2020 71 %      No results found for:   FEFMax-Pred   Date Value Ref Range Status   2020 8.61 L/sec      FEFMax-Pre   Date Value Ref Range Status   2020 4.47 L/sec      FEFMax-%Pred-Pre   Date Value Ref Range Status    07/28/2020 51 %      ExpTime-Pre   Date Value Ref Range Status   07/28/2020 6.52 sec      FIFMax-Pre   Date Value Ref Range Status   07/28/2020 3.79 L/sec      FEV1FEV6-Pred   Date Value Ref Range Status   07/28/2020 77 %      FEV1FEV6-Pre   Date Value Ref Range Status   07/28/2020 72 %      No results found for: 20055  PFTs obtained 7/28/20 personally reviewed.  Notable for moderate restriction and moderate reduction in diffusing capacity.

## 2020-08-07 NOTE — LETTER
8/7/2020         RE: Agapito Fairbanks  [address]  [place] [address]        Dear Colleague,    Thank you for referring your patient, Agapito Fairbanks, to the [facility]. Please see a copy of my visit note below.    Agapito Fairbanks is a 70 year old male who is being evaluated via a billable telephone visit.        Phone call duration: 30 minutes    Ana Cristina Richard MD    [facility]  Initial Clinic Note        Assessment and Plan:  Agapito Fairbanks is a 70 year old male with a history of metastatic lung cancer who is presenting today for a phone visit to discuss worsening dyspnea on exertion and cough.    While the patient's PFTs do appear restrictive, I suspect that is more related to prior thoracic surgery.  He does have evidence of emphysema on his CT scan and has a longstanding history of cough and smoking.  He is currently treated with albuterol only.  Also has presence of some hemoptysis which I believe is related to his lung cancer.     I am also concerned that he has a component of heart failure based on new symptoms of orthopnea and lower extremity edema, and finding of enlarged heart on his recent PET scan.    Plan:   -Add Stiolto (LABA-LAMA) to patient's inhaler regimen, in addition to albuterol.  -I am ordering a nebulizer for albuterol nebs, to be tried 4 times daily  -Continue Tessalon for cough  -Echocardiogram to be obtained at the patient's convenience to assess systolic and diastolic function, I am concerned that a component of heart failure is playing a role in patient's presentation  -I also ordered an NT proBNP to be added onto any of his future lab draws; I told the patient's wife they do not need to make a separate trip for this lab.    We will plan on a follow-up phone call in about 3 months to assess his response to Stiolto and follow-up results of his echo.    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 Hemilaryngectomy in 3/2005.   - recurred 12/2005, treated with microdirect laryngoscopy and excision of the R. True vocal cord tumor, then radiation + chemo (cisplatin).   - PET in 2009: SKYLA spiculated nodule, and underwent SKYLA segmentectomy, mediastinal LN dissection.  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   - February 2/20: Back lesion, underwent Mohs; found to be squamous cell ca.  Underwent radiation.   - PET scan 4/2020- SKYLA hypermetabolic mass, and hypermetabolic lesions in the bones.  Biopsy of the L. Lung mass 4/13/20 was moderately differentiated squamous cell Ca, started pembrolizumab 4/29/20. Now adding on carboplatin/taxol due to evidence of disease progression on PET scan in 7/2020    History of more recent dyspnea/cough:   Roque is present for a phone call today, however his wife does the majority of the talking.  Roque's wife describes that his dyspnea on exertion and cough seem to have changed quite a bit since his diagnosis of lung cancer this past spring.  She reports that he had what she describes as a smoker's cough a decade ago when he first had his lung cancer, but his current cough and shortness of breath are quite different.  His wife endorses significant dyspnea on exertion which has progressed since March.  They live in a condo and it is about 100 steps to the elevator, and his wife notices that he is very winded by the time they get to the elevator.  He is  still able to care for himself, but he takes significantly more time than previously.  In addition to shortness of breath, the patient has a sporadic cough.  It is productive of thick mucus, and occasional streaks of blood are present.  He does not wheeze, but his wife describes a rattle.    He has been using albuterol for the cough, and goes through an inhaler every 1 to 2 weeks.  She thinks that he does get relief from this inhaler, although sometimes he takes 2 to 3 puffs before he feels better.  His wife is giving him 1 Tessalon Maliha at night and she thinks this is helping.  Roque is also prescribed fentanyl and oxycodone for cancer related pain, his wife does not feel like these things are helpful for his cough. Roque's cough is improved with sitting up.  He is also started sleeping either with a wedge in his bed, or is sleeping in a recliner.  His wife also notices new ankle swelling.  The symptoms of sleeping sitting up and ankle swelling are new in the past few weeks.    No recent fevers, sweats, or chills.  No respiratory complications.  Had a temp in March, COVID was ruled out.  Wife is working but is practicing social distancing.  Of note, his wife tells me that Roque is going in for IV fluids 3 times a week due to dehydration.    Exposure History  Tobacco: quit smoking in 2000.  2ppd.    Other inhaled substances: none  Occupation: sold real estate.  No occupational exposures.  Retired 1 year ago.   Pets: dog  Allergies: seasonal allergies only, mild.  Improved with zyrtec  Hobbies: none  Travel: none  Home: no issues with mold/leaking water    PMH:  Past Medical History:   Diagnosis Date     Acute gastritis with hemorrhage 11/02     Basal cell carcinoma, leg      left pretibial area     CKD (chronic kidney disease) stage 3, GFR 30-59 ml/min (H)     creat baseline approx 1.4     Hearing loss      Helicobacter pylori (H. pylori) 11/02     Humerus fracture 2013    left      Hyperlipidemia LDL goal <100  10/31/2010     Hypothyroidism      Impotence of organic origin      Laryngeal carcinoma (H)     Squamous cell carcinoma right vocal cord     Lung cancer (H) 0    1cm spiculated SKYLA Adenosquamous cell carcinoma     Mild major depression (H)      Other and unspecified malignant neoplasm of skin of other and unspecified parts of face      Basal Cell CA nasal area , chest 3/05, right chest 10/09     Other testicular hypofunction      Squamous cell carcinoma  2012     RLE s/p excision     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)      Tobacco use disorder     quit      Unspecified cataract     left     Unspecified essential hypertension      Unspecified retinal detachment     Bilateral       Allergies:  Allergies   Allergen Reactions     Simvastatin      myalgias     Lisinopril Rash     rash       Social History:  Social History     Socioeconomic History     Marital status:      Spouse name: Not on file     Number of children: Not on file     Years of education: Not on file     Highest education level: Not on file   Occupational History     Not on file   Social Needs     Financial resource strain: Not on file     Food insecurity     Worry: Not on file     Inability: Not on file     Transportation needs     Medical: Not on file     Non-medical: Not on file   Tobacco Use     Smoking status: Former Smoker     Packs/day: 1.50     Years: 30.00     Pack years: 45.00     Last attempt to quit: 1998     Years since quittin.6     Smokeless tobacco: Never Used   Substance and Sexual Activity     Alcohol use: Yes     Comment: 2-3 times per week, 1-2 beers     Drug use: No     Sexual activity: Yes     Partners: Female     Comment: aydinristephanie with TL   Lifestyle     Physical activity     Days per week: Not on file     Minutes per session: Not on file     Stress: Not on file   Relationships     Social connections     Talks on phone: Not on file     Gets together: Not on file     Attends  Christian service: Not on file     Active member of club or organization: Not on file     Attends meetings of clubs or organizations: Not on file     Relationship status: Not on file     Intimate partner violence     Fear of current or ex partner: Not on file     Emotionally abused: Not on file     Physically abused: Not on file     Forced sexual activity: Not on file   Other Topics Concern     Parent/sibling w/ CABG, MI or angioplasty before 65F 55M? Not Asked   Social History Narrative    High school education.    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 [employer]    Working part-time as of 2019    Met wife in 1992    Seldom drinks more than 1 drink every now and then    1 daughter in [facility]       Medications:  Current Outpatient Medications   Medication Sig Dispense Refill     albuterol (PROAIR HFA/PROVENTIL HFA/VENTOLIN HFA) 108 (90 Base) MCG/ACT inhaler Inhale 2 puffs into the lungs every 6 hours as needed for shortness of breath / dyspnea or wheezing 6.7 g 3     benzonatate (TESSALON) 100 MG capsule Take 1 capsule (100 mg) by mouth 3 times daily as needed for cough 10 capsule 1     bisacodyl (DULCOLAX) 5 MG EC tablet Take 5 mg by mouth daily as needed for constipation       cetirizine (ZYRTEC ALLERGY) 10 MG tablet Take 10 mg by mouth daily       donepezil (ARICEPT) 5 MG tablet Take 1 tablet (5 mg) by mouth At Bedtime 90 tablet 3     fentaNYL (DURAGESIC) 12 mcg/hr 72 hr patch Place 1 patch onto the skin every 72 hours 10 patch 0     fluticasone (FLONASE) 50 MCG/ACT nasal spray Spray 1-2 sprays into both nostrils daily (Patient not taking: Reported on 8/4/2020) 48 g 3     gabapentin (NEURONTIN) 300 MG capsule Take 1 capsule (300 mg) by mouth 3 times daily 90 capsule 3     levothyroxine (SYNTHROID/LEVOTHROID) 112 MCG tablet Take 112 mcg by mouth daily       LORazepam  (ATIVAN) 0.5 MG tablet Take 1 tablet (0.5 mg) by mouth every 4 hours as needed (Anxiety, Nausea/Vomiting or Sleep) 30 tablet 3     MULTI VITAMIN MENS OR 1 TABLET DAILY       naloxone (NARCAN) 4 MG/0.1ML nasal spray Spray 1 spray (4 mg) into one nostril alternating nostrils as needed for opioid reversal every 2-3 minutes until assistance arrives (Patient not taking: Reported on 2020) 0.2 mL 1     ondansetron (ZOFRAN) 8 MG tablet Take 1 tablet (8 mg) by mouth every 8 hours as needed (Nausea/Vomiting) (Patient not taking: Reported on 2020) 10 tablet 3     oxyCODONE (ROXICODONE) 5 MG tablet Take 1 tablet (5 mg) by mouth every 4 hours as needed for severe pain 120 tablet 0     prochlorperazine (COMPAZINE) 10 MG tablet Take 1 tablet (10 mg) by mouth every 6 hours as needed (Nausea/Vomiting) 30 tablet 3     sertraline (ZOLOFT) 50 MG tablet Take 1 tablet (50 mg) by mouth daily 90 tablet 3     triamcinolone (KENALOG) 0.1 % external cream APPLY  CREAM EXTERNALLY TO AFFECTED AREA THREE TIMES DAILY AS NEEDED SPARINGLY 80 g 1       Family History:  Family History   Problem Relation Age of Onset     Colon Cancer Mother          age 65, in      Family History Negative Father         mild memory problems,  age 87, in      Family History Negative Brother      Family History Negative Brother      Cancer Brother         hx prostate ca     Sleep Apnea Brother        ROS: Complete 12 point ROS negative unless mentioned in HPI    Physical Exam:  There were no vitals taken for this visit.    Patient only speaks a few words over her phone call today, I did not hear any coughing.    Labs and Radiology:    Labs from 2020 personally reviewed.  Notable for hyponatremia to 127, hypoalbuminemia to 2.4, and low total protein at 6.3; other chemistries are normal.  This is stable compared to .  CBC is notable for mild anemia to 10.2.  His hemoglobin has trended down slowly since the end of  July.    PET scan obtained on July 20, 2020 personally reviewed.  Notable for moderate apical emphysema.  He is status post left upper lobe segmentectomy.  There is a 3.4 x 3.6 cm mass in the remaining left upper lobe.  He has multiple pulmonary nodules bilaterally which are concerning for metastases.  He has a small amount of bibasilar groundglass opacity as well.  His heart appears enlarged.    PFT's:  Most Recent [facility] Pulmonary Function Testing    FVC-Pred   Date Value Ref Range Status   07/28/2020 4.11 L      FVC-Pre   Date Value Ref Range Status   07/28/2020 2.47 L      FVC-%Pred-Pre   Date Value Ref Range Status   07/28/2020 59 %      FEV1-Pre   Date Value Ref Range Status   07/28/2020 1.76 L      FEV1-%Pred-Pre   Date Value Ref Range Status   07/28/2020 56 %      FEV1FVC-Pred   Date Value Ref Range Status   07/28/2020 76 %      FEV1FVC-Pre   Date Value Ref Range Status   07/28/2020 71 %      No results found for: 20029  FEFMax-Pred   Date Value Ref Range Status   07/28/2020 8.61 L/sec      FEFMax-Pre   Date Value Ref Range Status   07/28/2020 4.47 L/sec      FEFMax-%Pred-Pre   Date Value Ref Range Status   07/28/2020 51 %      ExpTime-Pre   Date Value Ref Range Status   07/28/2020 6.52 sec      FIFMax-Pre   Date Value Ref Range Status   07/28/2020 3.79 L/sec      FEV1FEV6-Pred   Date Value Ref Range Status   07/28/2020 77 %      FEV1FEV6-Pre   Date Value Ref Range Status   07/28/2020 72 %      No results found for: 20055  PFTs obtained 7/28/20 personally reviewed.  Notable for moderate restriction and moderate reduction in diffusing capacity.       Again, thank you for allowing me to participate in the care of your patient.        Sincerely,        Ana Cristina Richard MD

## 2020-08-07 NOTE — PROGRESS NOTES
Infusion Nursing Note:  Agapito Fairbanks presents today for IVF.    Patient seen by provider today: No   present during visit today: Not Applicable.    Note: N/A.    Intravenous Access:  Peripheral IV placed.    Treatment Conditions:  Not Applicable.      Post Infusion Assessment:  Patient tolerated infusion without incident.  Site patent and intact, free from redness, edema or discomfort.  No evidence of extravasations.  Access discontinued per protocol.       Discharge Plan:   AVS to patient via MYCHART.  Patient will return 8/9 for next appointment.   Patient discharged in stable condition accompanied by: self.  Departure Mode: Wheelchair.    Stef Dumont RN

## 2020-08-07 NOTE — PATIENT INSTRUCTIONS
Dear Mr. Fairbanks,    It was a pleasure to speak with you and your wife today.  Here is a summary of my recommendations:     1. Start using the Stiolto inhaler daily for at least the next 2-4 weeks in addition to the albuterol.   2. Start using the albuterol nebs- can experiment with this to see if it's more effective than the inhaler.  Can use these up to 4 times daily.   3. Continue using the tessalon pearls  4. ECHO test- ultrasound to evaluate the heart function.  To be scheduled at your convenience  5. Blood test to look at whether or not the heart is under stress.  Whenever you have another lab draw.     We will plan to touch base in 3 months, sooner if you have any other issues or questions that com up.     Take Care,    Dr. Richard

## 2020-08-08 NOTE — PROGRESS NOTES
Dear Dr. Love,     Medicare Home Health regulations requires [facility] to provide an initial assessment visit either within 48 hours of the patient's return home, or on the physician ordered Start of Care date.    There will be a delay in the Initial Assessment for Agapito GENI Fairbanks; MRN [id number]    Sincerely [facility]  Lynnette Clark  [phone]

## 2020-08-09 NOTE — PROGRESS NOTES
Infusion Nursing Note:  Agapito Fairbanks presents today for IVF.    Patient seen by provider today: No   present during visit today: Not Applicable.    Note: N/A.    Intravenous Access:  Peripheral IV placed.    Treatment Conditions:  Not Applicable.      Post Infusion Assessment:  Patient tolerated infusion without incident.  Site patent and intact, free from redness, edema or discomfort.  No evidence of extravasations.  Access discontinued per protocol.       Discharge Plan:   Discharge instructions reviewed with: Patient.  Patient and/or family verbalized understanding of discharge instructions and all questions answered.  Copy of AVS reviewed with patient and/or family.  Patient will return as scheduled for next appointment.  Patient discharged in stable condition accompanied by: self.  Departure Mode: Ambulatory.    Amanda Vora RN

## 2020-08-11 NOTE — PROGRESS NOTES
Infusion Nursing Note:  Agapito Fairbanks presents today for IVF, labs.    Patient seen by provider today: No   present during visit today: Not Applicable.    Note: pt c/o severe neck pain today. Took pain meds just before leaving home. Applied warm pack and warm blankets. RNCC here to help pt with paperwork for handicapped parking.     Intravenous Access:  Peripheral IV placed.    Treatment Conditions:  Not Applicable.      Post Infusion Assessment:  Patient tolerated infusion without incident.  Site patent and intact, free from redness, edema or discomfort.  No evidence of extravasations.  Access discontinued per protocol.       Discharge Plan:   Discharge instructions reviewed with: Patient.  Patient and/or family verbalized understanding of discharge instructions and all questions answered.  Copy of AVS reviewed with patient and/or family.  Patient will return as scheduled for next appointment.  Patient discharged in stable condition accompanied by: self.  Departure Mode: Ambulatory with walker.    Amanda Vora RN

## 2020-08-14 NOTE — TELEPHONE ENCOUNTER
RNMargarita with  home care calling for verbal orders.     SN: next week to start. Once a week for 4 weeks and 5 PRN visits  Education and assessment for chemo side effects, medication education, nutrition, pain and skin assessments.     Speech eval/treat once in the next 10 days.     Verbal okay was given for these orders.     No further follow up needed.

## 2020-08-16 NOTE — PROGRESS NOTES
Infusion Nursing Note:  Agapito Fairbanks presents today for IVFs.    Patient seen by provider today: No   present during visit today: Not Applicable.    Note: Pt c/o neck pain today- reports he is taking po oxycodone.  Heat pack given for neck pain    Intravenous Access:  Peripheral IV placed.    Treatment Conditions:  Not Applicable.      Post Infusion Assessment:  Patient tolerated infusion without incident.  Blood return noted pre and post infusion.  Site patent and intact, free from redness, edema or discomfort.  No evidence of extravasations.  Access discontinued per protocol.       Discharge Plan:   Patient declined prescription refills.  Discharge instructions reviewed with: Patient.  Patient and/or family verbalized understanding of discharge instructions and all questions answered.  Copy of AVS reviewed with patient and/or family.  Patient will return 8/18/2020 for next appointment.  Patient discharged in stable condition accompanied by: self.  Departure Mode: Ambulatory with walker.    Rosita Ku RN

## 2020-08-18 NOTE — PROGRESS NOTES
Writer received a call  From patient's wife, Millicent, regarding his increased pain to his back/and shoulder area and a mass that is growing on the left pectoral area. Millicent notes the mass to be tennis ball size. She also states patient has trouble with speech and swallowing. We discussed having speech evaluate and assess and a

## 2020-08-18 NOTE — TELEPHONE ENCOUNTER
Noted per med review software:   Severity Level: 2-Severe Interaction donepeziL interacts with Zofran (ondansetron HCl)    Please respond with any med changes that are needed, or with an ok to continue current meds.    Thank you,    Celine Nieto MA, CCC-SLP  Speech-Language Pathologist  [facility]  [email or web address]  [phone]

## 2020-08-18 NOTE — TELEPHONE ENCOUNTER
Drug to Drug interaction involves  Possible QT interval prolongation Zofran/Odansetron recently started and being prescribed by Oncologist (Dr Mullins) who is managing pt's lung cancer therapy and nausea related to cancer treatment  Will route this to Dr Mullins  to see if he wishes to try different anti-nausea med that may not have drug/drug interaction. If he feels pt needs Ondansetron, then would recommend pt hold Donepizil while using Odansetron. Will await Dr Mullins's response back to me

## 2020-08-18 NOTE — PROGRESS NOTES
Infusion Nursing Note:  Agapito Fairbanks presents today for Labs/IVF.    Patient seen by provider today: No   present during visit today: Not Applicable.    Note: N/A.    Intravenous Access:  Labs drawn without difficulty.  Peripheral IV placed.    Treatment Conditions:  Not Applicable.      Post Infusion Assessment:  Patient tolerated infusion without incident.  Blood return noted pre and post infusion.  Site patent and intact, free from redness, edema or discomfort.  No evidence of extravasations.  Access discontinued per protocol.       Discharge Plan:   Discharge instructions reviewed with: Patient.  Patient and/or family verbalized understanding of discharge instructions and all questions answered.  AVS to patient via [employer].  Patient will return 8/19/20 for next appointment.   Patient discharged in stable condition accompanied by: self.  Departure Mode: Ambulatory.    Dayday Yanez RN

## 2020-08-19 NOTE — PATIENT INSTRUCTIONS
Your On-body Neulasta Injector was applied to your right arm at 3:20pm.  At approximately 6:20pm on 8/20/20, your On-body Injector will beep to let you know your dose delivery will begin in 2 minutes.  Your medication will be delivered over the next 45 minutes.  You can remove your Injector at 7:10pm.  Please make sure your Injector has a solid green light or has turned off prior to removing the device.  Please contact your provider at [phone] with questions or concerns.

## 2020-08-19 NOTE — PROGRESS NOTES
Visit Date:   08/19/2020     ONCOLOGY HISTORY: Mr. 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  High PD-L1 expression.  No EGFR mutation.  ALK and ROS1 could not be done because of insufficient sample.     1.  In 2005, he was evaluated by ENT for hoarseness.   -On 02/22/2005, he had laryngoscopy. There was right anterior true vocal cord lesion with extension to the anterior commissure on the right. 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   -On 03/18/2005, he had endoscopic laser assisted vertical hemilaryngectomy right. Pathology from vocal cord revealed squamous cell carcinoma, moderately differentiated, invasive  -Unfortunately, he had recurrence. 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 Pathology revealed invasive moderately differentiated squamous cell carcinoma.   -Patient received concurrent chemoradiation. He received 6000 cGy completed on 06/21/2006. He received cisplatin with radiation.     2.  He had a PET scan done on 10/24/2009.  It revealed enlarging spiculated nodule in posterior left upper lobe with borderline hypermetabolic activity.   -He had left upper lobe segmentectomy and mediastinal lymph node dissection on 12/10/2009.  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  No lymphovascular present.  Margins negative.  Lymph nodes were all benign.     3.  The patient developed a lesion in his back.  He was seen by a dermatologist.  Biopsy on 01/31/2020 revealed squamous cell carcinoma. He subsequently underwent a Mohs surgical procedure on 02/28/2020.  Lesion measured 2.7 cm.  The patient was recommended postoperative radiation.     4. PET scan on 04/02/2020 reveals a hypermetabolic 6.4 cm mass in left upper lobe and mildly enlarged hypermetabolic single mediastinal lymph node.  There is hypermetabolic lesion in L4 vertebral  body and left second rib.  There is hypermetabolic nodule in left parotid gland.   -MRI of the thoracic and lumbar spine on 04/06/2020 revealed destructive metastatic lesions involving L4 vertebral body.  There was also multiple other osseous metastatic disease.     5.  CT-guided biopsy of left lung mass on 04/13/2020 reveals moderately differentiated squamous cell carcinoma.    -TPS score of 60%. High PD-L1 expression.   -NEGATIVE for BRAF, EGFR, ERBB2, IDH1, IDH2, KRAS, MET, NRAS, RET     -ALK and ROS1 could not be done because of insufficient sample.     6.  Radiation to lumbar spine. 3000 cGy between 04/07/2020 and 04/20/2020.  -Radiation to left parotid between 04/09/2020 and 04/22/2020. 3000 cGy.     7. Pembrolizumab x 4 between 04/29/2020 and 07/24/2020.  -Carboplatin and Taxol added on 07/29/2020.       SUBJECTIVE:  Mr. 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  He is on carboplatin, Taxol and pembrolizumab.  He is here for cycle 2.      The patient has some memory issues.  He is more forgetful.  His wife was with him.      The patient lately has been more fatigued.  His appetite is not good.  He has lost weight.  No headache.  Some lightheadedness.  No sore throat.  No neck pain.  The patient at times gets pain in the chest, especially left side.  The patient feels a lump in the left anterior chest.  No abdominal pain. Some nausea.  No recurrent vomiting.  He has some constipation.  No bleeding.  No fever or chills.  As per the wife, some days the patient is more fatigued and on others he is less.      He does have some cough and shortness of breath.  He is using inhalers.  He is waiting for nebulizers.  He has seen a pulmonologist.      PHYSICAL EXAMINATION:   GENERAL:  He is alert.  He was oriented x 3.   FACE:  No swelling.  No facial droop.    NECK:  Supple. No tenderness. No lymphadenopathy.   LUNGS:  Good air entry bilaterally.  No wheezing.   HEART:   Regular.   GI: Abdomen is soft and non-tender. No mass.   EXTREMITIES:  No edema.   SKIN:  No petechia.  CHEST:  No area of tenderness. No skin rash.  In the left chest just below the clavicle, there is an area of fullness.  No hard mass felt.      LABORATORY DATA:  Reviewed.      ASSESSMENT:   1.  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   2.  Fatigue.   3.  Pain from malignancy.   4.  Hyponatremia.   5.  Mild leukocytosis.   6.  Mild thrombocytosis.   7.  Decreased appetite and weight loss secondary to malignancy.      PLAN:   1.  The patient has multiple different symptoms.  It could be due to progression of malignancy.      I explained to the patient that we will get the scans.  He is agreeable for it.  CT chest, abdomen and pelvis and MRI brain will be scheduled.      2.  Today we will give him a second cycle of carboplatin, Taxol and pembrolizumab.  Side effects were reviewed.  Labs were all good for treatment.      3.  The patient wanted refill on some of the medications, which was given.      4.  I will see him in about 3 weeks' time with scans.  Wife advised to call us with any questions or concerns.         JENNIFER CEDENO MD             D: 2020   T: 2020   MT: SAM      Name:     CHRISTI MANN   MRN:      [id number]        Account:      [id number]   :      1950           Visit Date:   2020      Document: [id number]

## 2020-08-19 NOTE — LETTER
8/19/2020         RE: Agapito Fairbanks  [address]  [place] [address]        Dear Colleague,    Thank you for referring your patient, Agapito Fairbanks, to the [facility]. Please see a copy of my visit note below.    Oncology Rooming Note    August 19, 2020 9:01 AM   Agapito Fairbanks is a 70 year old male who presents for:    Chief Complaint   Patient presents with     Oncology Clinic Visit     Initial Vitals: BP (!) 151/93   Pulse 99   Temp 98.1  F (36.7  C) (Oral)   Resp 18   Ht 1.829 m (6')   Wt 62.9 kg (138 lb 9.6 oz)   SpO2 (!) 83%   BMI 18.80 kg/m   Estimated body mass index is 18.8 kg/m  as calculated from the following:    Height as of this encounter: 1.829 m (6').    Weight as of this encounter: 62.9 kg (138 lb 9.6 oz). Body surface area is 1.79 meters squared.  Severe Pain (7) Comment: Data Unavailable   No LMP for male patient.  Allergies reviewed: Yes  Medications reviewed: Yes    Medications: MEDICATION REFILLS NEEDED TODAY. Provider was notified.  Pharmacy name entered into [employer]: [id number] - [place], [place] - [address]    Clinical concerns:  doctor was notified.     REFILLS ON TESSALON, ATIVAN. SOUNDS VERY WET, COUGHING UP PHLEGM      Araceli Steve [facility]              Visit Date:   08/19/2020     ONCOLOGY HISTORY: Mr. 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  High PD-L1 expression.  No EGFR mutation.  ALK and ROS1 could not be done because of insufficient sample.     1.  In 2005, he was evaluated by ENT for hoarseness.   -On 02/22/2005, he had laryngoscopy. There was right anterior true vocal cord lesion with extension to the anterior commissure on the right. 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   -On 03/18/2005, he had endoscopic laser assisted vertical hemilaryngectomy right. Pathology from vocal cord revealed squamous cell carcinoma, moderately  differentiated, invasive  -Unfortunately, he had recurrence. 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 Pathology revealed invasive moderately differentiated squamous cell carcinoma.   -Patient received concurrent chemoradiation. He received 6000 cGy completed on 06/21/2006. He received cisplatin with radiation.     2.  He had a PET scan done on 10/24/2009.  It revealed enlarging spiculated nodule in posterior left upper lobe with borderline hypermetabolic activity.   -He had left upper lobe segmentectomy and mediastinal lymph node dissection on 12/10/2009.  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  No lymphovascular present.  Margins negative.  Lymph nodes were all benign.     3.  The patient developed a lesion in his back.  He was seen by a dermatologist.  Biopsy on 01/31/2020 revealed squamous cell carcinoma. He subsequently underwent a Mohs surgical procedure on 02/28/2020.  Lesion measured 2.7 cm.  The patient was recommended postoperative radiation.     4. PET scan on 04/02/2020 reveals a hypermetabolic 6.4 cm mass in left upper lobe and mildly enlarged hypermetabolic single mediastinal lymph node.  There is hypermetabolic lesion in L4 vertebral body and left second rib.  There is hypermetabolic nodule in left parotid gland.   -MRI of the thoracic and lumbar spine on 04/06/2020 revealed destructive metastatic lesions involving L4 vertebral body.  There was also multiple other osseous metastatic disease.     5.  CT-guided biopsy of left lung mass on 04/13/2020 reveals moderately differentiated squamous cell carcinoma.    -TPS score of 60%. High PD-L1 expression.   -NEGATIVE for BRAF, EGFR, ERBB2, IDH1, IDH2, KRAS, MET, NRAS, RET     -ALK and ROS1 could not be done because of insufficient sample.     6.  Radiation to lumbar spine. 3000 cGy between 04/07/2020 and 04/20/2020.  -Radiation to left parotid  between 04/09/2020 and 04/22/2020. 3000 cGy.     7. Pembrolizumab x 4 between 04/29/2020 and 07/24/2020.  -Carboplatin and Taxol added on 07/29/2020.       SUBJECTIVE:  Mr. 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  He is on carboplatin, Taxol and pembrolizumab.  He is here for cycle 2.      The patient has some memory issues.  He is more forgetful.  His wife was with him.      The patient lately has been more fatigued.  His appetite is not good.  He has lost weight.  No headache.  Some lightheadedness.  No sore throat.  No neck pain.  The patient at times gets pain in the chest, especially left side.  The patient feels a lump in the left anterior chest.  No abdominal pain. Some nausea.  No recurrent vomiting.  He has some constipation.  No bleeding.  No fever or chills.  As per the wife, some days the patient is more fatigued and on others he is less.      He does have some cough and shortness of breath.  He is using inhalers.  He is waiting for nebulizers.  He has seen a pulmonologist.      PHYSICAL EXAMINATION:   GENERAL:  He is alert.  He was oriented x 3.   FACE:  No swelling.  No facial droop.    NECK:  Supple. No tenderness. No lymphadenopathy.   LUNGS:  Good air entry bilaterally.  No wheezing.   HEART:  Regular.   GI: Abdomen is soft and non-tender. No mass.   EXTREMITIES:  No edema.   SKIN:  No petechia.  CHEST:  No area of tenderness. No skin rash.  In the left chest just below the clavicle, there is an area of fullness.  No hard mass felt.      LABORATORY DATA:  Reviewed.      ASSESSMENT:   1.  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   2.  Fatigue.   3.  Pain from malignancy.   4.  Hyponatremia.   5.  Mild leukocytosis.   6.  Mild thrombocytosis.   7.  Decreased appetite and weight loss secondary to malignancy.      PLAN:   1.  The patient has multiple different symptoms.  It could be due to progression of malignancy.       I explained to the patient that we will get the scans.  He is agreeable for it.  CT chest, abdomen and pelvis and MRI brain will be scheduled.      2.  Today we will give him a second cycle of carboplatin, Taxol and pembrolizumab.  Side effects were reviewed.  Labs were all good for treatment.      3.  The patient wanted refill on some of the medications, which was given.      4.  I will see him in about 3 weeks' time with scans.  Wife advised to call us with any questions or concerns.         JENNIFER CEDENO MD             D: 2020   T: 2020   MT:       Name:     CHRISTI MANN   MRN:      [id number]        Account:      [id number]   :      1950           Visit Date:   2020      Document: [id number]      Visit Date:   2020     ONCOLOGY HISTORY: Mr. 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  High PD-L1 expression.  No EGFR mutation.  ALK and ROS1 could not be done because of insufficient sample.     1.  In , he was evaluated by ENT for hoarseness.   -On 2005, he had laryngoscopy. There was right anterior true vocal cord lesion with extension to the anterior commissure on the right. 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   -On 2005, he had endoscopic laser assisted vertical hemilaryngectomy right. Pathology from vocal cord revealed squamous cell carcinoma, moderately differentiated, invasive  -Unfortunately, he had recurrence. 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 Pathology revealed invasive moderately differentiated squamous cell carcinoma.   -Patient received concurrent chemoradiation. He received 6000 cGy completed on 2006. He received cisplatin with radiation.     2.  He had a PET scan done on 10/24/2009.  It revealed enlarging spiculated nodule in posterior left upper lobe with borderline hypermetabolic  activity.   -He had left upper lobe segmentectomy and mediastinal lymph node dissection on 12/10/2009.  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  No lymphovascular present.  Margins negative.  Lymph nodes were all benign.     3.  The patient developed a lesion in his back.  He was seen by a dermatologist.  Biopsy on 01/31/2020 revealed squamous cell carcinoma. He subsequently underwent a Mohs surgical procedure on 02/28/2020.  Lesion measured 2.7 cm.  The patient was recommended postoperative radiation.     4. PET scan on 04/02/2020 reveals a hypermetabolic 6.4 cm mass in left upper lobe and mildly enlarged hypermetabolic single mediastinal lymph node.  There is hypermetabolic lesion in L4 vertebral body and left second rib.  There is hypermetabolic nodule in left parotid gland.   -MRI of the thoracic and lumbar spine on 04/06/2020 revealed destructive metastatic lesions involving L4 vertebral body.  There was also multiple other osseous metastatic disease.     5.  CT-guided biopsy of left lung mass on 04/13/2020 reveals moderately differentiated squamous cell carcinoma.    -TPS score of 60%. High PD-L1 expression.   -NEGATIVE for BRAF, EGFR, ERBB2, IDH1, IDH2, KRAS, MET, NRAS, RET     -ALK and ROS1 could not be done because of insufficient sample.     6.  Radiation to lumbar spine. 3000 cGy between 04/07/2020 and 04/20/2020.  -Radiation to left parotid between 04/09/2020 and 04/22/2020. 3000 cGy.     7. Pembrolizumab x 4 between 04/29/2020 and 07/24/2020.  -Carboplatin and Taxol added on 07/29/2020.       SUBJECTIVE:  Mr. 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  He is on carboplatin, Taxol and pembrolizumab.  He is here for cycle 2.      The patient has some memory issues.  He is more forgetful.  His wife was with him.      The patient lately has been more fatigued.  His appetite is not good.  He has lost weight.  No  headache.  Some lightheadedness.  No sore throat.  No neck pain.  The patient at times gets pain in the chest, especially left side.  The patient feels a lump in the left anterior chest.  No abdominal pain. Some nausea.  No recurrent vomiting.  He has some constipation.  No bleeding.  No fever or chills.  As per the wife, some days the patient is more fatigued and on others he is less.      He does have some cough and shortness of breath.  He is using inhalers.  He is waiting for nebulizers.  He has seen a pulmonologist.      PHYSICAL EXAMINATION:   GENERAL:  He is alert.  He was oriented x 3.   FACE:  No swelling.  No facial droop.    NECK:  Supple. No tenderness. No lymphadenopathy.   LUNGS:  Good air entry bilaterally.  No wheezing.   HEART:  Regular.   GI: Abdomen is soft and non-tender. No mass.   EXTREMITIES:  No edema.   SKIN:  No petechia.  CHEST:  No area of tenderness. No skin rash.  In the left chest just below the clavicle, there is an area of fullness.  No hard mass felt.      LABORATORY DATA:  Reviewed.      ASSESSMENT:   1.  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   2.  Fatigue.   3.  Pain from malignancy.   4.  Hyponatremia.   5.  Mild leukocytosis.   6.  Mild thrombocytosis.   7.  Decreased appetite and weight loss secondary to malignancy.      PLAN:   1.  The patient has multiple different symptoms.  It could be due to progression of malignancy.      I explained to the patient that we will get the scans.  He is agreeable for it.  CT chest, abdomen and pelvis and MRI brain will be scheduled.      2.  Today we will give him a second cycle of carboplatin, Taxol and pembrolizumab.  Side effects were reviewed.  Labs were all good for treatment.      3.  The patient wanted refill on some of the medications, which was given.      4.  I will see him in about 3 weeks' time with scans.  Wife advised to call us with any questions or concerns.         JENNIFER CEDENO MD              D: 2020   T: 2020   MT:       Name:     CHRISTI MANN   MRN:      [id number]        Account:      [id number]   :      1950           Visit Date:   2020      Document: [id number]          Again, thank you for allowing me to participate in the care of your patient.        Sincerely,        Wilver Mullins MD

## 2020-08-19 NOTE — PATIENT INSTRUCTIONS
1. Continue chemotherapy.  2. CT scan and brain MRI in 2 weeks.  3. See me with next treatment.    patient in infusion

## 2020-08-19 NOTE — PROGRESS NOTES
Oncology Rooming Note    August 19, 2020 9:01 AM   Agapito Fairbanks is a 70 year old male who presents for:    Chief Complaint   Patient presents with     Oncology Clinic Visit     Initial Vitals: BP (!) 151/93   Pulse 99   Temp 98.1  F (36.7  C) (Oral)   Resp 18   Ht 1.829 m (6')   Wt 62.9 kg (138 lb 9.6 oz)   SpO2 (!) 83%   BMI 18.80 kg/m   Estimated body mass index is 18.8 kg/m  as calculated from the following:    Height as of this encounter: 1.829 m (6').    Weight as of this encounter: 62.9 kg (138 lb 9.6 oz). Body surface area is 1.79 meters squared.  Severe Pain (7) Comment: Data Unavailable   No LMP for male patient.  Allergies reviewed: Yes  Medications reviewed: Yes    Medications: MEDICATION REFILLS NEEDED TODAY. Provider was notified.  Pharmacy name entered into [employer]: [id number] [place], [place] - [address]    Clinical concerns:  doctor was notified.     REFILLS ON TESMIKE ATIVAN. SOUNDS VERY WET, COUGHING UP PHLEGM      Araceli Steve, CMA

## 2020-08-19 NOTE — PROGRESS NOTES
Infusion Nursing Note:  Agapito Fairbanks presents today for C2D1 Keytruda/Taxol/Carbo.    Patient seen by provider today: Yes: Dr. Mullins   present during visit today: Not Applicable.    Note: Pt tolerated tx well today, forgetful, able to go to [place] alone but got lost at times. Wife called for updates.     Intravenous Access:  Peripheral IV placed.    Treatment Conditions:  Lab Results   Component Value Date    HGB 10.8 08/18/2020     Lab Results   Component Value Date    WBC 14.0 08/18/2020      Lab Results   Component Value Date    ANEU 12.1 08/18/2020     Lab Results   Component Value Date     08/18/2020      Lab Results   Component Value Date     08/18/2020                   Lab Results   Component Value Date    POTASSIUM 4.2 08/18/2020           Lab Results   Component Value Date    MAG 2.0 05/02/2020            Lab Results   Component Value Date    CR 0.69 08/18/2020                   Lab Results   Component Value Date    SUSIE 9.0 08/18/2020                Lab Results   Component Value Date    BILITOTAL 0.8 08/18/2020           Lab Results   Component Value Date    ALBUMIN 2.7 08/18/2020                    Lab Results   Component Value Date    ALT 15 08/18/2020           Lab Results   Component Value Date    AST 9 08/18/2020       Results reviewed, labs MET treatment parameters, ok to proceed with treatment.      Post Infusion Assessment:  Patient tolerated infusion without incident.  Blood return noted pre and post infusion.  Site patent and intact, free from redness, edema or discomfort.  No evidence of extravasations.  Access discontinued per protocol.     ONPRO  Was placed on patient's: back of right arm.    Was placed at 3:20 PM    ONPRO injector device Lot number: [id number]    Patient education included: what patient can expect after application, what colored lights mean on the device, when to remove device, when and where to call with questions or issues, all patients questions  answered and that Neulasta administration will occur at 6:20pm on 8/20/20.    Patient tolerated administration well.      Discharge Plan:   Discharge instructions reviewed with: Patient.  Patient and/or family verbalized understanding of discharge instructions and all questions answered.  [facility]  Patient will return 8/21/20 for next appointment.   Patient discharged in stable condition accompanied by: self.  Departure Mode: Ambulatory.    Dayday Yanez RN

## 2020-08-20 NOTE — TELEPHONE ENCOUNTER
Pt contacted [facility], this company informed spouse that they do not bill insurance for neb machine but they could go buy one from them.     Could a neb machine RX and diagnosis that needs a neb be faxed over to [facility]?  Main fax is [phone]    Thank you  Dana Adams RN  [phone]  [email or web address]

## 2020-08-21 NOTE — PROGRESS NOTES
Infusion Nursing Note:  Agapito Fairbanks presents today for IVF.    Patient seen by provider today: Yes: Brett   present during visit today: Not Applicable.    Note: N/A.    Intravenous Access:  Peripheral IV placed.    Treatment Conditions:  Not Applicable.      Post Infusion Assessment:  Patient tolerated infusion without incident.  Blood return noted pre and post infusion.  Site patent and intact, free from redness, edema or discomfort.  No evidence of extravasations.  Access discontinued per protocol.       Discharge Plan:   Discharge instructions reviewed with: Patient.  Patient and/or family verbalized understanding of discharge instructions and all questions answered.  Patient discharged in stable condition accompanied by: self, wife .  Departure Mode: Wheelchair.    Ashleigh Pollard RN

## 2020-08-21 NOTE — TELEPHONE ENCOUNTER
Prior Authorization Approval    Authorization Effective Date: 5/23/2020  Authorization Expiration Date: 8/21/2021  Medication: dronabinol 2.5mg  Approved Dose/Quantity: 60 capsules for 30 days  Reference #: Key: [id number] - [place] Case ID: [id number]   Insurance Company: [employer] - Phone [phone] Fax [phone]  Expected CoPay: ?     CoPay Card Available: No    Foundation Assistance Needed: n/a  Which Pharmacy is filling the prescription (Not needed for infusion/clinic administered): [facility] [place]  [place] - [address] [address]  Pharmacy Notified: Yes  Patient Notified: No

## 2020-08-23 NOTE — PROGRESS NOTES
Visit Date:   08/19/2020     ONCOLOGY HISTORY: Mr. 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  High PD-L1 expression.  No EGFR mutation.  ALK and ROS1 could not be done because of insufficient sample.     1.  In 2005, he was evaluated by ENT for hoarseness.   -On 02/22/2005, he had laryngoscopy. There was right anterior true vocal cord lesion with extension to the anterior commissure on the right. 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   -On 03/18/2005, he had endoscopic laser assisted vertical hemilaryngectomy right. Pathology from vocal cord revealed squamous cell carcinoma, moderately differentiated, invasive  -Unfortunately, he had recurrence. 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 Pathology revealed invasive moderately differentiated squamous cell carcinoma.   -Patient received concurrent chemoradiation. He received 6000 cGy completed on 06/21/2006. He received cisplatin with radiation.     2.  He had a PET scan done on 10/24/2009.  It revealed enlarging spiculated nodule in posterior left upper lobe with borderline hypermetabolic activity.   -He had left upper lobe segmentectomy and mediastinal lymph node dissection on 12/10/2009.  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  No lymphovascular present.  Margins negative.  Lymph nodes were all benign.     3.  The patient developed a lesion in his back.  He was seen by a dermatologist.  Biopsy on 01/31/2020 revealed squamous cell carcinoma. He subsequently underwent a Mohs surgical procedure on 02/28/2020.  Lesion measured 2.7 cm.  The patient was recommended postoperative radiation.     4. PET scan on 04/02/2020 reveals a hypermetabolic 6.4 cm mass in left upper lobe and mildly enlarged hypermetabolic single mediastinal lymph node.  There is hypermetabolic lesion in L4 vertebral  body and left second rib.  There is hypermetabolic nodule in left parotid gland.   -MRI of the thoracic and lumbar spine on 04/06/2020 revealed destructive metastatic lesions involving L4 vertebral body.  There was also multiple other osseous metastatic disease.     5.  CT-guided biopsy of left lung mass on 04/13/2020 reveals moderately differentiated squamous cell carcinoma.    -TPS score of 60%. High PD-L1 expression.   -NEGATIVE for BRAF, EGFR, ERBB2, IDH1, IDH2, KRAS, MET, NRAS, RET     -ALK and ROS1 could not be done because of insufficient sample.     6.  Radiation to lumbar spine. 3000 cGy between 04/07/2020 and 04/20/2020.  -Radiation to left parotid between 04/09/2020 and 04/22/2020. 3000 cGy.     7. Pembrolizumab x 4 between 04/29/2020 and 07/24/2020.  -Carboplatin and Taxol added on 07/29/2020.       SUBJECTIVE:  Mr. 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  He is on carboplatin, Taxol and pembrolizumab.  He is here for cycle 2.      The patient has some memory issues.  He is more forgetful.  His wife was with him.      The patient lately has been more fatigued.  His appetite is not good.  He has lost weight.  No headache.  Some lightheadedness.  No sore throat.  No neck pain.  The patient at times gets pain in the chest, especially left side.  The patient feels a lump in the left anterior chest.  No abdominal pain. Some nausea.  No recurrent vomiting.  He has some constipation.  No bleeding.  No fever or chills.  As per the wife, some days the patient is more fatigued and on others he is less.      He does have some cough and shortness of breath.  He is using inhalers.  He is waiting for nebulizers.  He has seen a pulmonologist.      PHYSICAL EXAMINATION:   GENERAL:  He is alert.  He was oriented x 3.   FACE:  No swelling.  No facial droop.    NECK:  Supple. No tenderness. No lymphadenopathy.   LUNGS:  Good air entry bilaterally.  No wheezing.   HEART:   Regular.   GI: Abdomen is soft and non-tender. No mass.   EXTREMITIES:  No edema.   SKIN:  No petechia.  CHEST:  No area of tenderness. No skin rash.  In the left chest just below the clavicle, there is an area of fullness.  No hard mass felt.      LABORATORY DATA:  Reviewed.      ASSESSMENT:   1.  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   2.  Fatigue.   3.  Pain from malignancy.   4.  Hyponatremia.   5.  Mild leukocytosis.   6.  Mild thrombocytosis.   7.  Decreased appetite and weight loss secondary to malignancy.      PLAN:   1.  The patient has multiple different symptoms.  It could be due to progression of malignancy.      I explained to the patient that we will get the scans.  He is agreeable for it.  CT chest, abdomen and pelvis and MRI brain will be scheduled.      2.  Today we will give him a second cycle of carboplatin, Taxol and pembrolizumab.  Side effects were reviewed.  Labs were all good for treatment.      3.  The patient wanted refill on some of the medications, which was given.      4.  I will see him in about 3 weeks' time with scans.  Wife advised to call us with any questions or concerns.         JENNIFER CEDENO MD             D: 2020   T: 2020   MT: SAM      Name:     CHRISTI MANN   MRN:      [id number]        Account:      [id number]   :      1950           Visit Date:   2020      Document: [id number]

## 2020-08-25 NOTE — TELEPHONE ENCOUNTER
Patients wife called as directed by clinic RN to call as patient is agitated especially at night, she is requesting medication to help with agitation and anxiety. She has been giving him Lorazepam and had stopped the prescribed Marinol as she believes this was contributing to his confusion    Millicent describes 5 days of confusion and anxiety and numerous falls with Roque hitting his head once, she states that Roque is not ready for hospice although she feels that he is showing signs of dying.    Millicent states that she feels that Roque's pain is controlled using the  Fentanyl and Oxycodone for breakthrough pain, she has been applying Voltaren gel to his tumor site which has been helping.    I advised Millicent that I would discuss these changes with her Oncology team and we would reach out to him in the morning with a plan to evaluate these changes. I communicated the importance of Roque being evaluated in the ED if his condition worsened, Millicent communicated that at the time of our call he was resting well, that she could manage his symptoms and that she would take him to the ED if his condition worsened.    Discussed Roque with Tressa Alanis RN and Dr Mullins who agree that Roque needs to be evaluated tomorrow in clinic. Millicent was called and she will wait to be contacted tomorrow by the clinic.

## 2020-08-25 NOTE — PROGRESS NOTES
Infusion Nursing Note:  Agapito Fairbanks presents today for IVF  Patient seen by provider today: No   present during visit today: Not Applicable.    Note: Having upper back pain. Did not take any oxycodone before he left for this appt. Discussed with Brett, orders received for tylenol 650 mg. Wife concerned with patient being wakeful at night and pain not controlled very well. Discussed the need to call and talk to palliative care or Dr. Mullins. Phone number given. Wife understood.    Intravenous Access:  Peripheral IV placed.    Treatment Conditions:  Not Applicable.      Post Infusion Assessment:  Patient tolerated infusion without incident.  Site patent and intact, free from redness, edema or discomfort.  No evidence of extravasations.  Access discontinued per protocol.       Discharge Plan:   AVS to patient via MYCHART.  Patient will return as prev chet for next appointment.   Patient discharged in stable condition accompanied by: self.  Departure Mode: Wheelchair.    Stef Dumont RN

## 2020-08-26 PROBLEM — C34.90 METASTATIC LUNG CANCER (METASTASIS FROM LUNG TO OTHER SITE) (H): Status: ACTIVE | Noted: 2020-01-01

## 2020-08-26 NOTE — PROGRESS NOTES
"Agapito Fairbanks is a 70 year old male who is being evaluated via a billable telephone visit.      The patient has been notified of following:     \"This telephone visit will be conducted via a call between you and your physician/provider. We have found that certain health care needs can be provided without the need for a physical exam.  This service lets us provide the care you need with a short phone conversation.  If a prescription is necessary we can send it directly to your pharmacy.  If lab work is needed we can place an order for that and you can then stop by our lab to have the test done at a later time.    Telephone visits are billed at different rates depending on your insurance coverage. During this emergency period, for some insurers they may be billed the same as an in-person visit.  Please reach out to your insurance provider with any questions.    If during the course of the call the physician/provider feels a telephone visit is not appropriate, you will not be charged for this service.\"    Patient has given verbal consent for Telephone visit?  Yes      Phone call duration: 20 minutes    ALEXANDRIA Berg CNP        Oncology/Hematology Visit Note  Aug 26, 2020    Reason for Visit: follow up of confusion, weakness, status post fall, hallucination    Metastatic squamous cell carcinoma of the lung  Patient started pembrolizumab on 04/29/2020  PET scan on July 20, 2020 shows progression of the disease    Patient is currently getting treatment with pembrolizumab carboplatin and Taxol      Interval History:  Spoke to wife   Wife reports patient pt has been experiencing increased in fatigue and weakness.  He fell multiple times last week.  On Friday he fell again and hit his head against cabinet, she noted some bruising in the area.  Denies loss of consciousness, wife reports also that he had some hallucinations and episodes of confusion that she contributes to Marinol that was prescribed for low appetite. " Marinol was discontinued   Wife reports pt has had episodes of anxiety and agitation as well  Also wife reports that his oxygen saturation was 82 %today.  She can hear chest rattling.  He continues to have chronic cough.  Wife Denies fever chills or sweats.      Review of Systems:  14 point ROS of systems including Constitutional, Eyes, Respiratory, Cardiovascular, Gastroenterology, Genitourinary, Integumentary, Muscularskeletal, Psychiatric were all negative except for pertinent positives noted in my HPI.        Physical Examination:  Telephone visit-  Spoke to wife     Laboratory Data:      Assessment and Plan:  This is a 70-year-old male with    Metastatic lung squamous cell carcinoma  Progressed on single  agent Keytruda  Carboplatin and Taxol added on 07/29/2020  -08/19-patient received pembrolizumab carboplatin and paclitaxel cycle 2      Bone metastasis  Continue with Zometa every 4 to 6 weeks    Cough-chronic -  secondary to progression of lung cancer   07/20 PET/CT  scan-no infectious or inflammatory process noted in the report  -Patient is seeing pulmonologist as well  Patient is on home  O2 oxygen  -He had a drop in oxygen saturation down to 82% today   Wife is hearing  chest rattling      Hyponatremia  Baseline sodium 127 -130  He has been getting IV fluids in the clinic 3 times per week      Low appetite  -Continue follow-up with palliative care  -He tried medical marijuana that did not work.  Also developed some confusion with Marinol.  Marinol has been discontinued  He has been getting IV fluids in the clinic      Fatigue/weakness-  Worsening of symptoms  -I am worried now with the new symptoms of progressive fatigue and weakness with altered mental status that he might have sepsis,  Additional possible differential diagnosis include brain metastasis, progression of cancer    recurrent  fall  -08/21-fell and hit head on cabinet.  With his hallucinations agitation I am worried about intracranial  bleeding.      Hallucination/altered mental status/ confusion /agitation   medication can cause it.  However, and I am  also worried about  sepsis, brain metastases, intracranial bleeding from the recent fall    I recommend wife takes pt to the emergency room for further evaluation. Wife verbalized understanding and states she will take him to the emergency room      ALEXANDRIA Berg CNP  [facility]     Chart documentation with Dragon Voice recognition Software. Although reviewed after completion, some words and grammatical errors may remain.

## 2020-08-26 NOTE — LETTER
"    8/26/2020         RE: Agapito Fairbanks  [address]  [place] [id number]        Dear Colleague,    Thank you for referring your patient, Agapito Fairbanks, to the [facility]. Please see a copy of my visit note below.    Agapito Fairbanks is a 70 year old male who is being evaluated via a billable telephone visit.      The patient has been notified of following:     \"This telephone visit will be conducted via a call between you and your physician/provider. We have found that certain health care needs can be provided without the need for a physical exam.  This service lets us provide the care you need with a short phone conversation.  If a prescription is necessary we can send it directly to your pharmacy.  If lab work is needed we can place an order for that and you can then stop by our lab to have the test done at a later time.    Telephone visits are billed at different rates depending on your insurance coverage. During this emergency period, for some insurers they may be billed the same as an in-person visit.  Please reach out to your insurance provider with any questions.    If during the course of the call the physician/provider feels a telephone visit is not appropriate, you will not be charged for this service.\"    Patient has given verbal consent for Telephone visit?  Yes      Phone call duration: 20 minutes    ALEXANDRIA Berg CNP        Oncology/Hematology Visit Note  Aug 26, 2020    Reason for Visit: follow up of confusion, weakness, status post fall, hallucination    Metastatic squamous cell carcinoma of the lung  Patient started pembrolizumab on 04/29/2020  PET scan on July 20, 2020 shows progression of the disease    Patient is currently getting treatment with pembrolizumab carboplatin and Taxol      Interval History:  Spoke to wife   Wife reports patient pt has been experiencing increased in fatigue and weakness.  He fell multiple times last week.  On Friday he fell " again and hit his head against cabinet, she noted some bruising in the area.  Denies loss of consciousness, wife reports also that he had some hallucinations and episodes of confusion that she contributes to Marinol that was prescribed for low appetite. Palliative care was contacted - Marinol was discontinued   Wife reports pt has had episodes of anxiety and agitation as well.  She contacted palliative care they prescribed lorazepam  Also wife reports that his oxygen saturation was 82 %today.  She can hear chest rattling.  He continues to have chronic cough.  Wife Denies fever chills or sweats.      Review of Systems:  14 point ROS of systems including Constitutional, Eyes, Respiratory, Cardiovascular, Gastroenterology, Genitourinary, Integumentary, Muscularskeletal, Psychiatric were all negative except for pertinent positives noted in my HPI.        Physical Examination:  Telephone visit-  Spoke to wife     Laboratory Data:      Assessment and Plan:  This is a 70-year-old male with    Metastatic lung squamous cell carcinoma  Progressed on single  agent Keytruda  Carboplatin and Taxol added on 07/29/2020  -08/19-patient received pembrolizumab carboplatin and paclitaxel cycle 2      Bone metastasis  Continue with Zometa every 4 to 6 weeks    Cough-chronic -  secondary to progression of lung cancer   07/20 PET/CT  scan-no infectious or inflammatory process noted in the report  -Patient is seeing pulmonologist as well  Patient is on home  O2 oxygen  -He had a drop in oxygen saturation down to 82% today   Wife is hearing  chest rattling      Hyponatremia  Baseline sodium 127 -130  Patient is asymptomatic. Signs  symptoms of hyponatremia discussed      Low appetite  -Continue follow-up with palliative care  -He tried medical marijuana that did not work.  Also developed some confusion with Marinol.  Marinol has been discontinued  He has been getting IV fluids in the clinic      Fatigue/weakness-  Worsening of symptoms  -I  am worried now with the new symptoms of progressive fatigue and weakness with altered mental status that he might have sepsis,  Additional possible differential diagnosis include brain metastasis, progression of cancer    recurrent  fall  -08/21-fell and hit head on cabinet.  With his hallucinations agitation I am worried about intracranial bleeding.      Hallucination/altered mental status/ confusion /agitation   medication can cause it.  However, and I am  also worried about  sepsis, brain metastases, intracranial bleeding from the recent fall    I recommend wife takes pt to the emergency room for further evaluation. Wife verbalized understanding and states she will take him to the emergency room      ALEXANDRIA Berg CNP  [facility]     Chart documentation with Dragon Voice recognition Software. Although reviewed after completion, some words and grammatical errors may remain.          Again, thank you for allowing me to participate in the care of your patient.        Sincerely,        ALEXANDRIA Berg CNP

## 2020-08-26 NOTE — ED PROVIDER NOTES
History     Chief Complaint:  Weakness, confusion, fall.    HPI   Agapito Fairbanks is a 70 year old male with stage 4 metastatic lung cancer who had his last chemotherapy 1 week ago who presents with weakness, confusion, hallucinations, falling, and chest pain. His significant other reports that last Friday he was in the bathroom and had a fall and hit his head on the bathroom cabinet and then shortly after experienced syncope on the toilet. The patient has been constipated due to oxycodone and has had one dose in the last 36 hours. The patient reports chest pain and a cough over the last 2 weeks which comes and goes and his significant other reports increased chest rattling sounds today. During physical therapy last week his oxygen level dropped to 82 while standing and 87 while sitting. She spoke with his doctor and he recommended they come in to get the patient examined for possible infection or pneumonia. He denies black or bloody stool, urination problems, hematuria, fever, or any other injuries.     Allergies:  Simvastatin  Lisinopril    Medications:    Albuterol inhaler  Tessalon  Ativan  Roxicodone  Albuterol nebulizer  Levothyroxine  Stiolto respimat  Duragesic  Zofran   Compazine  Gabapentin  Narcan  Aricept  Flonase  Zoloft  Dulcolax  Crestor  Keflex    Past Medical History:    Basal cell carcinoma  Chronic kidney disease, stage III  Hearing loss  Helicobacter pylori  Hyperlipidemia   Hypothyroidism  Laryngeal carcinoma  Lung cancer  Depression  Malignant neoplasm of skin  Testicular hypofunction  Squamous cell carcinoma  Hypertension   Renal detachment  Talipes cavus  Laryngeal cancer  Dysfunction of right eustachian tube  Elevated prostate  Bone metastasis    Past Surgical History:    B retinal surgery  L cataract  Right ankle fracture repair  Right hemilaryngectomy  Moh's procedure for Basal cell cancer chest  Moh's procedure for Basal cell cancer ear lob  Right vocal cord squamous cell cancer  excision  Phacoemulsification of the lens with posterior chamber lens implant (R)  Moh's procedure for basal cell cancer left pretibial area  Left thoracoscopic wedge resection  Thoracic surgery    Family History:    Mother- colon cancer  Brother- prostate cancer, sleep apnea    Social History:  Smoking status: Former, quit 1998  Alcohol use: Yes, 2-3 times a week  Drug use: No  PCP: [place] [facility]  Presents to the ED with his wife  Marital Status:   [2]    Review of Systems   Constitutional: Negative for fever.   Respiratory: Positive for cough.    Cardiovascular: Positive for chest pain.   Gastrointestinal: Positive for constipation. Negative for blood in stool.   Genitourinary: Negative for difficulty urinating and hematuria.   Neurological: Positive for syncope and weakness.   Psychiatric/Behavioral: Positive for confusion and hallucinations.   All other systems reviewed and are negative.    Physical Exam     Patient Vitals for the past 24 hrs:   BP Temp Temp src Pulse Resp SpO2 Height Weight   08/26/20 1800 106/71 -- -- 87 -- 97 % -- --   08/26/20 1730 127/87 -- -- 90 -- 97 % -- --   08/26/20 1700 98/79 -- -- 93 -- 98 % -- --   08/26/20 1645 99/70 -- -- 86 -- 91 % -- --   08/26/20 1630 103/68 -- -- 85 -- 95 % -- --   08/26/20 1600 104/74 -- -- 82 -- 92 % -- --   08/26/20 1518 113/72 98.2  F (36.8  C) Temporal 94 20 94 % 1.829 m (6') 63.5 kg (140 lb)      Physical Exam  SKIN:  Warm, dry.  No jaundice.  No rash.  HEMATOLOGIC/IMMUNOLOGIC/LYMPHATIC:  No pallor.  Scattered bilateral edema of the ankles.  No petechia.  HENT: Painless active range of motion of the head and neck.  Cervical spine nontender.  Upper mid forehead ecchymoses with abrasion.  EYES:  Conjunctivae normal.  Anicteric.  Normal extraocular motion.  Pupils equal round and reactive to light.  Visual fields intact.  CARDIOVASCULAR:  Regular rate and rhythm.  No murmur.  No cardiac rub.  No JVD.  RESPIRATORY:  No respiratory distress, breath  sounds bilaterally coarse.  GASTROINTESTINAL:  Soft, nontender abdomen with active bowel sounds.  No distention.  No palpable mass.  MUSCULOSKELETAL: Thin body habitus.  Painless passive range of motion of the torso and extremities.  NEUROLOGIC:  Alert, conversant.  Oriented to self place and time.  PSYCHIATRIC:  No psychotic features.    Emergency Department Course   ECG (16:09:41):  Rate 84 bpm. MI interval 138. QRS duration 76. QT/QTc 342/404. P-R-T axes 11 38 44. Normal sinus rhythm. Nonspecific T wave abnormality. Abnormality ECG. Interpreted at 1617 by Yakov Moon MD.     Imaging:  Radiographic findings were communicated with the patient who voiced understanding of the findings.  CT Head w/o Contrast  1. No evidence of acute traumatic intracranial abnormality.   2. Mild diffuse parenchymal volume loss and white matter changes   likely due to chronic microvascular ischemic disease.   3. A small focus of abnormal enhancement was identified within the   inferomedial aspect of the left temporal lobe on MRI of April 2020.   This lesion is not definitely visible on the current unenhanced CT,   but remains indeterminate given the patient's history of metastatic   malignancy. Recommend follow-up brain MRI to assess for possible   intracranial metastatic disease.     As read by Radiology.    XR Chest Port 1 View  Minimal enlargement of left upper lobe mass since the   comparison study. A component of infiltrate on the left would be   difficult to exclude. Right lung clear.     As read by Radiology.    CT Chest Pulmonary Embolism w Contrast  1.  No pulmonary embolism demonstrated.   2.  The left upper and lower lobe bronchi on the left secondary to the   mass.   3.  Increased size of the mass and apical metastasis since the   comparison study.     As read by Radiology.     Laboratory:  CBC: WBC 3.9 (L), HGB 9.4 (L) o/w WNL ()  CMP: glucose 121 (H),  (L), chloride 90 (L), creatinine 0.62 (L), albumin  2.7 (L), protein total 6.4 (L) o/w WNL     UA: Acknowledged    Asymptomatic COVID-19 Virus (Coronavirus) by PCR: Pending     Interventions:  1628: Morphine 4 mg IV    Emergency Department Course:  Past medical records, nursing notes, and vitals reviewed.  1523: I performed an exam of the patient and obtained history, as documented above.     1558: IV inserted and blood drawn.     1600: The patient was sent for a chest x-ray while in the emergency department, findings above.     1609: EKG performed, results above.     1625: The patient was sent for a head CT while in the emergency department, findings above.     1655: The patient was sent for a pulmonary embolism chest CT while in the emergency department, findings above.    1751: The patient's nose and throat were swabbed and this sample was sent for COVID testing, findings above.      Findings and plan explained to the Patient who consents to admission.     1830: Discussed the patient with Dr. Alexander, who will admit the patient to an adult med/surg bed for further monitoring, evaluation, and treatment.      Impression & Plan      Medical Decision Making:  The patient presents with increasing weakness with recent chemotherapy 1 week ago. He also suffered a syncopal episode with increasing hypoxia and work of breathing. Given the head trauma CT scan brain performed and unrevealing regarding trauma. He has known metastasis to the brain and there was evidence of that on CT. His chest x-ray was relatively unrevealing in the context of his cancer and symptoms with hypoxia.  I was concerned for pulmonary embolism so a CT chest was performed. This did revealhis neoplastic disease that is worsening compared to previous imaging studies. However, no knew pathology. This tumor has eroded into his chest wall which would explain the palpable mass there.  He is hyponatremic although not extremely low, but that could be leading to his weakness. Saline initiated to correct that. He  has yet to produce a urine sample. The patient will be admitted for further monitoring and treatment.     Covid-19  Agapito Fairbanks was evaluated during a global COVID-19 pandemic, which necessitated consideration that the patient might be at risk for infection with the SARS-CoV-2 virus that causes COVID-19.   Applicable protocols for evaluation were followed during the patient's care.   COVID-19 was considered as part of the patient's evaluation. The plan for testing is:  a test was obtained during this visit.    Diagnosis:    ICD-10-CM   1. Hyponatremia  E87.1   2. Malignant neoplasm of lung, unspecified laterality, unspecified part of lung (H)  C34.90       Disposition:  Admitted to [facility]    Luz Elena Xiong  8/26/2020    EMERGENCY DEPARTMENT    Luz Elena MCCALL, am serving as a scribe at 4:49 PM on 8/26/2020 to document services personally performed by Yakov Moon MD based on my observations and the provider's statements to me.         Yakov Moon MD  08/26/20 2122

## 2020-08-26 NOTE — ED NOTES
[facility]  ED Nurse Handoff Report    ED Chief complaint: Generalized Weakness and Fall      ED Diagnosis:   Final diagnoses:   Hyponatremia   Malignant neoplasm of lung, unspecified laterality, unspecified part of lung (H)       Code Status: Full Code    Allergies:   Allergies   Allergen Reactions     Simvastatin      myalgias     Lisinopril Rash     rash       Patient Story: increase in generalized weakness  Focused Assessment:  Increase in generalized weakness that caused a fall this past weekend.  Patient struck his head, has bruise to top, center of his forehead.  History of lung CA, has moist, coarse breath sounds today.    Treatments and/or interventions provided: fluids, morphine for pain  Patient's response to treatments and/or interventions: decrease in pain, sleeping now    To be done/followed up on inpatient unit:  symptomatic covid    Does this patient have any cognitive concerns?: no    Activity level - Baseline/Home:  Independent  Activity Level - Current:   Stand with Assist    Patient's Preferred language: English   Needed?: No    Isolation: COVID 19 r/o   Infection: Not Applicable  Bariatric?: No    Vital Signs:   Vitals:    08/26/20 1645 08/26/20 1700 08/26/20 1730 08/26/20 1800   BP: 99/70 98/79 127/87 106/71   Pulse: 86 93 90 87   Resp:       Temp:       TempSrc:       SpO2: 91% 98% 97% 97%   Weight:       Height:           Cardiac Rhythm:     Was the PSS-3 completed:   Yes  What interventions are required if any?               Family Comments: wife is here with patient  OBS brochure/video discussed/provided to patient/family: N/A              Name of person given brochure if not patient: n/a                Relationship to patient: n/a    For the majority of the shift this patient's behavior was Green.   Behavioral interventions performed were n/a.    ED NURSE PHONE NUMBER: [phone]

## 2020-08-26 NOTE — PROGRESS NOTES
"Writer received a return call from Millicent, She states patient had a good night of sleep and was relieved he seemed to be returning to baseline. She informed me that the scary nights where he was hallucinating, and fell , were this Friday 8/21, and Saturday 8/22, after taking Marinol, which was the only new medication added to list on 8/21.    Patient did fall and hit his head against a \"moveable\" cabinet on Friday 8/21, but never loss consciousness. A cut, some bruising, and swelling in the area is noted.     Millicent did arrange for a nurse to be with him this morning and PT will be stopping as well. She thought it would be good to still have patient \"checked out\".    I have added her onto [facility] schedule for today at 2:30pm.    Cecile Alanis, RN    "

## 2020-08-26 NOTE — ED TRIAGE NOTES
Pt reports increasing weakness and falls over the past few days. Hit head Friday night. Sent here by oncologist for further eval, hx of lung CA.

## 2020-08-26 NOTE — PROGRESS NOTES
Writer first attempted to contact patient on home line and was not able to leave a message.     Writer LVM on patient's spouse, Millicent's, mobile #, requesting a return call to get an update and see if patient needs to be evaluated this morning.    Cecile Alanis RN

## 2020-08-27 NOTE — PLAN OF CARE
After thorough chart review and discussion with care team, decision made to hold   OT eval while test/re-test pending for COVID-19. Will re-assess status   daily

## 2020-08-27 NOTE — UTILIZATION REVIEW
Admission Status; Secondary Review Determination       Under the authority of the Utilization Management Committee, the utilization review process indicated a secondary review on the above patient. The review outcome is based on review of the medical records, discussions with staff, and applying clinical experience noted on the date of the review.     (x) Inpatient Status Appropriate - This patient's medical care is consistent with medical management for inpatient care and reasonable inpatient medical practice.     RATIONALE FOR DETERMINATION   70 year old male with PMH of metastatic lung cancer on chemotherapy, CKD3, HLD, HTN, who presents with hyponatremia and weakness.Metastatic squamous cell lung cancer-Failure to thrive, deconditioning-Probable severe malnutrition-Pancytopenia, hyponatremia: Sodium levels recent have been low normal from 127 to 130, however this admission it is 124.  Despite intervention this morning sodium decreased down to 122. The expected length of stay at the time of admission was more than 2 nights because of the severity of illness, intensity of service provided, and risk for adverse outcome. Inpatient admission is appropriate.     This document was produced using voice recognition software       The information on this document is developed by the utilization review team in order for the business office to ensure compliance. This only denotes the appropriateness of proper admission status and does not reflect the quality of care rendered.   The definitions of Inpatient Status and Observation Status used in making the determination above are those provided in the CMS Coverage Manual, Chapter 1 and Chapter 6, section 70.4.   Sincerely,   QUIRINO ESTRADA MD   System Medical Director   Utilization Management   [facility].

## 2020-08-27 NOTE — PROGRESS NOTES
[facility]    Hospitalist Progress Note      Assessment & Plan   Agapito Fairbanks is a 70 year old male with PMH of metastatic lung cancer on chemotherapy, CKD3, HLD, HTN, who presents with hyponatremia and weakness and overall failure to thrive after most recent chemotherapy.    Metastatic squamous cell lung cancer  Failure to thrive, deconditioning  severe malnutrition  Pancytopenia  >Patient initially diagnosed in 2015, with recurrence in 2009, now in April 2020 with a large and progressive mass in his left upper lobe. Underwent radiation in 4/2020, now on pembrolizumab, carboplatin, and taxol.  > Last chemotherapy was on 8/19/2020.   >A CT chest shows a [id number] left lung mass that is growing compared to previous imaging, with local invasion and metastases.    PLAN  -HemOnc consult has been placed, they saw patient in clinic yesterday and recommends direct admission, pending further recs today  -Nutrition following recommended adding ensure with meals  - PTA albuterol PRN, Tessalon PRN, bisacodyl PRN for constipation  - Continue PTA gabapentin, fentanyl 12mcg q72hr, oxycodone PRN for pain  -flutter valve to help with loud secretions     Hyponatremia:   >123 on admission with drop to 122 after starting low rate IVF and up back to 124 after hold further IVF likely has component of SIADH  >Urine studies ordered but taken after starting IVF and not likely accurate    PLAN  Continue to follow sodium but space out to q6h. Suspect continue to rise slowly with fluid restriction.     COVID-19 SYMPTOMATIC testing: Patient has a productive cough, dyspnea, weakness, likely related to cancer but given these symptoms he is being tested as if he were symptomatic. He will need COVID precautions. He is low risk and these precautions can be discontinue if he is negative.  - No nebs     Cognitive impairment: Continue PTA donepezil     Hypothyroidism: Continue PTA levothyroxine       Code Status: Full Code. Per  admitting discussed at length and family adamant about full code status.     Disposition: Expected discharge tomorrow  DVT Prophylaxis: Enoxaparin (Lovenox) SQ    Vandana Tripp DO  Text Page (7am - 6pm, M-F)    Interval History   Patient doing better this morning. Had an appetite and ate some lunch without nausea for vomiting. Has cough and rattling in his chest which he tells me is new yesterday.     -Data reviewed today: I reviewed all new labs and imaging results over the last 24 hours.     Physical Exam   Temp: 96.9  F (36.1  C) Temp src: Oral BP: 128/81 Pulse: 79   Resp: 20 SpO2: 97 % O2 Device: Nasal cannula Oxygen Delivery: 2 LPM  Vitals:    08/26/20 1518   Weight: 63.5 kg (140 lb)     Vital Signs with Ranges  Temp:  [96.6  F (35.9  C)-98.2  F (36.8  C)] 96.9  F (36.1  C)  Pulse:  [79-94] 79  Resp:  [14-20] 20  BP: ()/(68-87) 128/81  SpO2:  [91 %-98 %] 97 %  I/O last 3 completed shifts:  In: 300 [P.O.:300]  Out: -     Constitutional: Awake, alert, cooperative, no apparent distress  Respiratory: crackles in R upper lobe, no obvious crackles in SKYLA  Cardiovascular: Regular rate and rhythm, normal S1 and S2, and no murmur noted  GI: Normal bowel sounds, soft, non-distended, non-tender  Skin/Integumen: No rashes, no cyanosis, no edema    Medications       cetirizine  10 mg Oral At Bedtime     donepezil  5 mg Oral At Bedtime     fentaNYL  12 mcg Transdermal Q72H     fentaNYL   Transdermal Q8H     gabapentin  300 mg Oral TID     levothyroxine  125 mcg Oral QAM AC     sertraline  50 mg Oral At Bedtime     sodium chloride (PF)  3 mL Intracatheter Q8H       Data   Recent Labs   Lab 08/27/20  1040 08/27/20  0645 08/27/20  0300  08/26/20  1556   WBC  --  4.6  --   --  3.9*   HGB  --  8.9*  --   --  9.4*   MCV  --  77*  --   --  78   PLT  --  178  --   --  177   * 122* 125*   < > 124*   POTASSIUM  --  4.0  --   --  3.7   CHLORIDE  --  89*  --   --  90*   CO2  --  26  --   --  28   BUN  --  10  --   --  12    CR  --  0.61*  --   --  0.62*   ANIONGAP  --  7  --   --  6   SUSIE  --  8.4*  --   --  8.5   GLC  --  91  --   --  121*   ALBUMIN  --   --   --   --  2.7*   PROTTOTAL  --   --   --   --  6.4*   BILITOTAL  --   --   --   --  0.8   ALKPHOS  --   --   --   --  121   ALT  --   --   --   --  14   AST  --   --   --   --  5    < > = values in this interval not displayed.       Recent Results (from the past 24 hour(s))   XR Chest Port 1 View    Narrative    CHEST ONE VIEW  8/26/2020 4:14 PM     HISTORY: Stage IV lung cancer, metastatic, productive cough.    COMPARISON: May 1, 2020      Impression    IMPRESSION: Minimal enlargement of left upper lobe mass since the  comparison study. A component of infiltrate on the left would be  difficult to exclude. Right lung clear.    AMARI FAROOQ MD   CT Head w/o Contrast    Narrative    CT SCAN OF THE HEAD WITHOUT CONTRAST   8/26/2020 4:25 PM     HISTORY: Fell, head injury, not anticoagulated.    TECHNIQUE:  Axial images of the head and coronal reformations without  IV contrast material. Radiation dose for this scan was reduced using  automated exposure control, adjustment of the mA and/or kV according  to patient size, or iterative reconstruction technique.    COMPARISON: 5/1/2020    FINDINGS: There is no evidence of intracranial hemorrhage, mass, acute  infarct or anomaly. The ventricles are normal in size, shape and  configuration. Mild diffuse parenchymal volume loss. Mild patchy  periventricular white matter hypodensities which are nonspecific, but  likely related to chronic microvascular ischemic disease.     Bilateral scleral buckle procedures and stigmata of previous bilateral  cataract surgery noted. No acute abnormality of the visualized aspects  of the orbits. The paranasal sinuses are free of significant disease  where visualized. The mastoid and middle ear cavities are clear. The  bony calvarium and bones of the skull base appear intact.       Impression    IMPRESSION:      1. No evidence of acute traumatic intracranial abnormality.  2. Mild diffuse parenchymal volume loss and white matter changes  likely due to chronic microvascular ischemic disease.   3. A small focus of abnormal enhancement was identified within the  inferomedial aspect of the left temporal lobe on MRI of April 2020.  This lesion is not definitely visible on the current unenhanced CT,  but remains indeterminate given the patient's history of metastatic  malignancy. Recommend follow-up brain MRI to assess for possible  intracranial metastatic disease.      AMARI SHAW MD   CT Chest Pulmonary Embolism w Contrast    Narrative    CT CHEST PULMONARY EMBOLISM WITH CONTRAST 8/26/2020 5:26 PM    CLINICAL HISTORY: Stage IV metastatic lung cancer, syncopal episode,  hypoxia, dyspnea, question PE.    TECHNIQUE: CT angiogram chest during arterial phase injection IV  contrast. 2D and 3D MIP reconstructions were performed by the CT  technologist. Dose reduction techniques were used.     CONTRAST: 58 mL Isovue-370    COMPARISON: [place] 10, 2020    FINDINGS:  ANGIOGRAM CHEST: Pulmonary arteries are normal caliber and negative  for pulmonary emboli. Thoracic aorta is negative for dissection. No CT  evidence of right heart strain.    LUNGS AND PLEURA: Occlusion of the left upper and left lower lobe  bronchi with mucous plugging extending into the lung. The right  mainstem bronchus is narrowed. Mild patchy infiltrates. No effusions.  Large mass invading the chest wall now measures 14.7 x 11.1 previously  11.9 x 8.8 cm. Pleural metastasis at the apex on the left now measures  3.1 cm previously 2.0 cm, bony invasion and/or erosion of the second  rib on the left noted.    MEDIASTINUM/AXILLAE: The mass invades or arises from the mediastinum  on the left. No effusion.    UPPER ABDOMEN: Normal.    MUSCULOSKELETAL: Chest wall invasion of the mass is seen anteriorly.  Bony erosion is seen in the left second rib.      Impression     IMPRESSION:  1.  No pulmonary embolism demonstrated.  2.  Occluded left upper and lower lobe bronchi on the left secondary  to the mass.  3.  Increased size of the mass and apical metastasis since the  comparison study.    AMARI FAROOQ MD

## 2020-08-27 NOTE — PROGRESS NOTES
08/27/20 1350   Quick Adds   Type of Visit Initial PT Evaluation   Living Environment   Lives With spouse   Living Arrangements condominium   Home Accessibility no concerns   Transportation Anticipated family or friend will provide   Living Environment Comment Patient lives in a condo with his wife. There is elevator access. Patient reports 1x/week house cleaning services otherwise him & his spouse are independent with household tasks.   Self-Care   Usual Activity Tolerance good   Current Activity Tolerance moderate   Equipment Currently Used at Home   (Owns a walker)   Activity/Exercise/Self-Care Comment At baseline patient is IND with functional mobility & ADLs.    Functional Level Prior   Ambulation 0-->independent   Transferring 0-->independent   Fall history within last six months yes   Number of times patient has fallen within last six months 5   Which of the above functional risks had a recent onset or change? ambulation;transferring;fall history   General Information   General Observations Greeted patient supine in bed with HOB elevated.   Cognitive Status Examination   Orientation person;place  (believes it is 2000 year)   Memory   (Cannot remember what caused recent falls.)   Cognitive Comment Patient ackowledges having some memory issues lately.   Pain Assessment   Patient Currently in Pain No   Integumentary/Edema   Integumentary/Edema no deficits were identifed   Posture    Posture Forward head position;Protracted shoulders;Kyphosis   Range of Motion (ROM)   ROM Comment B LE WFL   Strength   Strength Comments B LE WFL   Bed Mobility   Bed Mobility Comments supine <> EOB at SBA   Transfer Skills   Transfer Comments sit <> stand with FWW at [facility]   Gait   Gait Comments 10ft with FWW at SBA   Balance   Balance Comments unsteady with ambulation during trial without assistive device - Ligia required during LOB when turning   Sensory Examination   Sensory Perception Comments denies numbness.tingling in LE  "  General Therapy Interventions   Planned Therapy Interventions balance training;gait training;strengthening;transfer training;home program guidelines;progressive activity/exercise;neuromuscular re-education   Clinical Impression   Criteria for Skilled Therapeutic Intervention yes, treatment indicated   PT Diagnosis impaired gait   Influenced by the following impairments impaired balance   Functional limitations due to impairments transfers, ambulation   Clinical Presentation Stable/Uncomplicated   Clinical Presentation Rationale PMH, current presentation, clinical judgement   Clinical Decision Making (Complexity) Low complexity   Therapy Frequency 5x/week   Predicted Duration of Therapy Intervention (days/wks) 4 days   Anticipated Discharge Disposition Home with Assist;Home with Home Therapy   Risk & Benefits of therapy have been explained Yes   Patient, Family & other staff in agreement with plan of care Yes   [facility] [employer] \"6 Clicks\"   2016, Trustees of [facility], under license to [employer].  All rights reserved.   6 Clicks Short Forms Basic Mobility Inpatient Short Form   [facility] [employer]  \"6 Clicks\" V.2 Basic Mobility Inpatient Short Form   1. Turning from your back to your side while in a flat bed without using bedrails? 4 - None   2. Moving from lying on your back to sitting on the side of a flat bed without using bedrails? 3 - A Little   3. Moving to and from a bed to a chair (including a wheelchair)? 3 - A Little   4. Standing up from a chair using your arms (e.g., wheelchair, or bedside chair)? 3 - A Little   5. To walk in hospital room? 3 - A Little   6. Climbing 3-5 steps with a railing? 3 - A Little   Basic Mobility Raw Score (Score out of 24.Lower scores equate to lower levels of function) 19   Total Evaluation Time   Total Evaluation Time (Minutes) 10     "

## 2020-08-27 NOTE — PHARMACY-ADMISSION MEDICATION HISTORY
Pharmacy Medication History  Admission medication history interview status for the 8/26/2020  admission is complete. See EPIC admission navigator for prior to admission medications     Medication history sources: Patient's family/friend (spouse) and S[employer]s  Medication history source reliability: Good  Adherence assessment: Good    Significant changes made to the medication list:      Additional medication history information:       Medication reconciliation completed by provider prior to medication history? No    Time spent in this activity: 15 min      Prior to Admission medications    Medication Sig Last Dose Taking? Auth Provider   albuterol (PROAIR HFA/PROVENTIL HFA/VENTOLIN HFA) 108 (90 Base) MCG/ACT inhaler Inhale 2 puffs into the lungs every 6 hours as needed for shortness of breath / dyspnea or wheezing prn Yes Wilver Mullins MD   albuterol (PROVENTIL) (2.5 MG/3ML) 0.083% neb solution Take 1 vial (2.5 mg) by nebulization every 6 hours as needed for shortness of breath / dyspnea or wheezing prn Yes Ana Cristina Richard MD   benzonatate (TESSALON) 100 MG capsule Take 1 capsule (100 mg) by mouth 3 times daily as needed for cough 8/25/2020 at Unknown time Yes Wilver Mullins MD   bisacodyl (DULCOLAX) 5 MG EC tablet Take 5 mg by mouth daily as needed for constipation prn Yes Unknown, Entered By History   cetirizine (ZYRTEC ALLERGY) 10 MG tablet Take 10 mg by mouth At Bedtime  8/25/2020 at Unknown time Yes Unknown, Entered By History   diclofenac (VOLTAREN) 1 % topical gel Place onto the skin daily as needed for moderate pain prn Yes Unknown, Entered By History   donepezil (ARICEPT) 5 MG tablet Take 1 tablet (5 mg) by mouth At Bedtime 8/25/2020 at Unknown time Yes Los Love MD   fentaNYL (DURAGESIC) 12 mcg/hr 72 hr patch Place 1 patch onto the skin every 72 hours 8/25/2020 at 2000 Yes Brett Manrique APRN CNP   fluticasone (FLONASE) 50 MCG/ACT nasal spray Spray 1 spray into both nostrils daily as  needed for rhinitis or allergies prn Yes Unknown, Entered By History   gabapentin (NEURONTIN) 300 MG capsule Take 1 capsule (300 mg) by mouth 3 times daily 8/26/2020 at 1200 Yes Marlene Hernandez MD   levothyroxine (SYNTHROID/LEVOTHROID) 125 MCG tablet Take 1 tablet (125 mcg) by mouth daily 8/26/2020 at Unknown time Yes Los Love MD   LORazepam (ATIVAN) 0.5 MG tablet Take 1 tablet (0.5 mg) by mouth every 4 hours as needed (Anxiety, Nausea/Vomiting or Sleep) 8/25/2020 at Unknown time Yes Wilver Mullins MD   ondansetron (ZOFRAN) 8 MG tablet Take 1 tablet (8 mg) by mouth every 8 hours as needed (Nausea/Vomiting) prn Yes Wilver Mullins MD   oxyCODONE (ROXICODONE) 5 MG tablet Take 1 tablet (5 mg) by mouth every 4 hours as needed for severe pain prn Yes Wilver Mullins MD   prochlorperazine (COMPAZINE) 10 MG tablet Take 1 tablet (10 mg) by mouth every 6 hours as needed (Nausea/Vomiting) prn Yes Wilver Mullins MD   sertraline (ZOLOFT) 50 MG tablet Take 1 tablet (50 mg) by mouth daily  Patient taking differently: Take 50 mg by mouth At Bedtime  8/25/2020 at hs Yes Los Love MD   MULTI VITAMIN MENS OR 1 TABLET DAILY More than a month at Unknown time  Reported, Patient   naloxone (NARCAN) 4 MG/0.1ML nasal spray Spray 1 spray (4 mg) into one nostril alternating nostrils as needed for opioid reversal every 2-3 minutes until assistance arrives  Patient not taking: Reported on 8/4/2020   Marlene Hernandez MD   tiotropium-olodaterol (STIOLTO RESPIMAT) 2.5-2.5 MCG/ACT AERS Inhale 2 puffs into the lungs daily  Patient not taking: Reported on 8/19/2020 Never picked up  Ana Cristina Richard MD

## 2020-08-27 NOTE — DISCHARGE SUMMARY
[facility]    Discharge Summary  Hospitalist    Date of Admission:  8/26/2020  Date of Discharge:  8/28/2020  Discharging Provider: Vandana Tripp DO  Date of Service (when I saw the patient): 08/28/20    Discharge Diagnoses   Severe Malnutrition  Failure to trhive  Hyponetremia    History of Present Illness   Agapito Fairbanks is a 70 year old male with PMH of metastatic lung cancer on chemotherapy, chronic hypoxic respiratory failure on 3L CKD3, HLD, HTN, who presented with hyponatremia, weakness, nausea and overall failure to thrive after most recent chemotherapy. He had been seen in oncology clinic earlier in the day prior to admission who recommended he present to the ED. In the ED, vitals were stable. Labs found new hyponatremia at 124 down from 127. He was started on low dose IVF and admitted for symptom control. After admission he demonstrated complete resolution in his presenting symptoms. After discussion with oncology it sounds like patient may have been taking too many pain medications and smoking too much marijuana. He was educated on the negative side effects of this lifestyle and he agreed to limit its use. Also on admission radiation oncology was consulted due to his worsening hilar mass and he was started on radiation therapy. He will discharge with second radiation treatment tomorrow morning.    For his hyponatremia patient will have close lab follow up with his PCP in 1 week. It is likely due to his lung cancer and component of SIADH. He initially decreased from 124 to 122 with low dose IVF which was discontinued and he elevated back to 124 which was stable last 24 hours prior to discharge. Encourage fluid restriction.    Vandana Tripp DO    Significant Results and Procedures   CT chest  IMPRESSION:   1.  No pulmonary embolism demonstrated.   2.  Occluded left upper and lower lobe bronchi on the left secondary   to the mass.   3.  Increased size of the mass and apical  metastasis since the   comparison study.     Unresulted Labs Ordered in the Past 30 Days of this Admission     No orders found for last 31 day(s).          Code Status   Full Code       Primary Care Physician   Los Love    Constitutional: Awake, alert, cooperative, no apparent distress  Respiratory: crackles in R upper lobe, no obvious crackles in SKYLA  Cardiovascular: Regular rate and rhythm, normal S1 and S2, and no murmur noted  GI: Normal bowel sounds, soft, non-distended, non-tender  Skin/Integumen: No rashes, no cyanosis, no edema    Discharge Disposition   Discharged to home  Condition at discharge: Fair    Consultations This Hospital Stay   PHYSICAL THERAPY ADULT IP CONSULT  OCCUPATIONAL THERAPY ADULT IP CONSULT  NUTRITION SERVICES ADULT IP CONSULT  HEMATOLOGY & ONCOLOGY IP CONSULT    Time Spent on this Encounter   IVandana DO, personally saw the patient today and spent greater than 30 minutes discharging this patient.    Discharge Orders   No discharge procedures on file.  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)      albuterol (PROVENTIL) (2.5 MG/3ML) 0.083% neb solution Take 1 vial (2.5 mg) by nebulization every 6 hours as needed for shortness of breath / dyspnea or wheezing  Qty: 120 vial, Refills: 3    Associated Diagnoses: Other emphysema (H)      benzonatate (TESSALON) 100 MG capsule Take 1 capsule (100 mg) by mouth 3 times daily as needed for cough  Qty: 30 capsule, Refills: 1    Associated Diagnoses:  Malignant neoplasm of upper lobe of left lung (H)      bisacodyl (DULCOLAX) 5 MG EC tablet Take 5 mg by mouth daily as needed for constipation      cetirizine (ZYRTEC ALLERGY) 10 MG tablet Take 10 mg by mouth At Bedtime       diclofenac (VOLTAREN) 1 % topical gel Place onto the skin daily as needed for moderate pain      donepezil (ARICEPT) 5 MG tablet Take 1 tablet (5 mg) by mouth At Bedtime  Qty: 90 tablet, Refills: 3    Associated Diagnoses: Memory loss; Cognitive impairment      fentaNYL (DURAGESIC) 12 mcg/hr 72 hr patch Place 1 patch onto the skin every 72 hours  Qty: 10 patch, Refills: 0    Associated Diagnoses: Cancer associated pain      fluticasone (FLONASE) 50 MCG/ACT nasal spray Spray 1 spray into both nostrils daily as needed for rhinitis or allergies      gabapentin (NEURONTIN) 300 MG capsule Take 1 capsule (300 mg) by mouth 3 times daily  Qty: 90 capsule, Refills: 3    Associated Diagnoses: Cancer associated pain      levothyroxine (SYNTHROID/LEVOTHROID) 125 MCG tablet Take 1 tablet (125 mcg) by mouth daily  Qty: 90 tablet, Refills: 3    Associated Diagnoses: Hypothyroidism, unspecified type      LORazepam (ATIVAN) 0.5 MG tablet Take 1 tablet (0.5 mg) by mouth every 4 hours as needed (Anxiety, Nausea/Vomiting or Sleep)  Qty: 60 tablet, Refills: 3    Associated Diagnoses: 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)      ondansetron (ZOFRAN) 8 MG tablet Take 1 tablet (8 mg) by mouth every 8 hours as needed (Nausea/Vomiting)  Qty: 10 tablet, Refills: 3    Associated Diagnoses: 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)      oxyCODONE (ROXICODONE) 5 MG tablet Take 1 tablet (5 mg) by mouth every 4 hours as needed for severe pain  Qty: 120 tablet, Refills: 0    Associated Diagnoses: 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); Cancer associated pain; Chronic pain due to neoplasm      prochlorperazine (COMPAZINE) 10 MG tablet Take 1 tablet (10 mg) by mouth every 6 hours as needed  (Nausea/Vomiting)  Qty: 30 tablet, Refills: 3    Associated Diagnoses: 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)      sertraline (ZOLOFT) 50 MG tablet Take 1 tablet (50 mg) by mouth daily  Qty: 90 tablet, Refills: 3    Comments: Stop RFs of the 25mg tab  Associated Diagnoses: Mild major depression (H)      MULTI VITAMIN MENS OR 1 TABLET DAILY      naloxone (NARCAN) 4 MG/0.1ML nasal spray Spray 1 spray (4 mg) into one nostril alternating nostrils as needed for opioid reversal every 2-3 minutes until assistance arrives  Qty: 0.2 mL, Refills: 1    Associated Diagnoses: Cancer associated pain      tiotropium-olodaterol (STIOLTO RESPIMAT) 2.5-2.5 MCG/ACT AERS Inhale 2 puffs into the lungs daily  Qty: 1 Inhaler, Refills: 11    Associated Diagnoses: Other emphysema (H)           Allergies   Allergies   Allergen Reactions     Simvastatin      myalgias     Lisinopril Rash     rash     Data   Most Recent 3 CBC's:  Recent Labs   Lab Test 08/28/20  0749 08/27/20  0645 08/26/20  1556   WBC 8.4 4.6 3.9*   HGB 8.3* 8.9* 9.4*   MCV 77* 77* 78    178 177      Most Recent 3 BMP's:  Recent Labs   Lab Test 08/28/20  0749 08/27/20  1741 08/27/20  1040 08/27/20  0645  08/26/20  1556   * 124* 124* 122*   < > 124*   POTASSIUM 4.1  --   --  4.0  --  3.7   CHLORIDE 89*  --   --  89*  --  90*   CO2 27  --   --  26  --  28   BUN 9  --   --  10  --  12   CR 0.64*  --   --  0.61*  --  0.62*   ANIONGAP 7  --   --  7  --  6   SUSIE 8.0*  --   --  8.4*  --  8.5   *  --   --  91  --  121*    < > = values in this interval not displayed.     Most Recent 2 LFT's:  Recent Labs   Lab Test 08/26/20  1556 08/18/20  1200   AST 5 9   ALT 14 15   ALKPHOS 121 123   BILITOTAL 0.8 0.8     Most Recent INR's and Anticoagulation Dosing History:  Anticoagulation Dose History     Recent Dosing and Labs Latest Ref Rng & Units 12/10/2009 5/10/2019 4/13/2020    INR 0.86 - 1.14 1.02 0.98 0.99        Most Recent 3 Troponin's:  Recent Labs   Lab  Test 04/02/16  2254   TROPI <0.015  The 99th percentile for upper reference range is 0.045 ug/L.  Troponin values in   the range of 0.045 - 0.120 ug/L may be associated with risks of adverse   clinical events.       Most Recent Cholesterol Panel:  Recent Labs   Lab Test 01/22/20  0852   CHOL 153   LDL 72   HDL 56   TRIG 123     Most Recent 6 Bacteria Isolates From Any Culture (See EPIC Reports for Culture Details):  Recent Labs   Lab Test 07/27/20  1400 05/01/20  1806 05/01/20  1803 05/01/20  1755   CULT No growth No growth No growth No growth     Most Recent TSH, T4 and A1c Labs:  Recent Labs   Lab Test 08/18/20  1200  06/15/15  1052   TSH 3.66  3.66   < >  --    T4 1.38   < >  --    A1C  --   --  5.7    < > = values in this interval not displayed.

## 2020-08-27 NOTE — PLAN OF CARE
PT orders received & acknowledged. Chart reviewed. Eval completed & treatment initiated.     Patient lives in a condo with his wife. There is elevator access. Patient reports 1x/week house cleaning services otherwise him & his spouse are independent with household tasks. At baseline patient is IND with functional mobility & ADLs. Wife informs RN that patient with history of dementia.     Discharge Planner PT   Patient plan for discharge: Home with spouse   Current status: Greeted patient supine in bed with RN at bedside and agreeable to session. VSS on RA at rest. During session, patient sats drop to 87% on RA with activity but recover to 90% with rest & cues for breathing. Engaged patient in 2 reps of supine <> EOB at SBA with cues for positioning to HOB to avoid feet hanging over end of bed. Engaged patient in multiple sit <> stand with FWW at CGA. Engaged patient in ambulation with FWW at SBA for a total distance of 75ft, progressed to ambulation without assistive device at CGA with patient demonstrating increased unsteadiness & LOB requiring Ligia when doing turns. Patient requires seated rest after 50ft of ambulation without assistive device. Educated patient on walking program during hospital stay for 3 walks/day with Ax1 from nursing staff. Per request, engaged patient in use of bathroom - with patient independent with cares and requiring CGA for standing balance. Engaged patient in standing balance exercises at CGA-Ligia. Discussed discharge recommendation. Concluded session with patient supine in bed, all needs in reach, alarm engaged and RN notified.   Barriers to return to prior living situation: medical status, impaired balance, fall risk   Recommendations for discharge: Home with Ax1 all out of bed mobility & use of FWW; HH-PT  Rationale for recommendations: Patient demonstrates necessary level of functional mobility with use of FWW & Ax1 in order to return to home environment. Patient is recommended to  continue with skilled HH-PT in order to improve LE strength, balance and activity tolerance in order to return to baseline mobility.  Pt appropriate for Home PT as they require AD, assistive person, and taxing effort to leave the home.         Entered by: Cheli Carey 08/27/2020 2:26 PM

## 2020-08-27 NOTE — PROGRESS NOTES
MD Notification    Notified Person: MD    Notified Person Name: dr Tripp    Notification Date/Time: 8/27/2020 1400       Notification Interaction: web base paging    Purpose of Notification: covid negative. Remove isolation.     Orders Received:    Comments:

## 2020-08-27 NOTE — PROGRESS NOTES
RECEIVING UNIT ED HANDOFF REVIEW    ED Nurse Handoff Report was reviewed by: Car Alvarenga RN on August 26, 2020 at 8:37 PM

## 2020-08-27 NOTE — CONSULTS
"CLINICAL NUTRITION SERVICES  -  ASSESSMENT NOTE      Recommendations Ordered by Registered Dietitian (RD):   Add Vanilla Boost Plus Shakes BID btw meals   Malnutrition:   % Weight Loss:  > 7.5% in 3 months (severe malnutrition)  % Intake:  </= 50% for >/= 1 month (severe malnutrition)  Subcutaneous Fat Loss:  Suspect  Muscle Loss:  Suspect   Fluid Retention:  None noted    Malnutrition Diagnosis: Severe malnutrition  In Context of:  Acute illness or injury  Chronic illness or disease        REASON FOR ASSESSMENT  Agapito Fairbanks is a 70 year old male seen by Registered Dietitian for Admission Nutrition Risk Screen for reduced oral intake over the last month and Provider Order - Malnutrition      NUTRITION HISTORY  - Information obtained from patient and Epic records.  - Patient is on a Regular diet at home.  - Typical food/fluid intake is fair.  - Diet history:    - Note out-patient RD spoke with patient's wife on 7/23/20 regarding poor oral intake.  At that time, wife reported that pt was not eating well, at most ~ 800 kcals when choosing to eat.  RD offered verbal support.  - Per Provider note this admit, patient has been able to drink  < 1 Liter fluid per day so has been getting IV infusions 3x/week.  - Today patient tells me he eats \"anything\" but his appetite is very poor.  He tries to eat 2-3 small meals per day.   - Supplements:  2 per day (Ensure or Boost type patient thinks)   - No food allergies/intolerances.  Pt is weak and deconditioned, having recent frequent falls.      CURRENT NUTRITION ORDERS  Diet Order:     Regular     Current Intake/Tolerance:  Patient hasn't ordered anything from room service yet.      NUTRITION FOCUSED PHYSICAL ASSESSMENT FOR DIAGNOSING MALNUTRITION)  No:  Unable to visualize patient due to COVID-19 distancing precautions.          Observed:    N/a    Obtained from Chart/Interdisciplinary Team:  Thin, cachectic   Failure to thrive, deconditioning   Probable severe " "malnutrition    ANTHROPOMETRICS  Height: 6' 0\"  Weight:  63.5 kg (140 lbs 0 oz)  Body mass index is 18.99 kg/m .  Weight Status:  Underweight BMI <18.5 (close)  IBW:  80.9 kg  % IBW:  78%  Weight History:  Weight loss 35 lbs (20%) over the past 3-4 months    Wt Readings from Last 10 Encounters:   08/26/20 63.5 kg (140 lb)   08/19/20 62.9 kg (138 lb 9.6 oz)   07/29/20 68 kg (150 lb)   07/24/20 67.6 kg (149 lb)   07/21/20 67.2 kg (148 lb 3.2 oz)   06/30/20 71.4 kg (157 lb 6.4 oz)   06/18/20 70.3 kg (155 lb)   06/09/20 73.4 kg (161 lb 12.8 oz)   05/19/20 77.9 kg (171 lb 12.8 oz)   05/07/20 79.7 kg (175 lb 9.6 oz)       LABS  Labs reviewed  Na 122 (L)    MEDICATIONS  Medications reviewed      ASSESSED NUTRITION NEEDS PER APPROVED PRACTICE GUIDELINES:    Dosing Weight:  63.5 kg   Estimated Energy Needs: 1776-3506 kcals (35-40 Kcal/Kg)  Justification: repletion and underweight  Estimated Protein Needs:  grams protein (1.5-1.8 g pro/Kg)  Justification: Repletion and hypercatabolism with acute illness  Estimated Fluid Needs:  1030-5165 mL (1 mL/Kcal)  Justification: maintenance    MALNUTRITION:  % Weight Loss:  > 7.5% in 3 months (severe malnutrition)  % Intake:  </= 50% for >/= 1 month (severe malnutrition)  Subcutaneous Fat Loss:  Suspect  Muscle Loss:  Suspect   Fluid Retention:  None noted    Malnutrition Diagnosis: Severe malnutrition  In Context of:  Acute illness or injury  Chronic illness or disease    NUTRITION DIAGNOSIS:  Inadequate oral intake related to decreased appetite and early satiety as evidenced by decreased oral intake over the past several months with resultant 20% weight loss and cachectic appearance per Provider notes.      NUTRITION INTERVENTIONS  Recommendations / Nutrition Prescription  Add Vanilla Boost Plus Shakes BID btw meals      Implementation  Nutrition education: Provided education on the use of supplements to optimize intake.  Discussed strategy of small, frequent feedings.  Medical " Food Supplement - Ordered supplements in Epic as above.      Nutrition Goals  Pt will consume > 50% meals and > 75% supplements in 3-5 days.      MONITORING AND EVALUATION:  Progress towards goals will be monitored and evaluated per protocol and Practice Guidelines    Margarita Walls RD, LD, CNSC

## 2020-08-27 NOTE — PROGRESS NOTES
Patient is COVID-19 Negative. MD notified of status. Received order from Provider to discontinue Special Precautions and transfer patient to medical floor. Charge RN and patient/family updated, and Special Precautions door sign turned over.

## 2020-08-27 NOTE — PROGRESS NOTES
Writer received a call from patient's wife that she is concerned about a few things verbalizes that are to do with patent being admitted on a COVID19 R/O unit, and lack of communication.    Millicent states that she would like to have patient's throat swabbed for fungal infection due to voice, and is wondering if the voice changes could also be related to the tumor compressing against his vocal cords.     Writer will have Dr. Mullins advise and return call to Millicent at , [phone].    Cecile Alanis RN

## 2020-08-27 NOTE — PROGRESS NOTES
[facility]  Patient is currently open to home care services with [facility]. The patient is currently receiving Skilled Nursing, PT and OT services. [facility]  and team have been notified of patient admission. [facility] liaison will continue to follow patient during stay. If appropriate provide orders to resume home care at time of discharge.

## 2020-08-27 NOTE — PLAN OF CARE
Pt here with Hypoxia, wkns, AMS. Hx lung cancer last chemo 8/19. A&Ox3-4, dis to situation at times, forgetful. VSS on RA. JORDY LS d/t PAPR. Regular diet, no fluid restriction. Na+ low q4h checks 123, 125. Up with SBA, W+GB, pt has fallen multiple times in the past week according to pt's wife. C/o chronic pain, was avoiding oxy if possible d/t constipation but given x1, Fentanyl patch in place. Covid result pending. Urine sample to be collected.

## 2020-08-27 NOTE — H&P
[facility]    History and Physical  Hospitalist       Date of Admission:  8/26/2020    Assessment & Plan   Agapito Fairbanks is a 70 year old male with PMH of metastatic lung cancer on chemotherapy, CKD3, HLD, HTN, who presents with hyponatremia and weakness.    Metastatic squamous cell lung cancer  Failure to thrive, deconditioning  Probable severe malnutrition  Pancytopenia  Patient initially diagnosed in 2015, with recurrence in 2009, ad now in April 2020 with a large and progressive mass in his left upper lobe. Underwent radiation in 4/2020, now on pembrolizumab, carboplatin, and taxol. Last chemotherapy was on 8/19/2020. Patient did not tolerate his most recent chemotherapy with increasing weakness, confusion, hallucinations (thought to be due to marinol), falls, a syncopal episode last week, and a productive cough. He is not hypoxic here, but he his lung exam shows decreased breath sounds and right sided rattling. A CT chest shows a [id number] left lung mass that is growing compared to previous imaging, with local invasion and metastases.   Overall, he does not appear to have an infection, it seems that his symptoms are primarily related to his cancer and chemotherapy. During my code discussion, the family was insistent on continuing full code for the patient. I think that more goals of care discussions will be needed for this patient especially given his declining functional status.  - HemOnc consult  - Nutrition consult  - PT/OT  - PTA albuterol PRN, Tessalon PRN, bisacodyl PRN for constipation  - Continue PTA gabapentin, fentanyl 12mcg q72hr, oxycodone PRN for pain  - Will not start antibiotics, but start if patient develops fever    Hyponatremia: Sodium levels recent have been low normal from 127 to 130, however this admission it is 124. He reportedly drinks less than 1L of fluids per day, and receives IVF three times a week at the infusion center. His creatinine is at his baseline of 0.62.  I'm concerned about SIADH here, however he does seem volume down overall so I will start with IVF and trend his sodium.  - NS @ 125ml/hr  - Check sodium q4h. If sodium drops to 122 or below, I would stop IVF and start fluid restriction  - Check urine sodium, urine osm, serum osm    COVID-19 SYMPTOMATIC testing: Patient has a productive cough, dyspnea, weakness, likely related to cancer but given these symptoms he is being tested as if he were symptomatic. He will need COVID precautions. He is low risk and these precautions can be discontinue if he is negative  - No nebs    Cognitive impairment: Continue PTA donepezil    Hypothyroidism: Continue PTA levothyroxine    DVT Prophylaxis: Pneumatic Compression Devices  Code Status: Full Code. This was discussed at length. I discussed with the family that I expected very poor outcomes if he had cardiac or respiratory arrest and that there will likely be significant morbidity and/or permanent disability. The family still wished for Full code.    Disposition: Expected discharge around 2-3 days    Valentin Alexander MD    Primary Care Physician   [facility]    Chief Complaint   Weakness, syncope, confusion    History is obtained from the patient and his wife Millicent    History of Present Illness   Agapito Fairbanks is a 70 year old male who presents with weakness, syncope, confusion. He is on chemotherapy for metastatic lung cancer, last dose was given 7 days ago. After his most recent dose, he has had multiple symptoms. He has been having increasing weakness and problems ambulating. He was hallucinating, which has been traced to his Marinol and since stopped. He has been slightly more confused than before. Last week he also had a syncopal episode in the bathroom and hit his head. He has had a mild productive cough with yellow phlegm and occasional chest pain. He has had both diarrhea and constipation. He has lost over 40lbs in the past several months. He has been hypoxic  at times at home, but he is usually not on oxygen. His chest has been rattling when he is breathing. He denies any fevers. PO intake remains very poor. He drinks less than 1L of fluids a day, he gets IVF three times a week in the infusion center. He does not have any sick contacts.    Past Medical History    I have reviewed this patient's medical history and updated it with pertinent information if needed.   Past Medical History:   Diagnosis Date     Acute gastritis with hemorrhage 11/02     Basal cell carcinoma, leg      left pretibial area     CKD (chronic kidney disease) stage 3, GFR 30-59 ml/min (H)     creat baseline approx 1.4     Hearing loss      Helicobacter pylori (H. 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  Past Surgical History:   Procedure Laterality Date     C NONSPECIFIC PROCEDURE  5/01, 9/01    B retinal surg.     C NONSPECIFIC PROCEDURE  12/01    L cataract     C NONSPECIFIC PROCEDURE  1983    right ankle fx repair     C NONSPECIFIC PROCEDURE  3/05    Right hemilaryngectomy (laser) for Squamous Cell CA T1N0     C NONSPECIFIC  PROCEDURE  3/05, 10/09    [facility] [place]     C NONSPECIFIC PROCEDURE      Moh's procedure for Basal Cell CA ear lobe     C NONSPECIFIC PROCEDURE  3/05,     right vocal cord squamous cell CA excision     C NONSPECIFIC PROCEDURE      Phacoemulsification of the lens with posterior chamber lens implant, right eye.      C NONSPECIFIC PROCEDURE  10/09     [facility] left pretibial area     C NONSPECIFIC PROCEDURE      Left thoracoscopic wedge resection, Open completion left upper lobe segmentectomy      THORACIC SURGERY      lung,throat       Prior to Admission Medications   Prior to Admission Medications   Prescriptions Last Dose Informant Patient Reported? Taking?   LORazepam (ATIVAN) 0.5 MG tablet   No No   Sig: Take 1 tablet (0.5 mg) by mouth every 4 hours as needed (Anxiety, Nausea/Vomiting or Sleep)   MULTI VITAMIN MENS OR  Spouse/Significant Other Yes No   Si TABLET DAILY   albuterol (PROAIR HFA/PROVENTIL HFA/VENTOLIN HFA) 108 (90 Base) MCG/ACT inhaler   No No   Sig: Inhale 2 puffs into the lungs every 6 hours as needed for shortness of breath / dyspnea or wheezing   albuterol (PROVENTIL) (2.5 MG/3ML) 0.083% neb solution   No No   Sig: Take 1 vial (2.5 mg) by nebulization every 6 hours as needed for shortness of breath / dyspnea or wheezing   benzonatate (TESSALON) 100 MG capsule   No No   Sig: Take 1 capsule (100 mg) by mouth 3 times daily as needed for cough   bisacodyl (DULCOLAX) 5 MG EC tablet  Spouse/Significant Other Yes No   Sig: Take 5 mg by mouth daily as needed for constipation   cetirizine (ZYRTEC ALLERGY) 10 MG tablet  Spouse/Significant Other Yes No   Sig: Take 10 mg by mouth daily   donepezil (ARICEPT) 5 MG tablet  Spouse/Significant Other No No   Sig: Take 1 tablet (5 mg) by mouth At Bedtime   fentaNYL (DURAGESIC) 12 mcg/hr 72 hr patch   No No   Sig: Place 1 patch onto the skin every 72 hours   gabapentin (NEURONTIN) 300 MG capsule   No No    Sig: Take 1 capsule (300 mg) by mouth 3 times daily   levothyroxine (SYNTHROID/LEVOTHROID) 125 MCG tablet   No No   Sig: Take 1 tablet (125 mcg) by mouth daily   naloxone (NARCAN) 4 MG/0.1ML nasal spray   No No   Sig: Spray 1 spray (4 mg) into one nostril alternating nostrils as needed for opioid reversal every 2-3 minutes until assistance arrives   Patient not taking: Reported on 2020   ondansetron (ZOFRAN) 8 MG tablet   No No   Sig: Take 1 tablet (8 mg) by mouth every 8 hours as needed (Nausea/Vomiting)   oxyCODONE (ROXICODONE) 5 MG tablet   No No   Sig: Take 1 tablet (5 mg) by mouth every 4 hours as needed for severe pain   prochlorperazine (COMPAZINE) 10 MG tablet   No No   Sig: Take 1 tablet (10 mg) by mouth every 6 hours as needed (Nausea/Vomiting)   sertraline (ZOLOFT) 50 MG tablet  Spouse/Significant Other No No   Sig: Take 1 tablet (50 mg) by mouth daily   tiotropium-olodaterol (STIOLTO RESPIMAT) 2.5-2.5 MCG/ACT AERS   No No   Sig: Inhale 2 puffs into the lungs daily   Patient not taking: Reported on 2020   triamcinolone (KENALOG) 0.1 % external cream  Spouse/Significant Other No No   Sig: APPLY  CREAM EXTERNALLY TO AFFECTED AREA THREE TIMES DAILY AS NEEDED SPARINGLY      Facility-Administered Medications: None     Allergies   Allergies   Allergen Reactions     Simvastatin      myalgias     Lisinopril Rash     rash       Social History   I have reviewed this patient's social history and updated it with pertinent information if needed. Agapito GENI Fairbanks  reports that he quit smoking about 22 years ago. He has a 45.00 pack-year smoking history. He has never used smokeless tobacco. He reports current alcohol use. He reports that he does not use drugs.    Family History   I have reviewed this patient's family history and updated it with pertinent information if needed.   Family History   Problem Relation Age of Onset     Colon Cancer Mother          age 65, in      Family History Negative  Father         mild memory problems,  age 87, in      Family History Negative Brother      Family History Negative Brother      Cancer Brother         hx prostate ca     Sleep Apnea Brother        Review of Systems   The 10 point Review of Systems is negative other than noted in the HPI or here.    Physical Exam   Temp: 98.2  F (36.8  C) Temp src: Temporal BP: 106/71 Pulse: 87   Resp: 20 SpO2: 97 % O2 Device: None (Room air)    Vital Signs with Ranges  Temp:  [98.2  F (36.8  C)] 98.2  F (36.8  C)  Pulse:  [82-94] 87  Resp:  [20] 20  BP: ()/(68-87) 106/71  SpO2:  [91 %-98 %] 97 %  140 lbs 0 oz    Constitutional: Thin, cachectic male apears chronically ill  Eyes: PERRL, nonicteric  HEENT: Normocephalic, noted bruise over forehead, voice is hoarse  Respiratory: Decreased breath sounds on the left, right lung very ronchorous, almost rattling with each breath  Cardiovascular: Difficult to hear heart sounds due to loud chest sounds, appears RRR  GI: No organomegaly, normoactive bowel sounds, nontender  Vascular: No lower extremity pitting edema  Skin: No rashes  Musculoskeletal: Moves all extremities  Neurologic: A&Ox3  Psychiatric: Appropriate affect and mood    Data   Data reviewed today:  I personally reviewed CT imaging.  Recent Labs   Lab 20  1556   WBC 3.9*   HGB 9.4*   MCV 78      *   POTASSIUM 3.7   CHLORIDE 90*   CO2 28   BUN 12   CR 0.62*   ANIONGAP 6   SUSIE 8.5   *   ALBUMIN 2.7*   PROTTOTAL 6.4*   BILITOTAL 0.8   ALKPHOS 121   ALT 14   AST 5       Imaging:  Recent Results (from the past 24 hour(s))   XR Chest Port 1 View    Narrative    CHEST ONE VIEW  2020 4:14 PM     HISTORY: Stage IV lung cancer, metastatic, productive cough.    COMPARISON: May 1, 2020      Impression    IMPRESSION: Minimal enlargement of left upper lobe mass since the  comparison study. A component of infiltrate on the left would be  difficult to exclude. Right lung clear.    AMARI FAROOQ MD    CT Head w/o Contrast    Narrative    CT SCAN OF THE HEAD WITHOUT CONTRAST   8/26/2020 4:25 PM     HISTORY: Fell, head injury, not anticoagulated.    TECHNIQUE:  Axial images of the head and coronal reformations without  IV contrast material. Radiation dose for this scan was reduced using  automated exposure control, adjustment of the mA and/or kV according  to patient size, or iterative reconstruction technique.    COMPARISON: 5/1/2020    FINDINGS: There is no evidence of intracranial hemorrhage, mass, acute  infarct or anomaly. The ventricles are normal in size, shape and  configuration. Mild diffuse parenchymal volume loss. Mild patchy  periventricular white matter hypodensities which are nonspecific, but  likely related to chronic microvascular ischemic disease.     Bilateral scleral buckle procedures and stigmata of previous bilateral  cataract surgery noted. No acute abnormality of the visualized aspects  of the orbits. The paranasal sinuses are free of significant disease  where visualized. The mastoid and middle ear cavities are clear. The  bony calvarium and bones of the skull base appear intact.       Impression    IMPRESSION:     1. No evidence of acute traumatic intracranial abnormality.  2. Mild diffuse parenchymal volume loss and white matter changes  likely due to chronic microvascular ischemic disease.   3. A small focus of abnormal enhancement was identified within the  inferomedial aspect of the left temporal lobe on MRI of April 2020.  This lesion is not definitely visible on the current unenhanced CT,  but remains indeterminate given the patient's history of metastatic  malignancy. Recommend follow-up brain MRI to assess for possible  intracranial metastatic disease.     CT Chest Pulmonary Embolism w Contrast    Narrative    CT CHEST PULMONARY EMBOLISM WITH CONTRAST 8/26/2020 5:26 PM    CLINICAL HISTORY: Stage IV metastatic lung cancer, syncopal episode,  hypoxia, dyspnea, question  PE.    TECHNIQUE: CT angiogram chest during arterial phase injection IV  contrast. 2D and 3D MIP reconstructions were performed by the CT  technologist. Dose reduction techniques were used.     CONTRAST: 58 mL Isovue-370    COMPARISON: [place] 10, 2020    FINDINGS:  ANGIOGRAM CHEST: Pulmonary arteries are normal caliber and negative  for pulmonary emboli. Thoracic aorta is negative for dissection. No CT  evidence of right heart strain.    LUNGS AND PLEURA: Occlusion of the left upper and left lower lobe  bronchi with mucous plugging extending into the lung. The right  mainstem bronchus is narrowed. Mild patchy infiltrates. No effusions.  Large mass invading the chest wall now measures 14.7 x 11.1 previously  11.9 x 8.8 cm. Pleural metastasis at the apex on the left now measures  3.1 cm previously 2.0 cm, bony invasion and/or erosion of the second  rib on the left noted.    MEDIASTINUM/AXILLAE: The mass invades or arises from the mediastinum  on the left. No effusion.    UPPER ABDOMEN: Normal.    MUSCULOSKELETAL: Chest wall invasion of the mass is seen anteriorly.  Bony erosion is seen in the left second rib.      Impression    IMPRESSION:  1.  No pulmonary embolism demonstrated.  2.  Occluded left upper and lower lobe bronchi on the left secondary  to the mass.  3.  Increased size of the mass and apical metastasis since the  comparison study.    AMARI FAROOQ MD

## 2020-08-27 NOTE — PLAN OF CARE
Covid negative. Pt is a/o. Forgetful. VSS. On RA at rest. Needs 2l oxygen with activity. Sats drops down to 88% with activity. Tolerating diet. Needs encouragement to eat. Up SBA with walker and gaitbelt. Na monitoring q4h. IV saline locked. Bruised forehead from the fall. PT recommending home with home PT. Heme/Onc consult done by dr braden. Radiation consult placed.   POC updated with pt and wife.

## 2020-08-28 NOTE — PROGRESS NOTES
Care Coordination:    The following appointment was made for the pt.  The plan is for his [facility] RN to draw his NA and have a video visit with Dr. Love :    1.  Follow up with primary care provider, [facility] Love, within 7 days for hospital follow- up and to follow up on results.  The following labs/tests are recommended: sodium level.  Home Care RN can draw your NA and your visit with Dr. Love will be a video visit.  They will send you videolink in [employer].    The earliest visit appointment available is Monday the 14th of September   11:40AM.     [phone]   [address] [facility] [address]    Appointment added to AVS and I updated the pt's wife Millicent verbally.  She is aware of Radiation schedule daily at 1400.    [facility] sent an email to Britta Soni Liaison that the pt will be discharging today.    No further needs noted at this time.      Erika Wynn RN, BSN Care Coordinator  [facility]  Mobile: [phone]

## 2020-08-28 NOTE — CONSULTS
69 y/o male with Dx. of metastatic squamous cell carcinoma of lung with enlarging left chest mass with airway compression & tumor invading left chest wall.  Reviewed CT chest showing 14.7cm left chest mass.  Rec: XRT treatment to be initiated emergently today for airway compromise.  Outlined 10 rx to dose of 3000 cGy to left chest mass.  I discussed with pt & wife who consent.  I also discussed with Dr. Mullins.  Consult dictated.  CINDY Irwin MD.

## 2020-08-28 NOTE — CONSULTS
Consult Date:  08/27/2020      This consult has been requested by Dr. Alexander for lung cancer.      Mr. 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  The patient initially was started on pembrolizumab in April.  Carboplatin and Taxol were added on 07/29/2020 because of progression.      The patient was brought to the emergency room on 08/26/2020 because of worsening weakness, confusion, chest discomfort and hallucination.  Multiple investigations were done.   -CBC revealed mild leukopenia with WBC of 3.9.  Anemia with hemoglobin 9.4.  Normal platelet.   -CMP revealed hyponatremia with sodium of 124.   -CT head did not reveal any acute intracranial pathology.   -CT chest angiogram did not reveal any pulmonary embolism.  It revealed increased size of a left chest mass and left pleural metastasis.  There is occlusion of left upper and left lower lobe bronchi.  There is bony erosion of the left second rib.      The patient admitted to the hospital with these symptoms.  He is being ruled out for COVID.      I spoke to the wife.  As per her, it is the medication that caused the patient's confusion.  The patient is on different medications including narcotics.  He also takes medical marijuana.  The patient's confusion has improved.      REVIEW OF SYSTEMS:  No headache.  He has some lightheadedness.  No neck pain.  He has some discomfort in the left chest where the left chest mass is.  He is mildly short of breath.  He has rattling in the chest.  He has some cough which is nonproductive.  No abdominal pain, nausea or vomiting.  No urinary or bowel complaints.  No bleeding.  No fever.      All other review of systems negative.      ALLERGIES:  REVIEWED.      MEDICATIONS:  Reviewed.      PAST MEDICAL HISTORY:   1.  Metastatic lung cancer.   2.  Recurrent head and neck cancer.   3.  Stage I left adenosquamous cell carcinoma in 2009.   4.  Skin cancer.   5.  Chronic kidney disease.   6.   Hyperlipidemia.   7.  Hypothyroidism.   8.  Depression.   9.  Cataracts.   10.  Hypertension.   11.  Retinal detachment.   12.  Right ankle fracture.      SOCIAL HISTORY:   -He is .   -He quit smoking about 20 years ago.  Before that, smoked a pack and a half for 30 years.   -He quit alcohol almost 2 years ago.      PHYSICAL EXAMINATION:   GENERAL:  He is alert and oriented x 3.  He is not in any acute distress.  No labored breathing.   The rest of a comprehensive physical examination is deferred due to public health emergency video visit restrictions.      LABORATORY DATA:  Labs reviewed.      ASSESSMENT:   1.  A 70-year-old gentleman with metastatic squamous cell carcinoma of the lung which is progressing.   2.  Chest discomfort from a left lung mass.   3.  Hyponatremia from malignancy.   4.  Confusion, improving.   5.  Microcytic anemia.   6.  Fatigue.   7.  Other medical problems including hypothyroidism and hypertension.      RECOMMENDATIONS:   1.  The patient has been admitted with weakness, confusion and hallucination.  Confusion and hallucination are likely drug induced.  It is already much improved.     His weakness is due to multiple factors including lung cancer, chemotherapy, anemia and multiple other medical problems.   His weakness may improve slightly; I do not expect it to improve significantly because of ongoing chemotherapy and other medical problems.     2.  Discussed regarding lung cancer.  Left chest mass is increasing.  Carboplatin and Taxol was added to pembrolizumab on 07/29/2020. We will have to wait for some more time for it to respond.     There is occlusion of bronchi in the left.  Discussed regarding palliative radiation.  He is agreeable for it.  Will get a Radiation consult.     3.  Labs were reviewed.  He is chronically anemic.  He should be transfused if hemoglobin goes below 8.     4.  He has hyponatremia secondary to malignancy.  Sodium will be monitored.  He may need  hypertonic saline if hyponatremia gets worse.     5.  The patient's pain is controlled on fentanyl patch.  That will be continued.     6.  Wife had a few questions, which were all answered. Oncology will continue to follow him.      Thanks for the consult.      This is a telehealth visit.  Video visit was done as patient is being ruled out for COVID-19.  I wanted to preserve PPE during this COVID-19 pandemic.      Total time of 40 minutes.         JENNIFER CEDENO MD             D: 2020   T: 2020   MT: CANDACE      Name:     CHRISTI MANN   MRN:      [id number]        Account:       [id number]   :      1950           Consult Date:  2020      Document: [id number]       cc: Jennifer Love MD

## 2020-08-28 NOTE — PLAN OF CARE
A&Ox3, disoriented to time. VSS on 3L oxygen at all times. CAPUTO & frequent, congested cough. LS coarse crackles throughout. Denies pain, fentanyl patch to mid back. Scab & bruise to forehead from fall at home. A1 + gb/w to ambulate, walked halls x2 this shift. Radiation mapping started today & went down for first round @2:50pm. Hem/Onc & radiation consulted. Plan for discharge today after radiation, wife will bring home oxygen tank for discharge. AVS reviewed w/ patient & wife, all questions answered.

## 2020-08-28 NOTE — PLAN OF CARE
A&Ox3, disoriented to time. VSS on RA, PRN O2 available w/ SOB. C/O slight pain, PRN oxy & fentanyl patch effective. Frequent, congested cough, LS coarse crackles, CAPUTO. Scab to forehead from fall @ home. A1, GB/W. Last chemo 8/19, Hem Onc & radiation consulted. Plan for possible discharge 8/28.

## 2020-08-28 NOTE — PLAN OF CARE
Physical Therapy Discharge Summary    Reason for therapy discharge:    Discharged to home with home therapy.    Progress towards therapy goal(s). See goals on Care Plan in [facility] electronic health record for goal details.  Goals not met.  Barriers to achieving goals:   discharge from facility.    Therapy recommendation(s):    Patient demonstrates necessary level of functional mobility with use of FWW & Ax1 for all OOB activity in order to return to home environment. Patient is recommended to continue with skilled HH-PT in order to improve LE strength, balance and activity tolerance in order to return to baseline mobility.  Pt appropriate for Home PT as they require AD, assistive person, and taxing effort to leave the home.

## 2020-08-28 NOTE — PROGRESS NOTES
Hematology-Oncology Follow-up Note  [facility]     Today's Date: 08/28/20  Date of Admission:  8/26/2020  Reason for Consult: Metastatic squamous cell carcinoma of the lung      ASSESSMENT/ PLAN :  Agapito Fairbanks is a 70 year old male        Metastatic squamous cell carcinoma of the lung  Currently on carboplatin Taxol and pembrolizumab  -Patient is getting palliative radiation to the left lung mass  Continue per Dr. Mullins's recommendations    Hyponatremia secondary to malignancy  -Management per inpatient team    Confusion and altered mental status  -Resolved  Drug-induced      Oncology will sign off    please call with questions        INTERIM HISTORY:  Patient reports he is feeling significantly better he denies any confusion.  He feels his strength is a little bit better     MEDICATIONS:  Reviewed         PHYSICAL EXAM:  Vital signs:  Temp: 96.8  F (36  C) Temp src: Oral BP: 114/75 Pulse: 97   Resp: 18 SpO2: 94 % O2 Device: Nasal cannula Oxygen Delivery: 3 LPM Height: 182.9 cm (6') Weight: 63.5 kg (140 lb)  Estimated body mass index is 18.99 kg/m  as calculated from the following:    Height as of this encounter: 1.829 m (6').    Weight as of this encounter: 63.5 kg (140 lb).            LABS:  Recent Labs   Lab Test 08/28/20  0749   *   POTASSIUM 4.1   CHLORIDE 89*   CO2 27   ANIONGAP 7   BUN 9   CR 0.64*   *   SUSIE 8.0*     Recent Labs   Lab Test 08/28/20  0749 08/27/20  0645 08/26/20  1556   WBC 8.4 4.6 3.9*   HGB 8.3* 8.9* 9.4*    178 177   MCV 77* 77* 78   NEUTROPHIL 81.0  --  78.0     Recent Labs   Lab Test 08/26/20  1556 08/18/20  1200   BILITOTAL 0.8 0.8   ALKPHOS 121 123   ALT 14 15   AST 5 9   ALBUMIN 2.7* 2.7*             Brett Manrique, Nurse Practitioner   [facility]     Chart documentation with Dragon Voice recognition Software. Although reviewed after completion, some words and grammatical errors may remain.

## 2020-08-28 NOTE — PLAN OF CARE
Shift Note: Transfer from Obs unit with negative COVID, A&Ox3, congested cough, LS coarse crackles, CAPUTO, O2 stable on RA, PRN nebs ordered, scab to forehead from fall at home, Heme Onc and radiation consulted, last chemo 8/19, , plan to discharge home with home care possible tomorrow

## 2020-08-28 NOTE — PLAN OF CARE
Patient discharged at 3:36 PM to home. IV was discontinued. Pain at time of discharge was controlled w/ oxy.  Belongings returned to patient.  Discharge instructions and medications reviewed with patient.  Patient verbalized understanding and all questions were answered.  Prescriptions given to patient.  At time of discharge, patient condition was stable and left the unit via wheelchair escorted by MARTIN.

## 2020-08-28 NOTE — PROGRESS NOTES
Patient has been assessed for Home Oxygen needs. Oxygen readings:    *Pulse oximetry (SpO2) = 89% on room air at rest while awake.    *SpO2 improved to 95% on 3liters/minute at rest.    *SpO2 = 86% on room air during activity/with exercise.    *SpO2 improved to 93% on 3 liters/minute during activity/with exercise.

## 2020-08-28 NOTE — PROGRESS NOTES
Patient was brought down to Radiation therapy to perform a simulation for planning for treatment to his lung.  He will return for 2 pm today to initiate treatment to his lung after the treatment planning process has been completed.  We are planning on doing a total of 10 treatments.    SE HARDING

## 2020-08-29 NOTE — CONSULTS
Consult Date:  08/28/2020      RADIATION THERAPY CONSULTATION      REFERRING PHYSICIANS:  Wilver Mullins MD in Oncology; also, hospitalist Dr. Valentin Alexander, also primary physician, Dr. Los Love.      HISTORY OF PRESENT ILLNESS:  Mr. Agapito Fairbanks is a 70-year-old gentleman with a diagnosis of metastatic squamous cell carcinoma of the left lung.  He has been admitted to [facility] with increasing confusion and weakness and left chest discomfort and pain.  Evaluation since admission has identified hyponatremia.  This is being corrected.  A sodium of 124 was noted.  A CT scan of the chest on 08/26/2020 identifies a large left chest lung mass measuring 14.7 x 11.1 cm.  This has increased in size when compared to CT scan from 06/10/2020.  The mass is additionally growing into the chest wall.  The patient has had improvement since hospital admission.  He does have some underlying confusion symptoms which are at his baseline.  He has been receiving systemic treatment under the direction of Dr. Mullins, receiving pembrolizumab medication.  Recently carboplatin and Taxol chemotherapy has been added on 07/29/2020.  His most recent chemotherapy was approximately 1 week ago.  He is seen for consideration of emergency radiation treatment for his left lung increasing tumor involvement.      The patient has had adequate oxygenation on 2 liters of oxygen.      MEDICATIONS:  Levothyroxine, hydrochlorothiazide, Losartan, Crestor, Zoloft, Aricept, hydrocodone for pain, fentanyl patch for pain, gabapentin.      ALLERGIES:  SIMVASTATIN AND LISINOPRIL.      PAST MEDICAL HISTORY:  Significant for skin cancer in the past.  The patient has received previous radiation treatment to the left cheek region of skin involvement completed in 04/2020.  Additional treatment to the lumbar spine region to a dose of 3000 cGy was completed in 04/2020.      PHYSICAL EXAMINATION:   GENERAL:  Reveals an alert male, breathing on 2 liters  of oxygen.   VITAL SIGNS:  Weight 140 pounds, blood pressure 114/75, pulse is 97 and regular, temperature 96.8 degrees Fahrenheit.   HEENT:  There is an abrasion on the patient's forehead from a recent fall at home, with ecchymosis in the area.  He is alert and responsive, although confused at times.   NECK:  There is no cervical or supraclavicular lymphadenopathy.   LUNGS:  On auscultation revealed decreased breath tones within the left chest region.  Right lung area has good air movement.   CARDIAC:  Regular rhythm without significant murmur.   ABDOMEN:  Soft without organomegaly.   EXTREMITIES:  Show no significant edema or calf tenderness.      IMPRESSION AND RECOMMENDATION:  Mr. Agapito Fairbanks is a 70-year-old gentleman with a diagnosis of metastatic squamous cell carcinoma of the left lung with increased tumor involvement extending into the anterior left chest wall with discomfort and pain in addition to shortness of breath in the region.      Based on the patient's extent of tumor involvement, I have outlined a proposed course of emergency radiation treatment to be directed to the left chest mediastinal region.  I have outlined treatment to a cumulative dose of 3000 cGy in 10 treatment fractions.  I have explained the risks, side effects, complications and beneficial goals of this treatment to the patient and his wife.  This discussion included, but was not limited to, an explanation of the risk of skin irritative reaction and radiation effect on pulmonary, cardiac, esophagus and soft tissues in the region.  The patient seems to understand.  He and his wife have consented to treatment.  We will be carrying out simulation and treatment planning this morning and plan to initiate treatment this afternoon, additionally plan to treat over the weekend on Saturday, 08/29/2020.  The patient is being considered for possible discharge from the hospital with followup with Dr. Mullins.      Thank you for asking me to  evaluate Mr. Agapito Mann.         YANET PARSONS MD             D: 2020   T: 2020   MT: EVON      Name:     AGAPITO MANN   MRN:      [id number]        Account:       [id number]   :      1950           Consult Date:  2020      Document: [id number]       cc: Wilver Alexander MD

## 2020-08-30 NOTE — TELEPHONE ENCOUNTER
Patient discharged from the hospital on 8/28/20 with a history of hyponatremia.  He is one the schedule for IVF today.  Due to history of hyponatremia Dr. ELIZA Bhatia paged for clarification if patient should be receiving IVF today.    TORB: Dr. ELIZA Bhatia/ JUAN Connors RN @ 0841 on 8/30/20:  - OK for patient to receive 1L NS as ordered today    Patient's appointment remains as scheduled for today at 1100.

## 2020-08-31 NOTE — PROGRESS NOTES
Writer called and spoke with patient's wife, Millicent, and was updated that patient's confusion seemed to improve after patient left yesterday. Millicent is requesting that patient be set up for IVF's on Tuesday, Friday, and Sundays for the next 2 weeks. Millicent would like to move up Selene CNP visit from Wednesday 9/2 to Tuesday so 2 separate trips not need to be made.     Writer will have schedulers work on scheduling and follow up with Millicent.     Cecile Alanis RN

## 2020-09-01 PROBLEM — J18.9 COMMUNITY ACQUIRED PNEUMONIA: Status: ACTIVE | Noted: 2020-01-01

## 2020-09-01 NOTE — PROGRESS NOTES
[facility]  Patient is currently open to home care services with [facility]. The patient is currently receiving Skilled Nursing, PT, OT services. Counts include 234 beds at the [facility]  and team have been notified of patient admission. Counts include 234 beds at the [facility] liaison will continue to follow patient during stay. If appropriate provide orders to resume home care at time of discharge.

## 2020-09-01 NOTE — H&P
[facility]    History and Physical - Hospitalist Service       Date of Admission:  9/1/2020    Assessment & Plan   Agapito Fairbanks is a 70 year old male admitted on 9/1/2020. He presents to the emergency department with complaints of shortness of breath after EMS was called to his home by his wife.  Found to have increased hypoxia from baseline as well as chest x-ray findings concerning for postobstructive pneumonia in the setting of metastatic adenocarcinoma of the lung.    Acute on chronic hypoxia: Baseline on 3 L nasal cannula oxygen as needed for metastatic adenocarcinoma of the lung.  Now requiring 5 L to maintain oxygen saturations.  Suspect multifactorial with lung cancer, anticipated new pneumonia  Community-acquired pneumonia: suspicious for right lower lobe pneumonia as well as left-sided postobstructive pneumonia with known left bronchial compression by tumor.   -Continue ceftriaxone and azithromycin  -Oximetry, oxygen as needed  -Continue radiation oncology treatments for tumor compression  -Nebulizer treatments on hold while awaiting COVID-19 rule out, okay for albuterol inhaler  -Aggressive pulmonary toilet including Acapella valve, incentive spirometry.  When COVID-19 ruled out, consider Mucomyst nebulizer treatments  -Sputum culture, Gram stain  -Low threshold for speech pathology evaluation if any evidence of aspiration or dysphasia with oral intake.  De-escalation of diet, however, should be discussed with patient in regards to his overall plan of care given metastatic malignancy, malnutrition.    Metabolic encephalopathy: Has been attributed to medications in the past including opiates, benzodiazepines, marijuana, though also could be secondary to metastatic malignancy.  Known cognitive impairment  -As below, due for outpatient MRI brain.  Could consider advancing this to current hospitalization, though no focal neurologic deficits.  -Resume prior to admission Aricept when  reconciled by pharmacy    Metastatic adenocarcinoma of the lung: Initial diagnosis in 2015, recurrence in 2019.  Follows with Brett Manrique and Dr Mullins of [facility] patient tells me that he is not on chemotherapy, though note.  Keytruda, then carboplatin and Taxol at the end of July, pembrolizumab and carboplatin 8/19.  -Continue prior to admission fentanyl patch; can be reordered following pharmacy reconciliation.  Patient has a 12 mcg patch on back currently, though date of application uncertain  -Radiation oncology consulted.  Patient is due for radiation treatment this afternoon and may still be able to make this appointment following COVID rule out.  Low suspicion.  -[facility] consulted.  Patient is due for repeat imaging studies.  He did recently have a CT PE study at last admission, though is also due for outpatient MRI of brain.  Pending oncology consultation and negative COVID test, could consider obtaining MRI brain during hospitalization in the setting of his persistent encephalopathy    Severe malnutrition: In the setting of known metastatic malignancy.  Followed by nutrition at last hospitalization  -Regular adult diet as tolerated  -Vanilla boost plus shakes twice daily between meals    SIADH with hyponatremia: Stable with serum sodium of 124  -Diet as tolerated.  Malnutrition more concerning than moderately low sodium at this time, and I would avoid dietary restrictions if able.  -Trend BMP    Hypothyroidism:  -Resume prior to admission levothyroxine when reconciled by pharmacy    Goals of care planning: Discussed goals of care with patient in the emergency department.  He tells me that he is DNR/DNI, and informs me that he filled out an advanced directive to this effect that we currently do not have on file.  If he experienced respiratory failure, he would desire a focus on comfort.  He tells me that he has met with palliative care and had considered hospice in the future.  His plan is  to remain at home with his wife as long as possible, and when she is no longer able to take care of him to transition to another facility, though he does not elaborate on this further.  Receiving radiation oncology, so not a candidate for hospice currently.  Additionally, I believe he is still receiving chemotherapy, though he does not recall this.        Diet: Combination Diet Regular Diet Adult    DVT Prophylaxis: Enoxaparin (Lovenox) SQ  Ocampo Catheter: not present  Code Status: Full Code    Rule Out COVID-19 Handoff:  Agapito is a LOW SUSPICION PUI.  Follow these instructions:    If COVID test positive -> continue isolation precautions    If COVID test negative -> discontinue COVID-specific isolation precautions       Disposition Plan   Expected discharge: 2 - 3 days, recommended to prior living arrangement once antibiotic plan established and safe disposition plan/ TCU bed available, hypoxia improved/stabilized.  Patient qualifies for inpatient status based on increased hypoxia with community-acquired pneumonia, though he desires to discharge to home as soon as able, potentially even later today.  He does have oxygen available at home, and could potentially be converted to oral anti-infectives as he is not currently toxic and has had no fevers if his wife feels comfortable with this plan.  Patient is relatively dependent on wife for assistance with cares including medication administration.  Recommend assessment later today with ambulatory oximetry and discussion with wife to further delineate discharge planning.  Entered: Kendrick Mar MD 09/01/2020, 6:37 AM     The patient's care was discussed with the Patient and Dr Hsieh in the ER.    Kendrick Mar MD  [facility]    ______________________________________________________________________    Chief Complaint   Increased shortness of breath    History is obtained from patient, chart review, discussion with ER provider    History of  Present Illness   Agapito Fairbanks is a 70 year old male who presents to the emergency department as he was more short of breath at home.  EMS arrived and found patient to be hypoxic to 70% on room air, though note that patient has a prescription for oxygen at 3 L.  Known metastatic adenocarcinoma of the lung, largely involving the left side with some bronchial involvement.  No fevers, no chills.  Rattling cough which has been persistent, though is worsened.  Patient with subjective increased shortness of breath and required additional oxygen supplementation to maintain saturations in the emergency department.    Patient has a known history of metastatic adenocarcinoma.  He also has a history of encephalopathy and cognitive impairment.  He is unable to provide a history entirely on admission, question accuracy of details.  For instance, patient tells me that he is not on chemotherapy.  He believes that he historically discontinued chemotherapy based on his preference, though appears to have been receiving chemotherapy as recently as August 19.  He cannot recall the names of his doctors, though knows that he follows with [facility].  He does not know when his next radiation oncology treatment is, and has difficulty with the day of the week.  He feels better following administration of increased rate nasal cannula oxygen, though still has shortness of breath despite normalization of oxygenation.    He has a wet rattling cough, though no fevers or chills.    Patient has no pain currently.  No nausea or vomiting, no diarrhea.    Patient is hopeful that he will be able to discharge back to home with his wife even today/tonight.  He tells me he would be more comfortable in his own bed.  He still has some mild shortness of breath with increased oxygen needs, though does have oxygen available at home.  Discussed that discharge today would require assessment of oxygenation with ambulation as well as coordination with  discharge plan with his wife.  He believes she would be able to appropriately care for him at home on oral anti-infectives.  He is amenable to plan for assessment later in the day following negative COVID and ambulatory oximetry.  Discussed with nursing staff.    Review of Systems    The 10 point Review of Systems is negative other than noted in the HPI or here.  As above, history provided by patient is somewhat questionable    Past Medical History    I have reviewed this patient's medical history and updated it with pertinent information if needed.   Past Medical History:   Diagnosis Date     Acute gastritis with hemorrhage 11/02     Basal cell carcinoma, leg      left pretibial area     CKD (chronic kidney disease) stage 3, GFR 30-59 ml/min (H)     creat baseline approx 1.4     Hearing loss      Helicobacter pylori (H. 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  Past Surgical History:   Procedure Laterality Date     C NONSPECIFIC PROCEDURE  5/01, 9/01    B retinal surg.     C NONSPECIFIC PROCEDURE  12/01    L cataract     C NONSPECIFIC  PROCEDURE      right ankle fx repair     C NONSPECIFIC PROCEDURE  3/05    Right hemilaryngectomy (laser) for Squamous Cell CA T1N0     C NONSPECIFIC PROCEDURE  3/05, 10/09    Moh's procedure for Basal Cell CA chest     C NONSPECIFIC PROCEDURE      Moh's procedure for Basal Cell CA ear lobe     C NONSPECIFIC PROCEDURE  3/05,     right vocal cord squamous cell CA excision     C NONSPECIFIC PROCEDURE      Phacoemulsification of the lens with posterior chamber lens implant, right eye.      C NONSPECIFIC PROCEDURE  10/09     Moh's procedufe for BCC left pretibial area     C NONSPECIFIC PROCEDURE      Left thoracoscopic wedge resection, Open completion left upper lobe segmentectomy      THORACIC SURGERY      lung,throat       Social History   I have reviewed this patient's social history and updated it with pertinent information if needed.  Social History     Tobacco Use     Smoking status: Former Smoker     Packs/day: 1.50     Years: 30.00     Pack years: 45.00     Last attempt to quit: 1998     Years since quittin.6     Smokeless tobacco: Never Used   Substance Use Topics     Alcohol use: Yes     Comment: 2-3 times per week, 1-2 beers     Drug use: No       Family History   I have reviewed this patient's family history and updated it with pertinent information if needed.  Family History   Problem Relation Age of Onset     Colon Cancer Mother          age 65, in      Family History Negative Father         mild memory problems,  age 87, in      Family History Negative Brother      Family History Negative Brother      Cancer Brother         hx prostate ca     Sleep Apnea Brother        Prior to Admission Medications   Prior to Admission Medications   Prescriptions Last Dose Informant Patient Reported? Taking?   LORazepam (ATIVAN) 0.5 MG tablet   No No   Sig: Take 1 tablet (0.5 mg) by mouth every 4 hours as needed (Anxiety, Nausea/Vomiting or Sleep)   MULTI VITAMIN MENS OR   Spouse/Significant Other Yes No   Si TABLET DAILY   albuterol (PROAIR HFA/PROVENTIL HFA/VENTOLIN HFA) 108 (90 Base) MCG/ACT inhaler   No No   Sig: Inhale 2 puffs into the lungs every 6 hours as needed for shortness of breath / dyspnea or wheezing   albuterol (PROVENTIL) (2.5 MG/3ML) 0.083% neb solution   No No   Sig: Take 1 vial (2.5 mg) by nebulization every 6 hours as needed for shortness of breath / dyspnea or wheezing   benzonatate (TESSALON) 100 MG capsule   No No   Sig: Take 1 capsule (100 mg) by mouth 3 times daily as needed for cough   bisacodyl (DULCOLAX) 5 MG EC tablet  Spouse/Significant Other Yes No   Sig: Take 5 mg by mouth daily as needed for constipation   cetirizine (ZYRTEC ALLERGY) 10 MG tablet  Spouse/Significant Other Yes No   Sig: Take 10 mg by mouth At Bedtime    diclofenac (VOLTAREN) 1 % topical gel   Yes No   Sig: Place onto the skin daily as needed for moderate pain   donepezil (ARICEPT) 5 MG tablet  Spouse/Significant Other No No   Sig: Take 1 tablet (5 mg) by mouth At Bedtime   fentaNYL (DURAGESIC) 12 mcg/hr 72 hr patch   No No   Sig: Place 1 patch onto the skin every 72 hours   fluticasone (FLONASE) 50 MCG/ACT nasal spray   Yes No   Sig: Spray 1 spray into both nostrils daily as needed for rhinitis or allergies   gabapentin (NEURONTIN) 300 MG capsule   No No   Sig: Take 1 capsule (300 mg) by mouth 3 times daily   levothyroxine (SYNTHROID/LEVOTHROID) 125 MCG tablet   No No   Sig: Take 1 tablet (125 mcg) by mouth daily   naloxone (NARCAN) 4 MG/0.1ML nasal spray   No No   Sig: Spray 1 spray (4 mg) into one nostril alternating nostrils as needed for opioid reversal every 2-3 minutes until assistance arrives   Patient not taking: Reported on 2020   ondansetron (ZOFRAN) 8 MG tablet   No No   Sig: Take 1 tablet (8 mg) by mouth every 8 hours as needed (Nausea/Vomiting)   oxyCODONE (ROXICODONE) 5 MG tablet   No No   Sig: Take 1 tablet (5 mg) by mouth every 4 hours as needed for severe pain    prochlorperazine (COMPAZINE) 10 MG tablet   No No   Sig: Take 1 tablet (10 mg) by mouth every 6 hours as needed (Nausea/Vomiting)   sertraline (ZOLOFT) 50 MG tablet  Spouse/Significant Other No No   Sig: Take 1 tablet (50 mg) by mouth daily   Patient taking differently: Take 50 mg by mouth At Bedtime    tiotropium-olodaterol (STIOLTO RESPIMAT) 2.5-2.5 MCG/ACT AERS   No No   Sig: Inhale 2 puffs into the lungs daily   Patient not taking: Reported on 8/19/2020      Facility-Administered Medications: None     Allergies   Allergies   Allergen Reactions     Simvastatin      myalgias     Lisinopril Rash     rash       Physical Exam   Vital Signs: Temp: 96.9  F (36.1  C) Temp src: Oral BP: 104/69 Pulse: 94   Resp: 27 SpO2: 95 % O2 Device: Nasal cannula Oxygen Delivery: 4 LPM  Weight: 139 lbs 6.4 oz    General Appearance: Cachectic appearing 70-year-old male resting comfortably in bed.  Eyes: No scleral icterus or injection.  HEENT: Central forehead lesion from prior fall with overlying scab.  Facial lipodystrophy.  Dry oral mucosa  Respiratory: Rhonchi throughout lung fields with exception of right apex spared.  Wet rattling cough.  Mild tachypnea, though comfortable appearing on 5 L nasal cannula oxygen  Cardiovascular: Regular rate and rhythm with heart rate currently in the 90s  GI: Abdomen thin, soft, no tenderness palpation.  No palpable mass.  Lymph/Hematologic: No lower extremity edema  Genitourinary: Not examined  Skin: Some mild telangiectasias of chest and face, scab from prior trauma central superior forehead  Musculoskeletal: Patient with a palpable subcutaneous mass under left anterior chest wall at approximately T2 level.  Near the head of the clavicle.  Mildly tender to palpation.  Diffuse muscular wasting and significant subcutaneous fat loss  Neurologic: Patient is alert, conversant.  This said, he is still mildly encephalopathic, which seems to be more longstanding.  He tells me he has his next radiation  oncology treatment on Monday or Tuesday despite today being Tuesday; earlier was able to report what day it was, though did not recall that yesterday was Monday.  No focal neurologic deficits are appreciated.  Defers much of history to his wife, who he tells me takes good care of him.  Psychiatric: Normal affect, pleasant    Data   Data reviewed today: I reviewed all medications, new labs and imaging results over the last 24 hours. I personally reviewed the chest x-ray image(s) showing Left-sided infiltrate/mass, right-sided infiltrate concerning for pneumonia.    Recent Labs   Lab 09/01/20  0209 08/28/20  0749 08/27/20  1741  08/27/20  0645  08/26/20  1556   WBC 16.6* 8.4  --   --  4.6  --  3.9*   HGB 9.3* 8.3*  --   --  8.9*  --  9.4*   MCV 78 77*  --   --  77*  --  78    200  --   --  178  --  177   * 123* 124*   < > 122*   < > 124*   POTASSIUM 3.9 4.1  --   --  4.0  --  3.7   CHLORIDE 90* 89*  --   --  89*  --  90*   CO2 29 27  --   --  26  --  28   BUN 11 9  --   --  10  --  12   CR 0.61* 0.64*  --   --  0.61*  --  0.62*   ANIONGAP 5 7  --   --  7  --  6   SUSIE 8.3* 8.0*  --   --  8.4*  --  8.5   * 119*  --   --  91  --  121*   ALBUMIN 2.3*  --   --   --   --   --  2.7*   PROTTOTAL 6.1*  --   --   --   --   --  6.4*   BILITOTAL 0.9  --   --   --   --   --  0.8   ALKPHOS 127  --   --   --   --   --  121   ALT 16  --   --   --   --   --  14   AST 10  --   --   --   --   --  5    < > = values in this interval not displayed.

## 2020-09-01 NOTE — PROGRESS NOTES
RECEIVING UNIT ED HANDOFF REVIEW    ED Nurse Handoff Report was reviewed by: Juan Butt RN on September 1, 2020 at 5:37 AM

## 2020-09-01 NOTE — PLAN OF CARE
A&O x4, forgetful at times; reoriented when needed. VS stable on O2 @ 4LPM via nasal cannula. Up with assist x1 with walker and gait belt, ambulating to bathroom. Incontinent at times. Complained of left shoulder pain, managed with PRN oxycodone x1, patient also has fentanyl patch on left upper back. Lung sounds coarse and diminished, frequent productive cough; dyspnea on exertion. COVID negative. Radiation therapy done this afternoon. Patient's wife at bedside this evening. Plan to transfer to station 88. Discharge plan pending.

## 2020-09-01 NOTE — ED NOTES
[facility]  ED Nurse Handoff Report    ED Chief complaint: Shortness of Breath      ED Diagnosis:   Final diagnoses:   None       Code Status: Full Code and MD to address    Allergies:   Allergies   Allergen Reactions     Simvastatin      myalgias     Lisinopril Rash     rash       Patient Story:  Patient hx of lung cancer.  Low O2 tonight, in the 80s.  Chronic wet cough.   Focused Assessment:  Frequent congested, wet cough.  O2 sat down to 78% when he removed his nasal cannula (talking on phone to wife).  Was not labored breathing at this time.  O2 reapplied, back to 94% on 4 lt. BP has been low, fluid bolus 1 Lt NS now infusing.  Antibiotics infusing also.      Treatments and/or interventions provided:  IV insertion.  Covid test sent.   Patient's response to treatments and/or interventions:     To be done/followed up on inpatient unit:      Does this patient have any cognitive concerns?: Alert & oriented.    Activity level - Baseline/Home:  Independent  Activity Level - Current:   Stand with Assist    Patient's Preferred language: English   Needed?: No    Isolation: None and Other: Covid test pending.   Infection:  Covid test pending  Bariatric?: No    Vital Signs:   Vitals:    09/01/20 0247 09/01/20 0257 09/01/20 0300 09/01/20 0315   BP:  105/58 (!) 83/53 97/60   Pulse: 109 108 101 105   Resp: 21 16 19 (!) 37   Temp:       TempSrc:       SpO2: 95% 96% 94% (!) 85%       Cardiac Rhythm:     Was the PSS-3 completed:   Yes  What interventions are required if any?               Family Comments:   OBS brochure/video discussed/provided to patient/family: N/A              Name of person given brochure if not patient:               Relationship to patient:     For the majority of the shift this patient's behavior was Green.   Behavioral interventions performed were .    ED NURSE PHONE NUMBER:  *[phone]

## 2020-09-01 NOTE — ED TRIAGE NOTES
Hx of metastatic lung cancer. Chronic wet cough. Today SOB. In the low SPO2 in the low 80 when EMS arrived. Comes  Into the ED in a non-re breather at 12 L.

## 2020-09-01 NOTE — PROGRESS NOTES
Pt received radiation treatment 4 of 10 to the left lung.  A total to date dose of 1200cGy at 10MV photons has been given.  Pt has 6 remaining treatments.

## 2020-09-01 NOTE — ED NOTES
Bed: ED26  Expected date: 9/1/20  Expected time: 1:44 AM  Means of arrival: Ambulance  Comments:  Laney 536 70M sob; lung CA

## 2020-09-01 NOTE — PROVIDER NOTIFICATION
MD Notification    Notified Person: MD    Notified Person Name:  Chelle    Notification Date/Time: 09/01/20 @ 3:46 PM     Notification Interaction: web paged MD    Purpose of Notification: FYI COVID negative    Orders Received: verbal order with read back - OK to discontinue isolation and transfer to medical bed when COVID negative.    Comments:

## 2020-09-01 NOTE — PROVIDER NOTIFICATION
.MD Notification    Notified Person: MD    Notified Person Name: Chelle    Notification Date/Time: 09/01/20 @ 9:04 AM     Notification Interaction: web paged MD    Purpose of Notification: Patient has Fentanyl patch on right upper back, per patient he uses these at home. Currently not ordered, please advise.    Orders Received:    Comments:

## 2020-09-01 NOTE — ED PROVIDER NOTES
"  History     Chief Complaint:  Shortness of Breath    HPI   Agapito Fairbanks is a 70 year old male with a history of lung cancer and a chronic wet cough who presents via EMS with shortness of breath. The patient was feeling more intense shortness of breath today. He was found to be hypoxic with EMS. His symptoms improved is O2 but he still feels \"off\". He denies fever or sore throat. The patient is somewhat a poor overall historian.    Allergies:  Simvastatin   Lisinopril      Medications:    Albuterol inhaler  Tessalon  Dulcolax  Aricept   Flonase  Neurontin  Synthroid  Ativan  Narcan  Zofran  Roxicodone  Compazine  Zoloft  Keflex     Past Medical History:    Acute gastritis with hemorrhage   Basal cell carcinoma  Chronic kidney disease, stage 3  H. Pylori  Humerus fracture  Hyperlipidemia  Hyperthyroidism  Impotence  Laryngeal carcinoma  Lung Cancer  Depression  Unspecified malignant neoplasm of skin  Testicular hypofunction  Squamous cell carcinoma, stage IV of left lung  Cataracts  Hypertension  Retinal detachment   Esophageal reflux  Memory loss  Dysfunction of right eustachian tube  Bone metastasis    Past Surgical History:    Retinal surgery  Left cataract surgery  Right ankle fracture repail  Right hemilaryngectomy for squamous cell carcinoma  Moh's procedure for basal cell carcinoma chest  Moh's procedure for basal cell carcinoma ear lobe  Right vocal cord squamous cell carcinoma excision  Phacoemulsification of the left with posterior chamber lens implant, right eye  Moh's procedure for basal cell carcinoma left pretibial area  Left thoracoscopic wedge resection  Thoracic surgery    Family History:    Mother - Colon Cancer  Brother - Prostate Cancer, Sleep Apnea    Social History:  The patient was accompanied to the ED by EMS.  Smoking Status: Former Smoker (1998)  Smokeless tobacco: Never Used  Alcohol Use: Yes  Drug Use: No  PCP:  Los Love  Marital Status:   [2]     Review of Systems "   Constitutional: Negative for fever.   HENT: Negative for sore throat.    Respiratory: Positive for cough and shortness of breath.    Gastrointestinal: Negative for abdominal pain, nausea and vomiting.   All other systems reviewed and are negative.      Physical Exam     Patient Vitals for the past 24 hrs:   BP Temp Temp src Pulse Resp SpO2 Height Weight   09/01/20 0629 104/69 96.9  F (36.1  C) Oral 94 -- 95 % 1.829 m (6') 63.2 kg (139 lb 6.4 oz)   09/01/20 0545 132/87 -- -- 96 27 96 % -- --   09/01/20 0530 124/83 -- -- 92 18 96 % -- --   09/01/20 0515 (!) 140/91 -- -- 98 22 96 % -- --   09/01/20 0500 127/81 -- -- 94 20 99 % -- --   09/01/20 0445 124/82 -- -- 93 18 96 % -- --   09/01/20 0430 97/82 -- -- 95 18 98 % -- --   09/01/20 0415 101/64 -- -- 94 18 99 % -- --   09/01/20 0400 96/62 -- -- 92 18 97 % -- --   09/01/20 0345 105/65 -- -- 97 18 99 % -- --   09/01/20 0330 116/50 -- -- 102 18 97 % -- --   09/01/20 0315 97/60 -- -- 105 (!) 37 (!) 85 % -- --   09/01/20 0300 (!) 83/53 -- -- 101 19 94 % -- --   09/01/20 0257 105/58 -- -- 108 16 96 % -- --   09/01/20 0247 -- -- -- 109 21 95 % -- --   09/01/20 0245 (!) 82/60 -- -- 107 23 95 % -- --   09/01/20 0230 (!) 79/58 -- -- 111 22 91 % -- --   09/01/20 0220 92/58 -- -- 105 21 95 % -- --   09/01/20 0215 92/58 -- -- 108 (!) 36 97 % -- --   09/01/20 0200 -- -- -- 111 (!) 33 97 % -- --   09/01/20 0157 95/65 99.2  F (37.3  C) Oral 109 17 96 % -- --       Physical Exam  General: Appears well-developed and well-nourished.   Head: No signs of trauma.   Neck: Normal range of motion. No nuchal rigidity. No cervical adenopathy  CV: Mild tachycardia and regular rhythm.    Resp: Effort normal and mild/moderately coarse breath sounds. No respiratory distress.   GI: Soft. There is no tenderness.  No rebound or guarding.  Normal bowel sounds.    MSK: Normal range of motion. n  Neuro: The patient is alert and oriented, poor historian.  Speech normal.  Skin: Skin is warm and dry. No  rash noted.   Psych: normal mood and affect. behavior is normal.       Emergency Department Course     Imaging:  Radiology findings were communicated with the patient who voiced understanding of the findings.    XR Chest 1 views   New focal right perihilar opacity in the appearance of alveolar infiltrate and relating into new pneumonia. Continued radiographic follow-up recommended. Large left lung mass remains essentially unchanged. Increasing alveolar opacities left   lung base with increasing elevation left hemidiaphragm. Postobstructive pneumonia in the left lower lung could have this appearance. Tortuous aorta. Normal pulmonary vascularity. Normal heart size.  Reading per radiology    Laboratory:  Laboratory findings were communicated with the patient who voiced understanding of the findings.    CBC: WBC 16.6 (H), HGB 9.3 (L), o/w WNL ()  CMP:  (L), Chloride 90 (L), Glucose 120 (H), Creatinine 0.61 (L), Calcium 8.3 (L), Albumin 2.3 (L), Protein 6.1 (L), o/w WNL  Lactic Acid: 1.6  Blood gas venous: pH Venous 7.40, PCO2 Venous 50, PO2 Venous 24 (L), Bicarbonate 31 (H), Oxyhemoglobin Venous 35, Base Excess 5.1   Blood Culture x2: Pending    COVID-19 Virus (Coronavirus) by PCR: Pending.     Interventions:  0421 0.9% NaCl bolus 1000 mL IV  0424 rocephin 1 g IV    Emergency Department Course:  Past medical records, nursing notes, and vitals reviewed.    (0212)   I performed an exam of the patient as documented above. History obtained from patient.      The patient was sent for a XR Chest 1 View while in the emergency department, results above.     IV was inserted and blood was drawn for laboratory testing, results above.   A nasal swab was obtained for laboratory testing, findings above.      (5667)   I spoke with Dr. Mar of the Hospitalist service regarding patient's presentation, findings, and plan of care.     Findings and plan explained to the Patient who consents to admission. Discussed the patient  "with Dr. Mar, who will admit the patient to a med/surg bed for further monitoring, evaluation, and treatment.     I personally reviewed the laboratory and imaging results with the Patient and answered all related questions prior to admission.     Impression & Plan     Medical Decision Making:  Agapito STAPLES \"Roque\" Magdi is a 70-year-old gentleman who presents due to shortness of breath.  He has a history of lung cancer and apparently became more short of breath this evening and EMS was called.  He was found to be hypoxic on room air with EMS.  In the ER we were able to titrate his oxygen down to 3 to 4 L.  Seems that the patient does have nasal cannula oxygen available to him at home but does not always use it.  Patient would desat he was off of the oxygen fairly quickly in the ER.  Chest x-ray obtained that did show signs of an infiltrate and the patient did have an elevated white blood cell count along with a chronic cough.  Concerns for developing pneumonia patient was given IV antibiotics and admitted to the hospital service.  Patient's blood pressure initially was somewhat soft but this did improve with IV fluids.    Covid-19  Agapito Fairbanks was evaluated during a global COVID-19 pandemic, which necessitated consideration that the patient might be at risk for infection with the SARS-CoV-2 virus that causes COVID-19.   Applicable protocols for evaluation were followed during the patient's care.   COVID-19 was considered as part of the patient's evaluation. The plan for testing is:  a test was obtained during this visit.    Diagnosis:    ICD-10-CM    1. Pneumonia of right lung due to infectious organism, unspecified part of lung  J18.9    2. Hypoxia  R09.02        Disposition:  Admitted to med/surg bed.    Scribe Disclosure:  Yoli MCCALL, am serving as a scribe at 2:12 AM on 9/1/2020 to document services personally performed by Joshua Hsieh MD based on my observations and the provider's statements " to me.   9/1/2020    EMERGENCY DEPARTMENT       Joshua Hsieh MD  09/01/20 0709

## 2020-09-01 NOTE — PLAN OF CARE
ED admission transfer to OBS 14. Desat to 81% & SOB with activities, 95%  on 2L/NC.. Mild disorientation to situations at times, easily redirection. VSS. Lung sound sound coarse. Denies pain. Plans for Hem/onc consult & possible radiation today.

## 2020-09-01 NOTE — ED NOTES
O2 sat down to 78%.  Patient was on the phone talking to wife, had removed his O2 from his nose.    O2 reapplied.  Slowly increased to 94% on 4 Lt.

## 2020-09-01 NOTE — PHARMACY-ADMISSION MEDICATION HISTORY
Pharmacy Medication History  Admission medication history interview status for the 9/1/2020  admission is complete. See EPIC admission navigator for prior to admission medications     Medication history sources: Patient's family/friend (spouse Amber)  Medication history source reliability: Good  Adherence assessment: Good    Significant changes made to the medication list:  Removed Narcan nasal spray and Stiolto Inhaler - Pt spouse states he's never picked these up or or used these      Additional medication history information:   As above    Medication reconciliation completed by provider prior to medication history? Yes    Time spent in this activity: 20 minutes      Prior to Admission medications    Medication Sig Last Dose Taking? Auth Provider   albuterol (PROAIR HFA/PROVENTIL HFA/VENTOLIN HFA) 108 (90 Base) MCG/ACT inhaler Inhale 2 puffs into the lungs every 6 hours as needed for shortness of breath / dyspnea or wheezing PRN Yes Wilver Mullins MD   albuterol (PROVENTIL) (2.5 MG/3ML) 0.083% neb solution Take 1 vial (2.5 mg) by nebulization every 6 hours as needed for shortness of breath / dyspnea or wheezing PRN Yes Ana Cristina Richard MD   benzonatate (TESSALON) 100 MG capsule Take 1 capsule (100 mg) by mouth 3 times daily as needed for cough PRN Yes Wilver Mullins MD   bisacodyl (DULCOLAX) 5 MG EC tablet Take 5 mg by mouth daily as needed for constipation PRN Yes Unknown, Entered By History   cetirizine (ZYRTEC ALLERGY) 10 MG tablet Take 10 mg by mouth daily as needed  PRN Yes Unknown, Entered By History   donepezil (ARICEPT) 5 MG tablet Take 1 tablet (5 mg) by mouth At Bedtime 8/31/2020 at Unknown time Yes Los Love MD   fluticasone (FLONASE) 50 MCG/ACT nasal spray Spray 1 spray into both nostrils daily as needed for rhinitis or allergies PRN Yes Unknown, Entered By History   gabapentin (NEURONTIN) 300 MG capsule Take 1 capsule (300 mg) by mouth 3 times daily 8/31/2020 at Unknown time Yes  Marlene Hernandez MD   levothyroxine (SYNTHROID/LEVOTHROID) 125 MCG tablet Take 1 tablet (125 mcg) by mouth daily 8/31/2020 at Unknown time Yes Los Love MD   ondansetron (ZOFRAN) 8 MG tablet Take 1 tablet (8 mg) by mouth every 8 hours as needed (Nausea/Vomiting) PRN Yes Wilver Mullins MD   oxyCODONE (ROXICODONE) 5 MG tablet Take 1 tablet (5 mg) by mouth every 4 hours as needed for severe pain PRN Yes Wilver Mullins MD   prochlorperazine (COMPAZINE) 10 MG tablet Take 1 tablet (10 mg) by mouth every 6 hours as needed (Nausea/Vomiting) PRN Yes Wilver Mullins MD   sertraline (ZOLOFT) 50 MG tablet Take 1 tablet (50 mg) by mouth daily  Patient taking differently: Take 50 mg by mouth At Bedtime   Yes Los Love MD   diclofenac (VOLTAREN) 1 % topical gel Place onto the skin daily as needed for moderate pain PRN  Unknown, Entered By History   fentaNYL (DURAGESIC) 12 mcg/hr 72 hr patch Place 1 patch onto the skin every 72 hours 8/30/2020  Brett Manrique APRN CNP   LORazepam (ATIVAN) 0.5 MG tablet Take 1 tablet (0.5 mg) by mouth every 4 hours as needed (Anxiety, Nausea/Vomiting or Sleep) PRN  Wilver Mullins MD

## 2020-09-01 NOTE — PROGRESS NOTES
[facility]    Medicine Progress Note - Hospitalist Service       Date of Admission:  9/1/2020  Date of Service: 09/01/2020    Assessment & Plan The email address for your [employer] account has been updated        Agapito Fairbanks is a 70 year old male admitted on 9/1/2020. He presents to the emergency department with complaints of shortness of breath after EMS was called to his home by his wife.  Found to have increased hypoxia from baseline as well as chest x-ray findings concerning for postobstructive pneumonia in the setting of metastatic adenocarcinoma of the lung.    Acute on chronic hypoxia: Baseline on 3 L nasal cannula oxygen as needed for metastatic adenocarcinoma of the lung.  Now requiring 5 L to maintain oxygen saturations.  Suspect multifactorial with lung cancer, anticipated new pneumonia. COVID-19 PCR was negative on admission.   Community-acquired pneumonia: suspicious for right lower lobe pneumonia as well as left-sided postobstructive pneumonia with known left bronchial compression by tumor.  Plan:   -Continue ceftriaxone and azithromycin  -Oximetry, oxygen as needed  -Continue radiation oncology treatments for tumor compression  -Aggressive pulmonary toilet including Acapella valve, incentive spirometry.   -Low threshold for speech pathology evaluation if any evidence of aspiration or dysphasia with oral intake.      Metabolic encephalopathy: Has been attributed to medications in the past including opiates, benzodiazepines, marijuana, though also could be secondary to metastatic malignancy.  Known cognitive impairment. Improving  Plan:  -As below, due for outpatient MRI brain.  Could consider advancing this to current hospitalization, though no focal neurologic deficits and AMS is improving  -Resume prior to admission Aricept when reconciled by pharmacy    Metastatic adenocarcinoma of the lung: Initial diagnosis in 2015, recurrence in 2019.  Follows with Brett Manrique and Dr Mullins  of [place] [facility] patient tells me that he is not on chemotherapy, though note.  Keytruda, then carboplatin and Taxol at the end of July, pembrolizumab and carboplatin 8/19.  Plan:  -Continue prior to admission fentanyl patch  -Radiation oncology consulted.  Patient is due for radiation treatment this afternoon and may still be able to make this appointment.  -[place] [facility] consulted.  Patient is due for repeat imaging studies.  He did recently have a CT PE study at last admission, though is also due for outpatient MRI of brain.     Severe malnutrition: In the setting of known metastatic malignancy.  Followed by nutrition at last hospitalization  -Regular adult diet as tolerated  -Vanilla boost plus shakes twice daily between meals    SIADH with hyponatremia: Stable with serum sodium of 124  -Diet as tolerated.  Malnutrition more concerning than moderately low sodium at this time, and I would avoid dietary restrictions if able.    Hypothyroidism:  -Resume prior to admission levothyroxine    Goals of care planning: Discussed goals of care with patient in the emergency department.  He tells me that he is DNR/DNI, and informs me that he filled out an advanced directive to this effect that we currently do not have on file.  If he experienced respiratory failure, he would desire a focus on comfort.  He tells me that he has met with palliative care and had considered hospice in the future.  His plan is to remain at home with his wife as long as possible, and when she is no longer able to take care of him to transition to another facility, though he does not elaborate on this further.  Receiving radiation oncology, so not a candidate for hospice currently.  Additionally, I believe he is still receiving chemotherapy, though he does not recall this.          Diet: Combination Diet Regular Diet Adult  Snacks/Supplements Adult: Boost Plus; Between Meals    DVT Prophylaxis: Low Risk/Ambulatory with no VTE prophylaxis  indicated  Ocampo Catheter: not present  Code Status: DNR/DNI         Disposition Plan   Expected discharge: 2 - 3 days, recommended to transitional care unit once antibiotic plan established and O2 use less than 2 liters/minute.  Entered: Ajith Corbett MD 09/01/2020, 6:52 PM       The patient's care was discussed with the Bedside Nurse and Patient.    Ajith Corbett MD  Hospitalist Service  [facility]    ______________________________________________________________________    Interval History     Feels improved today  Felt winded after ambulating to bathroom  But no CP  No fevers since admission  No nausea/vomiting or abdominal pain  No new complaints.     Data reviewed today: I reviewed all medications, new labs and imaging results over the last 24 hours. I personally reviewed no images or EKG's today.    Physical Exam   Vital Signs: Temp: 97.6  F (36.4  C) Temp src: Oral BP: 118/78 Pulse: 85   Resp: 22 SpO2: 99 % O2 Device: Nasal cannula Oxygen Delivery: 4 LPM  Weight: 139 lbs 6.4 oz    General Appearance: Cachectic appearing 70-year-old male resting comfortably in bed.  Eyes: No scleral icterus or injection.  HEENT: Central forehead lesion from prior fall with overlying scab.  Facial lipodystrophy.  Dry oral mucosa  Respiratory: Rhonchi throughout lung fields with exception of right apex spared.  Wet rattling cough.  Mild tachypnea  Cardiovascular: Regular rate and rhythm with heart rate currently in the 90s  GI: Abdomen thin, soft, no tenderness palpation.  No palpable mass.  Lymph/Hematologic: No lower extremity edema  Skin: Some mild telangiectasias of chest and face  Neurologic: Patient is alert, conversant.  This said, he is still mildly encephalopathic, which seems to be more longstanding.   Psychiatric: Normal affect, pleasant       Data   Recent Labs   Lab 09/01/20  0751 09/01/20  0209 08/28/20  0749 08/27/20  1741  08/27/20  0645  08/26/20  1556   WBC  --  16.6* 8.4  --   --  4.6  --  3.9*    HGB  --  9.3* 8.3*  --   --  8.9*  --  9.4*   MCV  --  78 77*  --   --  77*  --  78   PLT  --  252 200  --   --  178  --  177   NA  --  124* 123* 124*   < > 122*   < > 124*   POTASSIUM  --  3.9 4.1  --   --  4.0  --  3.7   CHLORIDE  --  90* 89*  --   --  89*  --  90*   CO2  --  29 27  --   --  26  --  28   BUN  --  11 9  --   --  10  --  12   CR 0.58* 0.61* 0.64*  --   --  0.61*  --  0.62*   ANIONGAP  --  5 7  --   --  7  --  6   SUSIE  --  8.3* 8.0*  --   --  8.4*  --  8.5   GLC  --  120* 119*  --   --  91  --  121*   ALBUMIN  --  2.3*  --   --   --   --   --  2.7*   PROTTOTAL  --  6.1*  --   --   --   --   --  6.4*   BILITOTAL  --  0.9  --   --   --   --   --  0.8   ALKPHOS  --  127  --   --   --   --   --  121   ALT  --  16  --   --   --   --   --  14   AST  --  10  --   --   --   --   --  5    < > = values in this interval not displayed.     Recent Results (from the past 24 hour(s))   XR Chest Port 1 View    Narrative    EXAM: XR CHEST PORT 1 VW  LOCATION: [facility]  DATE/TIME: 9/1/2020 2:26 AM    INDICATION: Shortness of breath with history of lung cancer  COMPARISON: Chest radiograph 08/26/2020, CT chest PE study 08/26/2020      Impression    IMPRESSION: New focal right perihilar opacity in the appearance of alveolar infiltrate and relating into new pneumonia. Continued radiographic follow-up recommended. Large left lung mass remains essentially unchanged. Increasing alveolar opacities left   lung base with increasing elevation left hemidiaphragm. Postobstructive pneumonia in the left lower lung could have this appearance. Tortuous aorta. Normal pulmonary vascularity. Normal heart size.     Medications       [START ON 9/2/2020] azithromycin  250 mg Intravenous Q24H     [START ON 9/2/2020] cefTRIAXone  2 g Intravenous Q24H     enoxaparin ANTICOAGULANT  40 mg Subcutaneous Q24H     fentaNYL  12 mcg Transdermal Q72H     fentaNYL   Transdermal Q8H     levothyroxine  125 mcg Oral Daily     sodium  chloride (PF)  3 mL Intracatheter Q8H

## 2020-09-02 NOTE — PROGRESS NOTES
Radiation treat # 5 to Chest.  1500 cGy total dose to date delivered with 10 MV Photons.  Patient has 5 treatments remaining.   SE  RTT

## 2020-09-02 NOTE — PROGRESS NOTES
{ TIP  Improve documentation with new tip texts.  DO NOT DELETE TIPS. Once your note is signed tips will disappear. Learn more- Malnutrition tip sheet, Sepsis tip sheet, & QuickLearn videos                                    :[id number]}    [facility]    Medicine Progress Note - Hospitalist Service       Date of Admission:  9/1/2020  Date of Service: 09/02/2020    Assessment & Plan The email address for your [facility]льга account has been updated        Agapito Fairbanks is a 70 year old male admitted on 9/1/2020. He presents to the emergency department with complaints of shortness of breath after EMS was called to his home by his wife.  Found to have increased hypoxia from baseline as well as chest x-ray findings concerning for postobstructive pneumonia in the setting of metastatic adenocarcinoma of the lung.    Acute on chronic hypoxia: Baseline on 3 L nasal cannula oxygen as needed for metastatic adenocarcinoma of the lung.  Now requiring 5 L to maintain oxygen saturations.  Suspect multifactorial with lung cancer, anticipated new pneumonia. COVID-19 PCR was negative on admission.   Community-acquired pneumonia: suspicious for right lower lobe pneumonia as well as left-sided postobstructive pneumonia with known left bronchial compression by tumor.  Plan:   -Continue ceftriaxone and azithromycin  -Oximetry, oxygen as needed  -Continue radiation oncology treatments for tumor compression  -Aggressive pulmonary toilet including Acapella valve, incentive spirometry.     Metabolic encephalopathy: Has been attributed to medications in the past including opiates, benzodiazepines, marijuana, though also could be secondary to metastatic malignancy.  Known cognitive impairment. Improving with negative brain MRI for mets.  Plan:  -Resume prior to admission Aricept     Metastatic adenocarcinoma of the lung: Initial diagnosis in 2015, recurrence in 2019.  Follows with Brett Manriuqe and Dr Mullins of [facility] patient  tells me that he is not on chemotherapy, though note.  Keytruda, then carboplatin and Taxol at the end of July, pembrolizumab and carboplatin 8/19.  Plan:  -Continue prior to admission fentanyl patch  -Radiation oncology consulted.  Patient is due for radiation treatment this afternoon and may still be able to make this appointment.  -[place] [facility] consulted.  Patient is due for repeat imaging studies.  He did recently have a CT PE study at last admission, though is also due for outpatient MRI of brain.     Severe malnutrition: In the setting of known metastatic malignancy.  Followed by nutrition at last hospitalization  -Regular adult diet as tolerated  -Vanilla boost plus shakes twice daily between meals    SIADH with hyponatremia: Stable with serum sodium of 124  -Diet as tolerated.  Malnutrition more concerning than moderately low sodium at this time, and I would avoid dietary restrictions if able.    Hypothyroidism:  -Resume prior to admission levothyroxine    Goals of care planning: Discussed goals of care with patient in the emergency department.  He tells me that he is DNR/DNI, and informs me that he filled out an advanced directive to this effect that we currently do not have on file.  If he experienced respiratory failure, he would desire a focus on comfort.  He tells me that he has met with palliative care and had considered hospice in the future.  His plan is to remain at home with his wife as long as possible, and when she is no longer able to take care of him to transition to another facility, though he does not elaborate on this further.  Receiving radiation oncology, so not a candidate for hospice currently.  Additionally, I believe he is still receiving chemotherapy, though he does not recall this.          Diet: Combination Diet Regular Diet Adult  Room Service  Snacks/Supplements Adult: Other; 4oz vanilla Boost mixed with 4oz milk  (RD); Between Meals    DVT Prophylaxis: Low Risk/Ambulatory  with no VTE prophylaxis indicated  Ocampo Catheter: not present  Code Status: DNR/DNI         Disposition Plan   Expected discharge: 2 - 3 days, recommended to transitional care unit once antibiotic plan established and O2 use less than 2 liters/minute.  Entered: Ajith Corbett MD 09/02/2020, 6:15 PM       The patient's care was discussed with the Bedside Nurse and Patient.    Ajith Corbett MD  Hospitalist Service  [facility]    ______________________________________________________________________    Interval History     Feels improved today, mental status mostly back to baseline per wife.   But no CP  No fevers overnight  No nausea/vomiting or abdominal pain  No new complaints.     Data reviewed today: I reviewed all medications, new labs and imaging results over the last 24 hours. I personally reviewed no images or EKG's today.    Physical Exam   Vital Signs: Temp: 96.9  F (36.1  C) Temp src: Axillary BP: 114/73 Pulse: 86   Resp: 20 SpO2: 99 % O2 Device: Nasal cannula Oxygen Delivery: 3 LPM  Weight: 141 lbs 11.2 oz    General Appearance: Cachectic appearing 70-year-old male resting comfortably in bed.  Eyes: No scleral icterus or injection.  HEENT: Central forehead lesion from prior fall with overlying scab.  Facial lipodystrophy.  Dry oral mucosa  Respiratory: Rhonchi throughout lung fields with exception of right apex spared.  Wet rattling cough.  Mild tachypnea  Cardiovascular: Regular rate and rhythm with heart rate currently in the 90s  GI: Abdomen thin, soft, no tenderness palpation.  No palpable mass.  Lymph/Hematologic: No lower extremity edema  Skin: Some mild telangiectasias of chest and face  Neurologic: Patient is alert, conversant.  This said, he is still mildly encephalopathic, which seems to be more longstanding.   Psychiatric: Normal affect, pleasant       Data   Recent Labs   Lab 09/02/20  0738 09/01/20  0751 09/01/20  0209 08/28/20  0749   WBC 18.9*  --  16.6* 8.4   HGB 8.7*  --  9.3*  8.3*   MCV 78  --  78 77*     --  252 200   *  --  124* 123*   POTASSIUM 4.2  --  3.9 4.1   CHLORIDE 93*  --  90* 89*   CO2 32  --  29 27   BUN 11  --  11 9   CR 0.57* 0.58* 0.61* 0.64*   ANIONGAP 4  --  5 7   SUSIE 8.4*  --  8.3* 8.0*   GLC 92  --  120* 119*   ALBUMIN  --   --  2.3*  --    PROTTOTAL  --   --  6.1*  --    BILITOTAL  --   --  0.9  --    ALKPHOS  --   --  127  --    ALT  --   --  16  --    AST  --   --  10  --      Recent Results (from the past 24 hour(s))   MR Brain w/o & w Contrast    Narrative    MRI BRAIN WITHOUT AND WITH CONTRAST  9/2/2020 10:20 AM    HISTORY:  Encephalopathy. History of stage 4 lung cancer.    TECHNIQUE:  Multiplanar, multisequence MRI of the brain without and  with 6 mL Gadavist.    COMPARISON: 4/21/2020.    FINDINGS: Diffusion-weighted images are normal. There is no evidence  for intracranial hemorrhage or acute infarct. There is some mild  cerebral atrophy. There is some minimal slight increased signal  intensity in both hippocampal nuclei similar to that seen on the prior  exam, but this is not associated with any contrast enhancement. The  brain parenchyma is otherwise normal. Postcontrast images do not show  any abnormal areas of enhancement or any focal mass lesions.  Previously seen left parotid lesion is not completely included on this  exam but appears smaller.      Impression    IMPRESSION:  1. Mild cerebral atrophy.  2. Increased signal intensity in the hippocampal regions bilaterally  without enhancement most likely due to some chronic hippocampal  sclerosis.  3. Previously described left parotid lesion is smaller than that seen  on prior exam. Clinical correlation suggested.  4. No evidence for acute infarct, acute hemorrhage, or any  intracranial metastatic disease.    MADDIE PICKARD MD     Medications       azithromycin  250 mg Intravenous Q24H     cefTRIAXone  2 g Intravenous Q24H     donepezil  5 mg Oral At Bedtime     enoxaparin ANTICOAGULANT  40 mg  Subcutaneous Q24H     fentaNYL  12 mcg Transdermal Q72H     fentaNYL   Transdermal Q8H     gabapentin  300 mg Oral TID     levothyroxine  125 mcg Oral Daily     sertraline  50 mg Oral At Bedtime     sodium chloride (PF)  3 mL Intracatheter Q8H     zinc sulfate  220 mg Oral Daily

## 2020-09-02 NOTE — PLAN OF CARE
Pt A/Ox4, but very forgetful and impulsive.  Vitally stable on 3L O2, pt's baseline.  Up with Ax1 and gait belt in hallway today.  Completed day 5 of 10 radiation.  Still needs sputum sample, but pt unable to expectorate.  Continue with Iv abx.

## 2020-09-02 NOTE — PROGRESS NOTES
Visit Date:   09/01/2020      Agapito Fairbanks is a 70-year-old gentleman we have been asked to see for Hematology/Oncology consultation by Dr. Mar.  He has a diagnosis of metastatic adenocarcinoma of the lung.      CHIEF COMPLAINT:   1.  Metastatic adenocarcinoma of the lung, currently looked after by Dr. Mullins at [facility].   2.  New diagnosis of pneumonia.      HISTORY OF PRESENTING COMPLAINT:  Agapito Fairbanks is a 70-year-old gentleman who presented to the Emergency Department with shortness of breath and found to have hypoxia.  He had a chest x-ray done, which showed a new focal right perihilar opacity as well as an alveolar infiltrate consistent with pneumonia.  He also has a large left lung mass where he has his lung cancer.  There was also felt to be a postobstructive pneumonia in the left lung.  He was admitted for IV antibiotics.  Since he is currently on chemotherapy and immunotherapy, we have been asked to follow his hospital course and help with his hospitalization.      PAST MEDICAL HISTORY:   1.  Metastatic adenocarcinoma of the lung.  He has been on treatment with Keytruda, carboplatin and Taxol.  All of his treatment will be on hold now until he recovers from his pneumonia.   2.  History of community-acquired pneumonia.  There is a suspicious right lower lobe pneumonia as well as a left postobstructive pneumonia.  He is currently on oxygen as needed as well as antibiotics.  He has a COVID-19 test pending.   3.  The patient has a history of metabolic encephalopathy.  He was due to have an MRI scan of the brain as an outpatient.  We will do this while he is here.   4.  History of hyponatremia.  This is most likely related to his lung cancer and also malnutrition.  He normally runs a sodium in the 120s, and this has been stable.   5.  History of hypothyroidism.      PAST SURGICAL HISTORY:  History of retinal surgery, history of cataract surgery, history of right hemilaryngectomy for squamous  cell carcinoma, history of Mohs procedure for basal cell carcinoma, history of laparoscopic wedge resection.      SOCIAL HISTORY:  The patient is a former smoker of about 3 years.  He has a 45-pack-year history of smoking, quit in 1998, drinks alcohol socially.      FAMILY HISTORY:  Negative for lung cancer.  His mother had colon cancer.  His brother had prostate cancer.      MEDICATIONS AND ALLERGIES:  Outlined in the nursing records.      PAIN ASSESSMENT:  The patient denies pain today.      PHYSICAL EXAMINATION:   GENERAL:  Reveals a well-appearing gentleman.  He is cachectic and pale.  He has got no acute distress.  He is alert and oriented x 3.  He has a lesion on the central forehead where he bumped into a wall.   NECK:  Oral mucosa is slightly dry.   CHEST:  Remarkable for some mild crackles and decreased breath sounds throughout.  He is currently on oxygen.   HEART:  Sounds 1, 2 normal.  No added sounds or murmurs.   GASTROINTESTINAL:  Abdomen is soft and nontender, no hepatosplenomegaly.   EXTREMITIES:  Legs without tenderness or edema.   SKIN:  Has no rashes or lesions other than the lesion on his forehead.   NEUROLOGIC:  The patient is alert and oriented.  He is somewhat forgetful.  He does have chronic encephalopathy.   PSYCHIATRIC:  Normal.      DATA REVIEW:  I have reviewed all of his lab work as well as his imaging.      IMPRESSION:  This is a 70-year-old gentleman who has metastatic adenocarcinoma of the lung involving the left side.  He has been on treatment with Keytruda, Taxol and carboplatin.  He seems to have been tolerating it quite well.  We are going to hold all treatment for now.  He has bilateral pneumonia and currently on antibiotics and oxygen.  We will await his COVID-19 test.  Since he has encephalopathy and he was due to get an MRI scan,  I have ordered that here as an inpatient.  I discussed this with him today, and he is in agreement.  We will follow his hospital course.      Thank  you for allowing us to participate in his care.         SANTOSH GARSIA MD             D: 2020   T: 2020   MT: EVON      Name:     CHRISTI MANN   MRN:      [id number]        Account:      [id number]   :      1950           Visit Date:   2020      Document: [id number]

## 2020-09-02 NOTE — PLAN OF CARE
Tx from OBS 2300. Fluctuating orientation, was disoriented to situation and at times forgetting he was in the hospital overnight. Can be impulsive getting out of bed and forgets to call, needs bed alarm and frequent reorientation. MRI brain completed overnight- results pending. VSS, weaned back to baseline O2 of 3L, satting mid 90's. Gets CAPUTO, frequent very loose productive cough. LS coarse throughout, very diminished on L. Voiding in BR. Radiation 4/10 yesterday to L lung. Discharge pending, still on IV abx.

## 2020-09-02 NOTE — PROGRESS NOTES
Pt. A&OX4, forgetful at times. VSS on 4L NC. Pt denies pain. Fetanyl patch removed before MRI. CMS intact. LS coarse/diminished, dyspnea on exertion, sputum sample still needed. Report given to nurse on 88. Pt left for MRI @21:45 and will transfer to 88 after MRI.

## 2020-09-03 NOTE — PROGRESS NOTES
09/03/20 1056   General Information   Onset Date 09/01/20   Start of Care Date 09/03/20   Patient Profile Review/OT: Additional Occupational Profile Info See Profile for full history and prior level of function   Patient/Family Goals Statement Patient did not state.    Swallowing Evaluation Bedside swallow evaluation   Behaviorial Observations Alert   Mode of current nutrition NPO  (Recently eating a regular diet until this morning. )   Respiratory Status O2 Supply   Type of O2 supply High flow face mask  (30LPM at 70%)   Comments Agapito Fairbanks is a 70 year old male admitted on 9/1/2020. He presents to the emergency department with complaints of shortness of breath after EMS was called to his home by his wife.  Found to have increased hypoxia from baseline as well as chest x-ray findings concerning for postobstructive pneumonia in the setting of metastatic adenocarcinoma of the lung. 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    Clinical Swallow Evaluation   Oral Musculature generally intact   Structural Abnormalities none present   Dentition present and adequate  (Ill condition of the molars. )   Mucosal Quality dry   Mandibular Strength and Mobility intact   Oral Labial Strength and Mobility WFL   Lingual Strength and Mobility impaired left lateral movement;impaired right lateral movement;impaired coordination   Velar Elevation impaired   Buccal Strength and Mobility impaired   Laryngeal Function Cough;Throat clear;Swallow;Voicing initiated;Dry swallow palpated  (Limited elevation and hard tissue. Hoarse vocal qulaity. )   Swallow Eval   Feeding Assistance set up only required   Clinical Swallow Eval: Thin Liquid Texture Trial   Mode of Presentation, Thin Liquids cup;spoon;self-fed;fed by clinician   Volume of Liquid or Food Presented 5 swallows of ice chips and water   Oral Phase of Swallow Premature pharyngeal entry   Pharyngeal Phase of Swallow  impaired;coughing/choking;reduction in laryngeal movement;repeated swallows   Diagnostic Statement Overt Sx of aspiration via the cup with an immediate cough. Suspect penetration via the spoon.    Clinical Swallow Eval: Nectar Thick Liquid Texture Trial   Mode of Presentation, Nectar spoon;self-fed;cup   Volume of Nectar Presented 5 swallows   Oral Phase, Nectar Premature pharyngeal entry   Pharyngeal Phase, Nectar impaired;coughing/choking;reduction in laryngeal movement;repeated swallows   Diagnostic Statement Coughing via the cup tolerated 3 teaspoons, but suspect penetration and increased residue.   Clinical Swallow Eval: Puree Solid Texture Trial   Mode of Presentation, Puree spoon;fed by clinician   Volume of Puree Presented 2 teaspoons of apple sauce   Oral Phase, Puree Premature pharyngeal entry   Pharyngeal Phase, Puree impaired;feeling of something stuck in throat;reduction in laryngeal movement;repeated swallows;throat clearing   Diagnostic Statement Globus sensation that is difficult to clear.    Swallow Compensations   Swallow Compensations Pacing;Reduce amounts;Multiple swallow;Alternate viscosity of consistencies   Results Suspect silent aspiration;Oral difficulties only   Esophageal Phase of Swallow   Patient reports or presents with symptoms of esophageal dysphagia Yes   Esophageal comments Burping with mild belching.   Swallow Eval: Clinical Impressions   Skilled Criteria for Therapy Intervention Skilled criteria met.  Treatment indicated.   Functional Assessment Scale (FAS) 2   Treatment Diagnosis Moderate to severe oral and pharyngeal dysphagia   Diet texture recommendations NPO   Therapy Frequency 5x/week   Predicted Duration of Therapy Intervention (days/wks) 1 week   Anticipated Discharge Disposition home w/ home health   Risks and Benefits of Treatment have been explained. Yes   Patient, family and/or staff in agreement with Plan of Care Yes   Clinical Impression Comments Patient presents  with moderate to severe oral and pharyngeal dysphagia at bedside secondary to  metastatic adenocarcinoma of the lung and now in the setting of pneumonia. Per patient's nurse she was concerned he aspirated when taking his medications and made NPO. His oxygen needs increased to HFNC 30LMP at 70%% which is considered high for safe oral intake. Patient demonstrated premature entry of thin liquids with overt Sx of aspiration via the cup with an immediate cough response. Ice chips were mildly tolerated, but penetration can not be ruled out. He was able to tolerate small 1-3 teaspoons of nectar thick liquids, but had an increased globus sensation and delayed Sx of aspiration via the cup. He was able to manipulate and propel a pureed bolus with decreased limited laryngeal elevation ( due to  late effects of  radiation for throat cancer in 2005) this was difficult for him to clear with multiple swallows and teaspoon amounts of liquid. He continued to cough throughout the evaluation with large amounts of phlegm being brought up. Patient is considered a high risk for aspiration given his presentation and high oxygen needs. Recommend; 1. NPO except for very small single ice chips with RN supervision and will need artifical saliva for severe xerostomia. Once off HFNC would complete a video swallow study to fully assess pharyngeal function for the least restrictive diet.    Total Evaluation Time   Total Evaluation Time (Minutes) 30

## 2020-09-03 NOTE — PROGRESS NOTES
[facility]    Medicine Progress Note - Hospitalist Service       Date of Admission:  9/1/2020  Date of Service: 09/03/2020    Assessment & Plan The email address for your [employer] account has been updated        Agapito Fairbanks is a 70 year old male admitted on 9/1/2020. He presents to the emergency department with complaints of shortness of breath after EMS was called to his home by his wife.  Found to have increased hypoxia from baseline as well as chest x-ray findings concerning for postobstructive pneumonia in the setting of metastatic adenocarcinoma of the lung.    Acute on chronic hypoxia: Baseline on 3 L nasal cannula oxygen as needed for metastatic adenocarcinoma of the lung.  Now requiring 5 L to maintain oxygen saturations.  Suspect multifactorial with lung cancer, anticipated new pneumonia. COVID-19 PCR was negative on admission.   Community-acquired pneumonia  Aspiration PNA?  Assessment: suspicious for right lower lobe pneumonia as well as left-sided postobstructive pneumonia with known left bronchial compression by tumor. Had episode of respiratory decompensation 09/03 morning, possibly from aspiration event.   Plan:   -CXR in AM  -SLP eval  -NPO   -Continue ceftriaxone and azithromycin  -Oximetry, oxygen as needed  -Continue radiation oncology treatments for tumor compression  -Aggressive pulmonary toilet including Acapella valve, incentive spirometry.     Metabolic encephalopathy: Has been attributed to medications in the past including opiates, benzodiazepines, marijuana, though also could be secondary to metastatic malignancy.  Known cognitive impairment. Improving with negative brain MRI for mets.  Plan:  -Resume prior to admission Aricept     Metastatic adenocarcinoma of the lung: Initial diagnosis in 2015, recurrence in 2019.  Follows with Brett Manrique and Dr Mullins of [place] oncology patient tells me that he is not on chemotherapy, though note.  Keytruda, then carboplatin  and Taxol at the end of July, pembrolizumab and carboplatin 8/19.  Plan:  -Continue prior to admission fentanyl patch  -Radiation oncology consulted.  Patient is due for radiation treatment this afternoon and may still be able to make this appointment.  -[place] oncology consulted.  Patient is due for repeat imaging studies.  He did recently have a CT PE study at last admission, though is also due for outpatient MRI of brain.     Severe malnutrition: In the setting of known metastatic malignancy.  Followed by nutrition at last hospitalization  -Regular adult diet as tolerated  -Vanilla boost plus shakes twice daily between meals    SIADH with hyponatremia: Stable with serum sodium of 124  -Diet as tolerated.  Malnutrition more concerning than moderately low sodium at this time, and I would avoid dietary restrictions if able.    Hypothyroidism:  -Resume prior to admission levothyroxine    Goals of care planning: Discussed goals of care with patient in the emergency department.  He tells me that he is DNR/DNI, and informs me that he filled out an advanced directive to this effect that we currently do not have on file.  If he experienced respiratory failure, he would desire a focus on comfort.  He tells me that he has met with palliative care and had considered hospice in the future.  His plan is to remain at home with his wife as long as possible, and when she is no longer able to take care of him to transition to another facility, though he does not elaborate on this further.  Receiving radiation oncology, so not a candidate for hospice currently.  Additionally, I believe he is still receiving chemotherapy, though he does not recall this.          Diet: Room Service  Snacks/Supplements Adult: Other; 4oz vanilla Boost mixed with 4oz milk  (RD); Between Meals  NPO for Medical/Clinical Reasons Except for: No Exceptions    DVT Prophylaxis: Low Risk/Ambulatory with no VTE prophylaxis indicated  Ocampo Catheter: not  present  Code Status: DNR/DNI         Disposition Plan   Expected discharge: 2 - 3 days, recommended to transitional care unit once antibiotic plan established and O2 use less than 2 liters/minute.  Entered: Ajith Corbett MD 09/03/2020, 5:43 PM       The patient's care was discussed with the Bedside Nurse and Patient.    Ajith Corbett MD  Hospitalist Service  [facility]    ______________________________________________________________________    Interval History     Had possible aspiration even this AM -> needed HF NC for period of time. Improved since then, now on Oxymask.   But no CP  No fevers overnight  No nausea/vomiting or abdominal pain  No new complaints.     Data reviewed today: I reviewed all medications, new labs and imaging results over the last 24 hours. I personally reviewed no images or EKG's today.    Physical Exam   Vital Signs: Temp: 97.4  F (36.3  C) Temp src: Oral BP: 127/74 Pulse: 90   Resp: 18 SpO2: 97 % O2 Device: High Flow Nasal Cannula (HFNC) Oxygen Delivery: 15 LPM  Weight: 141 lbs 11.2 oz    General Appearance: Cachectic appearing 70-year-old male resting comfortably in bed.  HEENT: Central forehead lesion from prior fall with overlying scab.  Facial lipodystrophy.  Dry oral mucosa  Respiratory: Rhonchi throughout lung fields.   Cardiovascular: Regular rate and rhythm with heart rate currently in the 90s  GI: Abdomen thin, soft, no tenderness palpation.  No palpable mass.  Lymph/Hematologic: No lower extremity edema  Skin: Some mild telangiectasias of chest and face  Neurologic: Patient is alert, conversant.  This said, he is still mildly encephalopathic, which seems to be more longstanding.   Psychiatric: Normal affect, pleasant       Data   Recent Labs   Lab 09/02/20  0738 09/01/20  0751 09/01/20  0209 08/28/20  0749   WBC 18.9*  --  16.6* 8.4   HGB 8.7*  --  9.3* 8.3*   MCV 78  --  78 77*     --  252 200   *  --  124* 123*   POTASSIUM 4.2  --  3.9 4.1    CHLORIDE 93*  --  90* 89*   CO2 32  --  29 27   BUN 11  --  11 9   CR 0.57* 0.58* 0.61* 0.64*   ANIONGAP 4  --  5 7   SUSIE 8.4*  --  8.3* 8.0*   GLC 92  --  120* 119*   ALBUMIN  --   --  2.3*  --    PROTTOTAL  --   --  6.1*  --    BILITOTAL  --   --  0.9  --    ALKPHOS  --   --  127  --    ALT  --   --  16  --    AST  --   --  10  --      No results found for this or any previous visit (from the past 24 hour(s)).  Medications       azithromycin  250 mg Intravenous Q24H     cefTRIAXone  2 g Intravenous Q24H     donepezil  5 mg Oral At Bedtime     enoxaparin ANTICOAGULANT  40 mg Subcutaneous Q24H     fentaNYL  12 mcg Transdermal Q72H     fentaNYL   Transdermal Q8H     gabapentin  300 mg Oral TID     levothyroxine  125 mcg Oral Daily     sertraline  50 mg Oral At Bedtime     sodium chloride (PF)  3 mL Intracatheter Q8H     zinc sulfate  220 mg Oral Daily

## 2020-09-03 NOTE — PROGRESS NOTES
Radiation treatment # 6 to Chest.  1800 cGy total dose to date delivered with 10 MV Photons.  Patient has 4 treatments remaining.  SE RTT

## 2020-09-03 NOTE — PLAN OF CARE
Discharge Planner PT   Patient plan for discharge: Home with spouse, likely tomorrow per spouse, resumed Home RN & PT  Current status: Pt admitted 9/1 with acute on chronic hypoxia & community acquired pneumonia and post-obstructive pneumonia in the setting of metastatic adenocarcinoma of the lung. Pt lives with spouse in [facility] with elevator access. Pt typically ambulates without AD within the home, though has 4WW if needed. Pt uses 4WW or WC for mobility outside the home, spouse will usually push pt seated on 4WW or WC for appointments. Bathroom includes walk in shower with seat and grab bar, standard toilet- has been using 4WW for toilet transfer, spouse has ordered toilet frame for UE support. Spouse provides assist as needed for mobility and ADLs. Per spouse, pt had 5 falls ~ 1 week ago, spouse reports these were due to medication side effects & pt hasn't fallen since meds adjusted.    Pt supine upon arrival, agreeable to PT. Spouse present & supportive. Pt with 15 L O2 via oxymask throughout session, SpO2 [id number] at rest. Supine<>sit with SBA from flat bed without rails. Sit>stand with FWW and SBA, engaged in standing marching to prep for mobility, SpO2 >95, pt fatigues easily. After seated rest, ambulated 40' in room with FWW and SBA, pt fatigues with ambulation & short of breath, SpO2 88-90 post gait, recovering to 97 with rest. Pt reports back pain, returned to supine in bed with SBA.. Discussed rec to use 4WW for ambulation for activity tolerance and balance, pt/spouse agreeable.     Spouse affirms that she can provide assist as needed for pt at home with ADLs/mobility & declines need for IP OT    Barriers to return to prior living situation: current O2 needs exceed home level  Recommendations for discharge: Home with use of 4WW, SBA of spouse for mobility, assist of spouse for ADLs as needed; resume home PT  Rationale for recommendations: Patient is below baseline with increased O2 needs and fatigues  quickly, however able to mobilize fairly well with walker and SBA. Anticipate with continued medical management and mobility with staff & therapy, pt will be appropriate for discharge home with spouse when medically appropriate. Recommend continued home PT to progress activity tolerance, strength.         Entered by: Melony Salinas 09/03/2020 3:35 PM

## 2020-09-03 NOTE — PLAN OF CARE
Mostly A&O x4 although gets confused to place overnight. VSS on 3L. Oxycodone x1 for pain. Still having very frequent productive cough of green sputum, sample pending. Continues on azithromycin and rocephin. Radiation 5/10 today at 1400. Needs PT/OT eval. Possible discharge home after.

## 2020-09-03 NOTE — PLAN OF CARE
Pt A/Ox4, forgetful at times and gets confused at times, but redirectable.  Pt found this morning in respiratory distress (sats low 80's on 3L), pt can't remember, but most likely pt aspirated on water.  Pt given neb and placed on 15L O2.  Respiratory placed patient on high flow, until around 2pm.  Currently on 15L O2.  Lung sounds very coarse cough up large amounts of sputum frequently, yankers at bedside for patient use.  Speech saw, strict NPO.  Plan for swallow eval tomorrow.

## 2020-09-03 NOTE — PLAN OF CARE
Discharge Planner SLP   Patient plan for discharge: He wants to go home.   Current status: Bedside swallow evaluation completed. Patient presents with moderate to severe oral and pharyngeal dysphagia at bedside secondary to  metastatic adenocarcinoma of the lung and now in the setting of pneumonia. Per patient's nurse she was concerned he aspirated when taking his medications and made NPO. His oxygen needs increased to HFNC 30LMP at 70%% which is considered high for safe oral intake. Patient demonstrated premature entry of thin liquids with overt Sx of aspiration via the cup with an immediate cough response. Ice chips were mildly tolerated, but penetration can not be ruled out. He was able to tolerate small 1-3 teaspoons of nectar thick liquids, but had an increased globus sensation and delayed Sx of aspiration via the cup. He was able to manipulate and propel a pureed bolus with decreased limited laryngeal elevation ( due to  late effects of  radiation for throat cancer in 2005) this was difficult for him to clear with multiple swallows and teaspoon amounts of liquid. He continued to cough throughout the evaluation with large amounts of phlegm being brought up. Patient is considered a high risk for aspiration given his presentation and high oxygen needs.     Recommend; 1. NPO except for very small single ice chips with RN supervision and will need artifical saliva for severe xerostomia. Once off HFNC would complete a video swallow study to fully assess pharyngeal function for the least restrictive diet.     Barriers to return to prior living situation: dysphagia  Recommendations for discharge: Home with home ST pending results of video swallow study.   Rationale for recommendations: Pending results of a video swallow study may need continued ST needs for dysphagia management.        Entered by: July Mooney 09/03/2020 12:27 PM

## 2020-09-03 NOTE — PROGRESS NOTES
09/03/20 1503   Quick Adds   Type of Visit Initial PT Evaluation   Living Environment   Lives With spouse   Living Arrangements condominium   Home Accessibility   (elevator access)   Living Environment Comment Bathroom includes walk in shower with grab bar and bench, standard toilet- spouse just ordered handles/ frame for toilet.   Self-Care   Usual Activity Tolerance moderate   Current Activity Tolerance fair   Equipment Currently Used at Home grab bar, tub/shower;shower chair;walker, rolling;wheelchair, manual  (4WW)   Functional Level Prior   Ambulation 1-->assistive equipment  (no AD in condo/ SBA of spouse, sometimes 4WW outside)   Transferring 2-->assistive person  (SBA of spouse)   Toileting 1-->assistive equipment  (has been using 4WW for transfer)   Bathing 3-->assistive equipment and person   Fall history within last six months yes   Number of times patient has fallen within last six months   (5 in past 2 weeks, likely med related per spouse)   Prior Functional Level Comment Pt typically ambulates in condo without AD, does have 4WW if needed, also has WC borrowed from [employer] if needed, has been using 4WW- seated and pushed by spouse or WC if more fatigued/needs more trunk support when leaving home for appts.   General Information   Onset of Illness/Injury or Date of Surgery - Date 09/01/20   Referring Physician Ajith Corbett MD    Patient/Family Goals Statement to go home   Pertinent History of Current Problem (include personal factors and/or comorbidities that impact the POC) Pt admitted 9/1 with acute on chronic hypoxia & community acquired pneumonia and post-obstructive pneumonia in the setting of metastatic adenocarcinoma of the lung. Per spouse/chart, pt was back to 3L O2 (home level) here overnight but then had possible aspiration on medication this AM & needed Hi flow O2 most of the day, now is on 15 L O2 via oxymask.   Precautions/Limitations fall precautions;oxygen therapy device and L/min  (15  L via oxymask)   General Observations pt is pleasant & agreeable to PT   Cognitive Status Examination   Orientation orientation to person, place and time  (looks at newspaper for numerical date)   Level of Consciousness alert   Follows Commands and Answers Questions 100% of the time;able to follow multistep instructions   Personal Safety and Judgment intact   Pain Assessment   Patient Currently in Pain Yes, see Vital Sign flowsheet  (L chest 4-5/10 (states typical is 6-7))   Posture    Posture Forward head position;Protracted shoulders   Posture Comments flexed posture with walker   Range of Motion (ROM)   ROM Comment ROM appears WFL with bed mobility and transfers   Strength   Strength Comments Strength not formally assessed, WFL with bed mobility and transfers   Bed Mobility   Bed Mobility Comments Supine>sit from flat bed without rail with SBA   Transfer Skills   Transfer Comments sit>stand with FWW and SBA, pt demos tendency to reach to walker to stand   Gait   Gait Comments Ambulated in room with FWW and close SBA, with 15 L O2 via oxymask. Flexed posture, quick pace, fatigues quickly   Balance   Balance Comments Relies on walker support for dynamic balance   Sensory Examination   Sensory Perception Comments Reports baseline numbness/tingling B feet   Modality Interventions   Planned Modality Interventions Cryotherapy   General Therapy Interventions   Planned Therapy Interventions balance training;bed mobility training;gait training;neuromuscular re-education;strengthening;transfer training;home program guidelines;progressive activity/exercise   Clinical Impression   Criteria for Skilled Therapeutic Intervention yes, treatment indicated   PT Diagnosis decreased functional endurance   Influenced by the following impairments decreased activity tolerance, current respiratory status/increased O2 needs, impaired balance   Functional limitations due to impairments decreased independence with transfers, ambulation  "  Clinical Presentation Evolving/Changing  (increased O2 needs today, just switched off hiflow O2)   Clinical Presentation Rationale clinical judgement, chart review, pt symptoms   Clinical Decision Making (Complexity) Moderate complexity  (current respiratory needs, comorbities)   Therapy Frequency Daily   Predicted Duration of Therapy Intervention (days/wks) 3 days   Anticipated Discharge Disposition Home with Assist;Home with Home Therapy  (SBA of spouse for mobility/ADLs, resumed home PT)   Risk & Benefits of therapy have been explained Yes   Patient, Family & other staff in agreement with plan of care Yes   Clinical Impression Comments Patient is below baseline with increased O2 needs and fatigues quickly, however able to mobilize fairly well with walker and SBA. Anticipate with continued medical management and mobility with staff & therapy, pt will be appropriate for discharge home with spouse when medically appropriate. Recommend continued home PT to progress activity tolerance, strength.   [facility] TM \"6 Clicks\"   2016, Trustees of [facility], under license to [employer].  All rights reserved.   6 Clicks Short Forms Basic Mobility Inpatient Short Form   [facility] AM-PAC  \"6 Clicks\" V.2 Basic Mobility Inpatient Short Form   1. Turning from your back to your side while in a flat bed without using bedrails? 4 - None   2. Moving from lying on your back to sitting on the side of a flat bed without using bedrails? 3 - A Little   3. Moving to and from a bed to a chair (including a wheelchair)? 3 - A Little   4. Standing up from a chair using your arms (e.g., wheelchair, or bedside chair)? 3 - A Little   5. To walk in hospital room? 3 - A Little   6. Climbing 3-5 steps with a railing? 3 - A Little   Basic Mobility Raw Score (Score out of 24.Lower scores equate to lower levels of function) 19   Total Evaluation Time   Total Evaluation Time (Minutes) 10     "

## 2020-09-03 NOTE — PLAN OF CARE
Discharge Planner PT   Patient plan for discharge: home with spouse, agreeable to OP PT    Current status: Pt admitted to observation 9/1 after syncopal fall at home. Pt fell forward & impacted forehead & shoulder. Pt with neck pain & shoulder pain currently. CT head & neck indicated no acute processes. Pt lives with spouse in house with 4 steps to enter with 1 rail support. All needs met main level but pt does enjoy going to lower level to use computer. Pt typically ambulates without AD in the home, uses cane/walking stick for walks outside the home/around the block.     Currently, pt received supine in bed, agreeable to PT. Spouse present for session and supportive. Edu on role of PT/POC. Pt reports neck pain 2/10 at rest, 5/10 while ambulating. Supine<>sit from flat bed without rail with SBA-CGA, cues for log roll. Sit>stand with no AD with SBA. Ambulated to bathroom with SBA-CGA and no AD first few steps, then IV pole for support. Sit<>stand at toilet with use of R rail, demos mod ind level. Ambulated 130' in sanchez with IV pole support and SBA, pt noted with stiff posture, reduced cervical ROM while ambulating. Ambulated ~10' without IV support with decreased step length, some unsteadiness. Discussed recommendation to use cane at all times and have spouse next to him when ambulating outside home and doing stairs. Edu on need for increased caution while ambulating due to reduced cervical ROM & resulting affect on balance. Pt supine upon departure.    Barriers to return to prior living situation: none as spouse able to provide SBA as needed    Recommendations for discharge: Home with use of cane for all ambulation , SBA of spouse for stairs and ambulation outside the home until mod ind; OP PT recommended to address pt's neck & shoulder pain & to progress balance and safety with mobility    Rationale for recommendations: Patient is below baseline, needing unilateral UE support/ cane for ambulation and with increased  caution/ decreased balance, affected by decreased cervical ROM & pain. Pt demos mobility appropriate for discharge home with assist as describe above. Recommend OP PT to address neck & shoulder pain & progress balance/gait.        Entered by: Melony Salinas 09/03/2020 4:48 PM

## 2020-09-03 NOTE — PROGRESS NOTES
Hematology-Oncology Follow-up Note  [facility]     Today's Date: 09/03/20  Date of Admission:  9/1/2020  Reason for Consult: Metastatic adenocarcinoma  Presented  to emergency room with hypoxia      ASSESSMENT/ PLAN :  Agapito Fairbanks is a 70 year old male      Metastatic adenocarcinoma  Patient of Dr. Mullins's currently getting treatment with Keytruda Taxol carboplatin  Patient is also getting radiation to chest  -We will hold Keytruda Taxol and carboplatin until the discharge  Continue with radiation to chest per radiation oncologist  MRI of the brain negative for brain metastasis      Bilateral pneumonia  Patient is on IV antibiotics  Continue management per inpatient team        INTERIM HISTORY:  Patient is getting radiation.  He reports his breathing is better today     MEDICATIONS:  Reviewed         PHYSICAL EXAM:  Vital signs:  Temp: 97.1  F (36.2  C) Temp src: Oral BP: 125/76 Pulse: 87   Resp: 18 SpO2: 98 % O2 Device: High Flow Nasal Cannula (HFNC) Oxygen Delivery: 30 LPM Height: 182.9 cm (6') Weight: 64.3 kg (141 lb 11.2 oz)  Estimated body mass index is 19.22 kg/m  as calculated from the following:    Height as of this encounter: 1.829 m (6').    Weight as of this encounter: 64.3 kg (141 lb 11.2 oz).          LABS:  Recent Labs   Lab Test 09/02/20  0738   *   POTASSIUM 4.2   CHLORIDE 93*   CO2 32   ANIONGAP 4   BUN 11   CR 0.57*   GLC 92   SUSIE 8.4*     Recent Labs   Lab Test 09/02/20  0738 09/01/20  0209 08/28/20  0749   WBC 18.9* 16.6* 8.4   HGB 8.7* 9.3* 8.3*    252 200   MCV 78 78 77*   NEUTROPHIL  --  93.5 81.0     Recent Labs   Lab Test 09/01/20  0209 08/26/20  1556   BILITOTAL 0.9 0.8   ALKPHOS 127 121   ALT 16 14   AST 10 5   ALBUMIN 2.3* 2.7*             Brett Manrique, Nurse Practitioner   [facility]     Chart documentation with Dragon Voice recognition Software. Although reviewed after completion, some words and grammatical errors may  remain.

## 2020-09-04 NOTE — PLAN OF CARE
A&O although very forgetful and needs frequent redirection. VSS on HFNC @ 20LPM, satting high 90's. LS are very coarse throughout and pt expectorating frequent thick green and yellow sputum, has yaunker suction at bedside to help w/ secretions. Blanchable redness to coccyx, encourage repositioning in bed. Remains on IV azithromycin and rocephin.  IV dilaudid ordered overnight as pt is strict NPO, given x3. swallow eval today.

## 2020-09-04 NOTE — PLAN OF CARE
Discharge Planner SLP   Patient plan for discharge: Home asap  Current status: SLP: Limited Video Swallow Study completed d/t pt reported pain with sitting and shortness of breath/coughing on thick secretions. Productive cough with yellow/green phlegm prior to and throughout evaluation. VFSS images with poor quality as pt unable to lower shoulders.     Thin liquids by cup and straw trialed with good oral control, delayed epiglottic inversion and flash penetration with straw gulp after the cracker. Reduced pharyngeal constriction and overall weakness with purees and solids resulting in significant vallecular residue. Note that pt had consistent coughing throughout the study and trials of thin liquids that did not appear to be related to aspiration. Risk of aspiration associated with respiratory status and weakness.     Images reviewed with pt and wife with extensive education. Discussed pureed diet, however, pt would like a regular diet and choose softer foods for increased menu options and quality of life. Would take small bites, alternate solids and liquids, and double swallows as needed.     Pt/wife denied Dietician consult or protein supplements. Wife had questions re: feeding tube which were answered from SLP perspective, but deferred discussion to MD. Discussed that even though pt did not aspirate during this study, high risk remains and pt will need to follow strict swallow precautions if he wants to continue a regular diet.     Barriers to return to prior living situation: Dysphagia; deconditioning  Recommendations for discharge: Home with wife with Outpatient SLP services  Rationale for recommendations: Per discussion, pt and wife can manage diet modifications and swallow strategies. Would continue OP SLP for exercises and continued strategy training.        Entered by: Britany Mejia 09/04/2020 1:26 PM

## 2020-09-04 NOTE — PROGRESS NOTES
[facility]    Medicine Progress Note - Hospitalist Service       Date of Admission:  9/1/2020  Date of Service: 09/04/2020    Assessment & Plan The email address for your [facility] account has been updated        Agapito Fairbanks is a 70 year old male admitted on 9/1/2020. He presents to the emergency department with complaints of shortness of breath after EMS was called to his home by his wife.  Found to have increased hypoxia from baseline as well as chest x-ray findings concerning for postobstructive pneumonia in the setting of metastatic adenocarcinoma of the lung.    Acute on chronic hypoxia: Baseline on 3 L nasal cannula oxygen as needed for metastatic adenocarcinoma of the lung.  Now requiring 5 L to maintain oxygen saturations.  Suspect multifactorial with lung cancer, anticipated new pneumonia. COVID-19 PCR was negative on admission.   Community-acquired pneumonia  Aspiration PNA?  Assessment: suspicious for right lower lobe pneumonia as well as left-sided postobstructive pneumonia with known left bronchial compression by tumor. Had episode of respiratory decompensation 09/03 morning, possibly from aspiration event, now improving from resp standpoint.  Plan:   -SLP following  -Continue ceftriaxone and azithromycin  -Oximetry, oxygen as needed  -Continue radiation oncology treatments for tumor compression  -Aggressive pulmonary toilet including Acapella valve, incentive spirometry.     Metabolic encephalopathy: Has been attributed to medications in the past including opiates, benzodiazepines, marijuana, though also could be secondary to metastatic malignancy.  Known cognitive impairment. Improving with negative brain MRI for mets.  Plan:  -Resume prior to admission Aricept     Metastatic adenocarcinoma of the lung: Initial diagnosis in 2015, recurrence in 2019.  Follows with Brett Manrique and Dr Mullins of [facility] oncology patient tells me that he is not on chemotherapy, though note.   Keytruda, then carboplatin and Taxol at the end of July, pembrolizumab and carboplatin 8/19. MRI of the brain negative for brain metastasis. MRI back 04/2020 shows Destructive metastasis involving the L4 vertebral body extending into the paraspinal soft tissues.  Plan:  -Continue prior to admission fentanyl patch  -[facility] following .  -hold Keytruda Taxol and carboplatin until the discharge    Severe malnutrition: In the setting of known metastatic malignancy.  Followed by nutrition at last hospitalization  -Diet per SLP  -Vanilla boost plus shakes twice daily between meals    SIADH with hyponatremia: Stable with serum sodium of 124  -Diet as tolerated.  Malnutrition more concerning than moderately low sodium at this time, and I would avoid dietary restrictions if able.    Hypothyroidism:  -Resume prior to admission levothyroxine    Goals of care planning: Discussed goals of care with patient in the emergency department.  He tells me that he is DNR/DNI, and informs me that he filled out an advanced directive to this effect that we currently do not have on file.  If he experienced respiratory failure, he would desire a focus on comfort.  He tells me that he has met with palliative care and had considered hospice in the future.  His plan is to remain at home with his wife as long as possible, and when she is no longer able to take care of him to transition to another facility, though he does not elaborate on this further.  Receiving radiation oncology, so not a candidate for hospice currently.  Additionally, I believe he is still receiving chemotherapy, though he does not recall this.          Diet: Room Service  Snacks/Supplements Adult: Other; 4oz vanilla Boost mixed with 4oz milk  (RD); Between Meals  Regular Diet Adult    DVT Prophylaxis: Low Risk/Ambulatory with no VTE prophylaxis indicated  Ocampo Catheter: not present  Code Status: DNR/DNI         Disposition Plan   Expected discharge: Tomorrow,  recommended to transitional care unit once antibiotic plan established and O2 use less than 2 liters/minute.  Entered: Ajith Corbett MD 09/04/2020, 4:44 PM       The patient's care was discussed with the Bedside Nurse and Patient.    Ajith Corbtet MD  Hospitalist Service  [facility]    ______________________________________________________________________    Interval History     O2 needs declining  Improved today per his wife and patient himself  But no CP  No fevers overnight  No nausea/vomiting or abdominal pain  No new complaints.     Data reviewed today: I reviewed all medications, new labs and imaging results over the last 24 hours. I personally reviewed no images or EKG's today.    Physical Exam   Vital Signs: Temp: 96.2  F (35.7  C) Temp src: Oral BP: 106/72 Pulse: 92   Resp: 22 SpO2: 100 % O2 Device: Nasal cannula Oxygen Delivery: 6 LPM  Weight: 135 lbs 3.2 oz    General Appearance: Cachectic appearing 70-year-old male resting comfortably in bed.  HEENT: Central forehead lesion from prior fall with overlying scab.  Facial lipodystrophy.  Dry oral mucosa  Respiratory: Rhonchi throughout lung fields.   Cardiovascular: Regular rate and rhythm with heart rate currently in the 90s  GI: Abdomen thin, soft, no tenderness palpation.  No palpable mass.  Lymph/Hematologic: No lower extremity edema  Skin: Some mild telangiectasias of chest and face  Neurologic: Patient is alert, conversant. Moving all extremities.   Psychiatric: Normal affect, pleasant       Data   Recent Labs   Lab 09/04/20  0719 09/02/20  0738 09/01/20  0751 09/01/20  0209   WBC  --  18.9*  --  16.6*   HGB  --  8.7*  --  9.3*   MCV  --  78  --  78    271  --  252   NA  --  129*  --  124*   POTASSIUM  --  4.2  --  3.9   CHLORIDE  --  93*  --  90*   CO2  --  32  --  29   BUN  --  11  --  11   CR 0.57* 0.57* 0.58* 0.61*   ANIONGAP  --  4  --  5   SUSIE  --  8.4*  --  8.3*   GLC  --  92  --  120*   ALBUMIN  --   --   --  2.3*    PROTTOTAL  --   --   --  6.1*   BILITOTAL  --   --   --  0.9   ALKPHOS  --   --   --  127   ALT  --   --   --  16   AST  --   --   --  10     Recent Results (from the past 24 hour(s))   XR Chest 2 Views    Narrative    CHEST TWO VIEWS   9/4/2020 8:10 AM     HISTORY: Shortness of breath.    COMPARISON: Chest x-rays dated 9/1/2020 and 8/26/2020, CT chest dated  8/26/2020.    FINDINGS: Right lung hyperaerated. Bibasilar infiltrates are again  noted. There is slight improvement in the right midlung infiltrate but  worsening of the basilar infiltrates since the prior study dated  9/1/2020. Elevation left hemidiaphragm is again noted. No pneumothorax  is identified. There is mild soft tissue prominence in the left apex  corresponding with the soft tissue lesion seen on the prior CT  examination in the same location. Large mass projected over the left  mediastinum may have slightly decreased in size since the prior study  dated 9/1/2020. This could also be secondary to technique. Cardiac  silhouette is difficult to evaluate due to the mass projected over the  left mid to upper chest and mediastinum and the left hemidiaphragm  elevation.      Impression    IMPRESSION:   1. Large mass projected over the left mediastinum and left mid to  upper lung may have slightly decreased in size since the prior study.  2. Bibasilar infiltrates are again noted. These appear slightly  worsened in the bases as compared to the prior studies. Right midlung  infiltrate has improved since 9/1/2020. There is a probable left  pleural fluid collection and there may be a small right pleural fluid  collection.  3. Soft tissue density in the left apex corresponds to the soft tissue  mass in this location on the prior CT.  4. Hyperaeration likely represents underlying chronic pulmonary  disease.   XR Video Swallow with SLP or OT    Narrative    VIDEO SWALLOWING EVALUATION   9/4/2020 12:02 PM     HISTORY: Lung cancer status post right vc cx with  radiation and here  with pneumonia.    COMPARISON: None.    FLUOROSCOPY TIME: 1.1 minutes     Number of cine runs: 5    FINDINGS:    Thin: Minimal flash penetration. Otherwise normal.    Nectar: Not administered.    Honey: Not administered.    Pudding: Moderate residue. Otherwise normal.    Semisolid: Not administered.    Solid: Moderate residue. Otherwise normal.    This study only includes the cervical esophagus.    CHRISSY MCFARLANE MD     Medications     sodium chloride         azithromycin  250 mg Intravenous Q24H     cefTRIAXone  2 g Intravenous Q24H     donepezil  5 mg Oral At Bedtime     enoxaparin ANTICOAGULANT  40 mg Subcutaneous Q24H     fentaNYL  12 mcg Transdermal Q72H     fentaNYL   Transdermal Q8H     gabapentin  300 mg Oral TID     levothyroxine  125 mcg Oral Daily     sertraline  50 mg Oral At Bedtime     sodium chloride (PF)  3 mL Intracatheter Q8H     zinc sulfate  220 mg Oral Daily

## 2020-09-04 NOTE — PLAN OF CARE
Discharge Planner OT   Patient plan for discharge: home w/ A of spouse, home RN and PT  Current status: OT orders received and chart reviewed. Per PT- pt's wife assists w/ mobility and ADL's w/ pt and wife declining need for OT eval. Will complete OT order.  Barriers to return to prior living situation: defer to PT note  Recommendations for discharge: defer to PT note  Rationale for recommendations: defer to PT note       Entered by: Massiel Snow 09/04/2020 7:01 AM

## 2020-09-04 NOTE — PROGRESS NOTES
Hematology-Oncology Follow-up Note  [facility]     Today's Date: 09/04/20  Date of Admission:  9/1/2020  Reason for Consult: Metastatic adenocarcinoma  Presented  to emergency room with hypoxia      ASSESSMENT/ PLAN :  Agapito Fairbanks is a 70 year old male      Metastatic adenocarcinoma  Patient of Dr. Mullins's currently getting treatment with Keytruda Taxol carboplatin  Patient is also getting radiation to chest  -We will hold Keytruda Taxol and carboplatin until the discharge  Continue with radiation to chest per radiation oncologist  MRI of the brain negative for brain metastasis          Bilateral pneumonia/hypoxia   Patient is on IV antibiotics  Continue management per inpatient team        INTERIM HISTORY:    Patient feels that breathing is better today      MEDICATIONS:  Reviewed         PHYSICAL EXAM:  Vital signs:  Temp: 98  F (36.7  C) Temp src: Oral BP: 130/71 Pulse: 80   Resp: 22 SpO2: 96 % O2 Device: High Flow Nasal Cannula (HFNC) Oxygen Delivery: 20 LPM Height: 182.9 cm (6') Weight: 61.3 kg (135 lb 3.2 oz)  Estimated body mass index is 18.34 kg/m  as calculated from the following:    Height as of this encounter: 1.829 m (6').    Weight as of this encounter: 61.3 kg (135 lb 3.2 oz).          LABS:  Recent Labs   Lab Test 09/02/20  0738   *   POTASSIUM 4.2   CHLORIDE 93*   CO2 32   ANIONGAP 4   BUN 11   CR 0.57*   GLC 92   SUSIE 8.4*     Recent Labs   Lab Test 09/02/20  0738 09/01/20  0209 08/28/20  0749   WBC 18.9* 16.6* 8.4   HGB 8.7* 9.3* 8.3*    252 200   MCV 78 78 77*   NEUTROPHIL  --  93.5 81.0     Recent Labs   Lab Test 09/01/20  0209 08/26/20  1556   BILITOTAL 0.9 0.8   ALKPHOS 127 121   ALT 16 14   AST 10 5   ALBUMIN 2.3* 2.7*             Brett Manrique, Nurse Practitioner   [facility]     Chart documentation with Dragon Voice recognition Software. Although reviewed after completion, some words and grammatical errors may remain.

## 2020-09-04 NOTE — PROGRESS NOTES
"   09/04/20 1412   General Information   Onset Date 09/01/20   Start of Care Date 09/03/20   Referring Physician Dr. Mar   Patient Profile Review/OT: Additional Occupational Profile Info See Profile for full history and prior level of function   Patient/Family Goals Statement Patient did not state.    Swallowing Evaluation Videofluoroscopic evaluation   Behaviorial Observations Alert   Mode of current nutrition NPO   Respiratory Status O2 Supply   Type of O2 supply   (oxymask)   Comments \"Agapito Fairbanks is a 70 year old male admitted on 9/1/2020. He presents to the emergency department with complaints of shortness of breath after EMS was called to his home by his wife.  Found to have increased hypoxia from baseline as well as chest x-ray findings concerning for postobstructive pneumonia in the setting of metastatic adenocarcinoma of the lung. 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 \" Severe dysphagia on clinical evaluation.    VFSS Eval: Thin Liquid Texture Trial   Mode of Presentation, Thin Liquid cup;straw   Preparatory Phase WFL   Oral Phase, Thin Liquid WFL   Pharyngeal Phase, Thin Liquid Delayed swallow reflex;Residue in valleculae   Rosenbek's Penetration Aspiration Scale: Thin Liquid Trial Results 2 - contrast enters airway, remains above the vocal cords, no residue remains (penetration)   Diagnostic Statement flash penetration by straw; extensive coughing after every trial and productive phelgm   VFSS Eval: Puree Solid Texture Trial   Mode of Presentation, Puree spoon;self-fed   Preparatory Phase WFL   Oral Phase, Puree WFL   Pharyngeal Phase, Puree Delayed swallow reflex;Residue in valleculae   Rosenbek's Penetration Aspiration Scale: Puree Food Trial Results 1 - no aspiration, contrast does not enter airway   Diagnostic Statement moderate vallecular residue   VFSS Eval: Semisolid Texture Trial   Mode of Presentation, Semisolid self-fed "   Preparatory Phase WFL   Oral Phase, Semisolid WFL   Pharyngeal Phase, Semisolid Delayed swallow reflex;Residue in valleculae   Rosenbek's Penetration Aspiration Scale: Semisolid Food Trial Results 1 - no aspiration, contrast does not enter airway   Diagnostic Statement mod to severe vallecular residue; a lot of coughing   General Therapy Interventions   Planned Therapy Interventions Dysphagia Treatment   Dysphagia treatment Oropharyngeal exercise training;Modified diet education;Instruction of safe swallow strategies   Swallow Eval: Clinical Impressions   Skilled Criteria for Therapy Intervention Skilled criteria met.  Treatment indicated.   Functional Assessment Scale (FAS) 4   Dysphagia Outcome Severity Scale (BECCA) Level 4 - BECCA   Treatment Diagnosis mild to moderate oropharyngeal dysphagia   Diet texture recommendations Regular diet;Thin liquids;Dysphagia diet level 1  (regular per pt wishes)   Recommended Feeding/Eating Techniques alternate between small bites and sips of food/liquid;check mouth frequently for oral residue/pocketing;hard swallow w/ each bite or sip;maintain upright posture during/after eating for 30 mins;no straws;small sips/bites   Therapy Frequency 5x/week   Predicted Duration of Therapy Intervention (days/wks) 1 week   Anticipated Discharge Disposition home w/ home health   Risks and Benefits of Treatment have been explained. Yes   Patient, family and/or staff in agreement with Plan of Care Yes   Clinical Impression Comments SLP: Limited Video Swallow Study completed d/t pt reported pain with sitting and shortness of breath/coughing on thick secretions. Productive cough with yellow/green phlegm prior to and throughout evaluation. VFSS images with poor quality as pt unable to lower shoulders. Thin liquids by cup and straw trialed with good oral control, delayed epiglottic inversion and flash penetration with straw gulp after the cracker. Reduced pharyngeal constriction and overall weakness  with purees and solids resulting in significant vallecular residue. Note that pt had consistent coughing throughout the study and trials of thin liquids that did not appear to be related to aspiration. Risk of aspiration associated with respiratory status and weakness. Images reviewed with pt and wife with extensive education. Discussed pureed diet, however, pt would like a regular diet and choose softer foods for increased menu options and quality of life. Would take small bites, alternate solids and liquids, and double swallows as needed. Pt/wife denied Dietician consult or protein supplements. Wife had questions re: feeding tube which were answered from SLP perspective, but deferred discussion to MD. Discussed that even though pt did not aspirate during this study, high risk remains and pt will need to follow strict swallow precautions if he wants to continue a regular diet.    Total Evaluation Time   Total Evaluation Time (Minutes) 30

## 2020-09-04 NOTE — PROGRESS NOTES
Radiation therapy treatment #7 of 10. Pt received a daily dose of 300 cGy with 10 MV photon radiation to the left lung for a total dose of 2100 cGy given to date. Plan for 3 more radiation treatments. Next appointment is Tuesday, 9/8/2020.

## 2020-09-04 NOTE — CONSULTS
"Care Coordination:    Care Transition Initial Assessment - RN    At discharge enter resume home care orders.   CC to notify [facility] that patient is discharge.         Met with: Patient and Family.  DATA   Active Problems:    Community acquired pneumonia       Cognitive Status: awake, alert and oriented.  [facility]  Primary Care MD Name: Dylon  Contact information and PCP information verified: Yes  Lives With: spouse   Living Arrangements: condominium                 Insurance concerns: No Insurance issues identified  ASSESSMENT  Patient currently receives the following services:  Pt lives with wife.  Is open to [facility] RN, PT, OT (though patient states he \"fired\" OT as he doesn't need it)  Pt has home oxygen (company unknown) but has portable tank and is in working order.         Identified issues/concerns regarding health management: Pt admitted for pneumonia.  Weaned back to baseline oxygen (3L) when developed hypoxia, aspiration.  Seen by Therapies, SLP, had video study.  On high flow oxygen but now weaning back to baseline.  Therapies recommend home with resumption of care.  Met with family and explained role.  Pt hoping to go in next couple of days pending oxygen, resp status and pain (Pt currently feels he is having more pain and would like to talk with Dr. Irwin).    Would like to continue with [facility].   Voices no other needs for discharge.     PLAN  Financial costs for the patient include TBD .  Patient given options and choices for discharge yes .  Patient/family is agreeable to the plan?  Yes:   Patient anticipates discharging to home .        Patient anticipates needs for home equipment: No  Transportation/person available to transport on day of discharge  is wife and have they been notified/set up call to arrange  Plan/Disposition: Home   Appointments: Sep 14, 2020 11:40 AM CDT   Video Visit with Los Love MD   [facility]  "      Care  (CTS) will continue to follow as needed.      Maegan Mendoza RN BSN  Inpatient Care Coordination  [facility]  [phone]

## 2020-09-04 NOTE — PLAN OF CARE
Discharge Planner PT   Patient plan for discharge: home with assist of spouse  Current status:   Pt seated in chair using 6L o2 from [place] while eating. Spouse reports that she got him up to chair because his back hurt. O2 sats 89% and improved with cough to 94%. Pt needs cues for hand placement for sit to stand. pt was transferred to portable O2 with mask. walker height adjusted. Pt stood with CGA and cues. pt sat in low chair to rest with Ligia . Standing from chair, pt needs repeated cues each time. Pt sat in chair 2x during gait. Pt returned to recliner chair to finish meal. O2 sats 97%. Pt coughting and using suction. Alarm on and needs in reach    Pt ambulated in room for 15ft and required seated rest. Pt's O2 remained 90 or above on 6L O2 with mask with . Pt has CAPUTO and had seated rest. Pt ambulated in sanchez for 30ft total with one LOB requireing modA after turning around. Pt fatigued and CAPUTO and O2 sats 92%. Pt sat and rested and then ambulated remainin 15ft back to chair. Pt fatigued but tolerated well.  Barriers to return to prior living situation: need for supplemental O2, assist with ADLs and mobility  Recommendations for discharge: home with 4WW, assist of spouse for ADLs, home PT  Rationale for recommendations:  Patient is below baseline with increased O2 needs and fatigues quickly, however able to mobilize fairly well with walker and SBA. Anticipate with continued medical management and mobility with staff & therapy, pt will be appropriate for discharge home with spouse when medically appropriate. Recommend continued home PT to progress activity tolerance, strength.            Entered by: Ana Cristina Kinsey 09/04/2020 3:14 PM

## 2020-09-05 NOTE — DISCHARGE SUMMARY
[facility]    Discharge Summary  Hospitalist    Date of Admission:  9/1/2020  Date of Discharge:  9/5/2020  2:50 PM  Provider:  Mariana Cerda DO    Discharge Diagnoses   1.  Acute on top of chronic hypoxic respiratory failure  2.  Bilateral pneumonia - CAP and post-obstructive   3.  Metabolic encephalopathy  4.  Severe malnutrition  5.  Hyponatremia and SIADH  6.  Metastatic adenocarcinoma of the lung    Other medical issues:  Past Medical History:   Diagnosis Date     Acute gastritis with hemorrhage 11/02     Basal cell carcinoma, leg      left pretibial area     CKD (chronic kidney disease) stage 3, GFR 30-59 ml/min (H)     creat baseline approx 1.4     Hearing loss      Helicobacter pylori (H. 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  Please see the admission history and physical for full details.    Hospital Course   Agapito Fairbanks was admitted on 9/1/2020.  The following problems were addressed during his hospitalization:    1.  Acute on top of chronic hypoxic respiratory failure  Bilateral pneumonia, CAP with known  "post-obstructive on the left    Mr. Mar is a 69 yo male pt with a h/o metastatic lung cancer who is on oxygen-dependent chronically who presents to the ED with increased SOB.  CXR revealed right-sided infiltrate and he was started on IV abx for CAP; also suspected to have post-obstructive component on the left d/t lung tumor.  CT of the chest negative for acute pulmonary embolism.    He is chronically on 3L oxygen; was noted to be 70% on RA with increased work of breathing.  On admission he required 5L to maintain oxygen sats in the 90's.  He improved slowly with aggressive pulmonary toilet; COVID 19 was r/o.     He completed 5 day course of azithromycin and 5 days of IV rocephin.  He will be discharged on po bid omnicef for an additional 5 more days.    2.  Swallowing dysfunction suspected  Mr. Fairbanks had a \"chocking\" episode prior to admission.  There was some concern for aspiration event.  HE was seen by speech therapy; had a limited video swallow and pureed diet was recommended.  Pt denied any swallowing difficulty on day of discharge and was eating upright without difficulty and so declined further swallow eval/treatment, for now.    3.  Metastatic adenocarcinoma of the lung  Acute on chronic hyponatremia, SIADH    Pt follows with Dr. Mullins from oncology.  He missed some appointments while hospitalized.  He was followed by Heme/onc while in the hospital and will call to reschedule appointments in the clinic next week (wife agreed to assist in scheduling)    His has a h/o likely malignancy-related SIADH and chronic hyponatremia.  His sodium was 123 on admission.  Improved to 130 day of discharge with conservative treatment and improvement of his acute medical condition.  Baseline sodium appears to be around 128 of recent.    4.  Acute metabolic encephalopathy  Known cognitive impairment    Brain MRI on 9/2/20 without acute CVA, bleed or metastatic disease.  His mentation slowly improved to baseline at time of " discharge with treatment of acute infection and improvement of his hyponatremia.  Resume home RN, PT/OT at time of discharge.    Significant Results and Procedures   none    Pending Results   Unresulted Labs Ordered in the Past 30 Days of this Admission     Date and Time Order Name Status Description    9/1/2020 0331 Blood culture Preliminary     9/1/2020 0331 Blood culture Preliminary           Code Status   DNR / DNI       Primary Care Physician   Los Love    Physical Exam   Temp: 96.9  F (36.1  C) Temp src: Oral BP: 109/62 Pulse: 80   Resp: 20 SpO2: 98 % O2 Device: Nasal cannula Oxygen Delivery: 3 LPM  Vitals:    09/02/20 0551 09/04/20 0433 09/05/20 0654   Weight: 64.3 kg (141 lb 11.2 oz) 61.3 kg (135 lb 3.2 oz) 60 kg (132 lb 3.2 oz)     Vital Signs with Ranges  Temp:  [96  F (35.6  C)-97.7  F (36.5  C)] 96.9  F (36.1  C)  Pulse:  [80-97] 80  Resp:  [20-22] 20  BP: (104-128)/(59-77) 109/62  SpO2:  [91 %-100 %] 98 %  I/O last 3 completed shifts:  In: 240 [P.O.:240]  Out: 250 [Urine:250]    PT SEEN AND EXAMINED ON DAY OF D/C  GEN:  Alert, awake, up in chair and in no acute respiratory distress  HEENT:  Normocephalic/atraumatic, PERRL, no scleral icterus, no nasal discharge, mask in place  CV:  Regular rate and rhythm, no murmur to ausc.  S1 + S2 noted, no S3 or S4.  LUNGS:  Diminished at the bases bilaterally, but otherwise clear to auscultation bilaterally.  No significant rales/rhonchi/wheezing auscultated bilaterally.  No costal retractions bilaterally.  Symmetric chest rise on inhalation noted.  ABD:  Active bowel sounds, soft, non-tender/non-distended.  No clear rebound/guarding/rigidity.  No masses palpated.  No obvious HSM to exam.  EXT:  No pretibial edema or cyanosis bilaterally. No joint synovitis noted.  No calf-tenderness or asymmetry noted.  SKIN:  Dry to touch, no rashes or jaundice noted.  PSYCH:  Mood appropriate, Not tearful or depressed.  Maintains direct eye contact.  cooperative  NEURO:  No  tremors at rest, speech clear.    Discharge Disposition   Discharged to home    Consultations This Hospital Stay   RADIATION ONCOLOGY IP CONSULT  HEMATOLOGY & ONCOLOGY IP CONSULT  HEMATOLOGY & ONCOLOGY IP CONSULT  PHYSICAL THERAPY ADULT IP CONSULT  OCCUPATIONAL THERAPY ADULT IP CONSULT  SPEECH LANGUAGE PATH ADULT IP CONSULT  CARE TRANSITION RN/SW IP CONSULT    Time Spent on this Encounter   IMariana DO, personally saw the patient today and spent greater than 30 minutes discharging this patient.    Discharge Orders      Home care nursing referral      Home Care PT Referral for Hospital Discharge      Home Care OT Referral for Hospital Discharge      Reason for your hospital stay    You were admitted for post-obstructive pneumonia     Follow-up and recommended labs and tests     F/U with Dr. Mullins next week (wife have verbally agreed to call and reschedule prior appointments)     Activity    Your activity upon discharge: activity as tolerated     MD face to face encounter    Documentation of Face to Face and Certification for Home Health Services    I certify that patient: Agapito Fairbanks is under my care and that I, or a nurse practitioner or physician's assistant working with me, had a face-to-face encounter that meets the physician face-to-face encounter requirements with this patient on: 9/5/2020.    This encounter with the patient was in whole, or in part, for the following medical condition, which is the primary reason for home health care: metastatic lung cancer, new post-obstructive pneumonia.    I certify that, based on my findings, the following services are medically necessary home health services: Nursing, Occupational Therapy and Physical Therapy.    My clinical findings support the need for the above services because: Nurse is needed: To provide assessment and oversight required in the home to assure adherence to the medical plan due to: oncology treatment and new  pneumonia.    Further, I certify that my clinical findings support that this patient is homebound (i.e. absences from home require considerable and taxing effort and are for medical reasons or [facility] services or infrequently or of short duration when for other reasons) because: Requires assistance of another person or specialized equipment to access medical services because patient: Is prone to wander/get lost without assistance...    Based on the above findings. I certify that this patient is confined to the home and needs intermittent skilled nursing care, physical therapy and/or speech therapy.  The patient is under my care, and I have initiated the establishment of the plan of care.  This patient will be followed by a physician who will periodically review the plan of care.  Physician/Provider to provide follow up care: Los Love    Attending hospital physician (the Medicare certified [facility] provider): Mariana Cerda DO  Physician Signature: See electronic signature associated with these discharge orders.  Date: 9/5/2020     No CPR- Do NOT Intubate     Diet    Follow this diet upon discharge:   regular     Discharge Medications   Current Discharge Medication List      START taking these medications    Details   acetaminophen (TYLENOL) 325 MG tablet Take 2 tablets (650 mg) by mouth every 4 hours as needed for mild pain  Qty:      Associated Diagnoses: Community acquired pneumonia, unspecified laterality      cefdinir (OMNICEF) 300 MG capsule Take 1 capsule (300 mg) by mouth 2 times daily  Qty: 9 capsule, Refills: 0    Comments: Start evening 9/5/20  Associated Diagnoses: Community acquired pneumonia, unspecified laterality         CONTINUE these medications which have NOT CHANGED    Details   albuterol (PROAIR HFA/PROVENTIL HFA/VENTOLIN HFA) 108 (90 Base) MCG/ACT inhaler Inhale 2 puffs into the lungs every 6 hours as needed for shortness of breath / dyspnea or wheezing  Qty: 6.7 g, Refills: 3     Comments: Pharmacy may dispense brand covered by insurance (Proair, or proventil or ventolin or generic albuterol inhaler)  Associated Diagnoses: 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)      albuterol (PROVENTIL) (2.5 MG/3ML) 0.083% neb solution Take 1 vial (2.5 mg) by nebulization every 6 hours as needed for shortness of breath / dyspnea or wheezing  Qty: 120 vial, Refills: 3    Associated Diagnoses: Other emphysema (H)      benzonatate (TESSALON) 100 MG capsule Take 1 capsule (100 mg) by mouth 3 times daily as needed for cough  Qty: 30 capsule, Refills: 1    Associated Diagnoses: Malignant neoplasm of upper lobe of left lung (H)      bisacodyl (DULCOLAX) 5 MG EC tablet Take 5 mg by mouth daily as needed for constipation      cetirizine (ZYRTEC ALLERGY) 10 MG tablet Take 10 mg by mouth daily as needed       donepezil (ARICEPT) 5 MG tablet Take 1 tablet (5 mg) by mouth At Bedtime  Qty: 90 tablet, Refills: 3    Associated Diagnoses: Memory loss; Cognitive impairment      fluticasone (FLONASE) 50 MCG/ACT nasal spray Spray 1 spray into both nostrils daily as needed for rhinitis or allergies      gabapentin (NEURONTIN) 300 MG capsule Take 1 capsule (300 mg) by mouth 3 times daily  Qty: 90 capsule, Refills: 3    Associated Diagnoses: Cancer associated pain      levothyroxine (SYNTHROID/LEVOTHROID) 125 MCG tablet Take 1 tablet (125 mcg) by mouth daily  Qty: 90 tablet, Refills: 3    Associated Diagnoses: Hypothyroidism, unspecified type      ondansetron (ZOFRAN) 8 MG tablet Take 1 tablet (8 mg) by mouth every 8 hours as needed (Nausea/Vomiting)  Qty: 10 tablet, Refills: 3    Associated Diagnoses: 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)      oxyCODONE (ROXICODONE) 5 MG tablet Take 1 tablet (5 mg) by mouth every 4 hours as needed for severe pain  Qty: 120 tablet, Refills: 0    Associated Diagnoses: 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); Cancer associated pain; Chronic pain due to neoplasm       prochlorperazine (COMPAZINE) 10 MG tablet Take 1 tablet (10 mg) by mouth every 6 hours as needed (Nausea/Vomiting)  Qty: 30 tablet, Refills: 3    Associated Diagnoses: 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)      sertraline (ZOLOFT) 50 MG tablet Take 1 tablet (50 mg) by mouth daily  Qty: 90 tablet, Refills: 3    Comments: Stop RFs of the 25mg tab  Associated Diagnoses: Mild major depression (H)      diclofenac (VOLTAREN) 1 % topical gel Place onto the skin daily as needed for moderate pain      fentaNYL (DURAGESIC) 12 mcg/hr 72 hr patch Place 1 patch onto the skin every 72 hours  Qty: 10 patch, Refills: 0    Associated Diagnoses: Cancer associated pain      LORazepam (ATIVAN) 0.5 MG tablet Take 1 tablet (0.5 mg) by mouth every 4 hours as needed (Anxiety, Nausea/Vomiting or Sleep)  Qty: 60 tablet, Refills: 3    Associated Diagnoses: 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)           Allergies   Allergies   Allergen Reactions     Simvastatin      myalgias     Lisinopril Rash     rash     Data   Recent Labs   Lab 09/01/20  0209   PHV 7.40   PO2V 24*   PCO2V 50   HCO3V 31*     Recent Labs   Lab 09/05/20  0743 09/04/20  0719 09/02/20  0738 09/01/20  0209   WBC 13.7*  --  18.9* 16.6*   HGB 8.0*  --  8.7* 9.3*   HCT 24.5*  --  27.1* 28.0*   MCV 79  --  78 78    246 271 252     Recent Labs   Lab 09/02/20  1345 09/01/20  0419 09/01/20  0209   CULT Light growth  Normal pat    Moderate growth  Candida albicans / dubliniensis  Candida albicans and Candida dubliniensis are not routinely speciated  Susceptibility testing not routinely done  * No growth after 4 days No growth after 4 days     Recent Labs   Lab 09/05/20  0743 09/04/20  0719 09/02/20  0738  09/01/20  0209   *  --  129*  --  124*   POTASSIUM 3.7  --  4.2  --  3.9   CHLORIDE 95  --  93*  --  90*   CO2 31  --  32  --  29   ANIONGAP 4  --  4  --  5   GLC 95  --  92  --  120*   BUN 11  --  11  --  11   CR 0.54* 0.57* 0.57*   < > 0.61*    GFRESTIMATED >90 >90 >90   < > >90   GFRESTBLACK >90 >90 >90   < > >90   SUSIE 7.9*  --  8.4*  --  8.3*   MAG  --  1.7  --   --   --    PROTTOTAL  --   --   --   --  6.1*   ALBUMIN  --   --   --   --  2.3*   BILITOTAL  --   --   --   --  0.9   ALKPHOS  --   --   --   --  127   AST  --   --   --   --  10   ALT  --   --   --   --  16    < > = values in this interval not displayed.     No results for input(s): INR in the last 168 hours.  Results for orders placed or performed during the hospital encounter of 09/01/20   XR Chest Port 1 View    Narrative    EXAM: XR CHEST PORT 1 VW  LOCATION: [facility]  DATE/TIME: 9/1/2020 2:26 AM    INDICATION: Shortness of breath with history of lung cancer  COMPARISON: Chest radiograph 08/26/2020, CT chest PE study 08/26/2020      Impression    IMPRESSION: New focal right perihilar opacity in the appearance of alveolar infiltrate and relating into new pneumonia. Continued radiographic follow-up recommended. Large left lung mass remains essentially unchanged. Increasing alveolar opacities left   lung base with increasing elevation left hemidiaphragm. Postobstructive pneumonia in the left lower lung could have this appearance. Tortuous aorta. Normal pulmonary vascularity. Normal heart size.   MR Brain w/o & w Contrast    Narrative    MRI BRAIN WITHOUT AND WITH CONTRAST  9/2/2020 10:20 AM    HISTORY:  Encephalopathy. History of stage 4 lung cancer.    TECHNIQUE:  Multiplanar, multisequence MRI of the brain without and  with 6 mL Gadavist.    COMPARISON: 4/21/2020.    FINDINGS: Diffusion-weighted images are normal. There is no evidence  for intracranial hemorrhage or acute infarct. There is some mild  cerebral atrophy. There is some minimal slight increased signal  intensity in both hippocampal nuclei similar to that seen on the prior  exam, but this is not associated with any contrast enhancement. The  brain parenchyma is otherwise normal. Postcontrast images do not  show  any abnormal areas of enhancement or any focal mass lesions.  Previously seen left parotid lesion is not completely included on this  exam but appears smaller.      Impression    IMPRESSION:  1. Mild cerebral atrophy.  2. Increased signal intensity in the hippocampal regions bilaterally  without enhancement most likely due to some chronic hippocampal  sclerosis.  3. Previously described left parotid lesion is smaller than that seen  on prior exam. Clinical correlation suggested.  4. No evidence for acute infarct, acute hemorrhage, or any  intracranial metastatic disease.    MADDIE PICKARD MD   XR Video Swallow with SLP or OT    Narrative    VIDEO SWALLOWING EVALUATION   9/4/2020 12:02 PM     HISTORY: Lung cancer status post right vc cx with radiation and here  with pneumonia.    COMPARISON: None.    FLUOROSCOPY TIME: 1.1 minutes     Number of cine runs: 5    FINDINGS:    Thin: Minimal flash penetration. Otherwise normal.    Nectar: Not administered.    Honey: Not administered.    Pudding: Moderate residue. Otherwise normal.    Semisolid: Not administered.    Solid: Moderate residue. Otherwise normal.    This study only includes the cervical esophagus.    CHRISSY MCFARLANE MD   XR Chest 2 Views    Narrative    CHEST TWO VIEWS   9/4/2020 8:10 AM     HISTORY: Shortness of breath.    COMPARISON: Chest x-rays dated 9/1/2020 and 8/26/2020, CT chest dated  8/26/2020.    FINDINGS: Right lung hyperaerated. Bibasilar infiltrates are again  noted. There is slight improvement in the right midlung infiltrate but  worsening of the basilar infiltrates since the prior study dated  9/1/2020. Elevation left hemidiaphragm is again noted. No pneumothorax  is identified. There is mild soft tissue prominence in the left apex  corresponding with the soft tissue lesion seen on the prior CT  examination in the same location. Large mass projected over the left  mediastinum may have slightly decreased in size since the prior study  dated  9/1/2020. This could also be secondary to technique. Cardiac  silhouette is difficult to evaluate due to the mass projected over the  left mid to upper chest and mediastinum and the left hemidiaphragm  elevation.      Impression    IMPRESSION:   1. Large mass projected over the left mediastinum and left mid to  upper lung may have slightly decreased in size since the prior study.  2. Bibasilar infiltrates are again noted. These appear slightly  worsened in the bases as compared to the prior studies. Right midlung  infiltrate has improved since 9/1/2020. There is a probable left  pleural fluid collection and there may be a small right pleural fluid  collection.  3. Soft tissue density in the left apex corresponds to the soft tissue  mass in this location on the prior CT.  4. Hyperaeration likely represents underlying chronic pulmonary  disease.    ROSALINE QUINONES MD

## 2020-09-05 NOTE — PLAN OF CARE
SLP: Pt finished breakfast as SLP arrived. Pt denied any change in swallow function with baseline globus sensation. Reviewed VFSS from 9/4. Pt with limited interaction (on his phone while talking) and dismissive of deficits. Will continue to follow if pt is interested in exercise program and strategy training.

## 2020-09-05 NOTE — PLAN OF CARE
[id number]: VSS on 5 L O2. LS coarse with productive cough. Pt disoriented to time and situation overnight with frequent redirection. Oxycodone given x1, fentanyl patch in place. Blanchable redness to coccyx. PIV infusing. Plan for radiation 8/10 on Monday.

## 2020-09-05 NOTE — PROGRESS NOTES
Patient discharged at 1450.  IV was discontinued. Pain at time of discharge was 0/10. Belongings returned to patient.  Discharge instructions and medications reviewed with patient and S.O.  Patient and S.O verbalized understanding and all questions were answered.  Prescriptions sent to be filled at preferred pharmacy.  At time of discharge, patient condition was stable and left the unit via WC escorted by MARTIN.

## 2020-09-05 NOTE — PROGRESS NOTES
Pt seen and examined.  Ok for discharge home later today with resumption of home care.  Wife will call oncology next week to reschedule appointments.

## 2020-09-05 NOTE — PLAN OF CARE
Discharge Planner PT   Patient plan for discharge: home with assist of spouse  Current status: Greeted patient sitting up in chair on 4L of O2 with sats at 99% during rest. Engaged patient in seated/standing leg exercises. Patient experiences some SOB during exercises & ambulation however sats remain between 93%-95% on 4L with activity. Engaged patient in sit <> stand with FWW at [facility] with cues for safety. Engaged patient in ambulation with FWW at [facility] for a total of 400ft around the nursing station, with patient demonstrating increased unsteadiness while turning & experiences mild LOB requiring Ligia.Discussed discharge recommendation. Concluded session with patient sitting up in chair, all needs in reach and alarm engaged.    Barriers to return to prior living situation: need for supplemental O2, assist with ADLs and mobility  Recommendations for discharge: home with 4WW, assist of spouse for ADLs, home PT  Rationale for recommendations:  Patient is below baseline with increased O2 needs and fatigues quickly, however able to mobilize fairly well with walker and SBA. Anticipate with continued medical management and mobility with staff & therapy, pt will be appropriate for discharge home with spouse when medically appropriate. Recommend continued home PT to progress activity tolerance, strength.       Entered by: Cheli Carey 09/05/2020 10:05 AM     Physical Therapy Discharge Summary    Reason for therapy discharge:    Discharged to home with home therapy.    Progress towards therapy goal(s). See goals on Care Plan in [facility] electronic health record for goal details.  Goals partially met.  Barriers to achieving goals:   discharge from facility.    Therapy recommendation(s):    see above

## 2020-09-07 NOTE — PROGRESS NOTES
Dear Dr. Love,     Medicare Home Health regulations requires [facility] to provide an initial assessment visit either within 48 hours of the patient's return home, or on the physician ordered Start of Care date.    There will be a delay in the Initial Assessment for Agapito GENI Fairbanks; MRN [id number]    Sincerely [facility]  Lynnette Clark  [phone]

## 2020-09-07 NOTE — PROGRESS NOTES
Dear Dr. Love,    Medicare Home Health regulations requires [facility] to provide an initial assessment visit either within 48 hours of the patient's return home, or on the physician ordered Start of Care date.    There will be a delay in the Initial Assessment for Agapito GENI Fairbanks; MRN [id number]    Sincerely [facility]  Lynnette Clark  [phone]

## 2020-09-08 NOTE — PROGRESS NOTES
Writer received a call from patient's spouse, Millicent, who had a few questions regarding patient's care. She updated me that patient continues to be weak and wonders if a unit of PRBC's would be indicated as it has been trending down. Patient is also completing an antibiotic for pneumonia, he has 1 day left. Will he still get treatment tomorrow?    Millicent, also noticed patient has a rattle that stared a little bit on Sunday and noticed more through yesterday. Should she continue a nebulizer?      Dr. Mullins to advise.     Cecile Alanis RN

## 2020-09-08 NOTE — LETTER
[place] [facility]  [address] [place]  [address]    September 8, 2020        Agapito Fairbanks  [address]   [place] [address]      Dear Agapito,    I am a clinic community health worker who works with Los Love MD at [facility]. I wanted to thank you for spending the time to talk with me.  Below is a description of clinic care coordination and how I can further assist you.      The clinic care coordination team is made up of a registered nurse,  and community health worker who understand the health care system. The goal of clinic care coordination is to help you manage your health and improve access to the health care system in the most efficient manner. The team can assist you in meeting your health care goals by providing education, coordinating services, strengthening the communication among your providers and supporting you with any resource needs.    Please feel free to contact me at [phone] with any questions or concerns. We are focused on providing you with the highest-quality healthcare experience possible and that all starts with you.     Sincerely,     DAVID Hess  Clinic Care Coordination  [facility]: Anushka Cade  Phone: [phone]

## 2020-09-08 NOTE — PROGRESS NOTES
Clinic Care Coordination Contact  Community Health Worker Initial Outreach  Spoke with patient's wife, Millicent.  CHW Initial Information Gathering:  Referral Source: IP Report  Preferred Hospital: [facility]  [phone]  No PCP office visit in Past Year: No  Patient's wife was not able to answer the intimal questions.     Chart Review: Referral off a discharge.  IP for Pneumonia.  Medication Changes: Yes     Patient accepts CC: No, Enrolled in home care. Patient will be sent Care Coordination introduction letter for future reference.      Plan: Care Coordinator will send care coordination introduction letter with care coordinator contact information and explanation of care coordination services via mail instead of MyChart per request. Care Coordinator will do no further outreaches at this time.    DAVID Hess  Clinic Care Coordination  [facility]: Anushka Cade   Phone: [phone]

## 2020-09-08 NOTE — PROGRESS NOTES
Dr. Mullins reviewed labs and is aware of recent hospitalization and completing antibiotics tomorrow.     Will proceed with chemotherapy.    Writer called and LVM with spouse, Millicent reviewing labs and that plan is to proceed with chemotherapy.    Cecile Alanis RN

## 2020-09-08 NOTE — PROGRESS NOTES
Infusion Nursing Note:  Agapito Fairbanks presents today for IVF, labs.    Patient seen by provider today: No   present during visit today: Not Applicable.    Note: N/A.    Intravenous Access:  Peripheral IV placed.    Treatment Conditions:  Not Applicable.      Post Infusion Assessment:  Patient tolerated infusion without incident.  Blood return noted pre and post infusion.  Site patent and intact, free from redness, edema or discomfort.  No evidence of extravasations.  Access discontinued per protocol.       Discharge Plan:   Discharge instructions reviewed with: Patient.  Patient and/or family verbalized understanding of discharge instructions and all questions answered.  Patient discharged in stable condition accompanied by: wife.  Departure Mode: Wheelchair.    Ashleigh Pollard RN

## 2020-09-09 NOTE — PATIENT INSTRUCTIONS
Patient should be taking calcium/vitamin D supplements: 1 tablet orally, twice daily. Each tablet should have 500-600mg elemental calcium and 400 units of vitamin D.

## 2020-09-09 NOTE — LETTER
9/9/2020         RE: Agapito Fairbanks  [address]  [place] [address]        Dear Colleague,    Thank you for referring your patient, Agapito Fairbanks, to the [facility]. Please see a copy of my visit note below.    Oncology Rooming Note    September 9, 2020 9:33 AM   Agapito Fairbanks is a 70 year old male who presents for:    Chief Complaint   Patient presents with     Oncology Clinic Visit     Initial Vitals: There were no vitals taken for this visit. Estimated body mass index is 17.93 kg/m  as calculated from the following:    Height as of 9/1/20: 1.829 m (6').    Weight as of 9/5/20: 60 kg (132 lb 3.2 oz). There is no height or weight on file to calculate BSA.  Data Unavailable Comment: Data Unavailable   No LMP for male patient.  Allergies reviewed: Yes  Medications reviewed: Yes    Medications: Medication refills not needed today.  Pharmacy name entered into [facility]: [id number] - [place], [place] - [address]    Clinical concerns:  doctor was notified.      Araceli Steve, [facility]              Visit Date:   09/09/2020     ONCOLOGY HISTORY: Mr. 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  High PD-L1 expression.  No EGFR mutation.  ALK and ROS1 could not be done because of insufficient sample.      1.  In 2005, he was evaluated by ENT for hoarseness.   -On 02/22/2005, he had laryngoscopy. There was right anterior true vocal cord lesion with extension to the anterior commissure on the right. 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   -On 03/18/2005, he had endoscopic laser assisted vertical hemilaryngectomy right. Pathology from vocal cord revealed squamous cell carcinoma, moderately differentiated, invasive  -Unfortunately, he had recurrence. 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 Pathology revealed invasive moderately  differentiated squamous cell carcinoma.   -Patient received concurrent chemoradiation. He received 6000 cGy completed on 06/21/2006. He received cisplatin with radiation.      2.  He had a PET scan done on 10/24/2009.  It revealed enlarging spiculated nodule in posterior left upper lobe with borderline hypermetabolic activity.   -He had left upper lobe segmentectomy and mediastinal lymph node dissection on 12/10/2009.  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  No lymphovascular present.  Margins negative.  Lymph nodes were all benign.      3.  The patient developed a lesion in his back.  He was seen by a dermatologist.  Biopsy on 01/31/2020 revealed squamous cell carcinoma. He subsequently underwent a Mohs surgical procedure on 02/28/2020.  Lesion measured 2.7 cm.  The patient was recommended postoperative radiation.      4. PET scan on 04/02/2020 reveals a hypermetabolic 6.4 cm mass in left upper lobe and mildly enlarged hypermetabolic single mediastinal lymph node.  There is hypermetabolic lesion in L4 vertebral body and left second rib.  There is hypermetabolic nodule in left parotid gland.   -MRI of the thoracic and lumbar spine on 04/06/2020 revealed destructive metastatic lesions involving L4 vertebral body.  There was also multiple other osseous metastatic disease.      5.  CT-guided biopsy of left lung mass on 04/13/2020 reveals moderately differentiated squamous cell carcinoma.    -TPS score of 60%. High PD-L1 expression.   -NEGATIVE for BRAF, EGFR, ERBB2, IDH1, IDH2, KRAS, MET, NRAS, RET     -ALK and ROS1 could not be done because of insufficient sample.      6.  Radiation to lumbar spine. 3000 cGy between 04/07/2020 and 04/20/2020.  -Radiation to left parotid between 04/09/2020 and 04/22/2020. 3000 cGy.      7. Pembrolizumab x 4 between 04/29/2020 and 07/24/2020.  -Carboplatin and Taxol added on 07/29/2020 because of progression.       SUBJECTIVE:  Mr. 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  He is on systemic treatment with carboplatin, Taxol and pembrolizumab.      The patient recently was admitted to the hospital in 08/2020.  CT scan had revealed occluded left upper and lower lobe bronchi.  There is also increase in size of left lung mass.  Palliative radiation was started.  He has 2 more days of radiation left.      Chest x-ray also revealed pneumonia.  He is on antibiotics.  He likely gets some aspiration pneumonia.  He was seen by speech pathologist and also had video swallow done.      He is accompanied by his wife.  The patient is weak.  Most of the time he just sits at home.  He walks with a walker.  His appetite has not been good.  He has been losing weight.  He is now 132 pounds.  On 05/01/2020, he was 175 pounds.      No headache.  He gets some dizziness when he stands.  No chest pain.  He has some mid back pain.  He is chronically short of breath.  It gets worse on minimal exertion.  No abdominal pain.  No vomiting.  Occasional nausea.  Appetite is decreased. He does not feel like eating.  Denies any urinary complaint.  Some constipation.  No bleeding from any site.  No fever or chills.      PHYSICAL EXAMINATION:   GENERAL:  The patient is alert, oriented x 3.  He is weak. ECOG PS of 2.   FACE:  No swelling.   EYES:  No icterus.   THROAT:  No ulcer or thrush.   NECK:  Supple, no lymphadenopathy.   LUNGS:  Decreased air entry at the bases with some crackles.   HEART:  Regular.   ABDOMEN:  Soft and nontender, no mass.   BACK:  There is no deformity.  No signs of infection.  On palpation, no tenderness.   EXTREMITIES:  No calf swelling or tenderness.  No edema.   SKIN:  Dry.  No petechiae.      LABORATORY DATA:  Reviewed.      ASSESSMENT:   1.  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   2.  Weakness.   3.  Hyponatremia from malignancy.   4.  Normocytic anemia secondary to  malignancy, chemotherapy and multiple other medical problems.   5.  Decreased appetite.   6.  Weight loss.   7.  Pneumonia.  He is on antibiotics. ? Aspiration pneumonia.   8.  Back pain from malignancy.      PLAN:   1.  I had a long discussion with the patient and his wife.  I am concerned regarding his overall decline in condition.  He is getting weaker.  He is losing weight. I asked the patient if he would like to take further treatment for cancer.  The patient says that he wants to fight cancer and wants to take treatment.      2.  I explained to the patient that he is very weak.  He is still recovering from pneumonia.  We will delay the chemotherapy until next week.  He is agreeable for it.  He will get treatment next week.      3.  He is on radiation, which will be completed.      4.  He is on Cefdinir for pneumonia.  I told him to continue for another 5 days.      5.  Discussed regarding his decreased appetite and weight loss.  He also has aspiration pneumonia.  Discussed regarding getting a PEG tube.  He is agreeable for it.  That will help to improve his nutritional status.  That way he can tolerate chemotherapy better.  PEG tube placement by IR will be arranged.  Anticipated length of tube feeding will be greater than 90 days.     6.  We will give him 1 liter of fluid today as his appetite has been decreased.      7.  I will see him in a month.  In between, he will see our nurse practitioner.  The patient is advised to call us with any questions or concerns.         JENNIFER CEDENO MD             D: 2020   T: 2020   MT:       Name:     CHRISTI MANN   MRN:      [id number]        Account:      [id number]   :      1950           Visit Date:   2020      Document: [id number]      Visit Date:   2020     ONCOLOGY HISTORY: Mr. 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  High PD-L1 expression.  No EGFR mutation.  ALK and ROS1 could not be  done because of insufficient sample.      1.  In 2005, he was evaluated by ENT for hoarseness.   -On 02/22/2005, he had laryngoscopy. There was right anterior true vocal cord lesion with extension to the anterior commissure on the right. 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   -On 03/18/2005, he had endoscopic laser assisted vertical hemilaryngectomy right. Pathology from vocal cord revealed squamous cell carcinoma, moderately differentiated, invasive  -Unfortunately, he had recurrence. 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 Pathology revealed invasive moderately differentiated squamous cell carcinoma.   -Patient received concurrent chemoradiation. He received 6000 cGy completed on 06/21/2006. He received cisplatin with radiation.      2.  He had a PET scan done on 10/24/2009.  It revealed enlarging spiculated nodule in posterior left upper lobe with borderline hypermetabolic activity.   -He had left upper lobe segmentectomy and mediastinal lymph node dissection on 12/10/2009.  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  No lymphovascular present.  Margins negative.  Lymph nodes were all benign.      3.  The patient developed a lesion in his back.  He was seen by a dermatologist.  Biopsy on 01/31/2020 revealed squamous cell carcinoma. He subsequently underwent a Mohs surgical procedure on 02/28/2020.  Lesion measured 2.7 cm.  The patient was recommended postoperative radiation.      4. PET scan on 04/02/2020 reveals a hypermetabolic 6.4 cm mass in left upper lobe and mildly enlarged hypermetabolic single mediastinal lymph node.  There is hypermetabolic lesion in L4 vertebral body and left second rib.  There is hypermetabolic nodule in left parotid gland.   -MRI of the thoracic and lumbar spine on 04/06/2020 revealed destructive metastatic lesions involving L4  vertebral body.  There was also multiple other osseous metastatic disease.      5.  CT-guided biopsy of left lung mass on 04/13/2020 reveals moderately differentiated squamous cell carcinoma.    -TPS score of 60%. High PD-L1 expression.   -NEGATIVE for BRAF, EGFR, ERBB2, IDH1, IDH2, KRAS, MET, NRAS, RET     -ALK and ROS1 could not be done because of insufficient sample.      6.  Radiation to lumbar spine. 3000 cGy between 04/07/2020 and 04/20/2020.  -Radiation to left parotid between 04/09/2020 and 04/22/2020. 3000 cGy.      7. Pembrolizumab x 4 between 04/29/2020 and 07/24/2020.  -Carboplatin and Taxol added on 07/29/2020 because of progression.       SUBJECTIVE:  Mr. 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  He is on systemic treatment with carboplatin, Taxol and pembrolizumab.      The patient recently was admitted to the hospital in 08/2020.  CT scan had revealed occluded left upper and lower lobe bronchi.  There is also increase in size of left lung mass.  Palliative radiation was started.  He has 2 more days of radiation left.      Chest x-ray also revealed pneumonia.  He is on antibiotics.  He likely gets some aspiration pneumonia.  He was seen by speech pathologist and also had video swallow done.      He is accompanied by his wife.  The patient is weak.  Most of the time he just sits at home.  He walks with a walker.  His appetite has not been good.  He has been losing weight.  He is now 132 pounds.  On 05/01/2020, he was 175 pounds.      No headache.  He gets some dizziness when he stands.  No chest pain.  He has some mid back pain.  He is chronically short of breath.  It gets worse on minimal exertion.  No abdominal pain.  No vomiting.  Occasional nausea.  Appetite is decreased. He does not feel like eating.  Denies any urinary complaint.  Some constipation.  No bleeding from any site.  No fever or chills.      PHYSICAL EXAMINATION:   GENERAL:  The patient is  alert, oriented x 3.  He is weak. ECOG PS of 2.   FACE:  No swelling.   EYES:  No icterus.   THROAT:  No ulcer or thrush.   NECK:  Supple, no lymphadenopathy.   LUNGS:  Decreased air entry at the bases with some crackles.   HEART:  Regular.   ABDOMEN:  Soft and nontender, no mass.   BACK:  There is no deformity.  No signs of infection.  On palpation, no tenderness.   EXTREMITIES:  No calf swelling or tenderness.  No edema.   SKIN:  Dry.  No petechiae.      LABORATORY DATA:  Reviewed.      ASSESSMENT:   1.  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   2.  Weakness.   3.  Hyponatremia from malignancy.   4.  Normocytic anemia secondary to malignancy, chemotherapy and multiple other medical problems.   5.  Decreased appetite.   6.  Weight loss.   7.  Pneumonia.  He is on antibiotics. ? Aspiration pneumonia.   8.  Back pain from malignancy.      PLAN:   1.  I had a long discussion with the patient and his wife.  I am concerned regarding his overall decline in condition.  He is getting weaker.  He is losing weight. I asked the patient if he would like to take further treatment for cancer.  The patient says that he wants to fight cancer and wants to take treatment.      2.  I explained to the patient that he is very weak.  He is still recovering from pneumonia.  We will delay the chemotherapy until next week.  He is agreeable for it.  He will get treatment next week.      3.  He is on radiation, which will be completed.      4.  He is on Cefdinir for pneumonia.  I told him to continue for another 5 days.      5.  Discussed regarding his decreased appetite and weight loss.  He also has aspiration pneumonia.  Discussed regarding getting a PEG tube.  He is agreeable for it.  That will help to improve his nutritional status.  That way he can tolerate chemotherapy better.  PEG tube placement by IR will be arranged.  Anticipated length of tube feeding will be greater than 90 days.     6.  We  will give him 1 liter of fluid today as his appetite has been decreased.      7.  I will see him in a month.  In between, he will see our nurse practitioner.  The patient is advised to call us with any questions or concerns.         JENNIFER CEDENO MD             D: 2020   T: 2020   MT:       Name:     CHRISTI MANN   MRN:      [id number]        Account:      [id number]   :      1950           Visit Date:   2020      Document: [id number]          Again, thank you for allowing me to participate in the care of your patient.        Sincerely,        Jennifer Cedeno MD

## 2020-09-09 NOTE — PROGRESS NOTES
Visit Date:   09/09/2020     ONCOLOGY HISTORY: Mr. 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  High PD-L1 expression.  No EGFR mutation.  ALK and ROS1 could not be done because of insufficient sample.      1.  In 2005, he was evaluated by ENT for hoarseness.   -On 02/22/2005, he had laryngoscopy. There was right anterior true vocal cord lesion with extension to the anterior commissure on the right. 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   -On 03/18/2005, he had endoscopic laser assisted vertical hemilaryngectomy right. Pathology from vocal cord revealed squamous cell carcinoma, moderately differentiated, invasive  -Unfortunately, he had recurrence. 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 Pathology revealed invasive moderately differentiated squamous cell carcinoma.   -Patient received concurrent chemoradiation. He received 6000 cGy completed on 06/21/2006. He received cisplatin with radiation.      2.  He had a PET scan done on 10/24/2009.  It revealed enlarging spiculated nodule in posterior left upper lobe with borderline hypermetabolic activity.   -He had left upper lobe segmentectomy and mediastinal lymph node dissection on 12/10/2009.  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  No lymphovascular present.  Margins negative.  Lymph nodes were all benign.      3.  The patient developed a lesion in his back.  He was seen by a dermatologist.  Biopsy on 01/31/2020 revealed squamous cell carcinoma. He subsequently underwent a Mohs surgical procedure on 02/28/2020.  Lesion measured 2.7 cm.  The patient was recommended postoperative radiation.      4. PET scan on 04/02/2020 reveals a hypermetabolic 6.4 cm mass in left upper lobe and mildly enlarged hypermetabolic single mediastinal lymph node.  There is hypermetabolic lesion in L4 vertebral  body and left second rib.  There is hypermetabolic nodule in left parotid gland.   -MRI of the thoracic and lumbar spine on 04/06/2020 revealed destructive metastatic lesions involving L4 vertebral body.  There was also multiple other osseous metastatic disease.      5.  CT-guided biopsy of left lung mass on 04/13/2020 reveals moderately differentiated squamous cell carcinoma.    -TPS score of 60%. High PD-L1 expression.   -NEGATIVE for BRAF, EGFR, ERBB2, IDH1, IDH2, KRAS, MET, NRAS, RET     -ALK and ROS1 could not be done because of insufficient sample.      6.  Radiation to lumbar spine. 3000 cGy between 04/07/2020 and 04/20/2020.  -Radiation to left parotid between 04/09/2020 and 04/22/2020. 3000 cGy.      7. Pembrolizumab x 4 between 04/29/2020 and 07/24/2020.  -Carboplatin and Taxol added on 07/29/2020 because of progression.       SUBJECTIVE:  Mr. 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  He is on systemic treatment with carboplatin, Taxol and pembrolizumab.      The patient recently was admitted to the hospital in 08/2020.  CT scan had revealed occluded left upper and lower lobe bronchi.  There is also increase in size of left lung mass.  Palliative radiation was started.  He has 2 more days of radiation left.      Chest x-ray also revealed pneumonia.  He is on antibiotics.  He likely gets some aspiration pneumonia.  He was seen by speech pathologist and also had video swallow done.      He is accompanied by his wife.  The patient is weak.  Most of the time he just sits at home.  He walks with a walker.  His appetite has not been good.  He has been losing weight.  He is now 132 pounds.  On 05/01/2020, he was 175 pounds.      No headache.  He gets some dizziness when he stands.  No chest pain.  He has some mid back pain.  He is chronically short of breath.  It gets worse on minimal exertion.  No abdominal pain.  No vomiting.  Occasional nausea.  Appetite is  decreased. He does not feel like eating.  Denies any urinary complaint.  Some constipation.  No bleeding from any site.  No fever or chills.      PHYSICAL EXAMINATION:   GENERAL:  The patient is alert, oriented x 3.  He is weak. ECOG PS of 2.   FACE:  No swelling.   EYES:  No icterus.   THROAT:  No ulcer or thrush.   NECK:  Supple, no lymphadenopathy.   LUNGS:  Decreased air entry at the bases with some crackles.   HEART:  Regular.   ABDOMEN:  Soft and nontender, no mass.   BACK:  There is no deformity.  No signs of infection.  On palpation, no tenderness.   EXTREMITIES:  No calf swelling or tenderness.  No edema.   SKIN:  Dry.  No petechiae.      LABORATORY DATA:  Reviewed.      ASSESSMENT:   1.  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   2.  Weakness.   3.  Hyponatremia from malignancy.   4.  Normocytic anemia secondary to malignancy, chemotherapy and multiple other medical problems.   5.  Decreased appetite.   6.  Weight loss.   7.  Pneumonia.  He is on antibiotics. ? Aspiration pneumonia.   8.  Back pain from malignancy.      PLAN:   1.  I had a long discussion with the patient and his wife.  I am concerned regarding his overall decline in condition.  He is getting weaker.  He is losing weight. I asked the patient if he would like to take further treatment for cancer.  The patient says that he wants to fight cancer and wants to take treatment.      2.  I explained to the patient that he is very weak.  He is still recovering from pneumonia.  We will delay the chemotherapy until next week.  He is agreeable for it.  He will get treatment next week.      3.  He is on radiation, which will be completed.      4.  He is on Cefdinir for pneumonia.  I told him to continue for another 5 days.      5.  Discussed regarding his decreased appetite and weight loss.  He also has aspiration pneumonia.  Discussed regarding getting a PEG tube.  He is agreeable for it.  That will help to improve  his nutritional status.  That way he can tolerate chemotherapy better.  PEG tube placement by IR will be arranged.  Anticipated length of tube feeding will be greater than 90 days.     6.  We will give him 1 liter of fluid today as his appetite has been decreased.      7.  I will see him in a month.  In between, he will see our nurse practitioner.  The patient is advised to call us with any questions or concerns.         JENNIFER CEDENO MD             D: 2020   T: 2020   MT:       Name:     CHRISTI MANN   MRN:      [id number]        Account:      [id number]   :      1950           Visit Date:   2020      Document: [id number]

## 2020-09-09 NOTE — PATIENT INSTRUCTIONS
1. Delay chemotherapy to next week.  2. IV fluid 1 litre today.  3. PEG tube placement by IR soon.  4. See Brett Manrique next week.  5. See me in 4 weeks.  6. Continue cefdinir.      Per Dr. Mullins, patient should be scheduled for a G-tube. I left a message at [phone] to call back.

## 2020-09-09 NOTE — PROGRESS NOTES
Infusion Nursing Note:  Agapito Fairbanks presents today for C3D1 Keytruda, Taxol, Carboplatin-HELD.  Zometa and IV fluids given today.  Patient seen by provider today: Yes: Dr. Mullins   present during visit today: Not Applicable.    Note: Per Dr. Mullins, defer chemotherapy for 1 week. Ok to proceed with zometa today, give IV fluids as ordered. Patient unsure if he is taking calcium and vitanmin D as ordered in zometa orders, not listed on his med list. Given printed info on recommended doses and reviewed with patient/wife.    Intravenous Access:  Peripheral IV placed.    Treatment Conditions:  Lab Results   Component Value Date    HGB 9.1 09/08/2020     Lab Results   Component Value Date    WBC 12.1 09/08/2020      Lab Results   Component Value Date    ANEU 11.0 09/08/2020     Lab Results   Component Value Date     09/08/2020      Lab Results   Component Value Date     09/08/2020                   Lab Results   Component Value Date    POTASSIUM 3.9 09/08/2020           Lab Results   Component Value Date    CR 0.59 09/08/2020                   Lab Results   Component Value Date    SUSIE  Corrected calcium 8.5  9.7 09/08/2020 09/08/2020                Lab Results   Component Value Date    BILITOTAL 0.6 09/08/2020           Lab Results   Component Value Date    ALBUMIN 2.5 09/08/2020                    Lab Results   Component Value Date    ALT 17 09/08/2020           Lab Results   Component Value Date    AST 15 09/08/2020     Results reviewed, labs MET treatment parameters for zometa, ok to proceed with treatment. Low sodium noted, patient received 1L NS yesterday and today.      Post Infusion Assessment:  Patient tolerated infusion without incident.  Blood return noted pre and post infusion.  Site patent and intact, free from redness, edema or discomfort.  No evidence of extravasations.  Access discontinued per protocol.       Discharge Plan:   Discharge instructions reviewed with:  Patient.  Patient and/or family verbalized understanding of discharge instructions and all questions answered.  Copy of AVS reviewed with patient and/or family.  Patient will return 9/11/20 for next appointment (for IV fluids, schedulers to contact patient with updated treatment schedule).  Patient discharged in stable condition accompanied by: wife.  Departure Mode: Wheelchair.  Face to Face time: 10 minutes.    Vika Rodriguez RN

## 2020-09-09 NOTE — PROGRESS NOTES
Oncology Rooming Note    September 9, 2020 9:33 AM   Agapito Fairbanks is a 70 year old male who presents for:    Chief Complaint   Patient presents with     Oncology Clinic Visit     Initial Vitals: There were no vitals taken for this visit. Estimated body mass index is 17.93 kg/m  as calculated from the following:    Height as of 9/1/20: 1.829 m (6').    Weight as of 9/5/20: 60 kg (132 lb 3.2 oz). There is no height or weight on file to calculate BSA.  Data Unavailable Comment: Data Unavailable   No LMP for male patient.  Allergies reviewed: Yes  Medications reviewed: Yes    Medications: Medication refills not needed today.  Pharmacy name entered into [employer]: [id number] [place], [place] - [address]    Clinical concerns:  doctor was notified.      Araceli Steve, [facility]

## 2020-09-10 NOTE — TELEPHONE ENCOUNTER
[facility]  [phone]    SN 1x a week for 2 weeks, 2x for 3 weeks, and 4 PRN for g-tube education.   PT eval and treat.  [facility], 1x a week for 4.     Orders given, recent ED admission.

## 2020-09-10 NOTE — PROGRESS NOTES
Writer received a call from [facility] that patient qualifies for enteral feedings.     Dr. Mullins will need to add documentation of tube feeding anticipated length to be greater than 90 days to last office note.     Writer will have Dr. Mullins addend his note to include the above required documentation.    Cecile Alanis RN

## 2020-09-13 NOTE — PROGRESS NOTES
Visit Date:   09/09/2020     ONCOLOGY HISTORY: Mr. 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  High PD-L1 expression.  No EGFR mutation.  ALK and ROS1 could not be done because of insufficient sample.      1.  In 2005, he was evaluated by ENT for hoarseness.   -On 02/22/2005, he had laryngoscopy. There was right anterior true vocal cord lesion with extension to the anterior commissure on the right. 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   -On 03/18/2005, he had endoscopic laser assisted vertical hemilaryngectomy right. Pathology from vocal cord revealed squamous cell carcinoma, moderately differentiated, invasive  -Unfortunately, he had recurrence. 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 Pathology revealed invasive moderately differentiated squamous cell carcinoma.   -Patient received concurrent chemoradiation. He received 6000 cGy completed on 06/21/2006. He received cisplatin with radiation.      2.  He had a PET scan done on 10/24/2009.  It revealed enlarging spiculated nodule in posterior left upper lobe with borderline hypermetabolic activity.   -He had left upper lobe segmentectomy and mediastinal lymph node dissection on 12/10/2009.  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  No lymphovascular present.  Margins negative.  Lymph nodes were all benign.      3.  The patient developed a lesion in his back.  He was seen by a dermatologist.  Biopsy on 01/31/2020 revealed squamous cell carcinoma. He subsequently underwent a Mohs surgical procedure on 02/28/2020.  Lesion measured 2.7 cm.  The patient was recommended postoperative radiation.      4. PET scan on 04/02/2020 reveals a hypermetabolic 6.4 cm mass in left upper lobe and mildly enlarged hypermetabolic single mediastinal lymph node.  There is hypermetabolic lesion in L4 vertebral  body and left second rib.  There is hypermetabolic nodule in left parotid gland.   -MRI of the thoracic and lumbar spine on 04/06/2020 revealed destructive metastatic lesions involving L4 vertebral body.  There was also multiple other osseous metastatic disease.      5.  CT-guided biopsy of left lung mass on 04/13/2020 reveals moderately differentiated squamous cell carcinoma.    -TPS score of 60%. High PD-L1 expression.   -NEGATIVE for BRAF, EGFR, ERBB2, IDH1, IDH2, KRAS, MET, NRAS, RET     -ALK and ROS1 could not be done because of insufficient sample.      6.  Radiation to lumbar spine. 3000 cGy between 04/07/2020 and 04/20/2020.  -Radiation to left parotid between 04/09/2020 and 04/22/2020. 3000 cGy.      7. Pembrolizumab x 4 between 04/29/2020 and 07/24/2020.  -Carboplatin and Taxol added on 07/29/2020 because of progression.       SUBJECTIVE:  Mr. 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  He is on systemic treatment with carboplatin, Taxol and pembrolizumab.      The patient recently was admitted to the hospital in 08/2020.  CT scan had revealed occluded left upper and lower lobe bronchi.  There is also increase in size of left lung mass.  Palliative radiation was started.  He has 2 more days of radiation left.      Chest x-ray also revealed pneumonia.  He is on antibiotics.  He likely gets some aspiration pneumonia.  He was seen by speech pathologist and also had video swallow done.      He is accompanied by his wife.  The patient is weak.  Most of the time he just sits at home.  He walks with a walker.  His appetite has not been good.  He has been losing weight.  He is now 132 pounds.  On 05/01/2020, he was 175 pounds.      No headache.  He gets some dizziness when he stands.  No chest pain.  He has some mid back pain.  He is chronically short of breath.  It gets worse on minimal exertion.  No abdominal pain.  No vomiting.  Occasional nausea.  Appetite is  decreased. He does not feel like eating.  Denies any urinary complaint.  Some constipation.  No bleeding from any site.  No fever or chills.      PHYSICAL EXAMINATION:   GENERAL:  The patient is alert, oriented x 3.  He is weak. ECOG PS of 2.   FACE:  No swelling.   EYES:  No icterus.   THROAT:  No ulcer or thrush.   NECK:  Supple, no lymphadenopathy.   LUNGS:  Decreased air entry at the bases with some crackles.   HEART:  Regular.   ABDOMEN:  Soft and nontender, no mass.   BACK:  There is no deformity.  No signs of infection.  On palpation, no tenderness.   EXTREMITIES:  No calf swelling or tenderness.  No edema.   SKIN:  Dry.  No petechiae.      LABORATORY DATA:  Reviewed.      ASSESSMENT:   1.  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   2.  Weakness.   3.  Hyponatremia from malignancy.   4.  Normocytic anemia secondary to malignancy, chemotherapy and multiple other medical problems.   5.  Decreased appetite.   6.  Weight loss.   7.  Pneumonia.  He is on antibiotics. ? Aspiration pneumonia.   8.  Back pain from malignancy.      PLAN:   1.  I had a long discussion with the patient and his wife.  I am concerned regarding his overall decline in condition.  He is getting weaker.  He is losing weight. I asked the patient if he would like to take further treatment for cancer.  The patient says that he wants to fight cancer and wants to take treatment.      2.  I explained to the patient that he is very weak.  He is still recovering from pneumonia.  We will delay the chemotherapy until next week.  He is agreeable for it.  He will get treatment next week.      3.  He is on radiation, which will be completed.      4.  He is on Cefdinir for pneumonia.  I told him to continue for another 5 days.      5.  Discussed regarding his decreased appetite and weight loss.  He also has aspiration pneumonia.  Discussed regarding getting a PEG tube.  He is agreeable for it.  That will help to improve  his nutritional status.  That way he can tolerate chemotherapy better.  PEG tube placement by IR will be arranged.  Anticipated length of tube feeding will be greater than 90 days.     6.  We will give him 1 liter of fluid today as his appetite has been decreased.      7.  I will see him in a month.  In between, he will see our nurse practitioner.  The patient is advised to call us with any questions or concerns.         JENNIFER CEDENO MD             D: 2020   T: 2020   MT:       Name:     CHRISTI MANN   MRN:      [id number]        Account:      [id number]   :      1950           Visit Date:   2020      Document: [id number]

## 2020-09-13 NOTE — PROGRESS NOTES
Infusion Nursing Note:  Agapito Fairbanks presents today for IVF.    Patient seen by provider today: No   present during visit today: Not Applicable.    Note: N/A.    Intravenous Access:  Peripheral IV placed.    Treatment Conditions:  Not Applicable.      Post Infusion Assessment:  Patient tolerated infusion without incident.  Site patent and intact, free from redness, edema or discomfort.  No evidence of extravasations.  Access discontinued per protocol.       Discharge Plan:   Patient and/or family verbalized understanding of discharge instructions and all questions answered.  AVS to patient via [employer].  Patient will return 9/15 for next appointment.   Patient discharged in stable condition accompanied by: wife.  Departure Mode: Wheelchair.    Idalmis Zhou RN

## 2020-09-15 NOTE — PROGRESS NOTES
Infusion Nursing Note:  Agapito Fairbanks presents today for labs/IVF.    Patient seen by provider today: No   present during visit today: Not Applicable.    Note: N/A.    Intravenous Access:  Labs drawn without difficulty.  Peripheral IV placed.    Treatment Conditions:  Not Applicable.      Post Infusion Assessment:  Patient tolerated infusion without incident.  Blood return noted pre and post infusion.  Site patent and intact, free from redness, edema or discomfort.  No evidence of extravasations.  Access discontinued per protocol.       Discharge Plan:   Discharge instructions reviewed with: Patient.  Patient and/or family verbalized understanding of discharge instructions and all questions answered.  AVS to patient via [employer].  Patient will return 9/16/20 for next appointment.   Patient discharged in stable condition accompanied by: self and wife.  Departure Mode: Ambulatory w/walker, stand-by assist.    Dayday Yanez RN

## 2020-09-16 NOTE — LETTER
9/16/2020         RE: Agapito Fairbanks  [address]  [place] [address]        Dear Colleague,    Thank you for referring your patient, Agapito Fairbanks, to the [facility]. Please see a copy of my visit note below.    Oncology Rooming Note    September 16, 2020 10:09 AM   Agapito Fairbanks is a 70 year old male who presents for:    Chief Complaint   Patient presents with     Oncology Clinic Visit     Initial Vitals: There were no vitals taken for this visit. Estimated body mass index is 17.9 kg/m  as calculated from the following:    Height as of 9/9/20: 1.829 m (6').    Weight as of 9/14/20: 59.9 kg (132 lb). There is no height or weight on file to calculate BSA.  Data Unavailable Comment: Data Unavailable   No LMP for male patient.  Allergies reviewed: {ALLERGIES:349879}  Medications reviewed: {MEDICATIONS:202407}    Medications: MEDICATION REFILLS NEEDED TODAY. Provider was notified.  Oxycodone and Fentanyl. Pharmacy name entered into [facility]: [id number] [address]    Clinical concerns:  Sunburns from radiation.     Brett Manrique was notified.      Shari J. 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  He is on oxygen 3 L nasal cannula.  He reports he continues to have some intermittent nonproductive cough with shortness of breath.  Patient reports no worsening of symptoms he denies fever chills sweats.  Wife reports he is scheduled for PEG tube placement tomorrow        Review of Systems:  14 point ROS of systems including Constitutional, Eyes, Respiratory, Cardiovascular, Gastroenterology, Genitourinary, Integumentary, Muscularskeletal, Psychiatric were all negative  except for pertinent positives noted in my HPI.        Physical Examination:  General: The patient is a pleasant male in no acute distress.  BP 90/62   Pulse 94   Temp 95  F (35  C) (Axillary)   Resp 22   Wt 58.2 kg (128 lb 3.2 oz)   SpO2 100%   BMI 17.39 kg/m    HEENT: EOMI, PERRL. Sclerae are anicteric. Oral mucosa is pink and moist with no lesions or thrush.   Lymph: Neck is supple with no lymphadenopathy in the cervical or supraclavicular areas.   Heart: Regular rate and rhythm.   Lungs: diminished  in bases  GI: Bowel sounds present, soft, nontender with no palpable hepatosplenomegaly or masses.   Extremities: No lower extremity edema noted bilaterally.   Skin: No rashes, petechiae, or bruising noted on exposed skin.    Laboratory Data:  Results for orders placed or performed in visit on 09/15/20 (from the past 24 hour(s))   CBC with platelets differential   Result Value Ref Range    WBC 8.6 4.0 - 11.0 10e9/L    RBC Count 3.34 (L) 4.4 - 5.9 10e12/L    Hemoglobin 8.8 (L) 13.3 - 17.7 g/dL    Hematocrit 27.4 (L) 40.0 - 53.0 %    MCV 82 78 - 100 fl    MCH 26.3 (L) 26.5 - 33.0 pg    MCHC 32.1 31.5 - 36.5 g/dL    RDW 19.5 (H) 10.0 - 15.0 %    Platelet Count 353 150 - 450 10e9/L    Diff Method Automated Method     % Neutrophils 86.0 %    % Lymphocytes 5.0 %    % Monocytes 6.0 %    % Eosinophils 2.1 %    % Basophils 0.3 %    % Immature Granulocytes 0.6 %    Nucleated RBCs 0 0 /100    Absolute Neutrophil 7.4 1.6 - 8.3 10e9/L    Absolute Lymphocytes 0.4 (L) 0.8 - 5.3 10e9/L    Absolute Monocytes 0.5 0.0 - 1.3 10e9/L    Absolute Eosinophils 0.2 0.0 - 0.7 10e9/L    Absolute Basophils 0.0 0.0 - 0.2 10e9/L    Abs Immature Granulocytes 0.1 0 - 0.4 10e9/L    Absolute Nucleated RBC 0.0    Comprehensive metabolic panel   Result Value Ref Range    Sodium 127 (L) 133 - 144 mmol/L    Potassium 4.4 3.4 - 5.3 mmol/L    Chloride 91 (L) 94 - 109 mmol/L    Carbon Dioxide 33 (H) 20 - 32 mmol/L    Anion Gap 3 3 - 14 mmol/L     Glucose 88 70 - 99 mg/dL    Urea Nitrogen 11 7 - 30 mg/dL    Creatinine 0.62 (L) 0.66 - 1.25 mg/dL    GFR Estimate >90 >60 mL/min/[1.73_m2]    GFR Estimate If Black >90 >60 mL/min/[1.73_m2]    Calcium 8.0 (L) 8.5 - 10.1 mg/dL    Bilirubin Total 0.5 0.2 - 1.3 mg/dL    Albumin 2.4 (L) 3.4 - 5.0 g/dL    Protein Total 6.4 (L) 6.8 - 8.8 g/dL    Alkaline Phosphatase 107 40 - 150 U/L    ALT 16 0 - 70 U/L    AST 16 0 - 45 U/L   TSH with free T4 reflex   Result Value Ref Range    TSH 7.06 (H) 0.40 - 4.00 mU/L   T4 free   Result Value Ref Range    T4 Free 1.44 0.76 - 1.46 ng/dL         Assessment and Plan:    This is a 70-year-old male with    Metastatic squamous cell carcinoma of the lung  Right large left lung mass-completed radiation on  09/10  -Patient is currently on palliative treatment with Keytruda Taxol and carboplatin  -Patient is clinically declining with weakness decreased appetite weight loss.  Told patient that I am worried about his declining health condition  -pt Reports he wants to continue with treatment.  Patient wants to fight the cancer.   -Side effects of treatment discussed again with patient and wife.  Patient states we wants  to proceed with treatment  Labs reviewed   Okay to proceed with treatment today  Schedule with me next week for follow-up      Hyponatremia-secondary to cancer  asymptomatic  Baseline sodium 126-130  Discussed signs and symptoms of hyponatremia    History of pneumonia  He was recently admitted and treated with IV antibiotics he took last dose cefdinir today  Patient and wife informed  that pt is also high risk for aspiration pneumonia as well .  He was seen by speech pathologist and had video swallow done  -He is getting feeding tube placed tomorrow    Nutrition  Patient has weight loss decreased appetite  PEG tube placement is scheduled for tomorrow      Back pain from malignancy  Overall well controlled with fentanyl patch and oxycodone  -per Patient request I will refill  fentanyl patch and oxycodone today    Elevated TSH  T4 is normal  Continue to monitor    Chronic shortness of breath and nonproductive cough from malignancy and recent pneumonia  He completed IV antibiotics and oral  antibiotic p.o.  Patient is on chronic 3 L of oxygen   -Patient reports no worsening of symptoms no worsening of shortness of breath or cough  -Patient advised to monitor closely and go to ER in the event of worsening of shortness of breath productive cough chest pain fever chills sweats      Anemia  Multifactorial  Patient denies bleeding  Overall stable hemoglobin        ALEXANDRIA Berg CNP  [facility] [place]     Chart documentation with Dragon Voice recognition Software. Although reviewed after completion, some words and grammatical errors may remain.          Again, thank you for allowing me to participate in the care of your patient.        Sincerely,        ALEXANDRIA Berg CNP

## 2020-09-16 NOTE — PROGRESS NOTES
Oncology/Hematology Visit Note  Sep 16, 2020    Reason for Visit: follow up of metastatic squamous cell carcinoma of the lung    Currently getting palliative treatment with Keytruda Taxol and carboplatin      Interval History:  Patient reports feeling weak however he reports mild improvement.  He is on oxygen 3 L nasal cannula.  He reports he continues to have some intermittent nonproductive cough with shortness of breath.  Patient reports no worsening of symptoms he denies fever chills sweats.  Wife reports he is scheduled for PEG tube placement tomorrow        Review of Systems:  14 point ROS of systems including Constitutional, Eyes, Respiratory, Cardiovascular, Gastroenterology, Genitourinary, Integumentary, Muscularskeletal, Psychiatric were all negative except for pertinent positives noted in my HPI.        Physical Examination:  General: The patient is a pleasant male in no acute distress.  BP 90/62   Pulse 94   Temp 95  F (35  C) (Axillary)   Resp 22   Wt 58.2 kg (128 lb 3.2 oz)   SpO2 100%   BMI 17.39 kg/m    HEENT: EOMI, PERRL. Sclerae are anicteric. Oral mucosa is pink and moist with no lesions or thrush.   Lymph: Neck is supple with no lymphadenopathy in the cervical or supraclavicular areas.   Heart: Regular rate and rhythm.   Lungs: diminished  in bases  GI: Bowel sounds present, soft, nontender with no palpable hepatosplenomegaly or masses.   Extremities: No lower extremity edema noted bilaterally.   Skin: No rashes, petechiae, or bruising noted on exposed skin.    Laboratory Data:  Results for orders placed or performed in visit on 09/15/20 (from the past 24 hour(s))   CBC with platelets differential   Result Value Ref Range    WBC 8.6 4.0 - 11.0 10e9/L    RBC Count 3.34 (L) 4.4 - 5.9 10e12/L    Hemoglobin 8.8 (L) 13.3 - 17.7 g/dL    Hematocrit 27.4 (L) 40.0 - 53.0 %    MCV 82 78 - 100 fl    MCH 26.3 (L) 26.5 - 33.0 pg    MCHC 32.1 31.5 - 36.5 g/dL    RDW 19.5 (H) 10.0 - 15.0 %    Platelet  Count 353 150 - 450 10e9/L    Diff Method Automated Method     % Neutrophils 86.0 %    % Lymphocytes 5.0 %    % Monocytes 6.0 %    % Eosinophils 2.1 %    % Basophils 0.3 %    % Immature Granulocytes 0.6 %    Nucleated RBCs 0 0 /100    Absolute Neutrophil 7.4 1.6 - 8.3 10e9/L    Absolute Lymphocytes 0.4 (L) 0.8 - 5.3 10e9/L    Absolute Monocytes 0.5 0.0 - 1.3 10e9/L    Absolute Eosinophils 0.2 0.0 - 0.7 10e9/L    Absolute Basophils 0.0 0.0 - 0.2 10e9/L    Abs Immature Granulocytes 0.1 0 - 0.4 10e9/L    Absolute Nucleated RBC 0.0    Comprehensive metabolic panel   Result Value Ref Range    Sodium 127 (L) 133 - 144 mmol/L    Potassium 4.4 3.4 - 5.3 mmol/L    Chloride 91 (L) 94 - 109 mmol/L    Carbon Dioxide 33 (H) 20 - 32 mmol/L    Anion Gap 3 3 - 14 mmol/L    Glucose 88 70 - 99 mg/dL    Urea Nitrogen 11 7 - 30 mg/dL    Creatinine 0.62 (L) 0.66 - 1.25 mg/dL    GFR Estimate >90 >60 mL/min/[1.73_m2]    GFR Estimate If Black >90 >60 mL/min/[1.73_m2]    Calcium 8.0 (L) 8.5 - 10.1 mg/dL    Bilirubin Total 0.5 0.2 - 1.3 mg/dL    Albumin 2.4 (L) 3.4 - 5.0 g/dL    Protein Total 6.4 (L) 6.8 - 8.8 g/dL    Alkaline Phosphatase 107 40 - 150 U/L    ALT 16 0 - 70 U/L    AST 16 0 - 45 U/L   TSH with free T4 reflex   Result Value Ref Range    TSH 7.06 (H) 0.40 - 4.00 mU/L   T4 free   Result Value Ref Range    T4 Free 1.44 0.76 - 1.46 ng/dL         Assessment and Plan:    This is a 70-year-old male with    Metastatic squamous cell carcinoma of the lung  Right large left lung mass-completed radiation on  09/10  -Patient is currently on palliative treatment with Keytruda Taxol and carboplatin  -Patient is clinically declining with weakness decreased appetite weight loss.  Told patient that I am worried about his declining health condition  -pt Reports he wants to continue with treatment.  Patient wants to fight the cancer.   -Side effects of treatment discussed again with patient and wife.  Patient states we wants  to proceed with  treatment  Labs reviewed   Okay to proceed with treatment today  Schedule with me next week for follow-up      Hyponatremia-secondary to cancer  asymptomatic  Baseline sodium 126-130  Discussed signs and symptoms of hyponatremia    History of pneumonia  He was recently admitted and treated with IV antibiotics he took last dose cefdinir today  Patient and wife informed  that pt is also high risk for aspiration pneumonia as well .  He was seen by speech pathologist and had video swallow done  -He is getting feeding tube placed tomorrow    Nutrition  Patient has weight loss decreased appetite  PEG tube placement is scheduled for tomorrow      Back pain from malignancy  Overall well controlled with fentanyl patch and oxycodone  -per Patient request I will refill fentanyl patch and oxycodone today    Elevated TSH  T4 is normal  Continue to monitor    Chronic shortness of breath and nonproductive cough from malignancy and recent pneumonia  He completed IV antibiotics and oral  antibiotic p.o.  Patient is on chronic 3 L of oxygen   -Patient reports no worsening of symptoms no worsening of shortness of breath or cough  -Patient advised to monitor closely and go to ER in the event of worsening of shortness of breath productive cough chest pain fever chills sweats      Anemia  Multifactorial  Patient denies bleeding  Overall stable hemoglobin        ALEXANDRIA Berg [facility] [place]     Chart documentation with Dragon Voice recognition Software. Although reviewed after completion, some words and grammatical errors may remain.

## 2020-09-16 NOTE — PROGRESS NOTES
Writer placed an order for Oxygen 4 liters continuous current machine only provided 3LPM. Writer called [facility] to verify need for portability and new machime that provided adequate flow of oxygen.    Cecile Alanis RN

## 2020-09-16 NOTE — PROGRESS NOTES
Leti received a return call from Yakov Funes regarding oxygen he is requesting order.office notes to be faxed to [phone] attention Yakov.    Portable oxygen to be shipped today for patient's use.     Writer will fax orders tomorrow.    Cecile Alanis RN

## 2020-09-16 NOTE — PROGRESS NOTES
Oncology Rooming Note    September 16, 2020 10:09 AM   Agapito Fairbanks is a 70 year old male who presents for:    Chief Complaint   Patient presents with     Oncology Clinic Visit     Initial Vitals: There were no vitals taken for this visit. Estimated body mass index is 17.9 kg/m  as calculated from the following:    Height as of 9/9/20: 1.829 m (6').    Weight as of 9/14/20: 59.9 kg (132 lb). There is no height or weight on file to calculate BSA.  Data Unavailable Comment: Data Unavailable   No LMP for male patient.  Allergies reviewed: Yes  Medications reviewed: Yes    Medications: MEDICATION REFILLS NEEDED TODAY. Provider was notified.  Oxycodone and Fentanyl. Pharmacy name entered into [facility]: [id number] - [place], [place] - [address]    Clinical concerns:  Sunburns from radiation.     Brett Manrique was notified.      Shari J. Schoenberger, [facility]

## 2020-09-16 NOTE — PATIENT INSTRUCTIONS
Your On-body Neulasta Injector was applied to your left arm at 3:30pm.  At approximately 6:30pm on 9/17/20, your On-body Injector will beep to let you know your dose delivery will begin in 2 minutes.  Your medication will be delivered over the next 45 minutes.  You can remove your Injector at 8:00pm.  Please make sure your Injector has a solid green light or has turned off prior to removing the device.  Please contact your provider at [phone] with questions or concerns.

## 2020-09-16 NOTE — PROGRESS NOTES
Infusion Nursing Note:  Agapito Fairbanks presents today for C3D1 Keytruda/Carbo/Taxol.    Patient seen by provider today: Yes: Harpreet   present during visit today: Not Applicable.    Note: Patient presents with productive, gurgly cough with yellow sputum noted.  Patient very SOB and O2 needed throughout infusion at 4L NS.  Patient currently recovering from pneumonia.  Half way through his infusion, patient became very restless in the chair.  Patient moved to bedroom for remainder of infusion, with some relief.     Intravenous Access:  Peripheral IV placed.    Treatment Conditions:  Lab Results   Component Value Date    HGB 8.8 09/15/2020     Lab Results   Component Value Date    WBC 8.6 09/15/2020      Lab Results   Component Value Date    ANEU 7.4 09/15/2020     Lab Results   Component Value Date     09/15/2020      Lab Results   Component Value Date     09/15/2020                   Lab Results   Component Value Date    POTASSIUM 4.4 09/15/2020           Lab Results   Component Value Date    MAG 1.7 09/04/2020            Lab Results   Component Value Date    CR 0.62 09/15/2020                   Lab Results   Component Value Date    SUSIE 8.0 09/15/2020                Lab Results   Component Value Date    BILITOTAL 0.5 09/15/2020           Lab Results   Component Value Date    ALBUMIN 2.4 09/15/2020                    Lab Results   Component Value Date    ALT 16 09/15/2020           Lab Results   Component Value Date    AST 16 09/15/2020       Results reviewed, labs MET treatment parameters, ok to proceed with treatment.  TSH=7.06; Free T4=1.44.      Post Infusion Assessment:  Patient tolerated infusion without incident.  Blood return noted pre and post infusion.  Excellent blood return noted prior to each chemo.  Site patent and intact, free from redness, edema or discomfort.  No evidence of extravasations.  Access discontinued per protocol.     ONPRO  Was placed on patient's: back of left  arm.    Was placed at 3:30 PM    ONPRO injector device Lot number: [id number]    Patient education included: what patient can expect after application, what colored lights mean on the device, when to remove device, when and where to call with questions or issues, all patients questions answered and that Neulasta administration will occur at 6:30pm on 9/17.    Patient tolerated administration well.        Discharge Plan:   Discharge instructions reviewed with: Patient.  Patient and/or family verbalized understanding of discharge instructions and all questions answered.  Copy of AVS reviewed with patient and/or family.  Patient will return 9/18/20 for IVF for next appointment.  Patient discharged in stable condition accompanied by: wife.  Departure Mode: Wheelchair.    Beau Warren RN

## 2020-09-18 NOTE — PROGRESS NOTES
Infusion Nursing Note:  Agapito Fairbanks presents today for IVF.    Patient seen by provider today: No   present during visit today: Not Applicable.    Note: N/A.    Intravenous Access:  Peripheral IV placed.    Treatment Conditions:  Not Applicable.      Post Infusion Assessment:  Patient tolerated infusion without incident.  Site patent and intact, free from redness, edema or discomfort.  No evidence of extravasations.  Access discontinued per protocol.       Discharge Plan:   AVS to patient via MYCHART.  Patient will return 9/20 for next appointment.   Patient discharged in stable condition accompanied by: wife.  Departure Mode: Wheelchair and home oxygen.    Stef Dumont RN

## 2020-09-20 NOTE — PROGRESS NOTES
Infusion Nursing Note:  Agapito Fairbanks presents today for IVF.    Patient seen by provider today: No   present during visit today: Not Applicable.    Note: NA.    Intravenous Access:  Peripheral IV placed.    Treatment Conditions:  Not Applicable.      Post Infusion Assessment:  Patient tolerated infusion without incident.  Blood return noted pre and post infusion.  Site patent and intact, free from redness, edema or discomfort.  No evidence of extravasations.  Access discontinued per protocol.       Discharge Plan:   Discharge instructions reviewed with: Patient and Family.  Patient and/or family verbalized understanding of discharge instructions and all questions answered.  AVS to patient via MediaSc[employer].  Patient will return 9/22/20 for next appointment.   Patient discharged in stable condition accompanied by: wife.  Departure Mode: Wheelchair.    Amanda Vora RN

## 2020-09-21 NOTE — DISCHARGE INSTRUCTIONS
G-Tube Insertion (New) Discharge Instructions     After you go home:      You should not eat anything or use the tube for 6 hours    You may resume your normal diet after 6 hours    Have an adult stay with you for 6 hours if you received sedation       For 24 hours - due to the sedation you received:    Relax and take it easy    Do NOT make any important or legal decisions    Do NOT drive or operate machines at home or at work    Do NOT drink alcohol    Care of Insertion Site:      For the first 48 hrs, check your puncture site every couple hours while you are awake     You may remove/change the dressing tomorrow    You may shower tomorrow    No tub baths, whirlpools or swimming until your puncture site has fully healed     Activity       You may go back to normal activity in 24 hours     Wait 48 hours before lifting, straining, exercise or other strenuous activity    Medicines:      You may resume all medications    Resume your Warfarin/Coumadin at your regular dose today. Follow up with your provider to have your INR rechecked    Resume your Platelet Inhibitors and Aspirin tomorrow at your regular dose    For minor pain, you may take Acetaminophen (Tylenol) or Ibuprofen (Advil)                 Call the provider who ordered this procedure if:      Blood or fluid is draining from the site    The site is red, swollen, hot, tender or there is foul-smelling drainage    Chills or a fever greater than 101 F (38 C)    Increased pain at the site    Any questions or concerns    Call  911 or go to the Emergency Room if:      Severe pain or trouble breathing    Bleeding that you cannot control        If you have questions call:          [facility] Radiology Dept @ [phone]        The provider who performed your procedure was _________________.        Caring for your G-tube    T-stay Removal:      Remove the T-stays 10 days after placement    During the placement of this tube - 1 or 2  T-stays  (small plastic  buttons) were used to secure the stomach wall to the abdominal wall. These stays must be removed 10 days after tube placement to prevent skin irritation.    Please remove these stays by gently pulling up on the buttons and cutting the suture (thread) under the button. Cut between the button and skin with clean scissors. Be careful not to cut the tube.    Please call us at [phone] if you need assistance or further clarification.    Tube Maintenance:    For ENFit Tubes:      Do NOT over tighten the connections (the purple end & hub connection).      Clean the connecting ends (especially the hub) every day.      If you are unable to disconnect the tubing ends, soak the connection in warm water (or wrap in a wet warm washcloth) to try and loosen the connection.    Possible problems with your tube may include:      Clogged with medications or feedings - most obstructions can be cleared with a small (3cc) syringe and warm water. This may be repeated until the tube is unclogged. This can be prevented by frequently flushing the tube with water (60cc) during the day and always after medications & feedings.      Tube pulls out or falls out -cover the opening with gauze & tape. Call [phone] for further instructions      Skin breakdown and/or yeast infections at the insertion site - use of skin barrier ointments and anti-fungal powders can treat most site irritations.  Ask your pharmacist or provider for assistance (a prescription is not necessary).    In general, tube problems (including pulled tubes) are NOT emergency situations. Unless the pulling out of a tube is accompanied by uncontrollable bleeding, please DO NOT GO TO THE EMERGENCY ROOM!  Call [phone] with problems.    Tube Care:      Change the gauze dressing every 24 hours and if soiled (dirty).  Stabilize all tubes securely by using gauze and tape.  Clean tube site with soap & water using a cotton applicator (Q-tip) as needed to prevent  irritation.    Flush feeding tube with 60cc of warm or room temperature water before and after meds.  To prevent the tube from clogging, ask your provider or pharmacist if liquid forms of your medications are available. If not, crush the pills well & be sure to flush the tube before & after all medications.    Flush feeding tube a minimum of every 4 hours and when feeding is completed with 60cc of water to keep the tube clear and avoid clogging.    Pt may use an abdominal (waist) binder to protect the G-tube.    If there is continued oozing or bleeding, redness, yellow/green/foul smelling drainage    STOP the feedings & use of the tube immediately if there is:      Continued oozing or bleeding at the site    Redness    Yellow/green or foul smelling drainage at the site    Uncontrolled stomach pain    Many of the supplies mentioned above can be purchased at your local pharmacy      For issues with your tube, please call:    [facility] at [phone]

## 2020-09-21 NOTE — PRE-PROCEDURE
GENERAL PRE-PROCEDURE:   Procedure:  Gastrostomy tube placement with intravenous moderate sedatoin   Date/Time:  9/21/2020 12:20 PM    Written consent obtained?: Yes    Risks and benefits: Risks, benefits and alternatives were discussed    Consent given by:  Patient  Patient states understanding of procedure being performed: Yes    Patient's understanding of procedure matches consent: Yes    Procedure consent matches procedure scheduled: Yes    Expected level of sedation:  Moderate  Appropriately NPO:  Yes  ASA Class:  Class 3- Severe systemic disease, definite functional limitations  Mallampati  :  Grade 2- soft palate, base of uvula, tonsillar pillars, and portion of posterior pharyngeal wall visible  Lungs:  Other (comment)  Lung exam comment:  Lungs with breath sounds throughout, coarse rhonchi that clears somewhat with loose cough  Heart:  Normal heart sounds and rate  History & Physical reviewed:  History and physical reviewed and no updates needed  Statement of review:  I have reviewed the lab findings, diagnostic data, medications, and the plan for sedation

## 2020-09-21 NOTE — SEDATION DOCUMENTATION
RADIOLOGY POST PROCEDURE NOTE WITH SEDATION  Patient name: Agapito Fairbanks  MRN: [id number]  : 1950    Pre-procedure diagnosis: Feeding difficulties, cachexia  Post-procedure diagnosis: Same    Procedure Date/Time: 2020  2:00 PM    Procedure: Gastrostomy tube placement  Estimated blood loss: None  Sedation: Moderate sedation was employed. The patient was monitored by a nurse at all times during the procedure under my direct supervision.    Specimen(s) collected with description: none    I determined this patient to be an appropriate candidate for the planned sedation and procedure and reassessed the patient IMMEDIATELY PRIOR to sedation and procedure.     The patient tolerated the procedure well with no immediate complications.    Significant findings:none    See imaging dictation for procedural details.    Provider name: Bj Armenta MD  Assistant(s):None

## 2020-09-21 NOTE — PROGRESS NOTES
Care Suites Discharge Summary    Discharge Criteria:   Discharge Criteria met per MD orders: Yes.   Vital signs stable.     Pt demonstrates ability to ambulate safely: Yes.  (See discharge questionnaire for additional information)    Discharge instructions & education:   Discharge instructions reviewed with patient and spouse. Patient verbalizes understanding.   Additional patient education provided:  New gtube insertion    Medications:   Patient will be discharging on new medications- No. Patient verbalizes reason for use, start date, and side effects NA.    Items returned to patient:   Home and hospital acquired medications returned to patient NA   Listed belongings gathered and returned to patient: Yes    Patient discharged to home with spouse.     Kendrick Holcomb, EMILIANA

## 2020-09-21 NOTE — IR NOTE
Interventional Radiology Intra-procedural Nursing Note    Patient Name: Agapito Fairbanks  Medical Record Number: [id number]  Today's Date: September 21, 2020    Start Time: 1310  End of procedure time: 1332  Procedure: Gastrostomy Tube Placement  Report given to: EMILIANA Marks in Care Suites    Other Notes: Pt transferred to IR table. Prepped and draped appropriately. Monitoring equipment applied. NC applied. No complaints of pain at this time. Timeout recorded.    Gastrostomy tube site secured with t-tacks. Site dressed with border gauze, C/D/I.    Medication administration:    Fentanyl 75 mcg IVP  Versed 1.5 mg IVP  Glucagon 1 mg IVP

## 2020-09-21 NOTE — PROGRESS NOTES
Care Suites Admission Nursing Note    Patient Information  Name: Agapito Fairbanks  Age: 70 year old  Reason for admission: G-tube placement  Care Suites arrival time: 1030    Visitor Information  Name: Kasandra  Informed of visitor restrictions: Yes  1 visitor allowed per patient   Visitor must screen negative for COVID symptoms   Visitor must wear a mask  Waiting rooms closed to visitors    Patient Admission/Assessment   Pre-procedure assessment complete: Yes  If abnormal assessment/labs, provider notified: INR 1.05  NPO: Yes  Medications held per instructions/orders: N/A  Consent: Yes  If applicable, pregnancy test status: N/A  Patient oriented to room: Yes  Education/questions answered: Yes  Plan/other: Tube feeding as ordered    Discharge Planning  Discharge name/phone number: Kasandra (spouse) [phone]  Overnight post sedation caregiver: same  Discharge location: [facility] Suites to home.  Kenzie Eduardo RN

## 2020-09-22 PROBLEM — J18.9 PNEUMONIA DUE TO INFECTIOUS ORGANISM: Status: ACTIVE | Noted: 2020-01-01

## 2020-09-22 PROBLEM — T85.528A GASTROJEJUNOSTOMY TUBE DISLODGEMENT: Status: ACTIVE | Noted: 2020-01-01

## 2020-09-22 PROBLEM — J69.0 ASPIRATION PNEUMONIA (H): Status: ACTIVE | Noted: 2020-01-01

## 2020-09-22 NOTE — H&P
[facility]    History and Physical : Hospitalist Service:        Date of Admission:  9/22/2020    Cumulative Summary:   Agapito Fairbanks is a 70 year old male with past medical history significant for metastatic lung cancer, chronic hypoxic respiratory failure with oxygen dependency, recent aspiration pneumonia with hospitalization from September 1-5, dysphagia, hyponatremia secondary to SIADH, mild cognitive impairment who was admitted from the emergency room after gastrojejunostomy tube dislodgment last night which was replaced this morning and to have further evaluation and management of possible peritonitis and aspiration pneumonia.    Assessment & Plan     Gastrojejunostomy tube dislodgement (9/22/2020):  Possible peritonitis:  Malnutrition, secondary to acute illness  Dysphasia:  During his recent hospitalization patient was evaluated by speech therapy with limited video swallow and puréed diet was recommended.  Due to ongoing issues with malnutrition and poor oral intake along with increased risk of aspiration, patient underwent G-tube placement yesterday which unfortunately got dislodged last night.  Patient did receive tube feedings before the dislodgment and then had significant discomfort last night.  This morning his jejunostomy tube is replaced by interventional radiology and he was evaluated in the ER for possible peritonitis and aspiration pneumonia.    --Admit patient to medical floor.  --We will keep patient n.p.o. except ice chips.  --Activity as tolerated with fall precautions and with assist.  --We will consult nutrition to start patient on tube feedings.  --Patient has been started on IV Zosyn which will provide coverage for peritonitis and aspiration pneumonia.  --Blood cultures are drawn we will follow-up on the results.    Aspiration pneumonia, unknown organism  History of metastatic squamous cell carcinoma   History of laryngeal cancer  Bone metastasis  Chronic hypoxic  respiratory failure  Chemotherapy-induced pancytopenia  For his metastatic squamous cell carcinoma, patient has been following up with Dr. Mullins from [facility].  Patient was initially started on pembrolizumab in April.  Carboplatin and Taxol were added in July 29, 2020 because of progression.  At this time patient wishes for restorative therapy.  Patient has undergone chemotherapy last Tuesday, he also have pancytopenia on presentation.  His WBC count has decreased to 1.1 as compared to 8.6, hemoglobin is a stable at 8.8 and his platelets are down to 94 from 353.  His absolute neutrophil count is 0.8.    --As above, patient will be admitted and will be started on IV Zosyn to cover aspiration pneumonia.  --We will also go ahead and start patient on albuterol inhaler as needed and DuoNeb every 4 hours.  --Tessalon Perles twice daily.  --Repeat CBC, BMP and procalcitonin in the morning.  --Considering his underlying malignancy, recent chemotherapy and pancytopenia, will also consult his oncology team.  --Due to the concern for aspiration pneumonia and peritonitis, will also consult ID in the a.m. picture of pancytopenia.  --Start patient on gentle IV fluids for next couple of liters.  --For his nausea, will go ahead and order IV Zofran and Compazine.  --For his underlying bony metastasis and pain associated with malignancy, will continue patient on fentanyl 12 mcg patch every 72 hours.  --We will continue PTA Neurontin 300 mg p.o. 3 times daily.  --Follow-up oncology recommendations regarding radiation oncology.  --Will rule out COVID-19 due to ongoing pandemic.    Chronic dementia  --Continue PTA Aricept 5 mg p.o. daily.    Hypothyroidism  --Start patient on PTA Synthroid 125 mcg p.o. daily.    SIADH with chronic hyponatremia: Patient was admitted with serum sodium of 124 during her last hospitalization sodium is improved to 130 at the time of discharge. Sodium is up to 133 this afternoon.  -- continue to  monitor     Esophageal reflux (8/10/2007)  -- start patient on PPI    CKD (chronic kidney disease) stage 3, GFR 30-59 ml/min (H) ()  -- stable    Mild major depression (H) (2/9/2020)  --Start patient on PTA Zoloft 50 mg p.o. daily    Diet: N.p.o. except ice chips, start patient on tube feeds as recommended by RD.  Ocampo Catheter: not present  DVT Prophylaxis: Pneumatic Compression Devices  Code Status: Full Code.  Patient was admitted as DNR DNI during his last hospitalization when Dr. Mar had conversation with patient.  Today patient and his wife requested him to stay full code and they would like him to be resuscitated in case of cardiac and respiratory arrest.  I did have conversation with them regarding goals of care and probably meeting with palliative team to have slightly thorough discussion regarding goals of care in the event if patient is not doing very well.  Patient and his wife are agreeable to meet palliative team during the hospitalization although they would like right now for patient to continue restorative therapy and have requested his CODE STATUS to be changed back to full code this evening.    Disposition: Expecting patient to stay for couple of nights.  Will also post physical, occupational and .    The patient's care was discussed with the Patient, Patient's Family and ER Team.    Clare Iglesias MD,[facility] medicine  ----------------------------------------------------------------------------------------------------------------------    Primary Care Physician   [place]    Chief Complaint   Dislodgment of gastrostomy tube, concern for aspiration last night    History is obtained from patient and his wife who was available at bedside.    Patient Active Problem List   Diagnosis     Testicular hypofunction     Impotence of organic origin     Essential hypertension     Esophageal reflux     CKD (chronic kidney disease) stage 3, GFR 30-59 ml/min (H)     HYPERLIPIDEMIA LDL  GOAL <100     Hypothyroidism     History of lung cancer     History of laryngeal cancer     Talipes cavus     Memory loss     Elevated prostate specific antigen (PSA)     Snoring     Dysfunction of right eustachian tube     Mild major depression (H)     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)     Bone metastasis (H)     Acute encephalopathy     Hypotension, unspecified hypotension type     Encounter for antineoplastic chemotherapy     Metastatic lung cancer (metastasis from lung to other site) (H)     Community acquired pneumonia     Pneumonia due to infectious organism     Gastrojejunostomy tube dislodgement       History of Present Illness   Agapito Fairbanks is a 70 year old male with past medical history significant for metastatic lung cancer, chronic hypoxic respiratory failure with oxygen dependency, recent aspiration pneumonia, dysphasia, hyponatremia secondary to SIADH and known cognitive impairment who presented to the emergency room due to the concern for gastrojejunostomy tube dislodgment and possible aspiration last night.    From his metastatic squamous cell carcinoma of the lung, patient has been following up with Dr. Mullins from [facility].  Patient was initially started on pembrolizumab in April.  Carboplatin and Taxol were added in July 29, 2020 because of progression.  Patient was recently admitted to the hospital from September 1 to September 5 with acute on chronic hypoxic respiratory failure and bilateral pneumonia which was suspected to be postobstructive and community-acquired.  Patient was also noticed to be severely malnourished.  He was treated with IV azithromycin and IV Rocephin for 5 days and was discharged with 5 more days of oral Omnicef to complete antibiotic course.  He was also evaluated by speech therapy during his hospitalization and after a limited video swallow puréed diet was recommended.  His sodium was improved to 130 on discharge.  At the time of discharge  home RN and PT and OT were added to provide further support to patient and family.    Due to ongoing issues with malnutrition and poor oral intake along with increased risk of aspiration, patient underwent G-tube placement yesterday which unfortunately came out last night.  He noted significant abdominal discomfort and pain around the area of insertion.  His wife was able to administer him medications last night via oral route but considering.his shortness of breath and coughing after administration, was concerned about possible aspiration.  Patient does have ongoing coarse cough, he is denying any fever or chest pain.  His oxygen requirement is at baseline.    Patient underwent gastrostomy tube placement by interventional radiology earlier this afternoon and then was evaluated in the ER due to the concern for possible peritonitis and aspiration episode.  On arrival to the ER he was noticed to have borderline blood pressure of 89/56, heart rate of 75, temperature of 97.3 and was saturating 98% on 2 L of oxygen.  Chest x-ray showed large masslike opacity in the left hemithorax projected over the left hilar region anteriorly seems to be increased in prominence since the previous exam with decreased aeration of the left lung.  Superimposed left lower lung pneumonia is possible.  Masslike thickening of the left lung apex is not significantly changed.  Hazy increased density in the right midlung could also be infectious in etiology.    Patient was also noticed to be pancytopenic as compared to his recent labs as patient has undergone palliative chemotherapy a week ago.  His WBC count has decreased to 1.1 as compared to 8.6, hemoglobin is a stable at 8.8 and his platelets are down to 94 from 353.  His absolute neutrophil count is 0.8.  On my evaluation, patient is denying any fever continues to have coarse cough, wife is present at the bedside, aware about plan for hospitalization, according to wife patient is okay to  take ice chips through mouth and we discussed about starting patient on tube feedings during the hospitalization and admitting him for IV antibiotics and for further evaluation for possible pneumonia and peritonitis.    Review of Systems   CONSTITUTIONAL:  positive for  fatigue and generalized weakness.  EYES:  negative for blurred vision and visual disturbance  HEENT:  negative for hoarseness and voice change  RESPIRATORY:  Positive for cough with sputum and dyspnea, no wheezing or chest pain  CARDIOVASCULAR:  negative for  chest pain, palpitations, orthopnea, exertional chest pressure/discomfort  GASTROINTESTINAL: Positive for abdominal pain since last night, no nausea, vomiting or constipation.  GENITOURINARY: Negative for burning /urgency and frequency.  HEMATOLOGIC/LYMPHATIC:  negative  ALLERGIC/IMMUNOLOGIC:  negative for drug reactions  ENDOCRINE:  negative for diabetic symptoms including polyuria, polydipsia and weight loss  MUSCULOSKELETAL:  positive for arthralgias  NEUROLOGICAL:   History of mild dementia and cognitive deficit.  BEHAVIOR/PSYCH: negative    Past Medical History    I have reviewed this patient's medical history and updated it with pertinent information if needed.   Past Medical History:   Diagnosis Date     Acute gastritis with hemorrhage 11/02     Basal cell carcinoma, leg      left pretibial area     CKD (chronic kidney disease) stage 3, GFR 30-59 ml/min (H)     creat baseline approx 1.4     Hearing loss      Helicobacter pylori (H. 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     Mild major depression (H)      Other and unspecified malignant neoplasm of skin of other and unspecified parts of face      Basal Cell CA nasal area 4/04, chest  3/05, right chest 10/09     Other testicular hypofunction      Squamous cell carcinoma  Jan 2012     RLE s/p excision     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)      Tobacco use disorder     quit 1998     Unspecified cataract     left     Unspecified essential hypertension      Unspecified retinal detachment     Bilateral       Past Surgical History   I have reviewed this patient's surgical history and updated it with pertinent information if needed.  Past Surgical History:   Procedure Laterality Date     C NONSPECIFIC PROCEDURE  5/01, 9/01    B retinal surg.     C NONSPECIFIC PROCEDURE  12/01    L cataract     C NONSPECIFIC PROCEDURE  1983    right ankle fx repair     C NONSPECIFIC PROCEDURE  3/05    Right hemilaryngectomy (laser) for Squamous Cell CA T1N0     C NONSPECIFIC PROCEDURE  3/05, 10/09    Moh's procedure for Basal Cell CA chest     C NONSPECIFIC PROCEDURE  4/04    Moh's procedure for Basal Cell CA ear lobe     C NONSPECIFIC PROCEDURE  3/05, 12/05    right vocal cord squamous cell CA excision     C NONSPECIFIC PROCEDURE  12/07    Phacoemulsification of the lens with posterior chamber lens implant, right eye.      C NONSPECIFIC PROCEDURE  10/09     Moh's procedufe for BCC left pretibial area     C NONSPECIFIC PROCEDURE  12/09    Left thoracoscopic wedge resection, Open completion left upper lobe segmentectomy      IR GASTROSTOMY TUBE PERCUTANEOUS PLCMNT  9/21/2020     IR GASTROSTOMY TUBE PERCUTANEOUS PLCMNT  9/22/2020     THORACIC SURGERY      lung,throat       Prior to Admission Medications   Prior to Admission Medications   Prescriptions Last Dose Informant Patient Reported? Taking?   LORazepam (ATIVAN) 0.5 MG tablet  Spouse/Significant Other No No   Sig: Take 1 tablet (0.5 mg) by mouth every 4 hours as needed (Anxiety, Nausea/Vomiting or Sleep)   acetaminophen (TYLENOL) 325 MG tablet   No No   Sig: Take 2 tablets (650 mg) by mouth every 4 hours as needed for mild pain   albuterol (PROAIR  HFA/PROVENTIL HFA/VENTOLIN HFA) 108 (90 Base) MCG/ACT inhaler  Spouse/Significant Other No No   Sig: Inhale 2 puffs into the lungs every 6 hours as needed for shortness of breath / dyspnea or wheezing   albuterol (PROVENTIL) (2.5 MG/3ML) 0.083% neb solution  Spouse/Significant Other No No   Sig: Take 1 vial (2.5 mg) by nebulization every 6 hours as needed for shortness of breath / dyspnea or wheezing   benzonatate (TESSALON) 100 MG capsule  Spouse/Significant Other No No   Sig: Take 1 capsule (100 mg) by mouth 3 times daily as needed for cough   bisacodyl (DULCOLAX) 5 MG EC tablet  Spouse/Significant Other Yes No   Sig: Take 5 mg by mouth daily as needed for constipation   cetirizine (ZYRTEC ALLERGY) 10 MG tablet  Spouse/Significant Other Yes No   Sig: Take 10 mg by mouth daily as needed    diclofenac (VOLTAREN) 1 % topical gel  Spouse/Significant Other Yes No   Sig: Place onto the skin daily as needed for moderate pain   donepezil (ARICEPT) 5 MG tablet  Spouse/Significant Other No No   Sig: Take 1 tablet (5 mg) by mouth At Bedtime   fentaNYL (DURAGESIC) 12 mcg/hr 72 hr patch   No No   Sig: Place 1 patch onto the skin every 72 hours   fluticasone (FLONASE) 50 MCG/ACT nasal spray  Spouse/Significant Other Yes No   Sig: Spray 1 spray into both nostrils daily as needed for rhinitis or allergies   gabapentin (NEURONTIN) 300 MG capsule  Spouse/Significant Other No No   Sig: Take 1 capsule (300 mg) by mouth 3 times daily   levothyroxine (SYNTHROID/LEVOTHROID) 125 MCG tablet  Spouse/Significant Other No No   Sig: Take 1 tablet (125 mcg) by mouth daily   ondansetron (ZOFRAN) 8 MG tablet  Spouse/Significant Other No No   Sig: Take 1 tablet (8 mg) by mouth every 8 hours as needed (Nausea/Vomiting)   oxyCODONE (ROXICODONE) 5 MG tablet   No No   Sig: Take 1 tablet (5 mg) by mouth every 4 hours as needed for severe pain   prochlorperazine (COMPAZINE) 10 MG tablet  Spouse/Significant Other No No   Sig: Take 1 tablet (10 mg) by  mouth every 6 hours as needed (Nausea/Vomiting)   sertraline (ZOLOFT) 50 MG tablet  Spouse/Significant Other No No   Sig: Take 1 tablet (50 mg) by mouth daily   Patient taking differently: Take 50 mg by mouth At Bedtime       Facility-Administered Medications: None     Allergies   Allergies   Allergen Reactions     Simvastatin      myalgias     Lisinopril Rash     rash       Social History   I have reviewed this patient's social history and updated it with pertinent information if needed. Agapito Fairbanks  reports that he quit smoking about 22 years ago. He has a 45.00 pack-year smoking history. He has never used smokeless tobacco. He reports current alcohol use. He reports that he does not use drugs.    Family History   I have reviewed this patient's family history and updated it with pertinent information if needed.   Family History   Problem Relation Age of Onset     Colon Cancer Mother          age 65, in      Family History Negative Father         mild memory problems,  age 87, in      Family History Negative Brother      Family History Negative Brother      Cancer Brother         hx prostate ca     Sleep Apnea Brother        Physical Exam   Temp: 97.3  F (36.3  C) Temp src: Temporal BP: 117/77 Pulse: 91   Resp: 20 SpO2: 99 % O2 Device: Nasal cannula Oxygen Delivery: 5 LPM  Vital Signs with Ranges  Temp:  [97.3  F (36.3  C)] 97.3  F (36.3  C)  Pulse:  [70-93] 91  Resp:  [20] 20  BP: ()/(45-82) 117/77  SpO2:  [98 %-100 %] 99 %  0 lbs 0 oz    Constitutional: Awake, alert,oriented to time, place and person , cooperative, wearing nasal cannula, looks chronically sick, wife present at the bedside.  Eyes: Conjunctiva and pupils examined and normal.  HEENT: Moist mucous membranes, normal dentition.  Respiratory: Decreased breath sounds in bases with bilateral rhonchi, coarse breath sounds bilaterally  Cardiovascular: Regular rate and rhythm, normal S1 and S2, and no murmur noted.  GI: Soft,  non-distended, non-tender, normal bowel sounds, gastrostomy tube present, no erythema around the tube site  Lymph/Hematologic: No anterior cervical or supraclavicular adenopathy.  Skin: No rashes, no cyanosis, no edema.  Musculoskeletal: No joint swelling, erythema or tenderness.  Neurologic: Cranial nerves 2-12 intact, normal strength and sensation.  Psychiatric: Alert, oriented to person, place and time, no obvious anxiety or depression.    Data   Data reviewed today:  I personally reviewed the chest x-ray image(s) showing Showing new infiltrates in the left lower lobe.  Recent Labs   Lab 09/22/20  1402 09/21/20  1125   WBC 1.1*  --    HGB 8.8*  --    MCV 84  --    PLT 94*  --    INR  --  1.05     --    POTASSIUM 4.4  --    CHLORIDE 96  --    CO2 31  --    BUN 15  --    CR 0.64*  --    ANIONGAP 6  --    SUSIE 8.2*  --    *  --        Imaging:  Recent Results (from the past 24 hour(s))   Chest XR,  PA & LAT    Narrative    CHEST TWO VIEWS  9/22/2020 2:57 PM     HISTORY: Possible aspiration. Evaluate for pneumonia.    COMPARISON: 9/4/2020.      Impression    IMPRESSION: Large masslike opacity in the left hemithorax projected  over the left hilar region anteriorly appears to have increased in  prominence since the previous exam, with decreased aeration of the  left lung noted. A superimposed left lower lung pneumonia is possible.  Masslike thickening at the left lung apex is not significantly  changed. Elevation of the left hemidiaphragm is not significantly  changed. Hazy increased density in the right midlung could also be  infectious in etiology. The right lung is otherwise clear. No  pneumothorax.    LAUREN VAUGHAN MD   IR Gastrostomy Tube Percutaneous Plcmnt    Narrative    G-TUBE PLACEMENT 9/22/2020 3:47 PM     HISTORY: Requires nutritional support. Unable to take adequate oral  intake. A stent had a G-tube placed yesterday. This tube has fallen  out.    DESCRIPTION OF PROCEDURE: After obtaining  informed consent, the  patient was placed in a supine position on the fluoroscopy table.  Attempts to regain access into the stomach were unsuccessful using a  blunt Farmer needle. Therefore, a nasogastric tube was placed into  the stomach. 1 mg glucagon was given intravenously. The stomach was  inflated with air.     Two T-TAC suture anchors were used to enter the stomach. A small skin  incision was made in between the T-TAC entry sites. An 18-gauge needle  was used to enter the stomach through the incision. A wire was passed  and curled in the stomach. A tract for the G-tube was dilated. An 18  Nauruan peel-away sheath was placed. Through the peel-away sheath, a 14  Nauruan G-tube was placed. The balloon was inflated with a mixture of 1  mL contrast and 9 mL saline. Balloon was pulled back so that it was  against the anterior stomach wall. Contrast was injected to document  adequate positioning. A fluoroscopic image was saved to document tube  position.     The patient tolerated the procedure well. There were no immediate  postprocedure complications. The patient tolerated the procedure well.  There were no immediate postprocedure complications. The patient's  vital signs were monitored by radiology nursing staff under my  supervision and remained stable throughout the study. Radiation dose  for this scan was reduced using automated exposure control, adjustment  of the mA and/or kV according to patient size, or iterative  reconstruction technique.    MEDICATIONS: 1 mg glucagon, 100 mg fentanyl    SEDATION TIME: None    FLUOROSCOPY TIME: 9.8 minutes    RADIATION DOSE: 192.01 mGy Air Kerma    NUMBER OF FLUOROSCOPIC IMAGES: 3      Impression    IMPRESSION: New G-tube placed into the stomach as above.    MELVA ROBISON MD

## 2020-09-22 NOTE — PROGRESS NOTES
RECEIVING UNIT ED HANDOFF REVIEW    ED Nurse Handoff Report was reviewed by: Cherri eKy RN on September 22, 2020 at 5:18 PM

## 2020-09-22 NOTE — ED NOTES
[facility]  ED Nurse Handoff Report    ED Chief complaint: Gtube Problem      ED Diagnosis:   Final diagnoses:   None       Code Status: DNR    Allergies:   Allergies   Allergen Reactions     Simvastatin      myalgias     Lisinopril Rash     rash       Patient Story: pt with hx of lung ca had feeding tube placed yesterday and it fell out per wife.    Focused Assessment:  Pt had g tube replaced in a new site as they could not find track for old one  Pt also has increassed infiltrated on chest x ray concern for aspiratiohn pneumonia.      Treatments and/or interventions provided: blood cultures and start antibiotics  Patient's response to treatments and/or interventions:     To be done/followed up on inpatient unit:      Does this patient have any cognitive concerns?:     Activity level - Baseline/Home:  Stand with Assist  Activity Level - Current:   Total Care    Patient's Preferred language: English   Needed?: No    Isolation: None  Infection: Not Applicable  Bariatric?: No    Vital Signs:   Vitals:    09/22/20 1535 09/22/20 1540 09/22/20 1545 09/22/20 1550   BP: 92/64 134/81 118/79 121/77   Pulse: 91 88 93 93   Resp:       Temp:       TempSrc:       SpO2: 100% 100% 100% 100%       Cardiac Rhythm:     Was the PSS-3 completed:   Yes  What interventions are required if any?               Family Comments: wife at bedside  OBS brochure/video discussed/provided to patient/family: N/A              Name of person given brochure if not patient:               Relationship to patient:     For the majority of the shift this patient's behavior was Green.   Behavioral interventions performed were .    ED NURSE PHONE NUMBER: *[phone]

## 2020-09-22 NOTE — ED PROVIDER NOTES
History     Chief Complaint:  Gtube Problem       HPI  Agapito Fairbanks is a 70 year old year old male with a history of metastatic lung cancer, memory loss, and chronic kidney disease who presents for evaluation of G-tube problem. The patient had a G-tube placed yesterday that has come out. He notes abdominal pain and pain to the area of insertion. He endorses that he is able to swallow but wife notes aspiration event last evening while taking pills and patient has a coarse cough. The patient denies fever, chest pain, or shortness of breath.  He is on oxygen at baseline.      Allergies:  Simvastatin  Lisinopril       Medications:   Albuterol inhaler  Tessalon  Dulcolax  Zyrtec allergy  Aricept  Duragesic  Flonase  Gabapentin  Synthroid  Ativan  Compazine  Zoloft      Medical History:   Acute gastritis with hemorrhage  Basal cell carcinoma, leg  Chronic kidney disease, stage 3  Hearing loss  H. 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  Tobacco use disorder  Cataract  Hypertension  Retinal detachment, bilatearl  Metastatic lung cancer  Acute encephalopathy  Bone metastasis  Memory loss  Elevated prostate specific antigen (PSA)  Esophageal reflux  Talipes cavus      Surgical History:  B retinal surgery  Cataract, left  Right ankle fracture repair  Right hemilaryngectomy (laser) for squamous cell  Mohs procedure for basal cell  Right vocal cord squamous cell excision  Left thoracoscopic wedge resection  Gastronomy tube percutaneous placement  Thoracic surgery, lung, throat      Family History:   Colon cancer      Social History:  Smoking Status: Former Smoker    Type: Cigarettes    Quit 1998  Smokeless Tobacco: Never Used  Alcohol Use: Positive  Drug Use: Negative  [facility] Care: [facility]         Review of Systems   Constitutional: Negative for fever.   Respiratory: Negative for shortness of  breath.    Cardiovascular: Negative for chest pain.   Gastrointestinal: Positive for abdominal pain.   All other systems reviewed and are negative.      Physical Exam     Patient Vitals for the past 24 hrs:   BP Temp Temp src Pulse Resp SpO2   09/22/20 1550 121/77 -- -- 93 -- 100 %   09/22/20 1545 118/79 -- -- 93 -- 100 %   09/22/20 1540 134/81 -- -- 88 -- 100 %   09/22/20 1535 92/64 -- -- 91 -- 100 %   09/22/20 1530 (!) 78/55 -- -- 70 -- 98 %   09/22/20 1525 (!) 88/61 -- -- 80 -- 100 %   09/22/20 1521 90/64 -- -- 81 -- 100 %   09/22/20 1513 105/67 -- -- 75 -- --   09/22/20 1415 101/75 -- -- 79 -- 100 %   09/22/20 1400 (!) 89/56 -- -- 75 -- 100 %   09/22/20 1343 -- -- -- -- 20 --   09/22/20 1341 (!) 77/45 -- -- 74 -- --   09/22/20 1340 -- 97.3  F (36.3  C) Temporal -- -- 98 %          Physical Exam    General: Alert and cooperative with exam. Patient in mild distress. Normal mentation.  Head:  Scalp is NC/AT  Eyes:  No scleral icterus, PERRL  ENT:  The external nose and ears are normal. The oropharynx is normal and without erythema; mucus membranes are moist. Uvula midline, no evidence of deep space infection.  Neck:  Normal range of motion without rigidity.  CV:  Regular rate and rhythm  Resp:  Breath sounds are clear bilaterally    Non-labored, no retractions or accessory muscle use. Diffuse coarse lung sounds on the right.  Diminished left-sided lung sounds  GI:  Abdomen is soft, no distension. No peritoneal signs. Very mild diffuse abdominal tenderness. G tube insertion site C/D/I. Gastronomy tube has fallen out.  MS:  No lower extremity edema   Skin:  Warm and dry, No rash or lesions noted.  Neuro: Oriented x 3. No gross motor deficits.      Emergency Department Course     Imaging:  Radiology results were communicated with the patient and family who voiced understanding of the findings.    Chest XR,  PA & LAT   Preliminary Result   IMPRESSION: Large masslike opacity in the left hemithorax projected   over the  left hilar region anteriorly appears to have increased in   prominence since the previous exam, with decreased aeration of the   left lung noted. A superimposed left lower lung pneumonia is possible.   Masslike thickening at the left lung apex is not significantly   changed. Elevation of the left hemidiaphragm is not significantly   changed. Hazy increased density in the right midlung could also be   infectious in etiology. The right lung is otherwise clear. No   pneumothorax.      IR Gastrostomy Tube Change    (Results Pending)     Reading per radiology     Laboratory:  Laboratory findings were communicated with the patient and family who voiced understanding of the findings.  Labs Ordered and Resulted from Time of ED Arrival Up to the Time of Departure from the ED   CBC WITH PLATELETS DIFFERENTIAL - Abnormal; Notable for the following components:       Result Value    WBC 1.1 (*)     RBC Count 3.32 (*)     Hemoglobin 8.8 (*)     Hematocrit 27.8 (*)     RDW 20.0 (*)     Platelet Count 94 (*)     Absolute Neutrophil 0.8 (*)     Absolute Lymphocytes 0.2 (*)     All other components within normal limits   BASIC METABOLIC PANEL - Abnormal; Notable for the following components:    Glucose 107 (*)     Creatinine 0.64 (*)     Calcium 8.2 (*)     All other components within normal limits   BLOOD CULTURE   BLOOD CULTURE       Interventions:   [facility]    Emergency Department Course:    1353 Nursing notes and vitals reviewed.    1402 IV was inserted and blood was drawn for laboratory testing, results above.    1409 I performed an exam of the patient as documented above.     1430 I spoke with the wife of the patient. She says the patient has had more coarse breathing at home.    1451 I spoke with Dr. Whalen regarding the patient.      He was accepted by Dr. Iglesias for admission      Impression & Plan       Medical Decision Making:  Patient is a 70-year-old male with history of metastatic adenocarcinoma of the lung who presents  following G-tube malfunction.  Patient's medical history and records were reviewed.  On evaluation patient is noted to have coarse lung sounds and patient's wife notes concern for aspiration.  Chest x-ray shows area of possible infiltrate as noted above as well as increasing tumor size; see complete results above.  Patient's presentation is concerning for possible aspiration pneumonia.  Dr. Murphy of  placed new G-tube as the old G-tube track was not found.  While patient is not showing evidence of peritonitis at this time, Dr. Murphy did recommended close monitoring overnight as it is unclear when G tube may have become dislodged.  Blood cultures were obtained and patient was provided Zosyn.  Patient was initially noted to be hypotensive and this resolved with IV fluid administration; likely element of dehydration.  Labs notable for significant drop in patient's white count.  Patient afebrile and on baseline oxygen requirement.  Admitted to Dr. Iglesias for further evaluation and care.    Diagnosis:     ICD-10-CM    1. Aspiration pneumonia of right lung, unspecified aspiration pneumonia type, unspecified part of lung (H)  J69.0 Blood culture     Asymptomatic COVID-19 Virus (Coronavirus) by PCR   2. Malfunction of gastrostomy tube (H)  K94.23    3. Dehydration  E86.0         Disposition:  Admit      Scribe Disclosure:  Reyna MCCALL, am serving as a scribe at 2:03 PM on 9/22/2020 to document services personally performed by Farhat Mcgowan DO based on my observations and the provider's statements to me.      Farhat Mcgowan DO  09/22/20 1833

## 2020-09-22 NOTE — PHARMACY-ADMISSION MEDICATION HISTORY
Pharmacy Medication History  Admission medication history interview status for the 9/22/2020  admission is complete. See EPIC admission navigator for prior to admission medications     Medication history sources: Patient's family/friend (wife)  Medication history source reliability: Good  Adherence assessment: Good    Significant changes made to the medication list:         Additional medication history information:        Medication reconciliation completed by provider prior to medication history? yes    Time spent in this activity:  20min      Prior to Admission medications    Medication Sig Last Dose Taking? Auth Provider   acetaminophen (TYLENOL) 325 MG tablet Take 2 tablets (650 mg) by mouth every 4 hours as needed for mild pain Past Month at Unknown time Yes Mariana Cerda DO   albuterol (PROAIR HFA/PROVENTIL HFA/VENTOLIN HFA) 108 (90 Base) MCG/ACT inhaler Inhale 2 puffs into the lungs every 6 hours as needed for shortness of breath / dyspnea or wheezing Past Week at Unknown time Yes Wilver Mullins MD   albuterol (PROVENTIL) (2.5 MG/3ML) 0.083% neb solution Take 1 vial (2.5 mg) by nebulization every 6 hours as needed for shortness of breath / dyspnea or wheezing 9/22/2020 at Unknown time Yes Ana Cristina Richard MD   benzonatate (TESSALON) 100 MG capsule Take 1 capsule (100 mg) by mouth 3 times daily as needed for cough Past Week at Unknown time Yes Wilver Mullins MD   bisacodyl (DULCOLAX) 5 MG EC tablet Take 5 mg by mouth daily as needed for constipation Past Month at Unknown time Yes Unknown, Entered By History   cetirizine (ZYRTEC ALLERGY) 10 MG tablet Take 10 mg by mouth daily as needed  9/22/2020 at Unknown time Yes Unknown, Entered By History   diclofenac (VOLTAREN) 1 % topical gel Place onto the skin daily as needed for moderate pain Past Week at Unknown time Yes Unknown, Entered By History   donepezil (ARICEPT) 5 MG tablet Take 1 tablet (5 mg) by mouth At Bedtime 9/21/2020 at hs Yes  [facility] MD   fluticasone (FLONASE) 50 MCG/ACT nasal spray Spray 1 spray into both nostrils daily as needed for rhinitis or allergies prn Yes Unknown, Entered By History   gabapentin (NEURONTIN) 300 MG capsule Take 1 capsule (300 mg) by mouth 3 times daily 9/21/2020 at Unknown time Yes Marlene Hernandez MD   levothyroxine (SYNTHROID/LEVOTHROID) 125 MCG tablet Take 1 tablet (125 mcg) by mouth daily 9/22/2020 at am Yes [facility] MD   LORazepam (ATIVAN) 0.5 MG tablet Take 1 tablet (0.5 mg) by mouth every 4 hours as needed (Anxiety, Nausea/Vomiting or Sleep) 9/21/2020 at hs Yes Wilver Mullins MD   ondansetron (ZOFRAN) 8 MG tablet Take 1 tablet (8 mg) by mouth every 8 hours as needed (Nausea/Vomiting) prn Yes Wilver Mullins MD   oxyCODONE (ROXICODONE) 5 MG tablet Take 1 tablet (5 mg) by mouth every 4 hours as needed for severe pain 9/22/2020 at 1100 Yes Brett Manrique APRN CNP   prochlorperazine (COMPAZINE) 10 MG tablet Take 1 tablet (10 mg) by mouth every 6 hours as needed (Nausea/Vomiting) Past Week at Unknown time Yes Wilver Mullins MD   sertraline (ZOLOFT) 50 MG tablet Take 1 tablet (50 mg) by mouth daily  Patient taking differently: Take 50 mg by mouth At Bedtime  9/21/2020 at hs Yes [facility] MD   fentaNYL (DURAGESIC) 12 mcg/hr 72 hr patch Place 1 patch onto the skin every 72 hours 9/19/2020 at due to change  Brett Manrique APRN CNP

## 2020-09-23 NOTE — PROGRESS NOTES
Interventional Radiology Progress Note:  Inpatient at [facility]  Date: 2020   Patient name: Agapito Fairbanks  MRN:[id number]  :  1950    History: Agapito Fairbanks is a 70 year old male with a history significant for metastatic lung cancer, aspiration with drinking, loose productive cough, failure to thrive,  and recent gastrostomy tube placement for nutrition.     A gastrostomy tube was placed on  as an outpatient for nutrition. Per Roque's wife he had done well until a few hours after he got home. He had significant pain with flushing the tube and tube feeding was started on . While caring for him she had turned away to grab a supply, when she turned back he had pulled out the g tube. One of the buttons had come undone also.     Roque had a new g tube placed in IR  along with 2 new buttons. He was admitted to the hospital per ED after.     Roque is being seen in IR follow up today.     Interval History: Did well overnight. Minimal pain with movement. No complaints.     Physical Exam:   Vitals: Temp:  [97  F (36.1  C)-98.5  F (36.9  C)] 97  F (36.1  C)  Pulse:  [67-93] 67  Resp:  [16-20] 16  BP: ()/(45-82) 105/59  SpO2:  [98 %-100 %] 100 %     General/Neuro: Alert, oriented, stable, pleasant male in no acute distress.  Moves all extremities equally.  Abdomen: Soft, non-distended, flat,   Gastrostomy tube site: Old dressing removed which had dried bloody drainage on it. Site and buttons intact, no bleeding noted. Clean split gauze and tape placed around the g tube.     Labs:  ROUTINE ICU LABS (Last four results)  CMP  Recent Labs   Lab 20  0736 20  1402    133   POTASSIUM 4.1 4.4   CHLORIDE 102 96   CO2 29 31   ANIONGAP 5 6   * 107*   BUN 20 15   CR 0.64* 0.64*   GFRESTIMATED >90 >90   GFRESTBLACK >90 >90   SUSIE 7.7* 8.2*     CBC  Recent Labs   Lab 20  0736 20  1402   WBC 1.9* 1.1*   RBC 2.77* 3.32*   HGB 7.3* 8.8*    HCT 23.0* 27.8*   MCV 83 84   MCH 26.4* 26.5   MCHC 31.7 31.7   RDW 20.0* 20.0*   PLT 60* 94*     INR  Recent Labs   Lab 09/21/20  1125   INR 1.05     Arterial Blood GasNo lab results found in last 7 days.    Assessment: G tube site appears stable and intact. No active bleeding or excessive pain. T buttons are intact.     Would expect some extra pain as he had 2 g tubes placed within 2 days along with pulling the original one out also.     Plan: Continue cares per oncology and hospitalist.   -Ok to advance tube feeding from IR standpoint.   -Daily g tube dressing changes.     15 minutes were spent with patient during today's visit with greater than 50% of the time spent face to face with the patient, in reviewing medical record and images and in counseling and coordinating patient's care.    [facility] Interventional Radiology CNP ([phone]) (phone [phone])

## 2020-09-23 NOTE — PROVIDER NOTIFICATION
MD Notification    Notified Person: MD    Notified Person Name: Dr. Alexander    Notification Date/Time: 9/23 0825    Notification Interaction: Page    Purpose of Notification: FYI procalcitonin 15.81 - please advise    Orders Received: No new interventions, continue antibiotics and monitor     Comments:

## 2020-09-23 NOTE — PROGRESS NOTES
[facility] Nurse Inpatient Wound Assessment   Reason for consultation: Evaluate and treat  Back and sacrum wounds    Assessment  Back and sarum wounds due to Pressure Injury  Status: initial assessment    Treatment Plan  Back and sacrum wounds: Every other day   Orders Written     Sacrum and Upper back  every other day, and prn   1. Clean wound with saline or MicKlenz Spray, pat dry   2. Paint with skin prep or No Sting Skin Prep (#[id number]) where ever you want your dressing to stick, allow to dry thoroughly. Do NOT skip this step! Skin prep will help your dressing to stick   3. Press a Mepilex Sacral Dressing (SACRUM) (PS#[id number]) or a Mepilex 4x4 (UPPER BACK)  to the area, making sure to conform nicely to skin curvatures  4. Time and date dressing change     PRESSURE INJURY PREVENTION (PIP) MEASURES   REPOSITION   BED POSITIONING - SIDE TO SIDE ONLY WHEN IN BED, EVERY 2 HOURS- this will help not only the skin but the respiratory status   - MAKE SURE TO USE AT LEAST 5-6 PILLOWS IN THE BED AT ALL TIMES TO ACHIEVE GOOD POSITIONING   - If pt is NOT turning then you need to notify/ discuss with the charge nurse, nurse manager and provider AND document this conversation   - if not turning due to pain then discuss with provider in addition to repositioning, providing hot/ cold packs, evaluate for low air loss mattress   CHAIR POSITIONING   -  must fully off load every 1-2 hours   -  If at risk must use chair cushion (#[id number] or #[id number] bariatric)- pillows to flatten out, not a pressure relieving device   HEELS - MUST BE KEPT ELEVATED AT ALL TIMES   - AT LEAST 2 PILLOWS UNDER EACH CALF, ASSURING HEELS ARE FLOATING       Recommended provider order: None, at this time  [facility] Nurse follow-up plan:twice weekly  Nursing to notify the Provider(s) and re-consult the [facility] Nurse if wound(s) deteriorates or new skin concern.    Patient History  According to provider note(s):  H+P 9/23: Agapito Fairbanks is a 70 year old male with past medical  history significant for metastatic lung cancer, chronic hypoxic respiratory failure with oxygen dependency, recent aspiration pneumonia with hospitalization from September 1-5, dysphagia, hyponatremia secondary to SIADH, mild cognitive impairment who was admitted from the emergency room after gastrojejunostomy tube dislodgment last night which was replaced this morning and to have further evaluation and management of possible peritonitis and aspiration pneumonia.    Objective Data  Containment of urine/stool: Brief    Active Diet Order  Orders Placed This Encounter      NPO for Medical/Clinical Reasons Except for: Other; Specify: NPO For oral intake and gastric feedings for 6 hours post insertion Gastrostomy G/GJ tube. May feed through Jejunal or Distal PORT immediately for GJ tubes ONLY.      Output:   I/O last 3 completed shifts:  In: 848 [I.V.:698; NG/GT:150]  Out: 125 [Urine:125]    Risk Assessment:   Sensory Perception: 4-->no impairment  Moisture: 3-->occasionally moist  Activity: 3-->walks occasionally  Mobility: 3-->slightly limited  Nutrition: 2-->probably inadequate  Friction and Shear: 1-->problem  Leonid Score: 16                          Labs:   Recent Labs   Lab 09/23/20  0736  09/21/20  1125   HGB 7.3*   < >  --    INR  --   --  1.05   WBC 1.9*   < >  --     < > = values in this interval not displayed.       Physical Exam  Areas of skin assessed: focused Back and bony prominences    Wound Location:  Upper back  Date of last photo 9/23  Wound History: Present on admission      Wound Base: 100% clean partial thickness     Palpation of the wound bed: normal      Drainage: scant     Description of drainage: serous     Measurements (length x width x depth, in cm) 4  x 5 cm      Tunneling N/A     Undermining N/A  Periwound skin: intact      Color: normal and consistent with surrounding tissue      Temperature: normal   Odor: none  Pain: denies , none  Pain intervention prior to dressing change: NA    Wound  Location:  Sacrum  Date of last photo 9/23  Wound History: Present on admission      Wound Base: 100% slough      Palpation of the wound bed: normal      Drainage: scant     Description of drainage: serous     Measurements (length x width x depth, in cm) 1  X 1 cm      Tunneling N/A     Undermining N/A  Periwound skin: intact      Color: normal and consistent with surrounding tissue      Temperature: normal   Odor: none  Pain: denies , none  Pain intervention prior to dressing change: NA    Interventions  Visual inspection and assessment completed 9/23  Wound Care Rationale Protect periwound skin, Promote moist wound healing without tissue dehydration  and Provide protection   Wound Care: done per plan of care  Supplies: floor stock  Current off-loading measures: Pillows  Current support surface: Standard  will order pulsate  Education provided to: importance of repositioning, plan of care and Off-loading pressure  Discussed plan of care with Patient and Nurse    Lucia Ilgesias, RN

## 2020-09-23 NOTE — CONSULTS
ID consult dictated IMP 1 71 yo male complex hx, met lung CA, Gtube dislodged, recent pneumoniia, now worsened CXR, some cough/hypoxia, ? Postobstructive or ongoing aspiration    REC follow clinically, zosyn for now

## 2020-09-23 NOTE — PLAN OF CARE
Admit from ED @1800. A&Ox4. Forgetful & whispers to speak. VSS on 4L oxygen. Labored breathing & CAPUTO. LS - diminished R lobes & coarse crackles to L lobes. New G-tube site, dressing CDI (not to be used until 10pm). Suspected pressure injuries to sacrum & upper spine, T/R q2 hours, foam dressings applied & WOCN consulted. WBC 1.1, neutropenic precautions. PIV infusing w/ int abx. Plan pending.

## 2020-09-23 NOTE — PLAN OF CARE
A/Ox3, disoriented to time- forgetful. VSS on 4L O2 (baseline) + humidification. Pain manages with PRN oxycodone + fent patch to L deltoid. NPO + tube feedings, nutrition following. Gtube dressing changed today, site WDL. Pressure injury to coccyx covered with sacral mepilex. Turn/repo q2h in bed. Rachid back, preventative mepilex in place. Completed teachback with wife regarding administering tube feedings, per wife she feels comfortable doing this at home. SBA + gb to BR. LS coarse with loose cough at times. Incontinent at times. Loose stools x2, senna held this AM. PIV SL + int abx. WBC 1.9, subcutaneous neupogen given today. PT/OT assessed pt today, see notes. ID, Hem/onc, WOC following.

## 2020-09-23 NOTE — CONSULTS
Consult Date:  09/23/2020      This consult has been requested by Dr. Clare Iglesias for lung cancer.      Mr. Fairbanks is a 70-year-old gentleman with metastatic squamous cell carcinoma of the lung diagnosed in 04/2020.  Initially, he was on single-agent pembrolizumab.  Because of progression, carboplatin and Taxol were added to pembrolizumab.  Last treatment was on 09/16/2020. He got Neulasta with it.  The patient also recently completed radiation to the chest.      Because of poor appetite, patient had a PEG tube placed on 09/21/2020.  The patient's PEG tube fell out yesterday and he presented to the emergency room for that.  Multiple investigations were done in the emergency room.   -WBC of 1.1, hemoglobin of 8.8 and platelets 94.  ANC of 0.8.   -Electrolytes are normal.   -Blood cultures were drawn and are negative.   -COVID-19 negative.   -Chest x-ray is abnormal.  There is a large mass-like opacity in the left hemithorax.  There could be superimposed pneumonia.  There is hazy increased density in the right mid lung.  Could be pneumonia also.   -New G-tube was placed.      The patient has not been having any fever at home.  He has been weak.  As per the wife, there has been more rattling sound in the chest.  He has been slightly more short of breath.  He has some cough.  Cough is not getting worse.  No hemoptysis.      The patient admitted to the hospital and is being treated for pneumonia.  Sometimes, patient chokes.  There is a possibility of aspiration pneumonia.  In the hospital, he has not had any fever.      REVIEW OF SYSTEMS:  I met with the patient.  His wife was at the bedside.  The patient feels weak.  No headache.  No dizziness.  No neck pain.  No chest pain.  He has some cough, nonproductive.  No hemoptysis.  No nausea or vomiting.  No abdominal discomfort.  No bleeding.  No fever or chills.      All other review of systems negative.      ALLERGIES:  REVIEWED.      MEDICATIONS:  Reviewed.      PAST  MEDICAL HISTORY:   1.  Metastatic lung squamous cell carcinoma.   2.  Recurrent head and neck cancer.   3.  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   4.  Skin cancer.   5.  Chronic kidney disease.   6.  Hyperlipidemia.   7.  Hypothyroidism.   8.  Depression.   9.  Cataract surgery.   10.  Hypertension.   11.  Retinal detachment.   12.  Right ankle fracture.   13.  ? aspiration pneumonia.      SOCIAL HISTORY:   -He is .   -He is a former smoker.  Smoked for almost 30 years.  Quit 20 years ago.   -He quit alcohol about 2 years ago.      PHYSICAL EXAMINATION:   GENERAL:  He is alert, oriented x3.  He is not in any distress.  No cough.  No labored breathing.   VITAL SIGNS:  Reviewed.    The rest of the systems not examined.      LABORATORY DATA:  Reviewed.      ASSESSMENT:    1.  A 70-year-old gentleman with metastatic squamous cell carcinoma  of the lung on palliative treatment with carboplatin, Taxol and pembrolizumab.   2.  Pneumonia. ? aspiration pneumonia.   3.  Pancytopenia from chemotherapy and recent radiation.   4.  Decreased appetite and weight loss, status post PEG tube placement.     5.  Cancer pain, controlled.   6.  Other medical problems including hypothyroidism, depression and chronic kidney disease.      RECOMMENDATIONS:   1.  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  He is on palliative treatment with carboplatin, Taxol and pembrolizumab.  That will be continued as outpatient.  Overall, he is tolerating it well.   2.  The patient had a chest x-ray done, which is abnormal and suspicious for pneumonia.  Some of the changes in the chest x-ray are from recent radiation.  He is being treated for pneumonia with antibiotics.  ID is following.  Clinically, he is not septic.   3.  The patient is pancytopenic from chemotherapy.  His ANC yesterday was 0.8.  As he has pneumonia, I will give him 1 dose of Neupogen and recheck CBC in the morning.   4.  The  patient's G-tube has been replaced.  PEG tube feeding will be started.   5.  The patient will be transfused if his hemoglobin is below 7.     6.  His wife had multiple questions, which were all answered.  Oncology will continue to follow.      Thanks for the consult.      TOTAL TIME SPENT:  45 minutes.         JENNIFER CEDENO MD             D: 2020   T: 2020   MT: RENETTA      Name:     CHRISTI MANN   MRN:      [id number]        Account:       [id number]   :      1950           Consult Date:  2020      Document: [id number]       cc: Los Love MD

## 2020-09-23 NOTE — CONSULTS
"CLINICAL NUTRITION SERVICES  -  ASSESSMENT NOTE      Recommendations Ordered by Registered Dietitian (RD):     Will continue on same TF plan as pt/wife were starting at home:  Isosource 1.5 - 1/2 can BID today    If pt kike feeding and refeeding labs WNL, will plan to increase to 1/2 can QID tomorrow    Will adjust every few days during admit as pt kike.      Water flushes of 60 mL before and after feeding.    Additional 100 mL flushes every 4 hrs (600 mL)    Recommend continue with IVF for adequate hydration, as slow increase in TF     Ordered daily multivitamin     Malnutrition:   % Weight Loss:  > 7.5% in 3 months (severe malnutrition)  % Intake:  </= 50% for >/= 5 days (severe malnutrition)  Subcutaneous Fat Loss:  None observed  Muscle Loss:  Temporal region - mild depletion and Clavicle bone region - moderate depletion  Fluid Retention:  None noted    Malnutrition Diagnosis: Severe malnutrition  In Context of:  Chronic illness or disease        REASON FOR ASSESSMENT  Agapito Fairbanks is a 70 year old male seen by Registered Dietitian for Provider Order - Registered Dietitian to Assess and Order TF per Medical Nutrition Therapy Protocol      NUTRITION HISTORY  Chart reviewed  Pt with met lung CA - chemo last week  Note that G-tube was placed in [facility] on 9/21 d/t dysphagia, poor po intake and malnutrition  9/22 - G-tube fell out    Met with pt and wife this morning  Wife states that they had a nutrition plan in place after G-tube place 9/21 and she has \"a month's worth\" of TF product at home (Isosource 1.5)  TF plan: 1/2 can Isosource 1.5 BID for a few days (given via gravity drip)                 Increase to 2 cans per day                  Gradual increase over the next month to 5 cans/day = 1875 cals (32 cals/kg), 85 gm pro (1.5 gm/kg)                 Syringe of water before and after feeds    Wife notes that after his first feeding on Monday night, pt c/o severe pain  On Tuesday morning, pt had liquid " "stool in his pull-up    Wife reports that pt has had minimal po intake over the past few weeks  Prior to that, he was eating ~600-800 cals/day  Pt consumes very soft foods and thickened liquids  A few days ago he was eating ice cream and squash soup    Wife states that they did not want to use a feeding pump (\"didn't want to be hooked up\") and didn't want to do bolus feedings with a syringe (\"didn't want to have to sit there and administer it\")    Wife notes that pt was very active prior to Feb - with getting sick and COVID, he decreased his workouts.  She notes that he is still pretty strong for having such poor po intake.      CURRENT NUTRITION ORDERS  Diet Order:     NPO    Wife would like to try to stay on the same feeding plan as they have at home  Will plan to start with 1/2 can given via syringe (wife would like to observe RN giving the first feeding)      NUTRITION FOCUSED PHYSICAL ASSESSMENT FOR DIAGNOSING MALNUTRITION)  Yes                 Observed:    Muscle wasting (refer to documentation in Malnutrition section)    Obtained from Chart/Interdisciplinary Team:  9/22: 14 [place] G-tube was placed    ANTHROPOMETRICS  Height: 6'0\"  Weight:(9/16 - most recent wt) 58.2 kg  BMI: 17.3  Weight Status:  Underweight BMI <18.5  IBW: 80.9 kg  % IBW: 72%  Weight History:  Records reflect wt is down ~45# (26%) over past 4 months (wife confirmed wt loss)  Wt Readings from Last 10 Encounters:   09/16/20 58.2 kg (128 lb 3.2 oz)   09/16/20 58.2 kg (128 lb 3.2 oz)   09/14/20 59.9 kg (132 lb)   09/09/20 60.1 kg (132 lb 9.6 oz)   09/05/20 60 kg (132 lb 3.2 oz)   08/26/20 63.5 kg (140 lb)   08/19/20 62.9 kg (138 lb 9.6 oz)   07/29/20 68 kg (150 lb)   07/24/20 67.6 kg (149 lb)   07/21/20 67.2 kg (148 lb 3.2 oz)     06/30/20 71.4 kg (157 lb 6.4 oz)   06/18/20 70.3 kg (155 lb)   06/09/20 73.4 kg (161 lb 12.8 oz)   05/19/20 77.9 kg (171 lb 12.8 oz)   05/07/20 79.7 kg (175 lb 9.6 oz)    [employer] " reviewed    MEDICATIONS  Medications reviewed      ASSESSED NUTRITION NEEDS PER APPROVED PRACTICE GUIDELINES:    Dosing Weight (9/16) 58.2 kg  Estimated Energy Needs: 0845-0660 kcals (35-40 Kcal/Kg)  Justification: underweight  Estimated Protein Needs:  grams protein (1.5-1.8 g pro/Kg)  Justification: Repletion  Estimated Fluid Needs: 9745-3759  mL (25-30 mL/kg)  Justification: maintenance    MALNUTRITION:  % Weight Loss:  > 7.5% in 3 months (severe malnutrition)  % Intake:  </= 50% for >/= 5 days (severe malnutrition)  Subcutaneous Fat Loss:  None observed  Muscle Loss:  Temporal region - mild depletion and Clavicle bone region - moderate depletion  Fluid Retention:  None noted    Malnutrition Diagnosis: Severe malnutrition  In Context of:  Chronic illness or disease    NUTRITION DIAGNOSIS:  Inadequate protein-energy intake related to malfunctioning G-tube as evidenced by pt not receiving any nutrition at this time      NUTRITION INTERVENTIONS  Recommendations / Nutrition Prescription  NPO (diet per MD)    Will continue on same TF plan as pt/wife were starting at home:  Isosource 1.5 - 1/2 can BID today    If pt kike feeding and refeeding labs WNL, will plan to increase to 1/2 can QID tomorrow    Will adjust every few days during admit as pt kike.      Water flushes of 60 mL before and after feeding.    Additional 100 mL flushes every 4 hrs (600 mL)    Recommend continue with IVF for adequate hydration, as slow increase in TF    Ordered daily multivitamin  .      Implementation  Nutrition education ---> Reviewed TF plans with pt and wife  EN Composition, EN Schedule and  ---> TF orders entered in EPIC  Collaboration and Referral of Nutrition care ---> discussed TF plans with bedside RN  .      Nutrition Goals  Pt to tolerate TF administration without abd pain  .      MONITORING AND EVALUATION:  Progress towards goals will be monitored and evaluated per protocol and Practice Guidelines

## 2020-09-23 NOTE — PROGRESS NOTES
09/23/20 1028   Quick Adds   Type of Visit Initial Occupational Therapy Evaluation   Living Environment   Lives With spouse   Living Arrangements condominium   Home Accessibility no concerns   Living Environment Comment Patient lives in a condo with his wife. There is elevator access. Patient reports 1x/week house cleaning services otherwise him & his spouse are independent with household tasks.    Self-Care   Usual Activity Tolerance fair   Current Activity Tolerance poor   Equipment Currently Used at Home walker, rolling;shower chair;wheelchair, manual   Activity/Exercise/Self-Care Comment Pt has been using a walker, having help with dressing and bathing, has access to a [employer] w/c   Functional Level   Ambulation 1-->assistive equipment   Transferring 1-->assistive equipment   Toileting 1-->assistive equipment   Bathing 3-->assistive equipment and person   Dressing 2-->assistive person   Eating 0-->independent   Communication 0-->understands/communicates without difficulty   Swallowing 0-->swallows foods/liquids without difficulty   Cognition 1 - attention or memory deficits   Fall history within last six months yes   Number of times patient has fallen within last six months 5   General Information   Onset of Illness/Injury or Date of Surgery - Date 09/22/20   Referring Physician Clare Iglesias MD    Patient/Family Goals Statement Home with wife   Additional Occupational Profile Info/Pertinent History of Current Problem Agapito Fairbanks is a 70 year old male with past medical history significant for metastatic lung cancer, chronic hypoxic respiratory failure with oxygen dependency, recent aspiration pneumonia with hospitalization from September 1-5, dysphagia, hyponatremia secondary to SIADH, mild cognitive impairment who was admitted from the emergency room after gastrojejunostomy tube dislodgment last night which was replaced this morning and to have further evaluation and management of possible peritonitis  and aspiration pneumonia   Precautions/Limitations fall precautions;oxygen therapy device and L/min   General Observations wet vocal quality, supplemental O2   Cognitive Status Examination   Orientation person   Level of Consciousness alert   Follows Commands (Cognition) WFL   Memory impaired   Cognitive Comment History of Mild Cognitive Impairment per chart'   Visual Perception   Visual Perception Wears glasses   Sensory Examination   Sensory Quick Adds No deficits were identified   Pain Assessment   Patient Currently in Pain Yes, see Vital Sign flowsheet   Posture   Posture forward head position;protracted shoulders   Range of Motion (ROM)   ROM Comment BUE AROM intact   Strength   Strength Comments 4-/5 at shoulders, 5/5 elbows   Hand Strength   Hand Strength Comments strong, equal grasp   Muscle Tone Assessment   Muscle Tone Quick Adds No deficits were identified   Mobility   Bed Mobility Comments CGA supine<>EOB   Transfer Skill: Bed to Chair/Chair to Bed   Level of [place]: Bed to Chair contact guard   Physical Assist/Nonphysical Assist: Bed to Chair verbal cues   Transfer Skill: Sit to Stand   Level of [place]: Sit/Stand contact guard   Physical Assist/Nonphysical Assist: Sit/Stand verbal cues   Transfer Skill: Toilet Transfer   Level of [place]: Toilet contact guard   Physical Assist/Nonphysical Assist: Toilet verbal cues   Toilet Transfer Skill Comments bedside commode   Balance   Balance Comments Min A to ambulate short distance without WW   Upper Body Dressing   Level of [place]: Dress Upper Body minimum assist (75% patients effort)   Lower Body Dressing   Level of [place]: Dress Lower Body minimum assist (75% patients effort)   Physical Assist/Nonphysical Assist: Dress Lower Body verbal cues   Toileting   Level of [place]: Toilet minimum assist (75% patients effort)   Grooming   Level of [place]: Grooming minimum assist (75% patients effort)   Physical  Assist/Nonphysical Assist: Grooming set-up required   Instrumental Activities of Daily Living (IADL)   IADL Comments Pt reports wife assists with all IADL   Activities of Daily Living Analysis   Impairments Contributing to Impaired Activities of Daily Living balance impaired;cognition impaired;pain;strength decreased   General Therapy Interventions   Planned Therapy Interventions ADL retraining;cognition;progressive activity/exercise;risk factor education;transfer training;strengthening   Clinical Impression   Criteria for Skilled Therapeutic Interventions Met yes, treatment indicated   OT Diagnosis impaired ADL and mobility   Influenced by the following impairments impaired activity tolerance   Assessment of Occupational Performance 5 or more Performance Deficits   Identified Performance Deficits all ADL, IADL and mobility   Clinical Decision Making (Complexity) Moderate complexity   Therapy Frequency 5x/week   Predicted Duration of Therapy Intervention (days/wks) 1 week   Anticipated Equipment Needs at Discharge shower chair;wheelchair   Anticipated Discharge Disposition Other (see comments)   Risks and Benefits of Treatment have been explained. Yes   Patient, Family & other staff in agreement with plan of care Yes   Clinical Impression Comments See daily POC notes for updated discharge recommendations   Total Evaluation Time   Total Evaluation Time (Minutes) 10

## 2020-09-23 NOTE — TELEPHONE ENCOUNTER
Placed call to patient's wife about an inquiry regarding which meds can be crushed and put down through Gtube. I also told her that some medications are available in liquid form. Patient is currently inpatient and I I told her that the easiest thing would be to ask the discharging provider to place discharge orders for liquid medications that can be given through the gtube since she will need new prescriptions for any medications that are available in liquid form.    Lukasz Buruchara PharmD.

## 2020-09-23 NOTE — CONSULTS
Consult Date:  09/23/2020      INFECTIOUS DISEASE CONSULTATION      LOCATION:  Room 825, [facility].      REFERRING PHYSICIAN:  Clare Iglesias MD.      IMPRESSION:   1.  A 70-year-old male with recent complicated history, mainly revolving around metastatic lung cancer, now admitted with G-tube dislodgement, probably not peritonitis, but some risk.  Relatively benign abdominal exam.   2.  Recent pneumonia, felt to be aspiration and indeed some ongoing aspiration occurring.  Chest x-ray quite impressive currently, mild hypoxia.  No major fever, mildly low white count.  X-rays suggest possible postobstructive pneumonia and evolving new infiltrates both aspiration and postobstructive pneumonia likely.   3.  Metastatic lung cancer, some progression on chest x-ray alone.   4.  Dysphagia and aspiration previously documented.   5.  Neutropenia, relatively mild, recent chemotherapy.   6.  Hyponatremia, SIADH, chronic ongoing problem.   7.  Prior laryngeal cancer.   8.  Mild cognitive dysfunction.      RECOMMENDATIONS:   1.  Continue Zosyn for now.   2.  Await cultures, follow clinically.  Amount of antibiotics depending on clinical course.  If improves enough, probably oral therapy.      HISTORY:  This 70-year-old male is seen in consultation.  There is some cognitive dysfunction, so much of the history comes from the records rather than the patient.  He, for example, is saying he has not been getting chemotherapy, which is not the case.  The patient had recent hospitalization with probable aspiration pneumonia, got antibiotics and improved.  Cultures at that time only grew Candida, which is not significant from the sputum.  He has underlying metastatic lung cancer and has been getting chemotherapy.  He has also had malnutrition and a G-tube placement, which had a dislodged and that is the main reason for him coming in.  Does not have obvious peritoneal signs.  Did not have major fever, but some degree of  hypoxia, ongoing cough and congestion, and chest x-ray with some worsening including worsening infiltrates fairly large mass-like area with a suggestion of decreased aeration and possible obstruction.  The patient states he is having some breathing symptoms, but feels relatively okay otherwise currently.  Not having major shaking chills or fever.      PAST MEDICAL HISTORY:  Dominated by the metastatic lung cancer which included chemotherapy, the malnutrition and chemo, history of recent probable aspiration pneumonia, prior history of laryngeal cancer, history of documented dysphagia and apparently some witnessed aspiration at home, mild leukopenia currently, ongoing hyponatremia with SIADH, mild chronic cognitive dysfunction which is evident on exam.      SOCIAL AND FAMILY HISTORY:  Living independently still.  No significant exposures.  No one else he has been around has been ill.  No COVID-19 major risk.      ALLERGIES:  NO ANTIBIOTIC ALLERGIES.      REVIEW OF SYSTEMS:  Largely as listed above.  No other notable specific symptoms.  Denied major abdominal symptoms.  Some degree of respiratory and ongoing cough problems.      PHYSICAL EXAMINATION:   GENERAL:  The patient's cognitive dysfunction is evident.   VITAL SIGNS:  He is afebrile, not tachycardic.  Mild hypoxia, on oxygen.   HEENT:  No visible lesions.  No facial lesions.   NECK:  Supple, nontender.   HEART:  Mildly tachycardic in the 90s.   LUNGS:  Congestion bilaterally.  Wheezing in the left lung.   ABDOMEN:  Nontender, even to relatively deep palpation.  Tube site noted, not particularly abnormal looking.   EXTREMITIES:  No major rash or skin lesions.      LABORATORY DATA:  White blood cell count initially 1100 with 71% PMNs, now and 1900 today.  Sodium better, was previously in the 120s, now in the 130s.  Procalcitonin 15.81.  Blood cultures pending.  Chest x-ray impressive; significant infiltrates both lungs, decreased aeration suggesting some degree of  obstruction.      Thank you very much for the consultation.  I will follow the patient with you.         ELLE GARCIA MD             D: 2020   T: 2020   MT: GASPER      Name:     CHRISTI MANN   MRN:      [id number]        Account:       [id number]   :      1950           Consult Date:  2020      Document: [id number]

## 2020-09-23 NOTE — PLAN OF CARE
Discharge Planner PT   Patient plan for discharge: Home with resumed assist of spouse and resumed home PT  Current status: Pt admitted from ED 9/22 after G tube dislodged. PMH significant for metastatic lung cancer. Pt lives with spouse in condo with no stairs. Uses 4L O2 at home. Pt's spouse works from home & has someone come to stay with pt if she leaves for an appointment. Pt uses 4WW to ambulate inside apartment, uses WC for longer distances. Spouse assists with tub transfer.     Currently, pt completes supine>sit with HOB elevated and SBA. 4L O2 throughout session. Sit>stand with FWW and CGA, assist to stabilize walker. Ambulated 15' in room with FWW and CGA-SBA. Pt fatigues quickly with mobility, declines sitting up in recliner. Sit>supine with SBA. Pt supine upon departure, all needs in reach    Barriers to return to prior living situation: limited activity tolerance  Recommendations for discharge: Home with SBA for all mobility and resumed home PT  Rationale for recommendations: Pt fatigues very easily & will need assist to care for self & SBA for mobility at home. Spouse is very supportive & states pt will always have someone with him at home. Per spouse pt hasn't eaten in 4 days; anticipate pt will build toward baseline with improved nutrition and continued therapies in hospital and after discharge.         Entered by: Melony Salinas 09/23/2020 4:56 PM

## 2020-09-23 NOTE — PROGRESS NOTES
[facility]    Hospitalist Progress Note    Interval History   - No significant change in his breathing today. PEG tube issue fixed and tolerated a tube feed. Wife at bedside, updated, questions answered. She would like liquid prescriptions of most of his medications at discharge.    Assessment & Plan   Summary: Agapito Fairbanks is a 70 year old male with PMH metastatic SCC of the lung, chronic respiratory failure, recent aspiration pneumonia, dysphagia, recent PEG tube placement, SIADH, mild cognitive impairment who was admitted on 9/22/2020 with PEG tube problem. Found to have pneumonia and pancytopenia.    Gastrojejunostomy tube dislodgement, now repaired  Malnutrition, secondary to acute illness  Dysphagia  During his recent hospitalization patient was evaluated by speech therapy with limited video swallow and puréed diet was recommended.  Due to ongoing issues with malnutrition and poor oral intake along with increased risk of aspiration, patient underwent G-tube placement 9/21, was dislodged, now fixed. Underwent a successful tube feed on 9/23. No clear evidence of peritonitis.  - Continue tube feeds  - Switch most medications to liquid, per wife request  - Up with assist  - Oxycodone liquid PRN    Multifocal pneumonia  Chronic hypoxic respiratory failure  Admission CXR with large left lung mass and new infiltrates bilaterally. Noted pancytopenia on admission. Per HemOnc, CXR changes likely reflect a combination of radiation pneumonitis, lung cancer, and infection. Procalcitonin is notably elevated to 16 suggestive for infection.  - Appreciate ID consult   - Continue Zosyn    Metastatic squamous cell cancer of th elung  History of laryngeal cancer  Chemotherapy-induced pancytopenia  For his metastatic squamous cell carcinoma, patient has been following up with Dr. Mullins from [facility].  Patient was initially started on pembrolizumab in April.  Carboplatin and Taxol were added in July  29, 2020 because of progression.  At this time patient wishes for restorative therapy. Patient has undergone chemotherapy last Tuesday, he also have pancytopenia on presentation.  His WBC count has decreased to 1.1 as compared to 8.6, hemoglobin is a stable at 8.8 and his platelets are down to 94 from 353.  His absolute neutrophil count is 0.8.  - Appreciate HemOnc consult   - Neupogen  - Albuterol PRN  - Continue PTA fentanyl patch 12mcg q72h, gabapentin    Chronic dementia: Continue PTA Aricept 5 mg p.o. daily.    Hypothyroidism: Continue PTA Synthroid 125 mcg p.o. daily.    SIADH with chronic hyponatremia: Sodium improved to 136 this admission.     CKD3: Creatinine stable here    Depression: Continue PTA Zoloft    DVT Prophylaxis: Pneumatic Compression Devices  Code Status: Full Code  PT/OT: ordered  Diet: NPO for Medical/Clinical Reasons Except for: Other; Specify: NPO For oral intake and gastric feedings for 6 hours post insertion Gastrostomy G/GJ tube. May feed through Jejunal or Distal PORT immediately for GJ tubes ONLY.  Adult Formula Bolus Feeding: BID Isosource 1.5; Route: Gastrostomy; 0.5; Can(s); Medication - Feeding Tube Flush Frequency: At least 15-30 mL water before and after medication administration and with tube clogging; Amount to Send (Nutrition use): ...      Disposition: Expected discharge ~2-3 days pending improvement in pancytopenia, infection    Valentin Alexander MD  Text Page  (7am to 6pm)  -Data reviewed today: I reviewed all new labs and imaging results over the last 24 hours.    Physical Exam   Temp: 97.2  F (36.2  C) Temp src: Oral BP: 102/56 Pulse: 76   Resp: 16 SpO2: 99 % O2 Device: Nasal cannula Oxygen Delivery: 3 LPM  There were no vitals filed for this visit.  Vital Signs with Ranges  Temp:  [97  F (36.1  C)-98.5  F (36.9  C)] 97.2  F (36.2  C)  Pulse:  [67-93] 76  Resp:  [16-20] 16  BP: ()/(55-82) 102/56  SpO2:  [98 %-100 %] 99 %  I/O last 3 completed shifts:  In: 848  [I.V.:698; NG/GT:150]  Out: 125 [Urine:125]  O2 requirements: yes    Constitutional: Thin cachectic male in NAD  HEENT: Eyes nonicteric, voice hoarse  Cardiovascular: RRR, normal S1/2, no m/r/g  Respiratory: Ronchorous bilaterally more on the right, left side is diminished, mild crackles bilaterally  Vascular: No LE pitting edema  GI: Normoactive bowel sounds, nontender, G tube site noted c/d/i  Skin/Integumen: No rashes  Neuro/Psych: Appropriate affect and mood. A&Ox3, moves all extremities    Medications     dextrose         donepezil  5 mg Oral At Bedtime     fentaNYL  12 mcg Transdermal Q72H     fentaNYL   Transdermal Q8H     gabapentin  300 mg Oral TID     levothyroxine  125 mcg Oral Daily     multivitamins w/minerals  15 mL Per Feeding Tube Daily     piperacillin-tazobactam  3.375 g Intravenous Q6H     senna-docusate  1 tablet Oral BID    Or     senna-docusate  2 tablet Oral BID     sertraline  50 mg Oral At Bedtime     sodium chloride (PF)  3 mL Intracatheter Q8H       Data   Recent Labs   Lab 09/23/20  0736 09/22/20  1402 09/21/20  1125   WBC 1.9* 1.1*  --    HGB 7.3* 8.8*  --    MCV 83 84  --    PLT 60* 94*  --    INR  --   --  1.05    133  --    POTASSIUM 4.1 4.4  --    CHLORIDE 102 96  --    CO2 29 31  --    BUN 20 15  --    CR 0.64* 0.64*  --    ANIONGAP 5 6  --    SUSIE 7.7* 8.2*  --    * 107*  --        Imaging:   Recent Results (from the past 24 hour(s))   IR Gastrostomy Tube Percutaneous Plcmnt    Narrative    G-TUBE PLACEMENT 9/22/2020 3:47 PM     HISTORY: Requires nutritional support. Unable to take adequate oral  intake. A stent had a G-tube placed yesterday. This tube has fallen  out.    DESCRIPTION OF PROCEDURE: After obtaining informed consent, the  patient was placed in a supine position on the fluoroscopy table.  Attempts to regain access into the stomach were unsuccessful using a  blunt Farmer needle. Therefore, a nasogastric tube was placed into  the stomach. 1 mg glucagon was  given intravenously. The stomach was  inflated with air.     Two T-TAC suture anchors were used to enter the stomach. A small skin  incision was made in between the T-TAC entry sites. An 18-gauge needle  was used to enter the stomach through the incision. A wire was passed  and curled in the stomach. A tract for the G-tube was dilated. An 18  [place]izen [place] peel-away sheath was placed. Through the peel-away sheath, a 14  [place]izen [place] G-tube was placed. The balloon was inflated with a mixture of 1  mL contrast and 9 mL saline. Balloon was pulled back so that it was  against the anterior stomach wall. Contrast was injected to document  adequate positioning. A fluoroscopic image was saved to document tube  position.     The patient tolerated the procedure well. There were no immediate  postprocedure complications. The patient tolerated the procedure well.  There were no immediate postprocedure complications. The patient's  vital signs were monitored by radiology nursing staff under my  supervision and remained stable throughout the study. Radiation dose  for this scan was reduced using automated exposure control, adjustment  of the mA and/or kV according to patient size, or iterative  reconstruction technique.    MEDICATIONS: 1 mg glucagon, 100 mg fentanyl    SEDATION TIME: None    FLUOROSCOPY TIME: 9.8 minutes    RADIATION DOSE: 192.01 mGy Air Kerma    NUMBER OF FLUOROSCOPIC IMAGES: 3      Impression    IMPRESSION: New G-tube placed into the stomach as above.    MELVA ROBISON MD

## 2020-09-23 NOTE — PROGRESS NOTES
[facility]  Patient is currently open to home care services with [facility]. The patient is currently receiving RN/PT and HHA services. [facility]  and team have been notified of patient admission. [facility] liaison will continue to follow patient during stay. If appropriate provide orders to resume home care at time of discharge.

## 2020-09-23 NOTE — PLAN OF CARE
A & O but forgetful and disoriented to situation at times. Breathing labored, CAPUTO and LS coarse t/o and L lobes diminished on 4 L O2 which is baseline. G-tube dressing CDI and is clamped except when giving meds, is NPO. IVF infusing @ 75 mL/hr w/intermittent ABX. Redness on upper spine w/mepilex in place. Coccyx w/mepilex in place, CDI; turn and repo q 2 hours. Has not been OOB overnight and PT/OT consulted. Plan for hem/onc, palliative and nutrition to see today.

## 2020-09-23 NOTE — PLAN OF CARE
Discharge Planner OT   Patient plan for discharge: Home with wife  Current status: OT eval completed and treatment initiated. Pt alert to self only-stated it was August and he wasn't sure where we are. He was able to follow all commands, but fatigues easily and has difficulty answering questions that require intact short term memory. He completed supine<>EOB with CGA. He donned socks with SBA while seated. He transferred bed<>chair with CGA and was unsteady with walking a short distance, requiring Min A to prevent loss of balance. WW brought to room to use with pt. Pt was too fatigued to stay up in a recliner and wanted to rest in bed. Rated that little actiivty as 6/10 difficulty. Frequently coughing with wet vocal quality, on 4 LPM O2 via NC.  Barriers to return to prior living situation: current level of assist, medical status, cognitive impairment, high falls risk  Recommendations for discharge: TCU  Rationale for recommendations: If goals of care remain restorative, would recommend TCU at this point unless pt would have 24 hour care at home. He is quite deconditioned and is high falls risk with cognitive impairment. If pt goes home with 24 care, would recommend Home PT/OT.       Entered by: Cori German 09/23/2020 1:09 PM

## 2020-09-23 NOTE — PROGRESS NOTES
09/23/20 1628   Quick Adds   Type of Visit Initial PT Evaluation   Living Environment   Lives With spouse   Living Arrangements condominium   Home Accessibility no concerns   Living Environment Comment Bathroom includes walk in shower with shower chair, though pt has been using tub shower & getting into tub on towels as pt prefers this, standard height toilet- does have grab bars   Self-Care   Usual Activity Tolerance fair   Current Activity Tolerance poor   Equipment Currently Used at Home grab bar, toilet;shower chair;walker, rolling;wheelchair, manual   Functional Level Prior   Ambulation 1-->assistive equipment   Transferring 3-->assistive equipment and person  (spouse sometimes assists)   Toileting 1-->assistive equipment   Bathing 3-->assistive equipment and person   Fall history within last six months yes   Number of times patient has fallen within last six months   (5x in 1 week due to medication change per spouse)   Which of the above functional risks had a recent onset or change? ambulation   General Information   Onset of Illness/Injury or Date of Surgery - Date 09/22/20   Referring Physician Clare Iglesias MD    Patient/Family Goals Statement to go home   Pertinent History of Current Problem (include personal factors and/or comorbidities that impact the POC) Pt admitted from ED 9/22 after G tube dislodged. past medical history significant for metastatic lung cancer, chronic hypoxic respiratory failure with oxygen dependency, recent aspiration pneumonia with hospitalization from September 1-5, dysphagia, hyponatremia secondary to SIADH, mild cognitive impairment    General Info Comments Activity: Up with assist   Cognitive Status Examination   Orientation orientation to person, place and time   Level of Consciousness alert   Follows Commands and Answers Questions 100% of the time;able to follow single-step instructions   Personal Safety and Judgment intact   Pain Assessment   Patient Currently in Pain  Yes, see Vital Sign flowsheet  (5/10 near incicion and generally)   Posture    Posture Forward head position;Protracted shoulders   Range of Motion (ROM)   ROM Comment ROM appears WFL with bed mobility and transfers   Strength   Strength Comments Strength not formally assessed, pt appears to have generalized weakness/deconditioning   Bed Mobility   Bed Mobility Comments Supine>sit with HOB elevated and SBA   Transfer Skills   Transfer Comments Sit>stand from EOB with FWW and CGA, assist to stabilize walker as pt pulling up on walker   Gait   Gait Comments Ambulated in room with FWW and CGA-SBA   Balance   Balance Comments Needs B UE support on walker for safe dynamic balance   Sensory Examination   Sensory Perception Comments denies numbness/tingling   Modality Interventions   Planned Modality Interventions Cryotherapy   General Therapy Interventions   Planned Therapy Interventions balance training;bed mobility training;gait training;strengthening;transfer training;home program guidelines;progressive activity/exercise   Clinical Impression   Criteria for Skilled Therapeutic Intervention yes, treatment indicated   PT Diagnosis decreased functional mobility   Influenced by the following impairments decreased activity tolerance, decreased endurance, decreased strength   Functional limitations due to impairments decreased independence with bed mobility, transfers, ambulation, self cares   Clinical Presentation Stable/Uncomplicated   Clinical Presentation Rationale clinical judgement   Clinical Decision Making (Complexity) Low complexity   Therapy Frequency Daily   Predicted Duration of Therapy Intervention (days/wks) 3 days   Anticipated Discharge Disposition Home with Assist;Home with Home Therapy   Risk & Benefits of therapy have been explained Yes   Patient, Family & other staff in agreement with plan of care Yes   Clinical Impression Comments Pt fatigues very easily & will need assist to care for self & SBA for  "mobility at home. Spouse is very supportive & states pt will always have someone with him at home. Per spouse pt hasn't eaten in 4 days; anticipate pt will build toward baseline with improved nutrition and continued therapies in hospital and after discharge.   [facility] TM \"6 Clicks\"   2016, Trustees of [facility], under license to [employer].  All rights reserved.   6 Clicks Short Forms Basic Mobility Inpatient Short Form   [facility]  \"6 Clicks\" V.2 Basic Mobility Inpatient Short Form   1. Turning from your back to your side while in a flat bed without using bedrails? 3 - A Little   2. Moving from lying on your back to sitting on the side of a flat bed without using bedrails? 3 - A Little   3. Moving to and from a bed to a chair (including a wheelchair)? 3 - A Little   4. Standing up from a chair using your arms (e.g., wheelchair, or bedside chair)? 3 - A Little   5. To walk in hospital room? 3 - A Little   6. Climbing 3-5 steps with a railing? 2 - A Lot   Basic Mobility Raw Score (Score out of 24.Lower scores equate to lower levels of function) 17   Total Evaluation Time   Total Evaluation Time (Minutes) 10     "

## 2020-09-24 NOTE — PLAN OF CARE
A/Ox2-3, disoriented to time and occasionally situation. VSS ex soft BPs, stable. Hgb 6.2, 1 unit of PRBC infused - tolerated well. Platelets 39. PIV SL + int zosyn. Continent of B&B today, incontinent at times d/t urgency. Loose stools x2 - Notified MD after first BM who suggested to ask nutrition to add fiber to tube feeding plan, later spoke with Nutrition who will follow-up tomorrow. Ax1 gb/walker to BR. Pain managed with fentanyl patch to Left deltoid, PRN oxycodone via Gtube. NPO + intermittent tube feeding boluses (11am, 2pm, 5pm, 8pm)  - Nutrition following and gradually increasing dose/frequency of tube feedings. Free water flushes q4h, 150ml. Speech eval completed - pt may have thin water/ice chips only. PT/OT, SW, WOC, Hem/onc, ID following. Discharge home with home care pending labs, improvement - possibly 1-2 days.

## 2020-09-24 NOTE — PROGRESS NOTES
MD Notification    Notified Person: MD    Notified Person Name: Dr. Iglesias     Notification Date/Time: 9/24 4343    Notification Interaction: Phone call    Purpose of Notification: FYI Pt has been having soft BPs, was 95/52 just prior to tranfusion, and now 88/54 during transfusion, please advise.    Orders Received: Continue with transfusion and monitor BPs. We will reassess after transfusion.     Comments:  Pt is asymptomatic

## 2020-09-24 NOTE — PROGRESS NOTES
Service Date: 09/24/2020      SUBJECTIVE:  Mr. Fairbanks is a 70-year-old gentleman with metastatic squamous cell carcinoma of the lung on treatment with carboplatin, Taxol and pembrolizumab.      The patient presented to the emergency room after his PEG tube fell out.  Multiple investigations were done.  CBC revealed pancytopenia.  Chest x-ray was abnormal suggestive of pneumonia.  The patient admitted to the hospital.  ID is following.  He is on IV antibiotics. For neutropenia, he received a dose of Neupogen.       I met with the patient.  Wife was at the bedside.  The patient is feeling better.  He is feeling stronger.  He is not having any fever or chills.  Rattling in the chest is less.  He has some cough. It has not gotten worse.      No headache.  Some dizziness.  No chest pain.  No nausea or vomiting.  Appetite is fair.  No abnormal bleeding.      PHYSICAL EXAMINATION:   GENERAL:  The patient was alert, oriented x 3.   VITAL SIGNS:  Reviewed.  He is not in any distress.   The rest of systems not examined.      LABORATORY DATA:  Reviewed.      ASSESSMENT:   1.  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   2.  Pancytopenia from chemotherapy.   3.  Pneumonia.   4.  Pain, controlled.      PLAN:   1.  The patient clinically is better.  He is feeling stronger.  He is no longer short of breath. For pneumonia, he is on antibiotics; that will be continued.  Some of the abnormalities seen on the chest x-ray are from radiation changes.      2.  Labs were all reviewed with him.  His WBC has improved.  ANC is normal.  He does not need any more Neupogen.        He is anemic.  Hemoglobin has decreased.  He is going to get 1 unit of blood transfusion. He is thrombocytopenic.  He is not bleeding.  He will be transfused platelets if he has bleeding or platelet is below 10.      3.  The patient has decreased appetite.  He has started PEG tube feeding.  That will help with his nutrition.  That  will also help reduce aspiration pneumonia.       4.  He and his wife had a few questions, which were all answered.  Oncology will continue to follow.         JENNIFER CEDENO MD             D: 2020   T: 2020   MT: BRENDA      Name:     CHRISTI MANN   MRN:      [id number]        Account:      [id number]   :      1950           Service Date: 2020      Document: [id number]

## 2020-09-24 NOTE — PROVIDER NOTIFICATION
MD Notification    Notified Person: MD    Notified Person Name: Dr. Iglesias    Notification Date/Time:  9/24 6745    Notification Interaction: Page    Purpose of Notification: Pt and wife asking when he may be able to have any po intake, could we consult speech for eval?    Orders Received: Speech consult placed    Comments:

## 2020-09-24 NOTE — PROGRESS NOTES
[facility]    HOSPITALIST PROGRESS NOTE :   --------------------------------------------------    Date of Admission:  9/22/2020    Cumulative Summary: Agapito Fairbanks is a 70 year old male with past medical history significant for metastatic lung cancer, chronic hypoxic respiratory failure with oxygen dependency, recent aspiration pneumonia with hospitalization from September 1-5, dysphagia, hyponatremia secondary to SIADH, mild cognitive impairment who was admitted from the emergency room after gastrojejunostomy tube dislodgment which was replaced on the day of admission and to have further evaluation and management of possible peritonitis and aspiration pneumonia.    Assessment & Plan     Gastrojejunostomy tube dislodgement, now repaired  Malnutrition, secondary to acute illness  Dysphagia  During his recent hospitalization patient was evaluated by speech therapy with limited video swallow and puréed diet was recommended.  Due to ongoing issues with malnutrition and poor oral intake along with increased risk of aspiration, patient underwent G-tube placement 9/21, was dislodged, now fixed. Underwent a successful tube feed on 9/23. No clear evidence of peritonitis.  -- Continue tube feeds, tolerating well  -- Most medications are switched to liquid, per wife request  -- Up with assist  -- Oxycodone liquid PRN  -- will post Speech therapy as patient and wife are wondering if he can be started back on oral diet also.  -- will change his Senna to PRN  -- Will Increase his free water flushes to 150 ml every 4 hours      Multifocal pneumonia  Chronic hypoxic respiratory failure  Admission CXR with large left lung mass and new infiltrates bilaterally. Noted pancytopenia on admission. Per HemOnc, CXR changes likely reflect a combination of radiation pneumonitis, lung cancer, and infection. Procalcitonin is notably elevated to 16 suggestive for infection.  -- Appreciate ID consult  -- Continue  Zosyn     Metastatic squamous cell cancer of th elung  History of laryngeal cancer  Chemotherapy-induced pancytopenia  For his metastatic squamous cell carcinoma, patient has been following up with Dr. Mullins from [facility].  Patient was initially started on pembrolizumab in April.  Carboplatin and Taxol were added in July 29, 2020 because of progression.  At this time patient wishes for restorative therapy. Patient has undergone chemotherapy last Tuesday, he also have pancytopenia on presentation.  His WBC count has decreased to 1.1 as compared to 8.6, hemoglobin is a stable at 8.8 and his platelets are down to 94 from 353.  His absolute neutrophil count is 0.8.  He has received Neupogen and his WBC counts are increased to 3.5 , but his Hgb is now down to 6.1    -- Appreciate HemOnc consult, Neupogen administration as per Oncology  -- Will go ahead and will order 1 unit PRBC transfusion for Hgb <7, blood consent was obtained after discussion with patient   -- Albuterol PRN  -- Continue PTA fentanyl patch 12mcg Q72h  -- Continue Gabapentin  -- will order incentive spirometry  -- Encouraged patient to increase his mobilization   -- Will discuss with oncology if patient should continue with his chest radiation.  --On the admission, I did offer the patient and his wife if they would like to meet palliative team and get established with them for symptom management or have further discussion regarding goals of care but at this time, they do not think they are ready for changing goals of care to comfort and would like to continue with restorative care plan as above.     Chronic dementia:   --Continue PTA Aricept 5 mg p.o. daily.     Hypothyroidism:   --Continue PTA Synthroid 125 mcg p.o. daily.     SIADH with chronic hyponatremia:   --Sodium improved during this admission.      CKD3: Creatinine stable here     Depression:   --Continue PTA Zoloft    Diet: Adult Formula Bolus Feeding: BID Isosource 1.5; Route:  Gastrostomy; 0.5; Can(s); Medication - Feeding Tube Flush Frequency: At least 15-30 mL water before and after medication administration and with tube clogging; Amount to Send (Nutrition use): ...    Ocampo Catheter: not present  DVT Prophylaxis: Pneumatic Compression Devices  Code Status: Full Code    The patient's care was discussed with the Bedside Nurse and Patient.    Disposition Plan   Expected discharge: 1 to 2 days, most likely over the weekend, once patient CBC is a stable and he is making clinical improvement.  Patient has been evaluated by therapy they have recommended TCU versus home health care, at this time patient and his wife wishes him to return home.  He will need home RN home PT OT and speech at discharge.    Clare Iglesias MD, FACP  Text Page (7am - 6pm)    ----------------------------------------------------------------------------------------------------------------------    Interval History   Patient was seen and examined this morning, known to me from his admission 2 days ago.  Patient looks clinically better as compared to Tuesday.  He told me that his breathing is better I encouraged him to do incentive spirometer.  We also discussed about his drop in hemoglobin, blood transfusion consent was obtained and patient is okay to get blood transfusion.  His wife was not present at the time of my evaluation, discussed with him that hopefully tomorrow I can update her personally at the time of rounding.  Patient is denying any chest pain, palpitations nausea or vomiting.  He is tolerating tube feedings well.    -Data reviewed today: I reviewed all new labs and imaging results over the last 24 hours.    I personally reviewed no images or EKG's today.    Physical Exam   Temp: 97.2  F (36.2  C) Temp src: Oral BP: 102/64 Pulse: 83   Resp: 16 SpO2: 98 % O2 Device: Nasal cannula with humidification Oxygen Delivery: 4 LPM  Vitals:    09/24/20 0540 09/24/20 0627   Weight: 56.7 kg (125 lb 1.6 oz) 54.1 kg (119  lb 3.2 oz)     Vital Signs with Ranges  Temp:  [96.5  F (35.8  C)-97.2  F (36.2  C)] 97.2  F (36.2  C)  Pulse:  [76-95] 83  Resp:  [16-18] 16  BP: (102-109)/(56-64) 102/64  SpO2:  [98 %-100 %] 98 %  I/O last 3 completed shifts:  In: -   Out: 275 [Urine:275]    GENERAL: Alert , awake and oriented. NAD. Conversational, appropriate, looks chronically sick , wearing nasal cannula   HEENT: Normocephalic. EOMI. No icterus or injection. Nares normal.   LUNGS: Clear to auscultation except rhonchi in bases with decreased breath sounds . No dyspnea at rest.   HEART: Regular rate. Extremities perfused.   ABDOMEN: Soft, nontender, and nondistended. Positive bowel sounds.Peg tube is present   EXTREMITIES: No LE edema noted.   NEUROLOGIC: Moves extremities x4 on command. No acute focal neurologic abnormalities noted.     Medications     dextrose         donepezil  5 mg Oral At Bedtime     fentaNYL  12 mcg Transdermal Q72H     fentaNYL   Transdermal Q8H     gabapentin  300 mg Oral TID     levothyroxine  125 mcg Oral Daily     multivitamins w/minerals  15 mL Per Feeding Tube Daily     piperacillin-tazobactam  3.375 g Intravenous Q6H     senna-docusate  1 tablet Oral BID    Or     senna-docusate  2 tablet Oral BID     sertraline  50 mg Oral At Bedtime     sodium chloride (PF)  3 mL Intracatheter Q8H       Data   Recent Labs   Lab 09/24/20  0716 09/23/20  0736 09/22/20  1402 09/21/20  1125   WBC 3.5* 1.9* 1.1*  --    HGB 6.1* 7.3* 8.8*  --    MCV 84 83 84  --    PLT 39* 60* 94*  --    INR  --   --   --  1.05    136 133  --    POTASSIUM 3.3* 4.1 4.4  --    CHLORIDE 101 102 96  --    CO2 29 29 31  --    BUN 13 20 15  --    CR 0.54* 0.64* 0.64*  --    ANIONGAP 6 5 6  --    SUSIE 7.4* 7.7* 8.2*  --    GLC 85 109* 107*  --        Imaging:   No results found for this or any previous visit (from the past 24 hour(s)).

## 2020-09-24 NOTE — PLAN OF CARE
Discharge Planner SLP   Patient plan for discharge: Did not state  Current status: SLP: Pt well known to this SLP with Video Swallow Study on 9/4/20 finding mild to moderate dysphagia with coughing throughout the evaluation that was NOT related to aspiration during the swallow. DD1/thin liquids recommended at that time, however, pt wanted to continue a regular diet to avoid getting a PEG tube. Since that time, pt was eating small amounts of pureed foods and thickened liquids (not recommended by SLP as thicker consistencies increased pharyngeal residue) and pt chose to pursue a PEG tube.     Pt appears to be near baseline dysphagia at this time. Baseline coughing prior to and throughout the evaluation. Pt able to produce creamy phelgm. Excellent oral care and oral cavity clear and moist at this time. Pt only requesting plain water. Water trialed with no change in coughing or vocal quality. Extensive education and handouts provided on Free Water Protocol. Wife/pt in agreement that pt is a candidate. Discussed concern with ongoing aspiration of secretions and risk of aspiration associated with tube feedings - consider close RT follow up at pt reported the nebs help and ?medication to help thin or dry out secretions. Education provided on reflux strategies during bolus feedings. Pt/wife appreciative of the information.     Continue tube feeding for nutrition and free water flushes. For comfort/quality of life and practice swallowing, pt can have as much THIN water or ice chips as he wants throughout the day as long as he is upright and taking small sips. Do not hold water/ice chips if pt is coughing - he will continue to cough with and without water. Continue oral cares with toothbrushing 3-5x per day. Continue all meds via PEG tube.     Barriers to return to prior living situation: Dysphagia  Recommendations for discharge: TCU  Rationale for recommendations: Continue ST for education on free water and swallow  exercises. Eventually, pt would like to return to comfort feedings with pureed solids with reducing the risk of aspiration as much as possible.        Entered by: Britany Mejia 09/24/2020 2:38 PM

## 2020-09-24 NOTE — PROGRESS NOTES
Disoriented to place, situation and time overnight. VSS on 4L O2. Up assist of 1 Gb and walker. NPO. Turn and reposition, refuses at times. PI to coccyx, mepliex C/D/I. Back sarah/red mepliex changed. Incontinent at times. G - tube dressing C/D/I. C/O pain at G-tube site and back, given PRN oxy, effective. PIV SL. Slept between cares.

## 2020-09-24 NOTE — PROVIDER NOTIFICATION
MD Notification    Notified Person: MD    Notified Person Name: Dr. Iglesias    Notification Date/Time: 9/24 4129    Notification Interaction: Page    Purpose of Notification: FYI Hgb 6.1, platelets 39 - no conditional orders in place, will need consent and type/screen first    Orders Received: Consent completed at bedside, Type/screen ordered, Conditional blood transfusion ordered    Comments:

## 2020-09-24 NOTE — PROGRESS NOTES
[facility]  Infectious Disease Progress Note          Assessment and Plan:   IMPRESSION:   1.  A 70-year-old male with recent complicated history, mainly revolving around metastatic lung cancer, now admitted with G-tube dislodgement, probably not peritonitis, but some risk.  Relatively benign abdominal exam.   2.  Recent pneumonia, felt to be aspiration and indeed some ongoing aspiration occurring.  Chest x-ray quite impressive currently, mild hypoxia.  No major fever, mildly low white count.  X-rays suggest possible postobstructive pneumonia and evolving new infiltrates both aspiration and postobstructive pneumonia likely.   3.  Metastatic lung cancer, some progression on chest x-ray alone.   4.  Dysphagia and aspiration previously documented.   5.  Neutropenia, relatively mild, recent chemotherapy.   6.  Hyponatremia, SIADH, chronic ongoing problem.   7.  Prior laryngeal cancer.   8.  Mild cognitive dysfunction.      RECOMMENDATIONS:   1.  Continue Zosyn   2.  Neg blood  Cultures,stable clinically.  Amount of antibiotics depending on clinical course.  If improves enough, probably oral therapy.         Interval History:   no new complaints no fver ; no cp, sob, n/v/d, or abd pain. T down on 4 L NC O2, bl cxs neg WBC 3500 PMNs OK              Medications:       donepezil  5 mg Oral At Bedtime     fentaNYL  12 mcg Transdermal Q72H     fentaNYL   Transdermal Q8H     gabapentin  300 mg Oral or Feeding Tube TID     levothyroxine  125 mcg Oral Daily     multivitamins w/minerals  15 mL Per Feeding Tube Daily     piperacillin-tazobactam  3.375 g Intravenous Q6H     sertraline  50 mg Oral or Feeding Tube At Bedtime     sodium chloride (PF)  3 mL Intracatheter Q8H                  Physical Exam:   Blood pressure 102/64, pulse 83, temperature 97.2  F (36.2  C), temperature source Oral, resp. rate 16, weight 54.1 kg (119 lb 3.2 oz), SpO2 98 %.  Wt Readings from Last 2 Encounters:   09/24/20 54.1 kg (119  lb 3.2 oz)   09/16/20 58.2 kg (128 lb 3.2 oz)     Vital Signs with Ranges  Temp:  [96.5  F (35.8  C)-97.2  F (36.2  C)] 97.2  F (36.2  C)  Pulse:  [76-95] 83  Resp:  [16-18] 16  BP: (102-109)/(56-64) 102/64  SpO2:  [98 %-100 %] 98 %    Constitutional: Awake, alert, cooperative, no apparent distress chronic ill appearance mild confused   Lungs: Congestion to auscultation bilaterally, no crackles or wheezing   Cardiovascular: Regular rate and rhythm, normal S1 and S2, and no murmur noted   Abdomen: Normal bowel sounds, soft, non-distended, non-tender   Skin: No rashes, no cyanosis, no edema   Other:           Data:   All microbiology laboratory data reviewed.  Recent Labs   Lab Test 09/24/20  0716 09/23/20  0736 09/22/20  1402   WBC 3.5* 1.9* 1.1*   HGB 6.1* 7.3* 8.8*   HCT 19.5* 23.0* 27.8*   MCV 84 83 84   PLT 39* 60* 94*     Recent Labs   Lab Test 09/24/20  0716 09/23/20  0736 09/22/20  1402   CR 0.54* 0.64* 0.64*     No lab results found.  Recent Labs   Lab Test 09/22/20  1535 09/22/20  1455 09/02/20  1345 09/01/20  0419 09/01/20  0209 07/27/20  1400 05/01/20  1806 05/01/20  1803 05/01/20  1755   CULT No growth after 2 days No growth after 2 days Light growth  Normal pat    Moderate growth  Candida albicans / dubliniensis  Candida albicans and Candida dubliniensis are not routinely speciated  Susceptibility testing not routinely done  * No growth No growth No growth No growth No growth No growth

## 2020-09-24 NOTE — PROGRESS NOTES
[facility]    Received a phone call from Michelle stating the patient's wife, Sandra had requested a refresher of tube feeding instructions and tips on how to de-clog feeding tube prior to discharge. Phone call placed and voicemail message left on Millicent's cell to coordinate teaching at bedside.     July Boudreaux RN  [facility] Liaison  [phone] (Mon thru Fri 8am - 5pm)  [phone] Office

## 2020-09-24 NOTE — PROGRESS NOTES
Chart reviewed  Visited with pt this morning  He tells me that he did well with the bolus feedings yesterday  (G-tube: 1/2 can Isosource 1.5 given twice daily)  RN also in room - confirmed tolerance and states wife did well giving the bolus feedings  Goal is 5 cans per day  9/24: K 3.3 (L - getting replacement this am)           Mg 1.7  +BM yesterday    PLAN:  Will increase to 1/2 can given 4 times per day (92sc-5ix-0wk-8pm) - 2 cans Isosource 1.5 = 750 cals, 34 gm pro, 88 gm CHO  Eval labs in tomorrow morning for tolerance/ability to increase volume of feedings    Dinorah Dunbar, RD, LD  Clinical Dietitian - [facility]   Pager - [phone]

## 2020-09-24 NOTE — PLAN OF CARE
Discharge Planner OT   Patient plan for discharge: home w/ 24 hr assist  Current status: SBA for bed mobility. CGA and VC's for transfers. CGA Pt for functional mobility to/from restroom using 2WW, no overt LOB but slightly unsteady. Pt required A to unthread old briefs and thread new briefs over BLE's but completed clothing mgmt up/down in stance w/ CGA. Pt required CGA for posterior pericare while seated on toilet. Pt returned to supine w/ SBA. Pt very fatigued after activity,   Barriers to return to prior living situation: dec strength, functional act tolerance, impaired cognition, fall risk  Recommendations for discharge: TCU  Rationale for recommendations: if goals of care remain restorative, pt would benefit from daily therapy at TCU setting as pt is quite deconditioned.     However per wife, pt will have 24/7 A at home. If returning home, would require 24 hr care and would benefit from home OT and PT for continued improvement of ind w/ ADL's, mobility, strength, balance, ect.        Entered by: Massiel Snow 09/24/2020 12:04 PM

## 2020-09-24 NOTE — CONSULTS
Care Transition Initial Assessment - RN        Met with: Patient and Family-patient's spouse Millicent.  DATA   Principal Problem:    Pneumonia due to infectious organism  Active Problems:    Gastrojejunostomy tube dislodgement    Essential hypertension    Esophageal reflux    CKD (chronic kidney disease) stage 3, GFR 30-59 ml/min (H)    Hypothyroidism    History of lung cancer    History of laryngeal cancer    Memory loss    Mild major depression (H)    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)    Bone metastasis (H)    Aspiration pneumonia (H)       Cognitive Status: awake.  Primary Care Clinic [facility]: [facility]  Primary Care MD Name: Dr. Love  Contact information and PCP information verified: Yes  Lives With: spouse   Living Arrangements: condominium                 Insurance concerns: No Insurance issues identified  ASSESSMENT  Patient currently receives the following services:  [facility] for home Oxygen-4L per NC, [facility] for g-tube tube feedings, [facility] RN, PT, HHA.  Patient has the following home equipment:  Wheelchair, grab bar at toilet seat & seated walker.        Identified issues/concerns regarding health management: Patient admitted 1 day after having a gastrostomy tube placement for nutrition and was found to have aspiration pneumonia.  ID is recommending await cultures & if improves anticipate patient would be discharged on oral abx.  IR replaced G-tube and Nutrition is following and feeding tubes restarted.  Met with patient and spouse to discuss discharge plans.  Discussed that patient would be discharging with resumption of [facility] for tube feeding support & supplies and that PT is recommending resumption of home care with addition of RN & HHA.  Patient & spouse in agreement of this plan.  Referral sent to [facility] & [facility] with anticipated discharge in the next couple of days.      Patient's spouse requesting to have [facility] Liaison meet with her to for  refresher of tube feeding instructions and tips on how to de-clog feeding tube.  Call placed to [facility] Liaison and message left.  [facility] & [facility] phone number added to AVS.   PLAN  Financial costs for the patient include TBD.  Patient given options and choices for discharge N/A .  Patient/family is agreeable to the plan?  Yes: home with resumption of [facility] & [facility].  Patient anticipates discharging to home.        Patient anticipates needs for home equipment: No  Transportation/person available to transport on day of discharge  is pt's spouse Millicent and have they been notified.  Plan/Disposition: Home   Appointments: No appointments scheduled at this time.      Care  (CTS) will continue to follow as needed.  Michelle Henry RN, BSN, OCN   Inpatient Care Coordination [address]  Office: [phone]

## 2020-09-24 NOTE — PROGRESS NOTES
"   09/24/20 1443   General Information   Onset Date 09/22/20   Start of Care Date 09/24/20   Referring Physician Dr. rhoades   Patient Profile Review/OT: Additional Occupational Profile Info See Profile for full history and prior level of function   Patient/Family Goals Statement water!   Swallowing Evaluation Bedside swallow evaluation   Behaviorial Observations Alert   Mode of current nutrition NPO;PEG   Respiratory Status O2 Supply   Type of O2 supply Nasal cannula   Comments Hx of cancer. New lung mass/pneumonia. SLP VFSS on 9/4/, \": Limited Video Swallow Study completed d/t pt reported pain with sitting and shortness of breath/coughing on thick secretions. Productive cough with yellow/green phlegm prior to and throughout evaluation. VFSS images with poor quality as pt unable to lower shoulders. Thin liquids by cup and straw trialed with good oral control, delayed epiglottic inversion and flash penetration with straw gulp after the cracker. Reduced pharyngeal constriction and overall weakness with purees and solids resulting in significant vallecular residue. Note that pt had consistent coughing throughout the study and trials of thin liquids that did not appear to be related to aspiration. Risk of aspiration associated with respiratory status and weakness. Images reviewed with pt and wife with extensive education. Discussed pureed diet, however, pt would like a regular diet and choose softer foods for increased menu options and quality of life. Would take small bites, alternate solids and liquids, and double swallows as needed. \"   Clinical Swallow Evaluation   Oral Musculature generally intact   Structural Abnormalities none present   Dentition present and adequate   Mucosal Quality good   Mandibular Strength and Mobility intact   Oral Labial Strength and Mobility WFL   Laryngeal Function Cough;Swallow;Voicing initiated   Oral Musculature Comments coughing throughout similar to previous evaluations   Clinical " Swallow Eval: Thin Liquid Texture Trial   Mode of Presentation, Thin Liquids cup;self-fed   Volume of Liquid or Food Presented thin water by cup   Oral Phase of Swallow WFL   Diagnostic Statement no change in coughing or vocal quality; very small sips taken   Swallow Compensations   Swallow Compensations Pacing;Reduce amounts;Multiple swallow   General Therapy Interventions   Planned Therapy Interventions Dysphagia Treatment   Dysphagia treatment Oropharyngeal exercise training;Modified diet education;Instruction of safe swallow strategies   Swallow Eval: Clinical Impressions   Skilled Criteria for Therapy Intervention Skilled criteria met.  Treatment indicated.   Functional Assessment Scale (FAS) 4   Treatment Diagnosis mild to moderate oropharyngeal dysphagia   Diet texture recommendations Clear liquid  (thin water only)   Demonstrates Need for Referral to Another Service dietitian   Therapy Frequency 5x/week   Predicted Duration of Therapy Intervention (days/wks) 1 week   Anticipated Discharge Disposition extended care facility   Risks and Benefits of Treatment have been explained. Yes   Patient, family and/or staff in agreement with Plan of Care Yes   Clinical Impression Comments SLP: Pt well known to this SLP with Video Swallow Study on 9/4/20 finding mild to moderate dysphagia with coughing throughout the evaluation that was NOT related to aspiration during the swallow. DD1/thin liquids recommended at that time, however, pt wanted to continue a regular diet to avoid getting a PEG tube. Since that time, pt was eating small amounts of pureed foods and thickened liquids (not recommended by SLP as thicker consistencies increased pharyngeal residue) and pt chose to pursue a PEG tubePt appears to be near baseline dysphagia at this time. Baseline coughing prior to and throughout the evaluation. Pt able to produce creamy phelgm. Excellent oral care and oral cavity clear and moist at this time. Pt only requesting plain  water. Water trialed with no change in coughing or vocal quality. Extensive education and handouts provided on Free Water Protocol. Wife/pt in agreement that pt is a candidate. Discussed concern with ongoing aspiration of secretions and risk of aspiration associated with tube feedings - consider close RT follow up at pt reported the nebs help and ?medication to help thin or dry out secretions. Education provided on reflux strategies during bolus feedings. Pt/wife appreciative of the information.  Continue tube feeding for nutrition and free water flushes. For comfort/quality of life and practice swallowing, pt can have as much THIN water or ice chips as he wants throughout the day as long as he is upright and taking small sips. Continue oral cares with toothbrushing 3-5x per day. Continue all meds via PEG tube.    Total Evaluation Time   Total Evaluation Time (Minutes) 20

## 2020-09-25 NOTE — PROVIDER NOTIFICATION
MD Notification    Notified Person: MD    Notified Person Name: Dr. Iglesias    Notification Date/Time: 9/25 at 1505     Notification Interaction: webpage     Purpose of Notification: Patient's wife would like Aricept tablet switched to Aricept ODT. She asked to get it changed to suspension, however, pharmacy says it does not come this way.     Orders Received: Awaiting orders.    Comments:

## 2020-09-25 NOTE — PLAN OF CARE
Discharge Planner PT   Patient plan for discharge: home with assist of spouse  Current status: Pt just returned from bathroom and was fatigued. Declined gait but agreeable to LE ex in supine and then transfer to chair. Pt is able to sit EOB Frankie, stand and needs Ligia to use walker to chair. Pt O2 sats 96% but does have CAPUTO. Pain in back.   Barriers to return to prior living situation: Assist for ADLs and mobility, decreased endurance for activity  Recommendations for discharge: Home with assist of spouse for mobility and ADLs and homecare  Rationale for recommendations: Pt is not independent and has fluctuations in endurance and tolerance and will need assist for all mobility and ADLs but spouse has equipemnt and support to provide 24/7 for pt       Entered by: Ana Cristina Kinsey 09/25/2020 11:43 AM

## 2020-09-25 NOTE — PROGRESS NOTES
MD Notification    Notified Person: MD    Notified Person Name: Dr. Iglesias    Notification Date/Time: 09/25/20 @ 9439    Notification Interaction: Phone Call    Purpose of Notification: Pt and Wife requesting Albuterol neb treatment    Orders Received: Verbal Telephone Order: 2.5mg Albuterol neb treatment once and 20 mg IV lasix after blood transfusion

## 2020-09-25 NOTE — PROGRESS NOTES
[facility]  Infectious Disease Progress Note          Assessment and Plan:   IMPRESSION:   1.  A 70-year-old male with recent complicated history, mainly revolving around metastatic lung cancer, now admitted with G-tube dislodgement, probably not peritonitis, but some risk.  Relatively benign abdominal exam.   2.  Recent pneumonia, felt to be aspiration and indeed some ongoing aspiration occurring.  Chest x-ray quite impressive currently, mild hypoxia.  No major fever, mildly low white count.  X-rays suggest possible postobstructive pneumonia and evolving new infiltrates both aspiration and postobstructive pneumonia likely.   3.  Metastatic lung cancer, some progression on chest x-ray alone.   4.  Dysphagia and aspiration previously documented.   5.  Neutropenia, relatively mild, recent chemotherapy.   6.  Hyponatremia, SIADH, chronic ongoing problem.   7.  Prior laryngeal cancer.   8.  Mild cognitive dysfunction.      RECOMMENDATIONS:   1.  Continue Zosyn while here seems better, no fver or ongoing sepsis, on 4 L O2, note Dr Mullins input some of lung imaging likely radiation related   2.  Neg blood  Cultures,stable clinically.  Likely improved enough, probably enteral empiric tx OK if otherwise disposition ready, augmentin 875 bid 1 week         Interval History:   no new complaints no fver ; no cp, sob, n/v/d, or abd pain. T down on 4 L NC O2, bl cxs neg WBC to nl, O2 same Dr Mullins noted no+ micro              Medications:       donepezil  5 mg Oral At Bedtime     fentaNYL  12 mcg Transdermal Q72H     fentaNYL   Transdermal Q8H     fiber modular (NUTRISOURCE FIBER)  1 packet Per Feeding Tube TID     gabapentin  300 mg Oral or Feeding Tube TID     levothyroxine  125 mcg Oral Daily     multivitamins w/minerals  15 mL Per Feeding Tube Daily     piperacillin-tazobactam  3.375 g Intravenous Q6H     sertraline  50 mg Oral or Feeding Tube At Bedtime     sodium chloride (PF)  3 mL Intracatheter Q8H                   Physical Exam:   Blood pressure 124/73, pulse 86, temperature 96.7  F (35.9  C), temperature source Oral, resp. rate 15, weight 54.3 kg (119 lb 11.2 oz), SpO2 99 %.  Wt Readings from Last 2 Encounters:   09/25/20 54.3 kg (119 lb 11.2 oz)   09/16/20 58.2 kg (128 lb 3.2 oz)     Vital Signs with Ranges  Temp:  [96  F (35.6  C)-97.3  F (36.3  C)] 96.7  F (35.9  C)  Pulse:  [74-92] 86  Resp:  [15-16] 15  BP: ()/(52-74) 124/73  SpO2:  [94 %-100 %] 99 %    Constitutional: Awake, alert, cooperative, no apparent distress chronic ill appearance mild confused   Lungs: Congestion to auscultation bilaterally, no crackles or wheezing   Cardiovascular: Regular rate and rhythm, normal S1 and S2, and no murmur noted   Abdomen: Normal bowel sounds, soft, non-distended, non-tender   Skin: No rashes, no cyanosis, no edema   Other:           Data:   All microbiology laboratory data reviewed.  Recent Labs   Lab Test 09/25/20  0601 09/24/20  0716 09/23/20  0736   WBC 8.0 3.5* 1.9*   HGB 7.7* 6.1* 7.3*   HCT 24.4* 19.5* 23.0*   MCV 84 84 83   PLT 46* 39* 60*     Recent Labs   Lab Test 09/25/20  0601 09/24/20  0716 09/23/20  0736   CR 0.54* 0.54* 0.64*     No lab results found.  Recent Labs   Lab Test 09/22/20  1535 09/22/20  1455 09/02/20  1345 09/01/20  0419 09/01/20  0209 07/27/20  1400 05/01/20  1806 05/01/20  1803 05/01/20  1755   CULT No growth after 3 days No growth after 3 days Light growth  Normal pat    Moderate growth  Candida albicans / dubliniensis  Candida albicans and Candida dubliniensis are not routinely speciated  Susceptibility testing not routinely done  * No growth No growth No growth No growth No growth No growth

## 2020-09-25 NOTE — PLAN OF CARE
Discharge Planner OT   Patient plan for discharge: Home   Current status: Pt transferred to/from the toilet with CGA. Pt required minimum assist for balance during clothing management during toileting task. Pt also reporting fatigue after toileting tasks.   Barriers to return to prior living situation: Current level of A for I/ADls; decreased activity tolerance for I/ADLs  Recommendations for discharge: TCU  Rationale for recommendations: Skilled OT in TCU setting to address safety and independence in I/ADLs.   If pt returns home, then 24/7 A/supervision for all I/ADLs and home OT.        Entered by: Ana Gamino 09/25/2020 2:25 PM

## 2020-09-25 NOTE — DISCHARGE INSTRUCTIONS
Discharge to home with [facility] services RN, PT & HHA. The staff will call to schedule the first visit. Their phone number is [phone].    [facility] will be following you for home tube feedings.  Please call [phone].    G-tube: Bolus feedings  1/2 can Isosource 1.5 given 4 times per day (timing per pt discretion)  Water flush of 60 mL before and after each bolus  Additional water flushes of 150 mL 6 times per day  Nutrisource Fiber - 1 packet given 3 times daily (while having loose stools, or until reaches goal TF volume of 5 cans per day)  Once home, pt to continue being followed by [facility] dietitian and to continue on previously planned TF schedule

## 2020-09-25 NOTE — PLAN OF CARE
A/Ox4, forgetful. VSS on 4L via NC (baseline). C/o generalized  pain managed with PRN oxycodone x1 (gave 5 mg of oxycodone but forgot to file in the MAR), denies nausea. G tube clamped, q4h manual flushes and scheduled feeds during the day. NPO ex water and ice chips. PIV SL. T/R q2h. Mepilex on back and buttocks. Pulsate in place. Fent patch L deltoid. Discharge pending labs. Will need HC at discharge as wife and patient wanting to go home vs TCU.

## 2020-09-25 NOTE — PLAN OF CARE
A & O but forgetful. C/o pain, gave prn liquid oxycodone x 1 via g-tube and fentanyl patch on L shoulder. LS coarse crackles, productive congested cough with yellow sputum.  Upper back red and dry, mepilex on upper spine, CDI. Mepilex to coccyx, CDI.  PIV is SL with intermittent IV ABX. NPO w/ice chips and water only, getting schduled TF and free water flushes. Possible discharge tomorrow if able to switch to po ABX.      Yes - the patient is able to be screened

## 2020-09-25 NOTE — PLAN OF CARE
"Discharge Planner SLP   Patient plan for discharge: Home this PM with HH SLP  Current status: SLP: Reviewed further into hx and noncompliance. Per wife, home health SLP follow up discontinued with pt \"firing\" that SLP. Pt/wife would like to continue free water protocol for now, but are motivated to continue SLP services for exercises and consideration for additional PO intake pending pt's medical status. Handouts provided on exercises. Continue thin water and ice chips following the free water protocol.   Barriers to return to prior living situation: Dysphagia  Recommendations for discharge: Home with HH SLP  Rationale for recommendations: Continue ST for exercises and return to Po diet pending pt's wishes       Entered by: Britany Mejia 09/25/2020 2:33 PM       "

## 2020-09-25 NOTE — PLAN OF CARE
7a-3p shift  Magnesium level low - Replacement done.  Phosphorus level low - 4 hour replacement in process  Dr Iglesias did not want any post MG+ or Phos+ level checks done today prior to discharge.  Hgb at 7.7 -improved after receiving 1 unit pRBC yesterday for Hgb 6.2  Hospitalist aware but thought OK for discharge. However Oncologist PA felt pt needs 1 unit due to CAPUTO and weakness and wants it given today before discharge. Consent signed, Blood to start next shift after phos replacement done. Dr Iglesias did not want any post MG+ or Phos+ level checks done today.   Pt has general weakness, gets up/ambulates  in room and to bathroom with SBA/GB/walker.  Last BM was yesterday  A/Ox4, forgetful - situational. VSS on 4L via NC (baseline). Denies pain this shift PRN oxycodone x1, denies nausea. G tube clamped, q4h manual flushes and scheduled feeds during the day. NPO ex water and ice chips. PIV SL. T/R q2h offered bu refused it at times.  Was up in chair x2 this shift. Mepilex on back and buttocks. Pulsate in place. Fent patch L post upper shoulder.   Plan discontinue home (to wife who is his caretaker) later if tolerated blood transfusion. Wife did very well in doing a tube feed administration this afternoon and water flushing x2 this shift with some supervision.  SW/CC following.

## 2020-09-25 NOTE — DISCHARGE SUMMARY
[facility]  Hospitalist Discharge Summary      Date of Admission:  9/22/2020  Date of Discharge:  9/25/2020  Discharging Provider: Clare Iglesias MD ,FACP    Discharge Diagnoses   Gastrojejunostomy tube dislodgment, now replaced.  Malnutrition secondary to acute illness.  Dysphasia.  Multifocal pneumonia, most likely aspiration, unknown organism.  Chronic hypoxic respiratory failure.  Metastatic squamous cell carcinoma of the lung.  History of meningeal cancer.  Chemotherapy-induced pancytopenia, improving.  Chronic dementia.  Hypothyroidism.  SIADH with chronic hyponatremia.  CKD stage III.  Depression.    Follow-ups Needed After Discharge   Follow-up Appointments     Follow-up and recommended labs and tests      Follow up with primary care provider, Los Love, within 7 days to   evaluate medication change, for hospital follow- up and to follow up on   results.  The following labs/tests are recommended: CBC.  Follow up with oncology next week as recommended           Unresulted Labs Ordered in the Past 30 Days of this Admission     Date and Time Order Name Status Description    9/22/2020 1559 Blood culture Preliminary     9/22/2020 1559 Blood culture Preliminary       These results will be followed up by PCP    Discharge Disposition   Discharged to home  Condition at discharge: Stable    Hospital Course    Agapito Fairbanks is a 70 year old male with past medical history significant for metastatic lung cancer, chronic hypoxic respiratory failure with oxygen dependency, recent aspiration pneumonia with hospitalization from September 1-5, dysphagia, hyponatremia secondary to SIADH, mild cognitive impairment who was admitted from the emergency room after gastrojejunostomy tube dislodgment which was replaced on the day of admission and to have further evaluation and management of possible peritonitis and aspiration pneumonia.  Here are further details regarding his current  hospitalization    Gastrojejunostomy tube dislodgement, now repaired  Malnutrition, secondary to acute illness  Dysphagia  During his recent hospitalization patient was evaluated by speech therapy with limited video swallow and puréed diet was recommended.  Due to ongoing issues with malnutrition and poor oral intake along with increased risk of aspiration, patient underwent G-tube placement 9/21, was dislodged, now fixed.   He is tolerating tube feeds well, there is no evidence of peritonitis.  Underwent a successful tube feed on 9/23. No clear evidence of peritonitis.    -- Most medications are switched to liquid, per wife request.  -- Continue tube feeds, tolerating well  -- Up with assist  -- Oxycodone liquid PRN, prescription is given.  -- Patient was also evaluated by speech therapy, currently he is recommended to continue with tube feeding for nutrition and free water flushes.  For comfort and quality of life and practice swallowing patient can have as much thin water or ice chips as he wants throughout the day as long as he is upright and taking small sips.  There is no recommendation for holding water and ice chips if patient is coughing as he will continue to cough with and without water.  -- He is recommended to have all his medications to be administered through PEG tube.  -- will change Senna to PRN as patient was having loose bowel movement before admission.  -- Nutrition has also added fiber supplement 3 times a day which is ordered on discharge  -- Free water flushes to 150 ml every 4 hours      Multifocal pneumonia  Chronic hypoxic respiratory failure  Admission CXR with large left lung mass and new infiltrates bilaterally. Noted pancytopenia on admission. Per HemOnc, CXR changes likely reflect a combination of radiation pneumonitis, lung cancer, and infection. Procalcitonin was notably elevated to 16 suggestive for infection.  -- Appreciate ID consult  -- Patient has received 4 days of IV Zosyn,  patient is recommended to be discharged on oral Augmentin for 1 more week to complete antibiotic course, Augmentin solution has been ordered on discharge.     Metastatic squamous cell cancer of th elung  History of laryngeal cancer  Chemotherapy-induced pancytopenia  For his metastatic squamous cell carcinoma, patient has been following up with Dr. Mullins from [facility].  Patient was initially started on pembrolizumab in April.  Carboplatin and Taxol were added in July 29, 2020 because of progression.  At this time patient wishes for restorative therapy. Patient has undergone chemotherapy last Tuesday, he also have pancytopenia on presentation.  His WBC count was decreased to 1.1 as compared to 8.6,   He has received Neupogen and his WBC counts are increased to 8.0 this morning.    -- Appreciate heme oncology evaluation.  -- Patient has received 1 unit of packed red blood cell yesterday and is planned to get another unit of packed red blood cell this morning before discharge.  -- For pain management patient will continue on fentanyl patch.  -- As above, I have ordered acetaminophen, liquid oxycodone and patient will also be taking his Neurontin which is also changed to liquid.  -- Patient will be following up closely with oncology next week.  -- Continue incentive spirometry and aspiration precautions after the discharge.  -- Encouraged patient to increase his mobilization   -- Will discuss with oncology if patient should continue with his chest radiation.     Chronic dementia:   --Continue PTA Aricept 5 mg p.o. daily.     Hypothyroidism:   --Continue PTA Synthroid 125 mcg p.o. daily.     SIADH with chronic hyponatremia:   --Sodium improved during this admission.      CKD3: Creatinine stable here     Depression:   --Continue PTA Zoloft, changed to Liquid.     Patient was seen and examined on the day of discharge , he is feeling better,does have some cough which is expected, does not have any complaints , I  did review the discharge medications and instructions with the patient and plan for him to follow up with the oncology and PCP after the hospitalization .patient was in agreement , He is discharged in stable condition back to his home.Plan of care was soledad discussed with his wife over the phone , Home infusion and Home nursing is ordered on discharge     Consultations This Hospital Stay   SOCIAL WORK IP CONSULT  PHYSICAL THERAPY ADULT IP CONSULT  OCCUPATIONAL THERAPY ADULT IP CONSULT  HEMATOLOGY & ONCOLOGY IP CONSULT  NUTRITION SERVICES ADULT IP CONSULT  INFECTIOUS DISEASES IP CONSULT  PALLIATIVE CARE ADULT IP CONSULT  WOUND OSTOMY CONTINENCE NURSE  IP CONSULT  PHARMACY IP CONSULT  SPEECH LANGUAGE PATH ADULT IP CONSULT  CARE TRANSITION RN/SW IP CONSULT    Code Status   Full Code    Time Spent on this Encounter   I, Clare Iglesias MD, personally saw the patient today and spent greater than 30 minutes discharging this patient.     Clare Iglesias MD, FACP  [facility]  ______________________________________________________________________    Physical Exam   Vital Signs: Temp: 96.7  F (35.9  C) Temp src: Oral BP: 124/73 Pulse: 86   Resp: 15 SpO2: 99 % O2 Device: Nasal cannula Oxygen Delivery: 4 LPM  Weight: 119 lbs 11.2 oz    Physical Exam  Vitals signs and nursing note reviewed.   Constitutional:       General: He is not in acute distress.     Appearance: He is well-developed.      Comments: Chronically ill appearing but looks improved as compared to few days ago , pleasant gentleman   HENT:      Head: Normocephalic and atraumatic.   Eyes:      Pupils: Pupils are equal, round, and reactive to light.   Neck:      Musculoskeletal: Normal range of motion and neck supple.      Thyroid: No thyromegaly.   Cardiovascular:      Rate and Rhythm: Normal rate and regular rhythm.      Heart sounds: Normal heart sounds.   Pulmonary:      Effort: Pulmonary effort is normal.      Breath sounds: Rhonchi and rales  present.   Abdominal:      General: Bowel sounds are normal. There is no distension.      Palpations: Abdomen is soft.      Comments: PEG tube present   Musculoskeletal: Normal range of motion.         General: No tenderness.   Skin:     General: Skin is warm and dry.   Neurological:      General: No focal deficit present.      Mental Status: He is alert and oriented to person, place, and time.   Psychiatric:         Behavior: Behavior normal.          Primary Care Physician   Los Love    Discharge Orders      Home care nursing referral      Home Care PT Referral for Hospital Discharge      Home infusion referral      Discharge Instructions    If questions or problems arise regarding tube function (e.g. leaking, dislodges, etc.) Contact Interventional Radiology department 24 hours a day.      For procedures that were done at [facility]:  8 AM - 4 PM Monday through Friday Contact   [phone]  For afterhours and weekends: [phone]   Ask for the Interventional Radiologist on call.     If DIRECTED by the RADIOLOGIST, related to specific problems with the tube functioning,  go to the Emergency Department.     Reason for your hospital stay    You were admitted to the hospital secondary to aspiration pneumonia and Gastric tube dislodgement.     Follow-up and recommended labs and tests    Follow up with primary care provider, Los Love, within 7 days to evaluate medication change, for hospital follow- up and to follow up on results.  The following labs/tests are recommended: CBC.  Follow up with oncology next week as recommended     Activity    Your activity upon discharge: activity as tolerated and no driving     Discharge Instructions    You will be finishing the course of antibiotics after the discharge , I have changed most of your medicines to liquid which could have been changed, please don't take stool softeners if having loose bowel movements hopefully bowel movements will improve with the  addition of fiber which is ordered .     Tubes and drains    You are going home with the following tubes or drains: G-Tube.  Tube cares per hospital or home care instructions     Full Code     Diet    Follow this diet upon discharge: Orders Placed This Encounter      Adult Formula Bolus Feeding: QID Isosource 1.5; Route: Gastrostomy; 0.5; Can(s); Medication - Feeding Tube Flush Frequency: At least 15-30 mL water before and after medication administration and with tube clogging; Amount to Send (Nutrition use):Clear Liquid Diet Thin Liquids (water, ice chips, juice, milk, gelatin, ice cream, etc)       Significant Results and Procedures   Results for orders placed or performed during the hospital encounter of 09/22/20   Chest XR,  PA & LAT    Narrative    CHEST TWO VIEWS  9/22/2020 2:57 PM     HISTORY: Possible aspiration. Evaluate for pneumonia.    COMPARISON: 9/4/2020.      Impression    IMPRESSION: Large masslike opacity in the left hemithorax projected  over the left hilar region anteriorly appears to have increased in  prominence since the previous exam, with decreased aeration of the  left lung noted. A superimposed left lower lung pneumonia is possible.  Masslike thickening at the left lung apex is not significantly  changed. Elevation of the left hemidiaphragm is not significantly  changed. Hazy increased density in the right midlung could also be  infectious in etiology. The right lung is otherwise clear. No  pneumothorax.    LAUREN VAUGHAN MD   IR Gastrostomy Tube Percutaneous Plcmnt    Narrative    G-TUBE PLACEMENT 9/22/2020 3:47 PM     HISTORY: Requires nutritional support. Unable to take adequate oral  intake. A stent had a G-tube placed yesterday. This tube has fallen  out.    DESCRIPTION OF PROCEDURE: After obtaining informed consent, the  patient was placed in a supine position on the fluoroscopy table.  Attempts to regain access into the stomach were unsuccessful using a  blunt Farmer needle.  Therefore, a nasogastric tube was placed into  the stomach. 1 mg glucagon was given intravenously. The stomach was  inflated with air.     Two T-TAC suture anchors were used to enter the stomach. A small skin  incision was made in between the T-TAC entry sites. An 18-gauge needle  was used to enter the stomach through the incision. A wire was passed  and curled in the stomach. A tract for the G-tube was dilated. An 18  French peel-away sheath was placed. Through the peel-away sheath, a 14  French G-tube was placed. The balloon was inflated with a mixture of 1  mL contrast and 9 mL saline. Balloon was pulled back so that it was  against the anterior stomach wall. Contrast was injected to document  adequate positioning. A fluoroscopic image was saved to document tube  position.     The patient tolerated the procedure well. There were no immediate  postprocedure complications. The patient tolerated the procedure well.  There were no immediate postprocedure complications. The patient's  vital signs were monitored by radiology nursing staff under my  supervision and remained stable throughout the study. Radiation dose  for this scan was reduced using automated exposure control, adjustment  of the mA and/or kV according to patient size, or iterative  reconstruction technique.    MEDICATIONS: 1 mg glucagon, 100 mg fentanyl    SEDATION TIME: None    FLUOROSCOPY TIME: 9.8 minutes    RADIATION DOSE: 192.01 mGy Air Kerma    NUMBER OF FLUOROSCOPIC IMAGES: 3      Impression    IMPRESSION: New G-tube placed into the stomach as above.    MELVA ROBISON MD       Discharge Medications   Current Discharge Medication List      START taking these medications    Details   acetaminophen (TYLENOL) 32 mg/mL liquid Take 20.3 mLs (650 mg) by mouth every 4 hours as needed for mild pain or fever  Qty: 473 mL, Refills: 0    Comments: Future refills by PCP Dr. Los Love with phone number [phone].  Associated Diagnoses: Aspiration  pneumonia of right lung, unspecified aspiration pneumonia type, unspecified part of lung (H); Primary malignant neoplasm of lung metastatic to other site, unspecified laterality (H)      amoxicillin-clavulanate (AUGMENTIN) 400-57 MG/5ML suspension Take 10.9 mLs (875 mg) by mouth 2 times daily for 7 days  Qty: 152.6 mL, Refills: 0    Associated Diagnoses: Aspiration pneumonia of right lung, unspecified aspiration pneumonia type, unspecified part of lung (H); Primary malignant neoplasm of lung metastatic to other site, unspecified laterality (H)      cetirizine (ZYRTEC) 5 MG/5ML solution Take 10 mLs (10 mg) by mouth daily  Qty: 473 mL, Refills: 0    Comments: Future refills by PCP Dr. Los Love with phone number [phone].  Associated Diagnoses: Aspiration pneumonia of right lung, unspecified aspiration pneumonia type, unspecified part of lung (H); Primary malignant neoplasm of lung metastatic to other site, unspecified laterality (H)      donepezil (ARICEPT) 5 MG ODT Take 1 tablet (5 mg) by mouth daily  Qty: 30 tablet, Refills: 0    Comments: Future refills by PCP Dr. Los Love with phone number [phone].  Associated Diagnoses: Alzheimer's dementia without behavioral disturbance, unspecified timing of dementia onset (H); Acute encephalopathy      gabapentin (NEURONTIN) 300 MG/6ML oral solution Take 6 mLs (300 mg) by mouth 3 times daily  Qty: 540 mL, Refills: 0    Comments: Future refills by PCP Dr. Los Love with phone number [phone].  Associated Diagnoses: Aspiration pneumonia of right lung, unspecified aspiration pneumonia type, unspecified part of lung (H); Primary malignant neoplasm of lung metastatic to other site, unspecified laterality (H)      Guar Gum (FIBER MODULAR, NUTRISOURCE FIBER,) packet 1 packet by Per Feeding Tube route 3 times daily  Qty: 90 packet, Refills: 0    Comments: Future refills by PCP Dr. Los Love with phone number [phone].  Associated Diagnoses: Aspiration  pneumonia of right lung, unspecified aspiration pneumonia type, unspecified part of lung (H); Primary malignant neoplasm of lung metastatic to other site, unspecified laterality (H)      LORazepam (ATIVAN) 2 MG/ML (HIGH CONC) solution Take 0.25 mLs (0.5 mg) by mouth every 4 hours as needed for anxiety  Qty: 30 mL, Refills: 0    Comments: Future refills by PCP Dr. Los Love with phone number [phone].  Associated Diagnoses: Aspiration pneumonia of right lung, unspecified aspiration pneumonia type, unspecified part of lung (H); Primary malignant neoplasm of lung metastatic to other site, unspecified laterality (H)      multivitamins w/minerals (CERTAVITE) liquid 15 mLs by Per Feeding Tube route daily  Qty: 450 mL, Refills: 0    Comments: Future refills by PCP Dr. Los Love with phone number [phone].  Associated Diagnoses: Aspiration pneumonia of right lung, unspecified aspiration pneumonia type, unspecified part of lung (H); Primary malignant neoplasm of lung metastatic to other site, unspecified laterality (H)      ondansetron (ZOFRAN) 4 MG/5ML solution Take 10 mLs (8 mg) by mouth 2 times daily as needed for nausea or vomiting  Qty: 100 mL, Refills: 0    Comments: Future refills by PCP Dr. Los Love with phone number [phone].  Associated Diagnoses: Aspiration pneumonia of right lung, unspecified aspiration pneumonia type, unspecified part of lung (H); Primary malignant neoplasm of lung metastatic to other site, unspecified laterality (H)      oxyCODONE (ROXICODONE) 5 MG/5ML solution Take 5 mLs (5 mg) by mouth every 6 hours as needed for severe pain  Qty: 200 mL, Refills: 0    Comments: Future refills by PCP Dr. Los Love with phone number [phone].  Associated Diagnoses: Aspiration pneumonia of right lung, unspecified aspiration pneumonia type, unspecified part of lung (H); Primary malignant neoplasm of lung metastatic to other site, unspecified laterality (H)      sertraline (ZOLOFT) 20  MG/ML (HIGH CONC) solution 2.5 mLs (50 mg) by Oral or Feeding Tube route At Bedtime  Qty: 75 mL, Refills: 0    Comments: Future refills by PCP Dr. Los Love with phone number [phone].  Associated Diagnoses: Aspiration pneumonia of right lung, unspecified aspiration pneumonia type, unspecified part of lung (H); Primary malignant neoplasm of lung metastatic to other site, unspecified laterality (H)         CONTINUE these medications which have NOT CHANGED    Details   albuterol (PROAIR HFA/PROVENTIL HFA/VENTOLIN HFA) 108 (90 Base) MCG/ACT inhaler Inhale 2 puffs into the lungs every 6 hours as needed for shortness of breath / dyspnea or wheezing  Qty: 6.7 g, Refills: 3    Comments: Pharmacy may dispense brand covered by insurance (Proair, or proventil or ventolin or generic albuterol inhaler)  Associated Diagnoses: 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)      albuterol (PROVENTIL) (2.5 MG/3ML) 0.083% neb solution Take 1 vial (2.5 mg) by nebulization every 6 hours as needed for shortness of breath / dyspnea or wheezing  Qty: 120 vial, Refills: 3    Associated Diagnoses: Other emphysema (H)      benzonatate (TESSALON) 100 MG capsule Take 1 capsule (100 mg) by mouth 3 times daily as needed for cough  Qty: 30 capsule, Refills: 1    Associated Diagnoses: Malignant neoplasm of upper lobe of left lung (H)      bisacodyl (DULCOLAX) 5 MG EC tablet Take 5 mg by mouth daily as needed for constipation      diclofenac (VOLTAREN) 1 % topical gel Place onto the skin daily as needed for moderate pain      fluticasone (FLONASE) 50 MCG/ACT nasal spray Spray 1 spray into both nostrils daily as needed for rhinitis or allergies      levothyroxine (SYNTHROID/LEVOTHROID) 125 MCG tablet Take 1 tablet (125 mcg) by mouth daily  Qty: 90 tablet, Refills: 3    Associated Diagnoses: Hypothyroidism, unspecified type      prochlorperazine (COMPAZINE) 10 MG tablet Take 1 tablet (10 mg) by mouth every 6 hours as needed  (Nausea/Vomiting)  Qty: 30 tablet, Refills: 3    Associated Diagnoses: 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)      fentaNYL (DURAGESIC) 12 mcg/hr 72 hr patch Place 1 patch onto the skin every 72 hours  Qty: 10 patch, Refills: 0    Associated Diagnoses: Cancer associated pain         STOP taking these medications       acetaminophen (TYLENOL) 325 MG tablet Comments:   Reason for Stopping:         cetirizine (ZYRTEC ALLERGY) 10 MG tablet Comments:   Reason for Stopping:         donepezil (ARICEPT) 5 MG tablet Comments:   Reason for Stopping:         gabapentin (NEURONTIN) 300 MG capsule Comments:   Reason for Stopping:         LORazepam (ATIVAN) 0.5 MG tablet Comments:   Reason for Stopping:         ondansetron (ZOFRAN) 8 MG tablet Comments:   Reason for Stopping:         oxyCODONE (ROXICODONE) 5 MG tablet Comments:   Reason for Stopping:         sertraline (ZOLOFT) 50 MG tablet Comments:   Reason for Stopping:             Allergies   Allergies   Allergen Reactions     Simvastatin      myalgias     Lisinopril Rash     rash

## 2020-09-25 NOTE — PROGRESS NOTES
CLINICAL NUTRITION SERVICES - REASSESSMENT NOTE      Future/Additional Recommendations:     Plan to continue current TF regimen as noted above - hold on increasing volume    Replacement of K, Mg, Phos as needed (NOTE PHOS REPLACEMENT PROTOCOL NOT ORDERED)    Will add Nutrisource Fiber TID - 45 cals, 9 gm fiber  TOTAL (TF + fiber) = 795 cals, 17 gm fiber     Malnutrition: (9/23)  % Weight Loss:  > 7.5% in 3 months (severe malnutrition)  % Intake:  </= 50% for >/= 5 days (severe malnutrition)  Subcutaneous Fat Loss:  None observed  Muscle Loss:  Temporal region - mild depletion and Clavicle bone region - moderate depletion  Fluid Retention:  None noted     Malnutrition Diagnosis: Severe malnutrition  In Context of:  Chronic illness or disease        EVALUATION OF PROGRESS TOWARD GOALS   Diet:    9/24: SLP - Continue tube feeding for nutrition and free water flushes. For comfort/quality of life and practice swallowing, pt can have as much THIN water or ice chips as he wants throughout the day as long as he is upright and taking small sips     Nutrition Support:    Type of Feeding Tube: (9/22) 14 Fr G-tube  Enteral Frequency:  Bolus  Enteral Regimen: Isosource 1.5 - 1/2 can given 4 times per day ([id number]-8pm)  Total Enteral Provisions: 750 cals, 34 gm pro, 88 gm CHO, 8 gm fiber, 380 mL free water  Free Water Flush: (9/24 - MD) 150 mL every 4 hrs = 900 mL                                 60 mL before and after each bolus = 480 mL    TOTAL free water (TF + flushes) = 1760 mL    Intake/Tolerance:    Chart reviewed  Note pt with 2 loose BM yesterday  Susana TF without abd pain    9/25: K 3.6           Mg 1.4 (L)           Phos 1.8 (L)    Daily multivitamin (Certavite)    Pt/wife working with [facility] on home TF regimen/advancement schedule.  Goal is slow advancement and work toward 5 cans/day over the next month.      ASSESSED NUTRITION NEEDS:  Dosing Weight (9/16) 58.2 kg  Estimated Energy Needs: 0489-0180 kcals (35-40  Kcal/Kg)  Justification: underweight  Estimated Protein Needs:  grams protein (1.5-1.8 g pro/Kg)  Justification: Repletion  Estimated Fluid Needs: 3661-0932  mL (25-30 mL/kg)  Justification: maintenance      NEW FINDINGS:   Vitals:    09/24/20 0540 09/24/20 0627 09/25/20 0527   Weight: 56.7 kg (125 lb 1.6 oz) 54.1 kg (119 lb 3.2 oz) 54.3 kg (119 lb 11.2 oz)         Previous Goals (9/23):   Pt to tolerate TF administration without abd pain  Evaluation: Met    Previous Nutrition Diagnosis (9/23):   Inadequate protein-energy intake related to malfunctioning G-tube as evidenced by pt not receiving any nutrition at this time  Evaluation: No change        CURRENT NUTRITION DIAGNOSIS  Inadequate enteral nutrition infusion related to refeeding syndrome risk as evidenced by pt with very slow TF advancement    INTERVENTIONS  Recommendations / Nutrition Prescription  Plan to continue current TF regimen as noted above - hold on increasing volume    Replacement of K, Mg, Phos as needed    Will add Nutrisource Fiber TID - 45 cals, 9 gm fiber  TOTAL (TF + fiber) = 795 cals, 17 gm fiber    Implementation  Added fiber modular in EPIC    Goals  Pt to tolerate TF with limited loose stools and abd pain      MONITORING AND EVALUATION:  Progress towards goals will be monitored and evaluated per protocol and Practice Guidelines

## 2020-09-25 NOTE — PROGRESS NOTES
"Hematology-Oncology Follow-up Note  [facility]     Today's Date: 09/25/20  Date of Admission:  9/22/2020  Reason for Consult: Metastatic squamous cell carcinoma of the lung      ASSESSMENT/ PLAN :  Agapito Fairbanks is a 70 year old male          Metastatic squamous cell carcinoma of the lung  Patient on treatment with carboplatin Taxol and Keytruda  We will continue with the treatment in outpatient setting  10/07 patient is schedule with Dr. Mullins prior next cycle of treatment  -I will contact our schedulers to schedule appointment with me next week for follow-up    Cytopenia from chemotherapy  -ANC is normal  -Platelets are recovering. denies bleeding transfuse for platelets 10 or below or bleeding  Hemoglobin is 7.7-per Dr. Mullins's request because of weakness and shortness of breath we will transfuse him with 1 unit of PRBC today  1 unit PRBC today  Continue with checking CBC daily    Nutrition  PEG tube feeding started    Aspiration pneumonia  Treatment per inpatient team patient is also seeing ID  Patient is being transitioned to Augmentin p.o. twice daily x1 week per  ID recommendations    INTERIM HISTORY:       MEDICATIONS:  Reviewed         PHYSICAL EXAM:  Vital signs:  Temp: 96.7  F (35.9  C) Temp src: Oral BP: 124/73 Pulse: 86   Resp: 15 SpO2: 99 % O2 Device: Nasal cannula Oxygen Delivery: 4 LPM   Weight: 54.3 kg (119 lb 11.2 oz)  Estimated body mass index is 16.23 kg/m  as calculated from the following:    Height as of 9/16/20: 1.829 m (6' 0.01\").    Weight as of this encounter: 54.3 kg (119 lb 11.2 oz).              Brett Manrique, Nurse Practitioner   [facility] [place]     Chart documentation with Dragon Voice recognition Software. Although reviewed after completion, some words and grammatical errors may remain.       "

## 2020-09-26 PROBLEM — J18.9 PNEUMONIA DUE TO INFECTIOUS ORGANISM: Status: RESOLVED | Noted: 2020-01-01 | Resolved: 2020-01-01

## 2020-09-26 PROBLEM — J18.9 COMMUNITY ACQUIRED PNEUMONIA: Status: RESOLVED | Noted: 2020-01-01 | Resolved: 2020-01-01

## 2020-09-26 PROBLEM — C34.90 METASTATIC LUNG CANCER (METASTASIS FROM LUNG TO OTHER SITE) (H): Status: RESOLVED | Noted: 2020-01-01 | Resolved: 2020-01-01

## 2020-09-26 NOTE — PLAN OF CARE
Physical Therapy Discharge Summary    Reason for therapy discharge:    Discharged to home with home therapy.    Progress towards therapy goal(s). See goals on Care Plan in [facility] electronic health record for goal details.  Goals not met.  Barriers to achieving goals:   discharge from facility.    Therapy recommendation(s):    Continued therapy is recommended.  Rationale/Recommendations:  continued mobility below baseline with decreased endurance/tolerance to activity.

## 2020-09-26 NOTE — PROVIDER NOTIFICATION
MD Notification    Notified Person: MD    Notified Person Name: Dr. Ghotra    Notification Date/Time: 09/25/20 @ 2000    Notification Interaction: Phone Call    Purpose of Notification: Pt wanting to stay overnight to finish blood    Orders Received: ok not to discharge, will have rounding MD re-evaluate in AM

## 2020-09-26 NOTE — PLAN OF CARE
Occupational Therapy Discharge Summary    Reason for therapy discharge:    Discharged to home.    Progress towards therapy goal(s). See goals on Care Plan in [facility] electronic health record for goal details.  Goals not met.  Barriers to achieving goals:   discharge from facility.    Therapy recommendation(s):    TCU was recommended to address safety and independence in I/ADLs.

## 2020-09-26 NOTE — PLAN OF CARE
Patient is disoriented to place and situation. VSS. Gave ativan for sleep. On 4L oxygen. CAPUTO, no more c/o SOB. Denies pain overnight. Lung sounds coarse on the left side and coarse crackles on right side. G-tube clamped with q4 hour flushes. Refusing repositioning. Clear liquid diet with bolus tube feeds. Fentanyl patch on right shoulder. Blanchable redness on lower spine and coccyx, open to air. Impulsive.

## 2020-09-26 NOTE — PLAN OF CARE
"Speech Language Therapy Discharge Summary    Reason for therapy discharge:    Discharged to home with home therapy.    Progress towards therapy goal(s). See goals on Care Plan in [facility] electronic health record for goal details.  Goals not met.  Barriers to achieving goals:   discharge from facility.    Therapy recommendation(s):    Continued therapy is recommended.  Rationale/Recommendations:      Per prior SLP notes:    Pt well known to this SLP with Video Swallow Study on 9/4/20 finding mild to moderate dysphagia with coughing throughout the evaluation that was NOT related to aspiration during the swallow. DD1/thin liquids recommended at that time, however, pt wanted to continue a regular diet to avoid getting a PEG tube. Since that time, pt was eating small amounts of pureed foods and thickened liquids (not recommended by SLP as thicker consistencies increased pharyngeal residue) and pt chose to pursue a PEG tube.     Per wife, home health SLP follow up discontinued with pt \"firing\" that SLP. Pt/wife would like to continue free water protocol for now, but are motivated to continue SLP services for exercises and consideration for additional PO intake pending pt's medical status. Handouts provided on exercises..    Continue tube feeding for nutrition and free water flushes. For comfort/quality of life and practice swallowing, pt can have as much THIN water or ice chips as he wants throughout the day as long as he is upright and taking small sips. Do not hold water/ice chips if pt is coughing - he will continue to cough with and without water. Continue oral cares with toothbrushing 3-5x per day. Continue all meds via PEG tube.   "

## 2020-09-26 NOTE — DISCHARGE SUMMARY
[facility]  Hospitalist Discharge Summary      Date of Admission:  9/22/2020  Date of Discharge:  9/26/2020   Discharging Provider: Rogerio Meraz MD ,FACP    Discharge Diagnoses   Gastrojejunostomy tube dislodgment, now replaced.  Malnutrition secondary to acute illness.  Dysphasia.  Multifocal pneumonia, most likely aspiration, unknown organism.  Chronic hypoxic respiratory failure.  Metastatic squamous cell carcinoma of the lung.  History of meningeal cancer.  Chemotherapy-induced pancytopenia, improving.  Chronic dementia.  Hypothyroidism.  SIADH with chronic hyponatremia.  CKD stage III.  Depression.    Follow-ups Needed After Discharge   Follow-up Appointments     Follow-up and recommended labs and tests      Follow up with primary care provider, Los Love, within 7 days to   evaluate medication change, for hospital follow- up and to follow up on   results.  The following labs/tests are recommended: CBC.  Follow up with oncology next week as recommended           Unresulted Labs Ordered in the Past 30 Days of this Admission     Date and Time Order Name Status Description    9/22/2020 1559 Blood culture Preliminary     9/22/2020 1559 Blood culture Preliminary       These results will be followed up by PCP    Discharge Disposition   Discharged to home  Condition at discharge: Stable    Hospital Course    Agapito Fairbanks is a 70 year old male with past medical history significant for metastatic lung cancer, chronic hypoxic respiratory failure with oxygen dependency, recent aspiration pneumonia with hospitalization from September 1-5, dysphagia, hyponatremia secondary to SIADH, mild cognitive impairment who was admitted from the emergency room after gastrojejunostomy tube dislodgment which was replaced on the day of admission and to have further evaluation and management of possible peritonitis and aspiration pneumonia.    Gastrojejunostomy tube dislodgement, now  repaired  Malnutrition, secondary to acute illness  Dysphagia  During his recent hospitalization patient was evaluated by speech therapy with limited video swallow and puréed diet was recommended.  Due to ongoing issues with malnutrition and poor oral intake along with increased risk of aspiration, patient underwent G-tube placement 9/21, was dislodged, now fixed.   He is tolerating tube feeds well, there is no evidence of peritonitis.  Underwent a successful tube feed on 9/23. No clear evidence of peritonitis.    -- Most medications are switched to liquid, per wife request.  -- Continue tube feeds, tolerating well  -- Up with assist  -- Oxycodone liquid PRN, prescription is given.  -- Patient was also evaluated by speech therapy, currently he is recommended to continue with tube feeding for nutrition and free water flushes.  For comfort and quality of life and practice swallowing patient can have as much thin water or ice chips as he wants throughout the day as long as he is upright and taking small sips.  There is no recommendation for holding water and ice chips if patient is coughing as he will continue to cough with and without water.  -- He is recommended to have all his medications to be administered through PEG tube.  -- will change Senna to PRN as patient was having loose bowel movement before admission.  -- Nutrition has also added fiber supplement 3 times a day which is ordered on discharge  -- Free water flushes to 150 ml every 4 hours      Multifocal pneumonia  Chronic hypoxic respiratory failure  Admission CXR with large left lung mass and new infiltrates bilaterally. Noted pancytopenia on admission. Per HemOnc, CXR changes likely reflect a combination of radiation pneumonitis, lung cancer, and infection. Procalcitonin was notably elevated to 16 suggestive for infection.  -- Appreciate ID consult  -- Patient has received 4 days of IV Zosyn, patient is recommended to be discharged on oral Augmentin for  1 more week to complete antibiotic course, Augmentin solution has been ordered on discharge.     Metastatic squamous cell CA of lung  History of laryngeal cancer  Chemotherapy-induced pancytopenia  For his metastatic squamous cell carcinoma, patient has been following up with Dr. Mullins from [facility].  Patient was initially started on pembrolizumab in April.  Carboplatin and Taxol were added in July 29, 2020 because of progression.  At this time patient wishes for restorative therapy. Patient has undergone chemotherapy last Tuesday, he also have pancytopenia on presentation.  His WBC count was decreased to 1.1 as compared to 8.6,   He has received Neupogen and his WBC counts are increased to 8.0 this morning.    -- Appreciate heme oncology evaluation.  -- Patient has received 1 unit of packed red blood cell yesterday and is planned to get another unit of packed red blood cell this morning before discharge.  -- For pain management patient will continue on fentanyl patch.  -- As above, I have ordered acetaminophen, liquid oxycodone and patient will also be taking his Neurontin which is also changed to liquid.  -- Patient will be following up closely with oncology next week.  -- Continue incentive spirometry and aspiration precautions after the discharge.  -- Encouraged patient to increase his mobilization   -- Will discuss with oncology if patient should continue with his chest radiation.     Chronic dementia:   --Continue PTA Aricept 5 mg p.o. daily.     Hypothyroidism:   --Continue PTA Synthroid 125 mcg p.o. daily.     SIADH with chronic hyponatremia:   --Sodium improved during this admission.      CKD3: Creatinine stable here     Depression:   --Continue PTA Zoloft, changed to Liquid.     Patient was seen and examined on the day of discharge , he is feeling better,does have some cough which is expected, does not have any complaints , I did review the discharge medications and instructions with the patient  and plan for him to follow up with the oncology and PCP after the hospitalization .patient was in agreement , He is discharged in stable condition back to his home.Plan of care was discussed with his wife over the phone , Home infusion and Home nursing is ordered on discharge     Consultations This Hospital Stay   SOCIAL WORK IP CONSULT  PHYSICAL THERAPY ADULT IP CONSULT  OCCUPATIONAL THERAPY ADULT IP CONSULT  HEMATOLOGY & ONCOLOGY IP CONSULT  NUTRITION SERVICES ADULT IP CONSULT  INFECTIOUS DISEASES IP CONSULT  PALLIATIVE CARE ADULT IP CONSULT  WOUND OSTOMY CONTINENCE NURSE  IP CONSULT  PHARMACY IP CONSULT  SPEECH LANGUAGE PATH ADULT IP CONSULT  CARE TRANSITION RN/SW IP CONSULT    Code Status   Full Code    Time Spent on this Encounter    greater than 30 minutes discharging this patient.     Rogerio Meraz MD, FACP  [facility]  ______________________________________________________________________    Physical Exam   Vital Signs: Temp: 96.2  F (35.7  C) Temp src: Oral BP: 135/84 Pulse: 73   Resp: 16 SpO2: 100 % O2 Device: Nasal cannula Oxygen Delivery: 4 LPM  Weight: 131 lbs 8 oz    Physical Exam  Vitals signs and nursing note reviewed.   Constitutional:       General: He is not in acute distress.     Appearance: He is well-developed.      Comments: Chronically ill appearing but looks improved as compared to few days ago , pleasant gentleman   HENT:      Head: Normocephalic and atraumatic.   Eyes:      Pupils: Pupils are equal, round, and reactive to light.   Neck:      Musculoskeletal: Normal range of motion and neck supple.      Thyroid: No thyromegaly.   Cardiovascular:      Rate and Rhythm: Normal rate and regular rhythm.      Heart sounds: Normal heart sounds.   Pulmonary:      Effort: Pulmonary effort is normal.      Breath sounds: Rhonchi and rales present.   Abdominal:      General: Bowel sounds are normal. There is no distension.      Palpations: Abdomen is soft.      Comments: PEG  tube present   Musculoskeletal: Normal range of motion.         General: No tenderness.   Skin:     General: Skin is warm and dry.   Neurological:      General: No focal deficit present.      Mental Status: He is alert and oriented to person, place, and time.   Psychiatric:         Behavior: Behavior normal.          Primary Care Physician   Los Love    Discharge Orders      Home care nursing referral      Home Care PT Referral for Hospital Discharge      Home infusion referral      Reason for your hospital stay    You were admitted to the hospital secondary to aspiration pneumonia and Gastric tube dislodgement.     Activity    Your activity upon discharge: activity as tolerated and no driving     Discharge Instructions    You will be finishing the course of antibiotics after the discharge , I have changed most of your medicines to liquid which could have been changed, please don't take stool softeners if having loose bowel movements hopefully bowel movements will improve with the addition of fiber which is ordered .     Tubes and drains    You are going home with the following tubes or drains: G-Tube.  Tube cares per hospital or home care instructions     Follow-up and recommended labs and tests    Follow up with primary care provider, Los Love, within 7 days to evaluate medication change, for hospital follow- up and to follow up on results.  The following labs/tests are recommended: CBC.  Follow up with oncology next week as recommended     Discharge Instructions    If questions or problems arise regarding tube function (e.g. leaking, dislodges, etc.) Contact Interventional Radiology department 24 hours a day.      For procedures that were done at [facility]:  8 AM - 4 PM Monday through Friday Contact   [phone]    Call Dr. Garcia if any medical questions at Cell Phone [phone].        For afterhours and weekends: [phone]   Ask for the Interventional Radiologist on call.     If  DIRECTED by the RADIOLOGIST, related to specific problems with the tube functioning,  go to the Emergency Department.     Full Code     Diet    Follow this diet upon discharge: Orders Placed This Encounter      Adult Formula Bolus Feeding: QID Isosource 1.5; Route: Gastrostomy; 0.5; Can(s); Medication - Feeding Tube Flush Frequency: At least 15-30 mL water before and after medication administration and with tube clogging; and Water 200 ml qid.  (Nutrition use):Clear Liquid Diet Thin Liquids (water, ice chips, juice, milk, gelatin, ice cream, etc)       Significant Results and Procedures   Results for orders placed or performed during the hospital encounter of 09/22/20   Chest XR,  PA & LAT    Narrative    CHEST TWO VIEWS  9/22/2020 2:57 PM     HISTORY: Possible aspiration. Evaluate for pneumonia.    COMPARISON: 9/4/2020.      Impression    IMPRESSION: Large masslike opacity in the left hemithorax projected  over the left hilar region anteriorly appears to have increased in  prominence since the previous exam, with decreased aeration of the  left lung noted. A superimposed left lower lung pneumonia is possible.  Masslike thickening at the left lung apex is not significantly  changed. Elevation of the left hemidiaphragm is not significantly  changed. Hazy increased density in the right midlung could also be  infectious in etiology. The right lung is otherwise clear. No  pneumothorax.    LAUREN VAUGHAN MD   IR Gastrostomy Tube Percutaneous Plcmnt    Narrative    G-TUBE PLACEMENT 9/22/2020 3:47 PM     HISTORY: Requires nutritional support. Unable to take adequate oral  intake. A stent had a G-tube placed yesterday. This tube has fallen  out.    DESCRIPTION OF PROCEDURE: After obtaining informed consent, the  patient was placed in a supine position on the fluoroscopy table.  Attempts to regain access into the stomach were unsuccessful using a  blunt Farmer needle. Therefore, a nasogastric tube was placed into  the  stomach. 1 mg glucagon was given intravenously. The stomach was  inflated with air.     Two T-TAC suture anchors were used to enter the stomach. A small skin  incision was made in between the T-TAC entry sites. An 18-gauge needle  was used to enter the stomach through the incision. A wire was passed  and curled in the stomach. A tract for the G-tube was dilated. An 18  French peel-away sheath was placed. Through the peel-away sheath, a 14  French G-tube was placed. The balloon was inflated with a mixture of 1  mL contrast and 9 mL saline. Balloon was pulled back so that it was  against the anterior stomach wall. Contrast was injected to document  adequate positioning. A fluoroscopic image was saved to document tube  position.     The patient tolerated the procedure well. There were no immediate  postprocedure complications. The patient tolerated the procedure well.  There were no immediate postprocedure complications. The patient's  vital signs were monitored by radiology nursing staff under my  supervision and remained stable throughout the study. Radiation dose  for this scan was reduced using automated exposure control, adjustment  of the mA and/or kV according to patient size, or iterative  reconstruction technique.    MEDICATIONS: 1 mg glucagon, 100 mg fentanyl    SEDATION TIME: None    FLUOROSCOPY TIME: 9.8 minutes    RADIATION DOSE: 192.01 mGy Air Kerma    NUMBER OF FLUOROSCOPIC IMAGES: 3      Impression    IMPRESSION: New G-tube placed into the stomach as above.    MELVA ROBISON MD       Discharge Medications   Current Discharge Medication List      START taking these medications    Details   acetaminophen (TYLENOL) 32 mg/mL liquid Take 20.3 mLs (650 mg) by mouth every 4 hours as needed for mild pain or fever  Qty: 473 mL, Refills: 0    Comments: Future refills by PCP Dr. Los Love with phone number [phone].  Associated Diagnoses: Aspiration pneumonia of right lung, unspecified aspiration  pneumonia type, unspecified part of lung (H); Primary malignant neoplasm of lung metastatic to other site, unspecified laterality (H)      amoxicillin-clavulanate (AUGMENTIN) 400-57 MG/5ML suspension Take 10.9 mLs (875 mg) by mouth 2 times daily for 7 days  Qty: 152.6 mL, Refills: 0    Associated Diagnoses: Aspiration pneumonia of right lung, unspecified aspiration pneumonia type, unspecified part of lung (H); Primary malignant neoplasm of lung metastatic to other site, unspecified laterality (H)      cetirizine (ZYRTEC) 5 MG/5ML solution Take 10 mLs (10 mg) by mouth daily  Qty: 473 mL, Refills: 0    Comments: Future refills by PCP Dr. Los Love with phone number [phone].  Associated Diagnoses: Aspiration pneumonia of right lung, unspecified aspiration pneumonia type, unspecified part of lung (H); Primary malignant neoplasm of lung metastatic to other site, unspecified laterality (H)      donepezil (ARICEPT) 5 MG ODT Take 1 tablet (5 mg) by mouth daily  Qty: 30 tablet, Refills: 0    Comments: Future refills by PCP Dr. Los Love with phone number [phone].  Associated Diagnoses: Alzheimer's dementia without behavioral disturbance, unspecified timing of dementia onset (H); Acute encephalopathy      gabapentin (NEURONTIN) 300 MG/6ML oral solution Take 6 mLs (300 mg) by mouth 3 times daily  Qty: 540 mL, Refills: 0    Comments: Future refills by PCP Dr. Los Love with phone number [phone].  Associated Diagnoses: Aspiration pneumonia of right lung, unspecified aspiration pneumonia type, unspecified part of lung (H); Primary malignant neoplasm of lung metastatic to other site, unspecified laterality (H)      Guar Gum (FIBER MODULAR, NUTRISOURCE FIBER,) packet 1 packet by Per Feeding Tube route 3 times daily  Qty: 90 packet, Refills: 0    Comments: Future refills by PCP Dr. Los Love with phone number [phone].  Associated Diagnoses: Aspiration pneumonia of right lung, unspecified aspiration  pneumonia type, unspecified part of lung (H); Primary malignant neoplasm of lung metastatic to other site, unspecified laterality (H)      LORazepam (ATIVAN) 2 MG/ML (HIGH CONC) solution Take 0.25 mLs (0.5 mg) by mouth every 4 hours as needed for anxiety  Qty: 30 mL, Refills: 0    Comments: Future refills by PCP Dr. Los Love with phone number [phone].  Associated Diagnoses: Aspiration pneumonia of right lung, unspecified aspiration pneumonia type, unspecified part of lung (H); Primary malignant neoplasm of lung metastatic to other site, unspecified laterality (H)      multivitamins w/minerals (CERTAVITE) liquid 15 mLs by Per Feeding Tube route daily  Qty: 450 mL, Refills: 0    Comments: Future refills by PCP Dr. Los Love with phone number [phone].  Associated Diagnoses: Aspiration pneumonia of right lung, unspecified aspiration pneumonia type, unspecified part of lung (H); Primary malignant neoplasm of lung metastatic to other site, unspecified laterality (H)      ondansetron (ZOFRAN) 4 MG/5ML solution Take 10 mLs (8 mg) by mouth 2 times daily as needed for nausea or vomiting  Qty: 100 mL, Refills: 0    Comments: Future refills by PCP Dr. Los Love with phone number [phone].  Associated Diagnoses: Aspiration pneumonia of right lung, unspecified aspiration pneumonia type, unspecified part of lung (H); Primary malignant neoplasm of lung metastatic to other site, unspecified laterality (H)      oxyCODONE (ROXICODONE) 5 MG/5ML solution Take 5 mLs (5 mg) by mouth every 6 hours as needed for severe pain  Qty: 200 mL, Refills: 0    Comments: Future refills by PCP Dr. Los Love with phone number [phone].  Associated Diagnoses: Aspiration pneumonia of right lung, unspecified aspiration pneumonia type, unspecified part of lung (H); Primary malignant neoplasm of lung metastatic to other site, unspecified laterality (H)      sertraline (ZOLOFT) 20 MG/ML (HIGH CONC) solution 2.5 mLs (50 mg) by  Oral or Feeding Tube route At Bedtime  Qty: 75 mL, Refills: 0    Comments: Future refills by PCP Dr. Los Love with phone number [phone].  Associated Diagnoses: Aspiration pneumonia of right lung, unspecified aspiration pneumonia type, unspecified part of lung (H); Primary malignant neoplasm of lung metastatic to other site, unspecified laterality (H)         CONTINUE these medications which have NOT CHANGED    Details   albuterol (PROAIR HFA/PROVENTIL HFA/VENTOLIN HFA) 108 (90 Base) MCG/ACT inhaler Inhale 2 puffs into the lungs every 6 hours as needed for shortness of breath / dyspnea or wheezing  Qty: 6.7 g, Refills: 3    Comments: Pharmacy may dispense brand covered by insurance (Proair, or proventil or ventolin or generic albuterol inhaler)  Associated Diagnoses: 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)      albuterol (PROVENTIL) (2.5 MG/3ML) 0.083% neb solution Take 1 vial (2.5 mg) by nebulization every 6 hours as needed for shortness of breath / dyspnea or wheezing  Qty: 120 vial, Refills: 3    Associated Diagnoses: Other emphysema (H)      benzonatate (TESSALON) 100 MG capsule Take 1 capsule (100 mg) by mouth 3 times daily as needed for cough  Qty: 30 capsule, Refills: 1    Associated Diagnoses: Malignant neoplasm of upper lobe of left lung (H)      bisacodyl (DULCOLAX) 5 MG EC tablet Take 5 mg by mouth daily as needed for constipation      diclofenac (VOLTAREN) 1 % topical gel Place onto the skin daily as needed for moderate pain      fluticasone (FLONASE) 50 MCG/ACT nasal spray Spray 1 spray into both nostrils daily as needed for rhinitis or allergies      levothyroxine (SYNTHROID/LEVOTHROID) 125 MCG tablet Take 1 tablet (125 mcg) by mouth daily  Qty: 90 tablet, Refills: 3    Associated Diagnoses: Hypothyroidism, unspecified type      prochlorperazine (COMPAZINE) 10 MG tablet Take 1 tablet (10 mg) by mouth every 6 hours as needed (Nausea/Vomiting)  Qty: 30 tablet, Refills: 3    Associated  Diagnoses: 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)      fentaNYL (DURAGESIC) 12 mcg/hr 72 hr patch Place 1 patch onto the skin every 72 hours  Qty: 10 patch, Refills: 0    Associated Diagnoses: Cancer associated pain         STOP taking these medications       acetaminophen (TYLENOL) 325 MG tablet Comments:   Reason for Stopping:         cetirizine (ZYRTEC ALLERGY) 10 MG tablet Comments:   Reason for Stopping:         donepezil (ARICEPT) 5 MG tablet Comments:   Reason for Stopping:         gabapentin (NEURONTIN) 300 MG capsule Comments:   Reason for Stopping:         LORazepam (ATIVAN) 0.5 MG tablet Comments:   Reason for Stopping:         ondansetron (ZOFRAN) 8 MG tablet Comments:   Reason for Stopping:         oxyCODONE (ROXICODONE) 5 MG tablet Comments:   Reason for Stopping:         sertraline (ZOLOFT) 50 MG tablet Comments:   Reason for Stopping:             Allergies   Allergies   Allergen Reactions     Simvastatin      myalgias     Lisinopril Rash     rash

## 2020-09-26 NOTE — PLAN OF CARE
A/Ox4, forgetful. VSS on 4L O2. C/o SOB and dyspnea, switched to oxymask.  Pain, oxy given x 1. G tube clamped, q4h manual flushes and scheduled feeds during the day. NPO ex water and ice chips. All meds through G tube. BM x1. Independently positioning.  Pulsate in place. Fent patch R deltoid. Possible discharge to home tomorrow morning.

## 2020-09-26 NOTE — PROGRESS NOTES
Patient discharged to home.  Wife private ride.  Discharge plan discussed with wife over phone.  Meds filled here and given to patient.  Pt medicated with oxycodone prior to departure.  TF will be given by wife upon arrival to home.  Oxygen at 3L, 97%, home o2 tank used.

## 2020-09-27 NOTE — TELEPHONE ENCOUNTER
Spoke with patient spouse she wanted to know if you would like labs drawn on 10-1-20 prior to appointment? They would like to make one trip that day if possible. Please advise.  Please send message to scheduling pool with response as I am off work tomorrow 9-28-20. Thanks,   Evelyne

## 2020-09-28 NOTE — LETTER
[facility]  [address] [place] [address]  [id number]      September 28, 2020      Agapito GENI Fairbanks  [address]   [place] [address]      Dear Agapito,    I am a clinic community health worker who works with Los Love MD at [facility]. I wanted to thank you for spending the time to talk with me.  Below is a description of clinic care coordination and how I can further assist you.      The clinic care coordination team is made up of a registered nurse,  and community health worker who understand the health care system. The goal of clinic care coordination is to help you manage your health and improve access to the health care system in the most efficient manner. The team can assist you in meeting your health care goals by providing education, coordinating services, strengthening the communication among your providers and supporting you with any resource needs.    Please feel free to contact me at [phone] with any questions or concerns. We are focused on providing you with the highest-quality healthcare experience possible and that all starts with you.     Sincerely,     DAVID Hess  Clinic Care Coordination  [facility]: [facility] [place]  Phone: [phone]

## 2020-09-28 NOTE — PROGRESS NOTES
Clinic Care Coordination Contact  Community Health Worker Initial Outreach    CHW Initial Information Gathering:  Referral Source: IP Report  Preferred Hospital: [facility]  [phone]  No PCP office visit in Past Year: No       Patient accepts CC: No, Enrolled in home care. Patient will be sent Care Coordination introduction letter for future reference.     Chart Review: Referral from a discharge.  IP for for  secondary to aspiration  pneumonia and Gastric tube dislodgement.    Plan: Care Coordinator will send care coordination introduction letter with care coordinator contact information and explanation of care coordination services via mail. Care Coordinator will do no further outreaches at this time.    DAVID Hess  Clinic Care Coordination  [facility] [place] [facility] [place]   Phone: [phone]

## 2020-09-28 NOTE — PROGRESS NOTES
Hospitalist Discharge Summary       Date of Admission:  9/22/2020  Date of Discharge:  9/26/2020   Discharging Provider: Rogerio Meraz MD ,FACP        Discharge Diagnoses     Gastrojejunostomy tube dislodgment, now replaced.  Malnutrition secondary to acute illness.  Dysphasia.  Multifocal pneumonia, most likely aspiration, unknown organism.  Chronic hypoxic respiratory failure.  Metastatic squamous cell carcinoma of the lung.  History of meningeal cancer.  Chemotherapy-induced pancytopenia, improving.  Chronic dementia.  Hypothyroidism.  SIADH with chronic hyponatremia.  CKD stage III.  Depression.       Write received a call from patient's wife, Millicent, patient was discharged on Saturday, 9/26. Millicent is calling to confirm what the follow up is and if HCRN and PT/OT will resume. Millicent states that patient is resting much of the time sleeping most of the day. Appears comfortable and is maintaining weight with it current weight at 131lbs.    Writer called [facility] and confirmed Jeni HOPSON will be out tomorrow for a resumption of services. Patient has follow up with Brett MEEK this Thursday and labs at that time.     Writer called and spoke wit with Millicent and confirmed that HCRN will be out tomorrow and that we will keep appointment with Brett MEEK and labs for Thursday.    Cecile Alanis, RN

## 2020-09-29 NOTE — TELEPHONE ENCOUNTER
Message left for spouse Millicent to schedule lab draw prior to appointment with Brett Manrique. Per Brett... may need to move appointments later in the day to coordinate.  Patient prefers to make one trip and will wait in car in between appointments.

## 2020-09-29 NOTE — TELEPHONE ENCOUNTER
Reason for Call: Request for an order or referral:  Order or referral being requested: orders for PT, OT & SPEECH THERAPY EVAL AND TREAT 1 X WEEK FOR 2 WEEKS, NURSE VISIT 1 X WEEK FOR 2 WEEKS, 2 PRN AND HOME HEALTH AID FOR BATHING 1 X WEEK FOR 2 WEEKS  Date needed: as soon as possible  Has the patient been seen by the PCP for this problem? YES  Additional comments: PLEASE CALL LUKE FROM [facility]  Phone number Patient can be reached at:  Other phone number:  [phone]  Best Time:  ANY  Can we leave a detailed message on this number?  YES  Call taken on 9/29/2020 at 1:46 PM by ANDREA PALUMBO

## 2020-09-30 NOTE — PROGRESS NOTES
Dear Dr. Love,    Medicare Home Health regulations requires [facility] to notify the Physician when the plan for visits has been altered.  We have provided fewer visits than ordered.  We are notifying you of a Missed Visit.  Agapito GENI LoweryFairbanks; MRN [id number]  Missed Visit  Is HA Visit  Dates of missed services  9/30/20  Reason: Patient cancelled   Sincerely [facility]  Lynnette Clark  [phone]

## 2020-10-01 PROBLEM — E87.1 HYPONATREMIA: Status: ACTIVE | Noted: 2020-01-01

## 2020-10-01 NOTE — ED NOTES
I spoke with the patients wife Wendi on th phone.  She states that she manages the patients care at home.  She does not want a full work up on the patient today, she was just hoping to increase his sodium.  She states that she believes it is low from too much water thru his G-Tube.  She states she flushes the tube with 150mL 3 times daily, she also flushes it with 150mL with each feed and sh flushes it with 60mL before and after each medication (120mL).  This totals about 1260mL per day and he also gets the tube feed.  Her goals of care for today are a fluid bolus and hopefully be able to get the patient back home.  She does not want a urine sample done as the patient is already on Amoxicillin for pneumonia.

## 2020-10-01 NOTE — ED NOTES
[facility]  ED Nurse Handoff Report    ED Chief complaint: Abnormal Labs      ED Diagnosis:   Final diagnoses:   None       Code Status: hospitalist to address     Allergies:   Allergies   Allergen Reactions     Simvastatin      myalgias     Lisinopril Rash     rash       Patient Story: Patient has a history of lung cancer.  Patient had routine labs done today in clinic and his sodium was found to be 115 so he was told to come in.    Focused Assessment:  Patient has audible crackles and wheezing.  Patient has a history of aspiration pneumonia and was just discharged from [facility] last week.  Patient has a G tube that the wife manages at home. She states she gives about a total of 1260mL of water thru his g tube daily.  She states that he is at baseline in his breathing.  Patient normally is on 3l NC.  He is saturating at 98% on 3lt here.  Patient is alert however confused on his medical history.  Wife states he is at baseline in his mentation. No other complaints from the patient.     Treatments and/or interventions provided: IV, fluids   Patient's response to treatments and/or interventions: Tolerated well     To be done/followed up on inpatient unit:  see in patient orders     Does this patient have any cognitive concerns?: Baseline dementia    Activity level - Baseline/Home:  Walker  Activity Level - Current:   Stand with Assist    Patient's Preferred language: English   Needed?: No    Isolation: None  Infection: Not Applicable  Bariatric?: No    Vital Signs:   Vitals:    10/01/20 1530   BP: 111/69   Resp: 16   Temp: 98  F (36.7  C)   TempSrc: Temporal   SpO2: 99%   Weight: 59.9 kg (132 lb)   Height: 1.829 m (6')       Cardiac Rhythm:Cardiac Rhythm: Normal sinus rhythm    Was the PSS-3 completed:   Yes  What interventions are required if any?               Family Comments: wife very knowledgeable and she is at bedside.   OBS brochure/video discussed/provided to patient/family: N/A               Name of person given brochure if not patient:               Relationship to patient:     For the majority of the shift this patient's behavior was Green.   Behavioral interventions performed were .    ED NURSE PHONE NUMBER: *[phone]

## 2020-10-01 NOTE — ED TRIAGE NOTES
Pt had labs drawn today and told he has low NA, hx lung cancer doing both chemo, done with radiation

## 2020-10-01 NOTE — ED NOTES
Patient resting in bed comfortable with wife at bedside.  Patient provided with mouth swab for dry mouth.  Patient and wife know he is not to have any water due to low sodium

## 2020-10-01 NOTE — H&P
"      [facility]    History and Physical - Hospitalist Service       Date of Admission:  10/1/2020    Assessment & Plan   Agapito Fairbanks is a 70 year old male with past medical history of metastatic lung cancer, chronic hypoxic respiratory failure with oxygen dependency, dysphagia status post G-tube placement, cognitive impairment, recent hospital admission from 9/22-9/26 secondary to aspiration pneumonia and dislodged G-tube who currently being admitted for hyponatremia.    Acute on chronic hyponatremia  History of chronic hyponatremia due to SIADH and baseline sodium ranges from 126-130.  However, his last sodium level when he left the hospital on 9/25 was 136.  His hyponatremia could be multifactorial due to increased free water intake as per wife has been giving frequent free water flushes through his G-tube.  -Check serum cause, urine osmolality, urine sodium  -He has received 1 L normal saline bolus in the emergency room, hold off further IV fluids for now until repeat Sodium results  -Based on his evening sodium levels, will decide whether to give additional fluids versus fluid restriction  -Sodium checks every 4 hours, goal is not to correct more than 8 mEq in 24 hours  - montior    Addendum  - Na still 115 after IVF bolus in the ED.   - place on free water restriction at 1L  - seizure precuations  - Na checks q4hrs  - nephrology consult    History of metastatic small cell cancer  History of laryngeal cancer  Bone metastasis with associated back pain  Patient is currently undergoing palliative chemotherapy with Keytruda Taxol and carboplatin-last treatment on September 16, 2020.  - continue prior to admission fentanyl patch which was placed on 9/29 at home and PRN oxycodone  -Oncology consultation placed    Severe malnutrition  Dysphasia  SLP note from 9/24 \"For comfort/quality of life and practice swallowing, pt can have as much THIN water or ice chips as he wants throughout the " "day as long as he is upright and taking small sips. Continue oral cares with toothbrushing 3-5x per day. Continue all meds via PEG tube\"  -He is on bolus tube feeds at home, ordered tube feeds for now  -Nutrition consult placed for further adjustment and management of his tube feedings    Hypothyroidism  -Continue with prior to admission levothyroxine    Chronic respiratory failure, on 3 L/min oxygen  Recent hospital admission for aspiration pneumonia  Per patient reports, his respiratory status seems to have been stable.  He does have coarse breath sounds and sounds congested.   -Continue with Augmentin for 2 more days to complete a 7 days course  -Continue albuterol inhalers as needed, Robitussin as needed    Cognitive impairment  -Continue prior to admission Aricept    Depression  -Continue prior to admission Zoloft    Decubitus pressure ulcer, present on admission  -Wound care consult placed       Diet:  N.p.o., ice chips, okay for small amounts of thin liquids, bolus feeding  DVT Prophylaxis: Pneumatic Compression Devices  Ocampo Catheter: not present  Code Status:  CPR okay, DNI.  Discussed with patient and his wife.  Also noted that he was full code during his last admission.         Disposition Plan   Expected discharge: 2 - 3 days, recommended to prior living arrangement once Improvement in sodium level.  Entered: Leno Shannon MD 10/01/2020, 5:07 PM     The patient's care was discussed with the Patient and Patient's Family.    Leno Shannon MD  [facility]    ______________________________________________________________________    Chief Complaint   Hyponatremia    History is obtained from the patient, patient's wife and discussion with emergency room physician    History of Present Illness   Agapito Fairbanks is a 70 year old male with past medical history of metastatic lung cancer, chronic hypoxic respiratory failure with oxygen dependency, dysphagia status post " G-tube placement, cognitive impairment, recent hospital admission from 9/22-9/26 secondary to aspiration pneumonia and dislodged G-tube who is now presenting with hyponatremia.  Patient was seen at his oncology clinic today and routine blood work was done which showed sodium of 115.  As a result, patient was advised to present to the emergency room.     Since hospital discharge, patient reports he has been doing fine.  Wife reports, in the last couple days he actually seemed stronger and was able to take few steps with his walker.  They are just disappointed that he has to come back to the hospital.  Patient states his breathing is stable, and continues to have congestion.  However, no change in his breathing status overall.  He denies fever, chills, nausea, vomiting.  He denies any loss of taste or smell.     At home, patient is on bolus tube feeding due to his malnutrition and some component of dysphasia.  He gets free water flushes with his tube feeding as follows--> 150 ml x3 as well as with each feed (3) and 60 before and after (x3) for a total of 1260 ml.     In the ED, patient's vitals are stable.  As above, outpatient labs showed sodium of 115.  UA negative for nitrites and leukocyte Esterase, some hyaline casts present.  He is given 1 L of normal saline in the ED and admission requested for further evaluation and management.    Review of Systems    The 10 point Review of Systems is negative other than noted in the HPI    Past Medical History    I have reviewed this patient's medical history and updated it with pertinent information if needed.   Past Medical History:   Diagnosis Date     Acute gastritis with hemorrhage 11/02     Basal cell carcinoma, leg      left pretibial area     CKD (chronic kidney disease) stage 3, GFR 30-59 ml/min     creat baseline approx 1.4     Hearing loss      Helicobacter pylori (H. 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    Squamous cell carcinoma right vocal cord     Lung cancer (H) 0/09    1cm spiculated SKYLA Adenosquamous cell carcinoma     Mild major depression (H)      Other and unspecified malignant neoplasm of skin of other and unspecified parts of face      Basal Cell CA nasal area 4/04, chest 3/05, right chest 10/09     Other testicular hypofunction      Squamous cell carcinoma  Jan 2012     RLE s/p excision     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)      Tobacco use disorder     quit 1998     Unspecified cataract     left     Unspecified essential hypertension      Unspecified retinal detachment     Bilateral       Past Surgical History   I have reviewed this patient's surgical history and updated it with pertinent information if needed.  Past Surgical History:   Procedure Laterality Date     C NONSPECIFIC PROCEDURE  5/01, 9/01    B retinal surg.     C NONSPECIFIC PROCEDURE  12/01    L cataract     C NONSPECIFIC PROCEDURE  1983    right ankle fx repair     C NONSPECIFIC PROCEDURE  3/05    Right hemilaryngectomy (laser) for Squamous Cell CA T1N0     C NONSPECIFIC PROCEDURE  3/05, 10/09    Moh's procedure for Basal Cell CA chest     C NONSPECIFIC PROCEDURE  4/04    Moh's procedure for Basal Cell CA ear lobe     C NONSPECIFIC PROCEDURE  3/05, 12/05    right vocal cord squamous cell CA excision     C NONSPECIFIC PROCEDURE  12/07    Phacoemulsification of the lens with posterior chamber lens implant, right eye.      C NONSPECIFIC PROCEDURE  10/09     Moh's procedufe for BCC left pretibial area     C NONSPECIFIC PROCEDURE  12/09    Left thoracoscopic wedge resection, Open completion left upper lobe segmentectomy      IR GASTROSTOMY TUBE PERCUTANEOUS PLCMNT  9/21/2020     IR GASTROSTOMY TUBE PERCUTANEOUS PLCMNT  9/22/2020     THORACIC SURGERY      lung,throat       Social History   I have reviewed this patient's social history and updated  it with pertinent information if needed.  Social History     Tobacco Use     Smoking status: Former Smoker     Packs/day: 1.50     Years: 30.00     Pack years: 45.00     Quit date: 1998     Years since quittin.7     Smokeless tobacco: Never Used   Substance Use Topics     Alcohol use: Yes     Comment: 2-3 times per week, 1-2 beers     Drug use: No       Family History   I have reviewed this patient's family history and updated it with pertinent information if needed.  Family History   Problem Relation Age of Onset     Colon Cancer Mother          age 65, in      Family History Negative Father         mild memory problems,  age 87, in      Family History Negative Brother      Family History Negative Brother      Cancer Brother         hx prostate ca     Sleep Apnea Brother        Prior to Admission Medications   Prior to Admission Medications   Prescriptions Last Dose Informant Patient Reported? Taking?   Guar Gum (FIBER MODULAR, NUTRISOURCE FIBER,) packet   No No   Si packet by Per Feeding Tube route 3 times daily   LORazepam (ATIVAN) 2 MG/ML (HIGH CONC) solution   No No   Sig: Take 0.25 mLs (0.5 mg) by mouth every 4 hours as needed for anxiety   acetaminophen (TYLENOL) 32 mg/mL liquid   No No   Sig: Take 20.3 mLs (650 mg) by mouth every 4 hours as needed for mild pain or fever   albuterol (PROAIR HFA/PROVENTIL HFA/VENTOLIN HFA) 108 (90 Base) MCG/ACT inhaler  Spouse/Significant Other No No   Sig: Inhale 2 puffs into the lungs every 6 hours as needed for shortness of breath / dyspnea or wheezing   albuterol (PROVENTIL) (2.5 MG/3ML) 0.083% neb solution  Spouse/Significant Other No No   Sig: Take 1 vial (2.5 mg) by nebulization every 6 hours as needed for shortness of breath / dyspnea or wheezing   amoxicillin-clavulanate (AUGMENTIN) 400-57 MG/5ML suspension   No No   Sig: Take 10.9 mLs (875 mg) by mouth 2 times daily for 7 days   benzonatate (TESSALON) 100 MG capsule  Spouse/Significant  Other No No   Sig: Take 1 capsule (100 mg) by mouth 3 times daily as needed for cough   bisacodyl (DULCOLAX) 5 MG EC tablet  Spouse/Significant Other Yes No   Sig: Take 5 mg by mouth daily as needed for constipation   cetirizine (ZYRTEC) 5 MG/5ML solution   No No   Sig: Take 10 mLs (10 mg) by mouth daily   diclofenac (VOLTAREN) 1 % topical gel  Spouse/Significant Other Yes No   Sig: Place onto the skin daily as needed for moderate pain   donepezil (ARICEPT) 5 MG ODT   No No   Sig: Take 1 tablet (5 mg) by mouth daily   fentaNYL (DURAGESIC) 12 mcg/hr 72 hr patch   No No   Sig: Place 1 patch onto the skin every 72 hours   fluticasone (FLONASE) 50 MCG/ACT nasal spray  Spouse/Significant Other Yes No   Sig: Spray 1 spray into both nostrils daily as needed for rhinitis or allergies   gabapentin (NEURONTIN) 300 MG/6ML oral solution   No No   Sig: Take 6 mLs (300 mg) by mouth 3 times daily   levothyroxine (SYNTHROID/LEVOTHROID) 125 MCG tablet  Spouse/Significant Other No No   Sig: Take 1 tablet (125 mcg) by mouth daily   multivitamins w/minerals (CERTAVITE) liquid   No No   Sig: 15 mLs by Per Feeding Tube route daily   ondansetron (ZOFRAN) 4 MG/5ML solution   No No   Sig: Take 10 mLs (8 mg) by mouth 2 times daily as needed for nausea or vomiting   oxyCODONE (ROXICODONE) 5 MG/5ML solution   No No   Sig: Take 5 mLs (5 mg) by mouth every 6 hours as needed for severe pain   prochlorperazine (COMPAZINE) 10 MG tablet  Spouse/Significant Other No No   Sig: Take 1 tablet (10 mg) by mouth every 6 hours as needed (Nausea/Vomiting)   sertraline (ZOLOFT) 20 MG/ML (HIGH CONC) solution   No No   Si.5 mLs (50 mg) by Oral or Feeding Tube route At Bedtime      Facility-Administered Medications: None     Allergies   Allergies   Allergen Reactions     Simvastatin      myalgias     Lisinopril Rash     rash       Physical Exam   Vital Signs: Temp: 98  F (36.7  C) Temp src: Temporal BP: 111/69     Resp: 16 SpO2: 99 % O2 Device: Nasal cannula  Oxygen Delivery: 3 LPM  Weight: 132 lbs 0 oz    General Appearance: Alert, awake, no apparent distress  HEENT: normocephalic, atraumatic, no scleral icterus or conjunctival injection  Respiratory: Coarse breath sounds bilaterally, no wheezing  Cardiovascular: Regular rate and rhythm  GI: Soft and nontender, G-tube in place  Skin: Pressure ulcer on his bottom noted, no obvious signs of infection, skin discoloration on his left upper back from prior radiation therapy  Musculoskeletal: No obvious joint effusion or swelling, no erythema.  Neurologic: Alert and oriented, moving all extremities spontaneously  Psychiatric: Mood and affect appear normal    Data   Data reviewed today: I reviewed all medications, new labs and imaging results over the last 24 hours. I personally reviewed no images or EKG's today.    Recent Labs   Lab 10/01/20  1426 09/26/20  0711 09/25/20  0601   WBC 12.8* 12.2* 8.0   HGB 9.6* 9.5* 7.7*   MCV 82 84 84   * 39* 46*   *  --  136   POTASSIUM 4.5  --  3.6   CHLORIDE 82*  --  102   CO2 27  --  34*   BUN 10  --  12   CR 0.39*  --  0.54*   ANIONGAP 6  --  <1*   SUSIE 7.7*  --  7.9*   GLC 79  --  84   ALBUMIN 2.0*  --   --    PROTTOTAL 5.7*  --   --    BILITOTAL 0.6  --   --    ALKPHOS 104  --   --    ALT 15  --   --    AST 17  --   --      No results found for this or any previous visit (from the past 24 hour(s)).

## 2020-10-01 NOTE — PROGRESS NOTES
"Oncology Rooming Note    October 1, 2020 2:04 PM   Agapito Fairbanks is a 70 year old male who presents for:    Chief Complaint   Patient presents with     Oncology Clinic Visit     Initial Vitals: There were no vitals taken for this visit. Estimated body mass index is 17.83 kg/m  as calculated from the following:    Height as of 9/16/20: 1.829 m (6' 0.01\").    Weight as of 9/26/20: 59.6 kg (131 lb 8 oz). There is no height or weight on file to calculate BSA.  Data Unavailable Comment: Data Unavailable   No LMP for male patient.  Allergies reviewed: Yes  Medications reviewed: Yes    Medications: Medication refills not needed today.  Pharmacy name entered into [facility]: [id number] [address]    Clinical concerns: Wet cough. Brett Manrique was notified.      Liz Russell CMA  10/1/2020      2:04 PM  "

## 2020-10-01 NOTE — ED PROVIDER NOTES
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 The patients last radiation took place 09/10/2020 and his last chemotherapy took place 09/16/2020. The patient has experienced a lot of water flushed of his gastrostomy tube. The patient denies leg swelling, and fever. The patient's shortness of breath is at baseline and he is chronically on 3L of oxygen. The patient follows with Dr. Mullins from oncology and was seen today by oncology where routine labs demonstrated sodium of 115. The patient has been complaining of weakness and appetite loss.       The patients wife states he lives at home and she flushes his G-tube with 150 ml x3 as well as with each feed (3) and 60cc before and after feedings (x3) for a total of 1260 ml of free water a day.    Allergies:  Simvastatin  Lisinopril       Medications:    Acetaminophen  Albuterol   Augmentin  Tessalon  Dulcolax  Zyrtec  Aricept  Fentanyl  Flonase  Neurontin  Levothyroxine  Ativan  Zofran  Oxycodone  Compazine  Zoloft      Past Medical History:    Acute gastritis with hemorrhage  Basal cell carcinoma  CKD  Hearing loss  Heliocbacter pylori  Humerus fracture  Hyperlipidemia  Hypothyroidism   Impotence of organic origin  Laryngeal carcinoma  Lung cancer  Depression  Malignant neoplasm of skin  Testicular hypofunction   Squamous cell carcinoma  Tobacco use disorder        Past Surgical History:    B retinal surgery  L cataract  Right ankle fx repair  Right hemilaryngectomy laser  moh's procedure  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   Respiratory: Positive for shortness of breath (baseline).    Cardiovascular: Negative for leg swelling.   Neurological: Negative for weakness and numbness.   All other systems reviewed and are negative.      Physical Exam     Patient Vitals for the past 24 hrs:   BP Temp Temp src Pulse Resp SpO2 Height Weight   10/01/20 1800 122/89 -- -- 78 12 98 % -- --   10/01/20 1700 118/82 -- -- 71 19 100 % -- --   10/01/20 1530 111/69 98  F (36.7  C) Temporal -- 16 99 % 1.829 m (6') 59.9 kg (132 lb)       Physical Exam  General: Alert and cooperative with exam. Patient in mild distress. Normal mentation.   Head:  Scalp is NC/AT  Eyes:  No scleral icterus, PERRL  ENT:  The external nose and ears are normal. The oropharynx is normal and without erythema; mucus membranes are moist. Uvula midline, no evidence of deep space infection.  Neck:  Normal range of motion without rigidity.  CV:  Regular rate and rhythm    No pathologic murmur   Resp:  Breath sounds are clear bilaterally    Non-labored, no retractions or accessory muscle use. Mild chronic cough.   GI:  Abdomen is soft, no distension, no tenderness. No peritoneal signs  MS:  No lower extremity edema   Skin:  Warm and dry, No rash or lesions noted.  Neuro: Oriented x 3. No gross motor deficits.      Emergency Department Course   ECG (16:43:35):  Rate 75 bpm. UT interval 146. QRS duration 80. QT/QTc 378/422. P-R-T axes -2 31 75. Poor data quality, interpretation may be adversely affected. Normal sinus rhythm. Cannot rule out inferior infarct, age undetermined. Abnormal ECG. Interpreted at 1011 by Farhat Mcgowan DO.      Laboratory:  Laboratory findings were communicated with the patient who voiced understanding of the findings.    UA: Ph urine 8.0 (H), Protein 10,  Urobilinogen 4.0 (H), Mucous Present Hyaline 3 (H), o/w Negative    COVID-19 Virus PCR: Pending    Interventions:  1623 NS 1L IV Bolus      Emergency Department Course:  Past medical records, nursing notes, and vitals reviewed.    1556 I performed an exam of the patient as documented above.     EKG obtained in the ED, see results above.   The patient provided a urine sample here in the emergency department. This was sent for laboratory testing, findings above.     1705 I rechecked the patient and discussed the results of his workup thus far.     Findings and plan explained to the Patient who consents to admission. Discussed the patient with Dr. Shannon, who will admit the patient to a Medicine telemetry bed for further monitoring, evaluation, and treatment.    I personally reviewed the laboratory results with the Patient and answered all related questions prior to admission.     Impression & Plan   Covid-19  Agapito Fairbanks was evaluated during a global COVID-19 pandemic, which necessitated consideration that the patient might be at risk for infection with the SARS-CoV-2 virus that causes COVID-19.   Applicable protocols for evaluation were followed during the patient's care.   COVID-19 was considered as part of the patient's evaluation. The plan for testing is:  a test was obtained during this visit.    Medical Decision Making:  Patient is a 70-year-old male with history of metastatic lung cancer who presents with significant hyponatremia noted on labs earlier today.  Patient's medical history and records were reviewed.  On evaluation patient is without complaint and is at his neurologic baseline.  EKG shows no significant acute abnormalities.  UA as noted above was obtained prior to administration 1L normal saline.  Patient's hyponatremia is likely secondary to significant free water flushes of his G-tube by his caregiver/wife.  He will be admitted to the hospitalist service for further evaluation and care.   Patient remained stable throughout ED course.  Asymptomatic COVID testing obtained and pending.    Diagnosis:    ICD-10-CM    1. Hyponatremia  E87.1        Disposition:  Admitted to Medicine telemetry.    Scribe Disclosure:  I, Rigoberto Mello, am serving as a scribe at 3:56 PM on 10/1/2020 to document services personally performed by Farhat Mcgowan DO based on my observations and the provider's statements to me.        Farhat Mcgowan DO  10/02/20 0141

## 2020-10-01 NOTE — PROGRESS NOTES
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  Denies shortness of breath denies chest pain.  Robitussin helps.  Patient is also taking Augmentin that was prescribed for him with last discharge   per wife he continues to be weak . Reports he is able to take more steps with physical therapy and Occupational Therapy.  Wife states that she would like us to hold chemotherapy next week and reschedule chemo in 2 weeks        Review of Systems:  14 point ROS of systems including Constitutional, Eyes, Respiratory, Cardiovascular, Gastroenterology, Genitourinary, Integumentary, Muscularskeletal, Psychiatric were all negative except for pertinent positives noted in my HPI.        Physical Examination:  General: The patient is a pleasant male in no acute distress.  BP (!) 132/90   Pulse 75   Temp 97.4  F (36.3  C) (Axillary)   Resp 16   Wt 59.9 kg (132 lb)   SpO2 100%   BMI 17.90 kg/m    HEENT: EOMI, PERRL. Sclerae are anicteric. Oral mucosa is pink and moist with no lesions or thrush.   Lymph: Neck is supple with no lymphadenopathy in the cervical or supraclavicular areas.   Heart: Regular rate and rhythm.   Lungs: diminished  in bases, crackles  GI: Bowel sounds present, soft, nontender with no palpable hepatosplenomegaly or masses.  G tube in place, tach  Extremities: No lower extremity edema noted bilaterally.       Laboratory Data:  Results for orders placed or performed in visit on 10/01/20 (from the past 24 hour(s))   Comprehensive metabolic panel   Result Value Ref Range    Sodium 115 (LL) 133 - 144 mmol/L    Potassium 4.5 3.4 - 5.3 mmol/L    Chloride 82 (L) 94 - 109 mmol/L    Carbon Dioxide 27 20 - 32 mmol/L    Anion Gap 6 3 - 14 mmol/L     Glucose 79 70 - 99 mg/dL    Urea Nitrogen 10 7 - 30 mg/dL    Creatinine 0.39 (L) 0.66 - 1.25 mg/dL    GFR Estimate >90 >60 mL/min/[1.73_m2]    GFR Estimate If Black >90 >60 mL/min/[1.73_m2]    Calcium 7.7 (L) 8.5 - 10.1 mg/dL    Bilirubin Total 0.6 0.2 - 1.3 mg/dL    Albumin 2.0 (L) 3.4 - 5.0 g/dL    Protein Total 5.7 (L) 6.8 - 8.8 g/dL    Alkaline Phosphatase 104 40 - 150 U/L    ALT 15 0 - 70 U/L    AST 17 0 - 45 U/L   CBC with platelets differential   Result Value Ref Range    WBC 12.8 (H) 4.0 - 11.0 10e9/L    RBC Count 3.42 (L) 4.4 - 5.9 10e12/L    Hemoglobin 9.6 (L) 13.3 - 17.7 g/dL    Hematocrit 27.9 (L) 40.0 - 53.0 %    MCV 82 78 - 100 fl    MCH 28.1 26.5 - 33.0 pg    MCHC 34.4 31.5 - 36.5 g/dL    RDW 17.9 (H) 10.0 - 15.0 %    Platelet Count 118 (L) 150 - 450 10e9/L    Diff Method Automated Method     % Neutrophils 89.1 %    % Lymphocytes 5.6 %    % Monocytes 3.7 %    % Eosinophils 0.6 %    % Basophils 0.2 %    % Immature Granulocytes 0.8 %    Nucleated RBCs 0 0 /100    Absolute Neutrophil 11.4 (H) 1.6 - 8.3 10e9/L    Absolute Lymphocytes 0.7 (L) 0.8 - 5.3 10e9/L    Absolute Monocytes 0.5 0.0 - 1.3 10e9/L    Absolute Eosinophils 0.1 0.0 - 0.7 10e9/L    Absolute Basophils 0.0 0.0 - 0.2 10e9/L    Abs Immature Granulocytes 0.1 0 - 0.4 10e9/L    Absolute Nucleated RBC 0.0          Assessment and Plan:    This is a 70-year-old male with    Metastatic squamous cell carcinoma of the lung    -Patient is clinically declining with weakness decreased appetite weight loss.  I Told patient that I am worried about declining  In his health   -pt reports he wants to continue with treatment.  Patient wants to fight the cancer.   Right large left lung mass-completed radiation on  09/10  -Patient is currently on palliative treatment with Keytruda Taxol and carboplatin- treatment cycle 3-day 1 given on September 16, 2020- he had  multiple admissions since then  -Per wife and patient request will hold treatment next week and  reschedule chemo and follow-up with Dr. Mullins in 2 weeks        Hyponatremia-secondary to cancer  asymptomatic  Baseline sodium 126-130      Nutrition  -G-tube in place  Continue with feeding tube      Back pain from malignancy  Overall well controlled with fentanyl patch and oxycodone  -per Patient request I will refill fentanyl patch and oxycodone today    Elevated TSH  T4 is normal  Continue to monitor    Chronic shortness of breath and nonproductive cough from malignancy and recent pneumonia  Patient is on chronic 3 L of oxygen   -Patient reports no worsening of symptoms no worsening of shortness of breath or cough, no fever chills sweats  Continue with Robitussin and Augmentin      Anemia  Multifactorial  Patient denies bleeding  Overall stable hemoglobin    Thrombocytopenia  Patient denies bleeding platelets are improving  Advised to monitor closely for bleeding in the event of bleeding fall or injury go to ER      Weakness   Continue with physical and Occupational Therapy    Health maintenance  Flu shot today      Addendum  I  received a page from a lab.  Sodium is 115 today.  Baseline sodium 126-130  Most likely secondary to free water G tube flushes.   Patient has already left the clinic.  I called wife and told wife to take patient to the ER      ALEXANDRIA Berg CNP  [facility]     Chart documentation with Dragon Voice recognition Software. Although reviewed after completion, some words and grammatical errors may remain.

## 2020-10-01 NOTE — ED NOTES
Patient wife heading home. She is concerned about his tube feeding for the evening.  Charge RN on 88 ([place]) was updated about this concern.

## 2020-10-01 NOTE — PHARMACY-ADMISSION MEDICATION HISTORY
Pharmacy Medication History  Admission medication history interview status for the 10/1/2020  admission is complete. See EPIC admission navigator for prior to admission medications     Medication history sources: Patient's family/friend (wife), Surescripts, Care Everywhere and [facility] discharge AVS on 9/22/20  Medication history source reliability: Good  Adherence assessment: Good    Significant changes made to the medication list:  Changed: Levothyroxine from 125 mcg to 112 mcg daily and sertraline solution to tablets    Additional medication history information:   Recent antimicrobials:  On 9/25/20, patient was prescribed Augmentin 400-57 mg/mL liquid for aspiration pneumonia with instructions of 10.9 mL by mouth BID x 7 days. Patient has not finished the course of antibiotics.   Per wife, patient had fentanyl patch placed on 9/29/20.     Medication reconciliation completed by provider prior to medication history? No    Time spent in this activity: 25 mins      Prior to Admission medications    Medication Sig Last Dose Taking? Auth Provider   acetaminophen (TYLENOL) 32 mg/mL liquid Take 20.3 mLs (650 mg) by mouth every 4 hours as needed for mild pain or fever 10/1/2020 at am Yes Clare Iglesias MD   albuterol (PROAIR HFA/PROVENTIL HFA/VENTOLIN HFA) 108 (90 Base) MCG/ACT inhaler Inhale 2 puffs into the lungs every 6 hours as needed for shortness of breath / dyspnea or wheezing  at prn Yes Wilver Mullins MD   albuterol (PROVENTIL) (2.5 MG/3ML) 0.083% neb solution Take 1 vial (2.5 mg) by nebulization every 6 hours as needed for shortness of breath / dyspnea or wheezing  at prn Yes Ana Cristina Richard MD   amoxicillin-clavulanate (AUGMENTIN) 400-57 MG/5ML suspension Take 10.9 mLs (875 mg) by mouth 2 times daily for 7 days 10/1/2020 at am Yes Clare Iglesias MD   benzonatate (TESSALON) 100 MG capsule Take 1 capsule (100 mg) by mouth 3 times daily as needed for cough  at prn Yes Wilver Mullins MD   bisacodyl (DULCOLAX)  5 MG EC tablet Take 5 mg by mouth daily as needed for constipation  at prn Yes Unknown, Entered By History   cetirizine (ZYRTEC) 5 MG/5ML solution Take 10 mLs (10 mg) by mouth daily 9/30/2020 at pm Yes Clare Iglesias MD   diclofenac (VOLTAREN) 1 % topical gel Place onto the skin daily as needed for moderate pain  at prn Yes Unknown, Entered By History   donepezil (ARICEPT) 10 MG ODT Take 5 mg by mouth daily 9/30/2020 at pm Yes Unknown, Entered By History   fentaNYL (DURAGESIC) 12 mcg/hr 72 hr patch Place 1 patch onto the skin every 72 hours 9/29/2020 Yes Brett Manrique APRN CNP   fluticasone (FLONASE) 50 MCG/ACT nasal spray Spray 1 spray into both nostrils daily as needed for rhinitis or allergies  at prn Yes Unknown, Entered By History   gabapentin (NEURONTIN) 300 MG/6ML oral solution Take 6 mLs (300 mg) by mouth 3 times daily 10/1/2020 at x1 Yes Clare Iglesias MD   Guar Gum (FIBER MODULAR, NUTRISOURCE FIBER,) packet 1 packet by Per Feeding Tube route 3 times daily  Yes Clare Iglesias MD   levothyroxine (SYNTHROID/LEVOTHROID) 112 MCG tablet 112 mcg by Oral or Feeding Tube route daily 9/30/2020 at pm Yes Unknown, Entered By History   LORazepam (ATIVAN) 2 MG/ML (HIGH CONC) solution Take 0.25 mLs (0.5 mg) by mouth every 4 hours as needed for anxiety 9/30/2020 at Unknown time Yes Clare Iglesias MD   multivitamins w/minerals (CERTAVITE) liquid 15 mLs by Per Feeding Tube route daily 9/30/2020 at Unknown time Yes Clare Iglesias MD   ondansetron (ZOFRAN) 4 MG/5ML solution Take 10 mLs (8 mg) by mouth 2 times daily as needed for nausea or vomiting  at prn Yes Clare Iglesias MD   oxyCODONE (ROXICODONE) 5 MG/5ML solution Take 5 mLs (5 mg) by mouth every 6 hours as needed for severe pain 9/30/2020 at Unknown time Yes Clare Iglesias MD   prochlorperazine (COMPAZINE) 10 MG tablet Take 1 tablet (10 mg) by mouth every 6 hours as needed (Nausea/Vomiting)  at prn Yes Wilver Mullins MD   sertraline (ZOLOFT) 50 MG tablet 50 mg by Oral or  Feeding Tube route At Bedtime 9/30/2020 at bedtime Yes Unknown, Entered By History

## 2020-10-01 NOTE — LETTER
"    10/1/2020         RE: Agapito Fairbanks  [address]  [place] [id number]        Dear Colleague,    Thank you for referring your patient, Agapito Fairbanks, to the [facility]. Please see a copy of my visit note below.    Oncology Rooming Note    October 1, 2020 2:04 PM   Agapito Fairbanks is a 70 year old male who presents for:    Chief Complaint   Patient presents with     Oncology Clinic Visit     Initial Vitals: There were no vitals taken for this visit. Estimated body mass index is 17.83 kg/m  as calculated from the following:    Height as of 9/16/20: 1.829 m (6' 0.01\").    Weight as of 9/26/20: 59.6 kg (131 lb 8 oz). There is no height or weight on file to calculate BSA.  Data Unavailable Comment: Data Unavailable   No LMP for male patient.  Allergies reviewed: Yes  Medications reviewed: Yes    Medications: Medication refills not needed today.  Pharmacy name entered into [employer]: [id number] - [place], [place] - [address]    Clinical concerns: Wet cough. Brett Manrique was notified.      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  Denies shortness of breath denies chest pain.  Robitussin helps.  Patient is also taking Augmentin that was prescribed for him with last discharge   per wife he continues to be weak . Reports he is able to take more steps with physical therapy and Occupational Therapy.  Wife states that she would like us to hold chemotherapy next week and reschedule chemo in 2 weeks        Review of Systems:  14 point ROS of systems including Constitutional, Eyes, Respiratory, Cardiovascular, Gastroenterology, " Genitourinary, Integumentary, Muscularskeletal, Psychiatric were all negative except for pertinent positives noted in my HPI.        Physical Examination:  General: The patient is a pleasant male in no acute distress.  BP (!) 132/90   Pulse 75   Temp 97.4  F (36.3  C) (Axillary)   Resp 16   Wt 59.9 kg (132 lb)   SpO2 100%   BMI 17.90 kg/m    HEENT: EOMI, PERRL. Sclerae are anicteric. Oral mucosa is pink and moist with no lesions or thrush.   Lymph: Neck is supple with no lymphadenopathy in the cervical or supraclavicular areas.   Heart: Regular rate and rhythm.   Lungs: diminished  in bases, crackles  GI: Bowel sounds present, soft, nontender with no palpable hepatosplenomegaly or masses.  G tube in place, tach  Extremities: No lower extremity edema noted bilaterally.       Laboratory Data:  Results for orders placed or performed in visit on 10/01/20 (from the past 24 hour(s))   Comprehensive metabolic panel   Result Value Ref Range    Sodium 115 (LL) 133 - 144 mmol/L    Potassium 4.5 3.4 - 5.3 mmol/L    Chloride 82 (L) 94 - 109 mmol/L    Carbon Dioxide 27 20 - 32 mmol/L    Anion Gap 6 3 - 14 mmol/L    Glucose 79 70 - 99 mg/dL    Urea Nitrogen 10 7 - 30 mg/dL    Creatinine 0.39 (L) 0.66 - 1.25 mg/dL    GFR Estimate >90 >60 mL/min/[1.73_m2]    GFR Estimate If Black >90 >60 mL/min/[1.73_m2]    Calcium 7.7 (L) 8.5 - 10.1 mg/dL    Bilirubin Total 0.6 0.2 - 1.3 mg/dL    Albumin 2.0 (L) 3.4 - 5.0 g/dL    Protein Total 5.7 (L) 6.8 - 8.8 g/dL    Alkaline Phosphatase 104 40 - 150 U/L    ALT 15 0 - 70 U/L    AST 17 0 - 45 U/L   CBC with platelets differential   Result Value Ref Range    WBC 12.8 (H) 4.0 - 11.0 10e9/L    RBC Count 3.42 (L) 4.4 - 5.9 10e12/L    Hemoglobin 9.6 (L) 13.3 - 17.7 g/dL    Hematocrit 27.9 (L) 40.0 - 53.0 %    MCV 82 78 - 100 fl    MCH 28.1 26.5 - 33.0 pg    MCHC 34.4 31.5 - 36.5 g/dL    RDW 17.9 (H) 10.0 - 15.0 %    Platelet Count 118 (L) 150 - 450 10e9/L    Diff Method Automated Method     %  Neutrophils 89.1 %    % Lymphocytes 5.6 %    % Monocytes 3.7 %    % Eosinophils 0.6 %    % Basophils 0.2 %    % Immature Granulocytes 0.8 %    Nucleated RBCs 0 0 /100    Absolute Neutrophil 11.4 (H) 1.6 - 8.3 10e9/L    Absolute Lymphocytes 0.7 (L) 0.8 - 5.3 10e9/L    Absolute Monocytes 0.5 0.0 - 1.3 10e9/L    Absolute Eosinophils 0.1 0.0 - 0.7 10e9/L    Absolute Basophils 0.0 0.0 - 0.2 10e9/L    Abs Immature Granulocytes 0.1 0 - 0.4 10e9/L    Absolute Nucleated RBC 0.0          Assessment and Plan:    This is a 70-year-old male with    Metastatic squamous cell carcinoma of the lung    -Patient is clinically declining with weakness decreased appetite weight loss.  I Told patient that I am worried about declining  In his health   -pt reports he wants to continue with treatment.  Patient wants to fight the cancer.   Right large left lung mass-completed radiation on  09/10  -Patient is currently on palliative treatment with Keytruda Taxol and carboplatin- treatment cycle 3-day 1 given on September 16, 2020- he had  multiple admissions since then  -Per wife and patient request will hold treatment next week and reschedule chemo and follow-up with Dr. Mullins in 2 weeks        Hyponatremia-secondary to cancer  asymptomatic  Baseline sodium 126-130      Nutrition  -G-tube in place  Continue with feeding tube      Back pain from malignancy  Overall well controlled with fentanyl patch and oxycodone  -per Patient request I will refill fentanyl patch and oxycodone today    Elevated TSH  T4 is normal  Continue to monitor    Chronic shortness of breath and nonproductive cough from malignancy and recent pneumonia  Patient is on chronic 3 L of oxygen   -Patient reports no worsening of symptoms no worsening of shortness of breath or cough, no fever chills sweats  Continue with Robitussin and Augmentin      Anemia  Multifactorial  Patient denies bleeding  Overall stable hemoglobin    Thrombocytopenia  Patient denies bleeding platelets  are improving  Advised to monitor closely for bleeding in the event of bleeding fall or injury go to ER      Weakness   Continue with physical and Occupational Therapy    Addendum  I  received a page from a lab.  Sodium is 115 today.  Baseline sodium 126-130  Most likely secondary to free water G tube flushes.   Patient has already left the clinic.  I called wife and told wife to take patient to the ER      ALEXANDRIA Berg CNP  [facility]     Chart documentation with Dragon Voice recognition Software. Although reviewed after completion, some words and grammatical errors may remain.            Again, thank you for allowing me to participate in the care of your patient.        Sincerely,        ALEXANDRIA Berg CNP

## 2020-10-02 NOTE — CONSULTS
Consult Date:  10/02/2020      IDENTIFICATION:  A 70-year-old male presenting to the emergency room, dated 10/01/2020 for evaluation of abnormal laboratory studies.      REASON FOR CONSULTATION:  Evaluation and assessment with respect to apparent hyponatremia.      IMPRESSION:   1.  Baseline normal renal function.   2.  Previous absence of significant proteinuria on both albumin and protein to creatinine ratios.   3.  History of previous hyponatremia with admission in August demonstrating modest hyponatremia with a sodium in the range of 123-130 millimole per liter.   4.  Significant hyponatremia on presentation with serum sodium of 115 millimole per liter.  Evaluation demonstrated urine osmolality of 602 and a urine sodium of 92.  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  He has apparently been receiving significant free water flushes via his G-tube, which may be contributing to his lower sodium in the setting of an elevated urine osmolality.   5.  Metastatic squamous cell of the lung.   6.  History of laryngeal cancer.   7.  Documented bone metastases.   8.  Malnutrition, requires all medications via his PEG.   9.  Chronic O2 requirement.      DISCUSSION:  Elderly male with metastatic lung cancer who presents with hypo-osmolar hyponatremia and urine studies consistent with SIADH.  Providing him with significant free water would certainly exacerbate his serum sodium, as his ability to excrete free water is concentrated by his high urine osmolality.      RECOMMENDATIONS:   1.  Fluid restriction as ordered.   2.  Redocument TSH.   3.  Document urine cortisol.   4.  Begin 2% saline infusion.   5.  Continue 4 hour sodium checks.   6.  He should not receive additional normal saline at this time.      HISTORY:  A 70-year-old male with a number of comorbid medical problems centered around metastatic lung cancer and associated hypoxic respiratory failure requiring chronic oxygen.  He also has dysphagia from apparent  laryngeal issues and now requires a G-tube for medications.      He presented to Oncology Clinic on 10/01/2020.  Laboratories at that time showed a sodium of 150.  He was directed to the emergency room where his repeat sodium was of similar range and he received 1 liter saline infusion.  His sodium has remained in that range.  He has only received 1 liter of saline infusion.      He is awake, alert, appropriate, interactive, sitting in a chair.  Review of his home feedings suggest that he gets 150 mL of free water in addition to 60 mL post-meal for a total of 1260 mL daily.      PAST MEDICAL HISTORY:  Metastatic lung cancer, history of chronic kidney disease, hearing loss, hyperlipidemia, hypothyroidism, laryngeal cancer, depression, testicular hypofunction, squamous cell carcinoma, history of tobacco use.      ADMISSION MEDICATIONS:  Acetaminophen, albuterol, Augmentin, Tessalon, Dulcolax, Zyrtec, Aricept, fentanyl, Flonase, Neurontin, levothyroxine, Ativan, Zofran, oxycodone, Compazine and Zoloft.      PAST SURGICAL HISTORY:  Reviewed.      SOCIAL HISTORY:  Limited, but some alcohol.  Former tobacco.  .      FAMILY HISTORY:  No renal disease of note.      REVIEW OF SYSTEMS:  Complete 10-point review of systems undertaken.  Pertinent positives and negatives are as I noted above.      PHYSICAL EXAMINATION:   VITAL SIGNS:  He is afebrile, his rhythm is sinus, his rate is 80s.  Blood pressure is in the range of 110-120.  His sats are adequate on oxygen.   GENERAL:  Somewhat ill-appearing male, lying in bed, in no acute distress.   HEENT:  Normocephalic, atraumatic.  Pupils equal, round, reactive to light and accommodation.  Pharynx without lesions, appears moist.   NECK:  Supple.   CHEST:  Symmetric.  Coarse breath sounds.   CARDIOVASCULAR:  Regular.   ABDOMEN:  Soft.  G-tube in place.   SKIN:  Without significant lesion.   EXTREMITIES:  No edema.      LABORATORY DATA:  Current and historic reviewed in detail.          G BRIANNA JOHNSON MD             D: 10/02/2020   T: 10/02/2020   MT: LUZ      Name:     CHRISTI MANN   MRN:      [id number]        Account:       [id number]   :      1950           Consult Date:  10/02/2020      Document: [id number]

## 2020-10-02 NOTE — CONSULTS
[facility]    Hematology/Oncology Consult Note      Date of Admission:  10/1/2020  Date of Consult:  10/02/20  Reason for Consult: metastatic squamous cell carcinoma of the lung, admitted for hyponatremia      ASSESSMENT/PLAN:  Agapito Fairbanks is a 70 year old male with:    1) Hyponatremia: History of SIADH due to his metastatic lung cancer, and patient has been receiving significant free water flushes in his G-tube.    -nephrology following, appreciate recommendations  -he is on fluid restriction and 2% saline and frequent sodium checks, as per nephrology recommendations    2) Metastatic squamous cell carcinoma of the lung: on palliative treatment with carboplatin, paclitaxel, and pembrolizumab, last treated on 9/16/20 with Neulasta support.  He wanted to delay treatment next week, and so he is scheduled to see Dr. Mullins in clinic on 10/14/20 with possible treatment.  -follow-up with Dr. Mullins    3) Anemia and thrombocytopenia: likely from chemotherapy, metastatic cancer.  -monitor    4) Chronic respiratory failure: He requests to resume his home albuterol nebs, which were ordered.    We will continue to follow.        HISTORY OF PRESENT ILLNESS: Agapito Fairbanks is a 70 year old male who presents with hyponatremia.  He has metastatic squamous cell carcinoma of the lung on treatment with carboplatin, paclitaxel, and pembrolizumab.   He is a patient of Dr. Mullins.  Please see his clinic notes for details of his oncologic history.  He last got treatment on 9/16/20 (C3D1) with Neulasta support.  He also completed radiation to the chest previously.  He saw Brett in clinic yesterday.  He has had weakness.  His wife reports that he has been confused.  They wanted to hold chemotherapy next week and reschedule in 2 weeks.  After he left the clinic, labs returned with hyponatremia, with sodium of 115, which is quite a bit lower than his baseline.  He was advised to go to the ED.  His wife had been  giving him a lot of free water G-tube flushes.      Currently, he feels about the same, still feels weak overall.  His wife is at bedside.      REVIEW OF SYSTEMS:   14 point ROS was reviewed and is negative other than as noted above in HPI.       MEDICATIONS:  Current Facility-Administered Medications   Medication     acetaminophen (TYLENOL) solution 650 mg     albuterol (PROAIR HFA/PROVENTIL HFA/VENTOLIN HFA) 108 (90 Base) MCG/ACT inhaler 2 puff     albuterol (PROVENTIL) neb solution 2.5 mg     amoxicillin-clavulanate (AUGMENTIN) 400-57 MG/5ML suspension 875 mg     cetirizine (zyrTEC) solution 10 mg     donepezil (ARICEPT) tablet 5 mg     fentaNYL (DURAGESIC) 12 mcg/hr 72 hr patch 1 patch     fentaNYL (DURAGESIC) Patch in Place     fluticasone (FLONASE) 50 MCG/ACT spray 1 spray     gabapentin (NEURONTIN) solution 300 mg     levothyroxine (SYNTHROID/LEVOTHROID) tablet 112 mcg     lidocaine (LMX4) cream     lidocaine 1 % 0.1-1 mL     LORazepam (ATIVAN) 2 MG/ML (HIGH CONC) solution 0.5 mg     melatonin tablet 1 mg     naloxone (NARCAN) injection 0.1-0.4 mg     ondansetron (ZOFRAN-ODT) ODT tab 4 mg    Or     ondansetron (ZOFRAN) injection 4 mg     oxyCODONE (ROXICODONE) solution 5 mg     polyethylene glycol (MIRALAX) Packet 17 g     prochlorperazine (COMPAZINE) injection 5 mg    Or     prochlorperazine (COMPAZINE) tablet 5 mg    Or     prochlorperazine (COMPAZINE) suppository 12.5 mg     sertraline (ZOLOFT) tablet 50 mg     sodium chloride (PF) 0.9% PF flush 3 mL     sodium chloride (PF) 0.9% PF flush 3 mL     sodium chloride 2% infusion         ALLERGIES:  Allergies   Allergen Reactions     Simvastatin      myalgias     Lisinopril Rash     rash         PAST MEDICAL HISTORY:  Past Medical History:   Diagnosis Date     Acute gastritis with hemorrhage 11/02     Basal cell carcinoma, leg      left pretibial area     CKD (chronic kidney disease) stage 3, GFR 30-59 ml/min     creat baseline approx 1.4     Hearing loss       Helicobacter pylori (H. 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)      Tobacco use disorder     quit 1998     Unspecified cataract     left     Unspecified essential hypertension      Unspecified retinal detachment     Bilateral         PAST SURGICAL HISTORY:  Past Surgical History:   Procedure Laterality Date     C NONSPECIFIC PROCEDURE  5/01, 9/01    B retinal surg.     C NONSPECIFIC PROCEDURE  12/01    L cataract     C NONSPECIFIC PROCEDURE  1983    right ankle fx repair     C NONSPECIFIC PROCEDURE  3/05    Right hemilaryngectomy (laser) for Squamous Cell CA T1N0     C NONSPECIFIC PROCEDURE  3/05, 10/09    Moh's procedure for Basal Cell CA chest     C NONSPECIFIC PROCEDURE  4/04    Moh's procedure for Basal Cell CA ear lobe     C NONSPECIFIC PROCEDURE  3/05, 12/05    right vocal cord squamous cell CA excision     C NONSPECIFIC PROCEDURE  12/07    Phacoemulsification of the lens with posterior chamber lens implant, right eye.      C NONSPECIFIC PROCEDURE  10/09     Moh's procedufe for BCC left pretibial area     C NONSPECIFIC PROCEDURE  12/09    Left thoracoscopic wedge resection, Open completion left upper lobe segmentectomy      IR GASTROSTOMY TUBE PERCUTANEOUS PLCMNT  9/21/2020     IR GASTROSTOMY TUBE PERCUTANEOUS PLCMNT  9/22/2020     THORACIC SURGERY      lung,throat         SOCIAL HISTORY:  Social History     Socioeconomic  History     Marital status:      Spouse name: Not on file     Number of children: Not on file     Years of education: Not on file     Highest education level: Not on file   Occupational History     Not on file   Social Needs     Financial resource strain: Not on file     Food insecurity     Worry: Not on file     Inability: Not on file     Transportation needs     Medical: Not on file     Non-medical: Not on file   Tobacco Use     Smoking status: Former Smoker     Packs/day: 1.50     Years: 30.00     Pack years: 45.00     Quit date: 1998     Years since quittin.7     Smokeless tobacco: Never Used   Substance and Sexual Activity     Alcohol use: Yes     Comment: 2-3 times per week, 1-2 beers     Drug use: No     Sexual activity: Yes     Partners: Female     Comment: girflriend with TL   Lifestyle     Physical activity     Days per week: Not on file     Minutes per session: Not on file     Stress: Not on file   Relationships     Social connections     Talks on phone: Not on file     Gets together: Not on file     Attends [employer] service: Not on file     Active member of club or organization: Not on file     Attends meetings of clubs or organizations: Not on file     Relationship status: Not on file     Intimate partner violence     Fear of current or ex partner: Not on file     Emotionally abused: Not on file     Physically abused: Not on file     Forced sexual activity: Not on file   Other Topics Concern     Parent/sibling w/ CABG, MI or angioplasty before 65F 55M? Not Asked   Social History Narrative    High school education.    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[place] [place]    Working part-time as of 2019    Met wife in     Seldom drinks more than 1 drink every now and then    1 daughter in [facility]         FAMILY HISTORY:  Family History   Problem  Relation Age of Onset     Colon Cancer Mother          age 65, in      Family History Negative Father         mild memory problems,  age 87, in      Family History Negative Brother      Family History Negative Brother      Cancer Brother         hx prostate ca     Sleep Apnea Brother          PHYSICAL EXAM:  Vital signs:  Temp: 96.6  F (35.9  C) Temp src: Oral BP: 130/80 Pulse: 71   Resp: 18 SpO2: 100 % O2 Device: Nasal cannula Oxygen Delivery: 2 LPM Height: 182.9 cm (6') Weight: 59.9 kg (132 lb)  Estimated body mass index is 17.9 kg/m  as calculated from the following:    Height as of this encounter: 1.829 m (6').    Weight as of this encounter: 59.9 kg (132 lb).    GENERAL/CONSTITUTIONAL: No acute distress. Wife at bedside.  EYES: No scleral icterus.  RESPIRATORY: Clear to auscultation bilaterally.  CARDIOVASCULAR: Regular rate and rhythm.  GASTROINTESTINAL: No tenderness. No guarding.    MUSCULOSKELETAL: Warm and well-perfused.  NEUROLOGIC: Alert, oriented, answers questions appropriately.        LABS:  CBC RESULTS:   Recent Labs   Lab Test 10/02/20  0557   WBC 12.2*   RBC 3.23*   HGB 8.8*   HCT 26.4*   MCV 82   MCH 27.2   MCHC 33.3   RDW 17.9*   *       Recent Labs   Lab Test 10/02/20  1418 10/02/20  1151 10/02/20  0557 10/01/20  1426 10/01/20  1426   * 117* 115*   < > 115*   POTASSIUM  --   --  4.1  --  4.5   CHLORIDE  --   --  80*  --  82*   CO2  --   --  30  --  27   ANIONGAP  --   --  5  --  6   GLC  --   --  79  --  79   BUN  --   --  10  --  10   CR  --   --  0.48*  --  0.39*   SUSIE  --   --  7.5*  --  7.7*    < > = values in this interval not displayed.             Thank you for the opportunity to participate in this patient's care.  Please call with any questions.    Ana Cristina Short MD  Hematology/Oncology  [facility]

## 2020-10-02 NOTE — PROVIDER NOTIFICATION
MD Notification    Notified Person: MD    Notified Person Name: Dr Yoodanial    Notification Date/Time: 10-2-20 0015    Notification Interaction: Web paged    Purpose of Notification: Na 117    Orders Received: Await any new orders    Comments:

## 2020-10-02 NOTE — PLAN OF CARE
Pt arrived from ED yesterday evening. A&Ox3- disoriented to situation and place at times (hx of dementia). VSS on 3L via NC. LS coarse and congested- crackles noted to R lung. Frequent productive cough noted with CAPUTO, PRN albuterol inhaler x1. G tube clamped, dressing CDI. Bolus tube feeding administered. NPO ex ice chips, 1L FR. BS active x4, no BM in approx 2 days per wife. Continent of bladder, voiding adequately. 2 open areas noted to cocyx, mepilex applied. Turn/repo q2hrs. Up with A1 using gait belt and walker. Plan for nephrology, hem/onc, WOC and nutrition consult today.

## 2020-10-02 NOTE — CONSULTS
"CLINICAL NUTRITION SERVICES  -  ASSESSMENT NOTE      Recommendations Ordered by Registered Dietitian (RD):     Nutrition Support Enteral:  Type of Feeding Tube: G-tube  Enteral Frequency:  bolus  Enteral Regimen: 1.3 cans Isosource 1.5 given TID (4 cans/day)  Total Enteral Provisions: 1500 cals (25 cals/kg), 68 gm pro (1.1 gm/kg), 760 mL free water, 15 gm fiber, 100% RDI  Free Water Flush: 60 mL before and after bolus for tube patency (360 mL)  TOTAL FREE WATER (TF + flushes) = 1120 mL       Future/Additional Recommendations:     TF schedule managed by hospitals dietitian     Malnutrition:   % Weight Loss:  > 7.5% in 3 months (severe malnutrition)  % Intake:  None noted - pt has been receiving 80% goal TF  Subcutaneous Fat Loss:  None observed  Muscle Loss:  Clavicle bone region moderate depletion, Dorsal hand region -mild depletion and Anterior thigh region moderate depletion  Fluid Retention:  None noted    Malnutrition Diagnosis: Severe malnutrition  In Context of:  Chronic illness or disease           REASON FOR ASSESSMENT  Agapito Fairbanks is a 70 year old male seen by Registered Dietitian for Provider Order - Registered Dietitian to Assess and Order TF per Medical Nutrition Therapy Protocol      NUTRITION HISTORY  Chart reviewed  Pt known from recent admit  Pt is followed by hospitals dietitian - wife has noted that goal is 5 cans Isosource per day - \"plan is to gradually reach this goal over the month\"  Wife does gravity feedings (didn't want pump or to use a syringe)    Visited with pt this morning (wife not here at this time)  Pt states that he has been doing 3 feedings per day (Isosource 1.5)  He states he is also drinking some Pepsi and glasses of water - \"6-8 glasses of water throughout the day\"    Chart notes that wife has been givin mL flushes TID (450 mL)  60 mL before and after each bolus (360 mL)  150 mL of water with each feeding (450 mL)  4 cans Isosource 1.5 = 760 mL free water  TOTAL (TF + flushes) " "= 2020 mL    RN spoke with wife.  She notes that they have been doing 3 bolus feeding per day, but have been giving 1.3 cans per feeding (4 cans total for the day). \"Have been slowly working toward the goal of 5 cans per day.\"      CURRENT NUTRITION ORDERS  Diet Order:     NPO  1 liter free water restriction      NUTRITION FOCUSED PHYSICAL ASSESSMENT FOR DIAGNOSING MALNUTRITION)  Yes              Observed:    Muscle wasting (refer to documentation in Malnutrition section)    Obtained from Chart/Interdisciplinary Team:  9/22: 14French G-tube was placed  Metastatic squamous cell carcinoma of the lung    ANTHROPOMETRICS  Height: 6' 0\"  Weight:(10/1) 59.9 kg / 132 lbs 0 oz  Body mass index is 17.9 kg/m .  Weight Status:  Underweight BMI <18.5  IBW: 80.9 kg  % IBW: 74%  Weight History: Pt had ~45# wt loss (26%) from May.  Has slowly started to regain with TF start.  Wt Readings from Last 10 Encounters:   10/01/20 59.9 kg (132 lb)   10/01/20 59.9 kg (132 lb)   09/26/20 59.6 kg (131 lb 8 oz)   09/16/20 58.2 kg (128 lb 3.2 oz)   09/16/20 58.2 kg (128 lb 3.2 oz)   09/14/20 59.9 kg (132 lb)   09/09/20 60.1 kg (132 lb 9.6 oz)   09/05/20 60 kg (132 lb 3.2 oz)   08/26/20 63.5 kg (140 lb)   08/19/20 62.9 kg (138 lb 9.6 oz)     07/29/20 68 kg (150 lb)   07/24/20 67.6 kg (149 lb)   07/21/20 67.2 kg (148 lb 3.2 oz)      06/30/20 71.4 kg (157 lb 6.4 oz)   06/18/20 70.3 kg (155 lb)   06/09/20 73.4 kg (161 lb 12.8 oz)   05/19/20 77.9 kg (171 lb 12.8 oz)   05/07/20 79.7 kg (175 lb 9.6 oz)      LABS  10/2: Na 116 ---> 115 (L)    MEDICATIONS  Medications reviewed      ASSESSED NUTRITION NEEDS PER APPROVED PRACTICE GUIDELINES:    Dosing Weight (10/1) 59.9 kg  Estimated Energy Needs: 4320-3834 kcals (35-40 Kcal/Kg)  Justification: underweight  Estimated Protein Needs:  grams protein (1.5-1.8 g pro/Kg)  Justification: Repletion  Estimated Fluid Needs:Per MD for fluid/lab balance    MALNUTRITION:  % Weight Loss:  > 7.5% in 3 months " (severe malnutrition)  % Intake:  None noted - pt has been receiving 80% goal TF  Subcutaneous Fat Loss:  None observed  Muscle Loss:  Clavicle bone region moderate depletion, Dorsal hand region -mild depletion and Anterior thigh region moderate depletion  Fluid Retention:  None noted    Malnutrition Diagnosis: Severe malnutrition  In Context of:  Chronic illness or disease    NUTRITION DIAGNOSIS:  Inadequate protein-energy intake related to slow advancement of EN as evidenced by pt currently at 60% goal      NUTRITION INTERVENTIONS  Recommendations / Nutrition Prescription  NPO  TF as at home    Nutrition Support Enteral:  Type of Feeding Tube: G-tube  Enteral Frequency:  bolus  Enteral Regimen: 1.3 cans Isosource 1.5 given TID (4 cans total)  Total Enteral Provisions: 1500 cals (25 cals/kg), 68 gm pro (1.1 gm/kg), 760 mL free water, 15 gm fiber, 75% RDI  Free Water Flush: 60 mL before and after bolus for tube patency (360 mL)  TOTAL FREE WATER (TF + flushes) = 1120 mL  .      Implementation  Nutrition education ---> Reviewed plans to resume TF as per home schedule with pt  EN Composition and EN Schedule ---> ordered entered in EPIC  .      Nutrition Goals  Pt to tolerate EN feedings  .      MONITORING AND EVALUATION:  Progress towards goals will be monitored and evaluated per protocol and Practice Guidelines

## 2020-10-02 NOTE — PROGRESS NOTES
"[facility]  Hospitalist Progress Note   10/02/2020          Assessment and Plan:     Agapito Fairbanks is a 70 year old male with medical history of metastatic lung cancer, chronic hypoxic respiratory failure with oxygen dependency, dysphagia status post G-tube placement, cognitive impairment, recent hospital admission from 9/22-9/26 secondary to aspiration pneumonia and dislodged G-tube, readmitted on 10/1/2020 for hyponatremia     Acute on chronic hyponatremia  History of chronic hyponatremia due to SIADH and baseline sodium ranges from 126-130.  However, his last sodium level when he left the hospital on 9/25 was 136.  His hyponatremia could be multifactorial due to increased free water intake as per wife has been giving frequent free water flushes through his G-tube.  1 L NS bolus in the ED, sodium continues to be at 117.  Nephrology recommended 2% saline and Q4hr sodium checks, fluid restriction.  Appreciate recommendation.  Seizure precuations  TSH, Cortisol a.m.  Tele monitoring      History of metastatic small cell cancer  History of laryngeal cancer  Bone metastasis with associated back pain  Patient is currently undergoing palliative chemotherapy with Keytruda Taxol and carboplatin-last treatment on September 16, 2020.  Continue prior to admission fentanyl patch which was placed on 9/29 at home and PRN oxycodone  -Oncology consultation     Severe malnutrition  Dysphasia  SLP note from 9/24 \"For comfort/quality of life and practice swallowing, pt can have as much THIN water or ice chips as he wants throughout the day as long as he is upright and taking small sips. Continue oral cares with toothbrushing 3-5x per day. Continue all meds via PEG tube\"  He is on bolus tube feeds at home, continue tube feeds for now  Nutrition consult for further adjustment and management of his tube feedings    Chronic respiratory failure, on 3 L/min oxygen  Recent hospital admission for aspiration " pneumonia  Per patient reports, his respiratory status seems to have been stable.  He does have coarse breath sounds and sounds congested.   Continue with Augmentin for 1 more days to complete a 7 days course  Continue albuterol inhalers as needed, Robitussin as needed    Hypothyroidism  Continue with prior to admission levothyroxine     Cognitive impairment  Continue prior to admission Aricept     Depression  Continue prior to admission Zoloft     Decubitus pressure ulcer, present on admission  Wound care consult     Physical deconditioning in the setting of malignancy.  recent hospital admission from 9/22-9/26 secondary to aspiration pneumonia and dislodged G-tube  Consider PT prior to discharge.     Diet:  N.p.o., ice chips, okay for small amounts of thin liquids, bolus feeding  DVT Prophylaxis: Pneumatic Compression Devices  Ocampo Catheter: not present  Code Status:  CPR okay, DNI per discussion with admitting hospitalist.     Discussed with patient, bedside RN    Elmo Nelson MD        Interval History:      Lying in bed.  Complains of generalized weakness.  No nausea or vomiting.  Afebrile overnight.  No chest pain or palpitation.         Physical Exam:        Physical Exam   Temp:  [95.6  F (35.3  C)-98  F (36.7  C)] 96.6  F (35.9  C)  Pulse:  [71-78] 71  Resp:  [12-20] 17  BP: (101-132)/(68-90) 130/80  SpO2:  [97 %-100 %] 100 %    Intake/Output Summary (Last 24 hours) at 10/2/2020 0854  Last data filed at 10/2/2020 0430  Gross per 24 hour   Intake 710 ml   Output 200 ml   Net 510 ml       Admission Weight: 59.9 kg (132 lb)  Current Weight: 59.9 kg (132 lb)    PHYSICAL EXAM  GENERAL: Patient is in no distress. Alert and oriented.  HEART: Regular rate and rhythm. S1S2. No murmurs  LUNGS: Bilateral slightly decreased breath sounds.  No wheezing.  Respirations unlabored  NEURO: Moving all extremities.  EXTREMITIES: No pedal edema.   SKIN: Warm, dry  PSYCHIATRY Cooperative       Medications:           amoxicillin-clavulanate  875 mg Oral BID     cetirizine  10 mg Per G Tube Daily     donepezil  5 mg Oral or Feeding Tube Daily     fentaNYL  12 mcg Transdermal Q72H     fentaNYL   Transdermal Q8H     gabapentin  300 mg Oral TID     levothyroxine  112 mcg Oral or Feeding Tube Daily     sertraline  50 mg Oral or Feeding Tube At Bedtime     sodium chloride (PF)  3 mL Intracatheter Q8H     acetaminophen, albuterol, fluticasone, lidocaine 4%, lidocaine (buffered or not buffered), LORazepam, melatonin, naloxone, ondansetron **OR** ondansetron, oxyCODONE, polyethylene glycol, prochlorperazine **OR** prochlorperazine **OR** prochlorperazine, sodium chloride (PF)         Data:      All new lab and imaging data was reviewed.

## 2020-10-02 NOTE — CODE/RAPID RESPONSE
House NP fall note  10/2/2020  1434    I was called to evaluate Mr. Fairbanks due to a witnessed fall.  Patient was sitting in his chair, stood up without assistance with his walker, and slid down to the floor.  His descent to the floor was witnessed by staff, his back with leaning up against the walker, has mild erythema but no open areas.  Patient subjectively denies hitting his head, any new headache, neck pain, back pain but endorses mild lightheadedness while on the floor, as well as coccyx pain.    Exam shows elderly, frail male, alert/oriented.  He is appropriate, cooperative.  There is no spinal tenderness, step-offs, skin exam shows erythema along lumbar spine where her skin hit the walker, range of motion passive/active intact without pain.    /63 (BP Location: Left arm)   Pulse 82   Temp 95.3  F (35.2  C) (Oral)   Resp 18   Ht 1.829 m (6')   Wt 59.9 kg (132 lb)   SpO2 95%   BMI 17.90 kg/m      --Obtain orthostatics this evening when able  --No imaging indicated    Not all injuries from falls are immediately obvious, please do not hesitate to contact the house team for any new findings, pain or symptoms this evening.      [facility]  Hospitalist Service  House Officer  Pager: [phone] (3c - 6p)

## 2020-10-02 NOTE — PROVIDER NOTIFICATION
MD Notification    Notified Person: MD    Notified Person Name: Dr. Shannon     Notification Date/Time: 10/1/2020 8:41 PM     Notification Interaction: phone    Purpose of Notification: Na 115    Orders Received: will place pt on 1L FR, continue to monitor Na q4hrs    Comments:

## 2020-10-02 NOTE — PROVIDER NOTIFICATION
Pt bed alarm went off, ran to the room to find pt sliding down on floor.  Pt stated he did not hit his head, notice small red spot on top of his head.  Notified house MD.  House MD assessed pt, no test ordered. Walked pt back to bed with assist of 2.

## 2020-10-02 NOTE — PROVIDER NOTIFICATION
MD Notification    Notified Person: MD    Notified Person Name: Dr Churchill    Notification Date/Time: 10-2-20 1500    Notification Interaction: Spoke over phone    Purpose of Notification: Na 117    Orders Received: Restart 2% sodium fluids; await next lab check    Comments:

## 2020-10-02 NOTE — PROVIDER NOTIFICATION
MD Notification    Notified Person: MD    Notified Person Name: Dr Sawyer (nephrology)-called per Dr Nelson's request    Notification Date/Time: 10-2-20 6122    Notification Interaction: Spoke on phone    Purpose of Notification: Na 117 drawn prior to 2g sodium being hung- fluids on for 1 hour now    Orders Received: Stop sodium fluids; keep Saline locked. see what next sodium check is    Comments:

## 2020-10-02 NOTE — PROGRESS NOTES
[facility]  Patient is currently open to home care services with [facility]. The patient is currently receiving Skilled Nursing, PT/OT/SLP and HA services. [facility]  and team have been notified of patient admission. [facility] liaison will continue to follow patient during stay. If appropriate provide orders to resume home care at time of discharge.

## 2020-10-02 NOTE — PROGRESS NOTES
[facility]    Patient is currently on service with [facility] for bolus tube feeds.   Nursing provided by [facility].      Liaison will follow along. If applicable at discharge, please include Home Infusion Referral in discharge orders.     Thank you,    July Boudreaux RN  [facility] Liaison  [phone] (M-F 8a-5p)  [phone] Office

## 2020-10-02 NOTE — PLAN OF CARE
Sats 100 on 2LNC. LS coarse. Prn neb ordered; given x1. Tele NSR. Forgetful. Disoriented to time. Upper back pain controlled with fentanyl patch. Sodium at 117. 2% sodium fluids on hold per nephrology orders; await next sodium check, then call nephrology for further instruction. CMS intact. Patient up in chair today. Up with assist of 1, GB and walker. Discharge plan pending improvement in hyponatremia. Continue to monitor.

## 2020-10-03 NOTE — PROGRESS NOTES
Renal Medicine Progress Note                                Agapito Fairbanks MRN# [id number]   Age: 70 year old YOB: 1950   Date of Admission: 10/1/2020 Hospital LOS: 2                  Assessment/Plan:     70-year-old male presenting to the emergency room, dated 10/01/2020 for evaluation of abnormal laboratory studies.     1.  Baseline normal renal function.     2.  Previous absence of significant proteinuria on both albumin and protein to creatinine ratios.     3.  History of previous hyponatremia with admission in August demonstrating modest hyponatremia with a sodium in the range of 123-130 millimole per liter.     4.  Significant hyponatremia on presentation with serum sodium of 115 millimole per liter.  Evaluation demonstrated urine osmolality of 602 and a urine sodium of 92.  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  He has apparently been receiving significant free water flushes via his G-tube, which may be contributing to his lower sodium in the setting of an elevated urine osmolality.     5.  Metastatic squamous cell of the lung.   6.  History of laryngeal cancer.   7.  Documented bone metastases.   8.  Malnutrition, requires all medications via his PEG.   9.  Chronic O2 requirement.       Rate of correction appropriate  Cortisol pending  TSH slightly elevated    Change free water flushes to 1/2NS  Increase 2% rate       Interval History:     No complaints  Up in chair follows    IO and UO reviewed  Rate of Na correction appropriate      ROS:     GENERAL: NAD, No fever,chills  R: NEGATIVE for significant cough or SOB  CV: NEGATIVE for chest pain, palpitations  EXT: no change edema  ROS otherwise negative    Medications and Allergies:     Reviewed    Physical Exam:     Vitals were reviewed  Patient Vitals for the past 8 hrs:   BP Temp Temp src Pulse Resp SpO2   10/03/20 0930 -- -- -- -- 16 --   10/03/20 0928 97/50 96.1  F (35.6  C) Oral 68 16 95 %   10/03/20 0847 -- -- -- -- -- 97 %    10/03/20 0835 -- -- -- -- -- (!) 80 %   10/03/20 0820 -- -- -- -- -- 94 %     I/O last 3 completed shifts:  In: 1248 [NG/GT:540]  Out: 800 [Urine:800]    Vitals:    10/01/20 1530   Weight: 59.9 kg (132 lb)       GENERAL: awake, alert, follows  HEENT: NC/AT, PERRLA, EOMI, non icteric, pharynx moist without lesion  RESP:  clear anteriorly  CV: RRR, normal S1 S2  ABDOMEN: soft, nontender, no HSM or masses and bowel sounds normal  MS: no clubbing, cyanosis   SKIN: clear without significant rashes or lesions  NEURO: speech normal and cranial nerves 2-12 intact  PSYCH: affect normal/bright  EXT: warm, no edema    Data:     Recent Labs   Lab 10/03/20  1041 10/03/20  0538 10/03/20  0203 10/02/20  2144 10/02/20  0557 10/02/20  0557 10/01/20  1426 10/01/20  1426   * 120* 117* 117*   < > 115*   < > 115*   POTASSIUM  --  4.3  --   --   --  4.1  --  4.5   CHLORIDE  --  85*  --   --   --  80*  --  82*   CO2  --  30  --   --   --  30  --  27   ANIONGAP  --  5  --   --   --  5  --  6   GLC  --  78  --   --   --  79  --  79   BUN  --  9  --   --   --  10  --  10   CR  --  0.47*  --   --   --  0.48*  --  0.39*   GFRESTIMATED  --  >90  --   --   --  >90  --  >90   GFRESTBLACK  --  >90  --   --   --  >90  --  >90   SUSIE  --  7.5*  --   --   --  7.5*  --  7.7*    < > = values in this interval not displayed.         Recent Labs   Lab Test 10/03/20  1041 10/03/20  0538 10/03/20  0203 10/02/20  2144 10/02/20  1757 10/02/20  1418 10/02/20  1151 10/02/20  0557 10/02/20  0213 10/02/20  0051   * 120* 117* 117* 116* 117* 117* 115* 116* 116*     Recent Labs   Lab 10/01/20  1426   ALBUMIN 2.0*     Recent Labs   Lab 10/02/20  0557 10/01/20  1426   HGB 8.8* 9.6*     Recent Labs   Lab 10/03/20  0538 10/01/20  1655   UROSMOLALITY  --  602   UNAR  --  92  92   TSH 10.68*  --          NAYA Torres MD    [facility] - Nephrology  [phone]

## 2020-10-03 NOTE — PROGRESS NOTES
Writer accessed chart because writer needed to complete an incomplete medication administration from previous admission. Unable to complete so writer will .

## 2020-10-03 NOTE — PROVIDER NOTIFICATION
MD Notification    Notified Person: MD    Notified Person Name:Dr. Sawyer    Notification Date/Time:10/02/20 2217    Notification Interaction:phone    Purpose of Notification:Na+ recheck 117    Orders Received: no change in order.  No need to draw 2am Na+, recheck in AM.      Comments:

## 2020-10-03 NOTE — PLAN OF CARE
Seizure precaution maintained.  A&O x 3, forgetful at times.  VSS, 3L NC.  LS congested, encourage IS use.  Up with 1 and walker.  Voiding adequate amount in BR.  Sodium 116, recheck q4hrs.  NS infusing 40 ml/hr.  NPO, bolus feeding through g-tube.  Pain managed with gabapentin and fentanyl patch.  Continue to monitor.

## 2020-10-03 NOTE — CONSULTS
[facility] Nurse Inpatient Wound Assessment   Reason for consultation: Evaluate and treat  Back and sacrum wounds      Coccyx: Unstageable CAPI, no local s/s infectgion  Superior spine:  No PI noted. Generalized blanchable erythema  Leonid Risk    Treatment Plan  Back and sacrum wounds: Every other day   Orders Written     Wound care: Sacrum and Upper back  -change dressing on even days and prn   1. Clean wound with MicroKlenz. Pat dry   2. Paint with skin prep or No Sting Skin Prep (#[id number])   3. Cover each site with Mepilex sacral   4. Time and date dressing change   PIP   -attempt Rt/Lt turning only  - heel suspension @ all times in bed  -consider pulsate mattress for additional off-loading  -chair cushion      Recommended provider order: None, at this time  WO Nurse follow-up plan:weekly  Nursing to notify the Provider(s) and re-consult the WO Nurse if wound(s) deteriorates or new skin concern.    Patient History  According to provider note(s):  H+P 9/23: Agapito Fairbanks is a 70 year old male with past medical history significant for metastatic lung cancer, chronic hypoxic respiratory failure with oxygen dependency, recent aspiration pneumonia with hospitalization from September 1-5, dysphagia, hyponatremia secondary to SIADH, mild cognitive impairment who was admitted again for hynatremia  Objective Data  Containment of urine/stool: Brief    Active Diet Order  Orders Placed This Encounter      NPO for Medical/Clinical Reasons Except for: Ice Chips      Output:   I/O last 3 completed shifts:  In: 1598 [NG/GT:390]  Out: 500 [Urine:500]    Risk Assessment:   Sensory Perception: 3-->slightly limited  Moisture: 4-->rarely moist  Activity: 3-->walks occasionally  Mobility: 3-->slightly limited  Nutrition: 2-->probably inadequate  Friction and Shear: 2-->potential problem  Leonid Score: 17                          Labs:   Recent Labs   Lab 10/02/20  0557 10/01/20  1426   ALBUMIN  --  2.0*   HGB 8.8* 9.6*   WBC 12.2*  12.8*       Physical Exam  Areas of skin assessed: focused Back and bony prominences    Wound Location:  Superior spine  Date of last photo 10-2      Large generalized area of blanchable erythema, peeling epidermis with thick yellow looking plaques. Skin is intact.     Wound Location:  Sacrum  Date of last photo 10-2         Size:      #1: Superior:  2.0cm x 1.0cm x 100% white adherent base      #2:  Distal:  1.0cm x.5cm x 100% white adherent base     Palpation of the wound bed: normal      Drainage: scant     Description of drainage: serous     Tunneling N/A     Undermining N/A       Periwound skin: intact      Color: normal and consistent with surrounding tissue      Temperature: normal   Odor: none  Pain: denies , none  Pain: Denies, just difficulty breathing    Interventions  Visual inspection and assessment completed 9/23  Wound Care Rationale Protect periwound skin, Promote moist wound healing without tissue dehydration  and Provide protection   Wound Care: done per plan of care  Supplies: floor stock  Current off-loading measures: Pillows  Current support surface: Standard    Education provided to: importance of repositioning, plan of care and Off-loading pressure  Discussed plan of care with Patient and Nurse    Meaghan Toussaint RN [facility]

## 2020-10-03 NOTE — PROGRESS NOTES
"[facility]  Hospitalist Progress Note   10/03/2020          Assessment and Plan:     Agapito Fairbanks is a 70 year old male with medical history of metastatic lung cancer, chronic hypoxic respiratory failure with oxygen dependency, dysphagia status post G-tube placement, cognitive impairment, recent hospital admission from 9/22-9/26 secondary to aspiration pneumonia and dislodged G-tube, readmitted on 10/1/2020 for hyponatremia     Acute on chronic hyponatremia multifactorial from poor oral intake, increased free water flushes, malignancy -SIADH  History of chronic hyponatremia due to SIADH and baseline sodium ranges from 126-130.  However, his last sodium level when he left the hospital on 9/25 was 136.    Baseline normal renal function, creatinine 0.4.  Urine osmolality 602, urine sodium 92.  UA leukocyte esterase negative, nitrite negative, Specific gravity within normal limits.  Received a liter fluid bolus in the ED, sodium continues to be at 117.  Nephrology following, on 2% saline and every 4 hours sodium checks.  Fluid restriction per nephrology.  Patient getting total free water 1120 mL from feeding tube, per nephrology change free water flushes to half-normal saline.  Appreciate comanagement.  Elevated TSH of 10.68, will check T4 level.  A.m. cortisol 15.3.  We will check liver function tests.  Continue telemetry monitoring.     History of metastatic small cell cancer  History of laryngeal cancer  Bone metastasis with associated back pain  Patient is currently undergoing palliative chemotherapy with Keytruda Taxol and carboplatin-last treatment on September 16, 2020 with Neulasta support  Continue prior to admission fentanyl patch and PRN oxycodone  [facility] oncology following, scheduled to see Dr. Mullins in clinic on 10/14/2020 with possible treatment.     Severe malnutrition  Dysphasia  SLP note from 9/24 \"For comfort/quality of life and practice swallowing, pt can have as " "much THIN water or ice chips as he wants throughout the day as long as he is upright and taking small sips. Continue oral cares with toothbrushing 3-5x per day. Continue all meds via PEG tube\"  He is on bolus tube feeds at home, continue tube feeds for now  Nutrition following.    Anemia and thrombocytopenia: likely from chemotherapy, metastatic cancer.  Chronic anemia and thrombocytopenia.  Hemoglobin between 8-9 at baseline.  Platelets around 120,000.  Will monitor.      Chronic respiratory failure, on 3 L/min oxygen  Recent hospital admission for aspiration pneumonia  Per patient reports, his respiratory status seems to have been stable.    Coarse breath sounds, completed Augmentin 7-day course.  Continue albuterol inhalers as needed, Robitussin as needed    Hypothyroidism  Continue with prior to admission levothyroxine     Cognitive impairment  Continue prior to admission Aricept  Occupational Therapy for cognitive assessment, patient lives at home.     Depression  Continue prior to admission Zoloft     Decubitus pressure ulcer, present on admission  Coccyx: Unstageable, wound care following.    Physical deconditioning in the setting of malignancy.  Witnessed fall in the hospital  Recent hospital admission from 9/22-9/26 secondary to aspiration pneumonia and dislodged G-tube  On 10/2 patient was sitting in his chair, stood up without assistance with his walker and slid down to the floor.  Evaluated by house staff, no external injuries.  PT, OT eval     Diet:  N.p.o., ice chips, okay for small amounts of thin liquids, bolus feeding  DVT Prophylaxis: Pneumatic Compression Devices  Ocampo Catheter: not present  Code Status:  CPR okay, DNI per discussion with admitting hospitalist.     Discussed with patient, bedside RN    Elmo Nelson MD        Interval History:      Lying in bed.  Complains of generalized weakness.  No nausea or vomiting.  Afebrile overnight.  No chest pain or palpitation.  Had a witnessed " fall, evaluated by house staff overnight.         Physical Exam:        Physical Exam   Temp:  [95.3  F (35.2  C)-98  F (36.7  C)] 96.2  F (35.7  C)  Pulse:  [75-82] 76  Resp:  [16-18] 16  BP: ()/(56-67) 108/67  SpO2:  [95 %-100 %] 97 %    Intake/Output Summary (Last 24 hours) at 10/2/2020 0854  Last data filed at 10/2/2020 0430  Gross per 24 hour   Intake 710 ml   Output 200 ml   Net 510 ml       Admission Weight: 59.9 kg (132 lb)  Current Weight: 59.9 kg (132 lb)    PHYSICAL EXAM  GENERAL: Patient is in no distress. Alert and oriented, slow to answer to commands.  HEART: Regular rate and rhythm. S1S2. No murmurs  LUNGS: Bilateral slightly decreased breath sounds.  No wheezing.  Respirations unlabored  NEURO: Moving all extremities.  EXTREMITIES: No pedal edema.   SKIN: Warm, dry  PSYCHIATRY Cooperative       Medications:          cetirizine  10 mg Per G Tube Daily     donepezil  5 mg Oral or Feeding Tube Daily     fentaNYL  12 mcg Transdermal Q72H     fentaNYL   Transdermal Q8H     gabapentin  300 mg Oral TID     levothyroxine  112 mcg Oral or Feeding Tube Daily     sertraline  50 mg Oral or Feeding Tube At Bedtime     sodium chloride (PF)  3 mL Intracatheter Q8H     acetaminophen, albuterol, albuterol, diclofenac, fluticasone, lidocaine 4%, lidocaine (buffered or not buffered), LORazepam, melatonin, naloxone, ondansetron **OR** ondansetron, oxyCODONE, polyethylene glycol, prochlorperazine **OR** prochlorperazine **OR** prochlorperazine, sodium chloride (PF)         Data:      All new lab and imaging data was reviewed.

## 2020-10-03 NOTE — PROGRESS NOTES
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    -nephrology following, appreciate recommendations  -he is on fluid restriction and 2% saline and frequent sodium checks, as per nephrology recommendations  - na level is slowly improving to 120 today    2) Metastatic squamous cell carcinoma of the lung: on palliative treatment with carboplatin, paclitaxel, and pembrolizumab, last treated on 9/16/20 with Neulasta support.  He wanted to delay treatment next week, and so he is scheduled to see Dr. Mullins in clinic on 10/14/20 with possible treatment.  -follow-up with Dr. Mullins, reminded wife of outpt apt 10/14 with Dr Mullins    3) Anemia and thrombocytopenia: likely from chemotherapy, metastatic cancer.  -monitor    4) Chronic respiratory failure: He requests to resume his home albuterol nebs, which were ordered.    We will continue to follow.        HISTORY OF PRESENT ILLNESS: Agapito Fairbanks is a 70 year old male who presents with hyponatremia.  He has metastatic squamous cell carcinoma of the lung on treatment with carboplatin, paclitaxel, and pembrolizumab.   He is a patient of Dr. Mullins.  Please see his clinic notes for details of his oncologic history.  He last got treatment on 9/16/20 (C3D1) with Neulasta support.  He also completed radiation to the chest previously.  He saw Brett in clinic yesterday.  He has had weakness.  His wife reports that he has been confused.  They wanted to hold chemotherapy next week and reschedule in 2 weeks.  After he left the clinic, labs returned with hyponatremia, with sodium of 115, which  is quite a bit lower than his baseline.  He was advised to go to the ED.  His wife had been giving him a lot of free water G-tube flushes.      TODAY  Pt notes he feels better thinks that perhaps he is less fatigued. No n/v. Getting his tube feeds okay. Wife wants to ensure she knows how much fluid flush to give once discharged.      REVIEW OF SYSTEMS:   14 point ROS was reviewed and is negative other than as noted above in HPI.       MEDICATIONS:  Current Facility-Administered Medications   Medication     acetaminophen (TYLENOL) solution 650 mg     albuterol (PROAIR HFA/PROVENTIL HFA/VENTOLIN HFA) 108 (90 Base) MCG/ACT inhaler 2 puff     albuterol (PROVENTIL) neb solution 2.5 mg     cetirizine (zyrTEC) solution 10 mg     diclofenac (VOLTAREN) 1 % topical gel 2 g     donepezil (ARICEPT) tablet 5 mg     fentaNYL (DURAGESIC) 12 mcg/hr 72 hr patch 1 patch     fentaNYL (DURAGESIC) Patch in Place     fluticasone (FLONASE) 50 MCG/ACT spray 1 spray     gabapentin (NEURONTIN) solution 300 mg     levothyroxine (SYNTHROID/LEVOTHROID) tablet 112 mcg     lidocaine (LMX4) cream     lidocaine 1 % 0.1-1 mL     LORazepam (ATIVAN) 2 MG/ML (HIGH CONC) solution 0.5 mg     melatonin tablet 1 mg     naloxone (NARCAN) injection 0.1-0.4 mg     ondansetron (ZOFRAN-ODT) ODT tab 4 mg    Or     ondansetron (ZOFRAN) injection 4 mg     oxyCODONE (ROXICODONE) solution 5 mg     polyethylene glycol (MIRALAX) Packet 17 g     prochlorperazine (COMPAZINE) injection 5 mg    Or     prochlorperazine (COMPAZINE) tablet 5 mg    Or     prochlorperazine (COMPAZINE) suppository 12.5 mg     sertraline (ZOLOFT) tablet 50 mg     sodium chloride (PF) 0.9% PF flush 3 mL     sodium chloride (PF) 0.9% PF flush 3 mL     sodium chloride 2% infusion         ALLERGIES:  Allergies   Allergen Reactions     Simvastatin      myalgias     Lisinopril Rash     rash         PAST MEDICAL HISTORY:  Past Medical History:   Diagnosis Date     Acute gastritis with hemorrhage 11/02      Basal cell carcinoma, leg      left pretibial area     CKD (chronic kidney disease) stage 3, GFR 30-59 ml/min     creat baseline approx 1.4     Hearing loss      Helicobacter pylori (H. 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)      Tobacco use disorder     quit 1998     Unspecified cataract     left     Unspecified essential hypertension      Unspecified retinal detachment     Bilateral         PAST SURGICAL HISTORY:  Past Surgical History:   Procedure Laterality Date     IR GASTROSTOMY TUBE PERCUTANEOUS PLCMNT  9/21/2020     IR GASTROSTOMY TUBE PERCUTANEOUS PLCMNT  9/22/2020     THORACIC SURGERY      lung,throat     [facility]  [facility], 9/01    B retinal surg.     [facility] NONSPECIFIC PROCEDURE  12/01    L cataract     [facility]  1983    right ankle fx repair     [facility] NONSPECIFIC PROCEDURE  3/05    Right hemilaryngectomy (laser) for Squamous Cell CA T1N0     Z NONSPECIFIC PROCEDURE  3/05, 10/09    Moh's procedure for Basal Cell CA chest     Z NONSPECIFIC PROCEDURE  4/04    Moh's procedure for Basal Cell CA ear lobe     Z NONSPECIFIC PROCEDURE  3/05, 12/05    right vocal cord squamous cell CA excision     [facility] NONSPECIFIC PROCEDURE  12/07    Phacoemulsification of the lens with posterior chamber lens implant, right eye.      [facility] NONSPECIFIC PROCEDURE  10/09     Moh's procedufe for BCC left pretibial  area     [facility] NONSPECIFIC PROCEDURE      Left thoracoscopic wedge resection, Open completion left upper lobe segmentectomy          SOCIAL HISTORY:  Social History     Socioeconomic History     Marital status:      Spouse name: Not on file     Number of children: Not on file     Years of education: Not on file     Highest education level: Not on file   Occupational History     Not on file   Social Needs     Financial resource strain: Not on file     Food insecurity     Worry: Not on file     Inability: Not on file     Transportation needs     Medical: Not on file     Non-medical: Not on file   Tobacco Use     Smoking status: Former Smoker     Packs/day: 1.50     Years: 30.00     Pack years: 45.00     Quit date: 1998     Years since quittin.7     Smokeless tobacco: Never Used   Substance and Sexual Activity     Alcohol use: Yes     Comment: 2-3 times per week, 1-2 beers     Drug use: No     Sexual activity: Yes     Partners: Female     Comment: duranflriend with TL   Lifestyle     Physical activity     Days per week: Not on file     Minutes per session: Not on file     Stress: Not on file   Relationships     Social connections     Talks on phone: Not on file     Gets together: Not on file     Attends Hindu service: Not on file     Active member of club or organization: Not on file     Attends meetings of clubs or organizations: Not on file     Relationship status: Not on file     Intimate partner violence     Fear of current or ex partner: Not on file     Emotionally abused: Not on file     Physically abused: Not on file     Forced sexual activity: Not on file   Other Topics Concern     Parent/sibling w/ CABG, MI or angioplasty before 65F 55M? Not Asked   Social History Narrative    High school education.    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 [employer]     Working part-time as of     Met wife in     Seldom drinks more than 1 drink every now and then    1 daughter in [facility]         FAMILY HISTORY:  Family History   Problem Relation Age of Onset     Colon Cancer Mother          age 65, in      Family History Negative Father         mild memory problems,  age 87, in      Family History Negative Brother      Family History Negative Brother      Cancer Brother         hx prostate ca     Sleep Apnea Brother          PHYSICAL EXAM:  Vital signs:  Temp: 96.1  F (35.6  C) Temp src: Oral BP: 97/50 Pulse: 68   Resp: 16 SpO2: 95 % O2 Device: Nasal cannula Oxygen Delivery: 2 LPM Height: 182.9 cm (6') Weight: 59.9 kg (132 lb)  Estimated body mass index is 17.9 kg/m  as calculated from the following:    Height as of this encounter: 1.829 m (6').    Weight as of this encounter: 59.9 kg (132 lb).    GENERAL/CONSTITUTIONAL: No acute distress. Wife at bedside.  EYES: No scleral icterus.  RESPIRATORY: Clear to auscultation bilaterally.  CARDIOVASCULAR: Regular rate and rhythm.  GASTROINTESTINAL: No tenderness. No guarding.    MUSCULOSKELETAL: Warm and well-perfused.  NEUROLOGIC: Alert, oriented, answers questions appropriately.        LABS:  CBC RESULTS:   Recent Labs   Lab Test 10/02/20  0557   WBC 12.2*   RBC 3.23*   HGB 8.8*   HCT 26.4*   MCV 82   MCH 27.2   MCHC 33.3   RDW 17.9*   *       Recent Labs   Lab Test 10/02/20  1418 10/02/20  1151 10/02/20  0557 10/01/20  1426 10/01/20  1426   * 117* 115*   < > 115*   POTASSIUM  --   --  4.1  --  4.5   CHLORIDE  --   --  80*  --  82*   CO2  --   --  30  --  27   ANIONGAP  --   --  5  --  6   GLC  --   --  79  --  79   BUN  --   --  10  --  10   CR  --   --  0.48*  --  0.39*   SUSIE  --   --  7.5*  --  7.7*    < > = values in this interval not displayed.             Bette Randhawa MD on 10/3/2020 at 11:40 AM

## 2020-10-03 NOTE — PROGRESS NOTES
10/03/20 1500   Quick Adds   Type of Visit Initial PT Evaluation   Living Environment   People in home spouse   Current Living Arrangements condominium   Home Accessibility wheelchair accessible   Transportation Anticipated family or friend will provide   Living Environment Comments Pt lives in duplex with spouse who can assist with I/ADL's   Self-Care   Usual Activity Tolerance good   Current Activity Tolerance fair   Regular Exercise Yes   Activity/Exercise Type walking   Exercise Amount/Frequency 3-5 times/wk   Equipment Currently Used at Home grab bar, toilet;grab bar, tub/shower;shower chair;raised toilet seat;walker, rolling;wheelchair, manual   Activity/Exercise/Self-Care Comment Wheelchair utilized for long distances   Disability/Function   Fall history within last six months yes   Number of times patient has fallen within last six months 1   General Information   Onset of Illness/Injury or Date of Surgery 10/01/20   Referring Physician Dr. Short   Patient/Family Therapy Goals Statement (PT) To go home   Pertinent History of Current Problem (include personal factors and/or comorbidities that impact the POC) Pt is a 70 year old male with history of lung cancer admitted with hyponatremia.   Existing Precautions/Restrictions fall   Pain Assessment   Patient Currently in Pain No   Range of Motion (ROM)   ROM Quick Adds ROM WFL   Strength   Strength Comments Gross LE strength 4/5 bilterally   Bed Mobility   Comment (Bed Mobility) SBA   Transfers   Transfer Safety Comments CGA   Gait/Stairs (Locomotion)   Comment (Gait/Stairs) 25' FWW CGA, decreased base of support   Balance   Balance Comments Good in sitting   Clinical Impression   Criteria for Skilled Therapeutic Intervention yes, treatment indicated   PT Diagnosis (PT) Impaired ambulation   Influenced by the following impairments Decreased strength, decreased endurance, decreased balance   Functional limitations due to impairments Difficulty with bed  "mobility, transfers, ambulation   Clinical Presentation Stable/Uncomplicated   Clinical Presentation Rationale VSS, pain controlled   Clinical Decision Making (Complexity) low complexity   Therapy Frequency (PT) Daily   Predicted Duration of Therapy Intervention (days/wks) 3 days   Planned Therapy Interventions (PT) balance training;gait training;strengthening;transfer training   Risk & Benefits of therapy have been explained evaluation/treatment results reviewed;care plan/treatment goals reviewed;risks/benefits reviewed;current/potential barriers reviewed;patient;participants included;participants voiced agreement with care plan;spouse/significant other   Clinical Impression Comments Pt fatigues very easily & will need assist to care for self & SBA for mobility at home. Spouse is very supportive & states pt will always have someone with him at home. Per spouse pt hasn't eaten in 4 days; anticipate pt will build toward baseline with improved nutrition and continued therapies in hospital and after discharge.   PT Discharge Planning    PT Discharge Recommendation (DC Rec) home;home with assist;home with home care physical therapy   PT Rationale for DC Rec Pt currently requires SBA to CGA which spouse is able to provide at all times. Pt has all equipment needs met. Pt will benefit from HHPT to increase endurance and strength. Home health PT is recommended as patient would require considerable effort, assist of one and AD to come and go from house.   PT Brief overview of current status  Pt currently requires SBA to CGA for mobility. Ambulates 100' with 4ww.   [facility] AM-PAC  \"6 Clicks\" V.2 Basic Mobility Inpatient Short Form   1. Turning from your back to your side while in a flat bed without using bedrails? 4 - None   2. Moving from lying on your back to sitting on the side of a flat bed without using bedrails? 4 - None   3. Moving to and from a bed to a chair (including a wheelchair)? 3 - A Little   4. " Standing up from a chair using your arms (e.g., wheelchair, or bedside chair)? 3 - A Little   5. To walk in hospital room? 3 - A Little   6. Climbing 3-5 steps with a railing? 3 - A Little   Basic Mobility Raw Score (Score out of 24.Lower scores equate to lower levels of function) 20   Total Evaluation Time   Total Evaluation Time (Minutes) 10

## 2020-10-03 NOTE — PROGRESS NOTES
10/03/20 1400   Quick Adds   Type of Visit Initial Occupational Therapy Evaluation   Living Environment   People in home spouse   Current Living Arrangements other (see comments)  (Duplex)   Home Accessibility wheelchair accessible   Transportation Anticipated family or friend will provide   Living Environment Comments Pt lives in duplex with spouse who can assist with I/ADL's   Self-Care   Usual Activity Tolerance good   Current Activity Tolerance fair   Regular Exercise Yes   Activity/Exercise Type walking  (Member at lifetime )   Exercise Amount/Frequency 3-5 times/wk   Equipment Currently Used at Home grab bar, toilet;grab bar, tub/shower;shower chair;raised toilet seat;walker, rolling;wheelchair, manual   Activity/Exercise/Self-Care Comment Wheelchair utilized for long distances   Disability/Function   Hearing Difficulty or Deaf other (see comments)  ([place])   Wear Glasses or Blind yes   Vision Management Wears glasses daily    Concentrating, Remembering or Making Decisions Difficulty yes  (Hx of MCI)   Difficulty Communicating no   Difficulty Eating/Swallowing no   Walking or Climbing Stairs Difficulty yes   Walking or Climbing Stairs ambulation difficulty, requires equipment   Dressing/Bathing Difficulty yes   Dressing/Bathing bathing difficulty, assistance 1 person;bathing difficulty, requires equipment   Dressing/Bathing Management Spouse assists with dressing and bathing tasks   Toileting yes   Toileting Management Supervision for toilet transfers   Toileting Assistance toileting difficulty, requires equipment   Fall history within last six months yes   Number of times patient has fallen within last six months   (More than 5)   Change in Functional Status Since Onset of Current Illness/Injury yes   General Information   Onset of Illness/Injury or Date of Surgery 10/01/20   Referring Physician Los Love MD   Patient/Family Therapy Goal Statement (OT) Return home   Additional Occupational Profile  Info/Pertinent History of Current Problem 70-year-old male presenting to the emergency room, dated 10/01/2020 for evaluation of abnormal laboratory studies.    Performance Patterns (Routines, Roles, Habits) Pt has not driven since may. Spouse present to provide pt history secondary to pt [place]. Pt receives spouse assist for ADL's and home managment tasks. Pt utilizes 4WW at baseline for mobility and transfers. Spouse provides SBA.    General Observations and Info Spouse home to assist spouse with daily routine. Spouse reported having additional assist for spouse while she is at work. Spouse reported having HH OT/PT prior to pt being admitted to hospital.    Cognitive Status Examination   Orientation Status person   Affect/Mental Status (Cognitive) confused   Follows Commands follows two-step commands   Safety Deficit moderate deficit   Memory Deficit minimal deficit   Attention Deficit minimal deficit   Executive Function Deficit minimal deficit   Cognitive Status Comments Spouse reported she does not feel safe leaving pt home alone.    Visual Perception   Impact of Vision Impairment on Function (Vision) Pt wears glasses daily    Sensory   Sensory Quick Adds No deficits were identified   Pain Assessment   Patient Currently in Pain Yes, see Vital Sign flowsheet   Range of Motion Comprehensive   General Range of Motion no range of motion deficits identified   Strength Comprehensive (MMT)   Comment, General Manual Muscle Testing (MMT) Assessment BUE shoulder flexion 4-/5.    Muscle Tone Assessment   Muscle Tone Quick Adds No deficits were identified   Bed Mobility   Bed Mobility supine-sit   Supine-Sit [place] (Bed Mobility) supervision   Assistive Device (Bed Mobility) bed rails   Comment (Bed Mobility) 1 VC for pacing   Transfers   Transfers toilet transfer;sit-stand transfer   Sit-Stand Transfer   Sit-Stand [place] (Transfers) contact guard   Assistive Device (Sit-Stand Transfers) walker, standard   Toilet  Transfer   Type (Toilet Transfer) sit-stand   [place] Level (Toilet Transfer) contact guard   Assistive Device (Toilet Transfer) walker, standard;grab bars/safety frame   Activities of Daily Living   BADL Assessment/Intervention lower body dressing;grooming   Lower Body Dressing Assessment/Training   [place] Level (Lower Body Dressing) contact guard assist   Position (Lower Body Dressing) unsupported sitting   Comment (Lower Body Dressing) Donning/doffing socks. Spouse reported pt min A-SBA for LB dressing at home.     Grooming Assessment/Training   [place] Level (Grooming) contact guard assist   Position (Grooming) supported standing   Instrumental Activities of Daily Living (IADL)   IADL Comments Spouse assists with home management tasks.    Clinical Impression   Criteria for Skilled Therapeutic Interventions Met (OT) yes   OT Diagnosis Decreased ind with ADL's   OT Problem List-Impairments impacting ADL cognition;balance;strength;activity tolerance impaired   ADL comments/analysis Pt performing below baseline for I/ADL's   Assessment of Occupational Performance 1-3 Performance Deficits   Identified Performance Deficits Dressing, g/h, toileting   Planned Therapy Interventions (OT) ADL retraining;strengthening;cognition   Clinical Decision Making Complexity (OT) low complexity   Therapy Frequency (OT) Daily   Predicted Duration of Therapy 2 days   Risks and Benefits of Treatment have been explained. Yes   Patient, Family & other staff in agreement with plan of care Yes   Comment-Clinical Impression Spouse reported pt performing below baseline. Spouse reported having additional assist when at work to assist pt with daily routine.    OT Discharge Planning    OT Discharge Recommendation (DC Rec) home with home care occupational therapy   OT Rationale for DC Rec Pt performing below baseline and would benefit from  OT for improved strength and safety with ADL engagement. Pt home bound secondary to use  "of AE; cog deficits; required assist from spouse/caregivers; and unsteadiness on feet. Pending HH OT recommendation based on PT recommendations.    OT Brief overview of current status  Spouse reported pt recieving HH services prior to hospital admission   [facility] TM \"6 Clicks\"   2016, Trustees of [facility], under license to [employer].  All rights reserved.   6 Clicks Short Forms Daily Activity Inpatient Short Form   [facility] AM-PAC  \"6 Clicks\" Daily Activity Inpatient Short Form   1. Putting on and taking off regular lower body clothing? 3 - A Little   2. Bathing (including washing, rinsing, drying)? 2 - A Lot   3. Toileting, which includes using toilet, bedpan or urinal? 3 - A Little   4. Putting on and taking off regular upper body clothing? 3 - A Little   5. Taking care of personal grooming such as brushing teeth? 3 - A Little   6. Eating meals? 4 - None   Daily Activity Raw Score (Score out of 24.Lower scores equate to lower levels of function) 18   Total Evaluation Time (Minutes)   Total Evaluation Time (Minutes) 8     "

## 2020-10-03 NOTE — PLAN OF CARE
VSS on 3LPM NC. Forget ful, but oriented. Up with A1 GB & walker. C/o mild lower back pain, liquid oxy given via g-tube with relief. Plan to continue with 2% saline infusion and frequent Na checks.

## 2020-10-03 NOTE — PROVIDER NOTIFICATION
MD Notification    Notified Person: MD    Notified Person Name:Dr. Torres    Notification Date/Time:10/02/20 19:05    Notification Interaction:phone    Purpose of Notification:critical lab- Na+ 116    Orders Received:changed 2% sodium fluid rate to 40 ml/hr.    Comments:

## 2020-10-03 NOTE — PLAN OF CARE
Sodium improving. Pain managed with oxycodone and tylenol. Up with 1 assist GB/walker. Starting to feel hungry, barbara says he had ad skyler diet at home, will advance diet as tolerated. Plan discharge home when sodium stable

## 2020-10-04 NOTE — PROGRESS NOTES
[facility]    House LOUANN Note  10/4/2020   Time Called: 0209    House LOUANN called for: Fall    Assessment & Plan     Unwitnessed, suspected mechanical fall without obvious injury in setting of physical deconditioning with PMH acute on chronic hyponatremia, metastatic lung cancer, chronic hypoxic respiratory failure.  - Upon arrival, pt lying supine on floor, parallel to bed, awake, alert, in no apparent distress.  Pt reports he need to use restroom; however, when he went to get up, he reports feeling weak and fell to the ground, unassisted, striking buttock first.  When nursing entered pt's room, pt was sitting upright on buttock, knees bent up toward chest, back toward the bed.  Pt reports bilateral shoulder pain, but states not due to the fall.  Pt also notes he did not strike his head.  VSS.    INTERVENTIONS:  - Maintain fall precautions at all times    Discussed with and defer further cares to nursing and hospitalist     Interval History     Agapito Fairbanks is a 70 year old male who was admitted on 10/1/2020 for hyponatremia.    Medical history significant for: Chronic hyponatremia, metastatic small cell cancer, laryngeal cancer, dysphagia, anemia, thrombocytopenia, chronic respiratory failure on 3L, hypothyroidism, cognitive impairment, depression     Code Status: Special Code    Allergies   Allergies   Allergen Reactions     Simvastatin      myalgias     Lisinopril Rash     rash       Physical Exam   Vital Signs with Ranges:  Temp:  [95.2  F (35.1  C)-96.7  F (35.9  C)] 96.7  F (35.9  C)  Pulse:  [68-80] 80  Resp:  [16] 16  BP: ()/(50-60) 102/59  SpO2:  [80 %-100 %] 100 %  I/O last 3 completed shifts:  In: 2137 [I.V.:847; NG/GT:285]  Out: 1050 [Urine:1050]    Constitutional: Pt lying supine on floor, awake, alert, in no apparent distress  Neck: Full ROM noted, no cervical tenderness noted  Pulmonary: In no apparent respiratory distress  Cardiovascular: Appears well perfused  GI:  Flat  Skin/Integumen: Warm, dry, no acute ecchymosis/hematoma/abrasion noted  Neuro: Awake, alert, clear speech, no focal neuro deficits noted  Psych:  Calm, appears upset with questions  Extremities: Moves all extremities without difficulty, no obvious bony deformity noted    Time Spent on this Encounter   I spent 5 minutes on the unit/floor managing the care of Agapito Fairbanks. Over 50% of my time was spent counseling the patient and/or coordinating care regarding services listed in this note.    ALEXANDRIA Storey [facility]  House LOUANN

## 2020-10-04 NOTE — PROGRESS NOTES
BRIEF NUTRITION NOTE:    Received message from RN that wife would like to increase the number of cans of Isosource 1.5 TF to 5 per day, given in 3 feedings/day.    NEW FINDINGS:  Na 127 (L, improved)  Yesterday the H20 flushes via FT were changed to 1/2 NS (360 mL/day)  The 2% IVF was stopped today.    Diet:  CL (FR 1000 mL/day)    10/2:  WOCN =   Coccyx: Unstageable CAPI, no local s/s infection  Superior spine:  No PI noted. Generalized blanchable erythema  Leonid Risk    INTERVENTIONS:  - Enteral Nutrition - Modify volume --> Entered order in Epic to change bolus feedings to 1.66 cans Isosource 1.5 given TID (5 cans /day) = 1875 kcals (31 kcal/kg and 89% energy needs), 85 gm pro (1.4 gm/kg and 94% pro needs), 950 mL H20, 19 gm fiber, 220 gm CHO  Total Fluid (TF + Flushes):  1310 mL.    - Multivitamin/mineral supplement therapy --> Will add Certavite daily via FT - Per PI Protocol (for unstageable CAPI)    Margarita Walls, RD, LD, CNSC

## 2020-10-04 NOTE — PROGRESS NOTES
10/04/20 1624   General Information   Onset of Illness/Injury or Date of Surgery 10/01/20   Referring Physician Dr. Nelson   Patient/Family Therapy Goal Statement (SLP) To advance diet safely as pt has been asking for increased po intake over the past 2 days.   Pertinent History of Current Problem Per MD note: Agapito Fairbanks is a 70 year old male with medical history of metastatic lung cancer, chronic hypoxic respiratory failure with oxygen dependency, dysphagia status post G-tube placement, cognitive impairment, recent hospital admission from 9/22-9/26 secondary to aspiration pneumonia and dislodged G-tube, readmitted on 10/1/2020 for hyponatremia.      Pt has a hx of dysphagia per recent admissions and baseline deficits s/p treatment for laryngeal cancer in 2005.  Video swallow study completed 9/4 with mild-moderate dysphagia found.  DDL1 and thin liquids were recommended with strategies.  G tube placed due to poor po intake and appetite.  During most recent admit, SLP completed a clincal evaluation on 9/24 with recommendations for water and ice chips on a free water protocol for comfort in addition to G tube feeding.  Spouse reports pt coughing/choking at times after thin liquids at home.  Pt has not had an appetite for food options at home. Pt now asking for increased po options.   General Observations Alert, forgetful.   Oral Motor   Oral Musculature generally intact   Mucosal Quality dry  (Congested coughing on phlegm throughout day reported)   Clinical Swallow Evaluation   Feeding Assistance no assistance needed   Clinical Swallow Eval: Thin Liquid Texture Trial   Mode of Presentation, Thin Liquids cup   Volume of Liquid or Food Presented sips x 6   Oral Phase of Swallow WFL   Pharyngeal Phase of Swallow reduction in laryngeal movement;repeated swallows   Diagnostic Statement Wet voicing x 1, delayed increased cough, swallow-cough-swallow decreased need for additional congested coughing   Clinical  Swallow Evaluation: Puree Solid Texture Trial   Mode of Presentation, Puree spoon;self-fed   Volume of Puree Presented tsps x 3   Oral Phase, Puree WFL   Pharyngeal Phase, Puree reduction in laryngeal movement;repeated swallows   Diagnostic Statement cough x 1 after swallow, swallow-cough-swallow decreased need for additional congested coughing   Clinical Swallow Evaluation: Solid Food Texture Trial   Mode of Presentation, Solid self-fed   Volume of Solid Food Presented bites x 2   Oral Phase, Solid   (slow mastication)   Pharyngeal Phase, Solid reduction in laryngeal movement;repeated swallows   Diagnostic Statement coughed up phlegm tinged with solid   Esophageal Phase of Swallow   Patient reports or presents with symptoms of esophageal dysphagia Yes   Esophageal comments Hx of burping with po   Swallowing Recommendations   Diet Consistency Recommendations dysphagia level 2 (mechanically altered);thin liquids  (Per pt/family request to initiate food items for pleasure; pt/spouse decline pureed food options)   Supervision Level for Intake 1:1 supervision needed   Mode of Delivery Recommendations no straws;slow rate of intake   Swallowing Maneuver Recommendations supraglottic swallow (swallow-cough-swallow sequence) ;alternate food and liquid intake;extra swallow   Monitoring/Assistance Required (Eating/Swallowing) monitor for cough or change in vocal quality with intake   Recommended Feeding/Eating Techniques (Swallow Eval) maintain upright posture during/after eating for 30 minutes;provide assist with feeding   Medication Administration Recommendations, Swallowing (SLP) Meds via G tube   SLP Therapy Assessment/Plan   Criteria for Skilled Therapeutic Interventions Met (SLP Eval) yes   SLP Diagnosis Moderate oral-pharyngeal dysphagia, difficult to determine baseline cough vs increased cough after swallows at times  (Note baseline coughing reported on 9/4 video swallow study )   Rehab Potential (SLP Eval) good, to  achieve stated therapy goals   Therapy Frequency (SLP Eval) 5 times/wk   Predicted Duration of Therapy Intervention (SLP Eval) 1 week   Comment, Therapy Assessment/Plan (SLP) Pt presents with moderate oral-pharyngeal dysphagia; however, difficult to determine baseline congested coughing from possible increased aspiration during po trials today.  Pt/family requesting food options for pleasure.  Family acknowledges pt has a baseline cough that increases with po intake at times.  Pt demonstrated decreased laryngeal elevation, need for multiple swallows, wet voicing and increased coughing intermittently during po trials today.  Spouse feels pt's swallow is at baseline.  Spouse would like to maximize safety, but increase po diet options.  Recommend cautious initiation of a dysphagia diet level 2 and thin liquids with 1:1 supervision, sit at 90 degrees, remain upright for 30-60 minutes after eating, small bites/sips, no straw, swallow-cough-swallow sequence every bite/sip, alternate textures, slow rate, chew carefully.  Hold po if increased lung congestion/decreased respiratory status noted.  MD was paged for diet order as spouse has questions about fluid restriction/sodium control with po.  Plan to provide swallow Tx to assess diet tolerance, train strategies, and educate regarding POC options as indicated per pt/family quality of life wishes.   Therapy Plan Review/Discharge Plan (SLP)   Therapy Plan Review (SLP) evaluation/treatment results reviewed;care plan/treatment goals reviewed;risks/benefits reviewed;participants voiced agreement with care plan;participants included;patient;spouse/significant other   SLP Discharge Planning    SLP Discharge Recommendation (DC Rec) home with home care speech therapy   SLP Rationale for DC Rec [facility] SLP recommended after discharge to maximize safety for pleasure feeding   SLP Brief overview of current status  Pt presents with moderate oral-pharyngeal dysphagia; however, difficult to  determine baseline congested coughing from possible increased aspiration during po trials today.  Pt/family requesting food options for pleasure.  Family acknowledges pt has a baseline cough that increases with po intake at times.  Pt demonstrated decreased laryngeal elevation, need for multiple swallows, wet voicing and increased coughing intermittently during po trials today.  Spouse feels pt's swallow is at baseline.  Spouse would like to maximize safety, but increase po diet options  MD paged for dysphagia diet level 2 and thin liquid order with cautious initiation.      Total Evaluation Time   Total Evaluation Time (Minutes) 20

## 2020-10-04 NOTE — PROVIDER NOTIFICATION
Notified DR. Nelson of SBP in 80's. Monitor for now, do not want to give fluid bolus with low sodiums.

## 2020-10-04 NOTE — PROGRESS NOTES
MD Notification    Notified Person: MD    Notified Person Name: Melissa    Notification Date/Time: 10/03/2020 23:50    Notification Interaction: Telephone    Purpose of Notification: Na 124, dropped one point.    Orders Received: Keep infusion rate at 25 ml/hr, recheck in am.    Comments: Stated that did not need a 2 am recheck, recheck Na in am and will re-assess infusion rate.

## 2020-10-04 NOTE — PLAN OF CARE
AxO x3-4, forgetful. Pain managed with PRN Oxycodone. Denies nausea. Ax1 GB walker, up to bathroom or bedside urinal. 1200 ml fluid restriction. Ice chips/sips of clears. 2% Na infusing. Discharge pending Na levels.

## 2020-10-04 NOTE — PROGRESS NOTES
Renal Medicine Progress Note                                Agapito Fairbanks MRN# [id number]   Age: 70 year old YOB: 1950   Date of Admission: 10/1/2020 Hospital LOS: 3                  Assessment/Plan:     70-year-old male presenting to the emergency room, dated 10/01/2020 for evaluation of abnormal laboratory studies.     1.  Baseline normal renal function.     2.  Previous absence of significant proteinuria on both albumin and protein to creatinine ratios.     3.  History of previous hyponatremia with admission in August demonstrating modest hyponatremia with a sodium in the range of 123-130 millimole per liter.     4.  Significant hyponatremia on presentation with serum sodium of 115 millimole per liter.  Evaluation demonstrated urine osmolality of 602 and a urine sodium of 92.  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  He has apparently been receiving significant free water flushes via his G-tube, which may be contributing to his lower sodium in the setting of an elevated urine osmolality.     5.  Metastatic squamous cell of the lung.   6.  History of laryngeal cancer.   7.  Documented bone metastases.   8.  Malnutrition, requires all medications via his PEG.   9.  Chronic O2 requirement.       Continue 0.45 saline for flushes  (mix 0.9 saline and water 1:1 as outpatient)    Stop 2% infusion  Na q6    Sertraline may need to be discontinued     Following       Interval History:     Clinical stable    Rate of Na correction appropriate    Follows      ROS:     GENERAL: NAD, No fever,chills  R: NEGATIVE for significant cough or SOB  CV: NEGATIVE for chest pain, palpitations  EXT: no change edema  ROS otherwise negative    Medications and Allergies:     Reviewed    Physical Exam:     Vitals were reviewed  Patient Vitals for the past 8 hrs:   BP Temp Temp src Pulse Resp SpO2   10/04/20 0746 104/67 96.1  F (35.6  C) Oral 75 15 100 %     I/O last 3 completed shifts:  In: 2137 [I.V.:847; NG/GT:285]  Out:  800 [Urine:800]    Vitals:    10/01/20 1530   Weight: 59.9 kg (132 lb)       GENERAL: awake, alert, follows  HEENT: NC/AT, PERRLA, EOMI, non icteric, pharynx moist without lesion  RESP:  clear anteriorly  CV: RRR, normal S1 S2  ABDOMEN: soft, nontender, no HSM or masses and bowel sounds normal  MS: no clubbing, cyanosis   SKIN: clear without significant rashes or lesions  NEURO: speech normal and cranial nerves 2-12 intact  PSYCH: affect normal/bright  EXT: warm, no edema    Data:     Recent Labs   Lab 10/04/20  1017 10/04/20  0531 10/03/20  2229 10/03/20  1805 10/03/20  0538 10/03/20  0538 10/02/20  0557 10/02/20  0557 10/01/20  1426 10/01/20  1426   * 127* 124* 125*   < > 120*   < > 115*   < > 115*   POTASSIUM  --  3.8  --   --   --  4.3  --  4.1  --  4.5   CHLORIDE  --  91*  --   --   --  85*  --  80*  --  82*   CO2  --  33*  --   --   --  30  --  30  --  27   ANIONGAP  --  3  --   --   --  5  --  5  --  6   GLC  --  88  --   --   --  78  --  79  --  79   BUN  --  10  --   --   --  9  --  10  --  10   CR  --  0.43*  --   --   --  0.47*  --  0.48*  --  0.39*   GFRESTIMATED  --  >90  --   --   --  >90  --  >90  --  >90   GFRESTBLACK  --  >90  --   --   --  >90  --  >90  --  >90   SUSIE  --  7.7*  --   --   --  7.5*  --  7.5*  --  7.7*    < > = values in this interval not displayed.         Recent Labs   Lab Test 10/04/20  1017 10/04/20  0531 10/03/20  2229 10/03/20  1805 10/03/20  1514 10/03/20  1041 10/03/20  0538 10/03/20  0203 10/02/20  2144 10/02/20  1757   * 127* 124* 125* 123* 120* 120* 117* 117* 116*     Recent Labs   Lab 10/03/20  1805 10/01/20  1426   ALBUMIN 2.2* 2.0*     Recent Labs   Lab 10/02/20  0557 10/01/20  1426   HGB 8.8* 9.6*     Recent Labs   Lab 10/04/20  0531 10/03/20  0538 10/01/20  1655   UROSMOLALITY  --   --  602   UNAR  --   --  92  92   TSH 10.74* 10.68*  --    ELADIO  --  15.3  --          NAYA Torres MD    [facility] - Nephrology  [phone]

## 2020-10-04 NOTE — PROGRESS NOTES
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    -nephrology following, appreciate recommendations  -he is on fluid restriction and 2% saline and frequent sodium checks, as per nephrology recommendations, g tube flushes changed to 0.5% NS  - na level is slowly improving to 124 today    2) Metastatic squamous cell carcinoma of the lung: on palliative treatment with carboplatin, paclitaxel, and pembrolizumab, last treated on 9/16/20 with Neulasta support.  He wanted to delay treatment next week, and so he is scheduled to see Dr. Mullins in clinic on 10/14/20 with possible treatment.  -follow-up with Dr. Mullins, reminded wife of outpt apt 10/14 with Dr Mullins    3) Anemia and thrombocytopenia: likely from chemotherapy, metastatic cancer.  -monitor    4) Chronic respiratory failure: He requests to resume his home albuterol nebs, which were ordered.    We will continue to follow.        HISTORY OF PRESENT ILLNESS: Agapito Fairbanks is a 70 year old male who presents with hyponatremia.  He has metastatic squamous cell carcinoma of the lung on treatment with carboplatin, paclitaxel, and pembrolizumab.   He is a patient of Dr. Mullins.  Please see his clinic notes for details of his oncologic history.  He last got treatment on 9/16/20 (C3D1) with Neulasta support.  He also completed radiation to the chest previously.  He saw Brett in clinic yesterday.  He has had weakness.  His wife reports that he has been confused.  They wanted to hold chemotherapy next week and reschedule in 2 weeks.  After he left the clinic, labs returned with  hyponatremia, with sodium of 115, which is quite a bit lower than his baseline.  He was advised to go to the ED.  His wife had been giving him a lot of free water G-tube flushes.      TODAY  Pt notes he feels about the same. He has a sore on his bottom the RN is getting ready to address. No other new issues. He'd like to go home, but understands this is more likely for tomorrow.       REVIEW OF SYSTEMS:   14 point ROS was reviewed and is negative other than as noted above in HPI.       MEDICATIONS:  Current Facility-Administered Medications   Medication     acetaminophen (TYLENOL) solution 650 mg     albuterol (PROAIR HFA/PROVENTIL HFA/VENTOLIN HFA) 108 (90 Base) MCG/ACT inhaler 2 puff     albuterol (PROVENTIL) neb solution 2.5 mg     cetirizine (zyrTEC) solution 10 mg     diclofenac (VOLTAREN) 1 % topical gel 2 g     donepezil (ARICEPT) tablet 5 mg     fentaNYL (DURAGESIC) 12 mcg/hr 72 hr patch 1 patch     fentaNYL (DURAGESIC) Patch in Place     fluticasone (FLONASE) 50 MCG/ACT spray 1 spray     gabapentin (NEURONTIN) solution 300 mg     levothyroxine (SYNTHROID/LEVOTHROID) tablet 112 mcg     lidocaine (LMX4) cream     lidocaine 1 % 0.1-1 mL     LORazepam (ATIVAN) 2 MG/ML (HIGH CONC) solution 0.5 mg     melatonin tablet 1 mg     naloxone (NARCAN) injection 0.1-0.4 mg     ondansetron (ZOFRAN-ODT) ODT tab 4 mg    Or     ondansetron (ZOFRAN) injection 4 mg     oxyCODONE (ROXICODONE) solution 5 mg     polyethylene glycol (MIRALAX) Packet 17 g     prochlorperazine (COMPAZINE) injection 5 mg    Or     prochlorperazine (COMPAZINE) tablet 5 mg    Or     prochlorperazine (COMPAZINE) suppository 12.5 mg     sertraline (ZOLOFT) tablet 50 mg     sodium chloride (PF) 0.9% PF flush 3 mL     sodium chloride (PF) 0.9% PF flush 3 mL         ALLERGIES:  Allergies   Allergen Reactions     Simvastatin      myalgias     Lisinopril Rash     rash         PAST MEDICAL HISTORY:  Past Medical History:   Diagnosis Date     Acute gastritis  with hemorrhage 11/02     Basal cell carcinoma, leg      left pretibial area     CKD (chronic kidney disease) stage 3, GFR 30-59 ml/min     creat baseline approx 1.4     Hearing loss      Helicobacter pylori (H. 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)      Tobacco use disorder     quit 1998     Unspecified cataract     left     Unspecified essential hypertension      Unspecified retinal detachment     Bilateral         PAST SURGICAL HISTORY:  Past Surgical History:   Procedure Laterality Date     IR GASTROSTOMY TUBE PERCUTANEOUS PLCMNT  9/21/2020     IR GASTROSTOMY TUBE PERCUTANEOUS PLCMNT  9/22/2020     THORACIC SURGERY      lung,throat     [facility] NONSPECIFIC PROCEDURE  5/01, 9/01    B retinal surg.     [facility] NONSPECIFIC PROCEDURE  12/01    L cataract     [facility] NONSPECIFIC PROCEDURE  1983    right ankle fx repair     Z NONSPECIFIC PROCEDURE  3/05    Right hemilaryngectomy (laser) for Squamous Cell CA T1N0     Z NONSPECIFIC PROCEDURE  3/05, 10/09    Moh's procedure for Basal Cell CA chest     Z NONSPECIFIC PROCEDURE  4/04    Moh's procedure for Basal Cell CA ear lobe     Z NONSPECIFIC PROCEDURE  3/05, 12/05    right vocal cord squamous cell CA excision     [facility] NONSPECIFIC PROCEDURE  12/07    Phacoemulsification of the lens with posterior chamber lens implant, right eye.      [facility] NONSPECIFIC PROCEDURE  10/09     Moh's procedufe for  BCC left pretibial area     [facility] NONSPECIFIC PROCEDURE      Left thoracoscopic wedge resection, Open completion left upper lobe segmentectomy          SOCIAL HISTORY:  Social History     Socioeconomic History     Marital status:      Spouse name: Not on file     Number of children: Not on file     Years of education: Not on file     Highest education level: Not on file   Occupational History     Not on file   Social Needs     Financial resource strain: Not on file     Food insecurity     Worry: Not on file     Inability: Not on file     Transportation needs     Medical: Not on file     Non-medical: Not on file   Tobacco Use     Smoking status: Former Smoker     Packs/day: 1.50     Years: 30.00     Pack years: 45.00     Quit date: 1998     Years since quittin.7     Smokeless tobacco: Never Used   Substance and Sexual Activity     Alcohol use: Yes     Comment: 2-3 times per week, 1-2 beers     Drug use: No     Sexual activity: Yes     Partners: Female     Comment: girflriend with TL   Lifestyle     Physical activity     Days per week: Not on file     Minutes per session: Not on file     Stress: Not on file   Relationships     Social connections     Talks on phone: Not on file     Gets together: Not on file     Attends Tenriism service: Not on file     Active member of club or organization: Not on file     Attends meetings of clubs or organizations: Not on file     Relationship status: Not on file     Intimate partner violence     Fear of current or ex partner: Not on file     Emotionally abused: Not on file     Physically abused: Not on file     Forced sexual activity: Not on file   Other Topics Concern     Parent/sibling w/ CABG, MI or angioplasty before 65F 55M? Not Asked   Social History Narrative    High school education.    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         FAMILY HISTORY:  Family History   Problem Relation Age of Onset     Colon Cancer Mother          age 65, in      Family History Negative Father         mild memory problems,  age 87, in      Family History Negative Brother      Family History Negative Brother      Cancer Brother         hx prostate ca     Sleep Apnea Brother          PHYSICAL EXAM:  Vital signs:  Temp: 96.1  F (35.6  C) Temp src: Oral BP: 104/67 Pulse: 75   Resp: 15 SpO2: 100 % O2 Device: Nasal cannula Oxygen Delivery: 2.5 LPM Height: 182.9 cm (6') Weight: 59.9 kg (132 lb)  Estimated body mass index is 17.9 kg/m  as calculated from the following:    Height as of this encounter: 1.829 m (6').    Weight as of this encounter: 59.9 kg (132 lb).    GENERAL/CONSTITUTIONAL: No acute distress.  EYES: No scleral icterus.  RESPIRATORY: no distress  GASTROINTESTINAL: No tenderness. No guarding.    MUSCULOSKELETAL: Warm and well-perfused.  NEUROLOGIC: Alert, oriented, answers questions appropriately.        LABS:  Recent Labs   Lab Test 10/04/20  1017 10/04/20  0531 10/03/20  2229 10/03/20  1805 10/03/20  1514 10/03/20  0538 10/03/20  0538 10/02/20  0557 10/02/20  0557 10/01/20  1426 10/01/20  1426 20  0601   * 127* 124* 125* 123*   < > 120*   < > 115*   < > 115* 136   POTASSIUM  --  3.8  --   --   --   --  4.3  --  4.1  --  4.5 3.6   CHLORIDE  --  91*  --   --   --   --  85*  --  80*  --  82* 102   CO2  --  33*  --   --   --   --  30  --  30  --  27 34*   ANIONGAP  --  3  --   --   --   --  5  --  5  --  6 <1*   BUN  --  10  --   --   --   --  9  --  10  --  10 12   CR  --  0.43*  --   --   --   --  0.47*  --  0.48*  --  0.39* 0.54*   GLC  --  88  --   --   --   --  78  --  79  --  79 84   SUSIE  --  7.7*  --   --   --   --  7.5*  --  7.5*  --  7.7* 7.9*    < > = values in  this interval not displayed.     Recent Labs   Lab Test 09/26/20  0711 09/25/20  0601 09/24/20  0716 09/04/20  0719 05/02/20  0845 05/01/20  1756 01/22/20  0852   MAG  --  1.4* 1.7 1.7 2.0 2.0  --    PHOS 3.2 1.8*  --   --   --   --  3.3     Recent Labs   Lab Test 10/02/20  0557 10/01/20  1426 09/26/20  0711 09/25/20  0601 09/24/20  0716 09/22/20  1402 09/22/20  1402   WBC 12.2* 12.8* 12.2* 8.0 3.5*   < > 1.1*   HGB 8.8* 9.6* 9.5* 7.7* 6.1*   < > 8.8*   * 118* 39* 46* 39*   < > 94*   MCV 82 82 84 84 84   < > 84   NEUTROPHIL  --  89.1 87.0 84.0 76.0  --  71.9    < > = values in this interval not displayed.     Recent Labs   Lab Test 10/03/20  1805 10/01/20  1426 09/15/20  1455   BILITOTAL 0.3 0.6 0.5   ALKPHOS 107 104 107   ALT 16 15 16   AST 17 17 16   ALBUMIN 2.2* 2.0* 2.4*     TSH   Date Value Ref Range Status   10/04/2020 10.74 (H) 0.40 - 4.00 mU/L Final   10/03/2020 10.68 (H) 0.40 - 4.00 mU/L Final     Comment:     Drawn above stopped IV   09/15/2020 7.06 (H) 0.40 - 4.00 mU/L Final     No results for input(s): CEA in the last 04040 hours.  Results for orders placed or performed during the hospital encounter of 09/22/20   Chest XR,  PA & LAT    Narrative    CHEST TWO VIEWS  9/22/2020 2:57 PM     HISTORY: Possible aspiration. Evaluate for pneumonia.    COMPARISON: 9/4/2020.      Impression    IMPRESSION: Large masslike opacity in the left hemithorax projected  over the left hilar region anteriorly appears to have increased in  prominence since the previous exam, with decreased aeration of the  left lung noted. A superimposed left lower lung pneumonia is possible.  Masslike thickening at the left lung apex is not significantly  changed. Elevation of the left hemidiaphragm is not significantly  changed. Hazy increased density in the right midlung could also be  infectious in etiology. The right lung is otherwise clear. No  pneumothorax.    LAUREN VAUGHAN MD   IR Gastrostomy Tube Percutaneous Plcmnt     Narrative    G-TUBE PLACEMENT 9/22/2020 3:47 PM     HISTORY: Requires nutritional support. Unable to take adequate oral  intake. A stent had a G-tube placed yesterday. This tube has fallen  out.    DESCRIPTION OF PROCEDURE: After obtaining informed consent, the  patient was placed in a supine position on the fluoroscopy table.  Attempts to regain access into the stomach were unsuccessful using a  blunt Farmer needle. Therefore, a nasogastric tube was placed into  the stomach. 1 mg glucagon was given intravenously. The stomach was  inflated with air.     Two T-TAC suture anchors were used to enter the stomach. A small skin  incision was made in between the T-TAC entry sites. An 18-gauge needle  was used to enter the stomach through the incision. A wire was passed  and curled in the stomach. A tract for the G-tube was dilated. An 18  Jordanian peel-away sheath was placed. Through the peel-away sheath, a 14  Jordanian G-tube was placed. The balloon was inflated with a mixture of 1  mL contrast and 9 mL saline. Balloon was pulled back so that it was  against the anterior stomach wall. Contrast was injected to document  adequate positioning. A fluoroscopic image was saved to document tube  position.     The patient tolerated the procedure well. There were no immediate  postprocedure complications. The patient tolerated the procedure well.  There were no immediate postprocedure complications. The patient's  vital signs were monitored by radiology nursing staff under my  supervision and remained stable throughout the study. Radiation dose  for this scan was reduced using automated exposure control, adjustment  of the mA and/or kV according to patient size, or iterative  reconstruction technique.    MEDICATIONS: 1 mg glucagon, 100 mg fentanyl    SEDATION TIME: None    FLUOROSCOPY TIME: 9.8 minutes    RADIATION DOSE: 192.01 mGy Air Kerma    NUMBER OF FLUOROSCOPIC IMAGES: 3      Impression    IMPRESSION: New G-tube placed into the  stomach as above.    MD Bette GUERRERO MD on 10/4/2020 at 11:40 AM

## 2020-10-05 NOTE — DISCHARGE SUMMARY
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  Severe malnutrition  Physical deconditioning in the setting of malignancy.    Past Medical History:   Diagnosis Date     Acute gastritis with hemorrhage 11/02     Basal cell carcinoma, leg      left pretibial area     CKD (chronic kidney disease) stage 3, GFR 30-59 ml/min     creat baseline approx 1.4     Hearing loss      Helicobacter pylori (H. 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)      Tobacco use disorder     quit 1998     Unspecified cataract     left     Unspecified essential hypertension      Unspecified retinal detachment     Bilateral       History of Present Illness   Agapito Fairbanks is an 70 year old male who presented with weakness     Hospital Course   Agapito Fairbanks is a 70 year old male with medical history of metastatic lung cancer, chronic hypoxic respiratory failure with oxygen dependency, dysphagia status post G-tube placement, cognitive impairment, recent hospital admission from 9/22-9/26 secondary to aspiration pneumonia and dislodged G-tube,  readmitted on 10/1/2020 for hyponatremia     Acute on chronic 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  History of chronic hyponatremia due to SIADH and baseline sodium ranges from 126-130. However, his last sodium level when he left the hospital on 9/25 was 136.   Reports poor oral intake, increased free water flushes, has underlying malignancy   Creatinine 0.4.  Urine osmolality 602, urine sodium 92.  TSH of 10.68, normal free T4,. cortisol 15.3.  LE no acute events  Albumin 2.2, ALT AST within normal limits.  CK normal.  UA leukocyte esterase negative, nitrite negative, Specific gravity within normal limits.  Was followed by nephrology during hospitalization, was on 2% normal saline and sodium levels improved to 124.  Switched free water flushes for feeding tube to half-normal saline flushes.  Fluid restriction to 1200 mL/day, at time of discharge sodium 128.  Recommended to recheck sodium within 48 hours.     History of metastatic small cell cancer  History of laryngeal cancer  Bone metastasis with associated back pain  Patient is currently undergoing palliative chemotherapy with Keytruda Taxol and carboplatin-last treatment 9/16/2020 with Neulasta support  Continue PTA PRN oxycodone  Followed by [facility] oncology during hospitalization, scheduled to see Dr. Mullins in clinic on 10/14/2020     Severe malnutrition  Dysphasia  Followed by nutrition, tube feeds continued.  Switched free water to half-normal saline flushes.  Speech therapy recommended dysphagia diet with strict aspiration precautions.  Educated patient and his wife on aspiration precautions, risk for aspiration.     Anemia and thrombocytopenia: likely from chemotherapy, metastatic cancer.  Chronic anemia and thrombocytopenia.  Hemoglobin between 8-9 at baseline.  Platelets around 120,000.       Chronic respiratory failure, on 3 L/min oxygen  Recent hospital admission for aspiration pneumonia  Per patient reports, his respiratory status  seems to have been stable.    Completed Augmentin 7-day course.  Continue albuterol inhalers as needed     Hypothyroidism  Elevated TSH of 10.74, free T4 0.95.  Monitor thyroid function tests in 10 to 12 weeks.  Continue with prior to admission levothyroxine     Cognitive impairment  Continue prior to admission Wilfrid  Lives at home with his wife, julio wiggins as outpatient.     Depression  Continue prior to admission Zoloft     Decubitus pressure ulcer, present on admission  Coccyx: Unstageable, wound care followed.     Physical deconditioning in the setting of malignancy.  Fall x 2 in the hospital  Recent hospital admission from 9/22-9/26 secondary to aspiration pneumonia and dislodged G-tube  On 10/2 patient went into had a witnessed fall, on 10 for unwitnessed fall.  Evaluated by house staff no external injuries. Discussed with patient, his wife, would like to take him home.  She reports he is more impulsive in the hospital.  PT, OT recommend home with home care.  Discussed with care coordinator, requested resumption of home care services.    Elmo Nelson MD, MD    Pending Results   Unresulted Labs Ordered in the Past 30 Days of this Admission     No orders found from 9/1/2020 to 10/2/2020.             Physical Exam   Vitals:    10/01/20 1530   Weight: 59.9 kg (132 lb)     Vital Signs with Ranges  Temp:  [95.6  F (35.3  C)-97.4  F (36.3  C)] 95.6  F (35.3  C)  Pulse:  [65-79] 71  Resp:  [16] 16  BP: ()/(41-65) 97/55  SpO2:  [98 %-100 %] 98 %  I/O last 3 completed shifts:  In: 1466 [P.O.:50; NG/GT:1416]  Out: 300 [Urine:300]  PHYSICAL EXAM  GENERAL: Patient is in no distress. Alert and oriented, slow to answer to commands.  HEART: Regular rate and rhythm. S1S2. No murmurs  LUNGS: Bilateral slightly decreased breath sounds.  No wheezing.  Respirations unlabored  NEURO: Moving all extremities.  EXTREMITIES: No pedal edema.   SKIN: Warm, dry  PSYCHIATRY Cooperative    )Consultations This Hospital Stay    NUTRITION SERVICES ADULT IP CONSULT  WOUND OSTOMY CONTINENCE NURSE  IP CONSULT  NEPHROLOGY IP CONSULT  HEMATOLOGY & ONCOLOGY IP CONSULT  PHYSICAL THERAPY ADULT IP CONSULT  OCCUPATIONAL THERAPY ADULT IP CONSULT  OCCUPATIONAL THERAPY ADULT IP CONSULT  SPEECH LANGUAGE PATH ADULT IP CONSULT  CARE MANAGEMENT / SOCIAL WORK IP CONSULT  OCCUPATIONAL THERAPY ADULT IP CONSULT  PHYSICAL THERAPY ADULT IP CONSULT    Time Spent on this Encounter   Elmo MCCALL MD, personally saw the patient today and spent greater than 30 minutes discharging this patient.  Discussed with patient, his wife, bedside RN, care coordinator.    Discharge Orders      Home Care PT Referral for Hospital Discharge      Home Care OT Referral for Hospital Discharge      Home Care SLP Referral for Hospital Discharge      CARE COORDINATION REFERRAL      Reason for your hospital stay    You were admitted to the hospital with severe hyponatremia multifactorial, followed by hospitalist and nephrology.  Free water flushes switch to half-normal saline.  Sodium levels improved to 128.     Activity    Your activity upon discharge: activity as tolerated and no driving for today     Discharge Instructions    Mix 0.9 saline and water 1 is to 1 as outpatient in place of free water flushes.  Wound care for decubitus ulcer.  Aggressive incentive spirometry, strict aspiration precautions.     Follow-up and recommended labs and tests     Follow up with primary care provider, Los Love, within 7 days for hospital follow- up.  The following labs/tests are recommended: Check sodium levels in 48 hours or earlier if symptomatic.  Follow-up with Dr. Mullins in clinic on 10/14 per schedule.  Monitor thyroid function tests in 10 to 12 weeks.  Consider outpatient cognitive assessment.  If continues to have hyponatremia consider alternatives to Zoloft.     Diet    Follow this diet upon discharge: Orders Placed This Encounter      Fluid restriction OTHER (SEE COMMENTS)       Adult Formula Bolus Feeding: QID Isosource 1.5; Route: Gastrostomy; 5; Can(s); Medication - Feeding Tube Flush Frequency: At least 15-30 mL water before and after medication administration and with tube clogging; Amount to Send (Nutrition use): 5 ...      Dysphagia Diet Level 2 [facility] Altered Thin Liquids (water, ice chips, juice, milk gel       Discharge Medications   Current Discharge Medication List      CONTINUE these medications which have NOT CHANGED    Details   acetaminophen (TYLENOL) 32 mg/mL liquid Take 20.3 mLs (650 mg) by mouth every 4 hours as needed for mild pain or fever  Qty: 473 mL, Refills: 0    Comments: Future refills by PCP Dr. Los Love with phone number [phone].  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)      albuterol (PROVENTIL) (2.5 MG/3ML) 0.083% neb solution Take 1 vial (2.5 mg) by nebulization every 6 hours as needed for shortness of breath / dyspnea or wheezing  Qty: 120 vial, Refills: 3    Associated Diagnoses: Other emphysema (H)      benzonatate (TESSALON) 100 MG capsule Take 1 capsule (100 mg) by mouth 3 times daily as needed for cough  Qty: 30 capsule, Refills: 1    Associated Diagnoses: Malignant neoplasm of upper lobe of left lung (H)      bisacodyl (DULCOLAX) 5 MG EC tablet Take 5 mg by mouth daily as needed for constipation      cetirizine (ZYRTEC) 5 MG/5ML solution Take 10 mLs (10 mg) by mouth  daily  Qty: 473 mL, Refills: 0    Comments: Future refills by PCP Dr. Los Love with phone number [phone].  Associated Diagnoses: Aspiration pneumonia of right lung, unspecified aspiration pneumonia type, unspecified part of lung (H); Primary malignant neoplasm of lung metastatic to other site, unspecified laterality (H)      diclofenac (VOLTAREN) 1 % topical gel Place onto the skin daily as needed for moderate pain      donepezil (ARICEPT) 10 MG ODT Take 5 mg by mouth daily      fluticasone (FLONASE) 50 MCG/ACT nasal spray Spray 1 spray into both nostrils daily as needed for rhinitis or allergies      gabapentin (NEURONTIN) 300 MG/6ML oral solution Take 6 mLs (300 mg) by mouth 3 times daily  Qty: 540 mL, Refills: 0    Comments: Future refills by PCP Dr. Los Love with phone number [phone].  Associated Diagnoses: Aspiration pneumonia of right lung, unspecified aspiration pneumonia type, unspecified part of lung (H); Primary malignant neoplasm of lung metastatic to other site, unspecified laterality (H)      Guar Gum (FIBER MODULAR, NUTRISOURCE FIBER,) packet 1 packet by Per Feeding Tube route 3 times daily  Qty: 90 packet, Refills: 0    Comments: Future refills by PCP Dr. Los Love with phone number [phone].  Associated Diagnoses: Aspiration pneumonia of right lung, unspecified aspiration pneumonia type, unspecified part of lung (H); Primary malignant neoplasm of lung metastatic to other site, unspecified laterality (H)      levothyroxine (SYNTHROID/LEVOTHROID) 112 MCG tablet 112 mcg by Oral or Feeding Tube route daily      LORazepam (ATIVAN) 2 MG/ML (HIGH CONC) solution Take 0.25 mLs (0.5 mg) by mouth every 4 hours as needed for anxiety  Qty: 30 mL, Refills: 0    Comments: Future refills by PCP Dr. Los Love with phone number [phone].  Associated Diagnoses: Aspiration pneumonia of right lung, unspecified aspiration pneumonia type, unspecified part of lung (H); Primary malignant  neoplasm of lung metastatic to other site, unspecified laterality (H)      multivitamins w/minerals (CERTAVITE) liquid 15 mLs by Per Feeding Tube route daily  Qty: 450 mL, Refills: 0    Comments: Future refills by PCP Dr. Los Love with phone number [phone].  Associated Diagnoses: Aspiration pneumonia of right lung, unspecified aspiration pneumonia type, unspecified part of lung (H); Primary malignant neoplasm of lung metastatic to other site, unspecified laterality (H)      ondansetron (ZOFRAN) 4 MG/5ML solution Take 10 mLs (8 mg) by mouth 2 times daily as needed for nausea or vomiting  Qty: 100 mL, Refills: 0    Comments: Future refills by PCP Dr. Los Love with phone number [phone].  Associated Diagnoses: Aspiration pneumonia of right lung, unspecified aspiration pneumonia type, unspecified part of lung (H); Primary malignant neoplasm of lung metastatic to other site, unspecified laterality (H)      oxyCODONE (ROXICODONE) 5 MG/5ML solution Take 5 mLs (5 mg) by mouth every 6 hours as needed for severe pain  Qty: 200 mL, Refills: 0    Comments: Future refills by PCP Dr. Los Love with phone number [phone].  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)      sertraline (ZOLOFT) 50 MG tablet 50 mg by Oral or Feeding Tube route At Bedtime         STOP taking these medications       amoxicillin-clavulanate (AUGMENTIN) 400-57 MG/5ML suspension Comments:   Reason for Stopping:         fentaNYL (DURAGESIC) 12 mcg/hr 72 hr patch Comments:   Reason for Stopping:             Allergies   Allergies   Allergen Reactions      Simvastatin      myalgias     Lisinopril Rash     rash       Discharge Disposition   Discharged to home  Condition at discharge: Good    DATA  Most Recent 3 CBC's:  Recent Labs   Lab Test 10/05/20  1413 10/02/20  0557 10/01/20  1426 09/26/20  0711   WBC 8.6 12.2* 12.8* 12.2*   HGB  --  8.8* 9.6* 9.5*   MCV  --  82 82 84   PLT  --  127* 118* 39*      Most Recent 3 BMP's:  Recent Labs   Lab Test 10/05/20  1413 10/05/20  0610 10/05/20  0022 10/04/20  0531 10/04/20  0531 10/03/20  0538 10/03/20  0538 10/02/20  0557 10/02/20  0557   * 127* 126*   < > 127*   < > 120*   < > 115*   POTASSIUM  --   --   --   --  3.8  --  4.3  --  4.1   CHLORIDE  --   --   --   --  91*  --  85*  --  80*   CO2  --   --   --   --  33*  --  30  --  30   BUN  --   --   --   --  10  --  9  --  10   CR  --   --   --   --  0.43*  --  0.47*  --  0.48*   ANIONGAP  --   --   --   --  3  --  5  --  5   SUSIE  --   --   --   --  7.7*  --  7.5*  --  7.5*   GLC  --   --   --   --  88  --  78  --  79    < > = values in this interval not displayed.     Most Recent 2 LFT's:  Recent Labs   Lab Test 10/03/20  1805 10/01/20  1426   AST 17 17   ALT 16 15   ALKPHOS 107 104   BILITOTAL 0.3 0.6       Most Recent 6 Bacteria Isolates From Any Culture (See EPIC Reports for Culture Details):  Recent Labs   Lab Test 09/22/20  1535 09/22/20  1455 09/02/20  1345 09/01/20  0419 09/01/20  0209 07/27/20  1400   CULT No growth No growth Light growth  Normal pat    Moderate growth  Candida albicans / dubliniensis  Candida albicans and Candida dubliniensis are not routinely speciated  Susceptibility testing not routinely done  * No growth No growth No growth     Most Recent TSH, T4 and A1c Labs:  Recent Labs   Lab Test 10/04/20  0531 06/15/15  1052 06/15/15  1052   TSH 10.74*   < >  --    T4 0.95   < >  --    A1C  --   --  5.7    < > = values in this interval not displayed.

## 2020-10-05 NOTE — DISCHARGE INSTRUCTIONS
Flushing instructions:   Flush with 15mL 1/2 NS after medications.   Flush with 30mL 1/2 NS after feedings.     Continue 1000mL/day oral fluid restriction.

## 2020-10-05 NOTE — PROGRESS NOTES
Renal Medicine Progress Note                                Agapito Fairbanks MRN# [id number]   Age: 70 year old YOB: 1950   Date of Admission: 10/1/2020 Hospital LOS: 4                  Assessment/Plan:     70-year-old male presenting to the emergency room, dated 10/01/2020 for evaluation of abnormal laboratory studies.     1.  Baseline normal renal function.     2.  Previous absence of significant proteinuria on both albumin and protein to creatinine ratios.     3.  History of previous hyponatremia with admission in August demonstrating modest hyponatremia with a sodium in the range of 123-130 millimole per liter.     4.  Significant hyponatremia on presentation with serum sodium of 115 millimole per liter.  Evaluation demonstrated urine osmolality of 602 and a urine sodium of 92.  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  He has apparently been receiving significant free water flushes via his G-tube, which may be contributing to his lower sodium in the setting of an elevated urine osmolality.     5.  Metastatic squamous cell of the lung.   6.  History of laryngeal cancer.   7.  Documented bone metastases.   8.  Malnutrition, requires all medications via his PEG.   9.  Chronic O2 requirement.       Continue 0.45 saline for flushes  (mix 0.9 saline and water 1:1 as outpatient)    OK to discharge from renal standpoint  Na mid week     No new recommendations  Call with questions  Signing off     Interval History:     Clinical stable    Rate of Na correction appropriate    Up in chair      ROS:     GENERAL: NAD, No fever,chills  R: NEGATIVE for significant cough or SOB  CV: NEGATIVE for chest pain, palpitations  EXT: no change edema  ROS otherwise negative    Medications and Allergies:     Reviewed    Physical Exam:     Vitals were reviewed  Patient Vitals for the past 8 hrs:   BP Temp Temp src Pulse Resp SpO2   10/05/20 0820 97/55 95.6  F (35.3  C) Oral 71 16 98 %     I/O last 3 completed shifts:  In:  1428 [P.O.:90; NG/GT:1005]  Out: 700 [Urine:700]    Vitals:    10/01/20 1530   Weight: 59.9 kg (132 lb)       GENERAL: awake, alert, follows  HEENT: NC/AT, PERRLA, EOMI, non icteric, pharynx moist without lesion  RESP:  clear anteriorly  CV: RRR, normal S1 S2  ABDOMEN: soft, nontender, no HSM or masses and bowel sounds normal  MS: no clubbing, cyanosis   SKIN: clear without significant rashes or lesions  NEURO: speech normal and cranial nerves 2-12 intact  PSYCH: affect normal/bright  EXT: warm, no edema    Data:     Recent Labs   Lab 10/05/20  0610 10/05/20  0022 10/04/20  1816 10/04/20  1221 10/04/20  0531 10/04/20  0531 10/03/20  0538 10/03/20  0538 10/02/20  0557 10/02/20  0557 10/01/20  1426 10/01/20  1426   * 126* 126* 126*   < > 127*   < > 120*   < > 115*   < > 115*   POTASSIUM  --   --   --   --   --  3.8  --  4.3  --  4.1  --  4.5   CHLORIDE  --   --   --   --   --  91*  --  85*  --  80*  --  82*   CO2  --   --   --   --   --  33*  --  30  --  30  --  27   ANIONGAP  --   --   --   --   --  3  --  5  --  5  --  6   GLC  --   --   --   --   --  88  --  78  --  79  --  79   BUN  --   --   --   --   --  10  --  9  --  10  --  10   CR  --   --   --   --   --  0.43*  --  0.47*  --  0.48*  --  0.39*   GFRESTIMATED  --   --   --   --   --  >90  --  >90  --  >90  --  >90   GFRESTBLACK  --   --   --   --   --  >90  --  >90  --  >90  --  >90   SUSIE  --   --   --   --   --  7.7*  --  7.5*  --  7.5*  --  7.7*    < > = values in this interval not displayed.         Recent Labs   Lab Test 10/05/20  0610 10/05/20  0022 10/04/20  1816 10/04/20  1221 10/04/20  1017 10/04/20  0531 10/03/20  2229 10/03/20  1805 10/03/20  1514 10/03/20  1041   * 126* 126* 126* 124* 127* 124* 125* 123* 120*     Recent Labs   Lab 10/03/20  1805 10/01/20  1426   ALBUMIN 2.2* 2.0*     Recent Labs   Lab 10/02/20  0557 10/01/20  1426   HGB 8.8* 9.6*     Recent Labs   Lab 10/04/20  0531 10/03/20  0538 10/01/20  1655   UROSMOLALITY  --   --   602   UNAR  --   --  92  92   TSH 10.74* 10.68*  --    ELADIO  --  15.3  --          NAYA Torres MD    [facility] - Nephrology  [phone]

## 2020-10-05 NOTE — PLAN OF CARE
Speech Language Therapy Discharge Summary    Reason for therapy discharge:    Goal met for IP goals.    Progress towards therapy goal(s). See goals on Care Plan in [facility] electronic health record for goal details.  Goals met    10/5 Tx: Swallow: Pt tolerated po trials with mod cues to use a modified supraglottic swallow (swallow-cough-swallow).  Baseline cough was noted throughout the session.  Difficult to assess definitive source of cough.  Independent careful chewing and prolonged oral phase noted with solids.  Educated pt/spouse on current observations/potential aspiration risk.  Educated on consideration of an OP video swallow study for full asessment of aspiration risk if desired.  Spouse stated no further instrumental assessment desired and that she wants to be able to give pt small amounts of pleasure when he asks at home.  Spouse stated she will cue pt to use strategies (swallow-cough-swallow for every bite/sips, small bites/sips, slow rate) and pick current dysphagia diet level 2/soft diet textures with thin liquids at home.  Spouse would like [facility] short term SLP swallow Tx follow up at home to assess tolerance at home.  Spouse plans to continue G tube feedings for overall nutrition/hydration.      Therapy recommendation(s):    Continued therapy is recommended.  Rationale/Recommendations:  Recommend short term [facility] SLP swallow Tx at home per family request and to further train strategies to attempt and educate potential aspiration risks with po intake/pleasure feeding at home.

## 2020-10-05 NOTE — CONSULTS
Care Management Assessment and Discharge Consult    General Information:  Patient's communication style: spoken language (English or Bilingual)  Hearing Difficulty or Deaf: yes([place])  Wear Glasses or Blind: yes  Cognitive/Neuro/Behavioral: .WDL except  Level of Consciousness: alert  Arousal Level: opens eyes spontaneously  Orientation: disoriented to, place, time, situation(at times)  Speech: hoarse  Mood/Behavior: calm, cooperative  Advance Care Planning:         Living Environment:   People in home: spouse  Current living Arrangements: condominium          Family/Social Support:  Care provided by:  Wife as needed  Provides care for:  NA  Description of Support System: wife            Lifestyle & Psychosocial Needs:        Socioeconomic History     Marital status:      Spouse name: Not on file     Number of children: Not on file     Years of education: Not on file     Highest education level: Not on file     Tobacco Use     Smoking status: Former Smoker     Packs/day: 1.50     Years: 30.00     Pack years: 45.00     Quit date: 1998     Years since quittin.7     Smokeless tobacco: Never Used   Substance and Sexual Activity     Alcohol use: Yes     Comment: 2-3 times per week, 1-2 beers     Drug use: No     Sexual activity: Yes     Partners: Female     Comment: vidya with TL       Functional Status:  Prior to admission patient needed assistance:         Current Resources:   Skilled Home Care Services: Skilled Nursing, Physicial Therapy, Occupational Therapy  Community Resources: Home Care  Equipment currently used at home: grab bar, toilet, grab bar, tub/shower, walker, standard  Supplies currently used at home:      Employment:  Employment Status: NA ,    Financial/Environmental Concerns:  NA,      Values/Beliefs:  Spiritual, Cultural Beliefs, Yazdanism Practices, Values that affect care:               Discharge Planning:  Expected Discharge Date: 10/06/20  Concerns to be Addressed:    Needs  lab draw this week, follow up appointment. Resume home care.      Anticipated Discharge Disposition:  home  Anticipated Discharge Services:  Home w/ Home care  Anticipated Discharge DME:  none    Patient/family educated on Medicare website which has current facility and service quality ratings:  NA  Referrals Placed by CM/SW:  Home care resumption  Education Provided on the Discharge Plan:  yes  Patient/Family in Agreement with the Plan:  yes  Disposition Comments:  Plan home later today    Additional Information:  [facility] updated that patient discharge later today.  CC following whether HC could draw required labs on Tuesday.  Otherwise clinic visit (Lab) would be required.  Wife is fine with either.  Voices no other needs.  Has O2 and supplies at home.     Addendum 1530:  Updated by MD that Na lab can be drawn on Wed.  Updated by [facility] Liaison Nicki that HC RN can be out on Wed to draw lab.   AVS updated. Wife updated  Bedside nurse updated.  Plan home tonight with resumption of [facility]. Appointments on AVS.  Wife wishes to hold on PCP appointment at this time as they follow with Dr. Mullins for all cares.    Maegan Mendoza, RN

## 2020-10-05 NOTE — PLAN OF CARE
Tolerating tube feed. Up in chair, up to BR. Encouraged to stay off back in bed due to sore coccyx, turns himself to back when positioned to his side. Reviewed new flush flush plan and oral fluid restriction with wife. SBP 80's this amaris. MD notified, continue to monitor.Sodium stable, plan discharge tomorrow if remains stable

## 2020-10-05 NOTE — PLAN OF CARE
Physical Therapy Discharge Summary    Reason for therapy discharge:    Discharged to home with home therapy.    Progress towards therapy goal(s). See goals on Care Plan in [facility] electronic health record for goal details.  Goals partially met.  Barriers to achieving goals:   discharge from facility.    Therapy recommendation(s):    Continued therapy is recommended.  Rationale/Recommendations:  Pt will benefit from HHPT to increase endurance and strength. Home health PT is recommended as patient would require considerable effort, assist of one and AD to come and go from house..

## 2020-10-05 NOTE — PLAN OF CARE
Tolerating increased amount of tube feeding. Sodium stable. C/o pain from sore coccyx, encouraged to lay on sides more however he turns himself and returns to his back after a few minutes. Foam dressing intact. Up with 1 assist, sat in chair. Plan was for discharge tomorrow/ MD, wife wants to take him home today. MD notified, spoke with wife, may discharge later today.

## 2020-10-05 NOTE — PROGRESS NOTES
Service Date: 10/05/2020      SUBJECTIVE:  Mr. Mann is a 70-year-old gentleman with metastatic lung squamous cell carcinoma.  The patient is currently on treatment with carboplatin, Taxol and pembrolizumab.      The patient admitted because of worsening hyponatremia.  On 10/01/2020 his sodium was 115.  On 09/25/2020, it was 136.      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  The patient has a PEG tube.  Apparently, he has been putting a lot of water through that.  That can cause hyponatremia.  Nephrology is following.  His sodium has improved to 128.      The patient says that he is feeling better.  His fatigue is less.  No headache.  No dizziness.  No chest pain.  He has chronic shortness of breath.  He has occasional cough.  No hemoptysis.  No nausea. No vomiting.  Appetite is fair.      All other review of systems negative.      PHYSICAL EXAMINATION:   GENERAL:  The patient was alert, oriented x 3.   VITAL SIGNS:  Reviewed. Not in any acute distress.  ECOG PS of 2.     The rest of the systems not examined.      LABORATORY DATA:  Reviewed.      ASSESSMENT:   1.  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   2.  Hyponatremia secondary to syndrome of inappropriate antidiuretic hormone.   3.  Shortness of breath and cough from lung cancer and chronic obstructive pulmonary disease.   4.  Malnutrition, status post PEG tube.   5.  Anemia and thrombocytopenia.      PLAN:   1.  The patient's sodium is better.  Clinically, he is feeling better. I explained to the patient that he needs to maintain water restriction as recommended by Nephrology.  That way his sodium can stay in a reasonable range.   2.  For lung cancer, he will follow up in the clinic and continue his treatment.   3.  He had a few questions, which were all answered.      Total time spent 25 minutes.         JENNIFER CEDENO MD             D: 10/05/2020   T: 10/05/2020   MT: CHAU      Name:     CHRISTI MANN   MRN:       [id number]        Account:      [id number]   :      1950           Service Date: 10/05/2020      Document: [id number]

## 2020-10-05 NOTE — PLAN OF CARE
AxOx2, disoriented to place and situation at times. VSS, 2.5L O2. Baseline O2. Pain managed with PRN Oxycodone. Na remains at 126. Dd2 diet, with 1,000 mL oral intake restriction. G tube patent. Ax1 GB walker. Seizure precautions in place. Plan to discharge today if Na remains stable.

## 2020-10-06 NOTE — PROGRESS NOTES
Patient discharged at 5: 15 PM to home. IV was discontinued. Pain at time of discharge was tolerable. Belongings returned to patient.  Discharge instructions and medications reviewed with patient.  Patient verbalized understanding and all questions were answered. At time of discharge, patient condition was stable and left the unit via wheelchair escorted by MARTIN

## 2020-10-06 NOTE — PROGRESS NOTES
This is a recent snapshot of the patient's [facility] medical record.  For current drug dose and complete information and questions, call [phone] or In [facility] pool, [facility] home infusion ([id number])  CSN Number:  [id number]

## 2020-10-06 NOTE — PLAN OF CARE
Occupational Therapy Discharge Summary    Reason for therapy discharge:    Discharged to home with home therapy.    Progress towards therapy goal(s). See goals on Care Plan in [facility] electronic health record for goal details.  Goals partially met.  Barriers to achieving goals:   limited tolerance for therapy and discharge from facility.    Therapy recommendation(s):    Continued therapy is recommended.  Rationale/Recommendations:  Home PT/OT to maximize (I), tolerance and safety with ADL, IADL and mobility; pt would have a taxing effort to leave the home, and is not able to leave the home without assist of another person and AD..

## 2020-10-07 NOTE — PROGRESS NOTES
Clinic Care Coordination Contact  [facility]/Voicemail    Referral Source: IP Report  Clinical Data: Care Coordinator Outreach  Outreach attempted x 1.  Left message on patient's voicemail with call back information and requested return call.  Plan: Care Coordinator will try to reach patient again in 1-2 business days.    Chart Review:  Referral from a discharge.  IP for Hyponatremia.  Medication Changes- Yes    DAVID Hess  Clinic Care Coordination  [facility] Clinics: [facility] & [place]   Phone: [phone]

## 2020-10-07 NOTE — LETTER
[place] CARE COORDINATION  [facility]  October 8, 2020    Agapito Fairbanks  [address]   [place] [address]      Dear Agapito,    I am a clinic community health worker who works with Los Love MD at the [facility]. I wanted to thank you for spending the time to talk with me.  Below is a description of clinic care coordination and how I can further assist you.      The clinic care coordination team is made up of a registered nurse,  and community health worker who understand the health care system. The goal of clinic care coordination is to help you manage your health and improve access to the health care system in the most efficient manner. The team can assist you in meeting your health care goals by providing education, coordinating services, strengthening the communication among your providers and supporting you with any resource needs.    Please feel free to contact me at [phone] with any questions or concerns. We are focused on providing you with the highest-quality healthcare experience possible and that all starts with you.     Sincerely,     DAVID Hess  Clinic Care Coordination  [facility]: [place] & [place]   Phone: [phone]

## 2020-10-08 NOTE — PROGRESS NOTES
Clinic Care Coordination Contact  Community Health Worker Initial Outreach  Spoke with patients wifeMillicent    Chart Review: Referral from discharge report, in ED to hospital on 10/1-10/5 for weakness. Discharged to home with PT, OT, and SLP home care. Follow up with PCP within 7 days follow up with Dr. Mullins on 10/14.    CHW Initial Information Gathering:  Referral Source: IP Report  Preferred Hospital: [facility]  [phone]  No PCP office visit in Past Year: No     CHW introduced self and role with CC  Millicent states patient is doing well, the home care nurse was there yesterday and they have PT, OT, and SLP lined up.   Millicent denies any additional support or resources at this time.   CHW provided contact information for any future needs or concerns.     Patient accepts CC: No, Enrolled in home care. Patient will be sent Care Coordination introduction letter for future reference.     DAVID Lockhart, B.S. [facility]  Clinic Care Coordination  [facility] Clinics:  [place], [place], [place], [place], and [place]  Phone: [phone]

## 2020-10-12 NOTE — TELEPHONE ENCOUNTER
[facility] now requests orders and shares plan of care/discharge summaries for some patients through [employer].  Please REPLY TO THIS MESSAGE OR ROUTE BACK TO THE AUTHOR in order to give authorization for orders when needed.  This is considered a verbal order, you will still receive a faxed copy of orders for signature.  Thank you for your assistance in improving collaboration for our patients.    ORDER    ST 1w1, 2w2, 1w1 for dysphagia therapy, oropharyngeal strengthening and diet modification. The client demonstrates mild to moderate oropharyngeal dysphagia and is at high risk for aspiration. Recommend skilled ST for treatment to improve swallow strength and modify diet for the safest and least restrictive texture.     Checo Santo MS, CCC-SLP  c [phone]

## 2020-10-12 NOTE — PROGRESS NOTES
Writer received a message from Millicent, patient's wife, that patient had lab draw today with [facility] and wondered if it covered tomorrow's labs. And questions about treatment this week.    Writer returned call and spoke with Millicent who states patient is to weak and doesn't want treatment this week, but wonders about Keytruda. She also states labs were drawn today ,She believes it was a CMP but not sure. Millicent is also wondering about when the next CT scan is going to be?    Writer will have Dr. Mullins advise.    Cecile Alanis, EMILIANA

## 2020-10-13 NOTE — PROGRESS NOTES
Writer received a message from patient's wife that patient ran our NS that was being used for flushing patient's g-tube.    Writer returned call and was informed that patient was discharged with NS to use as a flush for patient's g-tube for low sodium. Patient is currently out of saline.     Writer spoke with Dr. Mullins and has requested that [facility] look into insurance coverage.     Writer has also noted that NS can be purchased at [facility] if needed .    Writer LVM with patient's wife regarding the above requesting a return call.    Cecile Alanis RN

## 2020-10-13 NOTE — TELEPHONE ENCOUNTER
[facility] now requests orders and shares plan of care/discharge summaries for some patients through [employer].  Please REPLY TO THIS MESSAGE OR ROUTE BACK TO THE AUTHOR in order to give authorization for orders when needed.  This is considered a verbal order, you will still receive a faxed copy of orders for signature.  Thank you for your assistance in improving collaboration for our patients.    ORDER.  PT 2w3, 1w1 for instruction in safe transfers/ambulation, home exercises, fall prevention per patient tolerance.    Margarita Martinez, PT  [email or web address]  [phone]

## 2020-10-13 NOTE — PROGRESS NOTES
"Writer spoke with Dr. Mullins he is ok with patient receiving just Keytruda tomorrow.     Millicent is aware that Keytruda infusion tomorrow is \"ok\".    Writer has spoke with charge infusion and we will cancel today's lab appointment to eliminate one trip out for the patient and start tomorrow at 9:30 back in infusion.     Writer LVM with patient's wife, Millicent, with the above adjustments in patient's schedule.    Cecile Alanis RN    "

## 2020-10-14 PROBLEM — T85.528A GASTROJEJUNOSTOMY TUBE DISLODGEMENT: Status: RESOLVED | Noted: 2020-01-01 | Resolved: 2020-01-01

## 2020-10-14 PROBLEM — H69.91 DYSFUNCTION OF RIGHT EUSTACHIAN TUBE: Status: RESOLVED | Noted: 2020-01-01 | Resolved: 2020-01-01

## 2020-10-14 NOTE — PROGRESS NOTES
Infusion Nursing Note:  Agapito Fairbanks presents today for Cycle 4 Day 1 Keytruda only, IVF, Zometa.    Patient seen by provider today: Yes: Dr. Mullins   present during visit today: Not Applicable.    Note: N/A.    Intravenous Access:  Peripheral IV placed.    Treatment Conditions:  Lab Results   Component Value Date     10/12/2020                   Lab Results   Component Value Date    POTASSIUM 4.0 10/12/2020           Lab Results   Component Value Date    MAG 1.4 09/25/2020            Lab Results   Component Value Date    CR 0.49 10/12/2020                   Lab Results   Component Value Date    SUSIE 8.3 10/12/2020                Lab Results   Component Value Date    BILITOTAL 0.3 10/03/2020           Lab Results   Component Value Date    ALBUMIN 2.2 10/03/2020                    Lab Results   Component Value Date    ALT 16 10/03/2020           Lab Results   Component Value Date    AST 17 10/03/2020           Post Infusion Assessment:  Patient tolerated infusion without incident.  Blood return noted pre and post infusion.  Site patent and intact, free from redness, edema or discomfort.  No evidence of extravasations.  Access discontinued per protocol.       Discharge Plan:   Patient declined prescription refills.  Discharge instructions reviewed with: Patient.  Patient verbalized understanding of discharge instructions and all questions answered.  AVS to patient via [employer].  Patient will return as scheduled for next appointment.   Patient discharged in stable condition accompanied by: self.  Departure Mode: Ambulatory.    Mecca Alejandro RN

## 2020-10-14 NOTE — PATIENT INSTRUCTIONS
1. IV NS 1 litre today.  2. Pembrolizumab today. No carboplatin and taxol.  3. PET scan in about 2-3 weeks.  4. Follow-up with next treatment.      Per patient he does not want to schedule Chemo until they know what the Pet scan looks like. 10/14/20 tm

## 2020-10-14 NOTE — LETTER
10/14/2020         RE: Agapito Fairbanks  [address]  [place] [address]        Dear Colleague,    Thank you for referring your patient, Agapito Fairbanks, to the [facility] [place]. Please see a copy of my visit note below.    Oncology Rooming Note    October 14, 2020 10:01 AM   Agapito Fairbanks is a 70 year old male who presents for:    Chief Complaint   Patient presents with     Oncology Clinic Visit     Initial Vitals: There were no vitals taken for this visit. Estimated body mass index is 17.9 kg/m  as calculated from the following:    Height as of 10/1/20: 1.829 m (6').    Weight as of 10/1/20: 59.9 kg (132 lb). There is no height or weight on file to calculate BSA.  Data Unavailable Comment: Data Unavailable   No LMP for male patient.  Allergies reviewed: Yes  Medications reviewed: Yes    Medications: Medication refills not needed today.  Pharmacy name entered into [employer]: [id number] - [place], [place] - [address]    Clinical concerns:  doctor was notified.      Kika Telles MA              Visit Date:   10/14/2020     ONCOLOGY HISTORY: Mr. 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  High PD-L1 expression.  No EGFR mutation.  ALK and ROS1 could not be done because of insufficient sample.      1.  In 2005, he was evaluated by ENT for hoarseness.   -On 02/22/2005, he had laryngoscopy. There was right anterior true vocal cord lesion with extension to the anterior commissure on the right. 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   -On 03/18/2005, he had endoscopic laser assisted vertical hemilaryngectomy right. Pathology from vocal cord revealed squamous cell carcinoma, moderately differentiated, invasive  -Unfortunately, he had recurrence. 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 Pathology revealed invasive  moderately differentiated squamous cell carcinoma.   -Patient received concurrent chemoradiation. He received 6000 cGy completed on 06/21/2006. He received cisplatin with radiation.      2.  He had a PET scan done on 10/24/2009.  It revealed enlarging spiculated nodule in posterior left upper lobe with borderline hypermetabolic activity.   -He had left upper lobe segmentectomy and mediastinal lymph node dissection on 12/10/2009.  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  No lymphovascular present.  Margins negative.  Lymph nodes were all benign.      3.  The patient developed a lesion in his back.  He was seen by a dermatologist.  Biopsy on 01/31/2020 revealed squamous cell carcinoma. He subsequently underwent a Mohs surgical procedure on 02/28/2020.  Lesion measured 2.7 cm.  The patient was recommended postoperative radiation.      4. PET scan on 04/02/2020 reveals a hypermetabolic 6.4 cm mass in left upper lobe and mildly enlarged hypermetabolic single mediastinal lymph node.  There is hypermetabolic lesion in L4 vertebral body and left second rib.  There is hypermetabolic nodule in left parotid gland.   -MRI of the thoracic and lumbar spine on 04/06/2020 revealed destructive metastatic lesions involving L4 vertebral body.  There was also multiple other osseous metastatic disease.      5.  CT-guided biopsy of left lung mass on 04/13/2020 reveals moderately differentiated squamous cell carcinoma.    -TPS score of 60%. High PD-L1 expression.   -NEGATIVE for BRAF, EGFR, ERBB2, IDH1, IDH2, KRAS, MET, NRAS, RET     -ALK and ROS1 could not be done because of insufficient sample.      6.  Radiation to lumbar spine. 3000 cGy between 04/07/2020 and 04/20/2020.  -Radiation to left parotid between 04/09/2020 and 04/22/2020. 3000 cGy.      7. Pembrolizumab x 4 between 04/29/2020 and 07/24/2020.  -Carboplatin and Taxol added on 07/29/2020 because of progression.     -Radiation to  left lung/mediastinum between 08/28/2020 and 09/10/2020.  3000 cGy in 10 fractions.      SUBJECTIVE:  Mr. 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  He is currently on carboplatin, Taxol, and pembrolizumab.  He is here to start cycle 4.      The patient was recently in the hospital for severe hyponatremia.  He was seen by nephrologist.  Hyponatremia has improved.  The hyponatremia mainly happened because of lot of water given through the PEG tube.      The patient received radiation to left lung/mediastinum between 08/28/2020 and 09/10/2020.  3000 cGy in 10 fractions.      The patient brought to the clinic by his wife.  The patient is weak.  Today, he just wants pembrolizumab.  He does not want carboplatin and Taxol because of weakness.      No headache.  He gets lightheaded when he stands.  No neck pain.  No chest pain.  He has chronic shortness of breath.  No worsening of it.  He has some cough.  No abdominal pain, nausea or vomiting.  Appetite is not good.  He mainly does PEG tube feeding.  No urinary complaint.  No diarrhea.  No abnormal bleeding.  No fever or chills.      All other review of systems negative.      PHYSICAL EXAMINATION:   GENERAL:  He is alert, oriented x 3.  He is mildly short of breath.   VITAL SIGNS:  Reviewed.  ECOG PS 2.   FACE:  No swelling.   EYES:  No icterus.   THROAT:  No thrush. No ulcer.  NECK:  Supple. No lymphadenopathy or thyromegaly.   AXILLAE:  No lymphadenopathy.   LUNGS:  Decreased air entry bilaterally with occasional wheezing.   HEART:  Regular.  No murmur.   GI: Abdomen is soft and nontender.  No mass.   EXTREMITIES:  No edema. Reduced muscle mass.      LABORATORY DATA:  Reviewed.      ASSESSMENT:   1.  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   2.  Hyponatremia secondary to malignancy.   3.  Fatigue.   4.  Decreased appetite.   5.  Cancer-associated pain, controlled.   6.  Hypotension.       PLAN:   1.  The patient's overall condition has been slowly deteriorating.  He is getting weaker.  His weakness is from lung cancer, chemotherapy, and hyponatremia.      2.  Discussed regarding chemotherapy.  He just wants pembrolizumab.  He does not want carboplatin and Taxol today.  He will get pembrolizumab.      3. Discussed regarding scans.  We will get a PET scan beginning of November.  After that, we will decide regarding further treatment.      4.  He is on Zometa for bone metastasis.  That will be given today.  So far, he has been tolerating it well.  No jaw or dental-related symptoms.      5.  He wanted refill on gabapentin and oxycodone, which was given.      6.  I discussed with the patient and wife regarding patient's metastatic disease and overall poor condition. If next scan shows progression of disease, we will be discussing hospice.      7.  The patient has hyponatremia.  Sodium is stable.  We will continue to monitor it.      8.  His blood pressure is low.  We will give him 1 liter of IV fluid.      9.  He will be seen after the PET scan.  Wife advised to call us with any questions or concerns.         JENNIFER CEDENO MD             D: 10/14/2020   T: 10/14/2020   MT: LAURIE      Name:     CHRISTI MANN   MRN:      [id number]        Account:      [id number]   :      1950           Visit Date:   10/14/2020      Document: [id number]      This office note has been dictated.          Again, thank you for allowing me to participate in the care of your patient.        Sincerely,        Jennifer Cedeno MD

## 2020-10-14 NOTE — PROGRESS NOTES
This is a recent snapshot of the patient's [facility] medical record.  For current drug dose and complete information and questions, call [phone] or In Basket pool, [facility] ([id number])  CSN Number:  [id number]

## 2020-10-14 NOTE — PROGRESS NOTES
Oncology Rooming Note    October 14, 2020 10:01 AM   Agapito Fairbanks is a 70 year old male who presents for:    Chief Complaint   Patient presents with     Oncology Clinic Visit     Initial Vitals: There were no vitals taken for this visit. Estimated body mass index is 17.9 kg/m  as calculated from the following:    Height as of 10/1/20: 1.829 m (6').    Weight as of 10/1/20: 59.9 kg (132 lb). There is no height or weight on file to calculate BSA.  Data Unavailable Comment: Data Unavailable   No LMP for male patient.  Allergies reviewed: Yes  Medications reviewed: Yes    Medications: Medication refills not needed today.  Pharmacy name entered into [employer]: [id number] [place], [place] - [address]    Clinical concerns:  doctor was notified.      Kika Telles MA

## 2020-10-19 NOTE — TELEPHONE ENCOUNTER
Contacted by pharmacy to advise Stiolto not covered by pt's insurance, preferred formulary is Anoro. New Rx for Anoro sent to pharmacy.

## 2020-10-20 NOTE — PROGRESS NOTES
This is a recent snapshot of the patient's [facility] medical record.  For current drug dose and complete information and questions, call [phone] or In Basket pool, [facility] home infusion ([id number])  CSN Number:  [id number]

## 2020-10-21 NOTE — TELEPHONE ENCOUNTER
[facility] home care nurse Erika called requesting order approval for SN visit 1 x a week for 5 weeks and  3 PRN visits.     Nurse also need to recheck sodium level. Per chart review, pt has future lab order for CMP on file. Should nurse use this order or would provider like to add any other labs?     Ok to leave a detailed voice mail with order approval and lab information.

## 2020-10-21 NOTE — TELEPHONE ENCOUNTER
Called and left detailed message for Erika [place] and will route message for Oncology .Arabella Monae RN

## 2020-10-21 NOTE — TELEPHONE ENCOUNTER
RADHA Mullins , Can you please advise on future labs and monitoring for sodium .Arabella Monae RN

## 2020-10-21 NOTE — PROGRESS NOTES
Writer called patient's wife Millicent and MARÍA to return call if there are any concerns or questions that I was just following up to see how patient was doing.    Cecile Alanis RN

## 2020-10-21 NOTE — TELEPHONE ENCOUNTER
OK for nursing visit orders as requested  Pt last saw Oncology 10/14/20 and they are managing pt re: stage 4 lung cancer. Per note from Oncology visit  from 10/14/20:    PLAN:   1.  The patient's overall condition has been slowly deteriorating.  He is getting weaker.  His weakness is both from lung cancer, chemotherapy, and hyponatremia.      5.  I discussed with the patient and wife regarding patient's metastatic disease and overall poor condition,  If next scan shows progression of disease, likely we will be discussing hospice.      6.  The patient has hyponatremia.  Sodium is stable.  We will continue to monitor it.       Recent sodium level  10/12/20 was stable and that was seen by Dr Mullins at oncology visit 10/14/20. Therefore would recommend that future lab monitoring re: sodium issue be done by Oncology. Inform  [facility] nurse  Erika re: OK for nurse visits and route message on to Dr Wilver Mullins  so he may order future labs per his plan or monitoring

## 2020-10-22 NOTE — PROGRESS NOTES
Writer received a call from Erika HOPSON of [facility] wondering about having labs being drawn to check sodium level.     Order has been placed for a BMP.    HCRN Erika to draw it today.    Cecile Alanis RN

## 2020-10-22 NOTE — TELEPHONE ENCOUNTER
Erika from [facility] needs updated homecare orders. She accidentally requested wrong frequency yesterday. Needs skilled nursing 1x a week for 1 week then 2x a week for 5 weeks and 3 PRNs. Ok to approve?     Erika [phone] ok to leave detailed message

## 2020-10-23 NOTE — TELEPHONE ENCOUNTER
Dear Enrique Magdi,     Sodium is low but stable at 129.     Please, call me with any questions.     Wilver Mullins MD

## 2020-10-23 NOTE — TELEPHONE ENCOUNTER
Brett Manrique APRN CNP  You 11 minutes ago (1:03 PM)     Do not add additional water     Message text

## 2020-10-23 NOTE — TELEPHONE ENCOUNTER
Called Millicent with Results note.   She is asking if she can add another 120ml of water in his tube feeding flushes daily  Currently he is flushing 60ml before and 60ml After TID.     Will route to Brett to advise and call Millicent back

## 2020-10-24 NOTE — PROGRESS NOTES
Visit Date:   10/14/2020     ONCOLOGY HISTORY: Mr. 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  High PD-L1 expression.  No EGFR mutation.  ALK and ROS1 could not be done because of insufficient sample.      1.  In 2005, he was evaluated by ENT for hoarseness.   -On 02/22/2005, he had laryngoscopy. There was right anterior true vocal cord lesion with extension to the anterior commissure on the right. 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   -On 03/18/2005, he had endoscopic laser assisted vertical hemilaryngectomy right. Pathology from vocal cord revealed squamous cell carcinoma, moderately differentiated, invasive  -Unfortunately, he had recurrence. 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 Pathology revealed invasive moderately differentiated squamous cell carcinoma.   -Patient received concurrent chemoradiation. He received 6000 cGy completed on 06/21/2006. He received cisplatin with radiation.      2.  He had a PET scan done on 10/24/2009.  It revealed enlarging spiculated nodule in posterior left upper lobe with borderline hypermetabolic activity.   -He had left upper lobe segmentectomy and mediastinal lymph node dissection on 12/10/2009.  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  No lymphovascular present.  Margins negative.  Lymph nodes were all benign.      3.  The patient developed a lesion in his back.  He was seen by a dermatologist.  Biopsy on 01/31/2020 revealed squamous cell carcinoma. He subsequently underwent a Mohs surgical procedure on 02/28/2020.  Lesion measured 2.7 cm.  The patient was recommended postoperative radiation.      4. PET scan on 04/02/2020 reveals a hypermetabolic 6.4 cm mass in left upper lobe and mildly enlarged hypermetabolic single mediastinal lymph node.  There is hypermetabolic lesion in L4 vertebral  body and left second rib.  There is hypermetabolic nodule in left parotid gland.   -MRI of the thoracic and lumbar spine on 04/06/2020 revealed destructive metastatic lesions involving L4 vertebral body.  There was also multiple other osseous metastatic disease.      5.  CT-guided biopsy of left lung mass on 04/13/2020 reveals moderately differentiated squamous cell carcinoma.    -TPS score of 60%. High PD-L1 expression.   -NEGATIVE for BRAF, EGFR, ERBB2, IDH1, IDH2, KRAS, MET, NRAS, RET     -ALK and ROS1 could not be done because of insufficient sample.      6.  Radiation to lumbar spine. 3000 cGy between 04/07/2020 and 04/20/2020.  -Radiation to left parotid between 04/09/2020 and 04/22/2020. 3000 cGy.      7. Pembrolizumab x 4 between 04/29/2020 and 07/24/2020.  -Carboplatin and Taxol added on 07/29/2020 because of progression.     -Radiation to left lung/mediastinum between 08/28/2020 and 09/10/2020.  3000 cGy in 10 fractions.      SUBJECTIVE:  Mr. 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  He is currently on carboplatin, Taxol, and pembrolizumab.  He is here to start cycle 4.      The patient was recently in the hospital for severe hyponatremia.  He was seen by nephrologist.  Hyponatremia has improved.  The hyponatremia mainly happened because of lot of water given through the PEG tube.      The patient received radiation to left lung/mediastinum between 08/28/2020 and 09/10/2020.  3000 cGy in 10 fractions.      The patient brought to the clinic by his wife.  The patient is weak.  Today, he just wants pembrolizumab.  He does not want carboplatin and Taxol because of weakness.      No headache.  He gets lightheaded when he stands.  No neck pain.  No chest pain.  He has chronic shortness of breath.  No worsening of it.  He has some cough.  No abdominal pain, nausea or vomiting.  Appetite is not good.  He mainly does PEG tube feeding.  No urinary complaint.  No  diarrhea.  No abnormal bleeding.  No fever or chills.      All other review of systems negative.      PHYSICAL EXAMINATION:   GENERAL:  He is alert, oriented x 3.  He is mildly short of breath.   VITAL SIGNS:  Reviewed.  ECOG PS 2.   FACE:  No swelling.   EYES:  No icterus.   THROAT:  No thrush. No ulcer.  NECK:  Supple. No lymphadenopathy or thyromegaly.   AXILLAE:  No lymphadenopathy.   LUNGS:  Decreased air entry bilaterally with occasional wheezing.   HEART:  Regular.  No murmur.   GI: Abdomen is soft and nontender.  No mass.   EXTREMITIES:  No edema. Reduced muscle mass.      LABORATORY DATA:  Reviewed.      ASSESSMENT:   1.  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   2.  Hyponatremia secondary to malignancy.   3.  Fatigue.   4.  Decreased appetite.   5.  Cancer-associated pain, controlled.   6.  Hypotension.      PLAN:   1.  The patient's overall condition has been slowly deteriorating.  He is getting weaker.  His weakness is from lung cancer, chemotherapy, and hyponatremia.      2.  Discussed regarding chemotherapy.  He just wants pembrolizumab.  He does not want carboplatin and Taxol today.  He will get pembrolizumab.      3. Discussed regarding scans.  We will get a PET scan beginning of November.  After that, we will decide regarding further treatment.      4.  He is on Zometa for bone metastasis.  That will be given today.  So far, he has been tolerating it well.  No jaw or dental-related symptoms.      5.  He wanted refill on gabapentin and oxycodone, which was given.      6.  I discussed with the patient and wife regarding patient's metastatic disease and overall poor condition. If next scan shows progression of disease, we will be discussing hospice.      7.  The patient has hyponatremia.  Sodium is stable.  We will continue to monitor it.      8.  His blood pressure is low.  We will give him 1 liter of IV fluid.      9.  He will be seen after the PET scan.  Wife  advised to call us with any questions or concerns.         JENNIFER CEDENO MD             D: 10/14/2020   T: 10/14/2020   MT: LAURIE      Name:     CHRISTI MANN   MRN:      [id number]        Account:      [id number]   :      1950           Visit Date:   10/14/2020      Document: [id number]

## 2020-11-02 NOTE — PROGRESS NOTES
Writer received a return call from Millicent with some concerns of anxiety and pain. Millicent would like to increase Ativan to 0.5mg every 4 hours.  Millicent describes sundowners and gets very anxious and feels he needs an increase dose.    Writer advise to increase dose tonight of the Ativan, patient has pain in the butt and upper back and depending on the pain but usually only has Oxycodone just once day.     .me

## 2020-11-02 NOTE — PROGRESS NOTES
"Writer received a call from [facility]  Control room that patient was unable to complete scan because he was in pain and agitated.     Write called and spoke with patient's wife, Millicent, and she verbalized patient has been agitated and in pain for the last week at this point conversation was ended stating \"can I call yo right back\".    Letir called back approximately 5 minutes later to make sure everything was ok and LVM requesting a return call.     Cecile Alanis RN    "

## 2020-11-03 NOTE — TELEPHONE ENCOUNTER
Patient's significant other Millicent Hayden called clinic requesting a phpne call back at [phone]. Message forwarded to CCRN.

## 2020-11-03 NOTE — TELEPHONE ENCOUNTER
Writer returned call and spoke with Millicent.  Writer was updated that patient's tumor on his upper back near the shoulder blades is growing. Moiz  Has questions on whether patient should have radiation or even see Dr. James for possible removal. Patient is quite restless at night and the 0.5mg of Ativan was effective last night providing 6 hours of sleep.    Writer with the request of Millicent has changed the appointment to in person for the evaluation of the tumor on patient's back.    Cecile Alanis RN

## 2020-11-03 NOTE — PROGRESS NOTES
Writer called and LVM with patient 's wife, Millicent, requesting a return call regarding update on patient status and assist in rescheduling the PETSCAN.    Yesterday's PETSCAN was not completed due to patient being in pain while lying flat.    Cecile Alanis RN

## 2020-11-04 NOTE — LETTER
11/4/2020         RE: Agapito Fairbanks  [address]  [place] [address]        Dear Colleague,    Thank you for referring your patient, Agapito Fairbanks, to the [facility]. Please see a copy of my visit note below.    Oncology Rooming Note    November 4, 2020 8:53 AM   Agapito Fairbanks is a 70 year old male who presents for:    Chief Complaint   Patient presents with     Oncology Clinic Visit     Initial Vitals: There were no vitals taken for this visit. Estimated body mass index is 17.63 kg/m  as calculated from the following:    Height as of 10/14/20: 1.829 m (6').    Weight as of 10/14/20: 59 kg (130 lb). There is no height or weight on file to calculate BSA.  Data Unavailable Comment: Data Unavailable   No LMP for male patient.  Allergies reviewed: Yes  Medications reviewed: Yes    Medications: Medication refills not needed today.  Pharmacy name entered into [email or web address]: [id number] - [place], [place] - [address]    Clinical concerns:  doctor was notified.      Kika Telles MA              Visit Date:   11/04/2020     ONCOLOGY HISTORY: Mr. 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  High PD-L1 expression.  No EGFR mutation.  ALK and ROS1 could not be done because of insufficient sample.      1.  In 2005, he was evaluated by ENT for hoarseness.   -On 02/22/2005, he had laryngoscopy. There was right anterior true vocal cord lesion with extension to the anterior commissure on the right. 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   -On 03/18/2005, he had endoscopic laser assisted vertical hemilaryngectomy right. Pathology from vocal cord revealed squamous cell carcinoma, moderately differentiated, invasive  -Unfortunately, he had recurrence. 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 Pathology revealed invasive  moderately differentiated squamous cell carcinoma.   -Patient received concurrent chemoradiation. He received 6000 cGy completed on 06/21/2006. He received cisplatin with radiation.      2.  He had a PET scan done on 10/24/2009.  It revealed enlarging spiculated nodule in posterior left upper lobe with borderline hypermetabolic activity.   -He had left upper lobe segmentectomy and mediastinal lymph node dissection on 12/10/2009.  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  No lymphovascular present.  Margins negative.  Lymph nodes were all benign.      3.  The patient developed a lesion in his back.  He was seen by a dermatologist.  Biopsy on 01/31/2020 revealed squamous cell carcinoma. He subsequently underwent a Mohs surgical procedure on 02/28/2020.  Lesion measured 2.7 cm.  The patient was recommended postoperative radiation.      4. PET scan on 04/02/2020 reveals a hypermetabolic 6.4 cm mass in left upper lobe and mildly enlarged hypermetabolic single mediastinal lymph node.  There is hypermetabolic lesion in L4 vertebral body and left second rib.  There is hypermetabolic nodule in left parotid gland.   -MRI of the thoracic and lumbar spine on 04/06/2020 revealed destructive metastatic lesions involving L4 vertebral body.  There was also multiple other osseous metastatic disease.      5.  CT-guided biopsy of left lung mass on 04/13/2020 reveals moderately differentiated squamous cell carcinoma.    -TPS score of 60%. High PD-L1 expression.   -NEGATIVE for BRAF, EGFR, ERBB2, IDH1, IDH2, KRAS, MET, NRAS, RET     -ALK and ROS1 could not be done because of insufficient sample.      6.  Radiation to lumbar spine. 3000 cGy between 04/07/2020 and 04/20/2020.  -Radiation to left parotid between 04/09/2020 and 04/22/2020. 3000 cGy.      7. Pembrolizumab x 4 between 04/29/2020 and 07/24/2020.  -Carboplatin and Taxol added on 07/29/2020 because of progression.     -Radiation to  left lung/mediastinum between 08/28/2020 and 09/10/2020.  3000 cGy in 10 fractions.      SUBJECTIVE: His wife was present.     Mr. 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  He is on carboplatin, Taxol and pembrolizumab.  With last treatment, he got only pembrolizumab.      He was scheduled for PET scan.  PET scan was attempted but because of patient's discomfort could not be completed.  He is going to be rescheduled.      The patient has some pain in the mid back.  It is more in the area of T12.      He does have a subcutaneous lump in the lower chest below the scapula.  The patient previously had other subcutaneous nodules, which have been resected, and it was a squamous cell carcinoma.      The patient has fatigue.  No worsening.  No headache.  Some lightheadedness.  No neck pain.  No chest pain.  He has chronic shortness of breath. No worsening.  At times, he has a rattling in the chest.  The patient has a PEG tube.  Most of the feedings are through the PEG tube.  Sometimes when he tries to eat orally, he has some choking.  No recurrent vomiting.  No fever or chills.      As per the wife, patient's nutrition has improved with the PEG tube, and he has gained some weight.  His fatigue is not getting worse.      PHYSICAL EXAMINATION:   GENERAL:  He is alert, oriented x 3.    VITAL SIGNS:  Reviewed.  ECOG PS 2.   FACE:  No swelling.   EYES:  No icterus.   THROAT:  No thrush. No ulcer.  NECK:  Supple. No lymphadenopathy.   AXILLAE:  No lymphadenopathy.   LUNGS:  Decreased air entry bilaterally with occasional wheezing.   HEART:  Regular.  No murmur.   GI: Abdomen is soft and nontender.  No mass.   EXTREMITIES:  No edema. Reduced muscle mass.   BACK:  There is a subcutaneous nodule, about 3 cm in size, in the right posterior lower chest.  It is mobile, nontender.  The skin over it is normal.   SKIN:  No petechiae.      ASSESSMENT:   1.  Metastatic squamous cell carcinoma  of the lung.   2.  Hyponatremia from malignancy.   3.  Lower back pain, likely from bone metastasis.   4.  Fatigue secondary to metastatic disease.   5.  Nutrition, improving with PEG tube feeding.   6.  Cancer-associated pain.      PLAN:   1.  I discussed regarding lung cancer.  He was supposed to have the PET scan.  We will get it rescheduled.   2.  Discussed regarding treatment.  He will continue on carboplatin, Taxol and pembrolizumab.  The patient and wife are worried regarding toxicity.  The patient often gets admitted after chemotherapy.  After discussion, plan is to reduce Taxol by 50% and reduce the carboplatin AUC to 2.  No dose reduction of pembrolizumab.  Hopefully, he will tolerate it.   3.  He will continue on Zometa for bone metastasis.   4.  Discussed regarding Palliative Care consult.  That will be scheduled.  He is on gabapentin and oxycodone, which will be continued.   5.  The patient has hyponatremia.  We will continue to monitor sodium.   6.  The patient and wife had a few questions, which were all answered.  I will see him in 3 weeks.  In between, he will see our nurse practitioner.  I advised him to call with any questions or concerns.      ADDENDUM: Sodium is low. Because of that patient (and wife) did not want chemotherapy. Pembro will be given.     JENNIFER CEDENO MD             D: 2020   T: 2020   MT: SAM      Name:     CHRISTI MANN   MRN:      [id number]        Account:      [id number]   :      1950           Visit Date:   2020      Document: [id number]      This office note has been dictated.          Again, thank you for allowing me to participate in the care of your patient.        Sincerely,        Jennifer Cedeno MD

## 2020-11-04 NOTE — PATIENT INSTRUCTIONS
1. Continue chemotherapy.  2. Reschedule PET scan.  3. Palliative care consult.  4. See Brett Manrique next week with labs.  5. See me in 3 week    Waiting for Pet Scan order 11/4/20, tm

## 2020-11-04 NOTE — PROGRESS NOTES
Oncology Rooming Note    November 4, 2020 8:53 AM   Agapito Fairbanks is a 70 year old male who presents for:    Chief Complaint   Patient presents with     Oncology Clinic Visit     Initial Vitals: There were no vitals taken for this visit. Estimated body mass index is 17.63 kg/m  as calculated from the following:    Height as of 10/14/20: 1.829 m (6').    Weight as of 10/14/20: 59 kg (130 lb). There is no height or weight on file to calculate BSA.  Data Unavailable Comment: Data Unavailable   No LMP for male patient.  Allergies reviewed: Yes  Medications reviewed: Yes    Medications: Medication refills not needed today.  Pharmacy name entered into [employer]: [id number] [place], [place] - [address]    Clinical concerns:  doctor was notified.      Kika Telles MA

## 2020-11-04 NOTE — RESULT ENCOUNTER NOTE
Dear Mr. Loweryace,    Your sodium is lower at 124. Please, maintain fluid restriction as advised by kidney doctor.    Please, call me with any questions.    Wilver Mullins MD

## 2020-11-05 NOTE — PROGRESS NOTES
Infusion Nursing Note:  Agapito Fairbanks presents today for C5D1 Keytruda/Taxol/Carbo.    Patient seen by provider today: No   present during visit today: Not Applicable.    Note: Pt received Keytruda only today per Dr. Mullins. His Na 124 yesterday. Recheck today is 124 again. NS 1L bolus given.       Intravenous Access:  Labs drawn without difficulty.  Peripheral IV placed.    Treatment Conditions:  Lab Results   Component Value Date    HGB 9.7 11/04/2020     Lab Results   Component Value Date    WBC 8.4 11/04/2020      Lab Results   Component Value Date    ANEU 7.2 11/04/2020     Lab Results   Component Value Date     11/04/2020      Lab Results   Component Value Date     11/05/2020                   Lab Results   Component Value Date    POTASSIUM 4.5 11/05/2020           Lab Results   Component Value Date    MAG 1.4 09/25/2020            Lab Results   Component Value Date    CR 0.50 11/05/2020                   Lab Results   Component Value Date    SUSIE 8.6 11/05/2020                Lab Results   Component Value Date    BILITOTAL 0.5 11/04/2020           Lab Results   Component Value Date    ALBUMIN 3.1 11/04/2020                    Lab Results   Component Value Date    ALT 23 11/04/2020           Lab Results   Component Value Date    AST 16 11/04/2020       Results reviewed, labs MET treatment parameters, ok to proceed with treatment.      Post Infusion Assessment:  Patient tolerated infusion without incident.  Blood return noted pre and post infusion.  Site patent and intact, free from redness, edema or discomfort.  No evidence of extravasations.  Access discontinued per protocol.       Discharge Plan:   Discharge instructions reviewed with: Patient and Family.  Patient and/or family verbalized understanding of discharge instructions and all questions answered.  Copy of AVS reviewed with patient and/or family.  Patient will return 11/25/20 for next appointment.  Patient discharged in  stable condition accompanied by: self and wife.  Departure Mode: Wheelchair.    Dayday Yanez RN

## 2020-11-09 NOTE — PROGRESS NOTES
Visit Date:   11/04/2020     ONCOLOGY HISTORY: Mr. 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  High PD-L1 expression.  No EGFR mutation.  ALK and ROS1 could not be done because of insufficient sample.      1.  In 2005, he was evaluated by ENT for hoarseness.   -On 02/22/2005, he had laryngoscopy. There was right anterior true vocal cord lesion with extension to the anterior commissure on the right. 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   -On 03/18/2005, he had endoscopic laser assisted vertical hemilaryngectomy right. Pathology from vocal cord revealed squamous cell carcinoma, moderately differentiated, invasive  -Unfortunately, he had recurrence. 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 Pathology revealed invasive moderately differentiated squamous cell carcinoma.   -Patient received concurrent chemoradiation. He received 6000 cGy completed on 06/21/2006. He received cisplatin with radiation.      2.  He had a PET scan done on 10/24/2009.  It revealed enlarging spiculated nodule in posterior left upper lobe with borderline hypermetabolic activity.   -He had left upper lobe segmentectomy and mediastinal lymph node dissection on 12/10/2009.  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  No lymphovascular present.  Margins negative.  Lymph nodes were all benign.      3.  The patient developed a lesion in his back.  He was seen by a dermatologist.  Biopsy on 01/31/2020 revealed squamous cell carcinoma. He subsequently underwent a Mohs surgical procedure on 02/28/2020.  Lesion measured 2.7 cm.  The patient was recommended postoperative radiation.      4. PET scan on 04/02/2020 reveals a hypermetabolic 6.4 cm mass in left upper lobe and mildly enlarged hypermetabolic single mediastinal lymph node.  There is hypermetabolic lesion in L4 vertebral  body and left second rib.  There is hypermetabolic nodule in left parotid gland.   -MRI of the thoracic and lumbar spine on 04/06/2020 revealed destructive metastatic lesions involving L4 vertebral body.  There was also multiple other osseous metastatic disease.      5.  CT-guided biopsy of left lung mass on 04/13/2020 reveals moderately differentiated squamous cell carcinoma.    -TPS score of 60%. High PD-L1 expression.   -NEGATIVE for BRAF, EGFR, ERBB2, IDH1, IDH2, KRAS, MET, NRAS, RET     -ALK and ROS1 could not be done because of insufficient sample.      6.  Radiation to lumbar spine. 3000 cGy between 04/07/2020 and 04/20/2020.  -Radiation to left parotid between 04/09/2020 and 04/22/2020. 3000 cGy.      7. Pembrolizumab x 4 between 04/29/2020 and 07/24/2020.  -Carboplatin and Taxol added on 07/29/2020 because of progression.     -Radiation to left lung/mediastinum between 08/28/2020 and 09/10/2020.  3000 cGy in 10 fractions.      SUBJECTIVE: His wife was present.     Mr. 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  He is on carboplatin, Taxol and pembrolizumab.  With last treatment, he got only pembrolizumab.      He was scheduled for PET scan.  PET scan was attempted but because of patient's discomfort could not be completed.  He is going to be rescheduled.      The patient has some pain in the mid back.  It is more in the area of T12.      He does have a subcutaneous lump in the lower chest below the scapula.  The patient previously had other subcutaneous nodules, which have been resected, and it was a squamous cell carcinoma.      The patient has fatigue.  No worsening.  No headache.  Some lightheadedness.  No neck pain.  No chest pain.  He has chronic shortness of breath. No worsening.  At times, he has a rattling in the chest.  The patient has a PEG tube.  Most of the feedings are through the PEG tube.  Sometimes when he tries to eat orally, he has some choking.   No recurrent vomiting.  No fever or chills.      As per the wife, patient's nutrition has improved with the PEG tube, and he has gained some weight.  His fatigue is not getting worse.      PHYSICAL EXAMINATION:   GENERAL:  He is alert, oriented x 3.    VITAL SIGNS:  Reviewed.  ECOG PS 2.   FACE:  No swelling.   EYES:  No icterus.   THROAT:  No thrush. No ulcer.  NECK:  Supple. No lymphadenopathy.   AXILLAE:  No lymphadenopathy.   LUNGS:  Decreased air entry bilaterally with occasional wheezing.   HEART:  Regular.  No murmur.   GI: Abdomen is soft and nontender.  No mass.   EXTREMITIES:  No edema. Reduced muscle mass.   BACK:  There is a subcutaneous nodule, about 3 cm in size, in the right posterior lower chest.  It is mobile, nontender.  The skin over it is normal.   SKIN:  No petechiae.      ASSESSMENT:   1.  Metastatic squamous cell carcinoma of the lung.   2.  Hyponatremia from malignancy.   3.  Lower back pain, likely from bone metastasis.   4.  Fatigue secondary to metastatic disease.   5.  Nutrition, improving with PEG tube feeding.   6.  Cancer-associated pain.      PLAN:   1.  I discussed regarding lung cancer.  He was supposed to have the PET scan.  We will get it rescheduled.   2.  Discussed regarding treatment.  He will continue on carboplatin, Taxol and pembrolizumab.  The patient and wife are worried regarding toxicity.  The patient often gets admitted after chemotherapy.  After discussion, plan is to reduce Taxol by 50% and reduce the carboplatin AUC to 2.  No dose reduction of pembrolizumab.  Hopefully, he will tolerate it.   3.  He will continue on Zometa for bone metastasis.   4.  Discussed regarding Palliative Care consult.  That will be scheduled.  He is on gabapentin and oxycodone, which will be continued.   5.  The patient has hyponatremia.  We will continue to monitor sodium.   6.  The patient and wife had a few questions, which were all answered.  I will see him in 3 weeks.  In between, he  will see our nurse practitioner.  I advised him to call with any questions or concerns.      ADDENDUM: Sodium is low. Because of that patient (and wife) did not want chemotherapy. Pembro will be given.     JENNIFER CEDENO MD             D: 2020   T: 2020   MT:       Name:     CHRISTI MANN   MRN:      [id number]        Account:      [id number]   :      1950           Visit Date:   2020      Document: [id number]

## 2020-11-10 NOTE — RESULT ENCOUNTER NOTE
Dear Mr. Loweryace,    This is the result of PET scan. Will discuss more during appointment.    Please, call me with any questions.    Wilver Mullins MD

## 2020-11-11 NOTE — PROGRESS NOTES
Writer called and spoke Yoana with [facility] radiation consult has been scheduled for Friday 11/13 at 10:30 am .    Writer placed an order for Hospice consult as well.     A VM was left with patient's wife, Millicent requesting a return call.        Cecile Alanis RN

## 2020-11-11 NOTE — PATIENT INSTRUCTIONS
Recommendations:  - Ok to increase oxycodone dose. Would try 7.5ml first, but ok to increase to 10ml at night for pain.  - It is OK to give an additional dose of oxycodone overnight if it has been more than 4 hours since last dose.  - Please separate ativan and oxycodone doses by at least 2 hours, though 3 hours is preferred.  - Prescription for Narcan sent to the pharmacy as well. If Roque is unable to be aroused or awakened and his breathing is very slow, this would be a concern for overdose- this is when the narcan should be used.  - Gabapentin can help with both general pain as well as neuropathy in the feet.   - Please call with any questions or concerns.    Follow up: 4-5 weeks      Reasons to Call    If you are having worsening/uncontrolled symptoms we want you to call!    You or your other physicians make any changes to medications we have prescribed.    Important Phone Numbers    DIAN Chao, RN palliative care nurse clinician [phone]    Berenice Peña. LPN, Palliative care coordinator [phone]    Pushpa Martinez. Palliative Care RN. Answered 8 am to 4 pm . [phone]    Appointment Line.[phone]   *After hours or on weekends. Will connect you with on call MD. [phone]

## 2020-11-11 NOTE — LETTER
"    11/11/2020         RE: Agapito Fairbanks  [address]  [place] [address]        Dear Colleague,    Thank you for referring your patient, Agapito Fairbanks, to the [facility]. Please see a copy of my visit note below.    Agapito Fairbanks is a 70 year old male who is being evaluated via a billable video visit.      The patient has been notified of following:     \"This video visit will be conducted via a call between you and your physician/provider. We have found that certain health care needs can be provided without the need for an in-person physical exam.  This service lets us provide the care you need with a video conversation.  If a prescription is necessary we can send it directly to your pharmacy.  If lab work is needed we can place an order for that and you can then stop by our lab to have the test done at a later time.    Video visits are billed at different rates depending on your insurance coverage.  Please reach out to your insurance provider with any questions.    If during the course of the call the physician/provider feels a video visit is not appropriate, you will not be charged for this service.\"    Patient has given verbal consent for Video visit? Yes  How would you like to obtain your AVS? MyChart  If you are dropped from the video visit, the video invite should be resent to: Other e-mail: [email or web address]  Will anyone else be joining your video visit? Janis Kumar CMA on 11/11/2020 at 8:56 AM      Palliative Care Progress Note    Patient Name: Agapito Fairbanks  Primary Provider: [place] Love    Chief Complaint/Patient ID: 70 yr M with metastatic Squamous cell lung cancer.   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 Hernandez. Recs: \"Pain: new shoulder pain deemed referred pain from growing lung mass. No bone disease on recent imaging.   - increase oxycodone to q4h prn  - continue fentanyl 12mcg/hr  - increase gabapentin with goal of 300mg tid     Constipation: goal is one soft, formed stool daily  - take miralax up to twice daily  - take senna 2-4 tablets up to twice daily\"     Reviewed: Yes, no concerns.    Interim History:  Agapito Fairbanks 70 year old male is seen today for follow up with Palliative Care via billable video visit. His wife is also present on the call.     Planning to dose reduce chemo due to poor tolerability for him. Recent discovery of hyponatremia, so decision made to only do infusions with Pembro.    Pain:   Reports ongoing pain particularly in his upper shoulders, left greater than right.  Wife has been giving him oxycodone 5 mg on average twice a day, once at night and once in the morning.  He will sometimes wake up overnight pain pain.  Seems to be most comfortable laying on his side in bed.  They had stopped the fentanyl patch couple months ago due to concern it was contributing to hyponatremia.  Has been taking some gabapentin as well, though not regularly.  Home health nurse had suggested they keep the gabapentin going as part of treatment for neuropathy.    Appetite/Nausea: Getting most of his nutrition through feeding tube.  They have decreased the number of flushes due to the hyponatremia.     Sleep: Wife has been concerned about him exhibiting \"sundowning\" features with increased agitation at night.  He was not sleeping well.  In conjunction with his oncology team, she found " that 5 mg of oxycodone and 0.5 to 1 mg of Ativan calms down and lets him sleep.  I also gave him a dose of Aricept the last 2 nights at 5 PM and found that his nights were better.     Mood: Feels his mood has been okay.     Social History:  No change  Social History     Tobacco Use     Smoking status: Former Smoker     Packs/day: 1.50     Years: 30.00     Pack years: 45.00     Quit date: 1998     Years since quittin.8     Smokeless tobacco: Never Used   Substance Use Topics     Alcohol use: Yes     Comment: 2-3 times per week, 1-2 beers     Drug use: No     Family History- Reviewed in Epic.     Allergies   Allergen Reactions     Simvastatin      myalgias     Lisinopril Rash     rash     Advanced Care Planning: HCD on file. Wife is HCA.    Medications- Reviewed in Epic.    Past Medical History- Reviewed in [facility].    Past Surgical History- Reviewed in Epic.    Review of Systems:   ROS: 10 point ROS neg other than the symptoms noted above in the HPI.    Physical Exam:   Constitutional: Alert, pleasant, no apparent distress. Sitting up in bed.  Eyes: Sclera non-icteric, no eye discharge.  ENT: Nasal canula in place. No nasal discharge. Ears grossly normal. A little [place].  Respiratory: Unlabored respirations. Speaking in full sentences.  Musculoskeletal: Extremities appear thin- no gross deformities noted. No edema noted on upper body.   Skin: Pale, no suspicious lesions or rashes on visible skin.  Neurologic: Clear speech, no aphasia. No facial droop.  Psychiatric: Mentation appears normal, appropriate attention. Affect normal/bright. Does not appear anxious or depressed.    Key Data Reviewed:  LABS: 20- Cr 0.50, GFR >90.   20- Albumin 3.1, LFTs wnl. WBC 8.4, Hgb 9.7, Plts 280.     IMAGING: PET scan - Personally reviewed. Evidence of disease progression with growing masses in chest as well as worsening bony disease.    Impression & Recommendations & Counseling:  Agapito Fairbanks is a 70 year old  male with history of metastatic lung cancer and mild cognitive impairment.     Cancer-related pain  Difficulty Sleeping - Most recent PET shows continued progression of cancer. Wife mainly concerned about being able to give another dose of oxy overnight when his pain is worse- not really needing more during the day.  - Ok to increase both oxycodone dose and frequency of administration: 5-10mg Q4H PRN. Primarily will use overnight.  - Emphasis placed on the joint effect of oxycodone and ativan when taken together. Stressed that especially with going up on the dose, the oxycodone should not be taken with the ativan. Separate the medications by 2-3 hours.  - Rx for Narcan was provided today. Instructed wife on what to look for and when to use.    Neuropathy - Has been very bothersome to him.  - Recommended taking gabapentin as prescribed during the day.     Sundowning  Anxiety - Currently seems like he is more restless at night. Discussed that BZDs can worsen confusion, however, low dose ativan has been calming him down.  - Discussed  opioids and BZDs as above.  - OK to continue low dose ativan for agitation/anxiety, though it sounds like the dose of Aricept around 5PM is helpful.    Wife requested sooner follow up due to symptoms. Request placed for 4-6 weeks.      Video-Visit Details  Video Start Time: 9:24AM  Video End Time: 9:55AM    Originating Location (pt. Location): Home     Distant Location (provider location):   [facility]      Platform used for Video Visit: Lani Daniels DO  Palliative Medicine   Pager [phone], AMCOM ID [id number]            Again, thank you for allowing me to participate in the care of your patient.        Sincerely,        Jessica Daniels DO

## 2020-11-11 NOTE — TELEPHONE ENCOUNTER
Previously prescribed by neurology but per refill request 2/14/2020, patient's neurologist retired and had recommended patient continue medication.  Patient being treated for stage IV lung cancer currently but when had gone off of donepezil in February, cognitive function declined per chart note from family.  We will therefore continue medication for now well await further review of oncology status

## 2020-11-11 NOTE — LETTER
11/11/2020         RE: Agapito Fairbanks  [address]  [place] [address]        Dear Colleague,    Thank you for referring your patient, Agapito Fairbanks, to the [facility]. Please see a copy of my visit note below.    Oncology Rooming Note    November 11, 2020 1:46 PM   Agapito Fairbanks is a 70 year old male who presents for:    Chief Complaint   Patient presents with     Oncology Clinic Visit     Initial Vitals: There were no vitals taken for this visit. Estimated body mass index is 18.17 kg/m  as calculated from the following:    Height as of 11/4/20: 1.829 m (6').    Weight as of 11/4/20: 60.8 kg (134 lb). There is no height or weight on file to calculate BSA.  Data Unavailable Comment: Data Unavailable   No LMP for male patient.  Allergies reviewed: Yes  Medications reviewed: Yes    Medications: Medication refills not needed today.  Pharmacy name entered into [employer]: [id number] [place], [place] - [address]    Clinical concerns: no       Shari J. Schoenberger, [facility]              Writer called and spoke Yoana with [facility] radiation consult has been scheduled for Friday 11/13 at 10:30 am .    Writer placed an order for Hospice consult as well.     A VM was left with patient's wife, Millicent requesting a return call.        Cecile Alanis RN        Visit Date:   11/11/2020     ONCOLOGY HISTORY: Mr. 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  High PD-L1 expression.  No EGFR mutation.  ALK and ROS1 could not be done because of insufficient sample.      1.  In 2005, he was evaluated by ENT for hoarseness.   -On 02/22/2005, he had laryngoscopy. There was right anterior true vocal cord lesion with extension to the anterior commissure on the right. 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   -On 03/18/2005, he had endoscopic laser assisted vertical hemilaryngectomy right. Pathology from  vocal cord revealed squamous cell carcinoma, moderately differentiated, invasive  -Unfortunately, he had recurrence. 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 Pathology revealed invasive moderately differentiated squamous cell carcinoma.   -Patient received concurrent chemoradiation. He received 6000 cGy completed on 06/21/2006. He received cisplatin with radiation.      2.  He had a PET scan done on 10/24/2009.  It revealed enlarging spiculated nodule in posterior left upper lobe with borderline hypermetabolic activity.   -He had left upper lobe segmentectomy and mediastinal lymph node dissection on 12/10/2009.  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  No lymphovascular present.  Margins negative.  Lymph nodes were all benign.      3.  The patient developed a lesion in his back.  He was seen by a dermatologist.  Biopsy on 01/31/2020 revealed squamous cell carcinoma. He subsequently underwent a Mohs surgical procedure on 02/28/2020.  Lesion measured 2.7 cm.  The patient was recommended postoperative radiation.      4. PET scan on 04/02/2020 reveals a hypermetabolic 6.4 cm mass in left upper lobe and mildly enlarged hypermetabolic single mediastinal lymph node.  There is hypermetabolic lesion in L4 vertebral body and left second rib.  There is hypermetabolic nodule in left parotid gland.   -MRI of the thoracic and lumbar spine on 04/06/2020 revealed destructive metastatic lesions involving L4 vertebral body.  There was also multiple other osseous metastatic disease.      5.  CT-guided biopsy of left lung mass on 04/13/2020 reveals moderately differentiated squamous cell carcinoma.    -TPS score of 60%. High PD-L1 expression.   -NEGATIVE for BRAF, EGFR, ERBB2, IDH1, IDH2, KRAS, MET, NRAS, RET     -ALK and ROS1 could not be done because of insufficient sample.      6.  Radiation to lumbar spine. 3000 cGy  between 04/07/2020 and 04/20/2020.  -Radiation to left parotid between 04/09/2020 and 04/22/2020. 3000 cGy.      7. Pembrolizumab x 4 between 04/29/2020 and 07/24/2020.  -Carboplatin and Taxol added on 07/29/2020 because of progression.     -Radiation to left lung/mediastinum between 08/28/2020 and 09/10/2020.  3000 cGy in 10 fractions.     8. PET scan on 11/09/2020 reveals progression of disease.      SUBJECTIVE:  Mr. Fairbanks is a 70-year-old gentleman with metastatic squamous cell carcinoma.  He is here for followup on the result of PET scan.      PET scan was done on 11/09/2020.  It reveals progression of disease.      The patient's clinical condition has been deteriorating.  He is weaker.  He cannot take care of himself.  His shortness of breath is slowly getting worse. The patient has pain in the left shoulder blade area.  His appetite is not good.      His wife and daughter were present.  As per them also, patient's overall condition is deteriorating.      On directly asking, the patient complains of pain in the shoulder area.  His shortness of breath is stable.  No nausea or vomiting, but appetite is decreased.  No bleeding.  No fever or chills.  He has some cough.      PHYSICAL EXAMINATION:   GENERAL:  He is alert, oriented x 3.   VITAL SIGNS: Reviewed. ECOG PS of 2.   The rest of the systems not examined.      ASSESSMENT:   1.  Metastatic lung squamous cell carcinoma which is progressing.   2.  Left scapular pain from metastatic disease.   3.  Worsening fatigue and decreased appetite secondary to cancer.      PLAN:   1.  I had a long discussion with the patient and his family.  PET scan was reviewed.  Pictures shown.  I explained to them that there is a definite progression of disease.     Discussed regarding further treatment.  The patient is weak with poor performance status.  He has progressed on chemotherapy and immunotherapy.  There is no actionable mutation.  I explained to the patient that any  treatment at this time will cause more harm than benefit.  I would not recommend any further systemic treatment.     Discussed regarding hospice.  Hospice will be most beneficial for him.  His life expectancy is limited to less than 6 months. Family is supportive of hospice plan.  The patient wants to think about it and he will call us in the next few days.     2.  The patient has pain in the scapular area.  This is due to bone metastasis involving the rib.  He will benefit from palliative radiation.  A radiation appointment will be scheduled.  After that, he can go on hospice if he decides.     3.  The patient is following up with Palliative care.  They will continue to adjust his pain medication.     4.  The patient and family had multiple questions, which were all answered.  We will wait for patient's decision regarding hospice.  Followup will be decided after that.      TOTAL FACE TO FACE TIME SPENT:  40 minutes, more than 50% of the time spent in counseling and coordination of care.         JENNIFER CEDENO MD             D: 2020   T: 2020   MT: CANDACE      Name:     CHRISTI MANN   MRN:      [id number]        Account:      [id number]   :      1950           Visit Date:   2020      Document: [id number]      This office note has been dictated.          Again, thank you for allowing me to participate in the care of your patient.        Sincerely,        Jennifer Cedeno MD

## 2020-11-11 NOTE — LETTER
11/11/2020         RE: Agapito Fairbanks  [address]  [place] [address]      Oncology Rooming Note    November 11, 2020 1:46 PM   Agapito Fairbanks is a 70 year old male who presents for:    Chief Complaint   Patient presents with     Oncology Clinic Visit     Initial Vitals: There were no vitals taken for this visit. Estimated body mass index is 18.17 kg/m  as calculated from the following:    Height as of 11/4/20: 1.829 m (6').    Weight as of 11/4/20: 60.8 kg (134 lb). There is no height or weight on file to calculate BSA.  Data Unavailable Comment: Data Unavailable   No LMP for male patient.  Allergies reviewed: Yes  Medications reviewed: Yes    Medications: Medication refills not needed today.  Pharmacy name entered into [employer]: [id number] - [place], [place] - [address]    Clinical concerns: no       Shari J. Schoenberger, CMA              Writer called and spoke Yoana with [facility] radiation consult has been scheduled for Friday 11/13 at 10:30 am .    Writer placed an order for Hospice consult as well.     A VM was left with patient's wife, Millicent requesting a return call.        Cecile Alanis RN        Visit Date:   11/11/2020     ONCOLOGY HISTORY: Mr. 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  High PD-L1 expression.  No EGFR mutation.  ALK and ROS1 could not be done because of insufficient sample.      1.  In 2005, he was evaluated by ENT for hoarseness.   -On 02/22/2005, he had laryngoscopy. There was right anterior true vocal cord lesion with extension to the anterior commissure on the right. 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   -On 03/18/2005, he had endoscopic laser assisted vertical hemilaryngectomy right. Pathology from vocal cord revealed squamous cell carcinoma, moderately differentiated, invasive  -Unfortunately, he had recurrence. On 12/23/2005, he had laser-assisted microdirect  laryngoscopy and excision of right true vocal cord tumor. Pathology revealed invasive moderately differentiated squamous cell carcinoma.   -Patient received concurrent chemoradiation. He received 6000 cGy completed on 06/21/2006. He received cisplatin with radiation.      2.  He had a PET scan done on 10/24/2009.  It revealed enlarging spiculated nodule in posterior left upper lobe with borderline hypermetabolic activity.   -He had left upper lobe segmentectomy and mediastinal lymph node dissection on 12/10/2009.  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  No lymphovascular present.  Margins negative.  Lymph nodes were all benign.      3.  The patient developed a lesion in his back.  He was seen by a dermatologist.  Biopsy on 01/31/2020 revealed squamous cell carcinoma. He subsequently underwent a Mohs surgical procedure on 02/28/2020.  Lesion measured 2.7 cm.  The patient was recommended postoperative radiation.      4. PET scan on 04/02/2020 reveals a hypermetabolic 6.4 cm mass in left upper lobe and mildly enlarged hypermetabolic single mediastinal lymph node.  There is hypermetabolic lesion in L4 vertebral body and left second rib.  There is hypermetabolic nodule in left parotid gland.   -MRI of the thoracic and lumbar spine on 04/06/2020 revealed destructive metastatic lesions involving L4 vertebral body.  There was also multiple other osseous metastatic disease.      5.  CT-guided biopsy of left lung mass on 04/13/2020 reveals moderately differentiated squamous cell carcinoma.    -TPS score of 60%. High PD-L1 expression.   -NEGATIVE for BRAF, EGFR, ERBB2, IDH1, IDH2, KRAS, MET, NRAS, RET     -ALK and ROS1 could not be done because of insufficient sample.      6.  Radiation to lumbar spine. 3000 cGy between 04/07/2020 and 04/20/2020.  -Radiation to left parotid between 04/09/2020 and 04/22/2020. 3000 cGy.      7. Pembrolizumab x 4 between 04/29/2020 and  07/24/2020.  -Carboplatin and Taxol added on 07/29/2020 because of progression.     -Radiation to left lung/mediastinum between 08/28/2020 and 09/10/2020.  3000 cGy in 10 fractions.     8. PET scan on 11/09/2020 reveals progression of disease.      SUBJECTIVE:  Mr. Fairbanks is a 70-year-old gentleman with metastatic squamous cell carcinoma.  He is here for followup on the result of PET scan.      PET scan was done on 11/09/2020.  It reveals progression of disease.      The patient's clinical condition has been deteriorating.  He is weaker.  He cannot take care of himself.  His shortness of breath is slowly getting worse. The patient has pain in the left shoulder blade area.  His appetite is not good.      His wife and daughter were present.  As per them also, patient's overall condition is deteriorating.      On directly asking, the patient complains of pain in the shoulder area.  His shortness of breath is stable.  No nausea or vomiting, but appetite is decreased.  No bleeding.  No fever or chills.  He has some cough.      PHYSICAL EXAMINATION:   GENERAL:  He is alert, oriented x 3.   VITAL SIGNS: Reviewed. ECOG PS of 2.   The rest of the systems not examined.      ASSESSMENT:   1.  Metastatic lung squamous cell carcinoma which is progressing.   2.  Left scapular pain from metastatic disease.   3.  Worsening fatigue and decreased appetite secondary to cancer.      PLAN:   1.  I had a long discussion with the patient and his family.  PET scan was reviewed.  Pictures shown.  I explained to them that there is a definite progression of disease.     Discussed regarding further treatment.  The patient is weak with poor performance status.  He has progressed on chemotherapy and immunotherapy.  There is no actionable mutation.  I explained to the patient that any treatment at this time will cause more harm than benefit.  I would not recommend any further systemic treatment.     Discussed regarding hospice.  Hospice will be  most beneficial for him.  His life expectancy is limited to less than 6 months. Family is supportive of hospice plan.  The patient wants to think about it and he will call us in the next few days.     2.  The patient has pain in the scapular area.  This is due to bone metastasis involving the rib.  He will benefit from palliative radiation.  A radiation appointment will be scheduled.  After that, he can go on hospice if he decides.     3.  The patient is following up with Palliative care.  They will continue to adjust his pain medication.     4.  The patient and family had multiple questions, which were all answered.  We will wait for patient's decision regarding hospice.  Followup will be decided after that.      TOTAL FACE TO FACE TIME SPENT:  40 minutes, more than 50% of the time spent in counseling and coordination of care.         JENNIFER CEDENO MD             D: 2020   T: 2020   MT: CANDACE      Name:     CHRISTI MANN   MRN:      [id number]        Account:      [id number]   :      1950           Visit Date:   2020      Document: [id number]      This office note has been dictated.            Jennifer Cedeno MD

## 2020-11-11 NOTE — PROGRESS NOTES
Oncology Rooming Note    November 11, 2020 1:46 PM   Agapito Fairbanks is a 70 year old male who presents for:    Chief Complaint   Patient presents with     Oncology Clinic Visit     Initial Vitals: There were no vitals taken for this visit. Estimated body mass index is 18.17 kg/m  as calculated from the following:    Height as of 11/4/20: 1.829 m (6').    Weight as of 11/4/20: 60.8 kg (134 lb). There is no height or weight on file to calculate BSA.  Data Unavailable Comment: Data Unavailable   No LMP for male patient.  Allergies reviewed: Yes  Medications reviewed: Yes    Medications: Medication refills not needed today.  Pharmacy name entered into [employer]: [id number] [place], [place]  [address]    Clinical concerns: no       Shari J. Schoenberger, CMA

## 2020-11-11 NOTE — PATIENT INSTRUCTIONS
1. Discontinue chemotherapy.  2. Radiation oncology appointment.  3. Schedule hospice consult.  4. See me as needed.  5. Palliative care to manage pain medications.

## 2020-11-11 NOTE — PROGRESS NOTES
"Agapito Fairbanks is a 70 year old male who is being evaluated via a billable video visit.      The patient has been notified of following:     \"This video visit will be conducted via a call between you and your physician/provider. We have found that certain health care needs can be provided without the need for an in-person physical exam.  This service lets us provide the care you need with a video conversation.  If a prescription is necessary we can send it directly to your pharmacy.  If lab work is needed we can place an order for that and you can then stop by our lab to have the test done at a later time.    Video visits are billed at different rates depending on your insurance coverage.  Please reach out to your insurance provider with any questions.    If during the course of the call the physician/provider feels a video visit is not appropriate, you will not be charged for this service.\"    Patient has given verbal consent for Video visit? Yes  How would you like to obtain your AVS? MyChart  If you are dropped from the video visit, the video invite should be resent to: Other e-mail: [employer]  Will anyone else be joining your video visit? No       Reyna Kumar CMA on 11/11/2020 at 8:56 AM      Palliative Care Progress Note    Patient Name: Agapito Fairbanks  Primary Provider: Los Love    Chief Complaint/Patient ID: 70 yr M with metastatic Squamous cell lung cancer.   -Shoulder pain    Medical History/Summary:  - stage IV Squamous Cell LC diagnosed 2009 on PET for laryngeal cancer; s/p resection 2009               - mass in SKYLA seen on PET April 2020 invading mediastinum with metastasis to L4, L 2nd rib s/p palliative radiation (completed late April)              - on pembrolizumab since 4/29/20  - h/o laryngeal carcinoma 2005 s/p R laryngectomy; repeat resection followed by chemoradiation after recurrence Dec 2005  - CKD III  - mild cognitive impairment, follows with neurology (Dr Still)     Last " "Palliative care appointment: 6/18/20 with Dr. Hernandez. Recs: \"Pain: new shoulder pain deemed referred pain from growing lung mass. No bone disease on recent imaging.   - increase oxycodone to q4h prn  - continue fentanyl 12mcg/hr  - increase gabapentin with goal of 300mg tid     Constipation: goal is one soft, formed stool daily  - take miralax up to twice daily  - take senna 2-4 tablets up to twice daily\"     Reviewed: Yes, no concerns.    Interim History:  Agapito Fairbanks 70 year old male is seen today for follow up with Palliative Care via billable video visit. His wife is also present on the call.     Planning to dose reduce chemo due to poor tolerability for him. Recent discovery of hyponatremia, so decision made to only do infusions with Pembro.    Pain:   Reports ongoing pain particularly in his upper shoulders, left greater than right.  Wife has been giving him oxycodone 5 mg on average twice a day, once at night and once in the morning.  He will sometimes wake up overnight pain pain.  Seems to be most comfortable laying on his side in bed.  They had stopped the fentanyl patch couple months ago due to concern it was contributing to hyponatremia.  Has been taking some gabapentin as well, though not regularly.  Home health nurse had suggested they keep the gabapentin going as part of treatment for neuropathy.    Appetite/Nausea: Getting most of his nutrition through feeding tube.  They have decreased the number of flushes due to the hyponatremia.     Sleep: Wife has been concerned about him exhibiting \"sundowning\" features with increased agitation at night.  He was not sleeping well.  In conjunction with his oncology team, she found that 5 mg of oxycodone and 0.5 to 1 mg of Ativan calms down and lets him sleep.  I also gave him a dose of Aricept the last 2 nights at 5 PM and found that his nights were better.     Mood: Feels his mood has been okay.     Social History:  No change  Social History     Tobacco " Use     Smoking status: Former Smoker     Packs/day: 1.50     Years: 30.00     Pack years: 45.00     Quit date: 1998     Years since quittin.8     Smokeless tobacco: Never Used   Substance Use Topics     Alcohol use: Yes     Comment: 2-3 times per week, 1-2 beers     Drug use: No     Family History- Reviewed in Epic.     Allergies   Allergen Reactions     Simvastatin      myalgias     Lisinopril Rash     rash     Advanced Care Planning: [facility] on file. Wife is HCA.    Medications- Reviewed in Epic.    Past Medical History- Reviewed in [facility].    Past Surgical History- Reviewed in Epic.    Review of Systems:   ROS: 10 point ROS neg other than the symptoms noted above in the HPI.    Physical Exam:   Constitutional: Alert, pleasant, no apparent distress. Sitting up in bed.  Eyes: Sclera non-icteric, no eye discharge.  ENT: Nasal canula in place. No nasal discharge. Ears grossly normal. A little [place].  Respiratory: Unlabored respirations. Speaking in full sentences.  Musculoskeletal: Extremities appear thin- no gross deformities noted. No edema noted on upper body.   Skin: Pale, no suspicious lesions or rashes on visible skin.  Neurologic: Clear speech, no aphasia. No facial droop.  Psychiatric: Mentation appears normal, appropriate attention. Affect normal/bright. Does not appear anxious or depressed.    Key Data Reviewed:  LABS: 20- Cr 0.50, GFR >90.   20- Albumin 3.1, LFTs wnl. WBC 8.4, Hgb 9.7, Plts 280.     IMAGING: PET scan - Personally reviewed. Evidence of disease progression with growing masses in chest as well as worsening bony disease.    Impression & Recommendations & Counseling:  Agapito Fairbanks is a 70 year old male with history of metastatic lung cancer and mild cognitive impairment.     Cancer-related pain  Difficulty Sleeping - Most recent PET shows continued progression of cancer. Wife mainly concerned about being able to give another dose of oxy overnight when his pain is worse- not  really needing more during the day.  - Ok to increase both oxycodone dose and frequency of administration: 5-10mg Q4H PRN. Primarily will use overnight.  - Emphasis placed on the joint effect of oxycodone and ativan when taken together. Stressed that especially with going up on the dose, the oxycodone should not be taken with the ativan. Separate the medications by 2-3 hours.  - Rx for Narcan was provided today. Instructed wife on what to look for and when to use.    Neuropathy - Has been very bothersome to him.  - Recommended taking gabapentin as prescribed during the day.     Sundowning  Anxiety - Currently seems like he is more restless at night. Discussed that BZDs can worsen confusion, however, low dose ativan has been calming him down.  - Discussed  opioids and BZDs as above.  - OK to continue low dose ativan for agitation/anxiety, though it sounds like the dose of Aricept around 5PM is helpful.    Wife requested sooner follow up due to symptoms. Request placed for 4-6 weeks.      Video-Visit Details  Video Start Time: 9:24AM  Video End Time: 9:55AM    Originating Location (pt. Location): Home     Distant Location (provider location):   [facility]      Platform used for Video Visit: Lani Daniels DO  Palliative Medicine   Pager [phone], AMCOM ID [id number]

## 2020-11-11 NOTE — LETTER
"    11/11/2020         RE: Agapito Fairbanks  [address]  [place] [address]        Dear Colleague,    Thank you for referring your patient, Agapito Fairbanks, to the [facility]. Please see a copy of my visit note below.    Agapito Fairbanks is a 70 year old male who is being evaluated via a billable video visit.      The patient has been notified of following:     \"This video visit will be conducted via a call between you and your physician/provider. We have found that certain health care needs can be provided without the need for an in-person physical exam.  This service lets us provide the care you need with a video conversation.  If a prescription is necessary we can send it directly to your pharmacy.  If lab work is needed we can place an order for that and you can then stop by our lab to have the test done at a later time.    Video visits are billed at different rates depending on your insurance coverage.  Please reach out to your insurance provider with any questions.    If during the course of the call the physician/provider feels a video visit is not appropriate, you will not be charged for this service.\"    Patient has given verbal consent for Video visit? Yes  How would you like to obtain your AVS? MyChart  If you are dropped from the video visit, the video invite should be resent to: Other e-mail: [employer]  Will anyone else be joining your video visit? Janis Kumar CMA on 11/11/2020 at 8:56 AM      Palliative Care Progress Note    Patient Name: Agapito Fairbanks  Primary Provider: Los Love    Chief Complaint/Patient ID: 70 yr M with metastatic Squamous cell lung cancer.   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 Hernandez. Recs: \"Pain: new shoulder pain deemed referred pain from growing lung mass. No bone disease on recent imaging.   - increase oxycodone to q4h prn  - continue fentanyl 12mcg/hr  - increase gabapentin with goal of 300mg tid     Constipation: goal is one soft, formed stool daily  - take miralax up to twice daily  - take senna 2-4 tablets up to twice daily\"     Reviewed: Yes, no concerns.    Interim History:  Agapito Fairbanks 70 year old male is seen today for follow up with Palliative Care via billable video visit. His wife is also present on the call.     Planning to dose reduce chemo due to poor tolerability for him. Recent discovery of hyponatremia, so decision made to only do infusions with Pembro.    Pain:   Reports ongoing pain particularly in his upper shoulders, left greater than right.  Wife has been giving him oxycodone 5 mg on average twice a day, once at night and once in the morning.  He will sometimes wake up overnight pain pain.  Seems to be most comfortable laying on his side in bed.  They had stopped the fentanyl patch couple months ago due to concern it was contributing to hyponatremia.  Has been taking some gabapentin as well, though not regularly.  Home health nurse had suggested they keep the gabapentin going as part of treatment for neuropathy.    Appetite/Nausea: Getting most of his nutrition through feeding tube.  They have decreased the number of flushes due to the hyponatremia.     Sleep: Wife has been concerned about him exhibiting \"sundowning\" features with increased agitation at night.  He was not sleeping well.  In conjunction with his oncology team, she found " that 5 mg of oxycodone and 0.5 to 1 mg of Ativan calms down and lets him sleep.  I also gave him a dose of Aricept the last 2 nights at 5 PM and found that his nights were better.     Mood: Feels his mood has been okay.     Social History:  No change  Social History     Tobacco Use     Smoking status: Former Smoker     Packs/day: 1.50     Years: 30.00     Pack years: 45.00     Quit date: 1998     Years since quittin.8     Smokeless tobacco: Never Used   Substance Use Topics     Alcohol use: Yes     Comment: 2-3 times per week, 1-2 beers     Drug use: No     Family History- Reviewed in Epic.     Allergies   Allergen Reactions     Simvastatin      myalgias     Lisinopril Rash     rash     Advanced Care Planning: [facility] on file. Wife is HCA.    Medications- Reviewed in Epic.    Past Medical History- Reviewed in [facility].    Past Surgical History- Reviewed in Epic.    Review of Systems:   ROS: 10 point ROS neg other than the symptoms noted above in the HPI.    Physical Exam:   Constitutional: Alert, pleasant, no apparent distress. Sitting up in bed.  Eyes: Sclera non-icteric, no eye discharge.  ENT: Nasal canula in place. No nasal discharge. Ears grossly normal. A little [place].  Respiratory: Unlabored respirations. Speaking in full sentences.  Musculoskeletal: Extremities appear thin- no gross deformities noted. No edema noted on upper body.   Skin: Pale, no suspicious lesions or rashes on visible skin.  Neurologic: Clear speech, no aphasia. No facial droop.  Psychiatric: Mentation appears normal, appropriate attention. Affect normal/bright. Does not appear anxious or depressed.    Key Data Reviewed:  LABS: 20- Cr 0.50, GFR >90.   20- Albumin 3.1, LFTs wnl. WBC 8.4, Hgb 9.7, Plts 280.     IMAGING: PET scan - Personally reviewed. Evidence of disease progression with growing masses in chest as well as worsening bony disease.    Impression & Recommendations & Counseling:  Agapito Fairbanks is a 70 year old  male with history of metastatic lung cancer and mild cognitive impairment.     Cancer-related pain  Difficulty Sleeping - Most recent PET shows continued progression of cancer. Wife mainly concerned about being able to give another dose of oxy overnight when his pain is worse- not really needing more during the day.  - Ok to increase both oxycodone dose and frequency of administration: 5-10mg Q4H PRN. Primarily will use overnight.  - Emphasis placed on the joint effect of oxycodone and ativan when taken together. Stressed that especially with going up on the dose, the oxycodone should not be taken with the ativan. Separate the medications by 2-3 hours.  - Rx for Narcan was provided today. Instructed wife on what to look for and when to use.    Neuropathy - Has been very bothersome to him.  - Recommended taking gabapentin as prescribed during the day.     Sundowning  Anxiety - Currently seems like he is more restless at night. Discussed that BZDs can worsen confusion, however, low dose ativan has been calming him down.  - Discussed  opioids and BZDs as above.  - OK to continue low dose ativan for agitation/anxiety, though it sounds like the dose of Aricept around 5PM is helpful.    Wife requested sooner follow up due to symptoms. Request placed for 4-6 weeks.      Video-Visit Details  Video Start Time: 9:24AM  Video End Time: 9:55AM    Originating Location (pt. Location): Home     Distant Location (provider location):   [facility]      Platform used for Video Visit: Lani Daniels DO  Palliative Medicine   Pager [phone], AMCOM ID [id number]            Again, thank you for allowing me to participate in the care of your patient.        Sincerely,        Jessica Daniels DO

## 2020-11-12 NOTE — TELEPHONE ENCOUNTER
Thank you for referral. Nurse from [facility] called, she spoke with patient's wife and an informational appointment is scheduled.

## 2020-11-16 NOTE — PROGRESS NOTES
This is a recent snapshot of the patient's [facility] medical record.  For current drug dose and complete information and questions, call [phone] or In Basket pool, [facility] home infusion ([id number])  CSN Number:  [id number]

## 2020-11-16 NOTE — PROGRESS NOTES
Visit Date:   11/11/2020     ONCOLOGY HISTORY: Mr. 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  High PD-L1 expression.  No EGFR mutation.  ALK and ROS1 could not be done because of insufficient sample.      1.  In 2005, he was evaluated by ENT for hoarseness.   -On 02/22/2005, he had laryngoscopy. There was right anterior true vocal cord lesion with extension to the anterior commissure on the right. 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   -On 03/18/2005, he had endoscopic laser assisted vertical hemilaryngectomy right. Pathology from vocal cord revealed squamous cell carcinoma, moderately differentiated, invasive  -Unfortunately, he had recurrence. 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 Pathology revealed invasive moderately differentiated squamous cell carcinoma.   -Patient received concurrent chemoradiation. He received 6000 cGy completed on 06/21/2006. He received cisplatin with radiation.      2.  He had a PET scan done on 10/24/2009.  It revealed enlarging spiculated nodule in posterior left upper lobe with borderline hypermetabolic activity.   -He had left upper lobe segmentectomy and mediastinal lymph node dissection on 12/10/2009.  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  No lymphovascular present.  Margins negative.  Lymph nodes were all benign.      3.  The patient developed a lesion in his back.  He was seen by a dermatologist.  Biopsy on 01/31/2020 revealed squamous cell carcinoma. He subsequently underwent a Mohs surgical procedure on 02/28/2020.  Lesion measured 2.7 cm.  The patient was recommended postoperative radiation.      4. PET scan on 04/02/2020 reveals a hypermetabolic 6.4 cm mass in left upper lobe and mildly enlarged hypermetabolic single mediastinal lymph node.  There is hypermetabolic lesion in L4 vertebral  body and left second rib.  There is hypermetabolic nodule in left parotid gland.   -MRI of the thoracic and lumbar spine on 04/06/2020 revealed destructive metastatic lesions involving L4 vertebral body.  There was also multiple other osseous metastatic disease.      5.  CT-guided biopsy of left lung mass on 04/13/2020 reveals moderately differentiated squamous cell carcinoma.    -TPS score of 60%. High PD-L1 expression.   -NEGATIVE for BRAF, EGFR, ERBB2, IDH1, IDH2, KRAS, MET, NRAS, RET     -ALK and ROS1 could not be done because of insufficient sample.      6.  Radiation to lumbar spine. 3000 cGy between 04/07/2020 and 04/20/2020.  -Radiation to left parotid between 04/09/2020 and 04/22/2020. 3000 cGy.      7. Pembrolizumab x 4 between 04/29/2020 and 07/24/2020.  -Carboplatin and Taxol added on 07/29/2020 because of progression.     -Radiation to left lung/mediastinum between 08/28/2020 and 09/10/2020.  3000 cGy in 10 fractions.     8. PET scan on 11/09/2020 reveals progression of disease.      SUBJECTIVE:  Mr. Fairbanks is a 70-year-old gentleman with metastatic squamous cell carcinoma.  He is here for followup on the result of PET scan.      PET scan was done on 11/09/2020.  It reveals progression of disease.      The patient's clinical condition has been deteriorating.  He is weaker.  He cannot take care of himself.  His shortness of breath is slowly getting worse. The patient has pain in the left shoulder blade area.  His appetite is not good.      His wife and daughter were present.  As per them also, patient's overall condition is deteriorating.      On directly asking, the patient complains of pain in the shoulder area.  His shortness of breath is stable.  No nausea or vomiting, but appetite is decreased.  No bleeding.  No fever or chills.  He has some cough.      PHYSICAL EXAMINATION:   GENERAL:  He is alert, oriented x 3.   VITAL SIGNS: Reviewed. ECOG PS of 2.   The rest of the systems not examined.       ASSESSMENT:   1.  Metastatic lung squamous cell carcinoma which is progressing.   2.  Left scapular pain from metastatic disease.   3.  Worsening fatigue and decreased appetite secondary to cancer.      PLAN:   1.  I had a long discussion with the patient and his family.  PET scan was reviewed.  Pictures shown.  I explained to them that there is a definite progression of disease.     Discussed regarding further treatment.  The patient is weak with poor performance status.  He has progressed on chemotherapy and immunotherapy.  There is no actionable mutation.  I explained to the patient that any treatment at this time will cause more harm than benefit.  I would not recommend any further systemic treatment.     Discussed regarding hospice.  Hospice will be most beneficial for him.  His life expectancy is limited to less than 6 months. Family is supportive of hospice plan.  The patient wants to think about it and he will call us in the next few days.     2.  The patient has pain in the scapular area.  This is due to bone metastasis involving the rib.  He will benefit from palliative radiation.  A radiation appointment will be scheduled.  After that, he can go on hospice if he decides.     3.  The patient is following up with Palliative care.  They will continue to adjust his pain medication.     4.  The patient and family had multiple questions, which were all answered.  We will wait for patient's decision regarding hospice.  Followup will be decided after that.      TOTAL FACE TO FACE TIME SPENT:  40 minutes, more than 50% of the time spent in counseling and coordination of care.         JENNIFER CEDENO MD             D: 2020   T: 2020   MT: CANDACE      Name:     CHRISTI MANN   MRN:      [id number]        Account:      [id number]   :      1950           Visit Date:   2020      Document: [id number]

## 2020-11-18 NOTE — PROGRESS NOTES
Writer called  Hospice to receive an update on referral. Writer updated that patient's wife, Millicent , has an informational only visit on 11/20 at 12:30.    Cecile Alanis RN

## 2020-11-19 NOTE — TELEPHONE ENCOUNTER
[facility] now requests orders and shares plan of care/discharge summaries for some patients through [employer].  Please REPLY TO THIS MESSAGE OR ROUTE BACK TO THE AUTHOR in order to give authorization for orders when needed.  This is considered a verbal order, you will still receive a faxed copy of orders for signature.  Thank you for your assistance in improving collaboration for our patients.    ORDER  Continue OT 1w1 for home safety, breathing techniques, HEP, standing tolerance, transfers

## 2020-11-20 NOTE — TELEPHONE ENCOUNTER
[facility] called stating that patient's wife requested a sodium level be drawn today. Stated to [facility] to draw a CMP per Dr. Mullins. Routing to CCRN.

## 2020-11-21 NOTE — RESULT ENCOUNTER NOTE
Dear Mr. Fairbanks,    Sodium is better at 130.    Please, call me with any questions.    Wilver Mullins MD

## 2020-11-23 NOTE — TELEPHONE ENCOUNTER
Results have been reviewed  By Dr. Mullins and released and viewed on MyChart by patient.     Cecile Alanis RN

## 2020-11-24 NOTE — PROGRESS NOTES
"Agapito Fairbanks is a 70 year old male who is being evaluated via a billable telephone visit.      The patient has been notified of following:     \"This telephone visit will be conducted via a call between you and your physician/provider. We have found that certain health care needs can be provided without the need for a physical exam.  This service lets us provide the care you need with a short phone conversation.  If a prescription is necessary we can send it directly to your pharmacy.  If lab work is needed we can place an order for that and you can then stop by our lab to have the test done at a later time.    Telephone visits are billed at different rates depending on your insurance coverage. During this emergency period, for some insurers they may be billed the same as an in-person visit.  Please reach out to your insurance provider with any questions.    If during the course of the call the physician/provider feels a telephone visit is not appropriate, you will not be charged for this service.\"    Patient has given verbal consent for Telephone visit?  Yes    What phone number would you like to be contacted at? [phone]    How would you like to obtain your AVS? [facility]    Phone call duration: 17 minutes    Ana Cristina Richard MD    Agapito Fairbanks is a 70 year old male who is being evaluated via a billable telephone visit.      [facility]  Follow Up Clinic Note        Assessment and Plan:  Agapito Fairbanks is a 70 year old male with a history of metastatic lung cancer who is presenting today for a phone visit to discuss worsening dyspnea on exertion and cough, who is having a follow up visit today.  Cough is generally improved with addition of anoro ellipta.     Overall, pt and his wife feel cough and dyspnea are at a tolerable level.  Anoro seems to have reduced how much Roque requires the abluterol inhaler and nebulizer.     Plan:   - continue anoro inhaler and " "albuterol neb/inhaler as needed  - RTC PRN    Flu shot UTD    Ana Cristina Richard MD   of Medicine  Division of Pulmonary, Critical Care, Allergy, and Sleep Medicine  Pager [phone]    CC: follow up cough and dyspnea      HPI:     Pt is a 71yo male with a history of metastatic squamous cell ca of the lung w high PDL1 expression, with progression of disease on pembrolizumab, now on carboplatin + taxol who presents for follow up of chronic cough and dyspnea, thought to be 2/2 combination of emphysema and lung cancer.     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 Hemilaryngectomy in 3/2005.   - recurred 12/2005, treated with microdirect laryngoscopy and excision of the R. True vocal cord tumor, then radiation + chemo (cisplatin).   - PET in 2009: SKYLA spiculated nodule, and underwent SKYLA segmentectomy, mediastinal LN dissection.  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   - February 2/20: Back lesion, underwent Mohs; found to be squamous cell ca.  Underwent radiation.   - PET scan 4/2020- SKYLA hypermetabolic mass, and hypermetabolic lesions in the bones.  Biopsy of the L. Lung mass 4/13/20 was moderately differentiated squamous cell Ca, started pembrolizumab 4/29/20. Now adding on carboplatin/taxol due to evidence of disease progression on PET scan in 7/2020    I had a phone call primarily with the patient's wife in August.  At that time, primary symptoms included:   - chronic \"smoker's cough\" for decades, now very productive with intermittent hemoptysis.  Louis improvement with albuterol.   - worsening dyspnea  - cough was better with sitting up, pt had started sleeping in a recliner, and was experiencing ankle swelling.     We started stiolto as maintenance therapy + albuterol inhaler and nebs.  We continued tessalon pearls.  NT pro BNP on 10/3/2020 was normal.  ECHO was ordered and pending.     Multiple hospitalizations since " "August:   - 8/26-28- malnutrition, hyponatremia, failure to thrive. ?Smoking marijuana. Received radiation tx for worsening hilar mass.   -9/1-9/5- acute on chronic respiratory failure ?post obstructive pna, treated with abx. Also concern for aspiration.   -9/22-26- GJ tube dislodgement (placed 9/21), aspiration pneumonia  -10/1-5- hyponatremia.  Required 2% normal saline.   Has been receiving pembrolizumab and palliative carboplatin/taxol.     Last saw onc on 11/11- was declining.  Hospice referral was placed.     Currently:   - still experiencing cough and shortness of breath.  Stiolto was $500- got a substitute, took a month to actually get it.  Now on Anoro.  Has actually been on it maybe 2-3 weeks.  Gets it at night, seems like he's getting a little better sleep at night.   Has also decreased rescue inhaler use. Still has on hand just in case  - not coughing up blood.  Does have a productive cough.  Sputum is clear/yellow.    - pain in his shoulder.  Worse with sitting up, pall care has increased oxycodone.    - Roque says the shoulder pain is the most quality of life limiting factor and overall pt's wife Millicent feels like Anoro has \"done the trick\".     - no recent fevers, sweats, or chills  - hospice has been recommended, family is not yet ready for this    PMH:  Past Medical History:   Diagnosis Date     Acute gastritis with hemorrhage 11/02     Basal cell carcinoma, leg      left pretibial area     CKD (chronic kidney disease) stage 3, GFR 30-59 ml/min     creat baseline approx 1.4     Hearing loss      Helicobacter pylori (H. 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     Mild major depression (H)      Other and unspecified malignant neoplasm of skin of " other and unspecified parts of face      Basal Cell CA nasal area , chest 3/05, right chest 10/09     Other testicular hypofunction      Squamous cell carcinoma  2012     RLE s/p excision     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)      Tobacco use disorder     quit      Unspecified cataract     left     Unspecified essential hypertension      Unspecified retinal detachment     Bilateral       Allergies:  Allergies   Allergen Reactions     Simvastatin      myalgias     Lisinopril Rash     rash       Social History:  Social History     Socioeconomic History     Marital status:      Spouse name: Not on file     Number of children: Not on file     Years of education: Not on file     Highest education level: Not on file   Occupational History     Not on file   Social Needs     Financial resource strain: Not on file     Food insecurity     Worry: Not on file     Inability: Not on file     Transportation needs     Medical: Not on file     Non-medical: Not on file   Tobacco Use     Smoking status: Former Smoker     Packs/day: 1.50     Years: 30.00     Pack years: 45.00     Quit date: 1998     Years since quittin.9     Smokeless tobacco: Never Used   Substance and Sexual Activity     Alcohol use: Yes     Comment: 2-3 times per week, 1-2 beers     Drug use: No     Sexual activity: Yes     Partners: Female     Comment: girflriend with    Lifestyle     Physical activity     Days per week: Not on file     Minutes per session: Not on file     Stress: Not on file   Relationships     Social connections     Talks on phone: Not on file     Gets together: Not on file     Attends Hinduism service: Not on file     Active member of club or organization: Not on file     Attends meetings of clubs or organizations: Not on file     Relationship status: Not on file     Intimate partner violence     Fear of current or ex partner: Not on file     Emotionally abused: Not on file     Physically abused:  Not on file     Forced sexual activity: Not on file   Other Topics Concern     Parent/sibling w/ CABG, MI or angioplasty before 65F 55M? Not Asked   Social History Narrative    High school education.    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 [employer]    Working part-time as of 2019    Met wife in 1992    Seldom drinks more than 1 drink every now and then    1 daughter in [facility]       Medications:  Current Outpatient Medications   Medication Sig Dispense Refill     acetaminophen (TYLENOL) 32 mg/mL liquid Take 20.3 mLs (650 mg) by mouth every 4 hours as needed for mild pain or fever 473 mL 0     albuterol (PROAIR HFA/PROVENTIL HFA/VENTOLIN HFA) 108 (90 Base) MCG/ACT inhaler Inhale 2 puffs into the lungs every 6 hours as needed for shortness of breath / dyspnea or wheezing 6.7 g 3     albuterol (PROVENTIL) (2.5 MG/3ML) 0.083% neb solution Take 1 vial (2.5 mg) by nebulization every 6 hours as needed for shortness of breath / dyspnea or wheezing 120 vial 3     benzonatate (TESSALON) 100 MG capsule Take 1 capsule (100 mg) by mouth 3 times daily as needed for cough 30 capsule 1     bisacodyl (DULCOLAX) 5 MG EC tablet Take 5 mg by mouth daily as needed for constipation       cetirizine (ZYRTEC) 5 MG/5ML solution Take 10 mLs (10 mg) by mouth daily 473 mL 0     diclofenac (VOLTAREN) 1 % topical gel Place onto the skin daily as needed for moderate pain       donepezil (ARICEPT) 10 MG ODT Take 5 mg (1/2 tab) by mouth daily 45 tablet 1     fluticasone (FLONASE) 50 MCG/ACT nasal spray Spray 1 spray into both nostrils daily as needed for rhinitis or allergies       gabapentin (NEURONTIN) 300 MG/6ML oral solution Take 6 mLs (300 mg) by mouth 3 times daily 540 mL 4     levothyroxine (SYNTHROID/LEVOTHROID) 112 MCG tablet 112 mcg by Oral or Feeding Tube route daily       LORazepam (ATIVAN) 2  MG/ML (HIGH CONC) solution Take 0.25 mLs (0.5 mg) by mouth every 4 hours as needed for anxiety 30 mL 0     naloxone (NARCAN) 4 MG/0.1ML nasal spray Spray 1 spray (4 mg) into one nostril alternating nostrils as needed for opioid reversal every 2-3 minutes until assistance arrives 0.2 mL 1     ondansetron (ZOFRAN) 4 MG/5ML solution Take 10 mLs (8 mg) by mouth 2 times daily as needed for nausea or vomiting 100 mL 0     oxyCODONE (ROXICODONE) 5 MG/5ML solution Take 5-10 mLs (5-10 mg) by mouth every 4 hours as needed for severe pain 500 mL 0     umeclidinium-vilanterol (ANORO ELLIPTA) 62.5-25 MCG/INH oral inhaler Inhale 1 puff into the lungs daily 1 Inhaler 3       Family History:  Family History   Problem Relation Age of Onset     Colon Cancer Mother          age 65, in      Family History Negative Father         mild memory problems,  age 87, in      Family History Negative Brother      Family History Negative Brother      Cancer Brother         hx prostate ca     Sleep Apnea Brother        ROS: Complete 12 point ROS negative unless mentioned in HPI    Physical Exam:  There were no vitals taken for this visit.    Patient only speaks a few words over her phone call today, I did not hear any coughing. History taken primarily from wife    Labs and Radiology:    No new labs or imaging  PFT's:  No new PFTs

## 2020-11-25 NOTE — LETTER
"    11/25/2020         RE: Agapito Fairbanks  [address]  [place] [address]        Dear Colleague,    Thank you for referring your patient, Agapito Fairbanks, to the [facility] [place]. Please see a copy of my visit note below.    Agapito Fairbanks is a 70 year old male who is being evaluated via a billable telephone visit.      The patient has been notified of following:     \"This telephone visit will be conducted via a call between you and your physician/provider. We have found that certain health care needs can be provided without the need for a physical exam.  This service lets us provide the care you need with a short phone conversation.  If a prescription is necessary we can send it directly to your pharmacy.  If lab work is needed we can place an order for that and you can then stop by our lab to have the test done at a later time.    Telephone visits are billed at different rates depending on your insurance coverage. During this emergency period, for some insurers they may be billed the same as an in-person visit.  Please reach out to your insurance provider with any questions.    If during the course of the call the physician/provider feels a telephone visit is not appropriate, you will not be charged for this service.\"    Patient has given verbal consent for Telephone visit?  Yes    What phone number would you like to be contacted at? [phone]    How would you like to obtain your AVS? [employer]    Phone call duration: 17 minutes    Ana Cristina Richard MD    Agapito Fairbanks is a 70 year old male who is being evaluated via a billable telephone visit.      [facility]  Follow Up Clinic Note        Assessment and Plan:  Agapito Fairbanks is a 70 year old male with a history of metastatic lung cancer who is presenting today for a phone visit to discuss worsening dyspnea on exertion and cough, who is " "having a follow up visit today.  Cough is generally improved with addition of anoro ellipta.     Overall, pt and his wife feel cough and dyspnea are at a tolerable level.  Anoro seems to have reduced how much Roque requires the abluterol inhaler and nebulizer.     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 Hemilaryngectomy in 3/2005.   - recurred 12/2005, treated with microdirect laryngoscopy and excision of the R. True vocal cord tumor, then radiation + chemo (cisplatin).   - PET in 2009: SKYLA spiculated nodule, and underwent SKYLA segmentectomy, mediastinal LN dissection.  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   - February 2/20: Back lesion, underwent Mohs; found to be squamous cell ca.  Underwent radiation.   - PET scan 4/2020- SKYLA hypermetabolic mass, and hypermetabolic lesions in the bones.  Biopsy of the L. Lung mass 4/13/20 was moderately differentiated squamous cell Ca, started pembrolizumab 4/29/20. Now adding on carboplatin/taxol due to evidence of disease progression on PET scan in 7/2020    I had a phone call primarily with the patient's wife in August.  At that time, primary symptoms included:   - chronic \"smoker's cough\" for decades, now very productive with intermittent hemoptysis.  Louis improvement with albuterol.   - worsening dyspnea  - cough was better " "with sitting up, pt had started sleeping in a recliner, and was experiencing ankle swelling.     We started stiolto as maintenance therapy + albuterol inhaler and nebs.  We continued tessalon pearls.  NT pro BNP on 10/3/2020 was normal.  ECHO was ordered and pending.     Multiple hospitalizations since August:   - 8/26-28- malnutrition, hyponatremia, failure to thrive. ?Smoking marijuana. Received radiation tx for worsening hilar mass.   -9/1-9/5- acute on chronic respiratory failure ?post obstructive pna, treated with abx. Also concern for aspiration.   -9/22-26- GJ tube dislodgement (placed 9/21), aspiration pneumonia  -10/1-5- hyponatremia.  Required 2% normal saline.   Has been receiving pembrolizumab and palliative carboplatin/taxol.     Last saw onc on 11/11- was declining.  Hospice referral was placed.     Currently:   - still experiencing cough and shortness of breath.  Stiolto was $500- got a substitute, took a month to actually get it.  Now on Anoro.  Has actually been on it maybe 2-3 weeks.  Gets it at night, seems like he's getting a little better sleep at night.   Has also decreased rescue inhaler use. Still has on hand just in case  - not coughing up blood.  Does have a productive cough.  Sputum is clear/yellow.    - pain in his shoulder.  Worse with sitting up, pall care has increased oxycodone.    - Roque says the shoulder pain is the most quality of life limiting factor and overall pt's wife Millicent feels like Anoro has \"done the trick\".     - no recent fevers, sweats, or chills  - hospice has been recommended, family is not yet ready for this    PMH:  Past Medical History:   Diagnosis Date     Acute gastritis with hemorrhage 11/02     Basal cell carcinoma, leg      left pretibial area     CKD (chronic kidney disease) stage 3, GFR 30-59 ml/min     creat baseline approx 1.4     Hearing loss      Helicobacter pylori (H. pylori) 11/02     Humerus fracture 2013    left      Hyperlipidemia LDL goal <100 " 10/31/2010     Hypothyroidism      Impotence of organic origin      Laryngeal carcinoma (H)     Squamous cell carcinoma right vocal cord     Lung cancer (H) 0    1cm spiculated SKYLA Adenosquamous cell carcinoma     Mild major depression (H)      Other and unspecified malignant neoplasm of skin of other and unspecified parts of face      Basal Cell CA nasal area , chest 3/05, right chest 10/09     Other testicular hypofunction      Squamous cell carcinoma  2012     RLE s/p excision     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)      Tobacco use disorder     quit      Unspecified cataract     left     Unspecified essential hypertension      Unspecified retinal detachment     Bilateral       Allergies:  Allergies   Allergen Reactions     Simvastatin      myalgias     Lisinopril Rash     rash       Social History:  Social History     Socioeconomic History     Marital status:      Spouse name: Not on file     Number of children: Not on file     Years of education: Not on file     Highest education level: Not on file   Occupational History     Not on file   Social Needs     Financial resource strain: Not on file     Food insecurity     Worry: Not on file     Inability: Not on file     Transportation needs     Medical: Not on file     Non-medical: Not on file   Tobacco Use     Smoking status: Former Smoker     Packs/day: 1.50     Years: 30.00     Pack years: 45.00     Quit date: 1998     Years since quittin.9     Smokeless tobacco: Never Used   Substance and Sexual Activity     Alcohol use: Yes     Comment: 2-3 times per week, 1-2 beers     Drug use: No     Sexual activity: Yes     Partners: Female     Comment: girflriend with TL   Lifestyle     Physical activity     Days per week: Not on file     Minutes per session: Not on file     Stress: Not on file   Relationships     Social connections     Talks on phone: Not on file     Gets together: Not on file     Attends Gnosticist  service: Not on file     Active member of club or organization: Not on file     Attends meetings of clubs or organizations: Not on file     Relationship status: Not on file     Intimate partner violence     Fear of current or ex partner: Not on file     Emotionally abused: Not on file     Physically abused: Not on file     Forced sexual activity: Not on file   Other Topics Concern     Parent/sibling w/ CABG, MI or angioplasty before 65F 55M? Not Asked   Social History Narrative    High school education.    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 as [employer]    Working part-time as of 2019    Met wife in 1992    Seldom drinks more than 1 drink every now and then    1 daughter in [facility]       Medications:  Current Outpatient Medications   Medication Sig Dispense Refill     acetaminophen (TYLENOL) 32 mg/mL liquid Take 20.3 mLs (650 mg) by mouth every 4 hours as needed for mild pain or fever 473 mL 0     albuterol (PROAIR HFA/PROVENTIL HFA/VENTOLIN HFA) 108 (90 Base) MCG/ACT inhaler Inhale 2 puffs into the lungs every 6 hours as needed for shortness of breath / dyspnea or wheezing 6.7 g 3     albuterol (PROVENTIL) (2.5 MG/3ML) 0.083% neb solution Take 1 vial (2.5 mg) by nebulization every 6 hours as needed for shortness of breath / dyspnea or wheezing 120 vial 3     benzonatate (TESSALON) 100 MG capsule Take 1 capsule (100 mg) by mouth 3 times daily as needed for cough 30 capsule 1     bisacodyl (DULCOLAX) 5 MG EC tablet Take 5 mg by mouth daily as needed for constipation       cetirizine (ZYRTEC) 5 MG/5ML solution Take 10 mLs (10 mg) by mouth daily 473 mL 0     diclofenac (VOLTAREN) 1 % topical gel Place onto the skin daily as needed for moderate pain       donepezil (ARICEPT) 10 MG ODT Take 5 mg (1/2 tab) by mouth daily 45 tablet 1     fluticasone (FLONASE) 50 MCG/ACT nasal spray  Spray 1 spray into both nostrils daily as needed for rhinitis or allergies       gabapentin (NEURONTIN) 300 MG/6ML oral solution Take 6 mLs (300 mg) by mouth 3 times daily 540 mL 4     levothyroxine (SYNTHROID/LEVOTHROID) 112 MCG tablet 112 mcg by Oral or Feeding Tube route daily       LORazepam (ATIVAN) 2 MG/ML (HIGH CONC) solution Take 0.25 mLs (0.5 mg) by mouth every 4 hours as needed for anxiety 30 mL 0     naloxone (NARCAN) 4 MG/0.1ML nasal spray Spray 1 spray (4 mg) into one nostril alternating nostrils as needed for opioid reversal every 2-3 minutes until assistance arrives 0.2 mL 1     ondansetron (ZOFRAN) 4 MG/5ML solution Take 10 mLs (8 mg) by mouth 2 times daily as needed for nausea or vomiting 100 mL 0     oxyCODONE (ROXICODONE) 5 MG/5ML solution Take 5-10 mLs (5-10 mg) by mouth every 4 hours as needed for severe pain 500 mL 0     umeclidinium-vilanterol (ANORO ELLIPTA) 62.5-25 MCG/INH oral inhaler Inhale 1 puff into the lungs daily 1 Inhaler 3       Family History:  Family History   Problem Relation Age of Onset     Colon Cancer Mother          age 65, in      Family History Negative Father         mild memory problems,  age 87, in      Family History Negative Brother      Family History Negative Brother      Cancer Brother         hx prostate ca     Sleep Apnea Brother        ROS: Complete 12 point ROS negative unless mentioned in HPI    Physical Exam:  There were no vitals taken for this visit.    Patient only speaks a few words over her phone call today, I did not hear any coughing. History taken primarily from wife    Labs and Radiology:    No new labs or imaging  PFT's:  No new PFTs    Sincerely,    Ana Cristina Richard MD

## 2020-12-03 NOTE — PROGRESS NOTES
"Agapito Fairbanks is a 70 year old male who is being evaluated via a billable video visit.      The patient has been notified of following:     \"This video visit will be conducted via a call between you and your physician/provider. We have found that certain health care needs can be provided without the need for an in-person physical exam.  This service lets us provide the care you need with a video conversation.  If a prescription is necessary we can send it directly to your pharmacy.  If lab work is needed we can place an order for that and you can then stop by our lab to have the test done at a later time.    Video visits are billed at different rates depending on your insurance coverage.  Please reach out to your insurance provider with any questions.    If during the course of the call the physician/provider feels a video visit is not appropriate, you will not be charged for this service.\"    Patient has given verbal consent for Video visit? Yes  How would you like to obtain your AVS? MyChart  If you are dropped from the video visit, the video invite should be resent to: Send to e-mail at: [email or web address]  Will anyone else be joining your video visit? Yes Wife Millicent      Medication Refill OXYCODONE, ATIVAN      Video-Visit Details    Type of service:  Video Visit    Video Start Time:    Video End Time:   Originating Location (pt. Location): Home    Distant Location (provider location):  [facility]     Platform used for Video Visit: Lani Arellano CMA        "

## 2020-12-03 NOTE — LETTER
12/3/2020         RE: Agapito Fairbanks  [address]  [place] [address]        Dear Colleague,    Thank you for referring your patient, Agapito Fairbanks, to the [facility]. Please see a copy of my visit note below.    Palliative Care Progress Note    Patient Name: Agapito Fairbanks  Primary Provider: Los Love    Chief Complaint/Patient ID: 70 yr M with metastatic Squamous cell lung cancer.   -Shoulder pain     Medical History/Summary:  - stage IV Squamous Cell LC diagnosed 2009 on PET for laryngeal cancer; s/p resection 2009               - mass in SKYLA seen on PET April 2020 invading mediastinum with metastasis to L4, L 2nd rib s/p palliative radiation (completed late April)              - on pembrolizumab since 4/29/20  - h/o laryngeal carcinoma 2005 s/p R laryngectomy; repeat resection followed by chemoradiation after recurrence Dec 2005  - CKD III  - mild cognitive impairment, follows with neurology (Dr Still)     Last Palliative care appointment: 11/11/20 with me.     Reviewed: Yes, no concerns.    Interim History:  Agapito Fairbanks 70 year old male is seen today for follow up with Palliative Care via billable video visit. Wife is also present on the video and provided most of the history.     Slowly increase the amount of oxycodone she has been giving him since last visit.  Most days, she is giving him 8-9 mg of oxycodone liquid AM and PM before bed and a dose of 9 mg overnight around 2 AM.  Some days, he gets a dose of 5 to 7 mg in the middle of the afternoon.  In addition, he is getting 650 mg (20mL) of Tylenol twice daily most days as well as a dose of 300 mg gabapentin in the middle of the day.  Overall feels that pain control is better, however he still has fairly significant pain.  Primarily involving his middle and lower back due to both tumor in his left chest and the mets in his ribs, soft tissue, and spine.  She is wondering about restarting  the fentanyl patch, as they have a number of them left.    She is still giving him Aricept 10 mg in the early, and it seems this has significantly helped with the sundowning which is disappeared.  She is no longer using the Ativan.    Does note his restless legs are fairly pronounced, and the twitching seems to increase his anxiety at night.    Sodium was improved on last check at 130.  She is convinced a lot of the hyponatremia was due to the amount of water flushes she was giving with his feeds.     Social History:  No changes.  Social History     Tobacco Use     Smoking status: Former Smoker     Packs/day: 1.50     Years: 30.00     Pack years: 45.00     Quit date: 1998     Years since quittin.9     Smokeless tobacco: Never Used   Substance Use Topics     Alcohol use: Yes     Comment: 2-3 times per week, 1-2 beers     Drug use: No     Family History- Reviewed in Epic.     Allergies   Allergen Reactions     Simvastatin      myalgias     Lisinopril Rash     rash       Advanced Care Planning: HCD on file. Wife is HCA.    Medications- Reviewed in Epic.    Past Medical History- Reviewed in [employer].    Past Surgical History- Reviewed in Epic.    Review of Systems:   ROS: 10 point ROS neg other than the symptoms noted above in the HPI.    Physical Exam:   Constitutional: Alert, pleasant, no apparent distress. Sitting up in bed.  Eyes: Sclera non-icteric, no eye discharge.  ENT: Nasal canula in place. No nasal discharge. Ears grossly normal. A little [place].  Respiratory: Unlabored respirations. Speaking in full sentences.  Musculoskeletal: Extremities appear thin- no gross deformities noted. No edema noted on upper body.   Skin: Pale, no suspicious lesions or rashes on visible skin.  Neurologic: Clear speech, no aphasia. No facial droop.  Psychiatric: Mentation appears normal, appropriate attention. Affect normal/bright. Does not appear anxious or depressed.    Key Data Reviewed:  LABS: 20- Cr 0.5, GFR >90,  Albumin 2.6, LFTs wnl. Sodium 130.   11/4/20- WBC 8.4, Hgb 9.7, Plts 280.     Impression & Recommendations & Counseling:  Agapito Fairbanks is a 70 year old male with history of metastatic lung cancer and mild cognitive impairment.     Cancer-related pain: Long discussion about pain management.  On average she is getting 30 to 40 mg of oxycodone daily, however, wife often feels that she is behind on his pain control as he does not know to ask for it until he is much more uncomfortable.  We did discuss starting a long-acting, and the fentanyl patches they have are 12 mcg.  Although he is currently taking <60 OMEs/day, I do think the fentanyl patch would be a reasonable choice to retry, and the 12 mcg dose does convert appropriately.  -We will restart fentanyl patch 12 mcg every 72 hours.  - Palliative care RN will check on them in a week to see how things are going.  - Plan to continue oxycodone 5-10 mg every 4-6 hours for breakthrough pain.  -Also suggested she increase the Tylenol to 30mL, which is closer to 1000 mg, for his 2 doses.    Restless legs: Seems to be quite distressing to patient.  Discussed gabapentin is a treatment for this.  - Will add 100 mg gabapentin at bedtime.  Discussed how she can escalate this up to 300 mg over the course of the week or so.    Difficulty sleeping  Sundowning: Has virtually disappeared with daily dosing of Aricept around 4-5PM.  - Provided refill of 10 mg Aricept today    Due to the extensive nature of our conversation around symptoms, and Roque wanting to get off the call, did not have much discourse about goals of care.  It appears from oncology notes that no further treatment for his cancer is recommended.  I believe they had a hospice informational meeting as well.  Would recommend exploring this at follow-up, as Millicent would benefit from the additional support of hospice in regards to managing Roque's symptoms.    Video-Visit Details  Video Start Time:  3:45 PM  Video End  "Time:  4:23 PM    Originating Location (pt. Location): Home     Distant Location (provider location):  [facility]     Platform used for Video Visit: Lani Daniels DO  Palliative Medicine   Pager [phone], [facility] ID [id number]      Agapito Fairbanks is a 70 year old male who is being evaluated via a billable video visit.      The patient has been notified of following:     \"This video visit will be conducted via a call between you and your physician/provider. We have found that certain health care needs can be provided without the need for an in-person physical exam.  This service lets us provide the care you need with a video conversation.  If a prescription is necessary we can send it directly to your pharmacy.  If lab work is needed we can place an order for that and you can then stop by our lab to have the test done at a later time.    Video visits are billed at different rates depending on your insurance coverage.  Please reach out to your insurance provider with any questions.    If during the course of the call the physician/provider feels a video visit is not appropriate, you will not be charged for this service.\"    Patient has given verbal consent for Video visit? Yes  How would you like to obtain your AVS? MyChart  If you are dropped from the video visit, the video invite should be resent to: Send to e-mail at: [email or web address]  Will anyone else be joining your video visit? Yes Wife Millicent      Medication Refill OXYCODONE, ATIVAN      Video-Visit Details    Type of service:  Video Visit    Video Start Time:    Video End Time:   Originating Location (pt. Location): Home    Distant Location (provider location):  [facility]     Platform used for Video Visit: Lani Arellano CMA            Again, thank you for allowing me to participate in the care of your patient.        Sincerely,        Jessica MALLOY" Frances, DO

## 2020-12-03 NOTE — PROGRESS NOTES
Palliative Care Progress Note    Patient Name: Agapito Fairbanks  Primary Provider: Los Love    Chief Complaint/Patient ID: 70 yr M with metastatic Squamous cell lung cancer.   -Shoulder pain     Medical History/Summary:  - stage IV Squamous Cell LC diagnosed 2009 on PET for laryngeal cancer; s/p resection 2009               - mass in SKYLA seen on PET April 2020 invading mediastinum with metastasis to L4, L 2nd rib s/p palliative radiation (completed late April)              - on pembrolizumab since 4/29/20  - h/o laryngeal carcinoma 2005 s/p R laryngectomy; repeat resection followed by chemoradiation after recurrence Dec 2005  - CKD III  - mild cognitive impairment, follows with neurology (Dr Still)     Last Palliative care appointment: 11/11/20 with me.     Reviewed: Yes, no concerns.    Interim History:  Agapito Fairbanks 70 year old male is seen today for follow up with Palliative Care via billable video visit. Wife is also present on the video and provided most of the history.     Slowly increase the amount of oxycodone she has been giving him since last visit.  Most days, she is giving him 8-9 mg of oxycodone liquid AM and PM before bed and a dose of 9 mg overnight around 2 AM.  Some days, he gets a dose of 5 to 7 mg in the middle of the afternoon.  In addition, he is getting 650 mg (20mL) of Tylenol twice daily most days as well as a dose of 300 mg gabapentin in the middle of the day.  Overall feels that pain control is better, however he still has fairly significant pain.  Primarily involving his middle and lower back due to both tumor in his left chest and the mets in his ribs, soft tissue, and spine.  She is wondering about restarting the fentanyl patch, as they have a number of them left.    She is still giving him Aricept 10 mg in the early, and it seems this has significantly helped with the sundowning which is disappeared.  She is no longer using the Ativan.    Does note his restless legs are  fairly pronounced, and the twitching seems to increase his anxiety at night.    Sodium was improved on last check at 130.  She is convinced a lot of the hyponatremia was due to the amount of water flushes she was giving with his feeds.     Social History:  No changes.  Social History     Tobacco Use     Smoking status: Former Smoker     Packs/day: 1.50     Years: 30.00     Pack years: 45.00     Quit date: 1998     Years since quittin.9     Smokeless tobacco: Never Used   Substance Use Topics     Alcohol use: Yes     Comment: 2-3 times per week, 1-2 beers     Drug use: No     Family History- Reviewed in Epic.     Allergies   Allergen Reactions     Simvastatin      myalgias     Lisinopril Rash     rash       Advanced Care Planning: HCD on file. Wife is HCA.    Medications- Reviewed in Epic.    Past Medical History- Reviewed in [facility].    Past Surgical History- Reviewed in Epic.    Review of Systems:   ROS: 10 point ROS neg other than the symptoms noted above in the HPI.    Physical Exam:   Constitutional: Alert, pleasant, no apparent distress. Sitting up in bed.  Eyes: Sclera non-icteric, no eye discharge.  ENT: Nasal canula in place. No nasal discharge. Ears grossly normal. A little [place].  Respiratory: Unlabored respirations. Speaking in full sentences.  Musculoskeletal: Extremities appear thin- no gross deformities noted. No edema noted on upper body.   Skin: Pale, no suspicious lesions or rashes on visible skin.  Neurologic: Clear speech, no aphasia. No facial droop.  Psychiatric: Mentation appears normal, appropriate attention. Affect normal/bright. Does not appear anxious or depressed.    Key Data Reviewed:  LABS: 20- Cr 0.5, GFR >90, Albumin 2.6, LFTs wnl. Sodium 130.   20- WBC 8.4, Hgb 9.7, Plts 280.     Impression & Recommendations & Counseling:  Agapito Fairbanks is a 70 year old male with history of metastatic lung cancer and mild cognitive impairment.     Cancer-related pain: Long  discussion about pain management.  On average she is getting 30 to 40 mg of oxycodone daily, however, wife often feels that she is behind on his pain control as he does not know to ask for it until he is much more uncomfortable.  We did discuss starting a long-acting, and the fentanyl patches they have are 12 mcg.  Although he is currently taking <60 OMEs/day, I do think the fentanyl patch would be a reasonable choice to retry, and the 12 mcg dose does convert appropriately.  -We will restart fentanyl patch 12 mcg every 72 hours.  - Palliative care RN will check on them in a week to see how things are going.  - Plan to continue oxycodone 5-10 mg every 4-6 hours for breakthrough pain.  -Also suggested she increase the Tylenol to 30mL, which is closer to 1000 mg, for his 2 doses.    Restless legs: Seems to be quite distressing to patient.  Discussed gabapentin is a treatment for this.  - Will add 100 mg gabapentin at bedtime.  Discussed how she can escalate this up to 300 mg over the course of the week or so.    Difficulty sleeping  Sundowning: Has virtually disappeared with daily dosing of Aricept around 4-5PM.  - Provided refill of 10 mg Aricept today    Due to the extensive nature of our conversation around symptoms, and Roque wanting to get off the call, did not have much discourse about goals of care.  It appears from oncology notes that no further treatment for his cancer is recommended.  I believe they had a hospice informational meeting as well.  Would recommend exploring this at follow-up, as Millicent would benefit from the additional support of hospice in regards to managing Roque's symptoms.    Video-Visit Details  Video Start Time:  3:45 PM  Video End Time:  4:23 PM    Originating Location (pt. Location): Home     Distant Location (provider location):  [facility]     Platform used for Video Visit: Lani Daniels DO  Palliative Medicine   Pager [phone],  [facility] ID [id number]

## 2020-12-03 NOTE — PATIENT INSTRUCTIONS
Recommendations:  - Restart the fentanyl patch and change this every 3 days. This takes about 12-16 hours to reach peak effect in the blood, so you will likely need to give a dose of oxycodone overnight and in the morning as you have been.  - Continue using the oxycodone liquid 5-10mLs every 4-6 hours as needed for breakthrough pain.   - Recommend increasing Tylenol to 30mLs twice daily.  - For the restless legs, suggest adding a dose of gabapentin at bedtime.  Would start with 2mLs and do this for 3-5 days.  If this seems well tolerated and there are no issues or side effects, increase to 4mLs.  Again, if this seems well-tolerated and there are no side effects, you can increase to 6mLs if you feel it is needed.    Follow up: 2 months, RN will call you to check in about in about a week      Reasons to Call    If you are having worsening/uncontrolled symptoms we want you to call!    You or your other physicians make any changes to medications we have prescribed.    Important Phone Numbers    DIAN Chao, RN palliative care nurse clinician [phone]    Berenice Peña. LPN, Palliative care coordinator [phone]    Pushpa Martinez. Palliative Care RN. Answered 8 am to 4 pm. [phone]    Appointment Line.[phone]   *After hours or on weekends. Will connect you with on call MD. [phone]

## 2020-12-04 NOTE — TELEPHONE ENCOUNTER
[facility] where liquid [facility]cod[facility] was sent does not carry liquid Oxycodone - wife Millicent asks for script to be resent to [facility]

## 2020-12-11 NOTE — TELEPHONE ENCOUNTER
Dr Daniels has ordered an increase in Fentanyl to 37.5mcg. [facility] does not have this in stock and can get by Monday.    Ok to use x3 12mcg patches when change made tonight.

## 2020-12-11 NOTE — TELEPHONE ENCOUNTER
Millicent called back     She reports that [place] back pain is not adequately controlled and that in addition to the Fentanyl patch at 12mcg  q72hrs he is requiring Oxycodone 10mgs every 4 hours.In addition she gives Roque Tylenol.    I will discuss next steps with Dr Estrella Arriola

## 2020-12-11 NOTE — TELEPHONE ENCOUNTER
Call made to Millicent Cisneros wife to evaluate his pain and the response to newly prescribed Fentanyl Patch.     VM left requesting a call back

## 2020-12-17 NOTE — TELEPHONE ENCOUNTER
Requested medications Aricept and Oxycodone    Days till out  3  Last refill: 12/04   Last office visit: 12/03    Patient would like script  eprescribed     [place]  Report reviewed.

## 2020-12-23 NOTE — TELEPHONE ENCOUNTER
Pt under palliative care. TSH elevated but normal FT4 and losing weight.  Per chart, pt now taking 112mcg daily  Per dose change of other MD. Rx for 125mcg dose declined

## 2020-12-31 NOTE — TELEPHONE ENCOUNTER
Requested medications Oxycodone   Last refill: 12/18  Last office visit: 11/11  Currently taking 50mgs per day - 10mgs q4 x 5 doses   Scheduled for follow up Will enroll in hospice in next few days - waiting on G-Tube to be changed before admission   Patient would like script  eprescribed    [place]  Report reviewed.

## 2020-12-31 NOTE — TELEPHONE ENCOUNTER
I received a call from home care that patient has wheezing and coughing up green sputum.  He has no fever or shortness of breath or chest pain.  The patient and wife are requesting an antibiotic.  Chart reviewed.  It appears that patient is going to enroll onto hospice soon but has not yet, and likely would like to avoid going to the hospital.  I did prescribe levofloxacin for 5 days.  If he develops symptoms, such as fever, shortness of breath, chest pain, he is to go to the ED.  Discussed with Erika, home care nurse.      Will let Dr. Mullins and Tressa know to check on patient on Monday.

## 2020-12-31 NOTE — TELEPHONE ENCOUNTER
Left detailed message for Erika but would like to ensure she received message and asked her to call back if any questions. Also paged her thru [place] to call back.

## 2020-12-31 NOTE — TELEPHONE ENCOUNTER
Reason for Call:  Other call back    Detailed comments: Erika gerber called and is wondering if the patient can get an antibiotic for the patients wheezing from the right lung and is coughing up green mucus. Please advise thank you    Phone Number Patient can be reached at: Other phone number:  [phone]    Best Time: anhytime    Can we leave a detailed message on this number? YES    Call taken on 12/31/2020 at 2:33 PM by Lucia Christianson

## 2020-12-31 NOTE — TELEPHONE ENCOUNTER
Reason for Call: Request for an order or referral:    Order or referral being requested: Homecare skilled nursing    Date needed: as soon as possible    Has the patient been seen by the PCP for this problem? YES    Additional comments: Erika from [facility] called and is requesting verbal orders for skilled nursing 1x week for 2 weeks and 2 PRN. Please advise thank you    Phone number Patient can be reached at:  Other phone number:  [phone]    Best Time:  anytime    Can we leave a detailed message on this number?  YES    Call taken on 12/31/2020 at 2:30 PM by Lucia Christianson;

## 2020-12-31 NOTE — TELEPHONE ENCOUNTER
I have not had contact with patient for over 3 months.  Being managed by oncology for lung cancer.   Discussions in chart noted  regarding future hospice  To which I have not been involved to know levels of care pt still wishes to have at this time also.  Would therefore  recommend home care nurse speak with patient's oncologist regarding current symptoms and management

## 2020-12-31 NOTE — TELEPHONE ENCOUNTER
Patients wife is requesting a G-tube replacement before enrolling in hospice - IR can perform on 01/06 and will arrange a COVID test before procedure

## 2021-01-01 ENCOUNTER — TELEPHONE (OUTPATIENT)
Dept: ONCOLOGY | Facility: CLINIC | Age: 71
End: 2021-01-01

## 2021-01-01 ENCOUNTER — PATIENT OUTREACH (OUTPATIENT)
Dept: ONCOLOGY | Facility: CLINIC | Age: 71
End: 2021-01-01

## 2021-01-01 DIAGNOSIS — Z11.59 ENCOUNTER FOR SCREENING FOR OTHER VIRAL DISEASES: ICD-10-CM

## 2021-01-01 LAB
LABORATORY COMMENT REPORT: NORMAL
SARS-COV-2 RNA SPEC QL NAA+PROBE: NEGATIVE
SARS-COV-2 RNA SPEC QL NAA+PROBE: NORMAL
SPECIMEN SOURCE: NORMAL
SPECIMEN SOURCE: NORMAL

## 2021-01-01 PROCEDURE — U0003 INFECTIOUS AGENT DETECTION BY NUCLEIC ACID (DNA OR RNA); SEVERE ACUTE RESPIRATORY SYNDROME CORONAVIRUS 2 (SARS-COV-2) (CORONAVIRUS DISEASE [COVID-19]), AMPLIFIED PROBE TECHNIQUE, MAKING USE OF HIGH THROUGHPUT TECHNOLOGIES AS DESCRIBED BY CMS-2020-01-R: HCPCS | Performed by: FAMILY MEDICINE

## 2021-01-01 PROCEDURE — U0005 INFEC AGEN DETEC AMPLI PROBE: HCPCS | Performed by: FAMILY MEDICINE

## 2021-01-04 NOTE — TELEPHONE ENCOUNTER
Writer called and spoke with Millicent, patient's wife, and patient symptoms have improved. Per the HC RN patient's lungs sounds have improved and Millicent denies  coughing any sputum or fever.     Plan is to do a gastrostomy tube exchange and sign onto hospice this Thursday after tube exchange.     Cecile Alanis RN

## 2021-01-04 NOTE — PROGRESS NOTES
Writer received a message from HCRN Ashley, that patient 02 saturations are 79%. ED is not an option and she is unable to auscultate lung sounds in the left side. Patient does not appear to be in distress and LVM asking for an order of high high the NC 02 can be set at. Ashley HCRN reports it is set at 3.5 liters.     [place] returned call and LVM stating 4 liters is ok and to set up hospice sooner then later requesting a return call.    Cecile Alanis RN

## 2021-01-04 NOTE — TELEPHONE ENCOUNTER
Millicent called to report that she needed a refill of Ativan for Roque.    She reported that he appeared to be retaining fluids and that he had increased O2 requirements requiring and increase in  his O2 to 4L  She plans to enroll Roque  in Hospice after his G-Tube is swapped out at [facility] on Wednesday.    I encouraged Millicent to call Hospice and enroll Roque and that his tube feed should not be the priority at this point.     As Roque's G-tube is clogged Millicent plans to call the Home care RN to help with this and will decide whether he should enroll in hospice

## 2021-01-05 ENCOUNTER — TELEPHONE (OUTPATIENT)
Dept: ONCOLOGY | Facility: CLINIC | Age: 71
End: 2021-01-05

## 2021-01-05 ENCOUNTER — TELEPHONE (OUTPATIENT)
Dept: INTERVENTIONAL RADIOLOGY/VASCULAR | Facility: CLINIC | Age: 71
End: 2021-01-05

## 2021-01-05 NOTE — TELEPHONE ENCOUNTER
Writer returned call and LVM with Baljinder HOPSON intake hospice nurse that Dr. Mullins will be the attending provider.     Cecile Alanis RN

## 2021-01-05 NOTE — TELEPHONE ENCOUNTER
Baljinder from [facility] called clinic inquiring if Dr. Mullins would follow patient in Hospice. Message will be routed to Dr. Mullins and Baljinder would like a call back at [phone].

## 2021-01-05 NOTE — TELEPHONE ENCOUNTER
Pt called for pre-procedure/covid. Pt spouse answered and pt passed away last night and needed case to be cancelled.

## 2021-01-07 ENCOUNTER — MEDICAL CORRESPONDENCE (OUTPATIENT)
Dept: HEALTH INFORMATION MANAGEMENT | Facility: CLINIC | Age: 71
End: 2021-01-07

## 2021-01-10 ENCOUNTER — TELEPHONE (OUTPATIENT)
Dept: ONCOLOGY | Facility: CLINIC | Age: 71
End: 2021-01-10

## 2021-02-01 DIAGNOSIS — F32.0 MILD MAJOR DEPRESSION (H): ICD-10-CM

## 2021-03-28 NOTE — PROGRESS NOTES
"Infusion Nursing Note:  Agapito Fairbanks presents today for IVF.    Patient seen by provider today: No    Note: Patient arrived to infusion center accompanied by his wife.  Patient using a walker and home oxygen which he was discharged with from his last hospitalization.  Patient wife reports that patient is more \"off\" today then he has been.  Per wife, patient is more confused.  Patient denies feeling confused.  Patient denies any pain or trouble breathing currently.  Patient did have two radiation treatments to chest Friday and Saturday and his wife reports that since the treatments his lungs don't sound as congested.  Patient given 1L of NS as per ordered and Ok ed by Dr. ELIZA Bhatia ( see earlier telephone encounter from 8/30/20).  No labs drawn today for upcoming Zometa infusion on 9/2/20.  Patient's last BMP is from 8/28/20 with his last albumin on 8/26/20.  Ok per Ashleigh King pharmacist, to use labs from last week for upcoming Zometa due on 9/2/20.      Patient tolerated IVF well. This RN reminded patient's wife to continue to monitor patient's confusion and to be sure to bring him into the ED with increased confusion ans /or development of new or worsening symptoms.  Wife verbalized understanding.     In-basket message sent to Tressa Alanis RN and Dr. Mullins, updating them on patient's status today.      Intravenous Access:  Peripheral IV placed.      Treatment Conditions:  Not Applicable.      Post Infusion Assessment:  Patient tolerated infusion without incident.  Blood return noted pre and post infusion.  Site patent and intact, free from redness, edema or discomfort.  No evidence of extravasations.  Access discontinued per protocol.    Discharge Plan:   Patient declined prescription refills.  Discharge instructions reviewed with: Patient.  Patient and/or family verbalized understanding of discharge instructions and all questions answered.  Copy of AVS reviewed with patient and/or family.  Patient will return " 9/2/20 for next appointment.  Patient discharged in stable condition accompanied by: wife.  Departure Mode: Wheelchair.    Mariya Connors RN                         no rash/no itching

## 2021-08-15 NOTE — PATIENT INSTRUCTIONS
Suspect viral URI.    Guaifenesin with dextromethorphan as needed for cough (Robitussin DM, Mucinex DM, Delsym DM).    Sudafed as needed for sinus congestion or runny nose.    Neti pot once or twice daily if possible.     ---    I don't think you need a chest xray or antibiotic right now, BUT if symptoms significantly worsen or do not improve after 1.5-2 weeks of symptoms, you should fill the antibiotic and/or contact me or Dr. Love.   
no

## 2021-10-19 PROBLEM — F32.9 MAJOR DEPRESSION: Status: ACTIVE | Noted: 2020-01-01

## 2023-04-03 PROBLEM — C79.51 MALIGNANT NEOPLASM METASTATIC TO BONE (H): Status: ACTIVE | Noted: 2020-01-01

## 2024-08-22 NOTE — LETTER
[place] CARE COORDINATION  [facility]  [address], [place] [address]    May 8, 2020    Agapito ARECHIGA Fairbanks  [address]   [facility] [place] [facility] [address]      Dear Agapito,    I am a clinic care coordinator who works with Los Love MD at [facility]. I wanted to introduce myself and provide you with my contact information for you to be able to call me with any questions or concerns. Below is a description of clinic care coordination and how I can further assist you.      The clinic care coordination team is made up of a registered nurse,  and community health worker who understand the health care system. The goal of clinic care coordination is to help you manage your health and improve access to the health care system in the most efficient manner. The team can assist you in meeting your health care goals by providing education, coordinating services, strengthening the communication among your providers and supporting you with any resource needs.    Please feel free to contact me with any questions or concerns. We are focused on providing you with the highest-quality healthcare experience possible and that all starts with you.     Sincerely,     Leonardo Bardales RN  Clinic Care Coordinator  [facility], [facility], [facility]  Ph: [phone]    Enclosed: I have enclosed a copy of a 24 Hour Access Plan. This has helpful phone numbers for you to call when needed. Please keep this in an easy to access place to use as needed.   Male

## (undated) RX ORDER — FENTANYL CITRATE 50 UG/ML
INJECTION, SOLUTION INTRAMUSCULAR; INTRAVENOUS
Status: DISPENSED
Start: 2020-01-01

## (undated) RX ORDER — LIDOCAINE HYDROCHLORIDE 10 MG/ML
INJECTION, SOLUTION INFILTRATION; PERINEURAL
Status: DISPENSED
Start: 2020-01-01